# Patient Record
Sex: FEMALE | Race: WHITE | Employment: UNEMPLOYED | URBAN - METROPOLITAN AREA
[De-identification: names, ages, dates, MRNs, and addresses within clinical notes are randomized per-mention and may not be internally consistent; named-entity substitution may affect disease eponyms.]

---

## 2019-04-26 ENCOUNTER — HOSPITAL ENCOUNTER (EMERGENCY)
Age: 49
Discharge: HOME | End: 2019-04-26
Attending: EMERGENCY MEDICINE
Payer: COMMERCIAL

## 2019-04-26 VITALS
OXYGEN SATURATION: 97 % | HEIGHT: 65 IN | TEMPERATURE: 98.4 F | DIASTOLIC BLOOD PRESSURE: 97 MMHG | SYSTOLIC BLOOD PRESSURE: 181 MMHG | RESPIRATION RATE: 18 BRPM | WEIGHT: 224 LBS | HEART RATE: 78 BPM | BODY MASS INDEX: 37.32 KG/M2

## 2019-04-26 DIAGNOSIS — N12 PYELONEPHRITIS: Primary | ICD-10-CM

## 2019-04-26 LAB
-: ABNORMAL
AMORPHOUS: ABNORMAL
BACTERIA: ABNORMAL
BILIRUBIN URINE: NEGATIVE
CASTS UA: ABNORMAL /LPF (ref 0–8)
COLOR: YELLOW
CRYSTALS, UA: ABNORMAL /HPF
EPITHELIAL CELLS UA: ABNORMAL /HPF (ref 0–5)
GLUCOSE URINE: ABNORMAL
HCG(URINE) PREGNANCY TEST: NEGATIVE
KETONES, URINE: ABNORMAL
LEUKOCYTE ESTERASE, URINE: ABNORMAL
MUCUS: ABNORMAL
NITRITE, URINE: POSITIVE
OTHER OBSERVATIONS UA: ABNORMAL
PH UA: 6 (ref 5–8)
PROTEIN UA: NEGATIVE
RBC UA: ABNORMAL /HPF (ref 0–4)
RENAL EPITHELIAL, UA: ABNORMAL /HPF
SPECIFIC GRAVITY UA: 1.02 (ref 1–1.03)
TRICHOMONAS: ABNORMAL
TURBIDITY: CLEAR
URINE HGB: NEGATIVE
UROBILINOGEN, URINE: NORMAL
WBC UA: ABNORMAL /HPF (ref 0–5)
YEAST: ABNORMAL

## 2019-04-26 PROCEDURE — 2580000003 HC RX 258: Performed by: EMERGENCY MEDICINE

## 2019-04-26 PROCEDURE — 84703 CHORIONIC GONADOTROPIN ASSAY: CPT

## 2019-04-26 PROCEDURE — 6370000000 HC RX 637 (ALT 250 FOR IP): Performed by: EMERGENCY MEDICINE

## 2019-04-26 PROCEDURE — 81001 URINALYSIS AUTO W/SCOPE: CPT

## 2019-04-26 PROCEDURE — 87186 SC STD MICRODIL/AGAR DIL: CPT

## 2019-04-26 PROCEDURE — 87088 URINE BACTERIA CULTURE: CPT

## 2019-04-26 PROCEDURE — 87086 URINE CULTURE/COLONY COUNT: CPT

## 2019-04-26 PROCEDURE — 99284 EMERGENCY DEPT VISIT MOD MDM: CPT

## 2019-04-26 RX ORDER — CLOPIDOGREL BISULFATE 75 MG/1
75 TABLET ORAL DAILY
COMMUNITY
Start: 2019-05-31 | End: 2021-09-13 | Stop reason: SDUPTHER

## 2019-04-26 RX ORDER — CEPHALEXIN 250 MG/1
500 CAPSULE ORAL ONCE
Status: COMPLETED | OUTPATIENT
Start: 2019-04-26 | End: 2019-04-26

## 2019-04-26 RX ORDER — CEPHALEXIN 500 MG/1
500 CAPSULE ORAL 2 TIMES DAILY
Qty: 20 CAPSULE | Refills: 0 | Status: SHIPPED | OUTPATIENT
Start: 2019-04-26 | End: 2019-05-06

## 2019-04-26 RX ORDER — RANOLAZINE 500 MG/1
500 TABLET, EXTENDED RELEASE ORAL 2 TIMES DAILY
COMMUNITY
Start: 2019-05-31 | End: 2020-10-01 | Stop reason: SDUPTHER

## 2019-04-26 RX ORDER — ATORVASTATIN CALCIUM 80 MG/1
80 TABLET, FILM COATED ORAL DAILY
COMMUNITY
Start: 2019-05-31 | End: 2021-09-13 | Stop reason: SDUPTHER

## 2019-04-26 RX ORDER — ASPIRIN 81 MG/1
81 TABLET, CHEWABLE ORAL DAILY
COMMUNITY
Start: 2019-05-31

## 2019-04-26 RX ORDER — TAMSULOSIN HYDROCHLORIDE 0.4 MG/1
0.4 CAPSULE ORAL ONCE
Status: COMPLETED | OUTPATIENT
Start: 2019-04-26 | End: 2019-04-26

## 2019-04-26 RX ORDER — 0.9 % SODIUM CHLORIDE 0.9 %
1000 INTRAVENOUS SOLUTION INTRAVENOUS ONCE
Status: COMPLETED | OUTPATIENT
Start: 2019-04-26 | End: 2019-04-26

## 2019-04-26 RX ORDER — METOPROLOL SUCCINATE 100 MG/1
100 TABLET, EXTENDED RELEASE ORAL 2 TIMES DAILY
COMMUNITY
Start: 2019-05-31 | End: 2019-05-31 | Stop reason: SDUPTHER

## 2019-04-26 RX ORDER — LEVOTHYROXINE SODIUM 0.07 MG/1
75 TABLET ORAL DAILY
COMMUNITY
Start: 2019-05-31 | End: 2019-05-31 | Stop reason: SDUPTHER

## 2019-04-26 RX ADMIN — CEPHALEXIN 500 MG: 250 CAPSULE ORAL at 10:17

## 2019-04-26 RX ADMIN — SODIUM CHLORIDE 1000 ML: 9 INJECTION, SOLUTION INTRAVENOUS at 09:29

## 2019-04-26 RX ADMIN — TAMSULOSIN HYDROCHLORIDE 0.4 MG: 0.4 CAPSULE ORAL at 09:26

## 2019-04-26 ASSESSMENT — PAIN SCALES - GENERAL: PAINLEVEL_OUTOF10: 9

## 2019-04-26 ASSESSMENT — ENCOUNTER SYMPTOMS
VOMITING: 0
COUGH: 0
BLOOD IN STOOL: 0
BACK PAIN: 0
WHEEZING: 0
DIARRHEA: 0
NAUSEA: 0
ABDOMINAL PAIN: 0
SHORTNESS OF BREATH: 0
SORE THROAT: 0
CONSTIPATION: 0

## 2019-04-26 ASSESSMENT — PAIN DESCRIPTION - PAIN TYPE: TYPE: ACUTE PAIN

## 2019-04-26 ASSESSMENT — PAIN DESCRIPTION - LOCATION: LOCATION: ABDOMEN

## 2019-04-26 NOTE — ED PROVIDER NOTES
Emergency Medicine Attending Note    I have seen and examined the patient in conjunction with the Resident/MLP. Please see my key portion documented:      I agree with the assessment and plan as discussed with Dr. Michael Moore. Electronically signed:  TRISTAN Swenson MD  04/26/19 [id number]

## 2019-04-26 NOTE — ED PROVIDER NOTES
[facility] ED  Emergency Department Encounter  EmergencyMedicine Resident     Pt Beatrice Villanueva  MRN: [id number]  Rositagfkishan 1970  Date of evaluation: 19  PCP:  No primary care provider on file. CHIEF COMPLAINT       Chief Complaint   Patient presents with    Abdominal Pain     hx of kidney infections and stones per patient. x 12 hours    Nausea       HISTORY OF PRESENT ILLNESS  (Location/Symptom, Timing/Onset, Context/Setting, Quality, Duration, Modifying Factors, Severity.)      Stephanie Bonilla is a 57 y.o. female who presents with a complaint that overnight she began having sharp left-sided flank pain radiating to her groin. She has a history of nephrolithiasis. She denies any vaginal discharge. She does admit to some dark colored urine over the past few days. She also admits to some dysuria. She has no peritoneal tenderness. Denies any shortness of breath or chest pain. She is afebrile today and has not taken any medications yet. PAST MEDICAL / SURGICAL / SOCIAL / FAMILY HISTORY      has a past medical history of Anxiety, Arthritis, CAD (coronary artery disease), Depression, Diabetes mellitus ([address] Hyperlipidemia, Kidney stone, and Myocardial infarct ([facility] [address] has a past surgical history that includes Cardiac surgery; back surgery;  section; Mastoid surgery; and Tonsillectomy.     Social History     Socioeconomic History    Marital status:      Spouse name: Not on file    Number of children: Not on file    Years of education: Not on file    Highest education level: Not on file   Occupational History    Not on file   Social Needs    Financial resource strain: Not on file    Food insecurity:     Worry: Not on file     Inability: Not on file    Transportation needs:     Medical: Not on file     Non-medical: Not on file   Tobacco Use    Smoking status: Former Smoker     Packs/day: 3.00     Years: 20.00     Pack years: 60.00     Types: Cigarettes     Last attempt to quit: 2016     Years since quitting: 3.3   Substance and Sexual Activity    Alcohol use: Yes     Comment: social    Drug use: Yes     Frequency: 7.0 times per week     Types: Marijuana    Sexual activity: Yes   Lifestyle    Physical activity:     Days per week: Not on file     Minutes per session: Not on file    Stress: Not on file   Relationships    Social connections:     Talks on phone: Not on file     Gets together: Not on file     Attends Jainism service: Not on file     Active member of club or organization: Not on file     Attends meetings of clubs or organizations: Not on file     Relationship status: Not on file    Intimate partner violence:     Fear of current or ex partner: Not on file     Emotionally abused: Not on file     Physically abused: Not on file     Forced sexual activity: Not on file   Other Topics Concern    Not on file   Social History Narrative    Not on file       No family history on file. Allergies:  Patient has no known allergies. Home Medications:  Prior to Admission medications    Medication Sig Start Date End Date Taking?  Authorizing Provider   clopidogrel (PLAVIX) 75 MG tablet Take 75 mg by mouth daily   Yes Historical Provider, MD   atorvastatin (LIPITOR) 80 MG tablet Take 80 mg by mouth daily   Yes Historical Provider, MD   metoprolol succinate (TOPROL XL) 100 MG extended release tablet Take 100 mg by mouth 2 times daily   Yes Historical Provider, MD   ranolazine (RANEXA) 500 MG extended release tablet Take 500 mg by mouth 2 times daily   Yes Historical Provider, MD   levothyroxine (SYNTHROID) 75 MCG tablet Take 75 mcg by mouth Daily   Yes Historical Provider, MD   aspirin 81 MG chewable tablet Take 81 mg by mouth daily   Yes Historical Provider, MD   cephALEXin (KEFLEX) 500 MG capsule Take 1 capsule by mouth 2 times daily for 10 days 4/26/19 5/6/19 Yes Roberto Herrera, DO       REVIEW OF SYSTEMS    (2-9 systems for level 4, 10 or more for level 5)      Review of Systems   Constitutional: Negative for diaphoresis and fever. HENT: Negative for sore throat. Respiratory: Negative for cough, shortness of breath and wheezing. Cardiovascular: Negative for chest pain, palpitations and leg swelling. Gastrointestinal: Negative for abdominal pain, blood in stool, constipation, diarrhea, nausea and vomiting. Genitourinary: Positive for dysuria and flank pain. Negative for frequency, hematuria, vaginal bleeding, vaginal discharge and vaginal pain. Musculoskeletal: Negative for back pain and neck pain. Skin: Negative for rash. Neurological: Negative for dizziness and headaches. Hematological: Does not bruise/bleed easily. PHYSICAL EXAM   (up to 7 for level 4, 8 or more for level 5)      INITIAL VITALS:   BP (!) 181/97   Pulse 78   Temp 98.4 °F (36.9 °C) (Oral)   Resp 18   Ht 5' 5\" (1.651 m)   Wt 224 lb (101.6 kg)   LMP 04/19/2019   SpO2 97%   BMI 37.28 kg/m²     Physical Exam   Constitutional: She is oriented to person, place, and time. She appears well-developed and well-nourished. HENT:   Head: Normocephalic and atraumatic. Eyes: Pupils are equal, round, and reactive to light. Right eye exhibits no discharge. Left eye exhibits no discharge. Neck: Normal range of motion. Cardiovascular: Normal rate, regular rhythm and normal heart sounds. Exam reveals no gallop and no friction rub. No murmur heard. Pulmonary/Chest: No respiratory distress. She has no wheezes. She has no rales. She exhibits no tenderness. Abdominal: Soft. Bowel sounds are normal. She exhibits no distension and no mass. There is no tenderness. There is no rebound and no guarding. Patient has left CVA tenderness radiating to left groin. She has no abdominal tenderness on the right side or epigastric tenderness. Musculoskeletal: Normal range of motion. She exhibits no edema, tenderness or deformity.    Neurological: She is alert and oriented to person, place, and time. Skin: Skin is warm, dry and intact. No rash noted. She is not diaphoretic. No erythema. No pallor. Psychiatric: She has a normal mood and affect. Her speech is normal and behavior is normal. Judgment and thought content normal. Cognition and memory are normal.       DIFFERENTIAL  DIAGNOSIS     PLAN (LABS / IMAGING / EKG):  Orders Placed This Encounter   Procedures    Urine Culture    Urinalysis with Microscopic    Pregnancy, Urine       MEDICATIONS ORDERED:  Orders Placed This Encounter   Medications    tamsulosin (FLOMAX) capsule 0.4 mg    0.9 % sodium chloride bolus    cephALEXin (KEFLEX) 500 MG capsule     Sig: Take 1 capsule by mouth 2 times daily for 10 days     Dispense:  20 capsule     Refill:  0    cephALEXin (KEFLEX) capsule 500 mg       DDX: Herniated disc, AAA rupture, pyelonephritis, kidney stone, musculoskeletal pain, retroperitoneal hematoma, pancreatitis, splenic rupture, pneumonia      DIAGNOSTIC RESULTS / EMERGENCY DEPARTMENT COURSE / MDM     LABS:  Results for orders placed or performed during the hospital encounter of 04/26/19   Urine Culture   Result Value Ref Range    Specimen Description . CLEAN CATCH URINE     Special Requests NOT REPORTED     Culture ESCHERICHIA COLI >[id number] CFU/ML (A)        Susceptibility    Escherichia coli - BACTERIAL SUSCEPTIBILITY PANEL RENÉE     amikacin Value in next row        NOT REPORTED     ampicillin Value in next row Resistant       >=32RESISTANT     ampicillin-sulbactam Value in next row        NOT REPORTED     aztreonam Value in next row Sensitive       <=1SUSCEPTIBLE     ceFAZolin Value in next row Sensitive       <=4SUSCEPTIBLE     ceFAZolin Value in next row Sensitive       <=4SUSCEPTIBLE     cefepime Value in next row        NOT REPORTED     cefTRIAXone Value in next row Sensitive       <=1SUSCEPTIBLE     ciprofloxacin Value in next row Sensitive       <=0.25SUSCEPTIBLE     ertapenem Value in next row        NOT REPORTED Confirmatory Extended Spectrum Beta-Lactamase Value in next row Negative       NOT REPORTED     gentamicin Value in next row Sensitive       <=1SUSCEPTIBLE     meropenem Value in next row        NOT REPORTED     nitrofurantoin Value in next row Sensitive       <=16SUSCEPTIBLE     tigecycline Value in next row        NOT REPORTED     tobramycin Value in next row Sensitive       <=1SUSCEPTIBLE     trimethoprim-sulfamethoxazole Value in next row Sensitive       <=20SUSCEPTIBLE     piperacillin-tazobactam Value in next row Sensitive       <=4SUSCEPTIBLE   Urinalysis with Microscopic   Result Value Ref Range    Color, UA YELLOW YELLOW    Turbidity UA CLEAR CLEAR    Glucose, Ur 3+ (A) NEGATIVE    Bilirubin Urine NEGATIVE NEGATIVE    Ketones, Urine MODERATE (A) NEGATIVE    Specific Gravity, UA 1.021 1.005 - 1.030    Urine Hgb NEGATIVE NEGATIVE    pH, UA 6.0 5.0 - 8.0    Protein, UA NEGATIVE NEGATIVE    Urobilinogen, Urine Normal Normal    Nitrite, Urine POSITIVE (A) NEGATIVE    Leukocyte Esterase, Urine SMALL (A) NEGATIVE    -          WBC, UA 10 TO 20 0 - 5 /HPF    RBC, UA 0 TO 2 0 - 4 /HPF    Casts UA  0 - 8 /LPF     2 TO 5 HYALINE Reference range defined for non-centrifuged specimen. Crystals UA NOT REPORTED None /HPF    Epithelial Cells UA 2 TO 5 0 - 5 /HPF    Renal Epithelial, Urine NOT REPORTED 0 /HPF    Bacteria, UA MANY (A) None    Mucus, UA NOT REPORTED None    Trichomonas, UA NOT REPORTED None    Amorphous, UA NOT REPORTED None    Other Observations UA NOT REPORTED NOT REQ. Yeast, UA NOT REPORTED None   Pregnancy, Urine   Result Value Ref Range    HCG(Urine) Pregnancy Test NEGATIVE NEGATIVE       RADIOLOGY:     No results found. Bedside ultrasound performed by attending does not show any acute hydronephrosis.     MDM/EMERGENCY DEPARTMENT COURSE:      ED Course as of Apr 30 0956   Fri Apr 26, 2019   0926 Patient's vitals are stable, afebrile with no antipyretics, will check urinalysis for urinary tract infection and large amount hemoglobin and urine. [KW]   1010 Nitrite, Urine(!): POSITIVE [KW]   1011 Leukocyte Esterase, Urine(!): SMALL [KW]   1011 Patient has signs of urinary tract infection, will give first dose of Keflex here and discharged with Keflex for home. Bacteria, UA(!): MANY [KW]   1011 No blood in urine, lower suspicion for acute nephrolithiasis. [KW]   1013 We'll treat patient with first dose of Keflex in the emergency department followed by 10 day course of Keflex outpatient, patient instructed to return if she should develop fevers or worsening symptoms. [KW]   8990 HCG(Urine) Pregnancy Test: NEGATIVE [KW]      ED Course User Index  [KW] Roberto Herrera DO           PROCEDURES:  None    CONSULTS:  None    CRITICAL CARE:  None    FINAL IMPRESSION      1.  Pyelonephritis          DISPOSITION / PLAN     DISPOSITION Decision To Discharge    PATIENT REFERRED TO:  [facility] AT [place] [id number] [phone]  Schedule an appointment as soon as possible for a visit   For Follow Up    [facility]  [address] [place] [address]  [phone]    If symptoms worsen, or fever develops      DISCHARGE MEDICATIONS:  Discharge Medication List as of 4/26/2019 10:13 AM      START taking these medications    Details   cephALEXin (KEFLEX) 500 MG capsule Take 1 capsule by mouth 2 times daily for 10 days, Disp-20 capsule, R-0Print             Roberto Herrera DO  Emergency Medicine Resident    (Please note that portions of this note were completed with a voicerecognition program.  Efforts were made to edit the dictations but occasionally words are mis-transcribed.)       Roberto Herrera,   04/26/19 [address] [place] [address], DO  04/26/19 [address] [place] [address], DO  04/30/19 0453

## 2019-04-26 NOTE — ED NOTES
Bed: 08  Expected date:   Expected time:   Means of arrival:   Comments:  dereje Rincon RN  04/26/19 [id number]

## 2019-04-26 NOTE — ED TRIAGE NOTES
Patient arrived to unit via ems. Patient in wheelchair. Complains of left sided abdominal pain with minor nausea x 12 hours. Patient reports history of kidney issues. Patient able to ambulate to cot, reports pain 9/10.

## 2019-04-27 LAB
CULTURE: ABNORMAL
Lab: ABNORMAL
SPECIMEN DESCRIPTION: ABNORMAL

## 2019-05-25 ENCOUNTER — HOSPITAL ENCOUNTER (EMERGENCY)
Age: 49
Discharge: HOME | End: 2019-05-25
Attending: EMERGENCY MEDICINE
Payer: COMMERCIAL

## 2019-05-25 ENCOUNTER — APPOINTMENT (OUTPATIENT)
Dept: MRI IMAGING | Age: 49
End: 2019-05-25
Payer: COMMERCIAL

## 2019-05-25 ENCOUNTER — OFFICE VISIT (OUTPATIENT)
Dept: PRIMARY CARE CLINIC | Age: 49
End: 2019-05-25
Payer: COMMERCIAL

## 2019-05-25 VITALS
SYSTOLIC BLOOD PRESSURE: 140 MMHG | DIASTOLIC BLOOD PRESSURE: 92 MMHG | BODY MASS INDEX: 35.65 KG/M2 | HEIGHT: 65 IN | WEIGHT: 214 LBS

## 2019-05-25 VITALS
RESPIRATION RATE: 18 BRPM | BODY MASS INDEX: 37.92 KG/M2 | TEMPERATURE: 98.4 F | DIASTOLIC BLOOD PRESSURE: 102 MMHG | WEIGHT: 214 LBS | HEIGHT: 63 IN | HEART RATE: 94 BPM | OXYGEN SATURATION: 98 % | SYSTOLIC BLOOD PRESSURE: 149 MMHG

## 2019-05-25 DIAGNOSIS — R20.2 NUMBNESS AND TINGLING OF BOTH UPPER EXTREMITIES: ICD-10-CM

## 2019-05-25 DIAGNOSIS — M54.2 CERVICAL PAIN: Primary | ICD-10-CM

## 2019-05-25 DIAGNOSIS — F41.8 ANXIETY WITH DEPRESSION: ICD-10-CM

## 2019-05-25 DIAGNOSIS — M79.602 PARESTHESIA AND PAIN OF BOTH UPPER EXTREMITIES: ICD-10-CM

## 2019-05-25 DIAGNOSIS — R29.898 WEAKNESS OF BOTH UPPER EXTREMITIES: ICD-10-CM

## 2019-05-25 DIAGNOSIS — M79.601 PARESTHESIA AND PAIN OF BOTH UPPER EXTREMITIES: ICD-10-CM

## 2019-05-25 DIAGNOSIS — R20.2 PARESTHESIA AND PAIN OF BOTH UPPER EXTREMITIES: ICD-10-CM

## 2019-05-25 DIAGNOSIS — R20.0 NUMBNESS AND TINGLING OF BOTH UPPER EXTREMITIES: ICD-10-CM

## 2019-05-25 DIAGNOSIS — M54.2 CERVICAL PAIN (NECK): Primary | ICD-10-CM

## 2019-05-25 PROCEDURE — 4004F PT TOBACCO SCREEN RCVD TLK: CPT | Performed by: NURSE PRACTITIONER

## 2019-05-25 PROCEDURE — 99284 EMERGENCY DEPT VISIT MOD MDM: CPT

## 2019-05-25 PROCEDURE — 99213 OFFICE O/P EST LOW 20 MIN: CPT | Performed by: NURSE PRACTITIONER

## 2019-05-25 PROCEDURE — 6370000000 HC RX 637 (ALT 250 FOR IP): Performed by: EMERGENCY MEDICINE

## 2019-05-25 PROCEDURE — G8427 DOCREV CUR MEDS BY ELIG CLIN: HCPCS | Performed by: NURSE PRACTITIONER

## 2019-05-25 PROCEDURE — A9579 GAD-BASE MR CONTRAST NOS,1ML: HCPCS | Performed by: EMERGENCY MEDICINE

## 2019-05-25 PROCEDURE — G8419 CALC BMI OUT NRM PARAM NOF/U: HCPCS | Performed by: NURSE PRACTITIONER

## 2019-05-25 PROCEDURE — 96374 THER/PROPH/DIAG INJ IV PUSH: CPT

## 2019-05-25 PROCEDURE — 2709999900 MRI CERVICAL SPINE W WO CONTRAST

## 2019-05-25 PROCEDURE — 6360000002 HC RX W HCPCS: Performed by: EMERGENCY MEDICINE

## 2019-05-25 PROCEDURE — 6360000004 HC RX CONTRAST MEDICATION: Performed by: EMERGENCY MEDICINE

## 2019-05-25 RX ORDER — IBUPROFEN 800 MG/1
800 TABLET ORAL ONCE
Status: COMPLETED | OUTPATIENT
Start: 2019-05-25 | End: 2019-05-25

## 2019-05-25 RX ORDER — LEVOTHYROXINE SODIUM 0.07 MG/1
75 TABLET ORAL DAILY
Qty: 30 TABLET | Refills: 0 | Status: CANCELLED | OUTPATIENT
Start: 2019-05-25 | End: 2019-06-24

## 2019-05-25 RX ORDER — FENTANYL CITRATE 50 UG/ML
50 INJECTION, SOLUTION INTRAMUSCULAR; INTRAVENOUS ONCE
Status: COMPLETED | OUTPATIENT
Start: 2019-05-25 | End: 2019-05-25

## 2019-05-25 RX ORDER — HYDROCODONE BITARTRATE AND ACETAMINOPHEN 5; 325 MG/1; MG/1
1 TABLET ORAL EVERY 6 HOURS PRN
Qty: 10 TABLET | Refills: 0 | Status: SHIPPED | OUTPATIENT
Start: 2019-05-25 | End: 2019-05-31 | Stop reason: SDUPTHER

## 2019-05-25 RX ORDER — CYCLOBENZAPRINE HCL 5 MG
5 TABLET ORAL 3 TIMES DAILY PRN
Qty: 30 TABLET | Refills: 0 | Status: SHIPPED | OUTPATIENT
Start: 2019-05-25 | End: 2019-05-31 | Stop reason: SDUPTHER

## 2019-05-25 RX ORDER — IBUPROFEN 600 MG/1
600 TABLET ORAL EVERY 6 HOURS PRN
Qty: 30 TABLET | Refills: 0 | Status: SHIPPED | OUTPATIENT
Start: 2019-05-25 | End: 2019-05-31 | Stop reason: SDUPTHER

## 2019-05-25 RX ORDER — DIAZEPAM 5 MG/1
5 TABLET ORAL ONCE
Status: COMPLETED | OUTPATIENT
Start: 2019-05-25 | End: 2019-05-25

## 2019-05-25 RX ADMIN — IBUPROFEN 800 MG: 800 TABLET, FILM COATED ORAL at 11:45

## 2019-05-25 RX ADMIN — DIAZEPAM 5 MG: 5 TABLET ORAL at 11:45

## 2019-05-25 RX ADMIN — GADOTERIDOL 19 ML: 279.3 INJECTION, SOLUTION INTRAVENOUS at 13:52

## 2019-05-25 RX ADMIN — FENTANYL CITRATE 50 MCG: 50 INJECTION, SOLUTION INTRAMUSCULAR; INTRAVENOUS at 14:09

## 2019-05-25 ASSESSMENT — PAIN SCALES - GENERAL
PAINLEVEL_OUTOF10: 9

## 2019-05-25 ASSESSMENT — ENCOUNTER SYMPTOMS
COUGH: 0
SHORTNESS OF BREATH: 0
VOMITING: 0
COLOR CHANGE: 0
CONSTIPATION: 0
SHORTNESS OF BREATH: 0
TROUBLE SWALLOWING: 0
COUGH: 1
DIARRHEA: 0
PHOTOPHOBIA: 0
BACK PAIN: 1
ABDOMINAL PAIN: 0
CONSTIPATION: 1
NAUSEA: 0
DIARRHEA: 0

## 2019-05-25 ASSESSMENT — PAIN DESCRIPTION - DESCRIPTORS: DESCRIPTORS: SHARP;SHOOTING;STABBING

## 2019-05-25 ASSESSMENT — PAIN DESCRIPTION - FREQUENCY: FREQUENCY: CONTINUOUS

## 2019-05-25 ASSESSMENT — PAIN DESCRIPTION - PAIN TYPE: TYPE: CHRONIC PAIN

## 2019-05-25 ASSESSMENT — PAIN DESCRIPTION - LOCATION: LOCATION: NECK

## 2019-05-25 NOTE — ED NOTES
Family arrives back to bedside. Awaiting MRI result. Will continue to monitor.       Raphael Fisher RN  05/25/19 [id number]

## 2019-05-25 NOTE — ED NOTES
Pt returned from MRI. Pt asking for pain medications, states she is very uncomfortable. Resident notified.       Chuck Renee RN  05/25/19 7573

## 2019-05-25 NOTE — ED NOTES
Pt provided box lunch. Dr. Laron Gutierrez at bedside to update pt on results and plan of care.       Chuck Renee RN  05/25/19 6441

## 2019-05-25 NOTE — ED NOTES
Pt to ed with , pt states she was sent from the walk in clinic at [address] for evaluation. Pt states she had cervical fusion c5-c7 in November 2017 while living in [place]. Pt states for the past 1.5 weeks she has been experiencing symptoms intermittently. Pt c/o headache, bilateral arm numbness, left leg numbness, dizziness, anxiety, neck pain. Pt states symptoms are present during the day but are worse at night. Pt is alert, oriented, speaking in complete sentences, no distress noted, pt is tearful.  Pt rates pain 9/10     Chuck Renee RN  05/25/19 8367

## 2019-05-25 NOTE — ED PROVIDER NOTES
Types: Marijuana    Sexual activity: Yes   Lifestyle    Physical activity:     Days per week: Not on file     Minutes per session: Not on file    Stress: Not on file   Relationships    Social connections:     Talks on phone: Not on file     Gets together: Not on file     Attends Pentecostal service: Not on file     Active member of club or organization: Not on file     Attends meetings of clubs or organizations: Not on file     Relationship status: Not on file    Intimate partner violence:     Fear of current or ex partner: Not on file     Emotionally abused: Not on file     Physically abused: Not on file     Forced sexual activity: Not on file   Other Topics Concern    Not on file   Social History Narrative    Not on file       History reviewed. No pertinent family history. Allergies:  Patient has no known allergies. Home Medications:  Prior to Admission medications    Medication Sig Start Date End Date Taking? Authorizing Provider   AMITRIPTYLINE HCL PO Take 2.5 mg by mouth daily   Yes Historical Provider, MD   HYDROcodone-acetaminophen (NORCO) 5-325 MG per tablet Take 1 tablet by mouth every 6 hours as needed for Pain for up to 3 days.  5/25/19 5/28/19 Yes Naresh Alcantar MD   cyclobenzaprine (FLEXERIL) 5 MG tablet Take 1 tablet by mouth 3 times daily as needed for Muscle spasms 5/25/19 6/4/19 Yes Naresh Alcantar MD   ibuprofen (ADVIL;MOTRIN) 600 MG tablet Take 1 tablet by mouth every 6 hours as needed for Pain 5/25/19  Yes Naresh Alcantar MD   clopidogrel (PLAVIX) 75 MG tablet Take 75 mg by mouth daily    Historical Provider, MD   atorvastatin (LIPITOR) 80 MG tablet Take 80 mg by mouth daily    Historical Provider, MD   metoprolol succinate (TOPROL XL) 100 MG extended release tablet Take 100 mg by mouth 2 times daily    Historical Provider, MD   ranolazine (RANEXA) 500 MG extended release tablet Take 500 mg by mouth 2 times daily    Historical Provider, MD   levothyroxine (SYNTHROID) 75 MCG tablet LABS:  No results found for this visit on 05/25/19. IMPRESSION: 51-year-old female with history of cervical spinal fusion presents with acute on intermittent cervical pain with related upper extremity sensory changes. Similar symptoms in the past, states current symptoms have persisted for 7 days. Recently moved to [facility] area, has not establish with neurosurgeon. Neurosurgery consult to and recommended MRI cervical spine. Images consistent with cervical stenosis and postoperative changes. RADIOLOGY:    Mri Cervical Spine W Wo Contrast    Result Date: 5/25/2019  EXAMINATION: MRI OF THE CERVICAL SPINE WITHOUT AND WITH CONTRAST  5/25/2019 1:09 pm: TECHNIQUE: Multiplanar multisequence MRI of the cervical spine was performed without and with the administration of intravenous contrast. COMPARISON: None. HISTORY: ORDERING SYSTEM PROVIDED HISTORY: Hx of fusion, b/l upper extremity sensory changes Initial encounter. FINDINGS: BONES/ALIGNMENT: Vertebral heights are maintained alignment is normal.  The patient is status post prior C5-6 and C6-7 ACDF with anterior metallic fixation hardware causing local susceptibility artifact. Reactive edematous marrow changes are noted about the C6-7 facet joints, likely secondary to mechanical stress is. SPINAL CORD: There is focal T2 hyperintensity in the central cord at C5-6 with mild volume loss. Spinal cord otherwise has normal signal and morphology. There is no abnormal fluid collection or mass within the spinal canal. SOFT TISSUES: There is mild reactive soft tissue edema and enhancement about the C6-7 facet joints. No fluid collection or mass is evident. C2-C3: Disc height and signal maintained. No neural foraminal narrowing or spinal canal stenosis. C3-C4: Disc height and signal maintained. No left neural foraminal narrowing. Mild right neural foraminal narrowing secondary uncovertebral hypertrophy. No spinal canal stenosis.  C4-C5: Disc height and signal maintained. Mild bilateral neural foraminal narrowing secondary to facet hypertrophy. No spinal canal stenosis. C5-C6: Prior ACDF. No left neural foraminal narrowing. Moderate right neural foraminal narrowing secondary to uncovertebral hypertrophy. Mild residual spinal canal stenosis secondary to right eccentric posterior osteophyte ridging. C6-C7: Prior ACDF. Mild bilateral neural foraminal narrowing secondary to uncovertebral hypertrophy. Mild residual spinal canal stenosis secondary to posterior osteophyte ridging. C7-T1: Disc height and signal maintained. Mild bilateral neural foraminal narrowing secondary to facet hypertrophy. No spinal canal stenosis. 1. Changes of prior C5-6 and C6-7 ACDF with mild residual spinal canal stenosis at the operative levels secondary to posterior osteophyte ridging. 2. Marrow and soft tissue edema and enhancement about the C6-7 facet joints, likely reactive secondary to mechanical stress. 3. Mild-to-moderate multilevel neural foraminal narrowing as detailed above. EKG  None    All EKG's are interpreted by the Emergency Department Physician who either signs or Co-signs this chart in the absence of a cardiologist.    EMERGENCY DEPARTMENT COURSE:    57 old female with history of cervical, lumbar fusion presents with acute on intermittent neck, low back pain. Patient states symptoms been present for approximately 1 week. Reports no improvement with outpatient use of Tylenol. Recent moved to the [facility] from [place], where she underwent cervical surgery. Reporting dizziness, neck pain, back pain. Vital signs remarkable for mildly elevated blood pressure. No focal neurological deficits. We'll provide muscle relaxant, NSAIDs. Consult to neurosurgery made. ED Course as of May 25 1759   Sat May 25, 2019   1230 Neurosurgery down to evaluate the patient. Requested MRI of the cervical spine. Orders placed. We'll continue to monitor and treat.     [RB] 1450 MRI results reviewed and discussed with neurosurgery resident. Patient reassessed and reporting symptomatic improvement following medications provided. Neurosurgery follow-up scheduled. We'll provide outpatient analgesics for symptomatic management. Return precautions given. Patient remained stable throughout entirety of ED visit while under my care. [RB]      ED Course User Index  [RB] Katia Hernandez MD         PROCEDURES:  None    CONSULTS:  IP CONSULT TO NEUROSURGERY    CRITICAL CARE:  None    FINAL IMPRESSION      1. Cervical pain    2. Paresthesia and pain of both upper extremities          DISPOSITION / PLAN     DISPOSITION Decision To Discharge 05/25/2019 02:39:01 PM      PATIENT REFERRED TO:  No follow-up provider specified. DISCHARGE MEDICATIONS:  Discharge Medication List as of 5/25/2019  2:39 PM      START taking these medications    Details   HYDROcodone-acetaminophen (NORCO) 5-325 MG per tablet Take 1 tablet by mouth every 6 hours as needed for Pain for up to 3 days. , Disp-10 tablet, R-0Print      cyclobenzaprine (FLEXERIL) 5 MG tablet Take 1 tablet by mouth 3 times daily as needed for Muscle spasms, Disp-30 tablet, R-0Print      ibuprofen (ADVIL;MOTRIN) 600 MG tablet Take 1 tablet by mouth every 6 hours as needed for Pain, Disp-30 tablet, R-0Print             Flora Degroot DO    Emergency Medicine Resident    (Please note that portions of thisnote were completed with a voice recognition program.  Efforts were made to edit the dictations but occasionally words are mis-transcribed.)      Katia Hernandez MD  05/25/19 6027

## 2019-05-25 NOTE — PROGRESS NOTES
Types: Cigarettes     Last attempt to quit: 2016     Years since quitting: 3.3    Smokeless tobacco: Never Used   Substance Use Topics    Alcohol use: Yes     Comment: social     Current Outpatient Medications   Medication Sig Dispense Refill    clopidogrel (PLAVIX) 75 MG tablet Take 75 mg by mouth daily      atorvastatin (LIPITOR) 80 MG tablet Take 80 mg by mouth daily      metoprolol succinate (TOPROL XL) 100 MG extended release tablet Take 100 mg by mouth 2 times daily      ranolazine (RANEXA) 500 MG extended release tablet Take 500 mg by mouth 2 times daily      levothyroxine (SYNTHROID) 75 MCG tablet Take 75 mcg by mouth Daily      aspirin 81 MG chewable tablet Take 81 mg by mouth daily      AMITRIPTYLINE HCL PO Take 2.5 mg by mouth daily       No current facility-administered medications for this visit. No Known Allergies    Health Maintenance   Topic Date Due    Pneumococcal 0-64 years Vaccine (1 of 1 - PPSV23) 12/23/1978    HIV screen  12/23/1987    DTaP/Tdap/Td vaccine (1 - Tdap) 12/23/1991    Cervical cancer screen  12/23/1993    Lipid screen  12/23/2012    Diabetes screen  12/23/2012    Flu vaccine (Season Ended) 09/01/2019         Review of Systems   Constitutional: Positive for activity change. Negative for fever. HENT: Negative. Respiratory: Positive for cough. Negative for shortness of breath. Cardiovascular: Positive for leg swelling. Negative for chest pain. Gastrointestinal: Positive for constipation. Negative for diarrhea. Genitourinary: Negative. Negative for dysuria. Hx of kidney stones   Musculoskeletal: Positive for neck pain. Skin: Negative. Neurological: Positive for weakness, numbness and headaches. Neck surgery November 2017, lumbar fusion 2004   Psychiatric/Behavioral: The patient is nervous/anxious. Depression         Physical Exam   Constitutional: She is oriented to person, place, and time.  She appears well-developed and well-nourished. HENT:   Head: Normocephalic and atraumatic. Right Ear: External ear normal.   Left Ear: External ear normal.   Eyes: Conjunctivae are normal.   Neck:   Pain with palpation   Cardiovascular: Normal rate and regular rhythm. Pulmonary/Chest: Effort normal and breath sounds normal.   Abdominal: Soft. Musculoskeletal: She exhibits tenderness. Tenderness and hypersensitivity with light palpation to posterior neck and upper arms. Upper extremities with limited ROM   Neurological: She is alert and oriented to person, place, and time. Skin: Skin is warm and dry. Psychiatric: She has a normal mood and affect. Her behavior is normal.     BP (!) 140/92 (Site: Left Upper Arm, Position: Sitting, Cuff Size: Medium Adult)   Ht 5' 5\" (1.651 m)   Wt 214 lb (97.1 kg)   LMP 04/19/2019   BMI 35.61 kg/m²           Diagnosis Orders   1. Cervical pain (neck)     2. Anxiety with depression     3. Weakness of both upper extremities     4. Numbness and tingling of both upper extremities           Recommend going to ED for full evaluation. Establish Primary care   All patient questions were answered and pt voiced understanding. Pt and spouse agreed with treatment plan. Follow up as directed.     Electronically signed by SAVANA Mcdaniel CNP on 5/25/2019 at 1:11 PM

## 2019-05-25 NOTE — ED NOTES
Pt family member states he is leaving to run some errands and will come back a little later.       Sunitha Sargent RN  05/25/19 1322

## 2019-05-25 NOTE — PROGRESS NOTES
Visit Information    Have you changed or started any medications since your last visit including any over-the-counter medicines, vitamins, or herbal medicines? no   Have you stopped taking any of your medications? Is so, why? -  no  Are you having any side effects from any of your medications? - no    Have you seen any other physician or provider since your last visit?  no   Have you had any other diagnostic tests since your last visit?  no   Have you been seen in the emergency room and/or had an admission in a hospital since we last saw you?  yes - at [facility]   Have you had your routine dental cleaning in the past 6 months?  no     Do you have an active [email or web address] account? If no, what is the barrier?   No: pending    No care team member to display    Medical History Review  Past Medical, Family, and Social History reviewed and does not contribute to the patient presenting condition    Health Maintenance   Topic Date Due    HIV screen  12/23/1987    DTaP/Tdap/Td vaccine (1 - Tdap) 12/23/1991    Cervical cancer screen  12/23/1993    Lipid screen  12/23/2012    Diabetes screen  12/23/2012    Flu vaccine (Season Ended) 09/01/2019    Pneumococcal 0-64 years Vaccine  Aged Out

## 2019-05-25 NOTE — CONSULTS
Department of Neurosurgery                                       Resident Consult Note    Neurosurgeon:   []Dr. Greer Shelton  []Dr. Philomena España  []Dr. Susana Goetz   []Dr. Saqib Estes  []Dr. Estrella Jiménez  [x]Dr. Patricia Lala    History Obtained From:  patient, electronic medical record    CHIEF COMPLAINT:         B/l UE paraesthesias and pain x 1 week     HISTORY OF PRESENT ILLNESS:       The patient is a 57 y.o. female who presents with bilateral portion of any paresthesias with pain and neck pain ×1 week. Patient recently moved here from [facility] where she underwent cervical fusion in 2017. Patient also she has had occasional flareups similar to this but they have never been as severe or consistent. Patient denies any new trauma or injury and has no other concerns or issues at this time.     PAST MEDICAL HISTORY :       Past Medical History:        Diagnosis Date    Anxiety     Arthritis     CAD (coronary artery disease)     Depression     [facility] mellitus ([facility] [address]     Hyperlipidemia     Kidney stone     Myocardial infarct ([facility] [address]        Past Surgical History:        Procedure Laterality Date    BACK SURGERY      L5-S1, C5-7    CARDIAC SURGERY       SECTION      MASTOID SURGERY      TONSILLECTOMY         Social History:   Social History     Socioeconomic History    Marital status:      Spouse name: Not on file    Number of children: Not on file    Years of education: Not on file    Highest education level: Not on file   Occupational History    Not on file   Social Needs    Financial resource strain: Not on file    Food insecurity:     Worry: Not on file     Inability: Not on file    Transportation needs:     Medical: Not on file     Non-medical: Not on file   Tobacco Use    Smoking status: Current Every Day Smoker     Packs/day: 3.00     Years: 20.00     Pack years: 60.00     Types: Cigarettes     Last attempt to quit: 2016     Years since quitting: 3.3    Smokeless tobacco: Never Used Substance and Sexual Activity    Alcohol use: Yes     Comment: social    Drug use: Yes     Frequency: 7.0 times per week     Types: Marijuana    Sexual activity: Yes   Lifestyle    Physical activity:     Days per week: Not on file     Minutes per session: Not on file    Stress: Not on file   Relationships    Social connections:     Talks on phone: Not on file     Gets together: Not on file     Attends Taoist service: Not on file     Active member of club or organization: Not on file     Attends meetings of clubs or organizations: Not on file     Relationship status: Not on file    Intimate partner violence:     Fear of current or ex partner: Not on file     Emotionally abused: Not on file     Physically abused: Not on file     Forced sexual activity: Not on file   Other Topics Concern    Not on file   Social History Narrative    Not on file       Family History:   History reviewed. No pertinent family history. Allergies:  Patient has no known allergies. Home Medications:  Prior to Admission medications    Medication Sig Start Date End Date Taking? Authorizing Provider   AMITRIPTYLINE HCL PO Take 2.5 mg by mouth daily   Yes Historical Provider, MD   clopidogrel (PLAVIX) 75 MG tablet Take 75 mg by mouth daily    Historical Provider, MD   atorvastatin (LIPITOR) 80 MG tablet Take 80 mg by mouth daily    Historical Provider, MD   metoprolol succinate (TOPROL XL) 100 MG extended release tablet Take 100 mg by mouth 2 times daily    Historical Provider, MD   ranolazine (RANEXA) 500 MG extended release tablet Take 500 mg by mouth 2 times daily    Historical Provider, MD   levothyroxine (SYNTHROID) 75 MCG tablet Take 75 mcg by mouth Daily    Historical Provider, MD   aspirin 81 MG chewable tablet Take 81 mg by mouth daily    Historical Provider, MD       Current Medications:   No current facility-administered medications for this encounter.      REVIEW OF SYSTEMS:       CONSTITUTIONAL: negative for fatigue and malaise   EYES: negative for double vision and photophobia    HEENT: negative for tinnitus and sore throat   RESPIRATORY: negative for cough, shortness of breath   CARDIOVASCULAR: negative for chest pain, palpitations   GASTROINTESTINAL: negative for nausea, vomiting   GENITOURINARY: negative for incontinence   MUSCULOSKELETAL: Positive for neck pain bilateral upper extremity paresthesias and pain. NEUROLOGICAL: negative for seizures   PSYCHIATRIC: negative for agitated, patient is anxious      Review of systems otherwise negative. PHYSICAL EXAM:       BP (!) 149/102   Pulse 94   Temp 98.4 °F (36.9 °C)   Resp 18   Ht 5' 3\" (1.6 m)   Wt 214 lb (97.1 kg)   LMP 04/19/2019   SpO2 98%   BMI 37.91 kg/m²     CONSTITUTIONAL:  Well developed, well nourished, alert and oriented x 3, in no acute distress. GCS 15, nontoxic. No dysarthria, no aphasia. EOMI.     HEAD:  normocephalic, atraumatic    EYES:  PERRLA, EOMI.   ENT:  moist mucous membranes   NECK:  supple, symmetric, no midline tenderness to palpation    BACK:  without midline tenderness, step-offs or deformities    LUNGS:  Equal air entry bilaterally   CARDIOVASCULAR:  normal s1 / s2   ABDOMEN:  Soft, no rigidity   NEUROLOGIC:  EYE OPENING     Spontaneous - 4 []       To voice - 3 []       To pain - 2 []       None - 1 []    VERBAL RESPONSE     Appropriate, oriented - 5 []       Dazed or confused - 4 []       Syllables, expletives - 3 []       Grunts - 2 []       None - 1 []    MOTOR RESPONSE     Spontaneous, command - 6 []       Localizes pain - 5 []       Withdraws pain - 4 []       Abnormal flexion - 3 []       Abnormal extension - 2 []       None - 1 []            Total GCS: 15    Mental Status:  A & O x3, awake             Cranial Nerves:    cranial nerves II-XII are grossly intact    Motor Exam:    Drift:  absent  Tone:  normal    Motor exam is symmetrical 5 out of 5 all extremities bilaterally    Sensory:    Touch:    Right Upper Extremity: normal  Left Upper Extremity:  normal  Right Lower Extremity:  normal  Left Lower Extremity:  normal    Deep Tendon Reflexes:    Right Bicep:  1+  Left Bicep:  1+  Right Knee:  1+  Left Knee:  1+    Plantar Response:    Right:  downgoing  Left:  downgoing    Coordination/Dysmetria:  Heel to Shin:  Right:  Normal  Left: Normal  Finger to Nose:   Right:  normal  Left:  normal     Gait:  normal   SKIN:  no rash      LABS AND IMAGING:     CBC with Differential:  No results found for: WBC, RBC, HGB, HCT, PLT, MCV, MCH, MCHC, RDW, NRBC, SEGSPCT, BANDSPCT, BLASTSPCT, METASPCT, LYMPHOPCT, PROMYELOPCT, MONOPCT, MYELOPCT, EOSPCT, BASOPCT, MONOSABS, LYMPHSABS, EOSABS, BASOSABS, DIFFTYPE  BMP:  No results found for: NA, K, CL, CO2, BUN, LABALBU, CREATININE, CALCIUM, GFRAA, LABGLOM, GLUCOSE    Radiology Review:  Mri Cervical Spine W Wo Contrast    Result Date: 5/25/2019  EXAMINATION: MRI OF THE CERVICAL SPINE WITHOUT AND WITH CONTRAST  5/25/2019 1:09 pm: TECHNIQUE: Multiplanar multisequence MRI of the cervical spine was performed without and with the administration of intravenous contrast. COMPARISON: None. HISTORY: ORDERING SYSTEM PROVIDED HISTORY: Hx of fusion, b/l upper extremity sensory changes Initial encounter. FINDINGS: BONES/ALIGNMENT: Vertebral heights are maintained alignment is normal.  The patient is status post prior C5-6 and C6-7 ACDF with anterior metallic fixation hardware causing local susceptibility artifact. Reactive edematous marrow changes are noted about the C6-7 facet joints, likely secondary to mechanical stress is. SPINAL CORD: There is focal T2 hyperintensity in the central cord at C5-6 with mild volume loss. Spinal cord otherwise has normal signal and morphology. There is no abnormal fluid collection or mass within the spinal canal. SOFT TISSUES: There is mild reactive soft tissue edema and enhancement about the C6-7 facet joints. No fluid collection or mass is evident.  C2-C3: Disc height and signal maintained. No neural foraminal narrowing or spinal canal stenosis. C3-C4: Disc height and signal maintained. No left neural foraminal narrowing. Mild right neural foraminal narrowing secondary uncovertebral hypertrophy. No spinal canal stenosis. C4-C5: Disc height and signal maintained. Mild bilateral neural foraminal narrowing secondary to facet hypertrophy. No spinal canal stenosis. C5-C6: Prior ACDF. No left neural foraminal narrowing. Moderate right neural foraminal narrowing secondary to uncovertebral hypertrophy. Mild residual spinal canal stenosis secondary to right eccentric posterior osteophyte ridging. C6-C7: Prior ACDF. Mild bilateral neural foraminal narrowing secondary to uncovertebral hypertrophy. Mild residual spinal canal stenosis secondary to posterior osteophyte ridging. C7-T1: Disc height and signal maintained. Mild bilateral neural foraminal narrowing secondary to facet hypertrophy. No spinal canal stenosis. 1. Changes of prior C5-6 and C6-7 ACDF with mild residual spinal canal stenosis at the operative levels secondary to posterior osteophyte ridging. 2. Marrow and soft tissue edema and enhancement about the C6-7 facet joints, likely reactive secondary to mechanical stress. 3. Mild-to-moderate multilevel neural foraminal narrowing as detailed above. ASSESSMENT AND PLAN:     There is no problem list on file for this patient. A/P:  This is a 57 y.o. female with neck and UE paraesthesias      Patient care will be discussed with attending, will reevaluate patient along with attending    - Recommend pain control   - No neurosurgical interventions planned for now  - CTLS recommendations: Clear  - MRI reviewed, recommend outpatient CT cervical spine for evaluation of fusion and follow up in Clinic    Additional recommendations may follow    Please contact neurosurgery with any changes in patients neurologic status. Thank you for your consult.        Carolyn Whitmore Karson Avalos DO   [place] pager [phone]  5/25/2019  12:25 PM

## 2019-05-25 NOTE — ED PROVIDER NOTES
[facility] [facility]     Emergency Department     Faculty Attestation        I performed a history and physical examination of the patient and discussed management with the resident. I reviewed the residents note and agree with the documented findings and plan of care. Any areas of disagreement are noted on the chart. I was personally present for the key portions of any procedures. I have documented in the chart those procedures where I was not present during the key portions. I have reviewed the emergency nurses triage note. I agree with the chief complaint, past medical history, past surgical history, allergies, medications, social and family history as documented unless otherwise noted below. For Physician Assistant/ Nurse Practitioner cases/documentation I have personally evaluated this patient and have completed at least one if not all key elements of the E/M (history, physical exam, and MDM). Additional findings are as noted. Vital Signs: BP: (!) 149/102  Pulse: 94  Resp: 18  Temp: 98.4 °F (36.9 °C) SpO2: 98 %  PCP:  No primary care provider on file. Pertinent Comments:     Patient is a 57 red female previous neck issues requiring neurosurgery in [id number] however now is living at [facility] is no follow-up. Patient states she's having her pain and numbness feeling in her typical places a bilateral upper extremities as well as right lower extremity chronic ulcer findings on the left lower extremity. Patient states what concerned her is the severity of symptoms is worse and has been lasting longer at 1 week. Denies any weakness or loss of bowel or bladder function. Plan: We will attempt symptomatic control as well as consult neurosurgery given chest no follow-up as of yet in [facility].         Critical Care  None      (Please note that portions of this note were completed with a voice recognition program. Efforts were made to edit the dictations but occasionally words are mis-transcribed.  Whenever words are used in this note in any gender, they shall be construed as though they were used in the gender appropriate to the circumstances; and whenever words are used in this note in the singular or plural form, they shall be construed as though they were used in the form appropriate to the circumstances.)    MD Jareth Wright  Attending Emergency Medicine Physician            Caroline Juárez MD  05/25/19 2039       Caroline Juárez MD  05/25/19 7411

## 2019-05-31 ENCOUNTER — OFFICE VISIT (OUTPATIENT)
Dept: PRIMARY CARE CLINIC | Age: 49
End: 2019-05-31
Payer: COMMERCIAL

## 2019-05-31 ENCOUNTER — HOSPITAL ENCOUNTER (OUTPATIENT)
Age: 49
Discharge: HOME | End: 2019-05-31
Payer: COMMERCIAL

## 2019-05-31 VITALS
WEIGHT: 219.2 LBS | DIASTOLIC BLOOD PRESSURE: 78 MMHG | TEMPERATURE: 98.2 F | SYSTOLIC BLOOD PRESSURE: 109 MMHG | BODY MASS INDEX: 37.42 KG/M2 | HEART RATE: 81 BPM | OXYGEN SATURATION: 97 % | HEIGHT: 64 IN

## 2019-05-31 DIAGNOSIS — Z79.4 TYPE 2 DIABETES MELLITUS WITHOUT COMPLICATION, WITH LONG-TERM CURRENT USE OF INSULIN (HCC): ICD-10-CM

## 2019-05-31 DIAGNOSIS — E03.9 HYPOTHYROIDISM, UNSPECIFIED TYPE: Primary | ICD-10-CM

## 2019-05-31 DIAGNOSIS — E11.9 TYPE 2 DIABETES MELLITUS WITHOUT COMPLICATION, WITH LONG-TERM CURRENT USE OF INSULIN (HCC): ICD-10-CM

## 2019-05-31 DIAGNOSIS — E03.9 HYPOTHYROIDISM, UNSPECIFIED TYPE: ICD-10-CM

## 2019-05-31 DIAGNOSIS — I10 ESSENTIAL HYPERTENSION: ICD-10-CM

## 2019-05-31 DIAGNOSIS — M48.02 FORAMINAL STENOSIS OF CERVICAL REGION: ICD-10-CM

## 2019-05-31 PROBLEM — M54.2 CERVICAL PAIN (NECK): Status: ACTIVE | Noted: 2019-05-31

## 2019-05-31 PROBLEM — M54.2 CERVICAL PAIN (NECK): Status: RESOLVED | Noted: 2019-05-31 | Resolved: 2019-05-31

## 2019-05-31 LAB
ALBUMIN SERPL-MCNC: 3.8 G/DL (ref 3.5–5.2)
ALBUMIN/GLOBULIN RATIO: 1.2 (ref 1–2.5)
ALP BLD-CCNC: 64 U/L (ref 35–104)
ALT SERPL-CCNC: 16 U/L (ref 5–33)
ANION GAP SERPL CALCULATED.3IONS-SCNC: 13 MMOL/L (ref 9–17)
AST SERPL-CCNC: 12 U/L
BILIRUB SERPL-MCNC: 0.34 MG/DL (ref 0.3–1.2)
BUN BLDV-MCNC: 10 MG/DL (ref 6–20)
BUN/CREAT BLD: ABNORMAL (ref 9–20)
CALCIUM SERPL-MCNC: 8.8 MG/DL (ref 8.6–10.4)
CHLORIDE BLD-SCNC: 100 MMOL/L (ref 98–107)
CO2: 23 MMOL/L (ref 20–31)
CREAT SERPL-MCNC: 0.53 MG/DL (ref 0.5–0.9)
ESTIMATED AVERAGE GLUCOSE: 232 MG/DL
GFR AFRICAN AMERICAN: >60 ML/MIN
GFR NON-AFRICAN AMERICAN: >60 ML/MIN
GFR SERPL CREATININE-BSD FRML MDRD: ABNORMAL ML/MIN/{1.73_M2}
GFR SERPL CREATININE-BSD FRML MDRD: ABNORMAL ML/MIN/{1.73_M2}
GLUCOSE BLD-MCNC: 218 MG/DL (ref 70–99)
HBA1C MFR BLD: 9.7 % (ref 4–6)
HCT VFR BLD CALC: 44.2 % (ref 36.3–47.1)
HEMOGLOBIN: 14.2 G/DL (ref 11.9–15.1)
MCH RBC QN AUTO: 29.8 PG (ref 25.2–33.5)
MCHC RBC AUTO-ENTMCNC: 32.1 G/DL (ref 28.4–34.8)
MCV RBC AUTO: 92.7 FL (ref 82.6–102.9)
NRBC AUTOMATED: 0 PER 100 WBC
PDW BLD-RTO: 13.6 % (ref 11.8–14.4)
PLATELET # BLD: 400 K/UL (ref 138–453)
PMV BLD AUTO: 10.4 FL (ref 8.1–13.5)
POTASSIUM SERPL-SCNC: 4.3 MMOL/L (ref 3.7–5.3)
RBC # BLD: 4.77 M/UL (ref 3.95–5.11)
SODIUM BLD-SCNC: 136 MMOL/L (ref 135–144)
TOTAL PROTEIN: 7 G/DL (ref 6.4–8.3)
TSH SERPL DL<=0.05 MIU/L-ACNC: 0.21 MIU/L (ref 0.3–5)
WBC # BLD: 11.5 K/UL (ref 3.5–11.3)

## 2019-05-31 PROCEDURE — G8427 DOCREV CUR MEDS BY ELIG CLIN: HCPCS | Performed by: NURSE PRACTITIONER

## 2019-05-31 PROCEDURE — 99204 OFFICE O/P NEW MOD 45 MIN: CPT | Performed by: NURSE PRACTITIONER

## 2019-05-31 PROCEDURE — 85027 COMPLETE CBC AUTOMATED: CPT

## 2019-05-31 PROCEDURE — 3046F HEMOGLOBIN A1C LEVEL >9.0%: CPT | Performed by: NURSE PRACTITIONER

## 2019-05-31 PROCEDURE — 2022F DILAT RTA XM EVC RTNOPTHY: CPT | Performed by: NURSE PRACTITIONER

## 2019-05-31 PROCEDURE — 96160 PT-FOCUSED HLTH RISK ASSMT: CPT | Performed by: NURSE PRACTITIONER

## 2019-05-31 PROCEDURE — 4004F PT TOBACCO SCREEN RCVD TLK: CPT | Performed by: NURSE PRACTITIONER

## 2019-05-31 PROCEDURE — 84443 ASSAY THYROID STIM HORMONE: CPT

## 2019-05-31 PROCEDURE — G8417 CALC BMI ABV UP PARAM F/U: HCPCS | Performed by: NURSE PRACTITIONER

## 2019-05-31 PROCEDURE — 80053 COMPREHEN METABOLIC PANEL: CPT

## 2019-05-31 PROCEDURE — 83036 HEMOGLOBIN GLYCOSYLATED A1C: CPT

## 2019-05-31 RX ORDER — CYCLOBENZAPRINE HCL 5 MG
5 TABLET ORAL 3 TIMES DAILY PRN
Qty: 30 TABLET | Refills: 0 | Status: SHIPPED | OUTPATIENT
Start: 2019-05-31 | End: 2019-06-19 | Stop reason: SDUPTHER

## 2019-05-31 RX ORDER — IBUPROFEN 600 MG/1
600 TABLET ORAL EVERY 6 HOURS PRN
Qty: 30 TABLET | Refills: 0 | Status: SHIPPED | OUTPATIENT
Start: 2019-05-31 | End: 2019-07-03 | Stop reason: SDUPTHER

## 2019-05-31 RX ORDER — GLUCOSAMINE HCL/CHONDROITIN SU 500-400 MG
CAPSULE ORAL
Qty: 100 STRIP | Refills: 5 | Status: SHIPPED | OUTPATIENT
Start: 2019-05-31 | End: 2019-06-16 | Stop reason: SDUPTHER

## 2019-05-31 RX ORDER — METOPROLOL SUCCINATE 100 MG/1
100 TABLET, EXTENDED RELEASE ORAL 2 TIMES DAILY
Qty: 60 TABLET | Refills: 11 | Status: SHIPPED | OUTPATIENT
Start: 2019-05-31 | End: 2019-11-20 | Stop reason: SDUPTHER

## 2019-05-31 RX ORDER — LEVOTHYROXINE SODIUM 0.07 MG/1
75 TABLET ORAL DAILY
Qty: 30 TABLET | Refills: 11 | Status: SHIPPED | OUTPATIENT
Start: 2019-05-31 | End: 2019-06-19 | Stop reason: DRUGHIGH

## 2019-05-31 RX ORDER — HYDROCODONE BITARTRATE AND ACETAMINOPHEN 5; 325 MG/1; MG/1
1 TABLET ORAL EVERY 6 HOURS PRN
Qty: 24 TABLET | Refills: 0 | Status: SHIPPED | OUTPATIENT
Start: 2019-05-31 | End: 2019-06-19 | Stop reason: SDUPTHER

## 2019-05-31 ASSESSMENT — ENCOUNTER SYMPTOMS
DIARRHEA: 0
BLOOD IN STOOL: 0
ABDOMINAL PAIN: 0
COUGH: 0
RHINORRHEA: 0
CHEST TIGHTNESS: 0
SHORTNESS OF BREATH: 0
EYE DISCHARGE: 0
SINUS PRESSURE: 0
CONSTIPATION: 1

## 2019-05-31 ASSESSMENT — PATIENT HEALTH QUESTIONNAIRE - PHQ9
SUM OF ALL RESPONSES TO PHQ QUESTIONS 1-9: 21
5. POOR APPETITE OR OVEREATING: 2
6. FEELING BAD ABOUT YOURSELF - OR THAT YOU ARE A FAILURE OR HAVE LET YOURSELF OR YOUR FAMILY DOWN: 2
8. MOVING OR SPEAKING SO SLOWLY THAT OTHER PEOPLE COULD HAVE NOTICED. OR THE OPPOSITE, BEING SO FIGETY OR RESTLESS THAT YOU HAVE BEEN MOVING AROUND A LOT MORE THAN USUAL: 3
2. FEELING DOWN, DEPRESSED OR HOPELESS: 3
SUM OF ALL RESPONSES TO PHQ QUESTIONS 1-9: 21
3. TROUBLE FALLING OR STAYING ASLEEP: 3
7. TROUBLE CONCENTRATING ON THINGS, SUCH AS READING THE NEWSPAPER OR WATCHING TELEVISION: 2
4. FEELING TIRED OR HAVING LITTLE ENERGY: 3
10. IF YOU CHECKED OFF ANY PROBLEMS, HOW DIFFICULT HAVE THESE PROBLEMS MADE IT FOR YOU TO DO YOUR WORK, TAKE CARE OF THINGS AT HOME, OR GET ALONG WITH OTHER PEOPLE: 2
SUM OF ALL RESPONSES TO PHQ9 QUESTIONS 1 & 2: 6
9. THOUGHTS THAT YOU WOULD BE BETTER OFF DEAD, OR OF HURTING YOURSELF: 0
1. LITTLE INTEREST OR PLEASURE IN DOING THINGS: 3

## 2019-05-31 NOTE — PROGRESS NOTES
activity change, appetite change, chills, fatigue and fever. HENT: Negative for congestion, ear pain, rhinorrhea and sinus pressure. Eyes: Negative for discharge and visual disturbance. Respiratory: Negative for cough, chest tightness and shortness of breath. Cardiovascular: Negative for chest pain, palpitations and leg swelling. Gastrointestinal: Positive for constipation. Negative for abdominal pain, blood in stool and diarrhea. Endocrine: Negative for cold intolerance and heat intolerance. Genitourinary: Negative for difficulty urinating and hematuria. Musculoskeletal: Positive for neck pain. Negative for arthralgias and myalgias. Skin: Negative for rash. Neurological: Negative for dizziness, light-headedness and headaches. Psychiatric/Behavioral: Negative for dysphoric mood and self-injury. Objective:     /78 (Site: Left Upper Arm, Position: Sitting, Cuff Size: Large Adult)   Pulse 81   Temp 98.2 °F (36.8 °C) (Oral)   Ht 5' 3.75\" (1.619 m)   Wt 219 lb 3.2 oz (99.4 kg)   SpO2 97%   BMI 37.92 kg/m²    Physical Exam   Constitutional: She is oriented to person, place, and time. She appears well-developed and well-nourished. She is active. No distress. HENT:   Head: Normocephalic and atraumatic. Right Ear: External ear normal.   Left Ear: External ear normal.   Nose: Nose normal.   Mouth/Throat: Oropharynx is clear and moist.   Eyes: Pupils are equal, round, and reactive to light. Conjunctivae and EOM are normal.   Neck: Trachea normal, normal range of motion and full passive range of motion without pain. No thyroid mass present. Cardiovascular: Normal rate, regular rhythm, S1 normal, S2 normal and normal heart sounds. Exam reveals no distant heart sounds and no friction rub. Pulmonary/Chest: Effort normal and breath sounds normal. No accessory muscle usage. No respiratory distress. Abdominal: Soft.  Bowel sounds are normal. She exhibits no distension, no ascites and no mass. Musculoskeletal: She exhibits no deformity. Cervical back: She exhibits decreased range of motion and tenderness. Pain free ROM     Lymphadenopathy:     She has no cervical adenopathy. Neurological: She is alert and oriented to person, place, and time. Gait is normal.   Skin: Skin is warm and dry. No rash noted. She is not diaphoretic. Psychiatric: She has a normal mood and affect. Her behavior is normal. Judgment and thought content normal.     Assessment/Plan         1. Hypothyroidism, unspecified type    - TSH; Future  - levothyroxine (SYNTHROID) 75 MCG tablet; Take 1 tablet by mouth Daily  Dispense: 30 tablet; Refill: 11    2. Foraminal stenosis of cervical region  Office records requested and reviewed from dr rojas's office. Will give short course of norco, encouraged to make them last.    Needs to start pt and f/u with ns.  - cyclobenzaprine (FLEXERIL) 5 MG tablet; Take 1 tablet by mouth 3 times daily as needed for Muscle spasms  Dispense: 30 tablet; Refill: 0  - HYDROcodone-acetaminophen (NORCO) 5-325 MG per tablet; Take 1 tablet by mouth every 6 hours as needed for Pain for up to 7 days. Dispense: 24 tablet; Refill: 0    3. Type 2 diabetes mellitus without complication, with long-term current use of insulin ([address]  Will go get lab work today. - Hemoglobin A1C; Future  - Insulin NPH Isophane & Regular (HUMULIN 70/30 KWIKPEN) (70-30) 100 UNIT per ML injection pen; Inject 33 Units into the skin 2 times daily  Dispense: 5 pen; Refill: 3  - Insulin Pen Needle ([employer] PEN NEEDLES 31G) 31G X 8 MM MISC; 1 each by Does not apply route daily  Dispense: 100 each; Refill: 3  - blood glucose monitor strips; Test twice times a day & as needed for symptoms of irregular blood glucose. True test meter  Dispense: 100 strip; Refill: 5    4. Essential hypertension    - CBC; Future  - Comprehensive Metabolic Panel; Future  - metoprolol succinate (TOPROL XL) 100 MG extended release tablet;  Take 1 tablet by mouth 2 times daily  Dispense: 60 tablet; Refill: 11   rto in 1 month   RTO if symptoms worsen or fail to improve  Pt agreeable with plan      Patient given educationalmaterials - see patient instructions. Discussed use, benefit, and side effectsof prescribed medications. All patient questions answered. Pt voiced understanding. Reviewed health maintenance. Instructed to continue current medications, diet andexercise. 1.  Charisma received counseling on the following healthy behaviors: nutrition, exercise and medication adherence  2. Patient given educational materials when available - see patient instructionswhen applicable  3. Discussed use, benefit, and side effects of prescribed medications. Barriersto medication compliance addressed. All patient questions answered. Pt voicedunderstanding. 4.  Reviewed prior labs and health maintenance  5. Continuecurrent medications, diet and exercise. Completed Refills   Requested Prescriptions     Signed Prescriptions Disp Refills    metoprolol succinate (TOPROL XL) 100 MG extended release tablet 60 tablet 11     Sig: Take 1 tablet by mouth 2 times daily    levothyroxine (SYNTHROID) 75 MCG tablet 30 tablet 11     Sig: Take 1 tablet by mouth Daily    cyclobenzaprine (FLEXERIL) 5 MG tablet 30 tablet 0     Sig: Take 1 tablet by mouth 3 times daily as needed for Muscle spasms    ibuprofen (ADVIL;MOTRIN) 600 MG tablet 30 tablet 0     Sig: Take 1 tablet by mouth every 6 hours as needed for Pain    Insulin NPH Isophane & Regular (HUMULIN 70/30 KWIKPEN) (70-30) 100 UNIT per ML injection pen 5 pen 3     Sig: Inject 33 Units into the skin 2 times daily    Insulin Pen Needle ([employer] PEN NEEDLES 31G) 31G X 8 MM MISC 100 each 3     Si each by Does not apply route daily    blood glucose monitor strips 100 strip 5     Sig: Test twice times a day & as needed for symptoms of irregular blood glucose.  True test meter    HYDROcodone-acetaminophen (Arlester Gaurav) 5-325 MG per tablet 24 tablet 0     Sig: Take 1 tablet by mouth every 6 hours as needed for Pain for up to 7 days.          Electronically signed by Ally Gomez CNP on 5/31/2019 at 10:52 AM

## 2019-05-31 NOTE — PROGRESS NOTES
Visit Information    Have you changed or started any medications since your last visit including any over-the-counter medicines, vitamins, or herbal medicines? no   Are you having any side effects from any of your medications? -  no  Have you stopped taking any of your medications? Is so, why? -  no    Have you seen any other physician or provider since your last visit? No  Have you had any other diagnostic tests since your last visit? Yes - Records Obtained  Have you been seen in the emergency room and/or had an admission to a hospital since we last saw you? Yes - Records Obtained  Have you had your routine dental cleaning in the past 6 months? no    Have you activated your [employer] account? If not, what are your barriers?  Yes     Patient Care Team:  SAVANA Elena - CNP as PCP - General (Family Nurse Practitioner)    Medical History Review  Past Medical, Family, and Social History reviewed and does contribute to the patient presenting condition    Health Maintenance   Topic Date Due    Pneumococcal 0-64 years Vaccine (1 of 1 - PPSV23) 12/23/1978    HIV screen  12/23/1987    DTaP/Tdap/Td vaccine (1 - Tdap) 12/23/1991    Cervical cancer screen  12/23/1993    Lipid screen  12/23/2012    Diabetes screen  12/23/2012    Flu vaccine (Season Ended) 09/01/2019

## 2019-06-04 DIAGNOSIS — E03.9 HYPOTHYROIDISM, UNSPECIFIED TYPE: ICD-10-CM

## 2019-06-14 ENCOUNTER — TELEPHONE (OUTPATIENT)
Dept: PRIMARY CARE CLINIC | Age: 49
End: 2019-06-14

## 2019-06-16 ENCOUNTER — TELEPHONE (OUTPATIENT)
Dept: PRIMARY CARE CLINIC | Age: 49
End: 2019-06-16

## 2019-06-16 DIAGNOSIS — Z79.4 TYPE 2 DIABETES MELLITUS WITHOUT COMPLICATION, WITH LONG-TERM CURRENT USE OF INSULIN (HCC): Primary | ICD-10-CM

## 2019-06-16 DIAGNOSIS — E11.9 TYPE 2 DIABETES MELLITUS WITHOUT COMPLICATION, WITH LONG-TERM CURRENT USE OF INSULIN (HCC): Primary | ICD-10-CM

## 2019-06-16 DIAGNOSIS — E11.9 TYPE 2 DIABETES MELLITUS WITHOUT COMPLICATION, WITH LONG-TERM CURRENT USE OF INSULIN (HCC): ICD-10-CM

## 2019-06-16 DIAGNOSIS — Z79.4 TYPE 2 DIABETES MELLITUS WITHOUT COMPLICATION, WITH LONG-TERM CURRENT USE OF INSULIN (HCC): ICD-10-CM

## 2019-06-16 RX ORDER — GLUCOSAMINE HCL/CHONDROITIN SU 500-400 MG
CAPSULE ORAL
Qty: 100 STRIP | Refills: 5 | Status: SHIPPED | OUTPATIENT
Start: 2019-06-16 | End: 2019-06-16

## 2019-06-16 RX ORDER — BLOOD-GLUCOSE METER
1 KIT MISCELLANEOUS DAILY
Qty: 1 KIT | Refills: 0 | Status: SHIPPED | OUTPATIENT
Start: 2019-06-16 | End: 2021-09-13

## 2019-06-16 RX ORDER — LANCETS 28 GAUGE
1 EACH MISCELLANEOUS DAILY
Qty: 100 EACH | Refills: 3 | Status: SHIPPED | OUTPATIENT
Start: 2019-06-16 | End: 2019-11-20 | Stop reason: SDUPTHER

## 2019-06-16 NOTE — PROGRESS NOTES
Pt called the office and left message with on-call service that she needed test strips. Pt was seen by Irene Ceja on 5/31/19, test strips were reorder at that appointment.  I confirmed pt pharmacy and resent test strips to pharmacy

## 2019-06-19 ENCOUNTER — HOSPITAL ENCOUNTER (OUTPATIENT)
Age: 49
Setting detail: SPECIMEN
Discharge: HOME | End: 2019-06-19
Payer: COMMERCIAL

## 2019-06-19 ENCOUNTER — OFFICE VISIT (OUTPATIENT)
Dept: PRIMARY CARE CLINIC | Age: 49
End: 2019-06-19
Payer: COMMERCIAL

## 2019-06-19 VITALS
DIASTOLIC BLOOD PRESSURE: 92 MMHG | WEIGHT: 215 LBS | OXYGEN SATURATION: 99 % | HEART RATE: 103 BPM | SYSTOLIC BLOOD PRESSURE: 123 MMHG | BODY MASS INDEX: 37.19 KG/M2

## 2019-06-19 DIAGNOSIS — E11.9 TYPE 2 DIABETES MELLITUS WITHOUT COMPLICATION, WITH LONG-TERM CURRENT USE OF INSULIN (HCC): ICD-10-CM

## 2019-06-19 DIAGNOSIS — E11.9 TYPE 2 DIABETES MELLITUS WITHOUT COMPLICATION, WITH LONG-TERM CURRENT USE OF INSULIN (HCC): Primary | ICD-10-CM

## 2019-06-19 DIAGNOSIS — M48.02 FORAMINAL STENOSIS OF CERVICAL REGION: ICD-10-CM

## 2019-06-19 DIAGNOSIS — Z23 NEED FOR PROPHYLACTIC VACCINATION AGAINST STREPTOCOCCUS PNEUMONIAE (PNEUMOCOCCUS): ICD-10-CM

## 2019-06-19 DIAGNOSIS — Z13.220 SCREENING FOR HYPERLIPIDEMIA: ICD-10-CM

## 2019-06-19 DIAGNOSIS — E03.9 HYPOTHYROIDISM, UNSPECIFIED TYPE: ICD-10-CM

## 2019-06-19 DIAGNOSIS — F41.9 ANXIETY: ICD-10-CM

## 2019-06-19 DIAGNOSIS — Z79.4 TYPE 2 DIABETES MELLITUS WITHOUT COMPLICATION, WITH LONG-TERM CURRENT USE OF INSULIN (HCC): ICD-10-CM

## 2019-06-19 DIAGNOSIS — Z79.4 TYPE 2 DIABETES MELLITUS WITHOUT COMPLICATION, WITH LONG-TERM CURRENT USE OF INSULIN (HCC): Primary | ICD-10-CM

## 2019-06-19 LAB
CREATININE URINE: 137.5 MG/DL (ref 28–217)
HBA1C MFR BLD: 9 %
MICROALBUMIN/CREAT 24H UR: 17 MG/L
MICROALBUMIN/CREAT UR-RTO: 12 MCG/MG CREAT

## 2019-06-19 PROCEDURE — 2022F DILAT RTA XM EVC RTNOPTHY: CPT | Performed by: NURSE PRACTITIONER

## 2019-06-19 PROCEDURE — 83036 HEMOGLOBIN GLYCOSYLATED A1C: CPT | Performed by: NURSE PRACTITIONER

## 2019-06-19 PROCEDURE — G8417 CALC BMI ABV UP PARAM F/U: HCPCS | Performed by: NURSE PRACTITIONER

## 2019-06-19 PROCEDURE — 4004F PT TOBACCO SCREEN RCVD TLK: CPT | Performed by: NURSE PRACTITIONER

## 2019-06-19 PROCEDURE — G8427 DOCREV CUR MEDS BY ELIG CLIN: HCPCS | Performed by: NURSE PRACTITIONER

## 2019-06-19 PROCEDURE — 90471 IMMUNIZATION ADMIN: CPT | Performed by: NURSE PRACTITIONER

## 2019-06-19 PROCEDURE — 99214 OFFICE O/P EST MOD 30 MIN: CPT | Performed by: NURSE PRACTITIONER

## 2019-06-19 PROCEDURE — 90732 PPSV23 VACC 2 YRS+ SUBQ/IM: CPT | Performed by: NURSE PRACTITIONER

## 2019-06-19 PROCEDURE — 3045F PR MOST RECENT HEMOGLOBIN A1C LEVEL 7.0-9.0%: CPT | Performed by: NURSE PRACTITIONER

## 2019-06-19 RX ORDER — NITROGLYCERIN 0.4 MG/1
0.4 TABLET SUBLINGUAL EVERY 5 MIN PRN
Refills: 0 | COMMUNITY
Start: 2019-06-16 | End: 2021-09-13 | Stop reason: SDUPTHER

## 2019-06-19 RX ORDER — HYDROXYZINE HYDROCHLORIDE 25 MG/1
25 TABLET, FILM COATED ORAL EVERY 8 HOURS PRN
Qty: 60 TABLET | Refills: 1 | Status: SHIPPED | OUTPATIENT
Start: 2019-06-19 | End: 2019-09-04 | Stop reason: SDUPTHER

## 2019-06-19 RX ORDER — LEVOTHYROXINE SODIUM 0.05 MG/1
50 TABLET ORAL DAILY
Qty: 30 TABLET | Refills: 5 | Status: SHIPPED | OUTPATIENT
Start: 2019-06-19 | End: 2019-11-20 | Stop reason: SDUPTHER

## 2019-06-19 RX ORDER — CYCLOBENZAPRINE HCL 5 MG
5 TABLET ORAL 3 TIMES DAILY PRN
Qty: 30 TABLET | Refills: 0 | Status: SHIPPED | OUTPATIENT
Start: 2019-06-19 | End: 2019-07-03 | Stop reason: SDUPTHER

## 2019-06-19 RX ORDER — HYDROCODONE BITARTRATE AND ACETAMINOPHEN 5; 325 MG/1; MG/1
1 TABLET ORAL 2 TIMES DAILY PRN
Qty: 42 TABLET | Refills: 0 | Status: SHIPPED | OUTPATIENT
Start: 2019-06-19 | End: 2019-07-10

## 2019-06-19 ASSESSMENT — ENCOUNTER SYMPTOMS
ABDOMINAL PAIN: 0
SINUS PRESSURE: 0
BLOOD IN STOOL: 0
SHORTNESS OF BREATH: 0
COUGH: 0
CHEST TIGHTNESS: 0
DIARRHEA: 0
CONSTIPATION: 1
EYE DISCHARGE: 0
RHINORRHEA: 0

## 2019-06-19 NOTE — PROGRESS NOTES
[facility]  [address], [address]  [phone]    Caterina Villeda is a 57 y.o. female who presents today for her  medical conditions/complaintsas noted below. Caterina Villeda is c/o of Medication Refill and Anxiety (States anxiety is getting worse)    HPI:     HPI   Neck pain-     Per last visit:    She is concerned with her neck pain. She is seeing a neurologist.  She is asking for pain control. She is seeing dr Kristi Argueta at [facility] clinic. She states she started working at [employer] and she feels that this worsened her neck pain hence the sudden frequent visits for this. She does have a history of c5-7 surgery that she had a fair response to. Last visit with dr Garcia Ferris was Wednesday. She states he plans on getting a ct scan, PT with traction and told her to come here for pain control. She is tearful in the office stating she is in excruciating pain. She states the flexeril helps some, makes her sleepy. She starts pt when she gets back from [place] next Monday- Wednesday to see her daughter graduate. She does explain that the ten norco that she took from the er, she spread them out and only took when needed. Changes of prior C5-6 and C6-7 ACDF with mild residual spinal canal   stenosis at the operative levels secondary to posterior osteophyte ridging. 2. Marrow and soft tissue edema and enhancement about the C6-7 facet joints,   likely reactive secondary to mechanical stress. 3. Mild-to-moderate multilevel neural foraminal narrowing as detailed above.         Surgery date confirmed with ns office on July 12. Dr Kristi Argueta told her he did not want her to do pt. She is still walking with a cane. Anxiety- not taking anything- has been on ativan. She staets she has had three anxiety attacks in the last few weeks. She feels she has been weepy. She feels her mood is better than it was on may 31.   Has also been on zoloft in the past. She is also anxious about the surgery. PHQ Scores 5/31/2019   PHQ2 Score 6   PHQ9 Score 21     Interpretation of Total Score Depression Severity: 1-4 = Minimal depression, 5-9 = Mild depression, 10-14 = Moderate depression, 15-19 = Moderately severe depression, 20-27 = Severe depression      Dm- a1c is 9.7- states the highest she has had was 154. She had been off her meds when she was seen on May 31, she is back on now and feels they are controlled. Nursing note reviewedand discussed with patient. Patient'smedications, allergies, past medical, surgical, social and family histories werereviewed and updated as appropriate. Current Outpatient Medications on File Prior to Visit   Medication Sig Dispense Refill    Blood Glucose Monitoring Suppl (FREESTYLE FREEDOM LITE) w/Device KIT 1 kit by Does not apply route daily 1 kit 0    FREESTYLE LANCETS MISC 1 each by Does not apply route daily 100 each 3    blood glucose test strips (FREESTYLE LITE) strip 1 each by In Vitro route daily As needed.  100 each 3    metoprolol succinate (TOPROL XL) 100 MG extended release tablet Take 1 tablet by mouth 2 times daily 60 tablet 11    ibuprofen (ADVIL;MOTRIN) 600 MG tablet Take 1 tablet by mouth every 6 hours as needed for Pain 30 tablet 0    Insulin NPH Isophane & Regular (HUMULIN 70/30 KWIKPEN) (70-30) 100 UNIT per ML injection pen Inject 33 Units into the skin 2 times daily 5 pen 3    Insulin Pen Needle ([employer] PEN NEEDLES 31G) 31G X 8 MM MISC 1 each by Does not apply route daily 100 each 3    AMITRIPTYLINE HCL PO Take 2.5 mg by mouth daily      clopidogrel (PLAVIX) 75 MG tablet Take 75 mg by mouth daily      atorvastatin (LIPITOR) 80 MG tablet Take 80 mg by mouth daily      ranolazine (RANEXA) 500 MG extended release tablet Take 500 mg by mouth 2 times daily      aspirin 81 MG chewable tablet Take 81 mg by mouth daily      nitroGLYCERIN (NITROSTAT) 0.4 MG SL tablet   0     No current facility-administered medications on file prior to visit. Past Medical History:   Diagnosis Date    Anxiety     Arthritis     CAD (coronary artery disease)     Depression     [facility] [facility]     Hyperlipidemia     Kidney stone     Myocardial infarct ([facility] [facility] 2016    Neck pain       Past Surgical History:   Procedure Laterality Date    BACK SURGERY      L5-S1, C5-7    CARDIAC CATHETERIZATION       SECTION      MASTOID SURGERY      TONSILLECTOMY       Family History   Problem Relation Age of Onset    Dementia Mother     Depression Mother     High Blood Pressure Mother     Coronary Art Dis Mother     COPD Mother     Diabetes Mother     Heart Attack Father     Diabetes Maternal Grandmother     Alzheimer's Disease Paternal Grandfather      Social History     Tobacco Use    Smoking status: Current Every Day Smoker     Years: 20.00     Types: Cigarettes     Last attempt to quit: 2016     Years since quitting: 3.4    Smokeless tobacco: Never Used    Tobacco comment: 3 packs per week   Substance Use Topics    Alcohol use: Yes     Comment: social      No Known Allergies    Subjective:     Review of Systems   Constitutional: Negative for activity change, appetite change, chills, fatigue and fever. HENT: Negative for congestion, ear pain, rhinorrhea and sinus pressure. Eyes: Negative for discharge and visual disturbance. Respiratory: Negative for cough, chest tightness and shortness of breath. Cardiovascular: Negative for chest pain, palpitations and leg swelling. Gastrointestinal: Positive for constipation. Negative for abdominal pain, blood in stool and diarrhea. Endocrine: Negative for cold intolerance and heat intolerance. Genitourinary: Negative for difficulty urinating and hematuria. Musculoskeletal: Positive for neck pain. Negative for arthralgias and myalgias. Skin: Negative for rash. Neurological: Negative for dizziness, light-headedness and headaches. Intact      Lab Review   Hospital Outpatient Visit on 05/31/2019   Component Date Value    TSH 05/31/2019 0.21*    Hemoglobin A1C 05/31/2019 9.7*    Estimated Avg Glucose 05/31/2019 232     Glucose 05/31/2019 218*    BUN 05/31/2019 10     CREATININE 05/31/2019 0.53     Bun/Cre Ratio 05/31/2019 NOT REPORTED     Calcium 05/31/2019 8.8     Sodium 05/31/2019 136     Potassium 05/31/2019 4.3     Chloride 05/31/2019 100     CO2 05/31/2019 23     Anion Gap 05/31/2019 13     Alkaline Phosphatase 05/31/2019 64     ALT 05/31/2019 16     AST 05/31/2019 12     Total Bilirubin 05/31/2019 0.34     Total Protein 05/31/2019 7.0     Alb 05/31/2019 3.8     Albumin/Globulin Ratio 05/31/2019 1.2     GFR Non- 05/31/2019 >60     GFR  05/31/2019 >60     GFR Comment 05/31/2019          GFR Staging 05/31/2019 NOT REPORTED     WBC 05/31/2019 11.5*    RBC 05/31/2019 4.77     Hemoglobin 05/31/2019 14.2     Hematocrit 05/31/2019 44.2     MCV 05/31/2019 92.7     MCH 05/31/2019 29.8     MCHC 05/31/2019 32.1     RDW 05/31/2019 13.6     Platelets 49/19/3503 400     MPV 05/31/2019 10.4     NRBC Automated 05/31/2019 0.0    Admission on 04/26/2019, Discharged on 04/26/2019   Component Date Value    Color, UA 04/26/2019 YELLOW     Turbidity UA 04/26/2019 CLEAR     Glucose, Ur 04/26/2019 3+*    Bilirubin Urine 04/26/2019 NEGATIVE     Ketones, Urine 04/26/2019 MODERATE*    Specific [place], UA 04/26/2019 1.021     Urine Hgb 04/26/2019 NEGATIVE     pH, UA 04/26/2019 6.0     Protein, UA 04/26/2019 NEGATIVE     Urobilinogen, Urine 04/26/2019 Normal     Nitrite, Urine 04/26/2019 POSITIVE*    Leukocyte Esterase, Urine 04/26/2019 SMALL*    - 04/26/2019          WBC, UA 04/26/2019 10 TO 20     RBC, UA 04/26/2019 0 TO 2     Casts UA 04/26/2019 2 TO 5 HYALINE Reference range defined for non-centrifuged specimen.      Crystals UA 04/26/2019 NOT REPORTED     Epithelial Cells UA 04/26/2019 2 TO 5     Renal Epithelial, Urine 04/26/2019 NOT REPORTED     Bacteria, UA 04/26/2019 MANY*    Mucus, UA 04/26/2019 NOT REPORTED     Trichomonas, UA 04/26/2019 NOT REPORTED     Amorphous, UA 04/26/2019 NOT REPORTED     Other Observations UA 04/26/2019 NOT REPORTED     Yeast, UA 04/26/2019 NOT REPORTED     Specimen Description 04/26/2019 . CLEAN CATCH URINE     Special Requests 04/26/2019 NOT REPORTED     Culture 04/26/2019 ESCHERICHIA COLI >[id number] CFU/ML*    HCG(Urine) Pregnancy Test 04/26/2019 NEGATIVE          Assessment/Plan      1. Foraminal stenosis of cervical region  Will give pain control until surgery, then it is up to NS  - cyclobenzaprine (FLEXERIL) 5 MG tablet; Take 1 tablet by mouth 3 times daily as needed for Muscle spasms  Dispense: 30 tablet; Refill: 0  - HYDROcodone-acetaminophen (NORCO) 5-325 MG per tablet; Take 1 tablet by mouth 2 times daily as needed for Pain for up to 21 days. Dispense: 42 tablet; Refill: 0    2. Screening for hyperlipidemia    - Lipid, Fasting; Future    3. Need for prophylactic vaccination against Streptococcus pneumoniae (pneumococcus)    - Pneumococcal polysaccharide vaccine 23-valent PPSV23    4. Type 2 diabetes mellitus without complication, with long-term current use of insulin (HCC)  Continue meds and dietary changes   -  DIABETES FOOT EXAM  - Microalbumin, Ur; Future  - Lipid, Fasting; Future  - POCT glycosylated hemoglobin (Hb A1C)    5. Anxiety  I've explained to her that drugs of the SSRI class can have side effects such as weight gain, sexual dysfunction, insomnia, headache, nausea. These medications are generally effective at alleviating symptoms of anxiety and/or depression. Let me know if significant side effects do occur.    - sertraline (ZOLOFT) 50 MG tablet; Take 1 tablet by mouth daily  Dispense: 30 tablet; Refill: 5  - hydrOXYzine (ATARAX) 25 MG tablet;  Take 1 tablet by mouth every 8 hours as needed for Anxiety  Dispense: 60 tablet; Refill: 1    6. Hypothyroidism, unspecified type  Decrease dose recheck in 6 weeks. - levothyroxine (SYNTHROID) 50 MCG tablet; Take 1 tablet by mouth Daily  Dispense: 30 tablet; Refill: 5  - TSH With Reflex Ft4; Future    RTO if symptoms worsen or fail to improve  Pt agreeable with plan      Patient given educationalmaterials - see patient instructions. Discussed use, benefit, and side effectsof prescribed medications. All patient questions answered. Pt voiced understanding. Reviewed health maintenance. Instructed to continue current medications, diet andexercise. 1.  Charisma received counseling on the following healthy behaviors: nutrition, exercise and medication adherence  2. Patient given educational materials when available - see patient instructionswhen applicable  3. Discussed use, benefit, and side effects of prescribed medications. Barriersto medication compliance addressed. All patient questions answered. Pt voicedunderstanding. 4.  Reviewed prior labs and health maintenance  5. Continuecurrent medications, diet and exercise. Completed Refills   Requested Prescriptions     Signed Prescriptions Disp Refills    cyclobenzaprine (FLEXERIL) 5 MG tablet 30 tablet 0     Sig: Take 1 tablet by mouth 3 times daily as needed for Muscle spasms    levothyroxine (SYNTHROID) 50 MCG tablet 30 tablet 5     Sig: Take 1 tablet by mouth Daily    HYDROcodone-acetaminophen (NORCO) 5-325 MG per tablet 42 tablet 0     Sig: Take 1 tablet by mouth 2 times daily as needed for Pain for up to 21 days.     sertraline (ZOLOFT) 50 MG tablet 30 tablet 5     Sig: Take 1 tablet by mouth daily    hydrOXYzine (ATARAX) 25 MG tablet 60 tablet 1     Sig: Take 1 tablet by mouth every 8 hours as needed for Anxiety         Electronically signed by Shannon Oneill CNP on 6/19/2019 at 2:54 PM

## 2019-07-03 DIAGNOSIS — M48.02 FORAMINAL STENOSIS OF CERVICAL REGION: ICD-10-CM

## 2019-07-05 RX ORDER — CYCLOBENZAPRINE HCL 5 MG
TABLET ORAL
Qty: 30 TABLET | Refills: 0 | Status: SHIPPED | OUTPATIENT
Start: 2019-07-05 | End: 2019-11-20

## 2019-07-05 RX ORDER — IBUPROFEN 600 MG/1
TABLET ORAL
Qty: 30 TABLET | Refills: 0 | Status: SHIPPED | OUTPATIENT
Start: 2019-07-05 | End: 2020-10-01 | Stop reason: DRUGHIGH

## 2019-09-04 DIAGNOSIS — F41.9 ANXIETY: ICD-10-CM

## 2019-09-05 RX ORDER — HYDROXYZINE HYDROCHLORIDE 25 MG/1
TABLET, FILM COATED ORAL
Qty: 60 TABLET | Refills: 3 | Status: SHIPPED | OUTPATIENT
Start: 2019-09-05 | End: 2019-11-20 | Stop reason: SINTOL

## 2019-10-21 DIAGNOSIS — Z79.4 TYPE 2 DIABETES MELLITUS WITHOUT COMPLICATION, WITH LONG-TERM CURRENT USE OF INSULIN (HCC): ICD-10-CM

## 2019-10-21 DIAGNOSIS — E11.9 TYPE 2 DIABETES MELLITUS WITHOUT COMPLICATION, WITH LONG-TERM CURRENT USE OF INSULIN (HCC): ICD-10-CM

## 2019-10-21 RX ORDER — INSULIN HUMAN 100 [IU]/ML
INJECTION, SUSPENSION SUBCUTANEOUS
Qty: 15 ML | Refills: 0 | Status: SHIPPED | OUTPATIENT
Start: 2019-10-21 | End: 2020-02-20

## 2019-11-20 ENCOUNTER — OFFICE VISIT (OUTPATIENT)
Dept: PRIMARY CARE CLINIC | Age: 49
End: 2019-11-20
Payer: COMMERCIAL

## 2019-11-20 VITALS
DIASTOLIC BLOOD PRESSURE: 88 MMHG | SYSTOLIC BLOOD PRESSURE: 138 MMHG | BODY MASS INDEX: 38.09 KG/M2 | HEART RATE: 90 BPM | WEIGHT: 215 LBS | HEIGHT: 63 IN

## 2019-11-20 DIAGNOSIS — I10 ESSENTIAL HYPERTENSION: ICD-10-CM

## 2019-11-20 DIAGNOSIS — Z23 NEED FOR IMMUNIZATION AGAINST INFLUENZA: ICD-10-CM

## 2019-11-20 DIAGNOSIS — F41.9 ANXIETY: ICD-10-CM

## 2019-11-20 DIAGNOSIS — E03.9 HYPOTHYROIDISM, UNSPECIFIED TYPE: ICD-10-CM

## 2019-11-20 DIAGNOSIS — Z79.4 TYPE 2 DIABETES MELLITUS WITHOUT COMPLICATION, WITH LONG-TERM CURRENT USE OF INSULIN (HCC): Primary | ICD-10-CM

## 2019-11-20 DIAGNOSIS — E11.9 TYPE 2 DIABETES MELLITUS WITHOUT COMPLICATION, WITH LONG-TERM CURRENT USE OF INSULIN (HCC): Primary | ICD-10-CM

## 2019-11-20 LAB — HBA1C MFR BLD: 7.8 %

## 2019-11-20 PROCEDURE — G8417 CALC BMI ABV UP PARAM F/U: HCPCS | Performed by: NURSE PRACTITIONER

## 2019-11-20 PROCEDURE — G8427 DOCREV CUR MEDS BY ELIG CLIN: HCPCS | Performed by: NURSE PRACTITIONER

## 2019-11-20 PROCEDURE — 90686 IIV4 VACC NO PRSV 0.5 ML IM: CPT | Performed by: NURSE PRACTITIONER

## 2019-11-20 PROCEDURE — 4004F PT TOBACCO SCREEN RCVD TLK: CPT | Performed by: NURSE PRACTITIONER

## 2019-11-20 PROCEDURE — G8482 FLU IMMUNIZE ORDER/ADMIN: HCPCS | Performed by: NURSE PRACTITIONER

## 2019-11-20 PROCEDURE — 3045F PR MOST RECENT HEMOGLOBIN A1C LEVEL 7.0-9.0%: CPT | Performed by: NURSE PRACTITIONER

## 2019-11-20 PROCEDURE — 99214 OFFICE O/P EST MOD 30 MIN: CPT | Performed by: NURSE PRACTITIONER

## 2019-11-20 PROCEDURE — 2022F DILAT RTA XM EVC RTNOPTHY: CPT | Performed by: NURSE PRACTITIONER

## 2019-11-20 PROCEDURE — 83036 HEMOGLOBIN GLYCOSYLATED A1C: CPT | Performed by: NURSE PRACTITIONER

## 2019-11-20 PROCEDURE — 90471 IMMUNIZATION ADMIN: CPT | Performed by: NURSE PRACTITIONER

## 2019-11-20 RX ORDER — BUSPIRONE HYDROCHLORIDE 5 MG/1
5 TABLET ORAL 2 TIMES DAILY
Qty: 60 TABLET | Refills: 0 | Status: SHIPPED | OUTPATIENT
Start: 2019-11-20 | End: 2020-02-20

## 2019-11-20 RX ORDER — LEVOTHYROXINE SODIUM 0.05 MG/1
50 TABLET ORAL DAILY
Qty: 30 TABLET | Refills: 5 | Status: SHIPPED | OUTPATIENT
Start: 2019-11-20 | End: 2020-09-03 | Stop reason: SDUPTHER

## 2019-11-20 RX ORDER — METOPROLOL SUCCINATE 100 MG/1
100 TABLET, EXTENDED RELEASE ORAL 2 TIMES DAILY
Qty: 60 TABLET | Refills: 11 | Status: SHIPPED | OUTPATIENT
Start: 2019-11-20 | End: 2020-12-11

## 2019-11-20 RX ORDER — LANCETS 28 GAUGE
1 EACH MISCELLANEOUS DAILY
Qty: 100 EACH | Refills: 3 | Status: SHIPPED | OUTPATIENT
Start: 2019-11-20 | End: 2022-04-19

## 2019-11-20 RX ORDER — SERTRALINE HYDROCHLORIDE 100 MG/1
100 TABLET, FILM COATED ORAL DAILY
Qty: 30 TABLET | Refills: 5 | Status: SHIPPED | OUTPATIENT
Start: 2019-11-20 | End: 2020-02-05 | Stop reason: SDUPTHER

## 2019-11-20 RX ORDER — TIZANIDINE 2 MG/1
2 TABLET ORAL 3 TIMES DAILY PRN
Qty: 90 TABLET | Refills: 3 | Status: SHIPPED | OUTPATIENT
Start: 2019-11-20 | End: 2021-09-13 | Stop reason: SDUPTHER

## 2019-11-20 ASSESSMENT — ENCOUNTER SYMPTOMS
COUGH: 0
SHORTNESS OF BREATH: 0

## 2020-01-28 ENCOUNTER — TELEPHONE (OUTPATIENT)
Dept: PRIMARY CARE CLINIC | Age: 50
End: 2020-01-28

## 2020-02-05 ENCOUNTER — OFFICE VISIT (OUTPATIENT)
Dept: PRIMARY CARE CLINIC | Age: 50
End: 2020-02-05
Payer: COMMERCIAL

## 2020-02-05 VITALS
HEART RATE: 88 BPM | OXYGEN SATURATION: 96 % | SYSTOLIC BLOOD PRESSURE: 127 MMHG | WEIGHT: 208 LBS | BODY MASS INDEX: 36.85 KG/M2 | DIASTOLIC BLOOD PRESSURE: 74 MMHG

## 2020-02-05 PROBLEM — M48.02 SPINAL STENOSIS OF CERVICAL REGION: Status: ACTIVE | Noted: 2020-02-05

## 2020-02-05 PROBLEM — S72.009A HIP FRACTURE (HCC): Status: ACTIVE | Noted: 2020-01-18

## 2020-02-05 PROCEDURE — 96160 PT-FOCUSED HLTH RISK ASSMT: CPT | Performed by: NURSE PRACTITIONER

## 2020-02-05 PROCEDURE — 99214 OFFICE O/P EST MOD 30 MIN: CPT | Performed by: NURSE PRACTITIONER

## 2020-02-05 PROCEDURE — G8431 POS CLIN DEPRES SCRN F/U DOC: HCPCS | Performed by: NURSE PRACTITIONER

## 2020-02-05 PROCEDURE — 4004F PT TOBACCO SCREEN RCVD TLK: CPT | Performed by: NURSE PRACTITIONER

## 2020-02-05 PROCEDURE — G8427 DOCREV CUR MEDS BY ELIG CLIN: HCPCS | Performed by: NURSE PRACTITIONER

## 2020-02-05 PROCEDURE — G8417 CALC BMI ABV UP PARAM F/U: HCPCS | Performed by: NURSE PRACTITIONER

## 2020-02-05 PROCEDURE — G8482 FLU IMMUNIZE ORDER/ADMIN: HCPCS | Performed by: NURSE PRACTITIONER

## 2020-02-05 RX ORDER — SERTRALINE HYDROCHLORIDE 100 MG/1
150 TABLET, FILM COATED ORAL DAILY
Qty: 45 TABLET | Refills: 5 | Status: SHIPPED | OUTPATIENT
Start: 2020-02-05 | End: 2020-09-03 | Stop reason: SDUPTHER

## 2020-02-05 RX ORDER — ACETAMINOPHEN 500 MG
1000 TABLET ORAL
COMMUNITY
Start: 2020-02-03 | End: 2020-10-01 | Stop reason: SDUPTHER

## 2020-02-05 RX ORDER — CHOLECALCIFEROL (VITAMIN D3) 50 MCG
2000 TABLET ORAL DAILY
COMMUNITY
Start: 2020-01-23

## 2020-02-05 RX ORDER — BUSPIRONE HYDROCHLORIDE 15 MG/1
15 TABLET ORAL
COMMUNITY
End: 2020-02-26

## 2020-02-05 RX ORDER — OXYCODONE HYDROCHLORIDE 5 MG/1
5 TABLET ORAL
COMMUNITY
Start: 2020-02-04 | End: 2020-10-01 | Stop reason: ALTCHOICE

## 2020-02-05 ASSESSMENT — PATIENT HEALTH QUESTIONNAIRE - PHQ9
SUM OF ALL RESPONSES TO PHQ9 QUESTIONS 1 & 2: 3
SUM OF ALL RESPONSES TO PHQ QUESTIONS 1-9: 16
7. TROUBLE CONCENTRATING ON THINGS, SUCH AS READING THE NEWSPAPER OR WATCHING TELEVISION: 0
2. FEELING DOWN, DEPRESSED OR HOPELESS: 3
4. FEELING TIRED OR HAVING LITTLE ENERGY: 3
5. POOR APPETITE OR OVEREATING: 2
SUM OF ALL RESPONSES TO PHQ QUESTIONS 1-9: 16
10. IF YOU CHECKED OFF ANY PROBLEMS, HOW DIFFICULT HAVE THESE PROBLEMS MADE IT FOR YOU TO DO YOUR WORK, TAKE CARE OF THINGS AT HOME, OR GET ALONG WITH OTHER PEOPLE: 1
6. FEELING BAD ABOUT YOURSELF - OR THAT YOU ARE A FAILURE OR HAVE LET YOURSELF OR YOUR FAMILY DOWN: 3
3. TROUBLE FALLING OR STAYING ASLEEP: 3
1. LITTLE INTEREST OR PLEASURE IN DOING THINGS: 0
8. MOVING OR SPEAKING SO SLOWLY THAT OTHER PEOPLE COULD HAVE NOTICED. OR THE OPPOSITE, BEING SO FIGETY OR RESTLESS THAT YOU HAVE BEEN MOVING AROUND A LOT MORE THAN USUAL: 2
9. THOUGHTS THAT YOU WOULD BE BETTER OFF DEAD, OR OF HURTING YOURSELF: 0

## 2020-02-05 NOTE — PROGRESS NOTES
medications on file prior to visit. Past Medical History:   Diagnosis Date    Anxiety     Arthritis     CAD (coronary artery disease)     Depression     Diabetes mellitus ([facility] [address]     Hyperlipidemia     Kidney stone     Myocardial infarct ([facility] [address] 2016    Neck pain       Past Surgical History:   Procedure Laterality Date    BACK SURGERY      L5-S1, C5-7    CARDIAC CATHETERIZATION       SECTION      MASTOID SURGERY      TONSILLECTOMY       Family History   Problem Relation Age of Onset    Dementia Mother     Depression Mother     High Blood Pressure Mother     Coronary Art Dis Mother     COPD Mother     Diabetes Mother     Heart Attack Father     Diabetes Maternal Grandmother     Alzheimer's Disease Paternal Grandfather      Social History     Tobacco Use    Smoking status: Current Every Day Smoker     Years: 20.00     Types: Cigarettes     Last attempt to quit: 2016     Years since quittin.0    Smokeless tobacco: Never Used    Tobacco comment: 3 packs per week   Substance Use Topics    Alcohol use: Yes     Comment: social      No Known Allergies    Subjective:     Review of Systems  Objective:     /74 (Site: Right Upper Arm, Position: Sitting, Cuff Size: Large Adult)   Pulse 88   Wt 208 lb (94.3 kg)   SpO2 96%   BMI 36.85 kg/m²    Physical Exam  Constitutional:       General: She is not in acute distress. Appearance: She is well-developed. She is not diaphoretic. HENT:      Head: Normocephalic and atraumatic. Right Ear: External ear normal.      Left Ear: External ear normal.      Nose: Nose normal.   Eyes:      Conjunctiva/sclera: Conjunctivae normal.      Pupils: Pupils are equal, round, and reactive to light. Neck:      Musculoskeletal: Full passive range of motion without pain and normal range of motion. Thyroid: No thyroid mass. Trachea: Trachea normal.   Cardiovascular:      Rate and Rhythm: Normal rate and regular rhythm.       Heart sounds: Normal heart sounds, S1 normal and S2 normal. Heart sounds not distant. No friction rub. Pulmonary:      Effort: Pulmonary effort is normal. No accessory muscle usage or respiratory distress. Breath sounds: Normal breath sounds. Abdominal:      General: Bowel sounds are normal. There is no distension. Palpations: Abdomen is soft. There is no mass. Musculoskeletal:      Left hip: She exhibits decreased range of motion, decreased strength and tenderness. Legs:       Comments: Pain free ROM     Lymphadenopathy:      Cervical: No cervical adenopathy. Skin:     General: Skin is warm and dry. Findings: No rash. Neurological:      Mental Status: She is oriented to person, place, and time. Comments: Gait is normal.   Psychiatric:         Behavior: Behavior normal.         Thought Content: Thought content normal.         Judgment: Judgment normal.          Assessment/Plan         1. Anxiety  Increase dose   rto in 1 month or prn   - sertraline (ZOLOFT) 100 MG tablet; Take 1.5 tablets by mouth daily  Dispense: 45 tablet; Refill: 5    2. Situational depression      3. S/P hip replacement, right  Continue with surgeon and pt     4. Positive depression screening    - Positive Screen for Clinical Depression with a Documented Follow-up Plan      rto in 1 month  Will check a1c  Will have had mammogram done. RTO if symptoms worsen or fail to improve  Pt agreeable with plan      Patient given educationalmaterials - see patient instructions. Discussed use, benefit, and side effectsof prescribed medications. All patient questions answered. Pt voiced understanding. Reviewed health maintenance. Instructed to continue current medications, diet andexercise. 1.  Charisma received counseling on the following healthy behaviors: nutrition, exercise and medication adherence  2. Patient given educational materials when available - see patient instructionswhen applicable  3.   Discussed use, benefit, and side effects of prescribed medications. Barriersto medication compliance addressed. All patient questions answered. Pt voicedunderstanding. 4.  Reviewed prior labs and health maintenance  5. Continuecurrent medications, diet and exercise. Completed Refills   Requested Prescriptions     Signed Prescriptions Disp Refills    sertraline (ZOLOFT) 100 MG tablet 45 tablet 5     Sig: Take 1.5 tablets by mouth daily         Electronically signed by Virgilio Daniel CNP on 2/5/2020 at 8:31 PM                                    On the basis of positive PHQ-9 screening (PHQ-9 Total Score: 16), the following plan was implemented: see emt. Patient will follow-up in 1 month(s) with PCP.

## 2020-02-05 NOTE — PROGRESS NOTES
Visit Information    Have you changed or started any medications since your last visit including any over-the-counter medicines, vitamins, or herbal medicines? no   Are you having any side effects from any of your medications? -  no  Have you stopped taking any of your medications? Is so, why? -  no    Have you seen any other physician or provider since your last visit? Yes - Records Requested  Have you had any other diagnostic tests since your last visit? Yes - Records Requested  Have you been seen in the emergency room and/or had an admission to a hospital since we last saw you? Yes - Records Requested  Have you had your routine dental cleaning in the past 6 months? no    Have you activated your [employer] account? If not, what are your barriers?  Yes     Patient Care Team:  SAVANA Loaiza CNP as PCP - General (Family Nurse Practitioner)  SAVANA Loaiza CNP as PCP - [facility]    Medical History Review  Past Medical, Family, and Social History reviewed and does not contribute to the patient presenting condition    Health Maintenance   Topic Date Due    Diabetic retinal exam  12/23/1982    Lipid screen  12/23/1982    DTaP/Tdap/Td vaccine (1 - Tdap) 12/23/1983    HIV screen  12/23/1987    Hepatitis B vaccine (1 of 3 - Risk 3-dose series) 12/23/1991    Cervical cancer screen  12/23/1993    TSH testing  05/31/2020    Potassium monitoring  05/31/2020    Creatinine monitoring  05/31/2020    Diabetic foot exam  06/19/2020    Diabetic microalbuminuria test  06/19/2020    A1C test (Diabetic or Prediabetic)  11/20/2020    Shingles Vaccine (1 of 2) 12/23/2022    Flu vaccine  Completed    Pneumococcal 0-64 years Vaccine  Completed    Hepatitis A vaccine  Aged Out    Hib vaccine  Aged Out    Meningococcal (ACWY) vaccine  Aged Out

## 2020-02-24 ENCOUNTER — TELEPHONE (OUTPATIENT)
Dept: FAMILY MEDICINE CLINIC | Age: 50
End: 2020-02-24

## 2020-03-06 ENCOUNTER — OFFICE VISIT (OUTPATIENT)
Dept: PRIMARY CARE CLINIC | Age: 50
End: 2020-03-06
Payer: COMMERCIAL

## 2020-03-06 VITALS
SYSTOLIC BLOOD PRESSURE: 126 MMHG | TEMPERATURE: 97.5 F | WEIGHT: 210 LBS | OXYGEN SATURATION: 95 % | BODY MASS INDEX: 37.2 KG/M2 | HEART RATE: 93 BPM | DIASTOLIC BLOOD PRESSURE: 82 MMHG

## 2020-03-06 PROBLEM — F41.9 ANXIETY: Status: ACTIVE | Noted: 2020-03-06

## 2020-03-06 PROBLEM — R91.8 GROUND GLASS OPACITY PRESENT ON IMAGING OF LUNG: Status: ACTIVE | Noted: 2020-02-18

## 2020-03-06 PROBLEM — F43.21 SITUATIONAL DEPRESSION: Status: ACTIVE | Noted: 2020-03-06

## 2020-03-06 PROBLEM — Z86.718 HX OF DEEP VENOUS THROMBOSIS: Status: ACTIVE | Noted: 2020-03-04

## 2020-03-06 PROBLEM — Z96.641 S/P HIP REPLACEMENT, RIGHT: Status: ACTIVE | Noted: 2020-03-06

## 2020-03-06 PROCEDURE — G8427 DOCREV CUR MEDS BY ELIG CLIN: HCPCS | Performed by: NURSE PRACTITIONER

## 2020-03-06 PROCEDURE — 4004F PT TOBACCO SCREEN RCVD TLK: CPT | Performed by: NURSE PRACTITIONER

## 2020-03-06 PROCEDURE — G8482 FLU IMMUNIZE ORDER/ADMIN: HCPCS | Performed by: NURSE PRACTITIONER

## 2020-03-06 PROCEDURE — 99214 OFFICE O/P EST MOD 30 MIN: CPT | Performed by: NURSE PRACTITIONER

## 2020-03-06 PROCEDURE — G8417 CALC BMI ABV UP PARAM F/U: HCPCS | Performed by: NURSE PRACTITIONER

## 2020-03-06 RX ORDER — HYDROXYZINE HYDROCHLORIDE 25 MG/1
25 TABLET, FILM COATED ORAL EVERY 8 HOURS PRN
Qty: 30 TABLET | Refills: 1 | Status: SHIPPED | OUTPATIENT
Start: 2020-03-06 | End: 2020-04-05

## 2020-03-06 RX ORDER — TRAMADOL HYDROCHLORIDE 50 MG/1
50 TABLET ORAL
COMMUNITY
Start: 2020-03-02 | End: 2020-09-03

## 2020-03-06 ASSESSMENT — ENCOUNTER SYMPTOMS
SHORTNESS OF BREATH: 0
COUGH: 0

## 2020-03-06 NOTE — PROGRESS NOTES
Visit Information    Have you changed or started any medications since your last visit including any over-the-counter medicines, vitamins, or herbal medicines? no   Are you having any side effects from any of your medications? -  no  Have you stopped taking any of your medications? Is so, why? -  no    Have you seen any other physician or provider since your last visit? Yes - Records Requested  Have you had any other diagnostic tests since your last visit? Yes - Records Requested  Have you been seen in the emergency room and/or had an admission to a hospital since we last saw you? No  Have you had your routine dental cleaning in the past 6 months? no    Have you activated your [employer] account? If not, what are your barriers?  Yes     Patient Care Team:  SAVANA Albert CNP as PCP - General (Family Nurse Practitioner)  SAVANA Albert CNP as PCP [facility]    Medical History Review  Past Medical, Family, and Social History reviewed and does not contribute to the patient presenting condition    Health Maintenance   Topic Date Due    Diabetic retinal exam  12/23/1982    Lipid screen  12/23/1982    DTaP/Tdap/Td vaccine (1 - Tdap) 12/23/1983    HIV screen  12/23/1987    Hepatitis B vaccine (1 of 3 - Risk 3-dose series) 12/23/1991    Cervical cancer screen  12/23/1993    TSH testing  05/31/2020    Potassium monitoring  05/31/2020    Creatinine monitoring  05/31/2020    Diabetic foot exam  06/19/2020    Diabetic microalbuminuria test  06/19/2020    A1C test (Diabetic or Prediabetic)  11/20/2020    Shingles Vaccine (1 of 2) 12/23/2022    Flu vaccine  Completed    Pneumococcal 0-64 years Vaccine  Completed    Hepatitis A vaccine  Aged Out    Hib vaccine  Aged Out    Meningococcal (ACWY) vaccine  Aged Out

## 2020-03-09 ENCOUNTER — TELEPHONE (OUTPATIENT)
Dept: PRIMARY CARE CLINIC | Age: 50
End: 2020-03-09

## 2020-03-09 NOTE — TELEPHONE ENCOUNTER
Have them get cardiology's approval for this, I believe she has to see them before surgery anyway.
Hypothyroidism     Type 2 diabetes mellitus without complication, with long-term current use of insulin (HCC)     Essential hypertension     Hip fracture (HCC)     Spinal stenosis of cervical region     Ground glass opacity present on imaging of lung     Hx of deep venous thrombosis     Situational depression     Anxiety     S/P hip replacement, right

## 2020-06-24 ENCOUNTER — TELEPHONE (OUTPATIENT)
Dept: FAMILY MEDICINE CLINIC | Age: 50
End: 2020-06-24

## 2020-06-24 ENCOUNTER — APPOINTMENT (OUTPATIENT)
Dept: GENERAL RADIOLOGY | Age: 50
End: 2020-06-24
Payer: COMMERCIAL

## 2020-06-24 ENCOUNTER — HOSPITAL ENCOUNTER (EMERGENCY)
Age: 50
Discharge: HOME | End: 2020-06-24
Attending: EMERGENCY MEDICINE
Payer: COMMERCIAL

## 2020-06-24 VITALS
WEIGHT: 203 LBS | RESPIRATION RATE: 20 BRPM | HEART RATE: 100 BPM | BODY MASS INDEX: 35.97 KG/M2 | HEIGHT: 63 IN | DIASTOLIC BLOOD PRESSURE: 101 MMHG | TEMPERATURE: 99.4 F | SYSTOLIC BLOOD PRESSURE: 150 MMHG | OXYGEN SATURATION: 98 %

## 2020-06-24 LAB
ABSOLUTE EOS #: 0.33 K/UL (ref 0–0.4)
ABSOLUTE IMMATURE GRANULOCYTE: 0 K/UL (ref 0–0.3)
ABSOLUTE LYMPH #: 6.47 K/UL (ref 1.1–3.7)
ABSOLUTE MONO #: 0.33 K/UL (ref 0.1–0.8)
ALBUMIN SERPL-MCNC: 4.1 G/DL (ref 3.5–5.2)
ALBUMIN/GLOBULIN RATIO: 1.3 (ref 1–2.5)
ALP BLD-CCNC: 69 U/L (ref 35–104)
ALT SERPL-CCNC: 14 U/L (ref 5–33)
ANION GAP SERPL CALCULATED.3IONS-SCNC: 15 MMOL/L (ref 9–17)
AST SERPL-CCNC: 13 U/L
ATYPICAL LYMPHOCYTE ABSOLUTE COUNT: 0.17 K/UL
ATYPICAL LYMPHOCYTES: 1 %
BASOPHILS # BLD: 0 % (ref 0–2)
BASOPHILS ABSOLUTE: 0 K/UL (ref 0–0.2)
BILIRUB SERPL-MCNC: 0.29 MG/DL (ref 0.3–1.2)
BUN BLDV-MCNC: 9 MG/DL (ref 6–20)
BUN/CREAT BLD: ABNORMAL (ref 9–20)
CALCIUM SERPL-MCNC: 9.2 MG/DL (ref 8.6–10.4)
CHLORIDE BLD-SCNC: 104 MMOL/L (ref 98–107)
CO2: 20 MMOL/L (ref 20–31)
CREAT SERPL-MCNC: 0.57 MG/DL (ref 0.5–0.9)
DIFFERENTIAL TYPE: ABNORMAL
EOSINOPHILS RELATIVE PERCENT: 2 % (ref 1–4)
GFR AFRICAN AMERICAN: >60 ML/MIN
GFR NON-AFRICAN AMERICAN: >60 ML/MIN
GFR SERPL CREATININE-BSD FRML MDRD: ABNORMAL ML/MIN/{1.73_M2}
GFR SERPL CREATININE-BSD FRML MDRD: ABNORMAL ML/MIN/{1.73_M2}
GLUCOSE BLD-MCNC: 171 MG/DL (ref 70–99)
HCT VFR BLD CALC: 45.6 % (ref 36.3–47.1)
HEMOGLOBIN: 15 G/DL (ref 11.9–15.1)
IMMATURE GRANULOCYTES: 0 %
LYMPHOCYTES # BLD: 39 % (ref 24–44)
MCH RBC QN AUTO: 29.9 PG (ref 25.2–33.5)
MCHC RBC AUTO-ENTMCNC: 32.9 G/DL (ref 28.4–34.8)
MCV RBC AUTO: 91 FL (ref 82.6–102.9)
MONOCYTES # BLD: 2 % (ref 1–7)
MORPHOLOGY: NORMAL
NRBC AUTOMATED: 0 PER 100 WBC
PDW BLD-RTO: 14.1 % (ref 11.8–14.4)
PLATELET # BLD: 483 K/UL (ref 138–453)
PLATELET ESTIMATE: ABNORMAL
PMV BLD AUTO: 9.2 FL (ref 8.1–13.5)
POTASSIUM SERPL-SCNC: 4 MMOL/L (ref 3.7–5.3)
RBC # BLD: 5.01 M/UL (ref 3.95–5.11)
RBC # BLD: ABNORMAL 10*6/UL
SEG NEUTROPHILS: 56 % (ref 36–66)
SEGMENTED NEUTROPHILS ABSOLUTE COUNT: 9.3 K/UL (ref 1.8–7.7)
SODIUM BLD-SCNC: 139 MMOL/L (ref 135–144)
TOTAL PROTEIN: 7.3 G/DL (ref 6.4–8.3)
TROPONIN INTERP: NORMAL
TROPONIN INTERP: NORMAL
TROPONIN T: NORMAL NG/ML
TROPONIN T: NORMAL NG/ML
TROPONIN, HIGH SENSITIVITY: 7 NG/L (ref 0–14)
TROPONIN, HIGH SENSITIVITY: 8 NG/L (ref 0–14)
WBC # BLD: 16.6 K/UL (ref 3.5–11.3)
WBC # BLD: ABNORMAL 10*3/UL

## 2020-06-24 PROCEDURE — 71046 X-RAY EXAM CHEST 2 VIEWS: CPT

## 2020-06-24 PROCEDURE — 80053 COMPREHEN METABOLIC PANEL: CPT

## 2020-06-24 PROCEDURE — 96374 THER/PROPH/DIAG INJ IV PUSH: CPT

## 2020-06-24 PROCEDURE — 93005 ELECTROCARDIOGRAM TRACING: CPT | Performed by: STUDENT IN AN ORGANIZED HEALTH CARE EDUCATION/TRAINING PROGRAM

## 2020-06-24 PROCEDURE — 84484 ASSAY OF TROPONIN QUANT: CPT

## 2020-06-24 PROCEDURE — 96375 TX/PRO/DX INJ NEW DRUG ADDON: CPT

## 2020-06-24 PROCEDURE — 85025 COMPLETE CBC W/AUTO DIFF WBC: CPT

## 2020-06-24 PROCEDURE — 99285 EMERGENCY DEPT VISIT HI MDM: CPT

## 2020-06-24 PROCEDURE — 6360000002 HC RX W HCPCS: Performed by: STUDENT IN AN ORGANIZED HEALTH CARE EDUCATION/TRAINING PROGRAM

## 2020-06-24 RX ORDER — ONDANSETRON 2 MG/ML
4 INJECTION INTRAMUSCULAR; INTRAVENOUS ONCE
Status: COMPLETED | OUTPATIENT
Start: 2020-06-24 | End: 2020-06-24

## 2020-06-24 RX ORDER — DICYCLOMINE HYDROCHLORIDE 10 MG/1
10 CAPSULE ORAL EVERY 6 HOURS PRN
Qty: 20 CAPSULE | Refills: 0 | Status: SHIPPED | OUTPATIENT
Start: 2020-06-24 | End: 2021-01-22

## 2020-06-24 RX ORDER — ONDANSETRON 4 MG/1
4 TABLET, ORALLY DISINTEGRATING ORAL EVERY 8 HOURS PRN
Qty: 10 TABLET | Refills: 0 | Status: SHIPPED | OUTPATIENT
Start: 2020-06-24 | End: 2021-09-13

## 2020-06-24 RX ORDER — IBUPROFEN 800 MG/1
800 TABLET ORAL EVERY 8 HOURS PRN
Qty: 30 TABLET | Refills: 0 | Status: SHIPPED | OUTPATIENT
Start: 2020-06-24 | End: 2021-12-20

## 2020-06-24 RX ORDER — ACETAMINOPHEN 500 MG
1000 TABLET ORAL EVERY 6 HOURS PRN
Qty: 30 TABLET | Refills: 0 | Status: ON HOLD | OUTPATIENT
Start: 2020-06-24 | End: 2022-10-29 | Stop reason: SDUPTHER

## 2020-06-24 RX ORDER — KETOROLAC TROMETHAMINE 15 MG/ML
15 INJECTION, SOLUTION INTRAMUSCULAR; INTRAVENOUS ONCE
Status: COMPLETED | OUTPATIENT
Start: 2020-06-24 | End: 2020-06-24

## 2020-06-24 RX ADMIN — ONDANSETRON 4 MG: 2 INJECTION INTRAMUSCULAR; INTRAVENOUS at 16:17

## 2020-06-24 RX ADMIN — KETOROLAC TROMETHAMINE 15 MG: 15 INJECTION, SOLUTION INTRAMUSCULAR; INTRAVENOUS at 16:17

## 2020-06-24 ASSESSMENT — PAIN DESCRIPTION - DESCRIPTORS: DESCRIPTORS: PRESSURE

## 2020-06-24 ASSESSMENT — PAIN DESCRIPTION - PAIN TYPE: TYPE: ACUTE PAIN

## 2020-06-24 ASSESSMENT — PAIN DESCRIPTION - ORIENTATION: ORIENTATION: RIGHT;LEFT

## 2020-06-24 ASSESSMENT — PAIN SCALES - GENERAL
PAINLEVEL_OUTOF10: 5
PAINLEVEL_OUTOF10: 5

## 2020-06-24 ASSESSMENT — PAIN DESCRIPTION - FREQUENCY: FREQUENCY: INTERMITTENT

## 2020-06-24 ASSESSMENT — PAIN DESCRIPTION - LOCATION: LOCATION: CHEST

## 2020-06-24 ASSESSMENT — PAIN DESCRIPTION - ONSET: ONSET: ON-GOING

## 2020-06-24 NOTE — ED PROVIDER NOTES
Care assumed from Dr. Velasquez Spine,     Pt with Chest pain, cough, diarrhea, myalgias. Planning for repeat troponin. Consider obs vs Discharge with close followup.        Mary Van MD  06/24/20 [id number]

## 2020-06-24 NOTE — TELEPHONE ENCOUNTER
Pt called in and stated she has been vomiting, and has had a fever since last night, and it was worse this morning.ked if she could come in for a visit, and I told her with the fever and vomiting we could not have her in the office, she would have to go to the Flu Clinic.  Pt stated she was going to [facility], she did not want to go to the flu clinic

## 2020-06-24 NOTE — ED PROVIDER NOTES
Attack Father     Diabetes Maternal Grandmother     Alzheimer's Disease Paternal Grandfather      Portions of the past medical history, past surgical history, social history, and family history were discussed and reviewed with thepatient/family and is included in HPI if pertinent. ALLERGIES / IMMUNIZATIONS / HOME MEDICATIONS     Allergies: Patient has no known allergies. IMMUNIZATIONS    Immunization History   Administered Date(s) Administered    Influenza, Quadv, IM, PF (6 mo and older Fluzone, Flulaval, Fluarix, and 3 yrs and older Afluria) 11/20/2019    Pneumococcal Polysaccharide ([id number]) 06/19/2019     Home Medications:  Prior to Admission medications    Medication Sig Start Date End Date Taking? Authorizing Provider   acetaminophen (TYLENOL) 500 MG tablet Take 2 tablets by mouth every 6 hours as needed for Pain 6/24/20  Yes Alicia Chan DO   ibuprofen (ADVIL;MOTRIN) 800 MG tablet Take 1 tablet by mouth every 8 hours as needed for Pain 6/24/20  Yes Alicia Chan DO   dicyclomine (BENTYL) 10 MG capsule Take 1 capsule by mouth every 6 hours as needed (cramps) 6/24/20  Yes Alicia Chan DO   ondansetron (ZOFRAN ODT) 4 MG disintegrating tablet Take 1 tablet by mouth every 8 hours as needed for Nausea 6/24/20  Yes Alicia Chan DO   traMADol (ULTRAM) 50 MG tablet Take 50 mg by mouth. 3/2/20   Historical Provider, MD   busPIRone (BUSPAR) 15 MG tablet Take 15 mg by mouth 2 times daily 2/26/20   SAVANA Nice - CNP   HUMULIN 70/30 KWIKPEN (70-30) 100 UNIT/ML injection pen inject 33 units subcutaneously twice a day 2/26/20   SAVANA Nice CNP   acetaminophen (TYLENOL) 500 MG tablet Take 1,000 mg by mouth 2/3/20   Historical Provider, MD   CALCITRATE 950 MG tablet  1/24/20   Historical Provider, MD   Cholecalciferol (VITAMIN D) 50 MCG (2000 UT) TABS tablet  1/23/20   Historical Provider, MD   oxyCODONE (ROXICODONE) 5 MG immediate release tablet Take 5 mg by mouth. ORDERED:  Orders Placed This Encounter   Medications    ondansetron (ZOFRAN) injection 4 mg    ketorolac (TORADOL) injection 15 mg    acetaminophen (TYLENOL) 500 MG tablet     Sig: Take 2 tablets by mouth every 6 hours as needed for Pain     Dispense:  30 tablet     Refill:  0    ibuprofen (ADVIL;MOTRIN) 800 MG tablet     Sig: Take 1 tablet by mouth every 8 hours as needed for Pain     Dispense:  30 tablet     Refill:  0    dicyclomine (BENTYL) 10 MG capsule     Sig: Take 1 capsule by mouth every 6 hours as needed (cramps)     Dispense:  20 capsule     Refill:  0    ondansetron (ZOFRAN ODT) 4 MG disintegrating tablet     Sig: Take 1 tablet by mouth every 8 hours as needed for Nausea     Dispense:  10 tablet     Refill:  0       DIAGNOSTIC RESULTS     LABS:  Results for orders placed or performed during the hospital encounter of 06/24/20   CBC WITH AUTO DIFFERENTIAL   Result Value Ref Range    WBC 16.6 (H) 3.5 - 11.3 k/uL    RBC 5.01 3.95 - 5.11 m/uL    Hemoglobin 15.0 11.9 - 15.1 g/dL    Hematocrit 45.6 36.3 - 47.1 %    MCV 91.0 82.6 - 102.9 fL    MCH 29.9 25.2 - 33.5 pg    MCHC 32.9 28.4 - 34.8 g/dL    RDW 14.1 11.8 - 14.4 %    Platelets 941 (H) 060 - 453 k/uL    MPV 9.2 8.1 - 13.5 fL    NRBC Automated 0.0 0.0 per 100 WBC    Differential Type NOT REPORTED     WBC Morphology NOT REPORTED     RBC Morphology NOT REPORTED     Platelet Estimate NOT REPORTED     Immature Granulocytes 0 0 %    Seg Neutrophils 56 36 - 66 %    Lymphocytes 39 24 - 44 %    Atypical Lymphocytes 1 %    Monocytes 2 1 - 7 %    Eosinophils % 2 1 - 4 %    Basophils 0 0 - 2 %    Absolute Immature Granulocyte 0.00 0.00 - 0.30 k/uL    Segs Absolute 9.30 (H) 1.8 - 7.7 k/uL    Absolute Lymph # 6.47 (H) 1.10 - 3.70 k/uL    Atypical Lymphocytes Absolute 0.17 k/uL    Absolute Mono # 0.33 0.1 - 0.8 k/uL    Absolute Eos # 0.33 0.0 - 0.4 k/uL    Basophils Absolute 0.00 0.0 - 0.2 k/uL    Morphology Normal    Comprehensive Metabolic Panel   Result Value Ref Range    Glucose 171 (H) 70 - 99 mg/dL    BUN 9 6 - 20 mg/dL    CREATININE 0.57 0.50 - 0.90 mg/dL    Bun/Cre Ratio NOT REPORTED 9 - 20    Calcium 9.2 8.6 - 10.4 mg/dL    Sodium 139 135 - 144 mmol/L    Potassium 4.0 3.7 - 5.3 mmol/L    Chloride 104 98 - 107 mmol/L    CO2 20 20 - 31 mmol/L    Anion Gap 15 9 - 17 mmol/L    Alkaline Phosphatase 69 35 - 104 U/L    ALT 14 5 - 33 U/L    AST 13 <32 U/L    Total Bilirubin 0.29 (L) 0.3 - 1.2 mg/dL    Total Protein 7.3 6.4 - 8.3 g/dL    Alb 4.1 3.5 - 5.2 g/dL    Albumin/Globulin Ratio 1.3 1.0 - 2.5    GFR Non-African American >60 >60 mL/min    GFR African American >60 >60 mL/min    GFR Comment          GFR Staging NOT REPORTED    Troponin   Result Value Ref Range    Troponin, High Sensitivity 7 0 - 14 ng/L    Troponin T NOT REPORTED <0.03 ng/mL    Troponin Interp NOT REPORTED    Troponin   Result Value Ref Range    Troponin, High Sensitivity 8 0 - 14 ng/L    Troponin T NOT REPORTED <0.03 ng/mL    Troponin Interp NOT REPORTED    EKG 12 Lead   Result Value Ref Range    Ventricular Rate 90 BPM    Atrial Rate 90 BPM    P-R Interval 148 ms    QRS Duration 82 ms    Q-T Interval 378 ms    QTc Calculation (Bazett) 462 ms    P Axis 60 degrees    R Axis 62 degrees    T Axis 69 degrees     Labs Reviewed   CBC WITH AUTO DIFFERENTIAL - Abnormal; Notable for the following components:       Result Value    WBC 16.6 (*)     Platelets 817 (*)     Segs Absolute 9.30 (*)     Absolute Lymph # 6.47 (*)     All other components within normal limits   COMPREHENSIVE METABOLIC PANEL - Abnormal; Notable for the following components:    Glucose 171 (*)     Total Bilirubin 0.29 (*)     All other components within normal limits   TROPONIN   TROPONIN     RADIOLOGY:  Xr Chest Standard (2 Vw)    Result Date: 6/24/2020  EXAMINATION: TWO XRAY VIEWS OF THE CHEST 6/24/2020 4:23 pm COMPARISON: None.  HISTORY: ORDERING SYSTEM PROVIDED HISTORY: chest pain, SOB, cough TECHNOLOGIST PROVIDED HISTORY: chest pain, DISPOSITION / PLAN     DISPOSITION Decision To Discharge 06/24/2020 06:49:30 PM    This patient was evaluated in the Emergency Department for symptoms described in the history of present illness. The patient was evaluated in the context of the global COVID-19 pandemic, which necessitated consideration that the patient might be at risk for infection with the SARS-CoV-2 virus that causes COVID-19. Institutional protocols and algorithms that pertain to the evaluation of patients at risk for COVID-19 are in a state of rapid change based on information released by regulatory bodies including the CDC and federal and state organizations. These policies and algorithms were followed during the patient's care in the ED. If discharged, the patient was instructed to return to the emergency department with any worsening symptoms, if new symptoms arise, or if they have any other concerns. Patient was instructed not to drive home if discharged today and received pain medications or other mind-altering medications while here.     PATIENT REFERRED TO:  SAVANA Gonzalez - CNP  [facility]  [phone]    Schedule an appointment as soon as possible for a visit       [facility]  [address] [id number] [phone]    As needed, If symptoms worsen      DISCHARGE MEDICATIONS:  Discharge Medication List as of 6/24/2020  6:51 PM      START taking these medications    Details   !! acetaminophen (TYLENOL) 500 MG tablet Take 2 tablets by mouth every 6 hours as needed for Pain, Disp-30 tablet, R-0Print      !! ibuprofen (ADVIL;MOTRIN) 800 MG tablet Take 1 tablet by mouth every 8 hours as needed for Pain, Disp-30 tablet, R-0Print      dicyclomine (BENTYL) 10 MG capsule Take 1 capsule by mouth every 6 hours as needed (cramps), Disp-20 capsule, R-0Print      ondansetron (ZOFRAN ODT) 4 MG disintegrating tablet Take 1 tablet by mouth every 8 hours as needed for Nausea, Disp-10 tablet, R-0Print       !! - Potential duplicate medications found. Please discuss with provider.         Sanchez Britt DO  Emergency Medicine Resident    (Please note that portions of this note were completed with a voice recognition program.  Efforts were made to edit the dictations but occasionally words are mis-transcribed.)     Sanchez Britt DO  06/25/20 [id number]

## 2020-06-24 NOTE — ED PROVIDER NOTES
Emergency Medicine Attending Note    I have seen and examined the patient in conjunction with the Resident/MLP. Please see my key portion documented:      I agree with the assessment and plan as discussed with Dr. Mena Richardson. Electronically signed:  Robby Velásquez M.D.           Jovanna Majano MD  06/24/20 [id number]

## 2020-06-25 ENCOUNTER — CARE COORDINATION (OUTPATIENT)
Dept: CARE COORDINATION | Age: 50
End: 2020-06-25

## 2020-06-25 LAB
EKG ATRIAL RATE: 90 BPM
EKG P AXIS: 60 DEGREES
EKG P-R INTERVAL: 148 MS
EKG Q-T INTERVAL: 378 MS
EKG QRS DURATION: 82 MS
EKG QTC CALCULATION (BAZETT): 462 MS
EKG R AXIS: 62 DEGREES
EKG T AXIS: 69 DEGREES
EKG VENTRICULAR RATE: 90 BPM

## 2020-06-25 PROCEDURE — 93010 ELECTROCARDIOGRAM REPORT: CPT | Performed by: INTERNAL MEDICINE

## 2020-06-25 ASSESSMENT — ENCOUNTER SYMPTOMS
NAUSEA: 1
COUGH: 1
SHORTNESS OF BREATH: 1
ABDOMINAL PAIN: 0
VOMITING: 1

## 2020-06-25 NOTE — CARE COORDINATION
Patient contacted regarding recent discharge and COVID-19 risk. Discussed COVID-19 related testing which was not done at this time. Test results were not done. Patient informed of results, if available? N/A     Care Transition Nurse/ Ambulatory Care Manager contacted the patient by telephone to perform post discharge assessment. Verified name and  with patient as identifiers. Patient has following risk factors of: CAD. CTN/ACM reviewed discharge instructions, medical action plan and red flags related to discharge diagnosis. Reviewed and educated them on any new and changed medications related to discharge diagnosis. Advised obtaining a 90-day supply of all daily and as-needed medications. Education provided regarding infection prevention, and signs and symptoms of COVID-19 and when to seek medical attention with patient who verbalized understanding. Discussed exposure protocols and quarantine from [address] you at higher risk for severe illness  and given an opportunity for questions and concerns. The patient agrees to contact the COVID-19 hotline [phone] or PCP office for questions related to their healthcare. CTN/ACM provided contact information for future reference. From CDC: Are you at higher risk for severe illness?  Wash your hands often.  Avoid close contact (6 feet, which is about two arm lengths) with people who are sick.  Put distance between yourself and other people if COVID-19 is spreading in your community.  Clean and disinfect frequently touched surfaces.  Avoid all cruise travel and non-essential air travel.  Call your healthcare professional if you have concerns about COVID-19 and your underlying condition or if you are sick. For more information on steps you can take to protect yourself, see CDC's How to [address] [place] [address] for follow-up call in 7-14 days based on severity of symptoms and risk factors.   Patient to call PCP today to schedule

## 2020-06-30 ENCOUNTER — TELEMEDICINE (OUTPATIENT)
Dept: FAMILY MEDICINE CLINIC | Age: 50
End: 2020-06-30
Payer: COMMERCIAL

## 2020-06-30 PROCEDURE — 99213 OFFICE O/P EST LOW 20 MIN: CPT | Performed by: NURSE PRACTITIONER

## 2020-06-30 PROCEDURE — G8427 DOCREV CUR MEDS BY ELIG CLIN: HCPCS | Performed by: NURSE PRACTITIONER

## 2020-06-30 ASSESSMENT — ENCOUNTER SYMPTOMS
SHORTNESS OF BREATH: 0
COUGH: 0

## 2020-06-30 NOTE — PROGRESS NOTES
daily Yes SAVANA Gonzlaez CNP   HUMULIN 70/30 KWIKPEN (70-30) 100 UNIT/ML injection pen inject 33 units subcutaneously twice a day Yes SAVANA Gonzalez CNP   acetaminophen (TYLENOL) 500 MG tablet Take 1,000 mg by mouth Yes Historical Provider, MD   CALCITRATE 950 MG tablet  Yes Historical Provider, MD   Cholecalciferol (VITAMIN D) 50 MCG (2000 UT) TABS tablet  Yes Historical Provider, MD   sertraline (ZOLOFT) 100 MG tablet Take 1.5 tablets by mouth daily Yes SAVANA Gonzalez CNP   levothyroxine (SYNTHROID) 50 MCG tablet Take 1 tablet by mouth Daily Yes SAVANA Gonzalez CNP   FREESTYLE LANCETS MISC 1 each by Does not apply route daily Yes SAVANA Gonzalez CNP   blood glucose test strips (FREESTYLE LITE) strip 1 each by In Vitro route daily As needed.  Yes SAVANA Gonzalez CNP   metoprolol succinate (TOPROL XL) 100 MG extended release tablet Take 1 tablet by mouth 2 times daily Yes SAVANA Goznalez CNP   Insulin Pen Needle ([employer] PEN NEEDLES 31G) 31G X 8 MM MISC 1 each by Does not apply route daily Yes SAVANA Gonzalez CNP   tiZANidine (ZANAFLEX) 2 MG tablet Take 1 tablet by mouth 3 times daily as needed (tension) Yes SAVANA Gonzalez CNP   ibuprofen (ADVIL;MOTRIN) 600 MG tablet take 1 tablet by mouth every 6 hours if needed for pain Yes SAVANA Gonzalez CNP   nitroGLYCERIN (NITROSTAT) 0.4 MG SL tablet  Yes Historical Provider, MD   Blood Glucose Monitoring Suppl (FREESTYLE FREEDOM LITE) w/Device KIT 1 kit by Does not apply route daily Yes Brooksie Nissen, APRN - CNP   AMITRIPTYLINE HCL PO Take 2.5 mg by mouth daily Yes Historical Provider, MD   atorvastatin (LIPITOR) 80 MG tablet Take 80 mg by mouth daily Yes Historical Provider, MD   ranolazine (RANEXA) 500 MG extended release tablet Take 500 mg by mouth 2 times daily Yes Historical Provider, MD   aspirin 81 MG chewable tablet Take 81 mg by mouth daily Yes Historical Provider, MD   oxyCODONE (ROXICODONE) 5 MG immediate release tablet Take 5 mg by mouth. Historical Provider, MD   clopidogrel (PLAVIX) 75 MG tablet Take 75 mg by mouth daily  Historical Provider, MD     Social- patient smokes    Past Medical History:   Diagnosis Date    Anxiety     Arthritis     CAD (coronary artery disease)     Depression     Hyperlipidemia     Kidney stone     Myocardial infarct ([facility] [address] 2016    Neck pain    ,   Past Surgical History:   Procedure Laterality Date    BACK SURGERY      L5-S1, C5-7    CARDIAC CATHETERIZATION       SECTION      MASTOID SURGERY      TONSILLECTOMY               [] OTHER:    Constitutional: [x] Appears well-developed and well-nourished [] No apparent distress                            [] Abnormal-   Mental status  [x] Alert and awake  [x] Oriented to person/place/time [x]Able to follow commands       Eyes:  EOM    [x]  Normal  [] Abnormal-  Sclera  [x]  Normal  [] Abnormal -         Discharge [x]  None visible  [] Abnormal -     HENT:   [x] Normocephalic, atraumatic.   [] Abnormal   [] Mouth/Throat: Mucous membranes are moist.      External Ears [x] Normal  [] Abnormal-      Neck: [x] No visualized mass      Pulmonary/Chest: [x] Respiratory effort normal.  [] No visualized signs of difficulty breathing or respiratory distress        [] Abnormal-      Musculoskeletal:   [] Normal gait with no signs of ataxia         [x] Normal range of motion of neck        [] Abnormal-   [] Motor grossly intact in visible upper extremities    [] Motor grossly intact in visible lower extremities        Neurological:        [x] No Facial Asymmetry (Cranial nerve 7 motor function) (limited exam to video visit)                       [x] No gaze palsy        [] Abnormal-         Skin:                     [x] No significant exanthematous lesions or discoloration noted on facial skin         [] Abnormal-                                  Psychiatric:

## 2020-07-08 ENCOUNTER — CARE COORDINATION (OUTPATIENT)
Dept: CARE COORDINATION | Age: 50
End: 2020-07-08

## 2020-07-08 ENCOUNTER — TELEPHONE (OUTPATIENT)
Dept: PRIMARY CARE CLINIC | Age: 50
End: 2020-07-08

## 2020-07-08 NOTE — CARE COORDINATION
You Patient resolved from the Care Transitions episode on 7/8/2020  Discussed COVID-19 related testing which was not done at this time. Test results were not done. Patient informed of results, if available? N/A    Patient/family has been provided the following resources and education related to COVID-19:                         Signs, symptoms and red flags related to COVID-19            CDC exposure and quarantine guidelines            [employer] exposure contact - [phone]            Contact for their local Department of Health                 Patient currently reports that the following symptoms have improved:  no new/worsening symptoms     No further outreach scheduled with this CTN/ACM. Episode of Care resolved. Patient has this CTN/ACM contact information if future needs arise.

## 2020-08-17 LAB
AVERAGE GLUCOSE: 180
HBA1C MFR BLD: 7.9 %

## 2020-09-01 ENCOUNTER — TELEPHONE (OUTPATIENT)
Dept: FAMILY MEDICINE CLINIC | Age: 50
End: 2020-09-01

## 2020-09-01 NOTE — TELEPHONE ENCOUNTER
Patient is needing a hospital follow up visit scheduled within the next week. Patient was seen at [facility] for a lung biopsy, and was discharged 8/31/2020.    Phone: [phone]

## 2020-09-03 ENCOUNTER — VIRTUAL VISIT (OUTPATIENT)
Dept: FAMILY MEDICINE CLINIC | Age: 50
End: 2020-09-03
Payer: COMMERCIAL

## 2020-09-03 PROBLEM — R91.1 PULMONARY NODULE: Status: ACTIVE | Noted: 2020-08-04

## 2020-09-03 PROCEDURE — G8427 DOCREV CUR MEDS BY ELIG CLIN: HCPCS | Performed by: NURSE PRACTITIONER

## 2020-09-03 PROCEDURE — 99214 OFFICE O/P EST MOD 30 MIN: CPT | Performed by: NURSE PRACTITIONER

## 2020-09-03 RX ORDER — LEVOTHYROXINE SODIUM 0.05 MG/1
50 TABLET ORAL DAILY
Qty: 30 TABLET | Refills: 5 | Status: SHIPPED | OUTPATIENT
Start: 2020-09-03 | End: 2021-09-13 | Stop reason: SDUPTHER

## 2020-09-03 RX ORDER — LORAZEPAM 0.5 MG/1
0.5 TABLET ORAL 2 TIMES DAILY PRN
Qty: 20 TABLET | Refills: 0 | Status: SHIPPED | OUTPATIENT
Start: 2020-09-03 | End: 2020-09-13

## 2020-09-03 RX ORDER — SERTRALINE HYDROCHLORIDE 100 MG/1
150 TABLET, FILM COATED ORAL DAILY
Qty: 45 TABLET | Refills: 5 | Status: SHIPPED | OUTPATIENT
Start: 2020-09-03 | End: 2021-09-13 | Stop reason: SDUPTHER

## 2020-09-03 RX ORDER — AMOXICILLIN 250 MG
2 CAPSULE ORAL NIGHTLY
COMMUNITY
Start: 2020-08-31 | End: 2020-09-14

## 2020-09-03 RX ORDER — POLYETHYLENE GLYCOL 3350 17 G/17G
17 POWDER, FOR SOLUTION ORAL DAILY PRN
COMMUNITY
Start: 2020-08-31

## 2020-09-03 ASSESSMENT — ENCOUNTER SYMPTOMS
COUGH: 0
BACK PAIN: 1
CHEST TIGHTNESS: 0
ABDOMINAL PAIN: 0
DIARRHEA: 0
SHORTNESS OF BREATH: 0
BLOOD IN STOOL: 0
CONSTIPATION: 0
RHINORRHEA: 0
SINUS PRESSURE: 0

## 2020-09-03 NOTE — PROGRESS NOTES
Patient is present for f/u from [facility]  Patient was at the hospital 8/24-8/31 for pulmonary nodule  States they did a lung biopsy and she is still waiting for the results  Patient is following up with her surgeon and pulm

## 2020-09-03 NOTE — PROGRESS NOTES
VISIT LOCATION: Home    TELEHEALTH EVALUATION -- Audio/Visual (During LCGOL-23 public health emergency)    Due to COVID 23 outbreak, patient's office visit was converted to a virtual visit. Patient was contacted and agreed to proceed with a virtual visit via [email or web address]  The risks and benefits of converting to a virtual visit were discussed in light of the current infectious disease epidemic. Patient also understood that insurance coverage and co-pays are up to their individual insurance plans. Jasmin Ellis (:  ) has requested an audio/video evaluation for the following concern(s):    Chief Complaint:   HPI:  Jasmin Ellis is here for virtual visit for hospital follow up. Per discharge summary  I've had the pleasure of caring for Shireen Goodwin in conjunction with, Dr. Robert Tafoya, throughout this hospital admission. As you recall, Shireen Goodwin is a 54 y.o. female patient who presented with multiple medical problems including known coronary disease status post previous stenting of her LAD and ongoing smoking habit 2 pack per day times 20 years. Over the last 2 years she had noted progressive fatigue and shortness of breath now with minimal exertion. CT scan in the past have demonstrated ill defined small nodules for which she did not seek follow-up. Earlier this year she had upper respiratory tract infection With dyspneic symptoms and repeat CT scan of the chest was completed on 2020. This demonstrated ground-glass opacities in the absence of any mediastinal or hilar lymphadenopathy. She saw Dr. Gris Pierson in consultation in February and referred to and seen by Dr. Robert Tafoya for possible surgical consultation. After discussion as to the findings, need for the procedure and the operative risks she has consented to surgery and returns to the hospital at this time for thorascopic lung biopsy.  Prior to surgery a cardiac catheterization was completed on 2018 and demonstrated minimal luminal irregularities with a widely patent stent in the LAD. Echocardiogram was also a completed and demonstrated normal left ventricular function and no significant valvular abnormality. She then underwent a attempted VATS right lateral thoracotomy open lung biopsy of the right upper middle and lower lobes performed on 08/24/2020. After brief time in the recovery room the patient was then transferred to the intermediate step-down unit for ongoing postoperative care. An epidural was placed for postop pain control. Chest x-ray the following day did demonstrate a very small right apical PTX. Chest tube remained to suction. Pain was difficult to controll at times, many of her home medications were resumed. Chest tube continue to demonstrate no air leak, chest x-ray the following day was stable in the chest tube was weaned to water seal. Epidural was discontinued patient remained anxious and painful. Analgesics were adjusted. Chest x-ray the following day did demonstrated increased right apical PTX chest tube was placed back to suction for 48 hours. Pneumothorax gradually improved. Chest tube was weaned to water seal and CXR remained stable. Chest tube was then clamped overnight and chest x-ray this morning is once again stable with a small right apical PTX. Chest tube has been discontinued. Patient will discharged home in stable condition later today. She will follow with CT surgery 1-2 weeks. Final pathology has been sent out and is pending. She states pathology still pending. Still has home nursing coming to care for wounds. She states they look good and there is no sign of infection. She is very anxious. She is concerned that this is cancer. She is still taking her zoloft and buspar but states her anxiety is not controlled. She had a bm last night. Sees dr Ralph Carreon on Tuesday  pulm in October. Hem oc soon  Dr Driss Hutton with cardio- had a stress and echo.  States was told everything looked great    Review of Systems   Constitutional: Positive for fatigue. Negative for activity change, appetite change, chills and fever. HENT: Negative for congestion, ear pain, rhinorrhea and sinus pressure. Respiratory: Negative for cough, chest tightness and shortness of breath. Cardiovascular: Negative for chest pain, palpitations and leg swelling. Gastrointestinal: Negative for abdominal pain, blood in stool, constipation and diarrhea. Musculoskeletal: Positive for back pain (from thoracotomy). Negative for arthralgias and myalgias. Skin: Negative for rash. Neurological: Negative for dizziness, light-headedness and headaches. Psychiatric/Behavioral: Negative for dysphoric mood and self-injury. The patient is nervous/anxious. Prior to Visit Medications    Medication Sig Taking? Authorizing Provider   senna-docusate (PERICOLACE) 8.6-50 MG per tablet Take 2 tablets by mouth nightly Yes Historical Provider, MD   polyethylene glycol (GLYCOLAX) 17 g packet Take 17 g by mouth daily Yes Historical Provider, MD   levothyroxine (SYNTHROID) 50 MCG tablet Take 1 tablet by mouth Daily Yes SAVANA Jeffers CNP   sertraline (ZOLOFT) 100 MG tablet Take 1.5 tablets by mouth daily Yes SAVANA Jeffers CNP   LORazepam (ATIVAN) 0.5 MG tablet Take 1 tablet by mouth 2 times daily as needed for Anxiety for up to 10 days.  Yes SAVANA Jeffers CNP   acetaminophen (TYLENOL) 500 MG tablet Take 2 tablets by mouth every 6 hours as needed for Pain Yes Alicia Chan DO   dicyclomine (BENTYL) 10 MG capsule Take 1 capsule by mouth every 6 hours as needed (cramps) Yes Alicia Chan DO   ondansetron (ZOFRAN ODT) 4 MG disintegrating tablet Take 1 tablet by mouth every 8 hours as needed for Nausea Yes Alicia Chan DO   busPIRone (BUSPAR) 15 MG tablet Take 15 mg by mouth 2 times daily Yes SAVANA Jeffers CNP   HUMULIN 70/30 KWIKPEN (70-30) 100 UNIT/ML injection pen inject 33 units Pain  Patient not taking: Reported on 9/3/2020  Alicia Chan,      Social- none    Past Medical History:   Diagnosis Date    Anxiety     Arthritis     CAD (coronary artery disease)     Depression     Hyperlipidemia     Kidney stone     Myocardial infarct ([facility] [address] 2016    Neck pain    ,   Past Surgical History:   Procedure Laterality Date    BACK SURGERY      L5-S1, C5-7    CARDIAC CATHETERIZATION       SECTION      MASTOID SURGERY      TONSILLECTOMY               [] OTHER:    Constitutional: [x] Appears well-developed and well-nourished [] No apparent distress                            [] Abnormal-   Mental status  [x] Alert and awake  [x] Oriented to person/place/time [x]Able to follow commands       Eyes:  EOM    [x]  Normal  [] Abnormal-  Sclera  [x]  Normal  [] Abnormal -         Discharge [x]  None visible  [] Abnormal -     HENT:   [x] Normocephalic, atraumatic.   [] Abnormal   [] Mouth/Throat: Mucous membranes are moist.      External Ears [x] Normal  [] Abnormal-      Neck: [x] No visualized mass      Pulmonary/Chest: [x] Respiratory effort normal.  [] No visualized signs of difficulty breathing or respiratory distress        [] Abnormal-      Musculoskeletal:   [] Normal gait with no signs of ataxia         [x] Normal range of motion of neck        [] Abnormal-   [] Motor grossly intact in visible upper extremities    [] Motor grossly intact in visible lower extremities        Neurological:        [x] No Facial Asymmetry (Cranial nerve 7 motor function) (limited exam to video visit)                       [x] No gaze palsy        [] Abnormal-         Skin:                     [x] No significant exanthematous lesions or discoloration noted on facial skin         [] Abnormal-                                  Psychiatric:           [x] Normal Affect [] No Hallucinations        [] Abnormal- [] Normal Mood  [] Anxious appearing    [] Depressed appearing  [] Confused appearing      Due to

## 2020-09-09 ENCOUNTER — APPOINTMENT (OUTPATIENT)
Dept: GENERAL RADIOLOGY | Age: 50
End: 2020-09-09
Payer: COMMERCIAL

## 2020-09-09 ENCOUNTER — TELEPHONE (OUTPATIENT)
Dept: FAMILY MEDICINE CLINIC | Age: 50
End: 2020-09-09

## 2020-09-09 ENCOUNTER — HOSPITAL ENCOUNTER (EMERGENCY)
Age: 50
Discharge: HOME | End: 2020-09-09
Attending: EMERGENCY MEDICINE
Payer: COMMERCIAL

## 2020-09-09 VITALS
BODY MASS INDEX: 35.61 KG/M2 | HEIGHT: 63 IN | WEIGHT: 201 LBS | OXYGEN SATURATION: 98 % | TEMPERATURE: 97.8 F | DIASTOLIC BLOOD PRESSURE: 87 MMHG | RESPIRATION RATE: 18 BRPM | SYSTOLIC BLOOD PRESSURE: 162 MMHG | HEART RATE: 83 BPM

## 2020-09-09 LAB
ANION GAP SERPL CALCULATED.3IONS-SCNC: 11 MMOL/L (ref 9–17)
BUN BLDV-MCNC: 9 MG/DL (ref 6–20)
BUN/CREAT BLD: ABNORMAL (ref 9–20)
CALCIUM SERPL-MCNC: 9.2 MG/DL (ref 8.6–10.4)
CHLORIDE BLD-SCNC: 100 MMOL/L (ref 98–107)
CO2: 23 MMOL/L (ref 20–31)
CREAT SERPL-MCNC: 0.62 MG/DL (ref 0.5–0.9)
GFR AFRICAN AMERICAN: >60 ML/MIN
GFR NON-AFRICAN AMERICAN: >60 ML/MIN
GFR SERPL CREATININE-BSD FRML MDRD: ABNORMAL ML/MIN/{1.73_M2}
GFR SERPL CREATININE-BSD FRML MDRD: ABNORMAL ML/MIN/{1.73_M2}
GLUCOSE BLD-MCNC: 187 MG/DL (ref 70–99)
HCT VFR BLD CALC: 42.2 % (ref 36.3–47.1)
HEMOGLOBIN: 13.9 G/DL (ref 11.9–15.1)
LACTIC ACID, WHOLE BLOOD: 1.5 MMOL/L (ref 0.7–2.1)
MCH RBC QN AUTO: 30 PG (ref 25.2–33.5)
MCHC RBC AUTO-ENTMCNC: 32.9 G/DL (ref 28.4–34.8)
MCV RBC AUTO: 91.1 FL (ref 82.6–102.9)
NRBC AUTOMATED: 0 PER 100 WBC
PDW BLD-RTO: 13.7 % (ref 11.8–14.4)
PLATELET # BLD: 607 K/UL (ref 138–453)
PMV BLD AUTO: 8.7 FL (ref 8.1–13.5)
POTASSIUM SERPL-SCNC: 4.6 MMOL/L (ref 3.7–5.3)
RBC # BLD: 4.63 M/UL (ref 3.95–5.11)
SODIUM BLD-SCNC: 134 MMOL/L (ref 135–144)
WBC # BLD: 12.5 K/UL (ref 3.5–11.3)

## 2020-09-09 PROCEDURE — 99283 EMERGENCY DEPT VISIT LOW MDM: CPT

## 2020-09-09 PROCEDURE — 83605 ASSAY OF LACTIC ACID: CPT

## 2020-09-09 PROCEDURE — 85027 COMPLETE CBC AUTOMATED: CPT

## 2020-09-09 PROCEDURE — 6370000000 HC RX 637 (ALT 250 FOR IP): Performed by: EMERGENCY MEDICINE

## 2020-09-09 PROCEDURE — 71046 X-RAY EXAM CHEST 2 VIEWS: CPT

## 2020-09-09 PROCEDURE — 80048 BASIC METABOLIC PNL TOTAL CA: CPT

## 2020-09-09 RX ORDER — OXYCODONE HYDROCHLORIDE 5 MG/1
5 TABLET ORAL ONCE
Status: COMPLETED | OUTPATIENT
Start: 2020-09-09 | End: 2020-09-09

## 2020-09-09 RX ORDER — CEPHALEXIN 500 MG/1
500 CAPSULE ORAL 4 TIMES DAILY
Qty: 28 CAPSULE | Refills: 0 | Status: SHIPPED | OUTPATIENT
Start: 2020-09-09 | End: 2020-09-16

## 2020-09-09 RX ORDER — CEPHALEXIN 500 MG/1
500 CAPSULE ORAL 4 TIMES DAILY
Qty: 28 CAPSULE | Refills: 0 | Status: SHIPPED | OUTPATIENT
Start: 2020-09-09 | End: 2020-09-09 | Stop reason: SDUPTHER

## 2020-09-09 RX ORDER — CEPHALEXIN 250 MG/1
500 CAPSULE ORAL ONCE
Status: COMPLETED | OUTPATIENT
Start: 2020-09-09 | End: 2020-09-09

## 2020-09-09 RX ADMIN — CEPHALEXIN 500 MG: 250 CAPSULE ORAL at 11:02

## 2020-09-09 RX ADMIN — OXYCODONE HYDROCHLORIDE 5 MG: 5 TABLET ORAL at 10:34

## 2020-09-09 ASSESSMENT — ENCOUNTER SYMPTOMS
VOMITING: 1
DIARRHEA: 0
ABDOMINAL DISTENTION: 0
SORE THROAT: 0
NAUSEA: 0
WHEEZING: 0
COUGH: 0
CONSTIPATION: 0
RHINORRHEA: 0
SHORTNESS OF BREATH: 0

## 2020-09-09 ASSESSMENT — PAIN SCALES - GENERAL
PAINLEVEL_OUTOF10: 7
PAINLEVEL_OUTOF10: 7

## 2020-09-09 ASSESSMENT — PAIN DESCRIPTION - PAIN TYPE: TYPE: ACUTE PAIN

## 2020-09-09 NOTE — TELEPHONE ENCOUNTER
Patient called back and stated she called her home care and they are not able to send a nurse out. They suggested she go to the ED. Advised patient that she should go to ED. Verbally agreed.

## 2020-09-09 NOTE — ED PROVIDER NOTES
[facility] ED  Emergency Department        Pt Name: Renea Harper  MRN: [id number]  [place] 1970  Date of evaluation: 20    CHIEF COMPLAINT       Chief Complaint   Patient presents with    Wound Check     Pt with open lright lung biopsy on . Pt states that she was seen yesterday to have sutures removed when she was given betadine strips. Pt states that she has changed dressing 3x today and reports that there has been purulent drainage each time, was told to come to the ED by PCP        HISTORY OF PRESENT ILLNESS  (Location/Symptom, Timing/Onset, Context/Setting, Quality, Duration, ModifyingFactors, Severity.)      Renea Harper is a 54 y.o. female who presents with Patient with some wound drainage after sutures removed from chest tube side and incision from VATS done by Dr. Kristine Marie on . Reports since sutures removed yesterday has had increased drainage and pus coming from chest tube incision. Patient also had emesis this morning, no belly pain, and reports temp of 100.4 last night, Just saw PA for CTsurgery yesterday when sutures were removed. Patient spoke with her CNP who told her to come to ER at [facility] today. Her surgeon is at [facility]. Patient did take tylenol this morning. PAST MEDICAL / SURGICAL / SOCIAL / FAMILY HISTORY      has a past medical history of Anxiety, Arthritis, CAD (coronary artery disease), Depression, Hyperlipidemia, Kidney stone, Myocardial infarct ([address] and Neck pain. has a past surgical history that includes back surgery;  section; Mastoid surgery; Tonsillectomy; and Cardiac catheterization.        Social History     Socioeconomic History    Marital status:      Spouse name: Not on file    Number of children: Not on file    Years of education: Not on file    Highest education level: Not on file   Occupational History    Not on file   Social Needs    Financial resource strain: Not on file    Food insecurity Worry: Not on file     Inability: Not on file    Transportation needs     Medical: Not on file     Non-medical: Not on file   Tobacco Use    Smoking status: Former Smoker     Years: 20.00     Types: Cigarettes     Last attempt to quit: 2016     Years since quittin.6    Smokeless tobacco: Never Used    Tobacco comment: 3 packs per week   Substance and Sexual Activity    Alcohol use: Yes     Comment: social    Drug use: Yes     Frequency: 7.0 times per week     Types: Marijuana    Sexual activity: Yes   Lifestyle    Physical activity     Days per week: Not on file     Minutes per session: Not on file    Stress: Not on file   Relationships    Social connections     Talks on phone: Not on file     Gets together: Not on file     Attends Bahai service: Not on file     Active member of club or organization: Not on file     Attends meetings of clubs or organizations: Not on file     Relationship status: Not on file    Intimate partner violence     Fear of current or ex partner: Not on file     Emotionally abused: Not on file     Physically abused: Not on file     Forced sexual activity: Not on file   Other Topics Concern    Not on file   Social History Narrative    Not on file       Family History   Problem Relation Age of Onset    Dementia Mother     Depression Mother     High Blood Pressure Mother     Coronary Art Dis Mother     COPD Mother     Diabetes Mother     Heart Attack Father     Diabetes Maternal Grandmother     Alzheimer's Disease Paternal Grandfather        Allergies:  Patient has no known allergies. Home Medications:  Prior to Admission medications    Medication Sig Start Date End Date Taking?  Authorizing Provider   cephALEXin (KEFLEX) 500 MG capsule Take 1 capsule by mouth 4 times daily for 7 days 20 Yes Billy Gibson, DO   senna-docusate (PERICOLACE) 8.6-50 MG per tablet Take 2 tablets by mouth nightly 20  Historical Provider, MD   polyethylene glycol (GLYCOLAX) 17 g packet Take 17 g by mouth daily 8/31/20   Historical Provider, MD   levothyroxine (SYNTHROID) 50 MCG tablet Take 1 tablet by mouth Daily 9/3/20 9/4/21  SAVANA Jeffers CNP   sertraline (ZOLOFT) 100 MG tablet Take 1.5 tablets by mouth daily 9/3/20   SAVANA Jeffers CNP   LORazepam (ATIVAN) 0.5 MG tablet Take 1 tablet by mouth 2 times daily as needed for Anxiety for up to 10 days. 9/3/20 9/13/20  SAVANA Jeffers CNP   acetaminophen (TYLENOL) 500 MG tablet Take 2 tablets by mouth every 6 hours as needed for Pain 6/24/20   Alicia Chan DO   ibuprofen (ADVIL;MOTRIN) 800 MG tablet Take 1 tablet by mouth every 8 hours as needed for Pain  Patient not taking: Reported on 9/3/2020 6/24/20   Alicia Chan DO   dicyclomine (BENTYL) 10 MG capsule Take 1 capsule by mouth every 6 hours as needed (cramps) 6/24/20   Alicia Chan DO   ondansetron (ZOFRAN ODT) 4 MG disintegrating tablet Take 1 tablet by mouth every 8 hours as needed for Nausea 6/24/20   Alicia Chan DO   busPIRone (BUSPAR) 15 MG tablet Take 15 mg by mouth 2 times daily 2/26/20   SAVANA Jeffers CNP   HUMULIN 70/30 KWIKPEN (70-30) 100 UNIT/ML injection pen inject 33 units subcutaneously twice a day 2/26/20   SAVANA Jeffers CNP   acetaminophen (TYLENOL) 500 MG tablet Take 1,000 mg by mouth 2/3/20   Historical Provider, MD   CALCITRATE 950 MG tablet  1/24/20   Historical Provider, MD   Cholecalciferol (VITAMIN D) 50 MCG (2000 UT) TABS tablet  1/23/20   Historical Provider, MD   oxyCODONE (ROXICODONE) 5 MG immediate release tablet Take 5 mg by mouth. 2/4/20   Historical Provider, MD   FREESTYLE LANCETS MISC 1 each by Does not apply route daily 11/20/19   SAVANA Jeffers CNP   blood glucose test strips (FREESTYLE LITE) strip 1 each by In Vitro route daily As needed.  11/20/19   SAVANA Jeffers CNP   metoprolol succinate (TOPROL XL) 100 MG extended release tablet Take 1 tablet by mouth 2 times daily 11/20/19 11/20/20  SAVANA Cuba CNP   Insulin Pen Needle ([employer] PEN NEEDLES 31G) 31G X 8 MM MISC 1 each by Does not apply route daily 11/20/19   SAVANA Cuba CNP   tiZANidine (ZANAFLEX) 2 MG tablet Take 1 tablet by mouth 3 times daily as needed (tension) 11/20/19   SAVANA Cuba CNP   ibuprofen (ADVIL;MOTRIN) 600 MG tablet take 1 tablet by mouth every 6 hours if needed for pain 7/5/19   SAVANA Cuba CNP   nitroGLYCERIN (NITROSTAT) 0.4 MG SL tablet  6/16/19   Historical Provider, MD   Blood Glucose Monitoring Suppl (FREESTYLE FREEDOM LITE) w/Device KIT 1 kit by Does not apply route daily 6/16/19   SAVANA Hung CNP   AMITRIPTYLINE HCL PO Take 2.5 mg by mouth daily    Historical Provider, MD   clopidogrel (PLAVIX) 75 MG tablet Take 75 mg by mouth daily 5/31/19 9/3/20  Historical Provider, MD   atorvastatin (LIPITOR) 80 MG tablet Take 80 mg by mouth daily 5/31/19 9/3/20  Historical Provider, MD   ranolazine (RANEXA) 500 MG extended release tablet Take 500 mg by mouth 2 times daily 5/31/19 9/3/20  Historical Provider, MD   aspirin 81 MG chewable tablet Take 81 mg by mouth daily 5/31/19 9/3/20  Historical Provider, MD       REVIEW OF SYSTEMS    (2-9 systems for level 4, 10 or more for level 5)      Review of Systems   Constitutional: Positive for fever. Negative for activity change, appetite change and fatigue. HENT: Negative for congestion, rhinorrhea and sore throat. Respiratory: Negative for cough, shortness of breath and wheezing. Cardiovascular: Negative for chest pain, palpitations and leg swelling. Gastrointestinal: Positive for vomiting. Negative for abdominal distention, constipation, diarrhea and nausea. Genitourinary: Negative for decreased urine volume and dysuria. Skin: Positive for wound. Negative for rash.    Neurological: Negative for dizziness, weakness, light-headedness, numbness and headaches. PHYSICAL EXAM   (up to 7 for level 4, 8 or more for level 5)     INITIAL VITALS:   BP (!) 162/87   Pulse 83   Temp 97.8 °F (36.6 °C) (Oral)   Resp 18   Ht 5' 3\" (1.6 m)   Wt 201 lb (91.2 kg)   LMP 08/23/2020 (Approximate)   SpO2 98%   BMI 35.61 kg/m²     Physical Exam  Vitals signs and nursing note reviewed. Constitutional:       Comments: Patient well appearing non toxic, speaking in full sentences. HENT:      Head: Normocephalic and atraumatic. Eyes:      General:         Right eye: No discharge. Left eye: No discharge. Conjunctiva/sclera: Conjunctivae normal.   Cardiovascular:      Rate and Rhythm: Normal rate and regular rhythm. Heart sounds: Normal heart sounds. No murmur. No friction rub. No gallop. Pulmonary:      Effort: Pulmonary effort is normal. No respiratory distress. Breath sounds: Normal breath sounds. No stridor. No wheezing, rhonchi or rales. Abdominal:      General: There is no distension. Palpations: Abdomen is soft. There is no mass. Tenderness: There is no abdominal tenderness. There is no guarding or rebound. Skin:     General: Skin is warm and dry. Findings: No rash. Comments: VATS incision to R upper back. Betadine still present, wound is well approximated, intermittent scabs noted, a few mm of erythema surrounding scabs. No edema noted, non tender to palpation, no drainage noted. Inferior to this incision is a chest tube site, and does appear to be more open, and slightly approximated. No drainage noted, minimal surround erythema, tender to palpation. Neurological:      Mental Status: She is alert and oriented to person, place, and time.          DIFFERENTIAL  DIAGNOSIS     Patient with concern for wound infection, overall wound appearing well, she does reprots drainage since sutures removed, which is more serosanguinious drainage, there is minimal surround edema, patient does appear very anxious, reports the follow up she has yesterday did not give her much information. And she reports fever last night and vomiting today. She is other wise well appearing. Afebrile here but did take tylenol prior to coming, and also non tachycardic.  Will check basic labs, CXR, and UA     PLAN (LABS / IMAGING / EKG):  Orders Placed This Encounter   Procedures    XR CHEST (2 VW)    CBC    BASIC METABOLIC PANEL    Lactic Acid, Whole Blood    Urinalysis with microscopic    Inpatient consult to Cardiothoracic Surgery       MEDICATIONS ORDERED:  Orders Placed This Encounter   Medications    oxyCODONE (ROXICODONE) immediate release tablet 5 mg    cephALEXin (KEFLEX) 500 MG capsule     Sig: Take 1 capsule by mouth 4 times daily for 7 days     Dispense:  28 capsule     Refill:  0    cephALEXin (KEFLEX) capsule 500 mg       DIAGNOSTIC RESULTS / EMERGENCY DEPARTMENT COURSE / MDM     LABS:  Results for orders placed or performed during the hospital encounter of 09/09/20   CBC   Result Value Ref Range    WBC 12.5 (H) 3.5 - 11.3 k/uL    RBC 4.63 3.95 - 5.11 m/uL    Hemoglobin 13.9 11.9 - 15.1 g/dL    Hematocrit 42.2 36.3 - 47.1 %    MCV 91.1 82.6 - 102.9 fL    MCH 30.0 25.2 - 33.5 pg    MCHC 32.9 28.4 - 34.8 g/dL    RDW 13.7 11.8 - 14.4 %    Platelets 232 (H) 179 - 453 k/uL    MPV 8.7 8.1 - 13.5 fL    NRBC Automated 0.0 0.0 per 100 WBC   BASIC METABOLIC PANEL   Result Value Ref Range    Glucose 187 (H) 70 - 99 mg/dL    BUN 9 6 - 20 mg/dL    CREATININE 0.62 0.50 - 0.90 mg/dL    Bun/Cre Ratio NOT REPORTED 9 - 20    Calcium 9.2 8.6 - 10.4 mg/dL    Sodium 134 (L) 135 - 144 mmol/L    Potassium 4.6 3.7 - 5.3 mmol/L    Chloride 100 98 - 107 mmol/L    CO2 23 20 - 31 mmol/L    Anion Gap 11 9 - 17 mmol/L    GFR Non-African American >60 >60 mL/min    GFR African American >60 >60 mL/min    GFR Comment          GFR Staging NOT REPORTED    Lactic Acid, Whole Blood   Result Value Ref Range    Lactic Acid, Whole Blood 1.5 0.7 - 2.1 mmol/L       IMPRESSION: wound check    RADIOLOGY:  XR CHEST (2 VW)   Final Result   No acute cardiopulmonary process. EMERGENCY DEPARTMENT COURSE:  10:58 AM EDT  Spoke with Dr. Denny Rodriguez regarding patient and workup, Discussed care plan, he is okay with keflex, and she can follow up later this week, and to encourage her to use her IS, and also to follow up with her pulmonologist    Spoke at length with patient, as she is very anxious, explained plan and discharge at this time, she is calm appearing, will try to see pulm, she reports she has been calling but office has not called her back. Patient has tolerated sips of fluids. Soft abdomen. FINAL IMPRESSION      1. Encounter for post surgical wound check          DISPOSITION / PLAN     DISPOSITION Decision To Discharge 09/09/2020 10:57:47 AM      PATIENT REFERRED TO:  No follow-up provider specified.     DISCHARGE MEDICATIONS:  New Prescriptions    CEPHALEXIN (KEFLEX) 500 MG CAPSULE    Take 1 capsule by mouth 4 times daily for 7 days       Arturo Arroyo  11:00 AM    Attending Emergency Physician  [facility] ED    (Please note that portions of this note were completed with a voice recognition program.  Ravi Chatman made to edit the dictations but occasionally words are mis-transcribed.)              Jorge Logan,   09/09/20 1104

## 2020-09-10 ENCOUNTER — TELEPHONE (OUTPATIENT)
Dept: FAMILY MEDICINE CLINIC | Age: 50
End: 2020-09-10

## 2020-09-10 RX ORDER — PROMETHAZINE HYDROCHLORIDE 25 MG/1
25 TABLET ORAL 3 TIMES DAILY PRN
Qty: 20 TABLET | Refills: 0 | Status: SHIPPED | OUTPATIENT
Start: 2020-09-10 | End: 2020-09-17

## 2020-09-10 NOTE — TELEPHONE ENCOUNTER
Spoke with patient and she stated she tried taking it with food and still had the nausea. Please advise due to Lawrence Barber being out of office.

## 2020-09-10 NOTE — TELEPHONE ENCOUNTER
Patient was given Keflex at ER for possible infection from chest tube site. She now is having Nausea.  Asking if something can be called in for nausea      [facility] Ricardo Landaverde

## 2020-09-14 ENCOUNTER — TELEPHONE (OUTPATIENT)
Dept: FAMILY MEDICINE CLINIC | Age: 50
End: 2020-09-14

## 2020-09-21 ENCOUNTER — TELEPHONE (OUTPATIENT)
Dept: FAMILY MEDICINE CLINIC | Age: 50
End: 2020-09-21

## 2020-09-22 NOTE — TELEPHONE ENCOUNTER
Pt stated, yes she did go to cardiothoracic surgeon. They referred her back to Pulmonologist: [facility]. Gomez Mckeon  [phone], out of office this week. Had chest xray on 9.9.20, in epic.

## 2020-09-28 ENCOUNTER — TELEPHONE (OUTPATIENT)
Dept: FAMILY MEDICINE CLINIC | Age: 50
End: 2020-09-28

## 2020-09-28 NOTE — TELEPHONE ENCOUNTER
Pt was admitted to [facility] for Pneumonia and lung issues. HS told her to follow up with PCP this week.    Admitted- 9/22/2020  Discharged- 9/26/2020    Please request records

## 2020-10-01 ENCOUNTER — VIRTUAL VISIT (OUTPATIENT)
Dept: FAMILY MEDICINE CLINIC | Age: 50
End: 2020-10-01
Payer: COMMERCIAL

## 2020-10-01 PROCEDURE — 1111F DSCHRG MED/CURRENT MED MERGE: CPT | Performed by: INTERNAL MEDICINE

## 2020-10-01 PROCEDURE — 99495 TRANSJ CARE MGMT MOD F2F 14D: CPT | Performed by: INTERNAL MEDICINE

## 2020-10-01 RX ORDER — RANOLAZINE 500 MG/1
500 TABLET, EXTENDED RELEASE ORAL 2 TIMES DAILY
Qty: 60 TABLET | Refills: 16 | Status: SHIPPED | OUTPATIENT
Start: 2020-10-01 | End: 2021-09-13 | Stop reason: SDUPTHER

## 2020-10-01 RX ORDER — FLUTICASONE PROPIONATE 50 MCG
1 SPRAY, SUSPENSION (ML) NASAL DAILY
Qty: 1 BOTTLE | Refills: 2 | Status: SHIPPED | OUTPATIENT
Start: 2020-10-01 | End: 2021-01-22

## 2020-10-01 RX ORDER — LIDOCAINE 50 MG/G
1 PATCH TOPICAL DAILY
Qty: 30 PATCH | Refills: 0 | Status: SHIPPED | OUTPATIENT
Start: 2020-10-01 | End: 2020-10-31

## 2020-10-01 RX ORDER — BUSPIRONE HYDROCHLORIDE 15 MG/1
15 TABLET ORAL 2 TIMES DAILY
Qty: 60 TABLET | Refills: 5 | Status: SHIPPED | OUTPATIENT
Start: 2020-10-01 | End: 2021-09-13 | Stop reason: SDUPTHER

## 2020-10-01 RX ORDER — AMOXICILLIN AND CLAVULANATE POTASSIUM 562.5; 437.5; 62.5 MG/1; MG/1; MG/1
2 TABLET, FILM COATED, EXTENDED RELEASE ORAL 2 TIMES DAILY
COMMUNITY
Start: 2020-09-26 | End: 2020-10-03

## 2020-10-01 RX ORDER — ALBUTEROL SULFATE 90 UG/1
2 AEROSOL, METERED RESPIRATORY (INHALATION) EVERY 4 HOURS PRN
COMMUNITY
Start: 2020-09-20 | End: 2021-09-13 | Stop reason: SDUPTHER

## 2020-10-01 RX ORDER — BLOOD-GLUCOSE METER
1 KIT MISCELLANEOUS 2 TIMES DAILY
Qty: 100 EACH | Refills: 3 | Status: SHIPPED | OUTPATIENT
Start: 2020-10-01 | End: 2021-09-13 | Stop reason: SDUPTHER

## 2020-10-01 NOTE — PROGRESS NOTES
Post-Discharge Transitional Care Management Services or Hospital Follow Up      Migdalia Muller   YOB: 1970    Date of Office Visit:  10/1/2020  Date of Hospital Admission: 9/22/2020  Date of Hospital Discharge: 9/26/2020    Care management risk score Rising risk (score 2-5) and Complex Care (Scores >=6): 1     Non face to face  following discharge, date last encounter closed (first attempt may have been earlier): *No documented post hospital discharge outreach found in the last 14 days     Call initiated 2 business days of discharge: *No response recorded in the last 14 days    Patient Active Problem List   Diagnosis    Hypothyroidism    Type 2 diabetes mellitus without complication, with long-term current use of insulin ([address]    Essential hypertension    [facility] [address]    Spinal stenosis of cervical region    Ground glass opacity present on imaging of lung    Hx of deep venous thrombosis    Situational depression    Anxiety    S/P hip replacement, right    Pulmonary nodule       No Known Allergies    Medications listed as ordered at the time of discharge from hospital  Yes     Medications marked \"taking\" at this time  Outpatient Medications Marked as Taking for the 10/1/20 encounter (Virtual Visit) with Abdulaziz Wilkerson MD   Medication Sig Dispense Refill    amoxicillin-clavulanate (AUGMENTIN XR) 1000-62.5 MG per extended release tablet Take 2 tablets by mouth 2 times daily      polyethylene glycol (GLYCOLAX) 17 g packet Take 17 g by mouth daily      levothyroxine (SYNTHROID) 50 MCG tablet Take 1 tablet by mouth Daily 30 tablet 5    sertraline (ZOLOFT) 100 MG tablet Take 1.5 tablets by mouth daily 45 tablet 5    acetaminophen (TYLENOL) 500 MG tablet Take 2 tablets by mouth every 6 hours as needed for Pain 30 tablet 0    ibuprofen (ADVIL;MOTRIN) 800 MG tablet Take 1 tablet by mouth every 8 hours as needed for Pain 30 tablet 0    dicyclomine (BENTYL) 10 MG capsule Take 1 capsule by mouth every 6 hours as needed (cramps) 20 capsule 0    ondansetron (ZOFRAN ODT) 4 MG disintegrating tablet Take 1 tablet by mouth every 8 hours as needed for Nausea 10 tablet 0    busPIRone (BUSPAR) 15 MG tablet Take 15 mg by mouth 2 times daily 60 tablet 5    HUMULIN 70/30 KWIKPEN (70-30) 100 UNIT/ML injection pen inject 33 units subcutaneously twice a day 15 mL 5    CALCITRATE 950 MG tablet       Cholecalciferol (VITAMIN D) 50 MCG (2000 UT) TABS tablet       FREESTYLE LANCETS MISC 1 each by Does not apply route daily 100 each 3    blood glucose test strips (FREESTYLE LITE) strip 1 each by In Vitro route daily As needed.  100 each 3    metoprolol succinate (TOPROL XL) 100 MG extended release tablet Take 1 tablet by mouth 2 times daily 60 tablet 11    Insulin Pen Needle ([employer] PEN NEEDLES 31G) 31G X 8 MM MISC 1 each by Does not apply route daily 100 each 3    tiZANidine (ZANAFLEX) 2 MG tablet Take 1 tablet by mouth 3 times daily as needed (tension) 90 tablet 3    nitroGLYCERIN (NITROSTAT) 0.4 MG SL tablet   0    Blood Glucose Monitoring Suppl (FREESTYLE FREEDOM LITE) w/Device KIT 1 kit by Does not apply route daily 1 kit 0    AMITRIPTYLINE HCL PO Take 2.5 mg by mouth daily      clopidogrel (PLAVIX) 75 MG tablet Take 75 mg by mouth daily      atorvastatin (LIPITOR) 80 MG tablet Take 80 mg by mouth daily      ranolazine (RANEXA) 500 MG extended release tablet Take 500 mg by mouth 2 times daily      aspirin 81 MG chewable tablet Take 81 mg by mouth daily          Medications patient taking as of now reconciled against medications ordered at time of hospital discharge: Yes    Chief Complaint   Patient presents with    Follow-Up from Hospital     TTH    Pneumonia       Per discharge summary, Zaire Lara is a 54 y.o. female with past medical history of coronary artery disease status post stent, insulin-dependent type 2 diabetes mellitus, hypothyroidism, spinal stenosis, depression, anxiety presented to the ED with complaint of shortness of breath for the past 4 days. Patient states that she was in her usual state of health until she developed shortness of breath on September 19 when she went out for a walk. Patient describes shortness of breath which is worse on exertion, with lying flat, taking deep breaths, coughing and sneezing. Shortness of breath gets better when sitting straight up. Patient also complains of right-sided chest pain in the lower part of front chest, moving through the site to lower right thoracic back in the region of previous thoracotomy. Patient states that she used to walk 3 sessions of 1 mild but she is getting short of breath after walking half a mile since September 19. Patient also complains of chills but no fever and diaphoresis that started on September 19. Patient also states that she noticed a crunching snow sound on the right side of her chest on September 20. She called her home nurse who told her that her chest sounds were clear. Patient called cardiothoracic surgeon on September 21 who ordered chest x-ray which did not show any evidence of acute cardiopulmonary etiology and they told her to follow with pulmonology. Patient call pulmonology on September 22 who ordered a stat CT chest which revealed changes of right lung wedge biopsy. Intermediate density lesion in right body wall inferior to scapular tip which may represent subacute postoperative hematoma, it is nonspecific an incompletely evaluated. No evidence for intrathoracic fluid accumulation or pneumothorax. Multiple upper lobe prominent pulmonary nodules similar to July 13, 2020. Patient had VATS procedure performed with limited right lateral thoracotomy with biopsy for multiple ill-defined nodular densities of upper lobe of right lung.  Chest tube was inserted after the procedure, after chest tube was removed and sutures were placed, patient developed sutures dehiscence and increased discharge from the wound was observed. Patient was prescribed Keflex, she has completed the course for 7 days. \"      Inpatient course: Discharge summary reviewed- see chart. Interval history/Current status: working on getting into [facility], should hear something by Friday. Her main issue is right sided pleurisy. She has also been referred to dermatology for eval, since oncology believes this is a multisystem disease. There were no vitals filed for this visit. There is no height or weight on file to calculate BMI. Wt Readings from Last 3 Encounters:   09/09/20 201 lb (91.2 kg)   06/24/20 203 lb (92.1 kg)   03/06/20 210 lb (95.3 kg)     BP Readings from Last 3 Encounters:   09/09/20 (!) 162/87   06/24/20 (!) 150/101   03/06/20 126/82       Review of Systems   Constitutional: Negative for fatigue, fever and unexpected weight change. Respiratory: Negative for cough, choking, chest tightness, shortness of breath and wheezing. Cardiovascular: Positive for chest pain (pleuritic, at site of chest tube). Negative for palpitations and leg swelling. Gastrointestinal: Negative for abdominal pain, anal bleeding, blood in stool, constipation, diarrhea, nausea and vomiting. Endocrine: Negative. Musculoskeletal: Negative for joint swelling and myalgias. Skin: Negative. Neurological: Negative for dizziness. Psychiatric/Behavioral: Negative for sleep disturbance. All other systems reviewed and are negative. Physical Exam  Nursing note reviewed. Constitutional:       General: She is not in acute distress. Appearance: She is not ill-appearing or toxic-appearing. HENT:      Head: Normocephalic and atraumatic. Right Ear: External ear normal.      Left Ear: External ear normal.      Mouth/Throat:      Mouth: Mucous membranes are moist.   Pulmonary:      Effort: Pulmonary effort is normal.   Skin:     General: Skin is dry. Findings: No rash (no rash on facial skin).    Neurological:      Mental Status:

## 2020-10-15 ASSESSMENT — ENCOUNTER SYMPTOMS
CONSTIPATION: 0
CHOKING: 0
CHEST TIGHTNESS: 0
WHEEZING: 0
SHORTNESS OF BREATH: 0
ABDOMINAL PAIN: 0
NAUSEA: 0
VOMITING: 0
ANAL BLEEDING: 0
COUGH: 0
BLOOD IN STOOL: 0
DIARRHEA: 0

## 2021-01-18 LAB
AVERAGE GLUCOSE: NORMAL
HBA1C MFR BLD: 8.6 %

## 2021-01-22 ENCOUNTER — APPOINTMENT (OUTPATIENT)
Dept: GENERAL RADIOLOGY | Age: 51
End: 2021-01-22
Payer: COMMERCIAL

## 2021-01-22 ENCOUNTER — APPOINTMENT (OUTPATIENT)
Dept: CT IMAGING | Age: 51
End: 2021-01-22
Payer: COMMERCIAL

## 2021-01-22 ENCOUNTER — HOSPITAL ENCOUNTER (OUTPATIENT)
Age: 51
Setting detail: OBSERVATION
Discharge: HOME | End: 2021-01-22
Attending: EMERGENCY MEDICINE | Admitting: INTERNAL MEDICINE
Payer: COMMERCIAL

## 2021-01-22 VITALS
BODY MASS INDEX: 37.74 KG/M2 | TEMPERATURE: 98.8 F | DIASTOLIC BLOOD PRESSURE: 85 MMHG | SYSTOLIC BLOOD PRESSURE: 129 MMHG | HEIGHT: 63 IN | HEART RATE: 77 BPM | OXYGEN SATURATION: 93 % | WEIGHT: 213 LBS | RESPIRATION RATE: 14 BRPM

## 2021-01-22 DIAGNOSIS — R07.9 CHEST PAIN, UNSPECIFIED TYPE: Primary | ICD-10-CM

## 2021-01-22 LAB
ABSOLUTE EOS #: 0.42 K/UL (ref 0–0.44)
ABSOLUTE IMMATURE GRANULOCYTE: 0.04 K/UL (ref 0–0.3)
ABSOLUTE LYMPH #: 4.49 K/UL (ref 1.1–3.7)
ABSOLUTE MONO #: 0.6 K/UL (ref 0.1–1.2)
ANION GAP SERPL CALCULATED.3IONS-SCNC: 13 MMOL/L (ref 9–17)
BASOPHILS # BLD: 1 % (ref 0–2)
BASOPHILS ABSOLUTE: 0.06 K/UL (ref 0–0.2)
BETA-HYDROXYBUTYRATE: 0.12 MMOL/L (ref 0.02–0.27)
BUN BLDV-MCNC: 10 MG/DL (ref 6–20)
BUN/CREAT BLD: 17 (ref 9–20)
CALCIUM SERPL-MCNC: 9.1 MG/DL (ref 8.6–10.4)
CHLORIDE BLD-SCNC: 101 MMOL/L (ref 98–107)
CO2: 21 MMOL/L (ref 20–31)
CREAT SERPL-MCNC: 0.59 MG/DL (ref 0.5–0.9)
D-DIMER QUANTITATIVE: <0.27 MG/L FEU (ref 0–0.59)
DIFFERENTIAL TYPE: ABNORMAL
EKG ATRIAL RATE: 100 BPM
EKG P AXIS: 56 DEGREES
EKG P-R INTERVAL: 154 MS
EKG Q-T INTERVAL: 342 MS
EKG QRS DURATION: 80 MS
EKG QTC CALCULATION (BAZETT): 441 MS
EKG R AXIS: 38 DEGREES
EKG T AXIS: 64 DEGREES
EKG VENTRICULAR RATE: 100 BPM
EOSINOPHILS RELATIVE PERCENT: 3 % (ref 1–4)
GFR AFRICAN AMERICAN: >60 ML/MIN
GFR NON-AFRICAN AMERICAN: >60 ML/MIN
GFR SERPL CREATININE-BSD FRML MDRD: ABNORMAL ML/MIN/{1.73_M2}
GFR SERPL CREATININE-BSD FRML MDRD: ABNORMAL ML/MIN/{1.73_M2}
GLUCOSE BLD-MCNC: 241 MG/DL (ref 70–99)
HCT VFR BLD CALC: 41.3 % (ref 36.3–47.1)
HEMOGLOBIN: 13.3 G/DL (ref 11.9–15.1)
IMMATURE GRANULOCYTES: 0 %
LYMPHOCYTES # BLD: 37 % (ref 24–43)
MCH RBC QN AUTO: 28.8 PG (ref 25.2–33.5)
MCHC RBC AUTO-ENTMCNC: 32.2 G/DL (ref 28.4–34.8)
MCV RBC AUTO: 89.4 FL (ref 82.6–102.9)
MONOCYTES # BLD: 5 % (ref 3–12)
MYOGLOBIN: <21 NG/ML (ref 25–58)
NRBC AUTOMATED: 0 PER 100 WBC
PDW BLD-RTO: 13.8 % (ref 11.8–14.4)
PLATELET # BLD: 436 K/UL (ref 138–453)
PLATELET ESTIMATE: ABNORMAL
PMV BLD AUTO: 9.2 FL (ref 8.1–13.5)
POTASSIUM SERPL-SCNC: 4.3 MMOL/L (ref 3.7–5.3)
RBC # BLD: 4.62 M/UL (ref 3.95–5.11)
RBC # BLD: ABNORMAL 10*6/UL
SEG NEUTROPHILS: 54 % (ref 36–65)
SEGMENTED NEUTROPHILS ABSOLUTE COUNT: 6.57 K/UL (ref 1.5–8.1)
SODIUM BLD-SCNC: 135 MMOL/L (ref 135–144)
TROPONIN INTERP: ABNORMAL
TROPONIN T: ABNORMAL NG/ML
TROPONIN, HIGH SENSITIVITY: 8 NG/L (ref 0–14)
TROPONIN, HIGH SENSITIVITY: 8 NG/L (ref 0–14)
TROPONIN, HIGH SENSITIVITY: 9 NG/L (ref 0–14)
WBC # BLD: 12.2 K/UL (ref 3.5–11.3)
WBC # BLD: ABNORMAL 10*3/UL

## 2021-01-22 PROCEDURE — 6360000004 HC RX CONTRAST MEDICATION: Performed by: EMERGENCY MEDICINE

## 2021-01-22 PROCEDURE — 36415 COLL VENOUS BLD VENIPUNCTURE: CPT

## 2021-01-22 PROCEDURE — 2580000003 HC RX 258: Performed by: EMERGENCY MEDICINE

## 2021-01-22 PROCEDURE — 83874 ASSAY OF MYOGLOBIN: CPT

## 2021-01-22 PROCEDURE — 81025 URINE PREGNANCY TEST: CPT

## 2021-01-22 PROCEDURE — 96376 TX/PRO/DX INJ SAME DRUG ADON: CPT

## 2021-01-22 PROCEDURE — 96374 THER/PROPH/DIAG INJ IV PUSH: CPT

## 2021-01-22 PROCEDURE — 96375 TX/PRO/DX INJ NEW DRUG ADDON: CPT

## 2021-01-22 PROCEDURE — 81003 URINALYSIS AUTO W/O SCOPE: CPT

## 2021-01-22 PROCEDURE — 71045 X-RAY EXAM CHEST 1 VIEW: CPT

## 2021-01-22 PROCEDURE — 6370000000 HC RX 637 (ALT 250 FOR IP): Performed by: EMERGENCY MEDICINE

## 2021-01-22 PROCEDURE — 80048 BASIC METABOLIC PNL TOTAL CA: CPT

## 2021-01-22 PROCEDURE — 6360000002 HC RX W HCPCS: Performed by: EMERGENCY MEDICINE

## 2021-01-22 PROCEDURE — 93010 ELECTROCARDIOGRAM REPORT: CPT | Performed by: INTERNAL MEDICINE

## 2021-01-22 PROCEDURE — 96361 HYDRATE IV INFUSION ADD-ON: CPT

## 2021-01-22 PROCEDURE — 82010 KETONE BODYS QUAN: CPT

## 2021-01-22 PROCEDURE — 85025 COMPLETE CBC W/AUTO DIFF WBC: CPT

## 2021-01-22 PROCEDURE — G0378 HOSPITAL OBSERVATION PER HR: HCPCS

## 2021-01-22 PROCEDURE — 99282 EMERGENCY DEPT VISIT SF MDM: CPT

## 2021-01-22 PROCEDURE — 93005 ELECTROCARDIOGRAM TRACING: CPT | Performed by: EMERGENCY MEDICINE

## 2021-01-22 PROCEDURE — 71260 CT THORAX DX C+: CPT

## 2021-01-22 PROCEDURE — 85379 FIBRIN DEGRADATION QUANT: CPT

## 2021-01-22 PROCEDURE — 84484 ASSAY OF TROPONIN QUANT: CPT

## 2021-01-22 RX ORDER — ACETAMINOPHEN 325 MG/1
650 TABLET ORAL EVERY 4 HOURS PRN
Status: DISCONTINUED | OUTPATIENT
Start: 2021-01-22 | End: 2021-01-22 | Stop reason: HOSPADM

## 2021-01-22 RX ORDER — CIPROFLOXACIN AND DEXAMETHASONE 3; 1 MG/ML; MG/ML
5 SUSPENSION/ DROPS AURICULAR (OTIC) 2 TIMES DAILY
COMMUNITY
Start: 2021-01-18 | End: 2021-09-13

## 2021-01-22 RX ORDER — OXYCODONE HYDROCHLORIDE AND ACETAMINOPHEN 5; 325 MG/1; MG/1
1 TABLET ORAL 2 TIMES DAILY PRN
COMMUNITY
End: 2021-09-13

## 2021-01-22 RX ORDER — MORPHINE SULFATE 4 MG/ML
4 INJECTION, SOLUTION INTRAMUSCULAR; INTRAVENOUS ONCE
Status: COMPLETED | OUTPATIENT
Start: 2021-01-22 | End: 2021-01-22

## 2021-01-22 RX ORDER — SODIUM CHLORIDE 0.9 % (FLUSH) 0.9 %
10 SYRINGE (ML) INJECTION PRN
Status: DISCONTINUED | OUTPATIENT
Start: 2021-01-22 | End: 2021-01-22 | Stop reason: HOSPADM

## 2021-01-22 RX ORDER — TOPIRAMATE 50 MG/1
50 TABLET, FILM COATED ORAL 2 TIMES DAILY
COMMUNITY
End: 2021-09-13 | Stop reason: SDUPTHER

## 2021-01-22 RX ORDER — HYDROCODONE BITARTRATE AND ACETAMINOPHEN 5; 325 MG/1; MG/1
1 TABLET ORAL ONCE
Status: COMPLETED | OUTPATIENT
Start: 2021-01-22 | End: 2021-01-22

## 2021-01-22 RX ORDER — SODIUM CHLORIDE 0.9 % (FLUSH) 0.9 %
10 SYRINGE (ML) INJECTION EVERY 12 HOURS SCHEDULED
Status: DISCONTINUED | OUTPATIENT
Start: 2021-01-22 | End: 2021-01-22 | Stop reason: HOSPADM

## 2021-01-22 RX ORDER — INSULIN HUMAN 100 [IU]/ML
25 INJECTION, SUSPENSION SUBCUTANEOUS 2 TIMES DAILY
COMMUNITY
End: 2021-09-15 | Stop reason: SDUPTHER

## 2021-01-22 RX ORDER — ONDANSETRON 2 MG/ML
4 INJECTION INTRAMUSCULAR; INTRAVENOUS ONCE
Status: COMPLETED | OUTPATIENT
Start: 2021-01-22 | End: 2021-01-22

## 2021-01-22 RX ORDER — ALBUTEROL SULFATE 2.5 MG/3ML
2.5 SOLUTION RESPIRATORY (INHALATION) EVERY 4 HOURS PRN
Status: ON HOLD | COMMUNITY
End: 2022-01-01 | Stop reason: HOSPADM

## 2021-01-22 RX ORDER — HYDROCODONE BITARTRATE AND ACETAMINOPHEN 5; 325 MG/1; MG/1
1 TABLET ORAL ONCE
Status: DISCONTINUED | OUTPATIENT
Start: 2021-01-22 | End: 2021-01-22 | Stop reason: HOSPADM

## 2021-01-22 RX ORDER — AMLODIPINE BESYLATE 5 MG/1
5 TABLET ORAL DAILY
COMMUNITY
Start: 2020-09-23 | End: 2021-09-13 | Stop reason: SDUPTHER

## 2021-01-22 RX ORDER — UBROGEPANT 100 MG/1
100 TABLET ORAL DAILY PRN
COMMUNITY
End: 2021-09-13

## 2021-01-22 RX ORDER — 0.9 % SODIUM CHLORIDE 0.9 %
80 INTRAVENOUS SOLUTION INTRAVENOUS ONCE
Status: COMPLETED | OUTPATIENT
Start: 2021-01-22 | End: 2021-01-22

## 2021-01-22 RX ORDER — ASCORBIC ACID 500 MG
500 TABLET ORAL DAILY
COMMUNITY

## 2021-01-22 RX ORDER — ONDANSETRON 2 MG/ML
4 INJECTION INTRAMUSCULAR; INTRAVENOUS EVERY 8 HOURS PRN
Status: DISCONTINUED | OUTPATIENT
Start: 2021-01-22 | End: 2021-01-22 | Stop reason: HOSPADM

## 2021-01-22 RX ORDER — FLUTICASONE PROPIONATE 50 MCG
1 SPRAY, SUSPENSION (ML) NASAL DAILY PRN
COMMUNITY

## 2021-01-22 RX ORDER — 0.9 % SODIUM CHLORIDE 0.9 %
1000 INTRAVENOUS SOLUTION INTRAVENOUS ONCE
Status: COMPLETED | OUTPATIENT
Start: 2021-01-22 | End: 2021-01-22

## 2021-01-22 RX ORDER — M-VIT,TX,IRON,MINS/CALC/FOLIC 27MG-0.4MG
1 TABLET ORAL DAILY
COMMUNITY

## 2021-01-22 RX ADMIN — ONDANSETRON 4 MG: 2 INJECTION INTRAMUSCULAR; INTRAVENOUS at 08:57

## 2021-01-22 RX ADMIN — Medication 10 ML: at 09:37

## 2021-01-22 RX ADMIN — MORPHINE SULFATE 4 MG: 4 INJECTION, SOLUTION INTRAMUSCULAR; INTRAVENOUS at 08:57

## 2021-01-22 RX ADMIN — HYDROCODONE BITARTRATE AND ACETAMINOPHEN 1 TABLET: 5; 325 TABLET ORAL at 11:34

## 2021-01-22 RX ADMIN — SODIUM CHLORIDE 1000 ML: 9 INJECTION, SOLUTION INTRAVENOUS at 08:57

## 2021-01-22 RX ADMIN — SODIUM CHLORIDE 80 ML: 9 INJECTION, SOLUTION INTRAVENOUS at 09:38

## 2021-01-22 RX ADMIN — IOPAMIDOL 75 ML: 755 INJECTION, SOLUTION INTRAVENOUS at 09:32

## 2021-01-22 RX ADMIN — MORPHINE SULFATE 4 MG: 4 INJECTION, SOLUTION INTRAMUSCULAR; INTRAVENOUS at 16:22

## 2021-01-22 ASSESSMENT — PAIN SCALES - GENERAL
PAINLEVEL_OUTOF10: 8
PAINLEVEL_OUTOF10: 7
PAINLEVEL_OUTOF10: 8

## 2021-01-22 ASSESSMENT — ENCOUNTER SYMPTOMS
ABDOMINAL PAIN: 0
TROUBLE SWALLOWING: 0
SHORTNESS OF BREATH: 1
NAUSEA: 1

## 2021-01-22 NOTE — ED NOTES
Dr John Washburn into examine patient ok to go home and follow with out patient follow up in his office.       Gee Sosa RN  01/22/21 [id number]

## 2021-01-22 NOTE — ED PROVIDER NOTES
Patient was endorsed to me as an admitted patient. .  Patient was evaluated by Dr. Valentine Lorenzo, cardiology in the emergency department evaluated patient in the ED and recommend for discharge home and outpatient follow-up. Patient was amenable to this treatment plan. Discharged home with outpatient follow-up and given parameters to return to the emergency department.       Shawn Esparza MD  92/82/31 [id number]

## 2021-01-22 NOTE — ED NOTES
Patient presents to the er with c/o left lateral chest pain radiating under left breast wrapping around to the back. Patient verbalizing sob along with \"feeling nauseated'. Patient has significant cardiac hx with stent placement. Patient follows with Dr Marco Baca and and had recent stress test back in she thinks September.      Atilio Caro, RN  01/22/21 [address], RN  01/22/21 [id number]

## 2021-01-22 NOTE — PROGRESS NOTES
Transitions of Care Pharmacy Service   Medication Review    The patient's list of current home medications has been reviewed. Source(s) of information: patient, Epic, Care Everywhere, [facility] Lawson, [facility], [employer]      Other Notes · She gets Junious Cally, Percocet, and Topamax from [facility], all other meds filled at [facility]           Please feel free to call me with any questions about this encounter. Thank you. Ruth Marroquin [facility]   Transitions of Care Pharmacy Service  Phone:  [phone]  Fax: [phone]      Electronically signed by Ruth Marroquin [facility] on 1/22/2021 at 2:52 PM         Prior to Admission medications    Medication Sig       Insulin NPH Isophane & Regular (HUMULIN 70/30 KWIKPEN) (70-30) 100 UNIT per ML injection pen Inject 25 Units into the skin 2 times daily       ciprofloxacin-dexamethasone (CIPRODEX) 0.3-0.1 % otic suspension Place 5 drops in ear(s) 2 times daily       amLODIPine (NORVASC) 5 MG tablet Take 5 mg by mouth daily        Calcium 200 MG TABS Take 400 mg by mouth 3 times daily (with meals) Takes 2 tabs with meals       albuterol (PROVENTIL) (2.5 MG/3ML) 0.083% nebulizer solution Take 2.5 mg by nebulization every 4 hours as needed for Wheezing or Shortness of Breath       topiramate (TOPAMAX) 50 MG tablet Take 50 mg by mouth 2 times daily       oxyCODONE-acetaminophen (PERCOCET) 5-325 MG per tablet Take 1 tablet by mouth 2 times daily as needed for Pain.        Ubrogepant (UBRELVY) 100 MG TABS Take 100 mg by mouth daily as needed (migraine)       fluticasone (FLONASE) 50 MCG/ACT nasal spray 1 spray by Each Nostril route daily as needed for Rhinitis       Multiple Vitamins-Minerals (THERAPEUTIC MULTIVITAMIN-MINERALS) tablet Take 1 tablet by mouth daily       ascorbic acid (VITAMIN C) 500 MG tablet Take 500 mg by mouth daily       metoprolol succinate (TOPROL XL) 100 MG extended release tablet take 1 tablet by mouth twice a day       albuterol sulfate  (90 Base) MCG/ACT inhaler Inhale 2 puffs into the lungs every 4 hours as needed       busPIRone (BUSPAR) 15 MG tablet Take 15 mg by mouth 2 times daily       ranolazine (RANEXA) 500 MG extended release tablet Take 1 tablet by mouth 2 times daily       polyethylene glycol (GLYCOLAX) 17 g packet Take 17 g by mouth daily as needed        levothyroxine (SYNTHROID) 50 MCG tablet Take 1 tablet by mouth Daily       sertraline (ZOLOFT) 100 MG tablet Take 1.5 tablets by mouth daily       acetaminophen (TYLENOL) 500 MG tablet Take 2 tablets by mouth every 6 hours as needed for Pain       ibuprofen (ADVIL;MOTRIN) 800 MG tablet Take 1 tablet by mouth every 8 hours as needed for Pain       ondansetron (ZOFRAN ODT) 4 MG disintegrating tablet Take 1 tablet by mouth every 8 hours as needed for Nausea       Cholecalciferol (VITAMIN D) 50 MCG (2000 UT) TABS tablet Take 2,000 Units by mouth daily        tiZANidine (ZANAFLEX) 2 MG tablet Take 1 tablet by mouth 3 times daily as needed (tension)       nitroGLYCERIN (NITROSTAT) 0.4 MG SL tablet Place 0.4 mg under the tongue every 5 minutes as needed        clopidogrel (PLAVIX) 75 MG tablet Take 75 mg by mouth daily       atorvastatin (LIPITOR) 80 MG tablet Take 80 mg by mouth daily       aspirin 81 MG chewable tablet Take 81 mg by mouth daily

## 2021-01-22 NOTE — ED PROVIDER NOTES
EMERGENCY DEPARTMENT ENCOUNTER    Pt Name: Michael Montiel  MRN: [id number]  [place] 1970  Date of evaluation: 1/22/21  CHIEF COMPLAINT       Chief Complaint   Patient presents with    Chest Pain     HISTORY OF PRESENT ILLNESS   Patient is a 68-year-old female with history of hypertension, diabetes mellitus, CAD with prior stent in 2016 here with chest pain. She describes it as pressure midsternal radiating to the left shoulder and left lateral rib area and back. She states it is intermittent. No ripping or tearing pain to the back. States pain started last night at rest.  She states this is not as severe as her pain back in 2016 when she required a stent. She is felt fatigued for the past few days. Some nausea increasing shortness of breath. No dizziness lightheadedness. No focal weakness numbness tingling. No falls or injuries. She took 4 baby aspirin and 2 nitro this morning with minimal relief. She is on Plavix. Denies abdominal pain. No vomiting no dark or bloody stools. No leg swelling or hemoptysis. She has had a prior DVT she is not on anticoagulation denies history of PE or hormone use currently. She states she is getting worked up at [facility] clinic for [facility]. REVIEW OF SYSTEMS     Review of Systems   Constitutional: Positive for activity change, appetite change and fatigue. Negative for chills and fever. HENT: Negative for trouble swallowing. Eyes: Negative for visual disturbance. Respiratory: Positive for shortness of breath. Cardiovascular: Positive for chest pain. Gastrointestinal: Positive for nausea. Negative for abdominal pain. Genitourinary: Negative for difficulty urinating. Musculoskeletal: Negative for neck pain. Skin: Negative for rash. Neurological: Negative for dizziness, weakness and headaches. Psychiatric/Behavioral: Negative for confusion.      PASTMEDICAL HISTORY     Past Medical History:   Diagnosis Date    Anxiety     Arthritis     CAD (coronary artery disease)     Depression     Hyperlipidemia     Kidney stone     Myocardial infarct ([facility] [address] 2016    Neck pain      SURGICAL HISTORY       Past Surgical History:   Procedure Laterality Date    BACK SURGERY      L5-S1, C5-7    CARDIAC CATHETERIZATION       SECTION      MASTOID SURGERY      TONSILLECTOMY       CURRENT MEDICATIONS       Previous Medications    ACETAMINOPHEN (TYLENOL) 500 MG TABLET    Take 2 tablets by mouth every 6 hours as needed for Pain    ALBUTEROL SULFATE  (90 BASE) MCG/ACT INHALER    Inhale 2 puffs into the lungs every 4 hours as needed    AMITRIPTYLINE HCL PO    Take 2.5 mg by mouth daily    ASPIRIN 81 MG CHEWABLE TABLET    Take 81 mg by mouth daily    ATORVASTATIN (LIPITOR) 80 MG TABLET    Take 80 mg by mouth daily    BLOOD GLUCOSE MONITORING SUPPL (FREESTYLE FREEDOM LITE) W/DEVICE KIT    1 kit by Does not apply route daily    BLOOD GLUCOSE TEST STRIPS (FREESTYLE LITE) STRIP    1 each by Does not apply route 2 times daily As needed.     BUSPIRONE (BUSPAR) 15 MG TABLET    Take 15 mg by mouth 2 times daily    CALCITRATE 950 MG TABLET        CHOLECALCIFEROL (VITAMIN D) 50 MCG (2000) TABS TABLET        CLOPIDOGREL (PLAVIX) 75 MG TABLET    Take 75 mg by mouth daily    DICYCLOMINE (BENTYL) 10 MG CAPSULE    Take 1 capsule by mouth every 6 hours as needed (cramps)    FLUTICASONE (FLONASE) 50 MCG/ACT NASAL SPRAY    1 spray by Each Nostril route daily    FREESTYLE LANCETS MISC    1 each by Does not apply route daily    HUMULIN 70/30 KWIKPEN (70-30) 100 UNIT/ML INJECTION PEN    inject 33 units subcutaneously twice a day    IBUPROFEN (ADVIL;MOTRIN) 800 MG TABLET    Take 1 tablet by mouth every 8 hours as needed for Pain    INSULIN PEN NEEDLE ([employer] PEN NEEDLES 31G) 31G X 8 MM MISC    1 each by Does not apply route daily    LEVOTHYROXINE (SYNTHROID) 50 MCG TABLET    Take 1 tablet by mouth Daily    METOPROLOL SUCCINATE (TOPROL XL) 100 MG EXTENDED icterus. Extraocular Movements: Extraocular movements intact. Pupils: Pupils are equal, round, and reactive to light. Neck:      Musculoskeletal: Normal range of motion and neck supple. No neck rigidity. Vascular: No carotid bruit. Cardiovascular:      Rate and Rhythm: Normal rate and regular rhythm. Pulses: Normal pulses. Radial pulses are 2+ on the right side and 2+ on the left side. Heart sounds: Normal heart sounds. Pulmonary:      Effort: Pulmonary effort is normal. No respiratory distress. Breath sounds: Normal breath sounds. Abdominal:      General: There is no distension. Palpations: Abdomen is soft. There is no mass or pulsatile mass. Tenderness: There is no abdominal tenderness. There is no guarding. Musculoskeletal: Normal range of motion. General: No deformity. Right lower leg: No edema. Left lower leg: No edema. Comments: No calf tenderness or asymmetry   Skin:     General: Skin is warm and dry. Capillary Refill: Capillary refill takes less than 2 seconds. Findings: No rash. Neurological:      General: No focal deficit present. Mental Status: She is alert and oriented to person, place, and time. Psychiatric:         Thought Content: Thought content normal.         MEDICAL DECISION MAKING:          Please see ED Course below for MDM/ED course. DDx: ACS, PE, aortic dissection, pneumonia    All patient's question's and concerns were answered prior to disposition and patient and/or family expressed understanding and agreement of treatment plan.        NIH STROKE SCALE:            PROCEDURES:    Procedures    DIAGNOSTIC RESULTS   EKG:All EKG's are interpreted by the Emergency Department Physician who either signs or Co-signs this chart in the absence of a cardiologist.    Normal sinus rhythm rate of 100  QRS 80  normal axis no ST elevations or depressions T wave flattening in aVL, Q wave V2, good R wave progression, nonspecific EKG; when compared to prior on 6/24/2020 no significant changes    RADIOLOGY:All plain film, CT, MRI, and formal ultrasound images (except ED bedside ultrasound) are read by the radiologist, see reports below, unless otherwisenoted in MDM or here. CT CHEST PULMONARY EMBOLISM W CONTRAST   Final Result   1. No evidence for acute pulmonary embolism. 2.  Peripheral opacity in the lateral inferior right upper lobe has an area   of central cavitation and associated scarring. Additionally, bilateral   predominantly ground-glass nodules are present. These findings may be   related to the provided history of Langerhans cell histiocytosis, however an   acute inflammatory process or infection is not excluded in the appropriate   clinical setting. Correlation with prior imaging is recommended. Otherwise   consider noncontrast CT follow-up in 3 months to demonstrate stability or   resolution. XR CHEST 1 VIEW   Final Result   No acute cardiopulmonary process. LABS: All lab results were reviewed by myself, and all abnormals are listed below.   Labs Reviewed   CBC WITH AUTO DIFFERENTIAL - Abnormal; Notable for the following components:       Result Value    WBC 12.2 (*)     Absolute Lymph # 4.49 (*)     All other components within normal limits   BASIC METABOLIC PANEL - Abnormal; Notable for the following components:    Glucose 241 (*)     All other components within normal limits   TROP/MYOGLOBIN - Abnormal; Notable for the following components:    Myoglobin <21 (*)     All other components within normal limits   TROP/MYOGLOBIN - Abnormal; Notable for the following components:    Myoglobin <21 (*)     All other components within normal limits   D-DIMER, QUANTITATIVE   BETA-HYDROXYBUTYRATE   POCT URINE PREGNANCY       EMERGENCY DEPARTMENTCOURSE:     Patient is a 30-year-old female with history of CAD status post stent here with chest pain shortness of breath nausea since last night. Minimal improvement with aspirin and nitro. She is tachycardic here. She has equal radial pulses her heart sounds are regular no murmurs rubs gallops lungs are clear abdomen soft nontender no lower extremity edema or calf tenderness. Impression is chest pain, heart score of 4. Will get cardiac work-up D-dimer likely CT chest given her tachycardia and history of DVT, will treat her pain and reassess. Likely speak with her cardiologist.  EKG is nonischemic. Patient's work-up negative for PE 2 normal troponins however she still having chest pain on reevaluation. Her CT does show peripheral opacity in the lateral inferior right upper lung which could be due to her Langerhans. She has no cough or fever to suggest pneumonia or infectious process. Spoke with her cardiologist Dr. Maribeth Dakin who is agreeable to admit the patient for cardiac evaluation. Vitals:    Vitals:    01/22/21 0800   BP: (!) 177/96   Pulse: 119   Resp: 16   Temp: 98.8 °F (37.1 °C)   TempSrc: Oral   SpO2: 97%   Weight: 213 lb (96.6 kg)   Height: 5' 3\" (1.6 m)       The patient was given the following medications while in the emergency department:  Orders Placed This Encounter   Medications    0.9 % sodium chloride bolus    morphine sulfate (PF) injection 4 mg    ondansetron (ZOFRAN) injection 4 mg    0.9 % sodium chloride bolus    sodium chloride flush 0.9 % injection 10 mL    iopamidol (ISOVUE-370) 76 % injection 75 mL     CONSULTS:  IP CONSULT TO CARDIOLOGY    FINAL IMPRESSION      1. Chest pain, unspecified type          DISPOSITION/PLAN   DISPOSITION Admitted 01/22/2021 11:20:20 AM      PATIENT REFERRED TO:  No follow-up provider specified.   DISCHARGE MEDICATIONS:  New Prescriptions    No medications on file     Kelly Menard MD  Attending Emergency Physician    This note was created with the assistance of a speech-recognition program. While intending to generate a document that actually reflects the content of the visit, no guarantees can be provided that every mistake has been identified and corrected by editing.                    Tsering Hawkins MD  01/22/21 1127

## 2021-01-22 NOTE — Clinical Note
Patient Class: Observation [104]   REQUIRED: Diagnosis: Chest pain [801700]   Estimated Length of Stay: Estimated stay of less than 2 midnights   Admitting Provider: Sally Ingram [[id number]]   Telemetry Bed Required?: Yes

## 2021-01-22 NOTE — CONSULTS
Cardiovascular Consult Note     TODAY'S DATE: 2021    Patient name: Michael Montiel   YOB: 1970  Date of admission:  2021       Patient seen, examined. Previous clinical entries reviewed. All available laboratory, imaging and ancillary data reviewed. Reason for Consult: Chest pain. Referring Physician: Maldonado Muniz MD    History of present Illness:     Michael Montiel is a 52 y.o. female who presented to ED with chest pain. Chest Pain: Patient complains of chest pain. Onset was 12 hours ago, with improving course since that time. The patient describes the pain as constant, costochondral in nature, does not radiate. Patient rates pain as a 5/10 in intensity. Associated symptoms are fatigue. Aggravating factors are none. Alleviating factors are: rest. Patient's cardiac risk factors are CAD, Hx of stent, hypertension, hyperliidemia. CT scan -ve for PE. Stress test negative for ischemia less than 6 months. Component of anxiety. EKG- No changes. Enzymes - 3 sets negative. Past Medical History:    has a past medical history of Anxiety, Arthritis, CAD (coronary artery disease), Depression, Hyperlipidemia, Kidney stone, Myocardial infarct ([facility] [address] and Neck pain.     Surgical History:     Past Surgical History:   Procedure Laterality Date    BACK SURGERY      L5-S1, C5-7    CARDIAC CATHETERIZATION       SECTION      MASTOID SURGERY      TONSILLECTOMY         Medications:   Scheduled Meds:   HYDROcodone 5 mg - acetaminophen  1 tablet Oral Once    sodium chloride flush  10 mL Intravenous 2 times per day     Continuous Infusions:   Outpatient Medications Marked as Taking for the 21 encounter [facility] Encounter)   Medication Sig Dispense Refill    Insulin NPH Isophane & Regular (HUMULIN 70/30 KWIKPEN) (70-30) 100 UNIT per ML injection pen Inject 25 Units into the skin 2 times daily      ciprofloxacin-dexamethasone (CIPRODEX) 0.3-0.1 % otic suspension Place 5 drops in ear(s) 2 times daily      amLODIPine (NORVASC) 5 MG tablet Take 5 mg by mouth daily       Calcium 200 MG TABS Take 400 mg by mouth 3 times daily (with meals) Takes 2 tabs with meals      albuterol (PROVENTIL) (2.5 MG/3ML) 0.083% nebulizer solution Take 2.5 mg by nebulization every 4 hours as needed for Wheezing or Shortness of Breath      topiramate (TOPAMAX) 50 MG tablet Take 50 mg by mouth 2 times daily      oxyCODONE-acetaminophen (PERCOCET) 5-325 MG per tablet Take 1 tablet by mouth 2 times daily as needed for Pain.       Ubrogepant (UBRELVY) 100 MG TABS Take 100 mg by mouth daily as needed (migraine)      fluticasone (FLONASE) 50 MCG/ACT nasal spray 1 spray by Each Nostril route daily as needed for Rhinitis      Multiple Vitamins-Minerals (THERAPEUTIC MULTIVITAMIN-MINERALS) tablet Take 1 tablet by mouth daily      ascorbic acid (VITAMIN C) 500 MG tablet Take 500 mg by mouth daily      metoprolol succinate (TOPROL XL) 100 MG extended release tablet take 1 tablet by mouth twice a day 60 tablet 5    albuterol sulfate  (90 Base) MCG/ACT inhaler Inhale 2 puffs into the lungs every 4 hours as needed      busPIRone (BUSPAR) 15 MG tablet Take 15 mg by mouth 2 times daily 60 tablet 5    ranolazine (RANEXA) 500 MG extended release tablet Take 1 tablet by mouth 2 times daily 60 tablet 16    polyethylene glycol (GLYCOLAX) 17 g packet Take 17 g by mouth daily as needed       levothyroxine (SYNTHROID) 50 MCG tablet Take 1 tablet by mouth Daily 30 tablet 5    sertraline (ZOLOFT) 100 MG tablet Take 1.5 tablets by mouth daily 45 tablet 5    acetaminophen (TYLENOL) 500 MG tablet Take 2 tablets by mouth every 6 hours as needed for Pain 30 tablet 0    ibuprofen (ADVIL;MOTRIN) 800 MG tablet Take 1 tablet by mouth every 8 hours as needed for Pain 30 tablet 0    ondansetron (ZOFRAN ODT) 4 MG disintegrating tablet Take 1 tablet by mouth every 8 hours as needed for Nausea 10 tablet 0    Cholecalciferol (VITAMIN D) 50 MCG (2000 UT) TABS tablet Take 2,000 Units by mouth daily       tiZANidine (ZANAFLEX) 2 MG tablet Take 1 tablet by mouth 3 times daily as needed (tension) 90 tablet 3    nitroGLYCERIN (NITROSTAT) 0.4 MG SL tablet Place 0.4 mg under the tongue every 5 minutes as needed   0    clopidogrel (PLAVIX) 75 MG tablet Take 75 mg by mouth daily      atorvastatin (LIPITOR) 80 MG tablet Take 80 mg by mouth daily      aspirin 81 MG chewable tablet Take 81 mg by mouth daily         Allergies:   Patient has no known allergies. Social History:    reports that she quit smoking about 5 years ago. Her smoking use included cigarettes. She quit after 20.00 years of use. She has never used smokeless tobacco. She reports current alcohol use. She reports current drug use. Frequency: 7.00 times per week. Drug: Marijuana. Family History:    family history includes Alzheimer's Disease in her paternal grandfather; COPD in her mother; Coronary Art Dis in her mother; Dementia in her mother; Depression in her mother; Diabetes in her maternal grandmother and mother; Heart Attack in her father; High Blood Pressure in her mother. Review of Systems:     Constitutional: No fever/chills. HENT: No headache, neck pain or neck stiffnes. No sore throat or dysphagia. Eyes: No blurred vision. Respiratory: As above. Cardiovascular: As above. Gastrointestinal: Negative. Genitourinary: Negative  Endocrine: Negative. Musculoskeletal: Negative. Skin: Negative. Allergic/Immunologic: Negative. Neurologic: Negative. Hematological: Negative. Psychiatric: Negative. All other systems are are noted to be otherwise negative. Physical Exam:   /85   Pulse 77   Temp 98.8 °F (37.1 °C) (Oral)   Resp 14   Ht 5' 3\" (1.6 m)   Wt 213 lb (96.6 kg)   LMP 01/15/2021   SpO2 93%   BMI 37.73 kg/m²   No intake or output data in the 24 hours ending 01/22/21 1807    GENERAL:  Alert, appropriate, oriented, in NAD.   HEENT:  Head is

## 2021-01-22 NOTE — CARE COORDINATION
Case Management Initial Discharge Plan  Anna Cardozo,         Readmission Risk              Risk of Unplanned Readmission:        0             Met with:patient to discuss discharge plans. Information verified: address, contacts, phone number, , insurance Yes  PCP: SAVANA Smith CNP    Stated no longer goes to [facility], needs new pcp- open to pcp list    Insurance Provider: [employer]    Discharge Planning  Current Residence:   house  Living Arrangements:   family   Home has 2 stories/3 stairs to climb  Support Systems:   family  Current Services PTA:   no Supplier: none  Patient able to perform ADL's:Independent  DME used to aid ambulation prior to admission: none  During admission: none    Potential Assistance Needed:   tbd    Pharmacy: [facility]   Potential Assistance Purchasing Medications:   no  Does patient want to participate in local refill/ meds to beds program?   no    Patient agreeable to home care: NA  [place] of choice provided:  n/a      Type of Home Care Services:     Patient expects to be discharged to:   Home    Prior SNF/Rehab Placement and Facility: no  Agreeable to SNF/Rehab: No  [place] of choice provided: yes   Evaluation: yes    Expected Discharge date: Follow Up Appointment: Best Day/ Time:      Transportation provider: family  Transportation arrangements needed for discharge: No    Discharge Plan:   Patient lives at home with family in 2 story home with 3 steps to enter. Patient was independent prior to admission. Patient denied any drug or alcohol use. SBIRT completed. Patient is obs for chest pain. Dr. Pamela Doss is cardiologist. Discharge needs tbd.         Electronically signed by JANIA Munroe on 21 at 2:53 PM EST

## 2021-01-25 LAB — HCG, PREGNANCY URINE (POC): NEGATIVE

## 2021-09-13 ENCOUNTER — TELEPHONE (OUTPATIENT)
Dept: FAMILY MEDICINE CLINIC | Age: 51
End: 2021-09-13

## 2021-09-13 ENCOUNTER — HOSPITAL ENCOUNTER (OUTPATIENT)
Age: 51
Setting detail: SPECIMEN
Discharge: HOME | End: 2021-09-13
Payer: COMMERCIAL

## 2021-09-13 ENCOUNTER — OFFICE VISIT (OUTPATIENT)
Dept: FAMILY MEDICINE CLINIC | Age: 51
End: 2021-09-13
Payer: COMMERCIAL

## 2021-09-13 VITALS
OXYGEN SATURATION: 98 % | HEART RATE: 99 BPM | SYSTOLIC BLOOD PRESSURE: 122 MMHG | TEMPERATURE: 97.3 F | BODY MASS INDEX: 37.03 KG/M2 | HEIGHT: 63 IN | WEIGHT: 209 LBS | DIASTOLIC BLOOD PRESSURE: 80 MMHG

## 2021-09-13 DIAGNOSIS — Z76.89 ENCOUNTER TO ESTABLISH CARE WITH NEW DOCTOR: ICD-10-CM

## 2021-09-13 DIAGNOSIS — G43.909 MIGRAINE WITHOUT STATUS MIGRAINOSUS, NOT INTRACTABLE, UNSPECIFIED MIGRAINE TYPE: ICD-10-CM

## 2021-09-13 DIAGNOSIS — I10 ESSENTIAL HYPERTENSION: Primary | ICD-10-CM

## 2021-09-13 DIAGNOSIS — E78.2 MIXED HYPERLIPIDEMIA: ICD-10-CM

## 2021-09-13 DIAGNOSIS — J84.82: ICD-10-CM

## 2021-09-13 DIAGNOSIS — F41.9 ANXIETY: ICD-10-CM

## 2021-09-13 DIAGNOSIS — N32.81 OAB (OVERACTIVE BLADDER): ICD-10-CM

## 2021-09-13 DIAGNOSIS — Z79.4 TYPE 2 DIABETES MELLITUS WITHOUT COMPLICATION, WITH LONG-TERM CURRENT USE OF INSULIN (HCC): ICD-10-CM

## 2021-09-13 DIAGNOSIS — F17.200 SMOKER: ICD-10-CM

## 2021-09-13 DIAGNOSIS — I25.10 CORONARY ARTERY DISEASE INVOLVING NATIVE HEART WITHOUT ANGINA PECTORIS, UNSPECIFIED VESSEL OR LESION TYPE: ICD-10-CM

## 2021-09-13 DIAGNOSIS — E11.9 TYPE 2 DIABETES MELLITUS WITHOUT COMPLICATION, WITH LONG-TERM CURRENT USE OF INSULIN (HCC): ICD-10-CM

## 2021-09-13 DIAGNOSIS — E03.9 HYPOTHYROIDISM, UNSPECIFIED TYPE: ICD-10-CM

## 2021-09-13 DIAGNOSIS — Z12.11 SCREENING FOR MALIGNANT NEOPLASM OF COLON: ICD-10-CM

## 2021-09-13 DIAGNOSIS — R07.9 CHEST PAIN, UNSPECIFIED TYPE: ICD-10-CM

## 2021-09-13 DIAGNOSIS — M48.02 SPINAL STENOSIS OF CERVICAL REGION: ICD-10-CM

## 2021-09-13 DIAGNOSIS — I10 ESSENTIAL HYPERTENSION: ICD-10-CM

## 2021-09-13 LAB
ABSOLUTE EOS #: 0.41 K/UL (ref 0–0.44)
ABSOLUTE IMMATURE GRANULOCYTE: <0.03 K/UL (ref 0–0.3)
ABSOLUTE LYMPH #: 4.59 K/UL (ref 1.1–3.7)
ABSOLUTE MONO #: 0.85 K/UL (ref 0.1–1.2)
ALBUMIN SERPL-MCNC: 4.2 G/DL (ref 3.5–5.2)
ALBUMIN/GLOBULIN RATIO: 1.4 (ref 1–2.5)
ALP BLD-CCNC: 79 U/L (ref 35–104)
ALT SERPL-CCNC: 19 U/L (ref 5–33)
ANION GAP SERPL CALCULATED.3IONS-SCNC: 15 MMOL/L (ref 9–17)
AST SERPL-CCNC: 19 U/L
BASOPHILS # BLD: 0 % (ref 0–2)
BASOPHILS ABSOLUTE: 0.05 K/UL (ref 0–0.2)
BILIRUB SERPL-MCNC: 0.27 MG/DL (ref 0.3–1.2)
BUN BLDV-MCNC: 10 MG/DL (ref 6–20)
BUN/CREAT BLD: ABNORMAL (ref 9–20)
CALCIUM SERPL-MCNC: 9.1 MG/DL (ref 8.6–10.4)
CHLORIDE BLD-SCNC: 102 MMOL/L (ref 98–107)
CHOLESTEROL, FASTING: 228 MG/DL
CHOLESTEROL/HDL RATIO: 5.1
CO2: 20 MMOL/L (ref 20–31)
CREAT SERPL-MCNC: 0.56 MG/DL (ref 0.5–0.9)
CREATININE URINE: 212.2 MG/DL (ref 28–217)
DIFFERENTIAL TYPE: ABNORMAL
EOSINOPHILS RELATIVE PERCENT: 3 % (ref 1–4)
ESTIMATED AVERAGE GLUCOSE: 214 MG/DL
GFR AFRICAN AMERICAN: >60 ML/MIN
GFR NON-AFRICAN AMERICAN: >60 ML/MIN
GFR SERPL CREATININE-BSD FRML MDRD: ABNORMAL ML/MIN/{1.73_M2}
GFR SERPL CREATININE-BSD FRML MDRD: ABNORMAL ML/MIN/{1.73_M2}
GLUCOSE BLD-MCNC: 204 MG/DL (ref 70–99)
HBA1C MFR BLD: 9.1 % (ref 4–6)
HCT VFR BLD CALC: 43.8 % (ref 36.3–47.1)
HDLC SERPL-MCNC: 45 MG/DL
HEMOGLOBIN: 13.7 G/DL (ref 11.9–15.1)
IMMATURE GRANULOCYTES: 0 %
LDL CHOLESTEROL: 156 MG/DL (ref 0–130)
LYMPHOCYTES # BLD: 38 % (ref 24–43)
MCH RBC QN AUTO: 28.1 PG (ref 25.2–33.5)
MCHC RBC AUTO-ENTMCNC: 31.3 G/DL (ref 28.4–34.8)
MCV RBC AUTO: 89.9 FL (ref 82.6–102.9)
MICROALBUMIN/CREAT 24H UR: <12 MG/L
MICROALBUMIN/CREAT UR-RTO: NORMAL MCG/MG CREAT
MONOCYTES # BLD: 7 % (ref 3–12)
NRBC AUTOMATED: 0 PER 100 WBC
PDW BLD-RTO: 14.4 % (ref 11.8–14.4)
PLATELET # BLD: 526 K/UL (ref 138–453)
PLATELET ESTIMATE: ABNORMAL
PMV BLD AUTO: 9.4 FL (ref 8.1–13.5)
POTASSIUM SERPL-SCNC: 4.4 MMOL/L (ref 3.7–5.3)
RBC # BLD: 4.87 M/UL (ref 3.95–5.11)
RBC # BLD: ABNORMAL 10*6/UL
SEG NEUTROPHILS: 52 % (ref 36–65)
SEGMENTED NEUTROPHILS ABSOLUTE COUNT: 6.32 K/UL (ref 1.5–8.1)
SODIUM BLD-SCNC: 137 MMOL/L (ref 135–144)
TOTAL PROTEIN: 7.3 G/DL (ref 6.4–8.3)
TRIGLYCERIDE, FASTING: 137 MG/DL
TSH SERPL DL<=0.05 MIU/L-ACNC: 0.99 MIU/L (ref 0.3–5)
VLDLC SERPL CALC-MCNC: ABNORMAL MG/DL (ref 1–30)
WBC # BLD: 12.2 K/UL (ref 3.5–11.3)
WBC # BLD: ABNORMAL 10*3/UL

## 2021-09-13 PROCEDURE — 4004F PT TOBACCO SCREEN RCVD TLK: CPT | Performed by: NURSE PRACTITIONER

## 2021-09-13 PROCEDURE — G8417 CALC BMI ABV UP PARAM F/U: HCPCS | Performed by: NURSE PRACTITIONER

## 2021-09-13 PROCEDURE — 2022F DILAT RTA XM EVC RTNOPTHY: CPT | Performed by: NURSE PRACTITIONER

## 2021-09-13 PROCEDURE — 99205 OFFICE O/P NEW HI 60 MIN: CPT | Performed by: NURSE PRACTITIONER

## 2021-09-13 PROCEDURE — 99406 BEHAV CHNG SMOKING 3-10 MIN: CPT | Performed by: NURSE PRACTITIONER

## 2021-09-13 PROCEDURE — 3052F HG A1C>EQUAL 8.0%<EQUAL 9.0%: CPT | Performed by: NURSE PRACTITIONER

## 2021-09-13 PROCEDURE — G8427 DOCREV CUR MEDS BY ELIG CLIN: HCPCS | Performed by: NURSE PRACTITIONER

## 2021-09-13 RX ORDER — TOPIRAMATE 50 MG/1
50 TABLET, FILM COATED ORAL 2 TIMES DAILY
Qty: 180 TABLET | Refills: 1 | Status: ON HOLD | OUTPATIENT
Start: 2021-09-13 | End: 2022-07-09 | Stop reason: HOSPADM

## 2021-09-13 RX ORDER — SERTRALINE HYDROCHLORIDE 100 MG/1
150 TABLET, FILM COATED ORAL DAILY
Qty: 135 TABLET | Refills: 1 | Status: SHIPPED | OUTPATIENT
Start: 2021-09-13

## 2021-09-13 RX ORDER — ATORVASTATIN CALCIUM 80 MG/1
80 TABLET, FILM COATED ORAL DAILY
Qty: 90 TABLET | Refills: 1 | Status: SHIPPED | OUTPATIENT
Start: 2021-09-13 | End: 2022-06-30

## 2021-09-13 RX ORDER — CLOPIDOGREL BISULFATE 75 MG/1
75 TABLET ORAL DAILY
Qty: 90 TABLET | Refills: 1 | Status: SHIPPED | OUTPATIENT
Start: 2021-09-13 | End: 2022-06-30

## 2021-09-13 RX ORDER — BUSPIRONE HYDROCHLORIDE 15 MG/1
15 TABLET ORAL 2 TIMES DAILY
Qty: 180 TABLET | Refills: 1 | Status: SHIPPED | OUTPATIENT
Start: 2021-09-13 | End: 2022-03-12

## 2021-09-13 RX ORDER — BLOOD-GLUCOSE METER
1 KIT MISCELLANEOUS 2 TIMES DAILY
Qty: 100 EACH | Refills: 3 | Status: SHIPPED | OUTPATIENT
Start: 2021-09-13 | End: 2022-04-19

## 2021-09-13 RX ORDER — AMLODIPINE BESYLATE 5 MG/1
5 TABLET ORAL DAILY
Qty: 90 TABLET | Refills: 1 | Status: SHIPPED | OUTPATIENT
Start: 2021-09-13 | End: 2022-03-14

## 2021-09-13 RX ORDER — BUPROPION HYDROCHLORIDE 150 MG/1
150 TABLET, EXTENDED RELEASE ORAL 2 TIMES DAILY
Qty: 180 TABLET | Refills: 1 | Status: ON HOLD | OUTPATIENT
Start: 2021-09-13 | End: 2021-12-08

## 2021-09-13 RX ORDER — RANOLAZINE 500 MG/1
500 TABLET, EXTENDED RELEASE ORAL 2 TIMES DAILY
Qty: 180 TABLET | Refills: 1 | Status: SHIPPED | OUTPATIENT
Start: 2021-09-13 | End: 2022-06-30

## 2021-09-13 RX ORDER — LEVOTHYROXINE SODIUM 0.05 MG/1
50 TABLET ORAL DAILY
Qty: 90 TABLET | Refills: 1 | Status: SHIPPED | OUTPATIENT
Start: 2021-09-13 | End: 2022-06-30

## 2021-09-13 RX ORDER — UREA 10 %
500-1000 LOTION (ML) TOPICAL 3 TIMES DAILY
COMMUNITY
Start: 2020-10-20

## 2021-09-13 RX ORDER — METOPROLOL SUCCINATE 100 MG/1
TABLET, EXTENDED RELEASE ORAL
Qty: 180 TABLET | Refills: 1 | Status: SHIPPED | OUTPATIENT
Start: 2021-09-13

## 2021-09-13 RX ORDER — TIZANIDINE 2 MG/1
2 TABLET ORAL 3 TIMES DAILY PRN
Qty: 90 TABLET | Refills: 0 | Status: SHIPPED | OUTPATIENT
Start: 2021-09-13 | End: 2021-10-19

## 2021-09-13 RX ORDER — ALBUTEROL SULFATE 90 UG/1
2 AEROSOL, METERED RESPIRATORY (INHALATION) EVERY 4 HOURS PRN
Qty: 18 G | Refills: 1 | Status: ON HOLD | OUTPATIENT
Start: 2021-09-13 | End: 2022-07-09 | Stop reason: HOSPADM

## 2021-09-13 RX ORDER — NITROGLYCERIN 0.4 MG/1
0.4 TABLET SUBLINGUAL EVERY 5 MIN PRN
Qty: 25 TABLET | Refills: 0 | Status: SHIPPED | OUTPATIENT
Start: 2021-09-13 | End: 2021-10-19

## 2021-09-13 SDOH — ECONOMIC STABILITY: FOOD INSECURITY: WITHIN THE PAST 12 MONTHS, THE FOOD YOU BOUGHT JUST DIDN'T LAST AND YOU DIDN'T HAVE MONEY TO GET MORE.: NEVER TRUE

## 2021-09-13 SDOH — ECONOMIC STABILITY: TRANSPORTATION INSECURITY
IN THE PAST 12 MONTHS, HAS THE LACK OF TRANSPORTATION KEPT YOU FROM MEDICAL APPOINTMENTS OR FROM GETTING MEDICATIONS?: NO

## 2021-09-13 SDOH — ECONOMIC STABILITY: TRANSPORTATION INSECURITY
IN THE PAST 12 MONTHS, HAS LACK OF TRANSPORTATION KEPT YOU FROM MEETINGS, WORK, OR FROM GETTING THINGS NEEDED FOR DAILY LIVING?: NO

## 2021-09-13 SDOH — ECONOMIC STABILITY: FOOD INSECURITY: WITHIN THE PAST 12 MONTHS, YOU WORRIED THAT YOUR FOOD WOULD RUN OUT BEFORE YOU GOT MONEY TO BUY MORE.: NEVER TRUE

## 2021-09-13 ASSESSMENT — SOCIAL DETERMINANTS OF HEALTH (SDOH): HOW HARD IS IT FOR YOU TO PAY FOR THE VERY BASICS LIKE FOOD, HOUSING, MEDICAL CARE, AND HEATING?: NOT HARD AT ALL

## 2021-09-13 NOTE — PATIENT INSTRUCTIONS
Stopping Smoking: Care Instructions  Your Care Instructions     Cigarette smokers crave the nicotine in cigarettes. Giving it up is much harder than simply changing a habit. Your body has to stop craving the nicotine. It is hard to quit, but you can do it. There are many tools that people use to quit smoking. You may find that combining tools works best for you. There are several steps to quitting. First you get ready to quit. Then you get support to help you. After that, you learn new skills and behaviors to become a nonsmoker. For many people, a necessary step is getting and using medicine. Your doctor will help you set up the plan that best meets your needs. You may want to attend a smoking cessation program to help you quit smoking. When you choose a program, look for one that has proven success. Ask your doctor for ideas. You will greatly increase your chances of success if you take medicine as well as get counseling or join a cessation program.  Some of the changes you feel when you first quit tobacco are uncomfortable. Your body will miss the nicotine at first, and you may feel short-tempered and grumpy. You may have trouble sleeping or concentrating. Medicine can help you deal with these symptoms. You may struggle with changing your smoking habits and rituals. The last step is the tricky one: Be prepared for the smoking urge to continue for a time. This is a lot to deal with, but keep at it. You will feel better. Follow-up care is a key part of your treatment and safety. Be sure to make and go to all appointments, and call your doctor if you are having problems. It's also a good idea to know your test results and keep a list of the medicines you take. How can you care for yourself at home? · Ask your family, friends, and coworkers for support. You have a better chance of quitting if you have help and support.   · Join a support group, such as Nicotine Anonymous, for people who are trying to quit smoking. · Consider signing up for a smoking cessation program, such as the American Lung Association's Freedom from Smoking program.  · Get text messaging support. Go to the website at www.smokefree. gov to sign up for the [facility] program.  · Set a quit date. Pick your date carefully so that it is not right in the middle of a big deadline or stressful time. Once you quit, do not even take a puff. Get rid of all ashtrays and lighters after your last cigarette. Clean your house and your clothes so that they do not smell of smoke. · Learn how to be a nonsmoker. Think about ways you can avoid those things that make you reach for a cigarette. ? Avoid situations that put you at greatest risk for smoking. For some people, it is hard to have a drink with friends without smoking. For others, they might skip a coffee break with coworkers who smoke. ? Change your daily routine. Take a different route to work or eat a meal in a different place. · Cut down on stress. Calm yourself or release tension by doing an activity you enjoy, such as reading a book, taking a hot bath, or gardening. · Talk to your doctor or pharmacist about nicotine replacement therapy, which replaces the nicotine in your body. You still get nicotine but you do not use tobacco. Nicotine replacement products help you slowly reduce the amount of nicotine you need. These products come in several forms, many of them available over-the-counter:  ? Nicotine patches  ? Nicotine gum and lozenges  ? Nicotine inhaler  · Ask your doctor about bupropion (Wellbutrin) or varenicline (Chantix), which are prescription medicines. They do not contain nicotine. They help you by reducing withdrawal symptoms, such as stress and anxiety. · Some people find hypnosis, acupuncture, and massage helpful for ending the smoking habit. · Eat a healthy diet and get regular exercise. Having healthy habits will help your body move past its craving for nicotine.   · Be prepared to keep trying. Most people are not successful the first few times they try to quit. Do not get mad at yourself if you smoke again. Make a list of things you learned and think about when you want to try again, such as next week, next month, or next year. Where can you learn more? Go to https://chpeanna.[employer]. org and sign in to your [employer] account. Enter Q670 in the [employer] box to learn more about \"Stopping Smoking: Care Instructions. \"     If you do not have an account, please click on the \"Sign Up Now\" link. Current as of: February 11, 2021               Content Version: 12.9  © 2006-2021 Healthwise, Incorporated. Care instructions adapted under license by [facility] ([facility]). If you have questions about a medical condition or this instruction, always ask your healthcare professional. [address] any warranty or liability for your use of this information.

## 2021-09-13 NOTE — TELEPHONE ENCOUNTER
Jan Houser,    Could you please assist patient with assistance in getting insulin? She is currently on 70/30 injecting twice daily. I would like to get her switched over to long acting once daily injection with [place] or [place] if possible. Also a GLP 1 as well.      Thanks,    Manfred Dominguez, APRN-CNP

## 2021-09-13 NOTE — PROGRESS NOTES
[facility]  [id number] [facility] [place], [place] [address]  [phone]  [phone]    Vadim Saunders is a 52 y.o. female who is here with c/o of:    Chief Complaint: Establish Care (previous pcp Dr Nereida Fairbanks)      Patient Accompanied by: n/a    HPI - Vadim Saunders is here today with c/o:    Patient here to establish care. Previous PCP: Dr Nereida Fairbanks  : Yes  Children: Yes  Employed: No  Exercise: Yes daily- 2 miles per day walking  Diet: Eats a balanced diet  Smoker: 3 pks per week  Alcohol: Socially  Sleep: Averages 8 hours- sleeps 4 hours    Chronic Conditions:    HTN-  Follows with Cardiology. HLD-  Compliant with Lipitor. Hypothyroid-  Compliant with Levothyroxine. Due for TSH testing. Anxiety-  Stable with buspar. Pulmonary Langer[place]ans-  Follows with Pulmonary. Uses her albuterol inhaler 2-3 times per week    CAD-  Follows with Cardiology. Has hx cardiac stents. Reports she had  maker. Migraines-  Takes Topamax daily. OAB-  Compliant with Myrbetriq. Spinal Stenosis Cervical-  Follows with Neurosurg. Takes Tizanidine. Diabetes type 2-  Checks blood sugars daily and averages 150's fasting. Uses sliding scale. Specialists:    Cardiology- Dr Kadeem Pond- hemoc/rheum/pulm    Health Concerns:    Due for labs and colonoscopy. Needs medication refills.      Health Maintenance Due   Topic Date Due    Lipid screen  Never done    Hepatitis B vaccine (1 of 3 - Risk 3-dose series) Never done    Cervical cancer screen  Never done    Colon cancer screen colonoscopy  Never done    TSH testing  05/31/2020    Diabetic foot exam  06/19/2020    Diabetic microalbuminuria test  06/19/2020    Flu vaccine (1) 09/01/2021        Patient Active Problem List:     Hypothyroidism     Type 2 diabetes mellitus without complication, with long-term current use of insulin ([facility] [address]     Essential hypertension     Hip fracture ([facility] [address]     Spinal stenosis of cervical region     Ground glass opacity present on imaging of lung     Hx of deep venous thrombosis     Situational depression     Anxiety     S/P hip replacement, right     Pulmonary nodule     Chest pain     Past Medical History:   Diagnosis Date    Anxiety     Arthritis     CAD (coronary artery disease)     Depression     Hyperlipidemia     Kidney stone     Myocardial infarct ([facility] [address] 2016    Neck pain     Spinal stenosis 2017      Past Surgical History:   Procedure Laterality Date    BACK SURGERY      L5-S1, C5-7    CARDIAC CATHETERIZATION       SECTION      HIP SURGERY Left     LUNG BIOPSY Right     MASTOID SURGERY      TONSILLECTOMY       Family History   Problem Relation Age of Onset    Dementia Mother     Depression Mother     High Blood Pressure Mother     Coronary Art Dis Mother     COPD Mother     Diabetes Mother     Heart Attack Father     Diabetes Maternal Grandmother     Alzheimer's Disease Paternal Grandfather      Social History     Tobacco Use    Smoking status: Current Some Day Smoker     Packs/day: 0.50     Years: 25.00     Pack years: 12.50     Types: Cigarettes    Smokeless tobacco: Never Used    Tobacco comment: 3 packs per week   Substance Use Topics    Alcohol use: Yes     Comment: social     ALLERGIES:    Allergies   Allergen Reactions    Other Other (See Comments)     Sneezing, watery eyes, wheezing    Seasonal Other (See Comments)     Sneezing, watery eyes, wheezing          Subjective   Review of Systems   · Constitutional:  Negative for activity change, appetite change,unexpected weight change, chills, fever, and fatigue. · HENT: Negative for ear pain, sore throat,  Rhinorrhea, sinus pain, sinus pressure, congestion. · Eyes:  Negative for pain and discharge. · Respiratory:  Negative for chest tightness, shortness of breath, wheezing, and cough.     · Cardiovascular:  Negative for chest pain, palpitations and leg swelling. · Gastrointestinal: Negative for abdominal pain, blood in stool, constipation,diarrhea, nausea and vomiting. · Endocrine: Negative for cold intolerance, heat intolerance, polydipsia, polyphagia and polyuria. · Genitourinary: Negative for difficulty urinating, dysuria, flank pain, frequency, hematuria and urgency. · Musculoskeletal: Negative for arthralgias, back pain, joint swelling, myalgias, neck pain and neck stiffness. · Skin: Negative for rash and wound. · Allergic/Immunologic: Negative for environmental allergies and food allergies. · Neurological:  Negative for dizziness, light-headedness, numbness and headaches. · Hematological:  Negative for adenopathy. Does not bruise/bleed easily. · Psychiatric/Behavioral: Negative for self-injury, sleep disturbance and suicidal ideas. Objective   Physical Exam   PHYSICAL EXAM:   · Constitutional: Roel Jiang is oriented to person, place, and time. Vital signs are normal. Appears well-developed and well-nourished. She is obese  · HEENT:   · Head: Normocephalic and atraumatic. Right Ear: Hearing and external ear normal. TM normal  Canal normal  · Left Ear: Hearing and external ear normal. TM normal Canal normal  · Nose: Nares normal. Septum midline. No drainage or sinus tenderness. Mucosa pink and moist  · Mouth/Throat: Oropharynx- No erythema, no exudate. Uvula midline, no erythema, no edema. Mucous membranes are pink and moist.   · Eyes:PERRL, EOMI, Conjunctiva normal, No discharge. · Neck: Full passive range of motion. Non-tender on palpation. Neck supple. No thyromegaly present. Trachea normal.  · Cardiovascular: Normal rate, regular rhythm, S1, S2, no murmur, no gallop, no friction rub, intact distal pulses. · Pulmonary/Chest: Breath sounds are clear throughout, No respiratory distress, No wheezing, No chest tenderness. Effort normal  · Abdominal: Soft. Normal appearance, bowel sounds are present and normoactive.  There is no hepatosplenomegaly. There is no tenderness. There is no CVA tenderness. · Musculoskeletal: Extremities appear regular and symmetric. No evident masses, lesions, foreign bodies, or other abnormalities. No edema. No tenderness on palpation. Joints are stable. Full ROM, strength and tone are within normal limits. · Lymphadenopathy: No lymphadenopathy noted. · Neurological: Alert and oriented to person, place, and time. Normal motor function, Normal sensory function, No focal deficits noted. He has normal strength. · Skin: Skin is warm, dry and intact. No obvious lesions on exposed skin  · Psychiatric: Normal mood and affect. Speech is normal and behavior is normal.     Nursing note and vitals reviewed. Blood pressure 122/80, pulse 99, temperature 97.3 °F (36.3 °C), temperature source Temporal, height 5' 3\" (1.6 m), weight 209 lb (94.8 kg), SpO2 98 %, not currently breastfeeding. Body mass index is 37.02 kg/m². Wt Readings from Last 3 Encounters:   21 209 lb (94.8 kg)   21 213 lb (96.6 kg)   20 201 lb (91.2 kg)     BP Readings from Last 3 Encounters:   21 122/80   21 129/85   20 (!) 162/87       Orders Only on 2021   Component Date Value Ref Range Status    Hemoglobin A1C 2021 8.6  % Final     No results found for this visit on 21. Completed Orders/Prescriptions   Orders Placed This Encounter   Medications    blood glucose test strips (FREESTYLE LITE) strip     Si each by Does not apply route 2 times daily As needed.      Dispense:  100 each     Refill:  3    busPIRone (BUSPAR) 15 MG tablet     Sig: Take 15 mg by mouth 2 times daily     Dispense:  180 tablet     Refill:  1    levothyroxine (SYNTHROID) 50 MCG tablet     Sig: Take 1 tablet by mouth Daily     Dispense:  90 tablet     Refill:  1    metoprolol succinate (TOPROL XL) 100 MG extended release tablet     Sig: take 1 tablet by mouth twice a day     Dispense:  180 tablet     Refill: 1    sertraline (ZOLOFT) 100 MG tablet     Sig: Take 1.5 tablets by mouth daily     Dispense:  135 tablet     Refill:  1    tiZANidine (ZANAFLEX) 2 MG tablet     Sig: Take 1 tablet by mouth 3 times daily as needed (tension)     Dispense:  90 tablet     Refill:  0    ranolazine (RANEXA) 500 MG extended release tablet     Sig: Take 1 tablet by mouth 2 times daily     Dispense:  180 tablet     Refill:  1    amLODIPine (NORVASC) 5 MG tablet     Sig: Take 1 tablet by mouth daily     Dispense:  90 tablet     Refill:  1    atorvastatin (LIPITOR) 80 MG tablet     Sig: Take 1 tablet by mouth daily     Dispense:  90 tablet     Refill:  1    clopidogrel (PLAVIX) 75 MG tablet     Sig: Take 1 tablet by mouth daily     Dispense:  90 tablet     Refill:  1    mirabegron (MYRBETRIQ) 50 MG TB24     Sig: Take 50 mg by mouth daily     Dispense:  90 tablet     Refill:  1    topiramate (TOPAMAX) 50 MG tablet     Sig: Take 1 tablet by mouth 2 times daily     Dispense:  180 tablet     Refill:  1    nitroGLYCERIN (NITROSTAT) 0.4 MG SL tablet     Sig: Place 1 tablet under the tongue every 5 minutes as needed for Chest pain     Dispense:  25 tablet     Refill:  0    albuterol sulfate  (90 Base) MCG/ACT inhaler     Sig: Inhale 2 puffs into the lungs every 4 hours as needed for Wheezing or Shortness of Breath     Dispense:  18 g     Refill:  1    buPROPion (ZYBAN) 150 MG extended release tablet     Sig: Take 1 tablet by mouth 2 times daily     Dispense:  180 tablet     Refill:  1               AssessmentPlan/Medical Decision Making     1. Essential hypertension    - Lipid, Fasting; Future  - CBC Auto Differential; Future  - Comprehensive Metabolic Panel; Future  - TSH with Reflex; Future  - metoprolol succinate (TOPROL XL) 100 MG extended release tablet; take 1 tablet by mouth twice a day  Dispense: 180 tablet; Refill: 1  - ranolazine (RANEXA) 500 MG extended release tablet;  Take 1 tablet by mouth 2 times daily  Dispense: 180 tablet; Refill: 1  - amLODIPine (NORVASC) 5 MG tablet; Take 1 tablet by mouth daily  Dispense: 90 tablet; Refill: 1    2. Hypothyroidism, unspecified type    - CBC Auto Differential; Future  - Comprehensive Metabolic Panel; Future  - TSH with Reflex; Future  - levothyroxine (SYNTHROID) 50 MCG tablet; Take 1 tablet by mouth Daily  Dispense: 90 tablet; Refill: 1    3. Type 2 diabetes mellitus without complication, with long-term current use of insulin (HCC)    - Lipid, Fasting; Future  - CBC Auto Differential; Future  - Comprehensive Metabolic Panel; Future  - TSH with Reflex; Future  - Hemoglobin A1C; Future  - Microalbumin, Ur; Future  - blood glucose test strips (FREESTYLE LITE) strip; 1 each by Does not apply route 2 times daily As needed. Dispense: 100 each; Refill: 3    4. Anxiety    - CBC Auto Differential; Future  - Comprehensive Metabolic Panel; Future  - TSH with Reflex; Future  - busPIRone (BUSPAR) 15 MG tablet; Take 15 mg by mouth 2 times daily  Dispense: 180 tablet; Refill: 1  - sertraline (ZOLOFT) 100 MG tablet; Take 1.5 tablets by mouth daily  Dispense: 135 tablet; Refill: 1    5. Mixed hyperlipidemia    - Lipid, Fasting; Future  - atorvastatin (LIPITOR) 80 MG tablet; Take 1 tablet by mouth daily  Dispense: 90 tablet; Refill: 1    6. Chest pain, unspecified type    - nitroGLYCERIN (NITROSTAT) 0.4 MG SL tablet; Place 1 tablet under the tongue every 5 minutes as needed for Chest pain  Dispense: 25 tablet; Refill: 0    7. Pulmonary [facility]anger[facility]ans cell granulomat[facility]      8. Coronary artery disease involving native heart without angina pectoris, unspecified vessel or lesion type    - clopidogrel (PLAVIX) 75 MG tablet; Take 1 tablet by mouth daily  Dispense: 90 tablet; Refill: 1    9. Migraine without status migrainosus, not intractable, unspecified migraine type    - topiramate (TOPAMAX) 50 MG tablet; Take 1 tablet by mouth 2 times daily  Dispense: 180 tablet; Refill: 1    10.  OAB (overactive questions.           Signed:  Maudie Fabry, TONYA

## 2021-09-13 NOTE — TELEPHONE ENCOUNTER
Pt called her insurance company and 4 of the 5 meds you told her, needs a prior authorization. triceba does not need a prior auth but would still cost her  Over $2000 per month.   The other meds are all between  $2000 and $ 4000 even with prior auth

## 2021-09-15 ENCOUNTER — TELEPHONE (OUTPATIENT)
Dept: FAMILY MEDICINE CLINIC | Age: 51
End: 2021-09-15

## 2021-09-15 DIAGNOSIS — Z79.4 TYPE 2 DIABETES MELLITUS WITHOUT COMPLICATION, WITH LONG-TERM CURRENT USE OF INSULIN (HCC): Primary | ICD-10-CM

## 2021-09-15 DIAGNOSIS — E11.9 TYPE 2 DIABETES MELLITUS WITHOUT COMPLICATION, WITH LONG-TERM CURRENT USE OF INSULIN (HCC): Primary | ICD-10-CM

## 2021-09-15 RX ORDER — INSULIN HUMAN 100 [IU]/ML
25 INJECTION, SUSPENSION SUBCUTANEOUS 2 TIMES DAILY
Qty: 5 PEN | Refills: 3 | Status: SHIPPED | OUTPATIENT
Start: 2021-09-15 | End: 2021-12-20

## 2021-09-15 NOTE — TELEPHONE ENCOUNTER
Will send refill and wait for Alisa Dues to return to assist with getting patient other medications.  I want her to increase her insulin to 28 units in am and 28 units in pm.

## 2021-09-15 NOTE — TELEPHONE ENCOUNTER
Pt wants to know if  She can get another rx for the humulin because she is down to 2 doses left. Augie Hernández will not be in the office this week. Or she would be willing to try an oral medicine?

## 2021-09-17 ENCOUNTER — NURSE TRIAGE (OUTPATIENT)
Dept: OTHER | Age: 51
End: 2021-09-17

## 2021-09-17 ENCOUNTER — TELEPHONE (OUTPATIENT)
Dept: FAMILY MEDICINE CLINIC | Age: 51
End: 2021-09-17

## 2021-09-17 ENCOUNTER — HOSPITAL ENCOUNTER (EMERGENCY)
Age: 51
Discharge: HOME | End: 2021-09-17
Attending: EMERGENCY MEDICINE
Payer: COMMERCIAL

## 2021-09-17 VITALS
OXYGEN SATURATION: 97 % | HEIGHT: 63 IN | SYSTOLIC BLOOD PRESSURE: 132 MMHG | TEMPERATURE: 98.4 F | BODY MASS INDEX: 36.32 KG/M2 | WEIGHT: 205 LBS | HEART RATE: 73 BPM | DIASTOLIC BLOOD PRESSURE: 75 MMHG | RESPIRATION RATE: 11 BRPM

## 2021-09-17 DIAGNOSIS — R07.89 ATYPICAL CHEST PAIN: ICD-10-CM

## 2021-09-17 DIAGNOSIS — F41.1 ANXIETY STATE: Primary | ICD-10-CM

## 2021-09-17 LAB
ABSOLUTE EOS #: 0.52 K/UL (ref 0–0.44)
ABSOLUTE IMMATURE GRANULOCYTE: 0.08 K/UL (ref 0–0.3)
ABSOLUTE LYMPH #: 4.65 K/UL (ref 1.1–3.7)
ABSOLUTE MONO #: 0.72 K/UL (ref 0.1–1.2)
ALBUMIN SERPL-MCNC: 4.1 G/DL (ref 3.5–5.2)
ALBUMIN/GLOBULIN RATIO: ABNORMAL (ref 1–2.5)
ALP BLD-CCNC: 74 U/L (ref 35–104)
ALT SERPL-CCNC: 16 U/L (ref 5–33)
ANION GAP SERPL CALCULATED.3IONS-SCNC: 12 MMOL/L (ref 9–17)
AST SERPL-CCNC: 12 U/L
BASOPHILS # BLD: 1 % (ref 0–2)
BASOPHILS ABSOLUTE: 0.07 K/UL (ref 0–0.2)
BILIRUB SERPL-MCNC: 0.52 MG/DL (ref 0.3–1.2)
BUN BLDV-MCNC: 9 MG/DL (ref 6–20)
BUN/CREAT BLD: 15 (ref 9–20)
CALCIUM SERPL-MCNC: 9.1 MG/DL (ref 8.6–10.4)
CHLORIDE BLD-SCNC: 104 MMOL/L (ref 98–107)
CO2: 20 MMOL/L (ref 20–31)
CREAT SERPL-MCNC: 0.6 MG/DL (ref 0.5–0.9)
DIFFERENTIAL TYPE: ABNORMAL
EKG ATRIAL RATE: 78 BPM
EKG P AXIS: 43 DEGREES
EKG P-R INTERVAL: 158 MS
EKG Q-T INTERVAL: 404 MS
EKG QRS DURATION: 78 MS
EKG QTC CALCULATION (BAZETT): 460 MS
EKG R AXIS: 52 DEGREES
EKG T AXIS: 66 DEGREES
EKG VENTRICULAR RATE: 78 BPM
EOSINOPHILS RELATIVE PERCENT: 4 % (ref 1–4)
GFR AFRICAN AMERICAN: >60 ML/MIN
GFR NON-AFRICAN AMERICAN: >60 ML/MIN
GFR SERPL CREATININE-BSD FRML MDRD: ABNORMAL ML/MIN/{1.73_M2}
GFR SERPL CREATININE-BSD FRML MDRD: ABNORMAL ML/MIN/{1.73_M2}
GLUCOSE BLD-MCNC: 212 MG/DL (ref 70–99)
HCT VFR BLD CALC: 42.3 % (ref 36.3–47.1)
HEMOGLOBIN: 13.8 G/DL (ref 11.9–15.1)
IMMATURE GRANULOCYTES: 1 %
LYMPHOCYTES # BLD: 31 % (ref 24–43)
MCH RBC QN AUTO: 28.3 PG (ref 25.2–33.5)
MCHC RBC AUTO-ENTMCNC: 32.6 G/DL (ref 28.4–34.8)
MCV RBC AUTO: 86.9 FL (ref 82.6–102.9)
MONOCYTES # BLD: 5 % (ref 3–12)
NRBC AUTOMATED: 0 PER 100 WBC
PDW BLD-RTO: 14.3 % (ref 11.8–14.4)
PLATELET # BLD: 499 K/UL (ref 138–453)
PLATELET ESTIMATE: ABNORMAL
PMV BLD AUTO: 8.9 FL (ref 8.1–13.5)
POTASSIUM SERPL-SCNC: 4.2 MMOL/L (ref 3.7–5.3)
RBC # BLD: 4.87 M/UL (ref 3.95–5.11)
RBC # BLD: ABNORMAL 10*6/UL
SEG NEUTROPHILS: 58 % (ref 36–65)
SEGMENTED NEUTROPHILS ABSOLUTE COUNT: 8.96 K/UL (ref 1.5–8.1)
SODIUM BLD-SCNC: 136 MMOL/L (ref 135–144)
TOTAL PROTEIN: 7.3 G/DL (ref 6.4–8.3)
TROPONIN INTERP: NORMAL
TROPONIN T: NORMAL NG/ML
TROPONIN, HIGH SENSITIVITY: <6 NG/L (ref 0–14)
WBC # BLD: 15 K/UL (ref 3.5–11.3)
WBC # BLD: ABNORMAL 10*3/UL

## 2021-09-17 PROCEDURE — 80053 COMPREHEN METABOLIC PANEL: CPT

## 2021-09-17 PROCEDURE — 6370000000 HC RX 637 (ALT 250 FOR IP): Performed by: EMERGENCY MEDICINE

## 2021-09-17 PROCEDURE — 99283 EMERGENCY DEPT VISIT LOW MDM: CPT

## 2021-09-17 PROCEDURE — 84484 ASSAY OF TROPONIN QUANT: CPT

## 2021-09-17 PROCEDURE — 36415 COLL VENOUS BLD VENIPUNCTURE: CPT

## 2021-09-17 PROCEDURE — 93005 ELECTROCARDIOGRAM TRACING: CPT | Performed by: EMERGENCY MEDICINE

## 2021-09-17 PROCEDURE — 85025 COMPLETE CBC W/AUTO DIFF WBC: CPT

## 2021-09-17 RX ORDER — HYDROCODONE BITARTRATE AND ACETAMINOPHEN 5; 325 MG/1; MG/1
1 TABLET ORAL ONCE
Status: COMPLETED | OUTPATIENT
Start: 2021-09-17 | End: 2021-09-17

## 2021-09-17 RX ORDER — LORAZEPAM 1 MG/1
1 TABLET ORAL ONCE
Status: COMPLETED | OUTPATIENT
Start: 2021-09-17 | End: 2021-09-17

## 2021-09-17 RX ADMIN — HYDROCODONE BITARTRATE AND ACETAMINOPHEN 1 TABLET: 5; 325 TABLET ORAL at 06:14

## 2021-09-17 RX ADMIN — LORAZEPAM 1 MG: 1 TABLET ORAL at 06:13

## 2021-09-17 ASSESSMENT — PAIN SCALES - GENERAL: PAINLEVEL_OUTOF10: 8

## 2021-09-17 ASSESSMENT — PAIN DESCRIPTION - LOCATION: LOCATION: CHEST

## 2021-09-17 ASSESSMENT — PAIN DESCRIPTION - PAIN TYPE: TYPE: ACUTE PAIN

## 2021-09-17 NOTE — ED PROVIDER NOTES
EMERGENCY DEPARTMENT ENCOUNTER    Pt Name: Alley Mcrae  MRN: [id number]  [place] 1970  Date of evaluation: 21  CHIEF COMPLAINT       Chief Complaint   Patient presents with    Chest Pain     HISTORY OF PRESENT ILLNESS   Patient is a 53-year-old female with PMH of CAD, hyperlipidemia, and anxiety who presents the ED complaining of anxiety and chest pain. She reports anxiety started gradually after starting Wellbutrin for the first time this week to help quit smoking. Yesterday, she reports having to do the hammock maneuver on her son which raised her anxiety even higher. She had difficulty sleeping last night because of anxiety and developed shortness of breath and chest pain. No other issues at this time. No fevers, cough, shortness of breath, chest pain, abdominal pain, nausea, vomiting, changes in urine or stool. REVIEW OF SYSTEMS     Review of Systems   All other systems reviewed and are negative.     PASTMEDICAL HISTORY     Past Medical History:   Diagnosis Date    Anxiety     Arthritis     CAD (coronary artery disease)     Depression     Hyperlipidemia     Kidney stone     Myocardial infarct ([facility] [address] 2016    Neck pain     Spinal stenosis 2017     SURGICAL HISTORY       Past Surgical History:   Procedure Laterality Date    BACK SURGERY      L5-S1, C5-7    CARDIAC CATHETERIZATION       SECTION      HIP SURGERY Left     LUNG BIOPSY Right     MASTOID SURGERY      TONSILLECTOMY       CURRENT MEDICATIONS       Previous Medications    ACETAMINOPHEN (TYLENOL) 500 MG TABLET    Take 2 tablets by mouth every 6 hours as needed for Pain    ALBUTEROL (PROVENTIL) (2.5 MG/3ML) 0.083% NEBULIZER SOLUTION    Take 2.5 mg by nebulization every 4 hours as needed for Wheezing or Shortness of Breath    ALBUTEROL SULFATE  (90 BASE) MCG/ACT INHALER    Inhale 2 puffs into the lungs every 4 hours as needed for Wheezing or Shortness of Breath    AMLODIPINE (NORVASC) 5 MG TABLET    Take 1 tablet by mouth daily    ASCORBIC ACID (VITAMIN C) 500 MG TABLET    Take 500 mg by mouth daily    ASPIRIN 81 MG CHEWABLE TABLET    Take 81 mg by mouth daily    ATORVASTATIN (LIPITOR) 80 MG TABLET    Take 1 tablet by mouth daily    BLOOD GLUCOSE TEST STRIPS (FREESTYLE LITE) STRIP    1 each by Does not apply route 2 times daily As needed.     BUPROPION (ZYBAN) 150 MG EXTENDED RELEASE TABLET    Take 1 tablet by mouth 2 times daily    BUSPIRONE (BUSPAR) 15 MG TABLET    Take 15 mg by mouth 2 times daily    CALCIUM CARBONATE (OS-BRANNON) 1250 (500 CA) MG CHEWABLE TABLET    Take 500-1,000 mg by mouth 3 times daily    CHOLECALCIFEROL (VITAMIN D) 50 MCG (2000 UT) TABS TABLET    Take 2,000 Units by mouth daily     CLOPIDOGREL (PLAVIX) 75 MG TABLET    Take 1 tablet by mouth daily    FLUTICASONE (FLONASE) 50 MCG/ACT NASAL SPRAY    1 spray by Each Nostril route daily as needed for Rhinitis    FREESTYLE LANCETS MISC    1 each by Does not apply route daily    IBUPROFEN (ADVIL;MOTRIN) 800 MG TABLET    Take 1 tablet by mouth every 8 hours as needed for Pain    INSULIN NPH ISOPHANE & REGULAR (HUMULIN 70/30 KWIKPEN) (70-30) 100 UNIT PER ML INJECTION PEN    Inject 25 Units into the skin 2 times daily    INSULIN PEN NEEDLE ([employer] PEN NEEDLES 31G) 31G X 8 MM MISC    1 each by Does not apply route daily    LEVOTHYROXINE (SYNTHROID) 50 MCG TABLET    Take 1 tablet by mouth Daily    METOPROLOL SUCCINATE (TOPROL XL) 100 MG EXTENDED RELEASE TABLET    take 1 tablet by mouth twice a day    MIRABEGRON (MYRBETRIQ) 50 MG TB24    Take 50 mg by mouth daily    MULTIPLE VITAMINS-MINERALS (THERAPEUTIC MULTIVITAMIN-MINERALS) TABLET    Take 1 tablet by mouth daily    NITROGLYCERIN (NITROSTAT) 0.4 MG SL TABLET    Place 1 tablet under the tongue every 5 minutes as needed for Chest pain    POLYETHYLENE GLYCOL (GLYCOLAX) 17 G PACKET    Take 17 g by mouth daily as needed     RANOLAZINE (RANEXA) 500 MG EXTENDED RELEASE TABLET    Take 1 tablet by mouth 2 times daily SERTRALINE (ZOLOFT) 100 MG TABLET    Take 1.5 tablets by mouth daily    TIZANIDINE (ZANAFLEX) 2 MG TABLET    Take 1 tablet by mouth 3 times daily as needed (tension)    TOPIRAMATE (TOPAMAX) 50 MG TABLET    Take 1 tablet by mouth 2 times daily     ALLERGIES     is allergic to other and seasonal.  FAMILY HISTORY     She indicated that her mother is . She indicated that her father is . She indicated that both of her brothers are . She indicated that her maternal grandmother is . She indicated that her maternal grandfather is . She indicated that her paternal grandmother is . She indicated that her paternal grandfather is . SOCIAL HISTORY       Social History     Tobacco Use    Smoking status: Current Some Day Smoker     Packs/day: 0.50     Years: 25.00     Pack years: 12.50     Types: Cigarettes    Smokeless tobacco: Never Used    Tobacco comment: 3 packs per week   Vaping Use    Vaping Use: Former   Substance Use Topics    Alcohol use: Yes     Comment: social    Drug use: Yes     Frequency: 7.0 times per week     Types: Marijuana     PHYSICAL EXAM     INITIAL VITALS: Pulse 73   Temp 98.4 °F (36.9 °C) (Oral)   Resp 11   Ht 5' 3\" (1.6 m)   Wt 205 lb (93 kg)   LMP 09/10/2021   SpO2 97%   BMI 36.31 kg/m²    Physical Exam  Constitutional:       Comments: Appearing anxious, tearful secondary to her symptoms. HENT:      Head: Normocephalic. Right Ear: External ear normal.      Left Ear: External ear normal.      Nose: Nose normal.   Eyes:      Conjunctiva/sclera: Conjunctivae normal.   Cardiovascular:      Rate and Rhythm: Normal rate and regular rhythm. Pulmonary:      Effort: Pulmonary effort is normal.   Abdominal:      General: Abdomen is flat. Skin:     General: Skin is dry. Neurological:      Mental Status: She is alert. Mental status is at baseline.    Psychiatric:         Mood and Affect: Mood normal.         Behavior: Behavior normal.         MEDICAL DECISION MAKING:   The patient is hemodynamically stable, afebrile, nontoxic-appearing. Physical exam unremarkable. Based on history and exam low suspicion for ACS, likely anxiety. ED plan for Ativan, Norco, EKG, chest x-ray, likely discharge. DIAGNOSTIC RESULTS   EKG:All EKG's are interpreted by the Emergency Department Physician who either signs or Co-signs this chart in the absence of a cardiologist.        RADIOLOGY:All plain film, CT, MRI, and formal ultrasound images (except ED bedside ultrasound) are read by the radiologist, see reports below, unless otherwisenoted in MDM or here. No orders to display     LABS: All lab results were reviewed by myself, and all abnormals are listed below. Labs Reviewed   CBC WITH AUTO DIFFERENTIAL - Abnormal; Notable for the following components:       Result Value    WBC 15.0 (*)     Platelets 293 (*)     Immature Granulocytes 1 (*)     Segs Absolute 8.96 (*)     Absolute Lymph # 4.65 (*)     Absolute Eos # 0.52 (*)     All other components within normal limits   COMPREHENSIVE METABOLIC PANEL W/ REFLEX TO MG FOR LOW K   TROPONIN       EMERGENCY DEPARTMENTCOURSE:   Patient did well in the ED. Labs unremarkable. EKG nonischemic. Symptoms improved with Ativan and Norco.  Patient will stop taking Wellbutrin tablets for smoking cessation and try the patch. No further work-up indicated at this time. Nursing notes reviewed. At this time this is what I find, the patient appears well and does not appear sick or toxic. I gave my usual and customary discussion of the risks and benefits of discharge versus admission. I answered the patient's questions. I gave the patient strict return precautions. Patient expressed understanding of the discharge instructions. Dictated but not reviewed.     Vitals:    Vitals:    09/17/21 0554 09/17/21 0630   Pulse: 88 73   Resp: 18 11   Temp: 98.4 °F (36.9 °C)    TempSrc: Oral    SpO2: 98% 97%   Weight: 205 lb (93 kg)    Height: 5' 3\" (1.6 m)        The patient was given the following medications while in the emergency department:  Orders Placed This Encounter   Medications    LORazepam (ATIVAN) tablet 1 mg    HYDROcodone-acetaminophen (NORCO) 5-325 MG per tablet 1 tablet     CONSULTS:  None    FINAL IMPRESSION      1. Anxiety state    2.  Atypical chest pain          DISPOSITION/PLAN   DISPOSITION Discharge - Pending Orders Complete 09/17/2021 06:58:22 AM      PATIENT REFERRED TO:  SAVANA Marsh - [address]  [phone]    In 2 days      DISCHARGE MEDICATIONS:  New Prescriptions    No medications on file     Tressa Hill MD  Attending Emergency Physician                    Jearline Fleischer, MD  09/17/21 1951

## 2021-09-17 NOTE — TELEPHONE ENCOUNTER
Pt advised and states she wants to know what is going to be done she is fine with not getting the norco that's just what they gave her at the ER she wants to know how long shell have the side effects of the wellburtin

## 2021-09-17 NOTE — TELEPHONE ENCOUNTER
Pt was in [facility]'s ER during the night. The Dr suggested that she stop taking the well butrin because of the side effects she was having. Having bad anxiety, chest angina pain, blood pressure 151/101, no appetite dry, mouth, headache,vertigo. It said she got really bad after yesterday afternoon  Because her son was choking  And she had  To do the heimlich maneuver on him.   So she is calling to say that she does not want to take wellbutrin anymore

## 2021-09-17 NOTE — TELEPHONE ENCOUNTER
Patient calls and states she started a new medication on Tuesday: Wellbutrin. Patient states she feels she is experiencing side affects to the medication which include CP, high BP, dizziness, headache and night sweats. Patient states she has been having CP since last night which she did take Nitroglycerin for and it did help, but the CP is back this morning. Patient denies any SOB or difficulty breathing. Patient states she does have a history of cardiac issues including angina. Patient states her BP is still high after taking her blood pressure medications. She last took BP right before she called answering service. Writer instructs patient to call 911. Patient states she will have her  take her to [facility] or [place] [address].      Reason for Disposition   [1] Chest pain lasts > 5 minutes AND [2] history of heart disease  (i.e., heart attack, bypass surgery, angina, angioplasty, CHF)    Protocols used: HIGH BLOOD PRESSURE-ADULT-AH

## 2021-09-17 NOTE — TELEPHONE ENCOUNTER
----- Message from Alysalmkishan Devinherb sent at 9/17/2021 11:02 AM EDT -----  Subject: Refill Request    QUESTIONS  Name of Medication? Other - HYDROcodone-acetaminophen (NORCO) 5-325 MG per   tablet 1 tablet  Patient-reported dosage and instructions? 5-325MG,   How many days do you have left? 0  Preferred Pharmacy? [facility] phone number (if available)? [phone]  Additional Information for Provider? Patient side effects to the   wellbutrin are coming back and is wanting to take this medication which   was given to her in the ER.  ---------------------------------------------------------------------------  --------------,  Name of Medication? Other - LORazepam (ATIVAN) tablet 1 mg  Patient-reported dosage and instructions? 1MG  How many days do you have left? 0  Preferred Pharmacy? [facility] phone number (if available)? [phone]  Additional Information for Provider? Patient side effects to the   wellbutrin are coming back and is wanting to take this medication which   was given to her in the ER.  ---------------------------------------------------------------------------  --------------  CALL BACK INFO  What is the best way for the office to contact you? OK to leave message on   voicemail  Preferred Call Back Phone Number?  [phone]

## 2021-09-17 NOTE — TELEPHONE ENCOUNTER
----- Message from Donna Cervantes sent at 9/17/2021 11:02 AM EDT -----  Subject: Refill Request    QUESTIONS  Name of Medication? Other - HYDROcodone-acetaminophen (NORCO) 5-325 MG per   tablet 1 tablet  Patient-reported dosage and instructions? 5-325MG,   How many days do you have left? 0  Preferred Pharmacy? [address] [email or web address] phone number (if available)? [phone]  Additional Information for Provider? Patient side effects to the   wellbutrin are coming back and is wanting to take this medication which   was given to her in the ER.  ---------------------------------------------------------------------------  --------------,  Name of Medication? Other - LORazepam (ATIVAN) tablet 1 mg  Patient-reported dosage and instructions? 1MG  How many days do you have left? 0  Preferred Pharmacy? [address] [email or web address] phone number (if available)? [phone]  Additional Information for Provider? Patient side effects to the   wellbutrin are coming back and is wanting to take this medication which   was given to her in the ER.  ---------------------------------------------------------------------------  --------------  CALL BACK INFO  What is the best way for the office to contact you? OK to leave message on   voicemail  Preferred Call Back Phone Number?  [phone]

## 2021-09-19 ENCOUNTER — APPOINTMENT (OUTPATIENT)
Dept: GENERAL RADIOLOGY | Age: 51
End: 2021-09-19
Payer: COMMERCIAL

## 2021-09-19 ENCOUNTER — APPOINTMENT (OUTPATIENT)
Dept: CT IMAGING | Age: 51
End: 2021-09-19
Payer: COMMERCIAL

## 2021-09-19 ENCOUNTER — HOSPITAL ENCOUNTER (EMERGENCY)
Age: 51
Discharge: HOME | End: 2021-09-19
Attending: EMERGENCY MEDICINE
Payer: COMMERCIAL

## 2021-09-19 VITALS
HEART RATE: 84 BPM | TEMPERATURE: 98.9 F | OXYGEN SATURATION: 98 % | HEIGHT: 63 IN | DIASTOLIC BLOOD PRESSURE: 99 MMHG | WEIGHT: 208 LBS | RESPIRATION RATE: 16 BRPM | SYSTOLIC BLOOD PRESSURE: 138 MMHG | BODY MASS INDEX: 36.86 KG/M2

## 2021-09-19 DIAGNOSIS — R42 DIZZINESS: Primary | ICD-10-CM

## 2021-09-19 DIAGNOSIS — R07.81 RIB PAIN: ICD-10-CM

## 2021-09-19 LAB
-: ABNORMAL
ABSOLUTE EOS #: 0.44 K/UL (ref 0–0.4)
ABSOLUTE IMMATURE GRANULOCYTE: 0 K/UL (ref 0–0.3)
ABSOLUTE LYMPH #: 5.95 K/UL (ref 1–4.8)
ABSOLUTE MONO #: 0.29 K/UL (ref 0.2–0.8)
ALBUMIN SERPL-MCNC: 4.2 G/DL (ref 3.5–5.2)
ALBUMIN/GLOBULIN RATIO: ABNORMAL (ref 1–2.5)
ALP BLD-CCNC: 79 U/L (ref 35–104)
ALT SERPL-CCNC: 20 U/L (ref 5–33)
AMORPHOUS: ABNORMAL
ANION GAP SERPL CALCULATED.3IONS-SCNC: 13 MMOL/L (ref 9–17)
AST SERPL-CCNC: 15 U/L
BACTERIA: ABNORMAL
BASOPHILS # BLD: 2 %
BASOPHILS ABSOLUTE: 0.29 K/UL (ref 0–0.2)
BILIRUB SERPL-MCNC: 0.28 MG/DL (ref 0.3–1.2)
BILIRUBIN DIRECT: 0.09 MG/DL
BILIRUBIN URINE: NEGATIVE
BILIRUBIN, INDIRECT: 0.19 MG/DL (ref 0–1)
BUN BLDV-MCNC: 13 MG/DL (ref 6–20)
BUN/CREAT BLD: 16 (ref 9–20)
CALCIUM SERPL-MCNC: 9.5 MG/DL (ref 8.6–10.4)
CASTS UA: ABNORMAL /LPF
CHLORIDE BLD-SCNC: 101 MMOL/L (ref 98–107)
CO2: 20 MMOL/L (ref 20–31)
COLOR: YELLOW
COMMENT UA: ABNORMAL
CREAT SERPL-MCNC: 0.83 MG/DL (ref 0.5–0.9)
CRYSTALS, UA: ABNORMAL /HPF
D-DIMER QUANTITATIVE: 0.39 MG/L FEU (ref 0–0.59)
DIFFERENTIAL TYPE: ABNORMAL
EOSINOPHILS RELATIVE PERCENT: 3 % (ref 1–4)
EPITHELIAL CELLS UA: ABNORMAL /HPF (ref 0–5)
GFR AFRICAN AMERICAN: >60 ML/MIN
GFR NON-AFRICAN AMERICAN: >60 ML/MIN
GFR SERPL CREATININE-BSD FRML MDRD: ABNORMAL ML/MIN/{1.73_M2}
GFR SERPL CREATININE-BSD FRML MDRD: ABNORMAL ML/MIN/{1.73_M2}
GLOBULIN: ABNORMAL G/DL (ref 1.5–3.8)
GLUCOSE BLD-MCNC: 171 MG/DL (ref 70–99)
GLUCOSE URINE: NEGATIVE
HCG QUALITATIVE: NEGATIVE
HCT VFR BLD CALC: 43.2 % (ref 36.3–47.1)
HEMOGLOBIN: 14.1 G/DL (ref 11.9–15.1)
IMMATURE GRANULOCYTES: 0 %
KETONES, URINE: NEGATIVE
LACTIC ACID: 0.9 MMOL/L (ref 0.5–2.2)
LACTIC ACID: 1.1 MMOL/L (ref 0.5–2.2)
LEUKOCYTE ESTERASE, URINE: NEGATIVE
LIPASE: 31 U/L (ref 13–60)
LYMPHOCYTES # BLD: 41 % (ref 24–44)
MCH RBC QN AUTO: 28.5 PG (ref 25.2–33.5)
MCHC RBC AUTO-ENTMCNC: 32.6 G/DL (ref 28.4–34.8)
MCV RBC AUTO: 87.4 FL (ref 82.6–102.9)
MONOCYTES # BLD: 2 % (ref 1–7)
MUCUS: ABNORMAL
NITRITE, URINE: NEGATIVE
NRBC AUTOMATED: 0 PER 100 WBC
OTHER OBSERVATIONS UA: ABNORMAL
PDW BLD-RTO: 14.2 % (ref 11.8–14.4)
PH UA: 5 (ref 5–8)
PLATELET # BLD: 486 K/UL (ref 138–453)
PLATELET ESTIMATE: ABNORMAL
PMV BLD AUTO: 8.9 FL (ref 8.1–13.5)
POTASSIUM SERPL-SCNC: 3.8 MMOL/L (ref 3.7–5.3)
PROTEIN UA: NEGATIVE
RBC # BLD: 4.94 M/UL (ref 3.95–5.11)
RBC # BLD: ABNORMAL 10*6/UL
RBC UA: ABNORMAL /HPF (ref 0–2)
RENAL EPITHELIAL, UA: ABNORMAL /HPF
SARS-COV-2, RAPID: NOT DETECTED
SEG NEUTROPHILS: 52 % (ref 36–66)
SEGMENTED NEUTROPHILS ABSOLUTE COUNT: 7.53 K/UL (ref 1.8–7.7)
SODIUM BLD-SCNC: 134 MMOL/L (ref 135–144)
SPECIFIC GRAVITY UA: 1.03 (ref 1–1.03)
SPECIMEN DESCRIPTION: NORMAL
TOTAL PROTEIN: 7.2 G/DL (ref 6.4–8.3)
TRICHOMONAS: ABNORMAL
TROPONIN INTERP: NORMAL
TROPONIN INTERP: NORMAL
TROPONIN T: NORMAL NG/ML
TROPONIN T: NORMAL NG/ML
TROPONIN, HIGH SENSITIVITY: 7 NG/L (ref 0–14)
TROPONIN, HIGH SENSITIVITY: <6 NG/L (ref 0–14)
TURBIDITY: ABNORMAL
URINE HGB: NEGATIVE
UROBILINOGEN, URINE: NORMAL
WBC # BLD: 14.5 K/UL (ref 3.5–11.3)
WBC # BLD: ABNORMAL 10*3/UL
WBC UA: ABNORMAL /HPF (ref 0–5)
YEAST: ABNORMAL

## 2021-09-19 PROCEDURE — 93005 ELECTROCARDIOGRAM TRACING: CPT | Performed by: EMERGENCY MEDICINE

## 2021-09-19 PROCEDURE — 83690 ASSAY OF LIPASE: CPT

## 2021-09-19 PROCEDURE — 84484 ASSAY OF TROPONIN QUANT: CPT

## 2021-09-19 PROCEDURE — 83605 ASSAY OF LACTIC ACID: CPT

## 2021-09-19 PROCEDURE — 87635 SARS-COV-2 COVID-19 AMP PRB: CPT

## 2021-09-19 PROCEDURE — 81001 URINALYSIS AUTO W/SCOPE: CPT

## 2021-09-19 PROCEDURE — 36415 COLL VENOUS BLD VENIPUNCTURE: CPT

## 2021-09-19 PROCEDURE — 80048 BASIC METABOLIC PNL TOTAL CA: CPT

## 2021-09-19 PROCEDURE — 71260 CT THORAX DX C+: CPT

## 2021-09-19 PROCEDURE — 84703 CHORIONIC GONADOTROPIN ASSAY: CPT

## 2021-09-19 PROCEDURE — 80076 HEPATIC FUNCTION PANEL: CPT

## 2021-09-19 PROCEDURE — 85025 COMPLETE CBC W/AUTO DIFF WBC: CPT

## 2021-09-19 PROCEDURE — 2580000003 HC RX 258: Performed by: EMERGENCY MEDICINE

## 2021-09-19 PROCEDURE — 71045 X-RAY EXAM CHEST 1 VIEW: CPT

## 2021-09-19 PROCEDURE — 6360000004 HC RX CONTRAST MEDICATION: Performed by: EMERGENCY MEDICINE

## 2021-09-19 PROCEDURE — 99283 EMERGENCY DEPT VISIT LOW MDM: CPT

## 2021-09-19 PROCEDURE — 85379 FIBRIN DEGRADATION QUANT: CPT

## 2021-09-19 RX ORDER — 0.9 % SODIUM CHLORIDE 0.9 %
1000 INTRAVENOUS SOLUTION INTRAVENOUS ONCE
Status: COMPLETED | OUTPATIENT
Start: 2021-09-19 | End: 2021-09-19

## 2021-09-19 RX ORDER — SODIUM CHLORIDE 0.9 % (FLUSH) 0.9 %
10 SYRINGE (ML) INJECTION PRN
Status: DISCONTINUED | OUTPATIENT
Start: 2021-09-19 | End: 2021-09-19 | Stop reason: HOSPADM

## 2021-09-19 RX ORDER — 0.9 % SODIUM CHLORIDE 0.9 %
80 INTRAVENOUS SOLUTION INTRAVENOUS ONCE
Status: COMPLETED | OUTPATIENT
Start: 2021-09-19 | End: 2021-09-19

## 2021-09-19 RX ADMIN — IOPAMIDOL 75 ML: 755 INJECTION, SOLUTION INTRAVENOUS at 19:13

## 2021-09-19 RX ADMIN — SODIUM CHLORIDE 80 ML: 9 INJECTION, SOLUTION INTRAVENOUS at 19:13

## 2021-09-19 RX ADMIN — SODIUM CHLORIDE, PRESERVATIVE FREE 10 ML: 5 INJECTION INTRAVENOUS at 19:13

## 2021-09-19 RX ADMIN — SODIUM CHLORIDE 1000 ML: 9 INJECTION, SOLUTION INTRAVENOUS at 18:22

## 2021-09-19 ASSESSMENT — ENCOUNTER SYMPTOMS
SHORTNESS OF BREATH: 1
SORE THROAT: 0
EYE DISCHARGE: 0
COLOR CHANGE: 0
DIARRHEA: 0
RHINORRHEA: 0
VOMITING: 0
EYE REDNESS: 0
COUGH: 0
NAUSEA: 0

## 2021-09-19 ASSESSMENT — PAIN SCALES - GENERAL: PAINLEVEL_OUTOF10: 7

## 2021-09-19 ASSESSMENT — PAIN DESCRIPTION - LOCATION: LOCATION: ABDOMEN

## 2021-09-19 NOTE — ED PROVIDER NOTES
EMERGENCY DEPARTMENT ENCOUNTER    Pt Name: Arben Jerez  MRN: [id number]  [place] 1970  Date of evaluation: 9/19/21  CHIEF COMPLAINT       Chief Complaint   Patient presents with    Abdominal Pain     left side    Dizziness    Rib Pain     HISTORY OF PRESENT ILLNESS   This is a 57-year-old female who presents with complaints of pain and discomfort in the left side of the upper abdomen as well as the left chest.  The patient has a history of a DVT, she states that over the past few days she has had increasing pain and discomfort in the left upper abdomen. She denies any cardiac history, she has no history of exposure to COVID-19. REVIEW OF SYSTEMS     Review of Systems   Constitutional: Negative for chills and fever. HENT: Negative for rhinorrhea and sore throat. Eyes: Negative for discharge, redness and visual disturbance. Respiratory: Positive for shortness of breath. Negative for cough. Cardiovascular: Positive for chest pain. Negative for palpitations and leg swelling. Gastrointestinal: Negative for diarrhea, nausea and vomiting. Genitourinary: Negative for dysuria and hematuria. Musculoskeletal: Negative for arthralgias, myalgias and neck pain. Skin: Negative for color change and rash. Neurological: Positive for dizziness. Negative for seizures, weakness and headaches. Psychiatric/Behavioral: Negative for hallucinations, self-injury and suicidal ideas.      PASTMEDICAL HISTORY     Past Medical History:   Diagnosis Date    Anxiety     Arthritis     CAD (coronary artery disease)     Depression     Hyperlipidemia     Kidney stone     Myocardial infarct ([address] 2016    Neck pain     Spinal stenosis 2017     Past Problem List  Patient Active Problem List   Diagnosis Code    Hypothyroidism E03.9    Type 2 diabetes mellitus without complication, with long-term current use of insulin ([address] E11.9, Z79.4    Essential hypertension I10    Hip fracture ([address] S72.009A  Spinal stenosis of cervical region M48.02    Ground glass opacity present on imaging of lung R91.8    Hx of deep venous thrombosis Z86.718    Situational depression F43.21    Anxiety F41.9    S/P hip replacement, right Z96.641    Chest pain R07.9    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    Mixed hyperlipidemia E78.2    Coronary artery disease involving native heart without angina pectoris I25.10    Migraine without status migrainosus, not intractable G43.909    OAB (overactive bladder) N32.81    Smoker F17.200     SURGICAL HISTORY       Past Surgical History:   Procedure Laterality Date    BACK SURGERY      L5-S1, C5-7    CARDIAC CATHETERIZATION       SECTION      HIP SURGERY Left     LUNG BIOPSY Right     MASTOID SURGERY      TONSILLECTOMY       CURRENT MEDICATIONS       Current Discharge Medication List      CONTINUE these medications which have NOT CHANGED    Details   Insulin NPH Isophane & Regular (HUMULIN 70/30 KWIKPEN) (70-30) 100 UNIT per ML injection pen Inject 25 Units into the skin 2 times daily  Qty: 5 pen, Refills: 3    Associated Diagnoses: Type 2 diabetes mellitus without complication, with long-term current use of insulin ([facility])      calcium carbonate (OS-BRANNON) 1250 (500 Ca) MG chewable tablet Take 500-1,000 mg by mouth 3 times daily      blood glucose test strips (FREESTYLE LITE) strip 1 each by Does not apply route 2 times daily As needed.   Qty: 100 each, Refills: 3    Associated Diagnoses: Type 2 diabetes mellitus without complication, with long-term current use of insulin ([facility])      busPIRone (BUSPAR) 15 MG tablet Take 15 mg by mouth 2 times daily  Qty: 180 tablet, Refills: 1    Associated Diagnoses: Anxiety      levothyroxine (SYNTHROID) 50 MCG tablet Take 1 tablet by mouth Daily  Qty: 90 tablet, Refills: 1    Associated Diagnoses: Hypothyroidism, unspecified type      metoprolol succinate (TOPROL XL) 100 MG extended release tablet take 1 tablet by mouth twice a day  Qty: 180 tablet, Refills: 1    Associated Diagnoses: Essential hypertension      sertraline (ZOLOFT) 100 MG tablet Take 1.5 tablets by mouth daily  Qty: 135 tablet, Refills: 1    Associated Diagnoses: Anxiety      tiZANidine (ZANAFLEX) 2 MG tablet Take 1 tablet by mouth 3 times daily as needed (tension)  Qty: 90 tablet, Refills: 0    Associated Diagnoses: Spinal stenosis of cervical region      ranolazine (RANEXA) 500 MG extended release tablet Take 1 tablet by mouth 2 times daily  Qty: 180 tablet, Refills: 1    Associated Diagnoses: Essential hypertension      amLODIPine (NORVASC) 5 MG tablet Take 1 tablet by mouth daily  Qty: 90 tablet, Refills: 1    Associated Diagnoses: Essential hypertension      atorvastatin (LIPITOR) 80 MG tablet Take 1 tablet by mouth daily  Qty: 90 tablet, Refills: 1    Associated Diagnoses: Mixed hyperlipidemia      clopidogrel (PLAVIX) 75 MG tablet Take 1 tablet by mouth daily  Qty: 90 tablet, Refills: 1    Associated Diagnoses: Coronary artery disease involving native heart without angina pectoris, unspecified vessel or lesion type      mirabegron (MYRBETRIQ) 50 MG TB24 Take 50 mg by mouth daily  Qty: 90 tablet, Refills: 1    Associated Diagnoses: OAB (overactive bladder)      topiramate (TOPAMAX) 50 MG tablet Take 1 tablet by mouth 2 times daily  Qty: 180 tablet, Refills: 1    Associated Diagnoses: Migraine without status migrainosus, not intractable, unspecified migraine type      nitroGLYCERIN (NITROSTAT) 0.4 MG SL tablet Place 1 tablet under the tongue every 5 minutes as needed for Chest pain  Qty: 25 tablet, Refills: 0    Associated Diagnoses: Chest pain, unspecified type      albuterol sulfate  (90 Base) MCG/ACT inhaler Inhale 2 puffs into the lungs every 4 hours as needed for Wheezing or Shortness of Breath  Qty: 18 g, Refills: 1      buPROPion (ZYBAN) 150 MG extended release tablet Take 1 tablet by mouth 2 times daily  Qty: 180 tablet, Refills: 1 Associated Diagnoses: Smoker      albuterol (PROVENTIL) (2.5 MG/3ML) 0.083% nebulizer solution Take 2.5 mg by nebulization every 4 hours as needed for Wheezing or Shortness of Breath      fluticasone (FLONASE) 50 MCG/ACT nasal spray 1 spray by Each Nostril route daily as needed for Rhinitis      Multiple Vitamins-Minerals (THERAPEUTIC MULTIVITAMIN-MINERALS) tablet Take 1 tablet by mouth daily      ascorbic acid (VITAMIN C) 500 MG tablet Take 500 mg by mouth daily      polyethylene glycol (GLYCOLAX) 17 g packet Take 17 g by mouth daily as needed       acetaminophen (TYLENOL) 500 MG tablet Take 2 tablets by mouth every 6 hours as needed for Pain  Qty: 30 tablet, Refills: 0      ibuprofen (ADVIL;MOTRIN) 800 MG tablet Take 1 tablet by mouth every 8 hours as needed for Pain  Qty: 30 tablet, Refills: 0      Cholecalciferol (VITAMIN D) 50 MCG (2000 UT) TABS tablet Take 2,000 Units by mouth daily       FREESTYLE LANCETS MISC 1 each by Does not apply route daily  Qty: 100 each, Refills: 3    Associated Diagnoses: Type 2 diabetes mellitus without complication, with long-term current use of insulin ([facility])      Insulin Pen Needle ([employer] PEN NEEDLES 31G) 31G X 8 MM MISC 1 each by Does not apply route daily  Qty: 100 each, Refills: 3    Associated Diagnoses: Type 2 diabetes mellitus without complication, with long-term current use of insulin ([facility])      aspirin 81 MG chewable tablet Take 81 mg by mouth daily           ALLERGIES     is allergic to other and seasonal.  FAMILY HISTORY     She indicated that her mother is . She indicated that her father is . She indicated that both of her brothers are . She indicated that her maternal grandmother is . She indicated that her maternal grandfather is . She indicated that her paternal grandmother is . She indicated that her paternal grandfather is .      SOCIAL HISTORY       Social History     Tobacco Use    Smoking status: Current Some Day Smoker     Packs/day: 0.50     Years: 25.00     Pack years: 12.50     Types: Cigarettes    Smokeless tobacco: Never Used    Tobacco comment: 3 packs per week   Vaping Use    Vaping Use: Former   Substance Use Topics    Alcohol use: Yes     Comment: social    Drug use: Yes     Frequency: 7.0 times per week     Types: Marijuana     PHYSICAL EXAM     INITIAL VITALS: BP (!) 138/99   Pulse 84   Temp 98.9 °F (37.2 °C) (Oral)   Resp 16   Ht 5' 3\" (1.6 m)   Wt 208 lb (94.3 kg)   LMP 09/10/2021   SpO2 99%   BMI 36.85 kg/m²    Physical Exam  Constitutional:       Appearance: Normal appearance. She is well-developed. She is not ill-appearing or toxic-appearing. HENT:      Head: Normocephalic and atraumatic. Eyes:      Conjunctiva/sclera: Conjunctivae normal.      Pupils: Pupils are equal, round, and reactive to light. Neck:      Trachea: Trachea normal.   Cardiovascular:      Rate and Rhythm: Normal rate and regular rhythm. Heart sounds: S1 normal and S2 normal. No murmur heard. Pulmonary:      Effort: Pulmonary effort is normal. No accessory muscle usage or respiratory distress. Breath sounds: Normal breath sounds. Chest:      Chest wall: No deformity or tenderness. Abdominal:      General: Bowel sounds are normal. There is no distension or abdominal bruit. Palpations: Abdomen is not rigid. Tenderness: There is no abdominal tenderness. There is no guarding or rebound. Musculoskeletal:      Cervical back: Normal range of motion and neck supple. Skin:     General: Skin is warm. Findings: No rash. Neurological:      Mental Status: She is alert and oriented to person, place, and time. GCS: GCS eye subscore is 4. GCS verbal subscore is 5. GCS motor subscore is 6. Psychiatric:         Speech: Speech normal.         MEDICAL DECISION MAKIN-year-old female presents with complaints of some dizziness, left-sided rib pain, and some upper abdominal pain. Plan is basic labs cardiac enzymes CT chest and reevaluation. 8:29 PM EDT  Patient's laboratory studies and CT scan are unremarkable, at this time I do not believe the patient requires admission to the hospital, the patient's symptoms of pain have been ongoing over the past few days, this makes an acute coronary syndrome extremely unlikely. CRITICAL CARE:       PROCEDURES:    Procedures    DIAGNOSTIC RESULTS   EKG:All EKG's are interpreted by the Emergency Department Physician who either signs or Co-signs this chart in the absence of a cardiologist.    Patient's EKG shows sinus rhythm rate of 71, [place] QRS QTC intervals unremarkable patient has normal axis no ST elevation or depressions, no significant T wave changes. Nonspecific EKG. RADIOLOGY:All plain film, CT, MRI, and formal ultrasound images (except ED bedside ultrasound) are read by the radiologist, see reports below, unless otherwisenoted in MDM or here. CT CHEST PULMONARY EMBOLISM W CONTRAST   Final Result   1. No evidence of pulmonary embolism   2. Reticulonodular changes again noted throughout the lungs, with an upper   lobe predominance, related to the history of Langerhans cell histiocytosis. No focal area of consolidation or pneumothorax is present . 3. Interval resolution of the previously noted cavity, within the right upper   lobe. Bronchiectatic changes are now present within this area         XR CHEST PORTABLE   Final Result   Persistent opacity peripherally in the right lung, a cavitary lesion was seen   in this region on CT. A CT of the chest may be helpful for further evaluation           LABS: All lab results were reviewed by myself, and all abnormals are listed below.   Labs Reviewed   BASIC METABOLIC PANEL - Abnormal; Notable for the following components:       Result Value    Glucose 171 (*)     Sodium 134 (*)     All other components within normal limits   CBC WITH AUTO DIFFERENTIAL - Abnormal; Notable for the following components:    WBC 14.5 (*)     Platelets 002 (*)     Absolute Lymph # 5.95 (*)     Absolute Eos # 0.44 (*)     Basophils Absolute 0.29 (*)     All other components within normal limits   HEPATIC FUNCTION PANEL - Abnormal; Notable for the following components: Total Bilirubin 0.28 (*)     All other components within normal limits   URINALYSIS - Abnormal; Notable for the following components:    Turbidity UA SLIGHTLY CLOUDY (*)     Specific Gravity, UA 1.035 (*)     All other components within normal limits   MICROSCOPIC URINALYSIS - Abnormal; Notable for the following components:    Mucus, UA 4+ (*)     All other components within normal limits   COVID-19, RAPID   LIPASE   TROPONIN   TROPONIN   LACTIC ACID   LACTIC ACID   HCG, SERUM, QUALITATIVE   D-DIMER, QUANTITATIVE       EMERGENCY DEPARTMENTCOURSE:         Vitals:    Vitals:    09/19/21 1718 09/19/21 1819 09/19/21 1830 09/19/21 1930   BP: (!) 138/99      Pulse: 84      Resp: 16      Temp: 98.9 °F (37.2 °C)      TempSrc: Oral      SpO2: 99% 97% 98% 99%   Weight: 208 lb (94.3 kg)      Height: 5' 3\" (1.6 m)          The patient was given the following medications while in the emergency department:  Orders Placed This Encounter   Medications    0.9 % sodium chloride bolus    0.9 % sodium chloride bolus    sodium chloride flush 0.9 % injection 10 mL    iopamidol (ISOVUE-370) 76 % injection 75 mL     CONSULTS:  None    FINAL IMPRESSION      1. Dizziness    2.  Rib pain          DISPOSITION/PLAN   DISPOSITION Decision To Discharge 09/19/2021 08:25:04 PM      PATIENT REFERRED TO:  SAVANA Zarco - CNP  [address]  [phone]    Schedule an appointment as soon as possible for a visit in 2 days      DISCHARGE MEDICATIONS:  Current Discharge Medication List        Tyree Pal MD  Attending Emergency Physician                   Tyree Pal MD  09/19/21 2031

## 2021-09-20 LAB
EKG ATRIAL RATE: 71 BPM
EKG P AXIS: 55 DEGREES
EKG P-R INTERVAL: 160 MS
EKG Q-T INTERVAL: 414 MS
EKG QRS DURATION: 80 MS
EKG QTC CALCULATION (BAZETT): 449 MS
EKG R AXIS: 48 DEGREES
EKG T AXIS: 86 DEGREES
EKG VENTRICULAR RATE: 71 BPM

## 2021-09-22 ENCOUNTER — OFFICE VISIT (OUTPATIENT)
Dept: FAMILY MEDICINE CLINIC | Age: 51
End: 2021-09-22
Payer: COMMERCIAL

## 2021-09-22 VITALS
HEART RATE: 77 BPM | BODY MASS INDEX: 37.02 KG/M2 | DIASTOLIC BLOOD PRESSURE: 78 MMHG | SYSTOLIC BLOOD PRESSURE: 120 MMHG | WEIGHT: 209 LBS | TEMPERATURE: 97.4 F | OXYGEN SATURATION: 98 %

## 2021-09-22 DIAGNOSIS — R42 DIZZINESS: ICD-10-CM

## 2021-09-22 DIAGNOSIS — M54.2 NECK PAIN: ICD-10-CM

## 2021-09-22 DIAGNOSIS — R11.0 NAUSEA: ICD-10-CM

## 2021-09-22 DIAGNOSIS — Z09 HOSPITAL DISCHARGE FOLLOW-UP: Primary | ICD-10-CM

## 2021-09-22 PROCEDURE — 99214 OFFICE O/P EST MOD 30 MIN: CPT | Performed by: NURSE PRACTITIONER

## 2021-09-22 PROCEDURE — 1111F DSCHRG MED/CURRENT MED MERGE: CPT | Performed by: NURSE PRACTITIONER

## 2021-09-22 PROCEDURE — G8417 CALC BMI ABV UP PARAM F/U: HCPCS | Performed by: NURSE PRACTITIONER

## 2021-09-22 PROCEDURE — 4004F PT TOBACCO SCREEN RCVD TLK: CPT | Performed by: NURSE PRACTITIONER

## 2021-09-22 PROCEDURE — G8427 DOCREV CUR MEDS BY ELIG CLIN: HCPCS | Performed by: NURSE PRACTITIONER

## 2021-09-22 RX ORDER — METOPROLOL TARTRATE 100 MG/1
100 TABLET ORAL 2 TIMES DAILY
Status: ON HOLD | COMMUNITY
Start: 2021-05-05 | End: 2021-12-08

## 2021-09-22 RX ORDER — ONDANSETRON 4 MG/1
4 TABLET, FILM COATED ORAL EVERY 8 HOURS PRN
Qty: 40 TABLET | Refills: 0 | Status: SHIPPED | OUTPATIENT
Start: 2021-09-22 | End: 2021-10-02

## 2021-09-22 ASSESSMENT — ENCOUNTER SYMPTOMS: NAUSEA: 1

## 2021-09-22 NOTE — PROGRESS NOTES
Post-Discharge Transitional Care Management Services or Hospital Follow Up      Racquel Vitale   YOB: 1970    Date of Office Visit:  9/22/2021  Date of Hospital Admission: 9/19/21  Date of Hospital Discharge: 9/19/21  Readmission Risk Score(high >=14%.  Medium >=10%):No data recorded    Care management risk score Rising risk (score 2-5) and Complex Care (Scores >=6): 1     Non face to face  following discharge, date last encounter closed (first attempt may have been earlier): *No documented post hospital discharge outreach found in the last 14 days *No documented post hospital discharge outreach found in the last 14 days    Call initiated 2 business days of discharge: *No response recorded in the last 14 days     Patient Active Problem List   Diagnosis    Hypothyroidism    Type 2 diabetes mellitus without complication, with long-term current use of insulin ([address]    Essential hypertension    Hip fracture ([address]    Spinal stenosis of cervical region    Ground glass opacity present on imaging of lung    Hx of deep venous thrombosis    Situational depression    Anxiety    S/P hip replacement, right    Chest pain    Pulmonary Langerhans cell granulomatosis ([address]    Mixed hyperlipidemia    Coronary artery disease involving native heart without angina pectoris    Migraine without status migrainosus, not intractable    OAB (overactive bladder)    Smoker       Allergies   Allergen Reactions    Other Other (See Comments)     Sneezing, watery eyes, wheezing    Seasonal Other (See Comments)     Sneezing, watery eyes, wheezing       Medications listed as ordered at the time of discharge from hospital   Neeta ALEXANDRA   Home Medication Instructions EFRA:    Printed on:09/22/21 3371   Medication Information                      acetaminophen (TYLENOL) 500 MG tablet  Take 2 tablets by mouth every 6 hours as needed for Pain             albuterol (PROVENTIL) (2.5 MG/3ML) 0.083% nebulizer solution  Take 2.5 mg by nebulization every 4 hours as needed for Wheezing or Shortness of Breath             albuterol sulfate  (90 Base) MCG/ACT inhaler  Inhale 2 puffs into the lungs every 4 hours as needed for Wheezing or Shortness of Breath             amLODIPine (NORVASC) 5 MG tablet  Take 1 tablet by mouth daily             ascorbic acid (VITAMIN C) 500 MG tablet  Take 500 mg by mouth daily             aspirin 81 MG chewable tablet  Take 81 mg by mouth daily             atorvastatin (LIPITOR) 80 MG tablet  Take 1 tablet by mouth daily             blood glucose test strips (FREESTYLE LITE) strip  1 each by Does not apply route 2 times daily As needed.              buPROPion (ZYBAN) 150 MG extended release tablet  Take 1 tablet by mouth 2 times daily             busPIRone (BUSPAR) 15 MG tablet  Take 15 mg by mouth 2 times daily             calcium carbonate (OS-BRANNON) 1250 (500 Ca) MG chewable tablet  Take 500-1,000 mg by mouth 3 times daily             Cholecalciferol (VITAMIN D) 50 MCG (2000 UT) TABS tablet  Take 2,000 Units by mouth daily              clopidogrel (PLAVIX) 75 MG tablet  Take 1 tablet by mouth daily             fluticasone (FLONASE) 50 MCG/ACT nasal spray  1 spray by Each Nostril route daily as needed for Rhinitis             FREESTYLE LANCETS MISC  1 each by Does not apply route daily             ibuprofen (ADVIL;MOTRIN) 800 MG tablet  Take 1 tablet by mouth every 8 hours as needed for Pain             Insulin NPH Isophane & Regular (HUMULIN 70/30 KWIKPEN) (70-30) 100 UNIT per ML injection pen  Inject 25 Units into the skin 2 times daily             Insulin Pen Needle (CHET PEN NEEDLES 31G) 31G X 8 MM MISC  1 each by Does not apply route daily             levothyroxine (SYNTHROID) 50 MCG tablet  Take 1 tablet by mouth Daily             metoprolol (LOPRESSOR) 100 MG tablet  Take 100 mg by mouth 2 times daily             metoprolol succinate (TOPROL XL) 100 MG extended release tablet  take 1 tablet by mouth twice a day             mirabegron (MYRBETRIQ) 50 MG TB24  Take 50 mg by mouth daily             Multiple Vitamins-Minerals (THERAPEUTIC MULTIVITAMIN-MINERALS) tablet  Take 1 tablet by mouth daily             nitroGLYCERIN (NITROSTAT) 0.4 MG SL tablet  Place 1 tablet under the tongue every 5 minutes as needed for Chest pain             ondansetron (ZOFRAN) 4 MG tablet  Take 1 tablet by mouth every 8 hours as needed for Nausea             polyethylene glycol (GLYCOLAX) 17 g packet  Take 17 g by mouth daily as needed              ranolazine (RANEXA) 500 MG extended release tablet  Take 1 tablet by mouth 2 times daily             sertraline (ZOLOFT) 100 MG tablet  Take 1.5 tablets by mouth daily             tiZANidine (ZANAFLEX) 2 MG tablet  Take 1 tablet by mouth 3 times daily as needed (tension)             topiramate (TOPAMAX) 50 MG tablet  Take 1 tablet by mouth 2 times daily                   Medications marked \"taking\" at this time  Outpatient Medications Marked as Taking for the 9/22/21 encounter (Office Visit) with SAVANA Valdes CNP   Medication Sig Dispense Refill    metoprolol (LOPRESSOR) 100 MG tablet Take 100 mg by mouth 2 times daily      ondansetron (ZOFRAN) 4 MG tablet Take 1 tablet by mouth every 8 hours as needed for Nausea 40 tablet 0    Insulin NPH Isophane & Regular (HUMULIN 70/30 KWIKPEN) (70-30) 100 UNIT per ML injection pen Inject 25 Units into the skin 2 times daily 5 pen 3    calcium carbonate (OS-BRANNON) 1250 (500 Ca) MG chewable tablet Take 500-1,000 mg by mouth 3 times daily      blood glucose test strips (FREESTYLE LITE) strip 1 each by Does not apply route 2 times daily As needed.  100 each 3    busPIRone (BUSPAR) 15 MG tablet Take 15 mg by mouth 2 times daily 180 tablet 1    levothyroxine (SYNTHROID) 50 MCG tablet Take 1 tablet by mouth Daily 90 tablet 1    metoprolol succinate (TOPROL XL) 100 MG extended release tablet take 1 tablet by mouth twice a day 180 tablet 1    sertraline (ZOLOFT) 100 MG tablet Take 1.5 tablets by mouth daily 135 tablet 1    tiZANidine (ZANAFLEX) 2 MG tablet Take 1 tablet by mouth 3 times daily as needed (tension) 90 tablet 0    ranolazine (RANEXA) 500 MG extended release tablet Take 1 tablet by mouth 2 times daily 180 tablet 1    amLODIPine (NORVASC) 5 MG tablet Take 1 tablet by mouth daily 90 tablet 1    atorvastatin (LIPITOR) 80 MG tablet Take 1 tablet by mouth daily 90 tablet 1    clopidogrel (PLAVIX) 75 MG tablet Take 1 tablet by mouth daily 90 tablet 1    mirabegron (MYRBETRIQ) 50 MG TB24 Take 50 mg by mouth daily 90 tablet 1    topiramate (TOPAMAX) 50 MG tablet Take 1 tablet by mouth 2 times daily 180 tablet 1    nitroGLYCERIN (NITROSTAT) 0.4 MG SL tablet Place 1 tablet under the tongue every 5 minutes as needed for Chest pain 25 tablet 0    albuterol sulfate  (90 Base) MCG/ACT inhaler Inhale 2 puffs into the lungs every 4 hours as needed for Wheezing or Shortness of Breath 18 g 1    buPROPion (ZYBAN) 150 MG extended release tablet Take 1 tablet by mouth 2 times daily 180 tablet 1    albuterol (PROVENTIL) (2.5 MG/3ML) 0.083% nebulizer solution Take 2.5 mg by nebulization every 4 hours as needed for Wheezing or Shortness of Breath      fluticasone (FLONASE) 50 MCG/ACT nasal spray 1 spray by Each Nostril route daily as needed for Rhinitis      Multiple Vitamins-Minerals (THERAPEUTIC MULTIVITAMIN-MINERALS) tablet Take 1 tablet by mouth daily      ascorbic acid (VITAMIN C) 500 MG tablet Take 500 mg by mouth daily      polyethylene glycol (GLYCOLAX) 17 g packet Take 17 g by mouth daily as needed       acetaminophen (TYLENOL) 500 MG tablet Take 2 tablets by mouth every 6 hours as needed for Pain 30 tablet 0    ibuprofen (ADVIL;MOTRIN) 800 MG tablet Take 1 tablet by mouth every 8 hours as needed for Pain 30 tablet 0    Cholecalciferol (VITAMIN D) 50 MCG (2000 UT) TABS tablet Take 2,000 Units by mouth daily       FREESTYLE LANCETS MISC 1 each by Does not apply route daily 100 each 3    Insulin Pen Needle ([employer] PEN NEEDLES 31G) 31G X 8 MM MISC 1 each by Does not apply route daily 100 each 3        Medications patient taking as of now reconciled against medications ordered at time of hospital discharge: Yes    Chief Complaint   Patient presents with   [address] from [facility] 9/19 dizziness, 9/17 anxiety       HPI    Inpatient course: Discharge summary reviewed- see chart. Interval history/Current status:     Patient here for ED follow up from [address] [place] [address]. She says she went to ED on 9/17 & 9/19. She says that she has been dizzy and nauseated. She is also having a lot of neck pain. Reports anytime she walks or stands up she gets these symptoms. She has severe spinal stenosis so he called her neurosurgeon, Dr Altaf Espinoza and was seen today. She will be having MRI and injections. He feels her symptoms are due to her neck. Says that he wants her to wear her neck brace. Review of Systems   Gastrointestinal: Positive for nausea. Musculoskeletal: Positive for neck pain. Neurological: Positive for dizziness. All other systems reviewed and are negative. Vitals:    09/22/21 1150   BP: 120/78   Site: Left Upper Arm   Position: Sitting   Cuff Size: Large Adult   Pulse: 77   Temp: 97.4 °F (36.3 °C)   TempSrc: Temporal   SpO2: 98%   Weight: 209 lb (94.8 kg)     Body mass index is 37.02 kg/m². Wt Readings from Last 3 Encounters:   09/22/21 209 lb (94.8 kg)   09/19/21 208 lb (94.3 kg)   09/17/21 205 lb (93 kg)     BP Readings from Last 3 Encounters:   09/22/21 120/78   09/19/21 (!) 138/99   09/17/21 132/75       Physical Exam  Constitutional:       Appearance: Normal appearance. HENT:      Head: Normocephalic and atraumatic. Neck:      Comments: In neck brace  Cardiovascular:      Rate and Rhythm: Normal rate and regular rhythm. Heart sounds: Normal heart sounds.    Pulmonary:      Effort: Pulmonary effort is normal.      Breath sounds: Normal breath sounds. Musculoskeletal:         General: Normal range of motion. Skin:     General: Skin is warm and dry. Neurological:      General: No focal deficit present. Mental Status: She is alert and oriented to person, place, and time. Mental status is at baseline. Psychiatric:         Mood and Affect: Mood normal.         Behavior: Behavior normal.         Thought Content: Thought content normal.         Judgment: Judgment normal.             Assessment/Plan:  1. Hospital discharge follow-up    - FL DISCHARGE MEDS RECONCILED W/ CURRENT OUTPATIENT MED LIST    2. Dizziness    - FL DISCHARGE MEDS RECONCILED W/ CURRENT OUTPATIENT MED LIST    3. Nausea    - ondansetron (ZOFRAN) 4 MG tablet; Take 1 tablet by mouth every 8 hours as needed for Nausea  Dispense: 40 tablet; Refill: 0    4. Neck pain    - Following with Neurosurgery. Has upcoming MRI and steroid injection.          Medical Decision Making: moderate complexity        Electronically Signed by: SAVANA Marsh-CNP

## 2021-09-29 ENCOUNTER — TELEPHONE (OUTPATIENT)
Dept: GASTROENTEROLOGY | Age: 51
End: 2021-09-29

## 2021-09-29 DIAGNOSIS — R19.5 POSITIVE COLORECTAL CANCER SCREENING USING COLOGUARD TEST: Primary | ICD-10-CM

## 2021-09-29 DIAGNOSIS — Z12.11 SCREENING FOR MALIGNANT NEOPLASM OF COLON: ICD-10-CM

## 2021-09-30 NOTE — TELEPHONE ENCOUNTER
Writer called and spoke to pt to discuss scheduling. Colon screen completed and pt needs ov prior d/t plavix and cardiac history.      Pt now scheduled for ov with Dr Gorge Dyer on 10/13/21 @ 215pm.

## 2021-10-13 ENCOUNTER — OFFICE VISIT (OUTPATIENT)
Dept: GASTROENTEROLOGY | Age: 51
End: 2021-10-13
Payer: COMMERCIAL

## 2021-10-13 VITALS
HEIGHT: 63 IN | HEART RATE: 79 BPM | OXYGEN SATURATION: 99 % | SYSTOLIC BLOOD PRESSURE: 105 MMHG | TEMPERATURE: 97.2 F | BODY MASS INDEX: 37.21 KG/M2 | WEIGHT: 210 LBS | DIASTOLIC BLOOD PRESSURE: 74 MMHG

## 2021-10-13 DIAGNOSIS — K58.1 IRRITABLE BOWEL SYNDROME WITH CONSTIPATION: ICD-10-CM

## 2021-10-13 DIAGNOSIS — F41.9 ANXIETY: ICD-10-CM

## 2021-10-13 DIAGNOSIS — R19.5 POSITIVE COLORECTAL CANCER SCREENING USING COLOGUARD TEST: Primary | ICD-10-CM

## 2021-10-13 DIAGNOSIS — K58.2 IRRITABLE BOWEL SYNDROME WITH BOTH CONSTIPATION AND DIARRHEA: ICD-10-CM

## 2021-10-13 PROCEDURE — 99204 OFFICE O/P NEW MOD 45 MIN: CPT | Performed by: INTERNAL MEDICINE

## 2021-10-13 PROCEDURE — 4004F PT TOBACCO SCREEN RCVD TLK: CPT | Performed by: INTERNAL MEDICINE

## 2021-10-13 PROCEDURE — G8484 FLU IMMUNIZE NO ADMIN: HCPCS | Performed by: INTERNAL MEDICINE

## 2021-10-13 PROCEDURE — G8417 CALC BMI ABV UP PARAM F/U: HCPCS | Performed by: INTERNAL MEDICINE

## 2021-10-13 PROCEDURE — G8427 DOCREV CUR MEDS BY ELIG CLIN: HCPCS | Performed by: INTERNAL MEDICINE

## 2021-10-13 RX ORDER — POLYETHYLENE GLYCOL 3350 17 G/17G
POWDER, FOR SOLUTION ORAL
Qty: 238 G | Refills: 0 | Status: ON HOLD | OUTPATIENT
Start: 2021-10-13 | End: 2021-12-08

## 2021-10-13 RX ORDER — GREEN TEA/HOODIA GORDONII 315-12.5MG
1 CAPSULE ORAL 2 TIMES DAILY
Qty: 60 TABLET | Refills: 0 | Status: SHIPPED | OUTPATIENT
Start: 2021-10-13 | End: 2021-11-12

## 2021-10-13 RX ORDER — BISACODYL 5 MG
TABLET, DELAYED RELEASE (ENTERIC COATED) ORAL
Qty: 4 TABLET | Refills: 0 | Status: ON HOLD | OUTPATIENT
Start: 2021-10-13 | End: 2021-12-08

## 2021-10-13 ASSESSMENT — ENCOUNTER SYMPTOMS
RECTAL PAIN: 0
ABDOMINAL PAIN: 1
VOMITING: 0
TROUBLE SWALLOWING: 0
SHORTNESS OF BREATH: 1
ABDOMINAL DISTENTION: 0
CHOKING: 0
SORE THROAT: 0
BLOOD IN STOOL: 0
COUGH: 1
VOICE CHANGE: 0
DIARRHEA: 0
WHEEZING: 0
ANAL BLEEDING: 0
NAUSEA: 1
CONSTIPATION: 1
COLOR CHANGE: 0

## 2021-10-19 DIAGNOSIS — M48.02 SPINAL STENOSIS OF CERVICAL REGION: ICD-10-CM

## 2021-10-19 DIAGNOSIS — R07.9 CHEST PAIN, UNSPECIFIED TYPE: ICD-10-CM

## 2021-10-19 RX ORDER — TIZANIDINE 2 MG/1
TABLET ORAL
Qty: 90 TABLET | Refills: 0 | Status: SHIPPED | OUTPATIENT
Start: 2021-10-19

## 2021-10-19 RX ORDER — NITROGLYCERIN 0.4 MG/1
TABLET SUBLINGUAL
Qty: 25 TABLET | Refills: 0 | Status: SHIPPED | OUTPATIENT
Start: 2021-10-19

## 2021-10-19 NOTE — TELEPHONE ENCOUNTER
Britney Camp is calling to request a refill on the following medication(s):    Medication Request:  Requested Prescriptions     Pending Prescriptions Disp Refills    tiZANidine (ZANAFLEX) 2 MG tablet [Pharmacy Med Name: TIZANIDINE HCL 2 MG TABLET] 90 tablet 0     Sig: take 1 tablet by mouth three times a day if needed    nitroGLYCERIN (NITROSTAT) 0.4 MG SL tablet [Pharmacy Med Name: NITROGLYCERIN 0.4 MG TABLET SL] 25 tablet 0     Sig: place 1 tablet under the tongue if needed every 5 minutes for chest pain for 3 doses IF NO RELIEF AFTER THIRD DOSE CALL PRESCRIBER .        Last Visit Date (If Applicable):  3/43/8032    Next Visit Date:    12/14/2021

## 2021-10-20 ENCOUNTER — TELEPHONE (OUTPATIENT)
Dept: FAMILY MEDICINE CLINIC | Age: 51
End: 2021-10-20

## 2021-10-20 DIAGNOSIS — E11.9 TYPE 2 DIABETES MELLITUS WITHOUT COMPLICATION, WITH LONG-TERM CURRENT USE OF INSULIN (HCC): Primary | ICD-10-CM

## 2021-10-20 DIAGNOSIS — Z79.4 TYPE 2 DIABETES MELLITUS WITHOUT COMPLICATION, WITH LONG-TERM CURRENT USE OF INSULIN (HCC): Primary | ICD-10-CM

## 2021-10-20 NOTE — TELEPHONE ENCOUNTER
Received fax stating that Freestyle Lite test strips aren't covered due to patient changing insurance. Will need new order for meter and test strips sent to pharmacy.

## 2021-11-09 ENCOUNTER — TELEPHONE (OUTPATIENT)
Dept: FAMILY MEDICINE CLINIC | Age: 51
End: 2021-11-09

## 2021-11-09 ENCOUNTER — TELEPHONE (OUTPATIENT)
Dept: GASTROENTEROLOGY | Age: 51
End: 2021-11-09

## 2021-11-09 NOTE — TELEPHONE ENCOUNTER
Writer called and spoke to Saman Byrnes from [employer] office to follow up on clearance again. Per Saman Byrnes pt has no appt for surgical clearance. She has an appt for chronic condition check up onn 12/14/21. Saman Byrnes is sending a message to the Dr to see if they will be able to clear pt or if she will need an appt first. Saman Byrnes will call the office after she gets an answer.

## 2021-11-12 ENCOUNTER — APPOINTMENT (OUTPATIENT)
Dept: CT IMAGING | Age: 51
End: 2021-11-12
Payer: COMMERCIAL

## 2021-11-12 ENCOUNTER — HOSPITAL ENCOUNTER (EMERGENCY)
Age: 51
Discharge: HOME | End: 2021-11-12
Attending: EMERGENCY MEDICINE
Payer: COMMERCIAL

## 2021-11-12 VITALS
TEMPERATURE: 98.5 F | HEART RATE: 94 BPM | RESPIRATION RATE: 18 BRPM | DIASTOLIC BLOOD PRESSURE: 96 MMHG | HEIGHT: 63 IN | WEIGHT: 200 LBS | BODY MASS INDEX: 35.44 KG/M2 | OXYGEN SATURATION: 97 % | SYSTOLIC BLOOD PRESSURE: 127 MMHG

## 2021-11-12 DIAGNOSIS — M54.2 NECK PAIN: Primary | ICD-10-CM

## 2021-11-12 PROCEDURE — 6360000002 HC RX W HCPCS: Performed by: EMERGENCY MEDICINE

## 2021-11-12 PROCEDURE — 72125 CT NECK SPINE W/O DYE: CPT

## 2021-11-12 PROCEDURE — 99283 EMERGENCY DEPT VISIT LOW MDM: CPT

## 2021-11-12 PROCEDURE — 96372 THER/PROPH/DIAG INJ SC/IM: CPT

## 2021-11-12 RX ORDER — HYDROCODONE BITARTRATE AND ACETAMINOPHEN 5; 325 MG/1; MG/1
1 TABLET ORAL EVERY 8 HOURS PRN
Qty: 10 TABLET | Refills: 0 | Status: SHIPPED | OUTPATIENT
Start: 2021-11-12 | End: 2021-11-17

## 2021-11-12 RX ORDER — ONDANSETRON 2 MG/ML
4 INJECTION INTRAMUSCULAR; INTRAVENOUS ONCE
Status: COMPLETED | OUTPATIENT
Start: 2021-11-12 | End: 2021-11-12

## 2021-11-12 RX ORDER — KETOROLAC TROMETHAMINE 30 MG/ML
30 INJECTION, SOLUTION INTRAMUSCULAR; INTRAVENOUS ONCE
Status: COMPLETED | OUTPATIENT
Start: 2021-11-12 | End: 2021-11-12

## 2021-11-12 RX ORDER — HYDROMORPHONE HYDROCHLORIDE 1 MG/ML
1 INJECTION, SOLUTION INTRAMUSCULAR; INTRAVENOUS; SUBCUTANEOUS ONCE
Status: COMPLETED | OUTPATIENT
Start: 2021-11-12 | End: 2021-11-12

## 2021-11-12 RX ADMIN — ONDANSETRON 4 MG: 2 INJECTION INTRAMUSCULAR; INTRAVENOUS at 02:33

## 2021-11-12 RX ADMIN — HYDROMORPHONE HYDROCHLORIDE 1 MG: 1 INJECTION, SOLUTION INTRAMUSCULAR; INTRAVENOUS; SUBCUTANEOUS at 02:34

## 2021-11-12 RX ADMIN — KETOROLAC TROMETHAMINE 30 MG: 30 INJECTION, SOLUTION INTRAMUSCULAR at 02:33

## 2021-11-12 ASSESSMENT — PAIN SCALES - GENERAL
PAINLEVEL_OUTOF10: 9
PAINLEVEL_OUTOF10: 9

## 2021-11-12 ASSESSMENT — ENCOUNTER SYMPTOMS
NAUSEA: 1
VOMITING: 0

## 2021-11-12 NOTE — ED PROVIDER NOTES
[address] [place] [address]  Emergency Medicine Department    Pt Name: Marina Ba  MRN: [id number]  [place] 1970  Date of evaluation: 11/12/2021  Provider: China Wadsworth MD    [address] [place] [address]     Chief Complaint   Patient presents with    Neck Pain    Emesis     pain induced    Arm Pain     states shooting nerve pain       HISTORY OF PRESENT ILLNESS  (Location/Symptom, Timing/Onset, Context/Setting,Quality, Duration, Modifying Factors, Severity.)   Marina Ba is a 52 y.o. female who presents to the emergency department complaining of pain in her neck radiating into her shoulders, worse on the right side. She rates her pain as a 9 out of 10. She had a fall 4 days ago and since then her pain has been getting progressively worse. She states she has a known disc bulge in her cervical spine and has had previous cervical fusion surgery. She made appointment to see her spine doctor on Monday but the pain has been unbearable. She denies any numbness or tingling in the hands. She also complains of pain in her left leg. Nursing Notes were reviewed.     ALLERGIES     Seasonal    CURRENT MEDICATIONS       Previous Medications    ACETAMINOPHEN (TYLENOL) 500 MG TABLET    Take 2 tablets by mouth every 6 hours as needed for Pain    ALBUTEROL (PROVENTIL) (2.5 MG/3ML) 0.083% NEBULIZER SOLUTION    Take 2.5 mg by nebulization every 4 hours as needed for Wheezing or Shortness of Breath    ALBUTEROL SULFATE  (90 BASE) MCG/ACT INHALER    Inhale 2 puffs into the lungs every 4 hours as needed for Wheezing or Shortness of Breath    AMLODIPINE (NORVASC) 5 MG TABLET    Take 1 tablet by mouth daily    ASCORBIC ACID (VITAMIN C) 500 MG TABLET    Take 500 mg by mouth daily    ASPIRIN 81 MG CHEWABLE TABLET    Take 81 mg by mouth daily    ATORVASTATIN (LIPITOR) 80 MG TABLET    Take 1 tablet by mouth daily    BISACODYL (BISACODYL) 5 MG EC TABLET    Take 4 tablets in the morning    BLOOD GLUCOSE MONITOR KIT AND SUPPLIES    Dispense sufficient amount for BID testing frequency plus additional to accommodate PRN testing needs. Dispense all needed supplies to include: monitor, strips, lancing device, lancets, control solutions, alcohol swabs. BLOOD GLUCOSE TEST STRIPS (FREESTYLE LITE) STRIP    1 each by Does not apply route 2 times daily As needed. BUPROPION (ZYBAN) 150 MG EXTENDED RELEASE TABLET    Take 1 tablet by mouth 2 times daily    BUSPIRONE (BUSPAR) 15 MG TABLET    Take 15 mg by mouth 2 times daily    CALCIUM CARBONATE (OS-BRANNON) 1250 (500 CA) MG CHEWABLE TABLET    Take 500-1,000 mg by mouth 3 times daily    CHOLECALCIFEROL (VITAMIN D) 50 MCG (2000 UT) TABS TABLET    Take 2,000 Units by mouth daily     CLOPIDOGREL (PLAVIX) 75 MG TABLET    Take 1 tablet by mouth daily    FLUTICASONE (FLONASE) 50 MCG/ACT NASAL SPRAY    1 spray by Each Nostril route daily as needed for Rhinitis    FREESTYLE LANCETS MISC    1 each by Does not apply route daily    IBUPROFEN (ADVIL;MOTRIN) 800 MG TABLET    Take 1 tablet by mouth every 8 hours as needed for Pain    INSULIN NPH ISOPHANE & REGULAR (HUMULIN 70/30 KWIKPEN) (70-30) 100 UNIT PER ML INJECTION PEN    Inject 25 Units into the skin 2 times daily    INSULIN PEN NEEDLE ([employer] PEN NEEDLES 31G) 31G X 8 MM MISC    1 each by Does not apply route daily    LEVOTHYROXINE (SYNTHROID) 50 MCG TABLET    Take 1 tablet by mouth Daily    METOPROLOL (LOPRESSOR) 100 MG TABLET    Take 100 mg by mouth 2 times daily    METOPROLOL SUCCINATE (TOPROL XL) 100 MG EXTENDED RELEASE TABLET    take 1 tablet by mouth twice a day    MIRABEGRON (MYRBETRIQ) 50 MG TB24    Take 50 mg by mouth daily    MULTIPLE VITAMINS-MINERALS (THERAPEUTIC MULTIVITAMIN-MINERALS) TABLET    Take 1 tablet by mouth daily    NITROGLYCERIN (NITROSTAT) 0.4 MG SL TABLET    place 1 tablet under the tongue if needed every 5 minutes for chest pain for 3 doses IF NO RELIEF AFTER THIRD DOSE CALL PRESCRIBER .     POLYETHYLENE GLYCOL (GLYCOLAX) 17 G PACKET    Take 17 g by mouth daily as needed     POLYETHYLENE GLYCOL (GLYCOLAX) 17 GM/SCOOP POWDER    Follow Instructions provided by physician's office    PROBIOTIC ACIDOPHILUS (FLORANEX) TABS    Take 1 tablet by mouth 2 times daily    RANOLAZINE (RANEXA) 500 MG EXTENDED RELEASE TABLET    Take 1 tablet by mouth 2 times daily    SERTRALINE (ZOLOFT) 100 MG TABLET    Take 1.5 tablets by mouth daily    TIZANIDINE (ZANAFLEX) 2 MG TABLET    take 1 tablet by mouth three times a day if needed    TOPIRAMATE (TOPAMAX) 50 MG TABLET    Take 1 tablet by mouth 2 times daily       PAST MEDICAL HISTORY         Diagnosis Date    Anxiety     Arthritis     CAD (coronary artery disease)     Depression     Hyperlipidemia     Kidney stone     Myocardial infarct ([facility] 2016    Neck pain     Spinal stenosis 2017       SURGICAL HISTORY           Procedure Laterality Date    BACK SURGERY      L5-S1, C5-7    CARDIAC CATHETERIZATION       SECTION      HIP SURGERY Left     LUNG BIOPSY Right     MASTOID SURGERY      TONSILLECTOMY         FAMILY HISTORY           Problem Relation Age of Onset    Dementia Mother     Depression Mother     High Blood Pressure Mother     Coronary Art Dis Mother     COPD Mother     Diabetes Mother     Heart Attack Father     Diabetes Maternal Grandmother     Alzheimer's Disease Paternal Grandfather      Family Status   Relation Name Status    Mother      Father     Shanna Blank Brother      MGM      MGF      [facility]      PGF      Brother          SOCIAL HISTORY      reports that she has been smoking cigarettes. She has a 12.50 pack-year smoking history. She has never used smokeless tobacco. She reports current alcohol use. She reports current drug use. Frequency: 7.00 times per week. Drug: Marijuana Naresh Blow).     REVIEW OF SYSTEMS    (2-9 systems for level 4, 10 or more for level 5)     Review of Systems Constitutional: Negative for fever. Gastrointestinal: Positive for nausea. Negative for vomiting. Musculoskeletal: Positive for myalgias (left leg pain) and neck pain. Allergic/Immunologic: Negative for immunocompromised state. Neurological: Positive for headaches. Negative for dizziness, weakness and numbness. All other systems reviewed and are negative. PHYSICAL EXAM    (up to 7 for level 4, 8 or more for level 5)     ED Triage Vitals   BP Temp Temp Source Pulse Resp SpO2 Height Weight   11/12/21 0144 11/12/21 0144 11/12/21 0144 11/12/21 0143 11/12/21 0143 11/12/21 0143 11/12/21 0143 11/12/21 0143   (!) 127/96 98.5 °F (36.9 °C) Oral 94 18 97 % 5' 3\" (1.6 m) 200 lb (90.7 kg)       Physical Exam  Vitals and nursing note reviewed. Constitutional:       General: She is not in acute distress. Appearance: She is well-developed. HENT:      Head: Normocephalic and atraumatic. Nose: Nose normal.      Mouth/Throat:      Mouth: Mucous membranes are moist.   Eyes:      Extraocular Movements: Extraocular movements intact. Cardiovascular:      Rate and Rhythm: Normal rate and regular rhythm. Heart sounds: Normal heart sounds. Pulmonary:      Effort: Pulmonary effort is normal. No respiratory distress. Breath sounds: Normal breath sounds. Abdominal:      Palpations: Abdomen is soft. Tenderness: There is no abdominal tenderness. There is no guarding. Musculoskeletal:         General: Tenderness (left upper leg) present. No deformity. Normal range of motion. Cervical back: Normal range of motion and neck supple. Right lower leg: No edema. Left lower leg: No edema. Lymphadenopathy:      Cervical: No cervical adenopathy. Skin:     General: Skin is warm and dry. Neurological:      Mental Status: She is alert and oriented to person, place, and time. Sensory: No sensory deficit. Motor: No weakness.    Psychiatric:         Behavior: Behavior normal. Thought Content: Thought content normal.         Judgment: Judgment normal.         DIAGNOSTIC RESULTS     RADIOLOGY:   Non-plain film images such as CT, Ultrasound and MRI are read by theradiologist. Plain radiographic images are visualized and preliminarily interpreted by the emergency physician with the below findings:    Interpretation per the Radiologist below, if available at the time of this note:    CT CERVICAL SPINE WO CONTRAST   Final Result   No acute abnormality of the cervical spine. Postsurgical changes of C4-C7 fusion. Follow-up cervical spine MRI may be helpful for a more thorough evaluation of   the spinal canal and neural foramina. ED BEDSIDE ULTRASOUND:   Performed by ED Physician - none    LABS:  Labs Reviewed - No data to display    All other labs were within normal range or not returned as of this dictation. EMERGENCYDEPARTMENT COURSE and DIFFERENTIAL DIAGNOSIS/MDM:   Vitals:    Vitals:    11/12/21 0143 11/12/21 0144   BP:  (!) 127/96   Pulse: 94    Resp: 18    Temp:  98.5 °F (36.9 °C)   TempSrc:  Oral   SpO2: 97%    Weight: 200 lb (90.7 kg)    Height: 5' 3\" (1.6 m)      30-year-old female with a history of cervical disc herniation presenting with acutely worsening pain in her neck. She has a normal neurologic exam in her upper extremity with no weakness or sensory deficits. She has full strength in her lower extremities as well. A CT scan of the cervical spine reveals no bony abnormalities. I feel she is safe for discharge home. She feels relief after treatment with IM pain medication. Will discharge with a short course of pain medicine. She has a follow-up appointment with her spine doctor in 3 days. CONSULTS:  None    PROCEDURES:  None indicated    FINAL IMPRESSION     1.  Neck pain          DISPOSITION/PLAN   DISPOSITION Decision To Discharge 11/12/2021 03:41:08 AM    PATIENT REFERRED TO:   Your spine doctor    Go on 11/15/2021  as scheduled    DISCHARGE MEDICATIONS:     New Prescriptions    HYDROCODONE-ACETAMINOPHEN (NORCO) 5-325 MG PER TABLET    Take 1 tablet by mouth every 8 hours as needed for Pain for up to 5 days. Intended supply: 3 days.  Take lowest dose possible to manage pain     (Please note that portions of this note were completed with a voice recognition program.  Efforts were made to edit the dictations butoccasionally words are mis-transcribed.)    Toribio Bruno MD  Attending Emergency Physician         Toribio Bruno MD  11/12/21 3106

## 2021-11-16 ENCOUNTER — HOSPITAL ENCOUNTER (EMERGENCY)
Age: 51
Discharge: HOME | End: 2021-11-17
Attending: EMERGENCY MEDICINE
Payer: COMMERCIAL

## 2021-11-16 ENCOUNTER — NURSE TRIAGE (OUTPATIENT)
Dept: OTHER | Facility: CLINIC | Age: 51
End: 2021-11-16

## 2021-11-16 VITALS
TEMPERATURE: 98.5 F | HEIGHT: 63 IN | DIASTOLIC BLOOD PRESSURE: 89 MMHG | WEIGHT: 200 LBS | OXYGEN SATURATION: 97 % | BODY MASS INDEX: 35.44 KG/M2 | SYSTOLIC BLOOD PRESSURE: 133 MMHG | HEART RATE: 83 BPM | RESPIRATION RATE: 16 BRPM

## 2021-11-16 DIAGNOSIS — M48.02 SPINAL STENOSIS OF CERVICAL REGION: ICD-10-CM

## 2021-11-16 DIAGNOSIS — R51.9 NONINTRACTABLE HEADACHE, UNSPECIFIED CHRONICITY PATTERN, UNSPECIFIED HEADACHE TYPE: ICD-10-CM

## 2021-11-16 DIAGNOSIS — R73.9 HYPERGLYCEMIA: Primary | ICD-10-CM

## 2021-11-16 LAB
ABSOLUTE EOS #: 0.42 K/UL (ref 0–0.44)
ABSOLUTE IMMATURE GRANULOCYTE: 0.05 K/UL (ref 0–0.3)
ABSOLUTE LYMPH #: 4.54 K/UL (ref 1.1–3.7)
ABSOLUTE MONO #: 0.74 K/UL (ref 0.1–1.2)
ALBUMIN SERPL-MCNC: 4 G/DL (ref 3.5–5.2)
ALBUMIN/GLOBULIN RATIO: ABNORMAL (ref 1–2.5)
ALLEN TEST: ABNORMAL
ALP BLD-CCNC: 83 U/L (ref 35–104)
ALT SERPL-CCNC: 14 U/L (ref 5–33)
ANION GAP SERPL CALCULATED.3IONS-SCNC: 14 MMOL/L (ref 9–17)
AST SERPL-CCNC: 11 U/L
BASOPHILS # BLD: 0 % (ref 0–2)
BASOPHILS ABSOLUTE: 0.04 K/UL (ref 0–0.2)
BETA-HYDROXYBUTYRATE: 0.09 MMOL/L (ref 0.02–0.27)
BILIRUB SERPL-MCNC: 0.13 MG/DL (ref 0.3–1.2)
BUN BLDV-MCNC: 9 MG/DL (ref 6–20)
BUN/CREAT BLD: 15 (ref 9–20)
CALCIUM SERPL-MCNC: 9 MG/DL (ref 8.6–10.4)
CHLORIDE BLD-SCNC: 104 MMOL/L (ref 98–107)
CO2: 22 MMOL/L (ref 20–31)
CREAT SERPL-MCNC: 0.61 MG/DL (ref 0.5–0.9)
DIFFERENTIAL TYPE: ABNORMAL
EOSINOPHILS RELATIVE PERCENT: 4 % (ref 1–4)
FIO2: ABNORMAL
GFR AFRICAN AMERICAN: >60 ML/MIN
GFR NON-AFRICAN AMERICAN: >60 ML/MIN
GFR SERPL CREATININE-BSD FRML MDRD: ABNORMAL ML/MIN/{1.73_M2}
GFR SERPL CREATININE-BSD FRML MDRD: ABNORMAL ML/MIN/{1.73_M2}
GLUCOSE BLD-MCNC: 196 MG/DL (ref 70–99)
GLUCOSE BLD-MCNC: 319 MG/DL (ref 65–105)
HCO3 VENOUS: 24.2 MMOL/L (ref 22–29)
HCT VFR BLD CALC: 38.6 % (ref 36.3–47.1)
HEMOGLOBIN: 12.2 G/DL (ref 11.9–15.1)
IMMATURE GRANULOCYTES: 0 %
LYMPHOCYTES # BLD: 40 % (ref 24–43)
MCH RBC QN AUTO: 28.8 PG (ref 25.2–33.5)
MCHC RBC AUTO-ENTMCNC: 31.6 G/DL (ref 28.4–34.8)
MCV RBC AUTO: 91 FL (ref 82.6–102.9)
MODE: ABNORMAL
MONOCYTES # BLD: 6 % (ref 3–12)
NEGATIVE BASE EXCESS, VEN: 1 (ref 0–2)
NRBC AUTOMATED: 0 PER 100 WBC
O2 DEVICE/FLOW/%: ABNORMAL
O2 SAT, VEN: 97 % (ref 60–85)
PATIENT TEMP: ABNORMAL
PCO2, VEN: 41.9 MM HG (ref 41–51)
PDW BLD-RTO: 14.6 % (ref 11.8–14.4)
PH VENOUS: 7.37 (ref 7.32–7.43)
PLATELET # BLD: 437 K/UL (ref 138–453)
PLATELET ESTIMATE: ABNORMAL
PMV BLD AUTO: 9.1 FL (ref 8.1–13.5)
PO2, VEN: 94.1 MM HG (ref 30–50)
POC PCO2 TEMP: ABNORMAL MM HG
POC PH TEMP: ABNORMAL
POC PO2 TEMP: ABNORMAL MM HG
POSITIVE BASE EXCESS, VEN: ABNORMAL (ref 0–3)
POTASSIUM SERPL-SCNC: 4.4 MMOL/L (ref 3.7–5.3)
RBC # BLD: 4.24 M/UL (ref 3.95–5.11)
RBC # BLD: ABNORMAL 10*6/UL
SAMPLE SITE: ABNORMAL
SEG NEUTROPHILS: 50 % (ref 36–65)
SEGMENTED NEUTROPHILS ABSOLUTE COUNT: 5.69 K/UL (ref 1.5–8.1)
SODIUM BLD-SCNC: 140 MMOL/L (ref 135–144)
TOTAL CO2, VENOUS: ABNORMAL MMOL/L (ref 23–30)
TOTAL PROTEIN: 6.9 G/DL (ref 6.4–8.3)
WBC # BLD: 11.5 K/UL (ref 3.5–11.3)
WBC # BLD: ABNORMAL 10*3/UL

## 2021-11-16 PROCEDURE — 80053 COMPREHEN METABOLIC PANEL: CPT

## 2021-11-16 PROCEDURE — 82010 KETONE BODYS QUAN: CPT

## 2021-11-16 PROCEDURE — 99282 EMERGENCY DEPT VISIT SF MDM: CPT

## 2021-11-16 PROCEDURE — 96374 THER/PROPH/DIAG INJ IV PUSH: CPT

## 2021-11-16 PROCEDURE — 6360000002 HC RX W HCPCS: Performed by: EMERGENCY MEDICINE

## 2021-11-16 PROCEDURE — 86140 C-REACTIVE PROTEIN: CPT

## 2021-11-16 PROCEDURE — 82947 ASSAY GLUCOSE BLOOD QUANT: CPT

## 2021-11-16 PROCEDURE — 82803 BLOOD GASES ANY COMBINATION: CPT

## 2021-11-16 PROCEDURE — 2580000003 HC RX 258: Performed by: EMERGENCY MEDICINE

## 2021-11-16 PROCEDURE — 85025 COMPLETE CBC W/AUTO DIFF WBC: CPT

## 2021-11-16 RX ORDER — FENTANYL CITRATE 50 UG/ML
25 INJECTION, SOLUTION INTRAMUSCULAR; INTRAVENOUS ONCE
Status: COMPLETED | OUTPATIENT
Start: 2021-11-16 | End: 2021-11-16

## 2021-11-16 RX ORDER — 0.9 % SODIUM CHLORIDE 0.9 %
1000 INTRAVENOUS SOLUTION INTRAVENOUS ONCE
Status: COMPLETED | OUTPATIENT
Start: 2021-11-16 | End: 2021-11-16

## 2021-11-16 RX ADMIN — SODIUM CHLORIDE 1000 ML: 9 INJECTION, SOLUTION INTRAVENOUS at 22:27

## 2021-11-16 RX ADMIN — FENTANYL CITRATE 25 MCG: 50 INJECTION, SOLUTION INTRAMUSCULAR; INTRAVENOUS at 22:27

## 2021-11-16 ASSESSMENT — PAIN DESCRIPTION - DESCRIPTORS: DESCRIPTORS: CONSTANT;PRESSURE

## 2021-11-16 ASSESSMENT — PAIN DESCRIPTION - LOCATION: LOCATION: NECK;SHOULDER

## 2021-11-16 ASSESSMENT — PAIN DESCRIPTION - ORIENTATION: ORIENTATION: LEFT;RIGHT

## 2021-11-16 ASSESSMENT — PAIN SCALES - GENERAL: PAINLEVEL_OUTOF10: 9

## 2021-11-16 ASSESSMENT — PAIN DESCRIPTION - FREQUENCY: FREQUENCY: CONTINUOUS

## 2021-11-16 NOTE — TELEPHONE ENCOUNTER
Received call from Leeann Gil at [facility] with [employer]. Brief description of triage: hyperglycemia    Triage indicates for patient to ED     Care advice provided, patient verbalizes understanding; denies any other questions or concerns; instructed to call back for any new or worsening symptoms. Attention Provider: Thank you for allowing me to participate in the care of your patient. The patient was connected to triage in response to information provided to the ECC/PSC. Please do not respond through this encounter as the response is not directed to a shared pool. Reason for Disposition   [1] Vomiting AND [2] signs of dehydration (e.g., very dry mouth, lightheaded, dark urine)    Answer Assessment - Initial Assessment Questions  1. BLOOD GLUCOSE: \"What is your blood glucose level? \"       307 at dinner @1830    2. ONSET: \"When did you check the blood glucose? \"     3414    3. USUAL RANGE: \"What is your glucose level usually? \" (e.g., usual fasting morning value, usual evening value)      200 -  Had steroid injection in neck this afternoon. States 258 this  Morning before the injection. 4. KETONES: \"Do you check for ketones (urine or blood test strips)? \" If yes, ask: \"What does the test show now? \"       Denies    5. TYPE 1 or 2:  \"Do you know what type of diabetes you have? \"  (e.g., Type 1, Type 2, Gestational; doesn't know)       Type 2    6. INSULIN: \"Do you take insulin? \" \"What type of insulin(s) do you use? What is the mode of delivery? (syringe, pen; injection or pump)? \"       28 units 70/30 humilin    7. DIABETES PILLS: \"Do you take any pills for your diabetes? \" If yes, ask: \"Have you missed taking any pills recently? \"      Denies    8. OTHER SYMPTOMS: \"Do you have any symptoms? \" (e.g., fever, frequent urination, difficulty breathing, dizziness, weakness, vomiting)      Dizziness, \"unbalanced\", headache, pain in arms and legs, increased urination, fatigue    9.  PREGNANCY: \"Is there any chance you are pregnant? \" \"When was your last menstrual period? \"      Currently on period.     Protocols used: DIABETES - HIGH BLOOD SUGAR-ADULT-AH

## 2021-11-17 ENCOUNTER — NURSE TRIAGE (OUTPATIENT)
Dept: OTHER | Facility: CLINIC | Age: 51
End: 2021-11-17

## 2021-11-17 ENCOUNTER — TELEPHONE (OUTPATIENT)
Dept: FAMILY MEDICINE CLINIC | Age: 51
End: 2021-11-17

## 2021-11-17 LAB — C-REACTIVE PROTEIN: 4.3 MG/L (ref 0–5)

## 2021-11-17 RX ORDER — BUTALBITAL, ACETAMINOPHEN AND CAFFEINE 50; 325; 40 MG/1; MG/1; MG/1
1 TABLET ORAL EVERY 4 HOURS PRN
Qty: 30 TABLET | Refills: 0 | Status: SHIPPED | OUTPATIENT
Start: 2021-11-17 | End: 2021-12-20

## 2021-11-17 NOTE — ED PROVIDER NOTES
[address] [facility]  eMERGENCY dEPARTMENT eNCOUnter      Pt Name: Kel Trivedi  MRN: [id number]  [place] 1970  Date of evaluation: 21      CHIEF COMPLAINT:  Chief Complaint   Patient presents with    Hyperglycemia     triage     Headache     epidural today, pain cont to head and neck       HISTORY OF PRESENT ILLNESS    Kel Trivedi is a 52 y.o. female who presents with having undergone cervical epidural steroid injections bilaterally earlier today. She is presenting now with complaints of pain at the site of the injections as well as elevated glucose with polyuria and thirst.  She reports her glucose checks at home were in the mid upper 300 range. She did cover her self with insulin at approximately 1830 this evening. No fevers or chills. No nausea or vomiting. No diarrhea. No chest pain or shortness of breath. No cough. No melena, hematochezia, hematemesis. Patient is currently on her menses. No dysuria or frequency. No abnormal vaginal bleeding or discharge. No rash. No headache. No new numbness, weakness, tingling. Patient has discomfort in bilateral upper extremities and this has been ongoing for several months. Patient is scheduled for laminectomy and fusion in the lower cervical segments sometime soon. Location/Symptom: See above  Timing/Onset: See above  Context/Setting: See above  Quality: See above  Duration: See above  Modifying Factors: See above  Severity: See above      REVIEW OF SYSTEMS       See above    PAST MEDICAL HISTORY   PMH:  has a past medical history of Anxiety, Arthritis, CAD (coronary artery disease), Depression, Hyperlipidemia, Kidney stone, Myocardial infarct [facility]), Neck pain, and Spinal stenosis. Surgical History:  has a past surgical history that includes back surgery;  section; Mastoid surgery; Tonsillectomy; Cardiac catheterization; Lung biopsy (Right); and hip surgery (Left).   Social History:  reports that she has been smoking cigarettes. She has a 12.50 pack-year smoking history. She has never used smokeless tobacco. She reports current alcohol use. She reports current drug use. Frequency: 7.00 times per week. Drug: Marijuana Candiss Littler). Family History: Noncontributory at this time  Psychiatric History: Noncontributory at this time    Allergies:is allergic to seasonal.      PHYSICAL EXAM     INITIAL VITALS: /89   Pulse 83   Temp 98.5 °F (36.9 °C) (Oral)   Resp 16   Ht 5' 3\" (1.6 m)   Wt 200 lb (90.7 kg)   LMP 10/29/2021   SpO2 97%   BMI 35.43 kg/m²     Awake, alert, coop, responsive. Speech fluent, normal comprehension. Lungs clear bilaterally. No rales, rhonchi, wheezes, stridor, retractions. Cardiac-S1S2, RRR, no murmur, rub, or gallop. Abdomen soft, nondistended, nontender. No organomegaly, mass, bruit. Normal bowel sounds. Examination the upper extremities reveals normal sensation bilaterally. There is some mild weakness in all muscle groups tested on the right side compared to the left which is apparently not new. Examination of the neck reveals some mild bilateral paracervical tenderness. There is no significant midline tenderness.   Injection sites do not appear infected or otherwise abnormal.        DIAGNOSTIC RESULTS     EKG: All EKG's are interpreted by the Emergency Department Physician who either signs or Co-signs this chart in the absence of a cardiologist.  Not indicated    RADIOLOGY:   I directly visualized the following  images and reviewed the radiologist interpretations:  No orders to display     Not indicated      LABS:  Labs Reviewed   CBC WITH AUTO DIFFERENTIAL - Abnormal; Notable for the following components:       Result Value    WBC 11.5 (*)     RDW 14.6 (*)     Absolute Lymph # 4.54 (*)     All other components within normal limits   COMPREHENSIVE METABOLIC PANEL W/ REFLEX TO MG FOR LOW K - Abnormal; Notable for the following components:    Glucose 196 (*)     Total Bilirubin 0.13 (*) All other components within normal limits   POC GLUCOSE FINGERSTICK - Abnormal; Notable for the following components:    POC Glucose 319 (*)     All other components within normal limits   VENOUS BLOOD GAS, POINT OF CARE - Abnormal; Notable for the following components:    pO2, Michel 94.1 (*)     O2 Sat, Michel 97 (*)     All other components within normal limits   C-REACTIVE PROTEIN   BETA-HYDROXYBUTYRATE     Available lab results have been reviewed. pH and PCO2 are essentially normal.  Main to the lab work is outstanding. EMERGENCY DEPARTMENT COURSE:   -------------------------  We will check lab work and provide IV fluids and analgesia and recheck patient's glucose after liter of fluids and proceed. This patient was signed out to  at the completion of my shift. CRITICAL CARE:     None    CONSULTS:    Undetermined    FINAL IMPRESSION      1. Hyperglycemia    2. Nonintractable headache, unspecified chronicity pattern, unspecified headache type    3. Spinal stenosis of cervical region          DISPOSITION/PLAN:  DISPOSITION Decision To Discharge 11/17/2021 12:38:52 AM        PATIENT REFERRED TO:  SAVANA Xie - CNP  [address] [place]    Schedule an appointment as soon as possible for a visit in 1 week        DISCHARGE MEDICATIONS:  Discharge Medication List as of 11/17/2021 12:38 AM      START taking these medications    Details   butalbital-acetaminophen-caffeine (FIORICET, ESGIC) -40 MG per tablet Take 1 tablet by mouth every 4 hours as needed for Headaches, Disp-30 tablet, R-0Print             (Please note that portions of this note were completed with a voice recognition program.  Efforts were made to edit the dictations but occasionally words are mis-transcribed. )    Alfredo Neal MD [facility]  Attending Emergency Physician        Alfredo Neal MD  11/17/21 3462       Alfredo Neal MD  11/17/21 8063

## 2021-11-17 NOTE — TELEPHONE ENCOUNTER
Reason for Disposition   SEVERE post-op pain (e.g., excruciating, pain scale 8-10) that is not controlled with pain medications   Sounds like a serious complication to the triager    Answer Assessment - Initial Assessment Questions  1. SYMPTOM: \"What's the main symptom you're concerned about? \" (e.g., pain, fever, vomiting)      Received pain steriod shot in neck yesterday. Today her BG is 158, having blurred vision, loss of bladder control and stiff neck. 2. ONSET: \"When did stiff neck, loss of bladder control and stiff neck  start? \"      Today. 3. SURGERY: \"What surgery was performed? \"      Steroid injection in neck and pain control clinic. 4. DATE of SURGERY: \"When was surgery performed? \"       Steroid in injection    5. ANESTHESIA: \" What type of anesthesia did you have? \" (e.g., general, spinal, epidural, local)      No     6. PAIN: \"Is there any pain? \" If so, ask: \"How bad is it? \"  (Scale 1-10; or mild, moderate, severe)      9-10/10  Worse than when she went in for the shot. 7. FEVER: \"Do you have a fever? \" If so, ask: \"What is your temperature, how was it measured, and when did it start? \"     Has not checked her temp but feels a bit warm. 8. VOMITING: \"Is there any vomiting? \" If yes, ask: \"How many times? \"      No     9. BLEEDING: \"Is there any bleeding? \" If so, ask: \"How much? \" and \"Where? \"      No     10. OTHER SYMPTOMS: \"Do you have any other symptoms? \" (e.g., drainage from wound, painful urination, constipation)        Fingers are swollen this AM. Rings are tight. Protocols used: POST-OP SYMPTOMS AND QUESTIONS-ADULT-[place]    Received call from Eron [facility]ford [facility] with [employer] Complaint. Brief description of triage: cervical steroid injection yesterday. Today blurred vision, loss of bladder control and stiff neck. Triage indicates for patient to go to ER. I reached out to office since patient was in ER last night for hyperglycemia. I reached out to office.  NP in with patient. MA stated no appointments today. Patient stated she will reach out to pain clinic again (called them before calling us and has not heard back) and her neurosurgeon. I encouraged her to go to ER unless she gets different direction from a provider within the next hour. Care advice provided, patient verbalizes understanding; denies any other questions or concerns; instructed to call back for any new or worsening symptoms. Attention Provider: Thank you for allowing me to participate in the care of your patient. The patient was connected to triage in response to information provided to the ECC/PSC. Please do not respond through this encounter as the response is not directed to a shared pool.

## 2021-11-17 NOTE — ED PROVIDER NOTES
[address] [place] [address] ED  Emergency Department  Emergency Medicine     Care of Ahsan Marie was assumed from previous provider and is being seen for Hyperglycemia (triage ) and Headache (epidural today, pain cont to head and neck)  . The patient's initial evaluation and plan have been discussed with the prior provider who initially evaluated the patient. EMERGENCY DEPARTMENT COURSE / MEDICAL DECISION MAKING:       MEDICATIONS GIVEN:  Orders Placed This Encounter   Medications    0.9 % sodium chloride bolus    fentaNYL (SUBLIMAZE) injection 25 mcg    butalbital-acetaminophen-caffeine (FIORICET, ESGIC) -40 MG per tablet     Sig: Take 1 tablet by mouth every 4 hours as needed for Headaches     Dispense:  30 tablet     Refill:  0       LABS / RADIOLOGY:     Labs Reviewed   CBC WITH AUTO DIFFERENTIAL - Abnormal; Notable for the following components:       Result Value    WBC 11.5 (*)     RDW 14.6 (*)     Absolute Lymph # 4.54 (*)     All other components within normal limits   COMPREHENSIVE METABOLIC PANEL W/ REFLEX TO MG FOR LOW K - Abnormal; Notable for the following components:    Glucose 196 (*)     Total Bilirubin 0.13 (*)     All other components within normal limits   POC GLUCOSE FINGERSTICK - Abnormal; Notable for the following components:    POC Glucose 319 (*)     All other components within normal limits   VENOUS BLOOD GAS, POINT OF CARE - Abnormal; Notable for the following components:    pO2, Michel 94.1 (*)     O2 Sat, Michel 97 (*)     All other components within normal limits   C-REACTIVE PROTEIN   BETA-HYDROXYBUTYRATE       CT CERVICAL SPINE WO CONTRAST    Result Date: 11/12/2021  EXAMINATION: CT OF THE CERVICAL SPINE WITHOUT CONTRAST 11/12/2021 2:10 am TECHNIQUE: CT of the cervical spine was performed without the administration of intravenous contrast. Multiplanar reformatted images are provided for review.  Dose modulation, iterative reconstruction, and/or weight based adjustment of the mA/kV was utilized to reduce the radiation dose to as low as reasonably achievable. COMPARISON: CT chest done September 19, 2021. HISTORY: ORDERING SYSTEM PROVIDED HISTORY: pain TECHNOLOGIST PROVIDED HISTORY: pain Decision Support Exception - unselect if not a suspected or confirmed emergency medical condition->Emergency Medical Condition (MA) Is the patient pregnant?->No Reason for Exam: Neck pain, nausea Acuity: Acute Type of Exam: Initial FINDINGS: BONES/ALIGNMENT: Postsurgical changes of C4-C7 fusion with solid osseous incorporation across the disc spaces at these levels. Straightening of the normal cervical lordosis. The craniocervical junction is intact. Trace anterolisthesis of C7 on T1. No significant listhesis at the other levels. No destructive osseous lesion. DEGENERATIVE CHANGES: Multilevel degenerative changes in the cervical spine. No definite severe cervical spinal canal stenosis. SOFT TISSUES: There is no prevertebral soft tissue swelling. Atherosclerosis. Partially visualized postsurgical changes in the right lung apex. Redemonstration reticulonodular opacities in the visualized lungs. No acute abnormality of the cervical spine. Postsurgical changes of C4-C7 fusion. Follow-up cervical spine MRI may be helpful for a more thorough evaluation of the spinal canal and neural foramina. RECENT VITALS:     Temp: 98.5 °F (36.9 °C),  Pulse: 83, Resp: 16, BP: 133/89, SpO2: 97 %         This patient is a 52 y.o. Female with reported hyperglycemia and headache. Patient has multiple chronic pain conditions receives a epidural injection today also has history of spinal stenosis of cervical region. Work-up completed by prior physician negative findings. Blood sugar markedly improved on lab blood sugar testing 196. Will discharge. Has insulin. No signs of DKA on testing. FINAL IMPRESSION:     1. Hyperglycemia    2.  Nonintractable headache, unspecified chronicity pattern, unspecified headache type    3.  Spinal stenosis of cervical region        DISPOSITION:         DISPOSITION:  [x]  Discharge   []  Transfer -    []  Admission -     []  Against Medical Advice   []  Eloped   FOLLOW-UP: Joy Coley, APRN - CNP  [facility] [place] [address]  [phone]    Schedule an appointment as soon as possible for a visit in 1 week       DISCHARGE MEDICATIONS: New Prescriptions    BUTALBITAL-ACETAMINOPHEN-CAFFEINE (FIORICET, ESGIC) -40 MG PER TABLET    Take 1 tablet by mouth every 4 hours as needed for Headaches          Alan Wells DO  Emergency Medicine       Alan Wells DO  11/17/21 0038

## 2021-11-17 NOTE — TELEPHONE ENCOUNTER
Pt called to let you know that she got hold of the neurosurgeons office and she is heading there right now

## 2021-12-03 ENCOUNTER — OFFICE VISIT (OUTPATIENT)
Dept: FAMILY MEDICINE CLINIC | Age: 51
End: 2021-12-03
Payer: COMMERCIAL

## 2021-12-03 VITALS
TEMPERATURE: 97.3 F | SYSTOLIC BLOOD PRESSURE: 130 MMHG | WEIGHT: 212.4 LBS | HEIGHT: 63 IN | BODY MASS INDEX: 37.63 KG/M2 | HEART RATE: 87 BPM | OXYGEN SATURATION: 98 % | DIASTOLIC BLOOD PRESSURE: 80 MMHG

## 2021-12-03 DIAGNOSIS — Z01.818 PRE-OP EVALUATION: Primary | ICD-10-CM

## 2021-12-03 DIAGNOSIS — Z23 NEED FOR INFLUENZA VACCINATION: ICD-10-CM

## 2021-12-03 PROCEDURE — 4004F PT TOBACCO SCREEN RCVD TLK: CPT | Performed by: NURSE PRACTITIONER

## 2021-12-03 PROCEDURE — G8417 CALC BMI ABV UP PARAM F/U: HCPCS | Performed by: NURSE PRACTITIONER

## 2021-12-03 PROCEDURE — G8484 FLU IMMUNIZE NO ADMIN: HCPCS | Performed by: NURSE PRACTITIONER

## 2021-12-03 PROCEDURE — G8427 DOCREV CUR MEDS BY ELIG CLIN: HCPCS | Performed by: NURSE PRACTITIONER

## 2021-12-03 PROCEDURE — 99214 OFFICE O/P EST MOD 30 MIN: CPT | Performed by: NURSE PRACTITIONER

## 2021-12-03 RX ORDER — HYDROCODONE BITARTRATE AND ACETAMINOPHEN 5; 325 MG/1; MG/1
TABLET ORAL
COMMUNITY
Start: 2021-12-02 | End: 2021-12-20 | Stop reason: SDUPTHER

## 2021-12-03 RX ORDER — CHLORHEXIDINE GLUCONATE 0.12 MG/ML
RINSE ORAL
COMMUNITY
Start: 2021-11-12 | End: 2022-02-14

## 2021-12-03 RX ORDER — DIAZEPAM 5 MG/1
TABLET ORAL
COMMUNITY
Start: 2021-10-20 | End: 2021-12-20

## 2021-12-03 NOTE — PROGRESS NOTES
Outpatient Medications Marked as Taking for the 12/3/21 encounter (Office Visit) with Claudell Roussel, APRN - CNP   Medication Sig Dispense Refill    chlorhexidine (PERIDEX) 0.12 % solution RINSE THREE TIMES A DAY TIL GONE(1/4OZ RINSE FOR 30 SECONDS;SPIT . ..  (REFER TO PRESCRIPTION NOTES).  diazePAM (VALIUM) 5 MG tablet take 1 tablet by mouth 1 hour PRIOR TO INJECTION may repeat after 15 MINUTES NO DRIVING      HYDROcodone-acetaminophen (NORCO) 5-325 MG per tablet       ALBUTEROL IN 2 Puffs Inhale (breathe in) every 6 hours as needed wheezing.  butalbital-acetaminophen-caffeine (FIORICET, ESGIC) -40 MG per tablet Take 1 tablet by mouth every 4 hours as needed for Headaches 30 tablet 0    blood glucose monitor kit and supplies Dispense sufficient amount for BID testing frequency plus additional to accommodate PRN testing needs. Dispense all needed supplies to include: monitor, strips, lancing device, lancets, control solutions, alcohol swabs. 1 kit 0    tiZANidine (ZANAFLEX) 2 MG tablet take 1 tablet by mouth three times a day if needed 90 tablet 0    nitroGLYCERIN (NITROSTAT) 0.4 MG SL tablet place 1 tablet under the tongue if needed every 5 minutes for chest pain for 3 doses IF NO RELIEF AFTER THIRD DOSE CALL PRESCRIBER . 25 tablet 0    polyethylene glycol (GLYCOLAX) 17 GM/SCOOP powder Follow Instructions provided by physician's office 238 g 0    bisacodyl (BISACODYL) 5 MG EC tablet Take 4 tablets in the morning 4 tablet 0    metoprolol (LOPRESSOR) 100 MG tablet Take 100 mg by mouth 2 times daily      Insulin NPH Isophane & Regular (HUMULIN 70/30 KWIKPEN) (70-30) 100 UNIT per ML injection pen Inject 25 Units into the skin 2 times daily 5 pen 3    calcium carbonate (OS-BRANNON) 1250 (500 Ca) MG chewable tablet Take 500-1,000 mg by mouth 3 times daily      blood glucose test strips (FREESTYLE LITE) strip 1 each by Does not apply route 2 times daily As needed.  100 each 3    busPIRone (BUSPAR) 15 MG tablet Take 15 mg by mouth 2 times daily 180 tablet 1    levothyroxine (SYNTHROID) 50 MCG tablet Take 1 tablet by mouth Daily 90 tablet 1    metoprolol succinate (TOPROL XL) 100 MG extended release tablet take 1 tablet by mouth twice a day 180 tablet 1    sertraline (ZOLOFT) 100 MG tablet Take 1.5 tablets by mouth daily 135 tablet 1    ranolazine (RANEXA) 500 MG extended release tablet Take 1 tablet by mouth 2 times daily 180 tablet 1    amLODIPine (NORVASC) 5 MG tablet Take 1 tablet by mouth daily 90 tablet 1    atorvastatin (LIPITOR) 80 MG tablet Take 1 tablet by mouth daily 90 tablet 1    clopidogrel (PLAVIX) 75 MG tablet Take 1 tablet by mouth daily 90 tablet 1    mirabegron (MYRBETRIQ) 50 MG TB24 Take 50 mg by mouth daily 90 tablet 1    topiramate (TOPAMAX) 50 MG tablet Take 1 tablet by mouth 2 times daily 180 tablet 1    albuterol sulfate  (90 Base) MCG/ACT inhaler Inhale 2 puffs into the lungs every 4 hours as needed for Wheezing or Shortness of Breath 18 g 1    buPROPion (ZYBAN) 150 MG extended release tablet Take 1 tablet by mouth 2 times daily 180 tablet 1    albuterol (PROVENTIL) (2.5 MG/3ML) 0.083% nebulizer solution Take 2.5 mg by nebulization every 4 hours as needed for Wheezing or Shortness of Breath      fluticasone (FLONASE) 50 MCG/ACT nasal spray 1 spray by Each Nostril route daily as needed for Rhinitis      Multiple Vitamins-Minerals (THERAPEUTIC MULTIVITAMIN-MINERALS) tablet Take 1 tablet by mouth daily      ascorbic acid (VITAMIN C) 500 MG tablet Take 500 mg by mouth daily      polyethylene glycol (GLYCOLAX) 17 g packet Take 17 g by mouth daily as needed       acetaminophen (TYLENOL) 500 MG tablet Take 2 tablets by mouth every 6 hours as needed for Pain 30 tablet 0    ibuprofen (ADVIL;MOTRIN) 800 MG tablet Take 1 tablet by mouth every 8 hours as needed for Pain 30 tablet 0    Cholecalciferol (VITAMIN D) 50 MCG (2000 UT) TABS tablet Take 2,000 Units by Temp 11/16/2021 NOT REPORTED     POC pCO2 Temp 11/16/2021 NOT REPORTED     POC pO2 Temp 11/16/2021 NOT REPORTED            Assessment:       52 y.o. patient with planned surgery as above. Known risk factors for perioperative complications: Coronary artery disease, Hypertension, Tobacco abuse  Current medications which may produce withdrawal symptoms if withheld perioperatively:none      Plan:     1. Preoperative workup as follows: none  2. Change in medication regimen before surgery: per cardiology and GI  3. Prophylaxis for cardiac events with perioperative beta-blockers: Currently taking  metoprolol  ACC/AHA indications for pre-operative beta-blocker use:    · Vascular surgery with history of postitive stress test  · Intermediate or high risk surgery withhistory of CAD   · Intermediate or high risk surgery with multiple clinical predictors of CAD- 2 of thefollowing: history of compensated or prior heart failure, history of cerebrovascular disease, DM, or renal insufficiency    Routine administration of higher-dose, long-acting metoprolol in beta-blockernaïve patients on the day of surgery, and in the absence of dose titration is associated with an overall increase in mortality. Beta-blockers should be started days to weeks prior tosurgery and titrated to pulse < 70.  4. Deep vein thrombosis prophylaxis: n/a  5.  No contraindications to planned surgery            Electronically Signed by: JORDAN Pacheco

## 2021-12-07 NOTE — TELEPHONE ENCOUNTER
Rec'd clearance from [address] 12/3/21 that cleared pt for pt and noted \"pt already off 1 wk per cardiology\". Writer sent clearance to Dr Leeroy Closs 12/3. rec'd clearance from Dr Leeroy Closs 12/7; pt cleared and ok to hold Plavix and asa x 7 days. Writer called and spoke to pt to notify her and see if she has already began holding meds. Pt reports she has been holding Plavix and asa for 7 days already d/t recent procedure.

## 2021-12-08 ENCOUNTER — ANESTHESIA EVENT (OUTPATIENT)
Dept: OPERATING ROOM | Age: 51
End: 2021-12-08
Payer: COMMERCIAL

## 2021-12-08 ENCOUNTER — ANESTHESIA (OUTPATIENT)
Dept: OPERATING ROOM | Age: 51
End: 2021-12-08
Payer: COMMERCIAL

## 2021-12-08 ENCOUNTER — HOSPITAL ENCOUNTER (OUTPATIENT)
Age: 51
Setting detail: OUTPATIENT SURGERY
Discharge: HOME | End: 2021-12-08
Attending: INTERNAL MEDICINE | Admitting: INTERNAL MEDICINE
Payer: COMMERCIAL

## 2021-12-08 VITALS
WEIGHT: 205 LBS | OXYGEN SATURATION: 98 % | TEMPERATURE: 97.3 F | DIASTOLIC BLOOD PRESSURE: 79 MMHG | RESPIRATION RATE: 17 BRPM | HEART RATE: 83 BPM | SYSTOLIC BLOOD PRESSURE: 119 MMHG | HEIGHT: 63 IN | BODY MASS INDEX: 36.32 KG/M2

## 2021-12-08 VITALS — SYSTOLIC BLOOD PRESSURE: 100 MMHG | OXYGEN SATURATION: 95 % | DIASTOLIC BLOOD PRESSURE: 68 MMHG

## 2021-12-08 LAB
GLUCOSE BLD-MCNC: 168 MG/DL (ref 65–105)
HCG, PREGNANCY URINE (POC): NEGATIVE

## 2021-12-08 PROCEDURE — 3609010300 HC COLONOSCOPY W/BIOPSY SINGLE/MULTIPLE: Performed by: INTERNAL MEDICINE

## 2021-12-08 PROCEDURE — 7100000010 HC PHASE II RECOVERY - FIRST 15 MIN: Performed by: INTERNAL MEDICINE

## 2021-12-08 PROCEDURE — 3700000001 HC ADD 15 MINUTES (ANESTHESIA): Performed by: INTERNAL MEDICINE

## 2021-12-08 PROCEDURE — 3700000000 HC ANESTHESIA ATTENDED CARE: Performed by: INTERNAL MEDICINE

## 2021-12-08 PROCEDURE — 43239 EGD BIOPSY SINGLE/MULTIPLE: CPT | Performed by: INTERNAL MEDICINE

## 2021-12-08 PROCEDURE — 7100000011 HC PHASE II RECOVERY - ADDTL 15 MIN: Performed by: INTERNAL MEDICINE

## 2021-12-08 PROCEDURE — 45380 COLONOSCOPY AND BIOPSY: CPT | Performed by: INTERNAL MEDICINE

## 2021-12-08 PROCEDURE — 2580000003 HC RX 258: Performed by: NURSE ANESTHETIST, CERTIFIED REGISTERED

## 2021-12-08 PROCEDURE — 2580000003 HC RX 258: Performed by: ANESTHESIOLOGY

## 2021-12-08 PROCEDURE — 88305 TISSUE EXAM BY PATHOLOGIST: CPT

## 2021-12-08 PROCEDURE — 6360000002 HC RX W HCPCS: Performed by: NURSE ANESTHETIST, CERTIFIED REGISTERED

## 2021-12-08 PROCEDURE — 82947 ASSAY GLUCOSE BLOOD QUANT: CPT

## 2021-12-08 PROCEDURE — 3609012400 HC EGD TRANSORAL BIOPSY SINGLE/MULTIPLE: Performed by: INTERNAL MEDICINE

## 2021-12-08 PROCEDURE — 2709999900 HC NON-CHARGEABLE SUPPLY: Performed by: INTERNAL MEDICINE

## 2021-12-08 PROCEDURE — 81025 URINE PREGNANCY TEST: CPT

## 2021-12-08 PROCEDURE — 2500000003 HC RX 250 WO HCPCS: Performed by: NURSE ANESTHETIST, CERTIFIED REGISTERED

## 2021-12-08 RX ORDER — ONDANSETRON 2 MG/ML
4 INJECTION INTRAMUSCULAR; INTRAVENOUS
Status: DISCONTINUED | OUTPATIENT
Start: 2021-12-08 | End: 2021-12-08 | Stop reason: HOSPADM

## 2021-12-08 RX ORDER — SODIUM CHLORIDE, SODIUM LACTATE, POTASSIUM CHLORIDE, CALCIUM CHLORIDE 600; 310; 30; 20 MG/100ML; MG/100ML; MG/100ML; MG/100ML
INJECTION, SOLUTION INTRAVENOUS CONTINUOUS PRN
Status: DISCONTINUED | OUTPATIENT
Start: 2021-12-08 | End: 2021-12-08 | Stop reason: SDUPTHER

## 2021-12-08 RX ORDER — LIDOCAINE HYDROCHLORIDE 20 MG/ML
INJECTION, SOLUTION EPIDURAL; INFILTRATION; INTRACAUDAL; PERINEURAL PRN
Status: DISCONTINUED | OUTPATIENT
Start: 2021-12-08 | End: 2021-12-08 | Stop reason: SDUPTHER

## 2021-12-08 RX ORDER — SODIUM CHLORIDE, SODIUM LACTATE, POTASSIUM CHLORIDE, CALCIUM CHLORIDE 600; 310; 30; 20 MG/100ML; MG/100ML; MG/100ML; MG/100ML
INJECTION, SOLUTION INTRAVENOUS CONTINUOUS
Status: DISCONTINUED | OUTPATIENT
Start: 2021-12-09 | End: 2021-12-08 | Stop reason: HOSPADM

## 2021-12-08 RX ORDER — PROPOFOL 10 MG/ML
INJECTION, EMULSION INTRAVENOUS PRN
Status: DISCONTINUED | OUTPATIENT
Start: 2021-12-08 | End: 2021-12-08 | Stop reason: SDUPTHER

## 2021-12-08 RX ORDER — LIDOCAINE HYDROCHLORIDE 10 MG/ML
1 INJECTION, SOLUTION EPIDURAL; INFILTRATION; INTRACAUDAL; PERINEURAL
Status: DISCONTINUED | OUTPATIENT
Start: 2021-12-09 | End: 2021-12-08 | Stop reason: HOSPADM

## 2021-12-08 RX ADMIN — SODIUM CHLORIDE, POTASSIUM CHLORIDE, SODIUM LACTATE AND CALCIUM CHLORIDE: 600; 310; 30; 20 INJECTION, SOLUTION INTRAVENOUS at 06:51

## 2021-12-08 RX ADMIN — PROPOFOL 60 MG: 10 INJECTION, EMULSION INTRAVENOUS at 08:51

## 2021-12-08 RX ADMIN — PROPOFOL 60 MG: 10 INJECTION, EMULSION INTRAVENOUS at 08:34

## 2021-12-08 RX ADMIN — PROPOFOL 60 MG: 10 INJECTION, EMULSION INTRAVENOUS at 08:39

## 2021-12-08 RX ADMIN — LIDOCAINE HYDROCHLORIDE 60 MG: 20 INJECTION, SOLUTION EPIDURAL; INFILTRATION; INTRACAUDAL; PERINEURAL at 08:31

## 2021-12-08 RX ADMIN — PROPOFOL 60 MG: 10 INJECTION, EMULSION INTRAVENOUS at 08:46

## 2021-12-08 RX ADMIN — SODIUM CHLORIDE, POTASSIUM CHLORIDE, SODIUM LACTATE AND CALCIUM CHLORIDE: 600; 310; 30; 20 INJECTION, SOLUTION INTRAVENOUS at 08:29

## 2021-12-08 RX ADMIN — PROPOFOL 70 MG: 10 INJECTION, EMULSION INTRAVENOUS at 08:31

## 2021-12-08 ASSESSMENT — PULMONARY FUNCTION TESTS
PIF_VALUE: 1

## 2021-12-08 ASSESSMENT — PAIN SCALES - GENERAL
PAINLEVEL_OUTOF10: 0
PAINLEVEL_OUTOF10: 0

## 2021-12-08 ASSESSMENT — ENCOUNTER SYMPTOMS: SHORTNESS OF BREATH: 0

## 2021-12-08 ASSESSMENT — PAIN DESCRIPTION - DESCRIPTORS: DESCRIPTORS: THROBBING;BURNING

## 2021-12-08 ASSESSMENT — PAIN - FUNCTIONAL ASSESSMENT: PAIN_FUNCTIONAL_ASSESSMENT: 0-10

## 2021-12-08 NOTE — OP NOTE
PROCEDURE NOTE    DATE OF PROCEDURE: 12/8/2021    SURGEON: Janell Sloan MD    ASSISTANT: None    PREOPERATIVE DIAGNOSIS: SCREENING  IBS    POSTOPERATIVE DIAGNOSIS: as described below    OPERATION: Total colonoscopy     ANESTHESIA: MAC PER ANESTHESIA     ESTIMATED BLOOD LOSS: less than 50     COMPLICATIONS: None. SPECIMENS:  Was Obtained:     HISTORY: The patient is a 52y.o. year old female with history of above preop diagnosis. I recommended colonoscopy with possible biopsy or polypectomy and I explained the risk, benefits, expected outcome, and alternatives to the procedure. Risks included but are not limited to bleeding, infection, respiratory distress, hypotension, and perforation of the colon and possibility of missing a lesion. The patient understands and is in agreement. PROCEDURE: The patient was given IV conscious sedation. The patient's SPO2 remained above 90% throughout the procedure. The colonoscope was inserted per rectum and advanced under direct vision to the cecum without difficulty. The prep was good. Findings:  Terminal ileum: normal    Cecum/Ascending colon: normal    Transverse colon: normal    Descending/Sigmoid colon: abnormal: MILD TO MOD DIVERTICULOSIS  FEW DIMINUTIVE POLYPS DISTAL RECTUM WERE BIOPSIED    Rectum/Anus: examined in normal and retroflexed positions and was abnormal: MOD INT HEMORRHOIDS WITH SKIN TAGS    Withdrawal Time was (minutes): 10    The colon was decompressed and the scope was removed. The patient tolerated the procedure well. Recommendations/Plan:   1. Lifestyle and dietary modifications as discussed  2. F/U Biopsies  3. F/U In Office in 3-4 weeks  4. Discussed with the family  5. Colonoscopy Recall :5 year  6. POST SEDATION PATIENT WAS STABLE WITH STABLE VITAL SIGNS AND OXYGEN SATURATIONS AND WAS DISCHARGED HOME WITH RIDE IN A STABLE CONDITION.     Electronically signed by Janell Sloan MD  on 12/8/2021 at 8:58 AM

## 2021-12-08 NOTE — ANESTHESIA POSTPROCEDURE EVALUATION
Department of Anesthesiology  Postprocedure Note    Patient: Regi Jara  MRN: [id number]  YOB: 1970  Date of evaluation: 12/8/2021  Time:  10:31 AM     Procedure Summary     Date: 12/08/21 Room / Location: [place] [address] / [facility] - INPATIENT    Anesthesia Start: 1858 Anesthesia Stop: 3004    Procedures:       COLONOSCOPY WITH BIOPSY (N/A )      EGD BIOPSY Diagnosis: (DX POSITIVE COLOGUARD TEST       IBS WITH CONSTIPATION AND DIARRHEA)    Surgeons: Olga Cleary MD Responsible Provider: Charisma Robbins MD    Anesthesia Type: general, MAC ASA Status: 4          Anesthesia Type: general, MAC    Joshua Phase I: Joshua Score: 10    Joshua Phase II: Joshua Score: 8    Last vitals: Reviewed and per EMR flowsheets.        Anesthesia Post Evaluation    Complications: no

## 2021-12-08 NOTE — OP NOTE
PROCEDURE NOTE    DATE OF PROCEDURE: 12/8/2021     SURGEON: Magali Francisco MD    ASSISTANT: None    PREOPERATIVE DIAGNOSIS: GERD  ABD PAINS  IBS    POSTOPERATIVE DIAGNOSIS: As described below    OPERATION: Upper GI endoscopy with Biopsy    ANESTHESIA: MAC PER ANESTHESIA     ESTIMATED BLOOD LOSS: Less than 50 ml    COMPLICATIONS: None. SPECIMENS:  Was Obtained:     HISTORY: The patient is a 52y.o. year old female with history of above preop diagnosis. I recommended esophagogastroduodenoscopy with possible biopsy and I explained the risk, benefits, expected outcome, and alternatives to the procedure. Risks included but are not limited to bleeding, infection, respiratory distress, hypotension, and perforation of the esophagus, stomach, or duodenum. Patient understands and is in agreement. PROCEDURE: The patient was given IV conscious sedation. The patient's SPO2 remained above 90% throughout the procedure. The gastroscope was inserted orally and advanced under direct vision through the esophagus, through the stomach, through the pylorus, and into the descending duodenum. Findings:    Retropharyngeal area was grossly normal appearing    Esophagus: abnormal: MILD DISTAL ESOPHAGITIS WAS BIOPSIED AT [facility]    Stomach:    Fundus: normal    Body: normal    Antrum: abnormal: MILD GASTRITIS WAS BIOPSIED    Duodenum:     Descending: normal    Bulb: normal    The scope was removed and the patient tolerated the procedure well. Recommendations/Plan:   1. F/U Biopsies  2. F/U In Office in 3-4 weeks  3. Discussed with the family  4.  Post sedation patient was stable with stable vital signs and stable O2 saturations    Electronically signed by Magali Francisco MD  on 12/8/2021 at 8:39 AM

## 2021-12-08 NOTE — H&P
Interval H&P Note    Pt Name: Ahsan Marie  MRN: [id number]  YOB: 1970  Date of evaluation: 12/8/2021      [x] I have reviewed in Epic the Family Practice progress note by Jil ORTEGA dated 12/3/2021 attached below which meets the criteria for an Interval History and Physical note. [x] I have examined  Ahsan Marie  There are no changes to the patient who is scheduled for a diagnostic colonoscopy by Dr. Lori Mendoza for positive cologuard test, IBS with constipation and diarrhea. Patient had positive cologuard test at home. She has complaints of severe constipation with 10-14 days between bowel movements and difficulty passing stool. Patient has occasional bright red blood in the stool and occasional dark tarry stools. Patient abdominal pain with constipation. She has complaints of dysphagia and heartburn. Patient has nausea today after completing the colon prep. She denies diarrhea, vomiting. Patient followed bowel prep until watery clear. Patient states she also needs to have EGD today. No previous colonoscopy. Previous EGD 5 years ago with no intervention. No FH colon cancer or polyps. The patient denies new health changes, fever, chills, wheezing, cough, increased SOB, chest pain, open sores or wounds. Denies hx diabetes. Last Vitamin D 12/7/2021, Plavix 11/24/2021, Ibuprofen 12/5/2021, ASA 81mg 11/24/2021. History of coronary artery disease with 1 stent to LAD (2017), diabetes, hypertension, hyperlipidemia. Patient was admitted to the hospital in 7/2021 with chest pain and had abnormal stress test. Patient underwent cardiac catheterization with no further intervention. Patient has occasional chest pain but managed with Ranexa. Patient follows with Dr. Erika Perez. Patient denies shortness of breath, palpitations, syncope. Patient states she has occasional dizziness which occurs with headaches. Patient states she fell on 12/1/2021 after becoming dizzy and hit her head in her bathroom.  She was evaluated at [facility] and discharged home. Patient was evaluated by PCP for clearance on 12/3/2021. Patient presented today with elevated heart rate - 137. Recheck 112. Anesthesia Dr. Betsy Cool aware and evaluated. Patient states she is nauseated, in pain, and is anxious over scheduled procedure. POC . Cardiac catheterization 7/23/2021:  · Previously placed stent in left anterior descending artery is widely patent. No significant changes in coronary anatomy since her last cardiac catheterization. · Global left ventricular systolic function is at the low end normal.   · Minimally elevated left ventricular end-diastolic pressure. Recommendations:  Medical management. Vital signs: BP (!) 151/99   Pulse 137   Temp 96.4 °F (35.8 °C) (Temporal)   Resp 16   Ht 5' 3\" (1.6 m)   Wt 205 lb (93 kg)   LMP 11/28/2021   SpO2 97%   BMI 36.31 kg/m²     Allergies:  Seasonal    Medications:    Prior to Admission medications    Medication Sig Start Date End Date Taking? Authorizing Provider   chlorhexidine (PERIDEX) 0.12 % solution RINSE THREE TIMES A DAY TIL GONE(1/4OZ RINSE FOR 30 SECONDS;SPIT . ..  (REFER TO PRESCRIPTION NOTES).  11/12/21  Yes Historical Provider, MD   tiZANidine (ZANAFLEX) 2 MG tablet take 1 tablet by mouth three times a day if needed 10/19/21  Yes To Guerrero, DO   Insulin NPH Isophane & Regular (HUMULIN 70/30 KWIKPEN) (70-30) 100 UNIT per ML injection pen Inject 25 Units into the skin 2 times daily 9/15/21  Yes SAVANA Dorman CNP   calcium carbonate (OS-BRANNON) 1250 (500 Ca) MG chewable tablet Take 500-1,000 mg by mouth 3 times daily 10/20/20  Yes Historical Provider, MD   busPIRone (BUSPAR) 15 MG tablet Take 15 mg by mouth 2 times daily 9/13/21 3/12/22 Yes SAVANA Dorman CNP   levothyroxine (SYNTHROID) 50 MCG tablet Take 1 tablet by mouth Daily 9/13/21 3/12/22 Yes SAVANA Dorman CNP   metoprolol succinate (TOPROL XL) 100 MG extended release tablet take 1 tablet by mouth twice a day 9/13/21  Yes SAVANA Salcedo CNP   sertraline (ZOLOFT) 100 MG tablet Take 1.5 tablets by mouth daily 9/13/21  Yes SAVANA Salcedo CNP   ranolazine (RANEXA) 500 MG extended release tablet Take 1 tablet by mouth 2 times daily 9/13/21 3/12/22 Yes SAVANA Salcedo CNP   amLODIPine (NORVASC) 5 MG tablet Take 1 tablet by mouth daily 9/13/21 3/12/22 Yes SAVANA Salcedo CNP   atorvastatin (LIPITOR) 80 MG tablet Take 1 tablet by mouth daily 9/13/21 3/12/22 Yes SAVANA Salcedo CNP   mirabegron (MYRBETRIQ) 50 MG TB24 Take 50 mg by mouth daily 9/13/21  Yes SAVANA Salcedo CNP   topiramate (TOPAMAX) 50 MG tablet Take 1 tablet by mouth 2 times daily 9/13/21 3/12/22 Yes SAVANA Salcedo CNP   albuterol sulfate  (90 Base) MCG/ACT inhaler Inhale 2 puffs into the lungs every 4 hours as needed for Wheezing or Shortness of Breath 9/13/21  Yes SAVANA Salcedo CNP   Multiple Vitamins-Minerals (THERAPEUTIC MULTIVITAMIN-MINERALS) tablet Take 1 tablet by mouth daily   Yes Historical Provider, MD   Cholecalciferol (VITAMIN D) 50 MCG (2000 UT) TABS tablet Take 2,000 Units by mouth daily  1/23/20  Yes Historical Provider, MD   diazePAM (VALIUM) 5 MG tablet take 1 tablet by mouth 1 hour PRIOR TO INJECTION may repeat after 15 MINUTES NO DRIVING 38/09/97   Historical Provider, MD   HYDROcodone-acetaminophen (Burns Balsam) 5-325 MG per tablet  12/2/21   Historical Provider, MD   ALBUTEROL IN 2 Puffs Inhale (breathe in) every 6 hours as needed wheezing. 12/1/21   Historical Provider, MD   butalbital-acetaminophen-caffeine (FIORICET, ESGIC) -40 MG per tablet Take 1 tablet by mouth every 4 hours as needed for Headaches 11/17/21   Hartmann ,    blood glucose monitor kit and supplies Dispense sufficient amount for BID testing frequency plus additional to accommodate PRN testing needs.  Dispense all needed supplies to include: monitor, strips, lancing device, lancets, control solutions, alcohol swabs. 10/25/21   SAVANA Medellin CNP   nitroGLYCERIN (NITROSTAT) 0.4 MG SL tablet place 1 tablet under the tongue if needed every 5 minutes for chest pain for 3 doses IF NO RELIEF AFTER THIRD DOSE CALL PRESCRIBER . 10/19/21   Florencio Riley,    blood glucose test strips (FREESTYLE LITE) strip 1 each by Does not apply route 2 times daily As needed. 9/13/21   SAVANA Medellin CNP   clopidogrel (PLAVIX) 75 MG tablet Take 1 tablet by mouth daily 9/13/21 3/12/22  SAVANA Medellin CNP   albuterol (PROVENTIL) (2.5 MG/3ML) 0.083% nebulizer solution Take 2.5 mg by nebulization every 4 hours as needed for Wheezing or Shortness of Breath    Historical Provider, MD   fluticasone (FLONASE) 50 MCG/ACT nasal spray 1 spray by Each Nostril route daily as needed for Rhinitis    Historical Provider, MD   ascorbic acid (VITAMIN C) 500 MG tablet Take 500 mg by mouth daily    Historical Provider, MD   polyethylene glycol (GLYCOLAX) 17 g packet Take 17 g by mouth daily as needed  8/31/20   Historical Provider, MD   acetaminophen (TYLENOL) 500 MG tablet Take 2 tablets by mouth every 6 hours as needed for Pain 6/24/20   Alicia Chan DO   ibuprofen (ADVIL;MOTRIN) 800 MG tablet Take 1 tablet by mouth every 8 hours as needed for Pain 6/24/20   Alicia Chan DO   FREESTYLE LANCETS MISC 1 each by Does not apply route daily 11/20/19   SAVANA Garrison CNP   Insulin Pen Needle ([employer] PEN NEEDLES 31G) 31G X 8 MM MISC 1 each by Does not apply route daily 11/20/19   SAVANA Garrison CNP   aspirin 81 MG chewable tablet Take 81 mg by mouth daily 5/31/19 12/3/21  Historical Provider, MD         This is a 52 y.o. female who is pleasant, cooperative, alert and oriented x3, in no acute distress. Heart: Heart sounds are normal.   asymptomatic tachycardic rate and regular rhythm without murmur, gallop or rub.    Lungs: Normal respiratory effort with equal expansion, good air exchange, unlabored and clear to auscultation without wheezes or rales bilaterally   Abdomen: soft, tenderness with palpation, nondistended with bowel sounds active. Labs:  Recent Labs     12/08/21  0622 11/16/21  2234   HGB  --  12.2   HCT  --  38.6   WBC  --  11.5*   MCV  --  91.0   PLT  --  437   NA  --  140   K  --  4.4   CL  --  104   CO2  --  22   BUN  --  9   CREATININE  --  0.61   GLUCOSE  --  196*   AST  --  11   ALT  --  14   LABALBU  --  4.0   HCG NEGATIVE  --        No results for input(s): COVID19 in the last 720 hours.     SAVANA Gooden CNP  Electronically signed 12/8/2021 at 6:40 AM      SAVANA Medellin CNP   Nurse Practitioner   Specialty:  Nurse Practitioner   Progress Notes      Signed   Encounter Date:  12/3/2021         Related encounter: Office Visit from 12/3/2021 in [facility]           Signed        Expand All Collapse All          Show:Clear all  [x]Manual[x]Template[]Copied    Added by:  [x]SAVANA Valle CNP      []Fransisco for details    Preoperative Consultation        Marina Ba  YOB: 1970     Date of Service:  12/3/2021             Chief Complaint   Patient presents with    Pre-op Exam       clearance for colonoscopy     Fall       fell after myleogram at [facility]         This patient presents to the office today for apreoperative consultation at the request of surgeon, Dr. Samreen Cruz, who plans on performing EGD/Colonoscopy on December 8 at [facility] DR DAYANA JAMIL.      Planned anesthesia: General      Review of Systems  Known anesthesia problems:None   Bleeding risk: No recent or remote history of abnormal bleeding  Personal or FHof DVT/PE: No    Patient objection to receiving blood products: No     Past Medical History        Past Medical History:   Diagnosis Date    Anxiety      Arthritis      CAD (coronary artery disease)      Depression      Hyperlipidemia      Kidney stone      Myocardial infarct ([facility] [address] 2016    Neck pain  Spinal stenosis          Past Surgical History         Past Surgical History:   Procedure Laterality Date    BACK SURGERY         L5-S1, C5-7    CARDIAC CATHETERIZATION         SECTION        HIP SURGERY Left      LUNG BIOPSY Right      MASTOID SURGERY        TONSILLECTOMY             Family History         Family History   Problem Relation Age of Onset    Dementia Mother      Depression Mother      High Blood Pressure Mother      Coronary Art Dis Mother      COPD Mother      Diabetes Mother      Heart Attack Father      Diabetes Maternal Grandmother      Alzheimer's Disease Paternal Grandfather           Social History            Tobacco Use    Smoking status: Current Some Day Smoker       Packs/day: 0.50       Years: 25.00       Pack years: 12.50       Types: Cigarettes    Smokeless tobacco: Never Used    Tobacco comment: 3 packs per week   Vaping Use    Vaping Use: Former   Substance Use Topics    Alcohol use: Yes       Comment: social    Drug use: Yes       Frequency: 7.0 times per week       Types: Marijuana (Weed)               Allergies   Allergen Reactions    Seasonal Other (See Comments)       Sneezing, watery eyes, wheezing      Active Medications          Outpatient Medications Marked as Taking for the 12/3/21 encounter (Office Visit) with SAVANA Dewitt CNP   Medication Sig Dispense Refill    chlorhexidine (PERIDEX) 0.12 % solution RINSE THREE TIMES A DAY TIL GONE(OZ RINSE FOR 30 SECONDS;SPIT . ..  (REFER TO PRESCRIPTION NOTES).  diazePAM (VALIUM) 5 MG tablet take 1 tablet by mouth 1 hour PRIOR TO INJECTION may repeat after 15 MINUTES NO DRIVING        HYDROcodone-acetaminophen (NORCO) 5-325 MG per tablet          ALBUTEROL IN 2 Puffs Inhale (breathe in) every 6 hours as needed wheezing.         butalbital-acetaminophen-caffeine (FIORICET, ESGIC) -40 MG per tablet Take 1 tablet by mouth every 4 hours as needed for Headaches 30 tablet 0    blood glucose monitor kit and supplies Dispense sufficient amount for BID testing frequency plus additional to accommodate PRN testing needs. Dispense all needed supplies to include: monitor, strips, lancing device, lancets, control solutions, alcohol swabs. 1 kit 0    tiZANidine (ZANAFLEX) 2 MG tablet take 1 tablet by mouth three times a day if needed 90 tablet 0    nitroGLYCERIN (NITROSTAT) 0.4 MG SL tablet place 1 tablet under the tongue if needed every 5 minutes for chest pain for 3 doses IF NO RELIEF AFTER THIRD DOSE CALL PRESCRIBER . 25 tablet 0    polyethylene glycol (GLYCOLAX) 17 GM/SCOOP powder Follow Instructions provided by physician's office 238 g 0    bisacodyl (BISACODYL) 5 MG EC tablet Take 4 tablets in the morning 4 tablet 0    metoprolol (LOPRESSOR) 100 MG tablet Take 100 mg by mouth 2 times daily        Insulin NPH Isophane & Regular (HUMULIN 70/30 KWIKPEN) (70-30) 100 UNIT per ML injection pen Inject 25 Units into the skin 2 times daily 5 pen 3    calcium carbonate (OS-BRANNON) 1250 (500 Ca) MG chewable tablet Take 500-1,000 mg by mouth 3 times daily        blood glucose test strips (FREESTYLE LITE) strip 1 each by Does not apply route 2 times daily As needed.  100 each 3    busPIRone (BUSPAR) 15 MG tablet Take 15 mg by mouth 2 times daily 180 tablet 1    levothyroxine (SYNTHROID) 50 MCG tablet Take 1 tablet by mouth Daily 90 tablet 1    metoprolol succinate (TOPROL XL) 100 MG extended release tablet take 1 tablet by mouth twice a day 180 tablet 1    sertraline (ZOLOFT) 100 MG tablet Take 1.5 tablets by mouth daily 135 tablet 1    ranolazine (RANEXA) 500 MG extended release tablet Take 1 tablet by mouth 2 times daily 180 tablet 1    amLODIPine (NORVASC) 5 MG tablet Take 1 tablet by mouth daily 90 tablet 1    atorvastatin (LIPITOR) 80 MG tablet Take 1 tablet by mouth daily 90 tablet 1    clopidogrel (PLAVIX) 75 MG tablet Take 1 tablet by mouth daily 90 tablet 1    mirabegron (MYRBETRIQ) 50 MG TB24 Take 50 mg by mouth daily 90 tablet 1    topiramate (TOPAMAX) 50 MG tablet Take 1 tablet by mouth 2 times daily 180 tablet 1    albuterol sulfate  (90 Base) MCG/ACT inhaler Inhale 2 puffs into the lungs every 4 hours as needed for Wheezing or Shortness of Breath 18 g 1    buPROPion (ZYBAN) 150 MG extended release tablet Take 1 tablet by mouth 2 times daily 180 tablet 1    albuterol (PROVENTIL) (2.5 MG/3ML) 0.083% nebulizer solution Take 2.5 mg by nebulization every 4 hours as needed for Wheezing or Shortness of Breath        fluticasone (FLONASE) 50 MCG/ACT nasal spray 1 spray by Each Nostril route daily as needed for Rhinitis        Multiple Vitamins-Minerals (THERAPEUTIC MULTIVITAMIN-MINERALS) tablet Take 1 tablet by mouth daily        ascorbic acid (VITAMIN C) 500 MG tablet Take 500 mg by mouth daily        polyethylene glycol (GLYCOLAX) 17 g packet Take 17 g by mouth daily as needed         acetaminophen (TYLENOL) 500 MG tablet Take 2 tablets by mouth every 6 hours as needed for Pain 30 tablet 0    ibuprofen (ADVIL;MOTRIN) 800 MG tablet Take 1 tablet by mouth every 8 hours as needed for Pain 30 tablet 0    Cholecalciferol (VITAMIN D) 50 MCG (2000 UT) TABS tablet Take 2,000 Units by mouth daily         FREESTYLE LANCETS MISC 1 each by Does not apply route daily 100 each 3    Insulin Pen Needle ([employer] PEN NEEDLES 31G) 31G X 8 MM MISC 1 each by Does not apply route daily 100 each 3    aspirin 81 MG chewable tablet Take 81 mg by mouth daily                   Physical Exam   Constitutional: She is oriented to person, place, and time. She appears well-developed and well-nourished. No distress. HENT:   Head: Normocephalic and atraumatic. Mouth/Throat: Uvula is midline, oropharynx isclear and moist and mucous membranes are normal.   Eyes: Conjunctivae and EOM arenormal. Pupils are equal, round, and reactive to light. Neck: Trachea normaland normal range of motion.  Neck  BUN 11/16/2021 9     CREATININE 11/16/2021 0.61     Bun/Cre Ratio 11/16/2021 15     Calcium 11/16/2021 9.0     Sodium 11/16/2021 140     Potassium 11/16/2021 4.4     Chloride 11/16/2021 104     CO2 11/16/2021 22     Anion Gap 11/16/2021 14     Alkaline Phosphatase 11/16/2021 83     ALT 11/16/2021 14     AST 11/16/2021 11     Total Bilirubin 11/16/2021 0.13*    Total Protein 11/16/2021 6.9     Albumin 11/16/2021 4.0     Albumin/Globulin Ratio 11/16/2021 NOT REPORTED     GFR Non- 11/16/2021 >60     GFR  11/16/2021 >60     GFR Comment 11/16/2021          GFR Staging 11/16/2021 NOT REPORTED     CRP 11/16/2021 4.3     Beta-Hydroxybutyrate 11/16/2021 0.09     pH, Michel 11/16/2021 7.369     pCO2, Michel 11/16/2021 41.9     pO2, Michel 11/16/2021 94.1*    HCO3, Venous 11/16/2021 24.2     Total CO2, Venous 11/16/2021 NOT REPORTED     Negative Base Excess, Michel 11/16/2021 1     Positive Base Excess, Michel 11/16/2021 NOT REPORTED     O2 Sat, Michel 11/16/2021 97*    O2 Device/Flow/% 11/16/2021 NOT REPORTED     Otto Test 11/16/2021 NOT REPORTED     Sample Site 11/16/2021 NOT REPORTED     Mode 11/16/2021 NOT REPORTED     FIO2 11/16/2021 NOT REPORTED     Pt Temp 11/16/2021 NOT REPORTED     POC pH Temp 11/16/2021 NOT REPORTED     POC pCO2 Temp 11/16/2021 NOT REPORTED     POC pO2 Temp 11/16/2021 NOT REPORTED              Assessment:       52 y.o. patient with planned surgery as above. Known risk factors for perioperative complications: Coronary artery disease, Hypertension, Tobacco abuse  Current medications which may produce withdrawal symptoms if withheld perioperatively:none       Plan:      1. Preoperative workup as follows: none  2. Change in medication regimen before surgery: per cardiology and GI  3.  Prophylaxis for cardiac events with perioperative beta-blockers: Currently taking  metoprolol  ACC/AHA indications for pre-operative beta-blocker use:    · Vascular surgery with history of postitive stress test  · Intermediate or high risk surgery withhistory of CAD   · Intermediate or high risk surgery with multiple clinical predictors of CAD- 2 of thefollowing: history of compensated or prior heart failure, history of cerebrovascular disease, DM, or renal insufficiency     Routine administration of higher-dose, long-acting metoprolol in beta-blocker-naïve patients on the day of surgery, and in the absence of dose titration is associated with an overall increase in mortality. Beta-blockers should be started days to weeks prior tosurgery and titrated to pulse < 70.  4. Deep vein thrombosis prophylaxis: n/a  5.  No contraindications to planned surgery               Electronically Signed by: JORDAN Huynh

## 2021-12-08 NOTE — ANESTHESIA PRE PROCEDURE
Department of Anesthesiology  Preprocedure Note       Name:  Mary Aguillon   Age:  52 y.o.  :  1970                                          MRN:  [id number]         Date:  2021      Surgeon: Clotilde Sharpe):  Cassie Oliver MD    Procedure: Procedure(s):  COLONOSCOPY DIAGNOSTIC    Medications prior to admission:   Prior to Admission medications    Medication Sig Start Date End Date Taking? Authorizing Provider   chlorhexidine (PERIDEX) 0.12 % solution RINSE THREE TIMES A DAY TIL GONE(1/4OZ RINSE FOR 30 SECONDS;SPIT . ..  (REFER TO PRESCRIPTION NOTES).  21  Yes Historical Provider, MD   tiZANidine (ZANAFLEX) 2 MG tablet take 1 tablet by mouth three times a day if needed 10/19/21  Yes Coral Kuhn DO   Insulin NPH Isophane & Regular (HUMULIN 70/30 KWIKPEN) (70-30) 100 UNIT per ML injection pen Inject 25 Units into the skin 2 times daily 9/15/21  Yes SAVANA Redmond CNP   calcium carbonate (OS-BRANNON) 1250 (500 Ca) MG chewable tablet Take 500-1,000 mg by mouth 3 times daily 10/20/20  Yes Historical Provider, MD   busPIRone (BUSPAR) 15 MG tablet Take 15 mg by mouth 2 times daily 9/13/21 3/12/22 Yes SAVANA Redmond CNP   levothyroxine (SYNTHROID) 50 MCG tablet Take 1 tablet by mouth Daily 9/13/21 3/12/22 Yes SAAVNA Redmond CNP   metoprolol succinate (TOPROL XL) 100 MG extended release tablet take 1 tablet by mouth twice a day 21  Yes SAVANA Redmond CNP   sertraline (ZOLOFT) 100 MG tablet Take 1.5 tablets by mouth daily 21  Yes SAVANA Redmond CNP   ranolazine (RANEXA) 500 MG extended release tablet Take 1 tablet by mouth 2 times daily 9/13/21 3/12/22 Yes SAVANA Redmond CNP   amLODIPine (NORVASC) 5 MG tablet Take 1 tablet by mouth daily 9/13/21 3/12/22 Yes SAVANA Redmond CNP   atorvastatin (LIPITOR) 80 MG tablet Take 1 tablet by mouth daily 9/13/21 3/12/22 Yes SAVANA Redmond - CNP   mirabegron (MYRBETRIQ) 50 MG TB24 Take 50 mg by mouth daily 21 Yes SAVANA Adorno CNP   topiramate (TOPAMAX) 50 MG tablet Take 1 tablet by mouth 2 times daily 9/13/21 3/12/22 Yes SAVANA Adorno CNP   albuterol sulfate  (90 Base) MCG/ACT inhaler Inhale 2 puffs into the lungs every 4 hours as needed for Wheezing or Shortness of Breath 9/13/21  Yes SAVANA Adorno CNP   Multiple Vitamins-Minerals (THERAPEUTIC MULTIVITAMIN-MINERALS) tablet Take 1 tablet by mouth daily   Yes Historical Provider, MD   Cholecalciferol (VITAMIN D) 50 MCG (2000 UT) TABS tablet Take 2,000 Units by mouth daily  1/23/20  Yes Historical Provider, MD   diazePAM (VALIUM) 5 MG tablet take 1 tablet by mouth 1 hour PRIOR TO INJECTION may repeat after 15 MINUTES NO DRIVING 13/30/62   Historical Provider, MD   HYDROcodone-acetaminophen ([address]) 5-325 MG per tablet  12/2/21   Historical Provider, MD   ALBUTEROL IN 2 Puffs Inhale (breathe in) every 6 hours as needed wheezing. 12/1/21   Historical Provider, MD   butalbital-acetaminophen-caffeine (FIORICET, ESGIC) -40 MG per tablet Take 1 tablet by mouth every 4 hours as needed for Headaches 11/17/21   Jeanette Cabral,    blood glucose monitor kit and supplies Dispense sufficient amount for BID testing frequency plus additional to accommodate PRN testing needs. Dispense all needed supplies to include: monitor, strips, lancing device, lancets, control solutions, alcohol swabs. 10/25/21   SAVANA Adorno CNP   nitroGLYCERIN (NITROSTAT) 0.4 MG SL tablet place 1 tablet under the tongue if needed every 5 minutes for chest pain for 3 doses IF NO RELIEF AFTER THIRD DOSE CALL PRESCRIBER . 10/19/21   Chepe Regalado, DO   blood glucose test strips (FREESTYLE LITE) strip 1 each by Does not apply route 2 times daily As needed.  9/13/21   SAVANA Adorno CNP   clopidogrel (PLAVIX) 75 MG tablet Take 1 tablet by mouth daily 9/13/21 3/12/22  SAVANA Adorno CNP   albuterol (PROVENTIL) (2.5 MG/3ML) 0.083% nebulizer solution Take 2.5 mg by nebulization every 4 hours as needed for Wheezing or Shortness of Breath    Historical Provider, MD   fluticasone (FLONASE) 50 MCG/ACT nasal spray 1 spray by Each Nostril route daily as needed for Rhinitis    Historical Provider, MD   ascorbic acid (VITAMIN C) 500 MG tablet Take 500 mg by mouth daily    Historical Provider, MD   polyethylene glycol (GLYCOLAX) 17 g packet Take 17 g by mouth daily as needed  8/31/20   Historical Provider, MD   acetaminophen (TYLENOL) 500 MG tablet Take 2 tablets by mouth every 6 hours as needed for Pain 6/24/20   Alicia Chan DO   ibuprofen (ADVIL;MOTRIN) 800 MG tablet Take 1 tablet by mouth every 8 hours as needed for Pain 6/24/20   Alicia Chan DO   FREESTYLE LANCETS MISC 1 each by Does not apply route daily 11/20/19   SAVANA Hicks CNP   Insulin Pen Needle ([employer] PEN NEEDLES 31G) 31G X 8 MM MISC 1 each by Does not apply route daily 11/20/19   SAVANA Hicks CNP   aspirin 81 MG chewable tablet Take 81 mg by mouth daily 5/31/19 12/3/21  Historical Provider, MD       Current medications:    Current Facility-Administered Medications   Medication Dose Route Frequency Provider Last Rate Last Admin    [START ON 12/9/2021] lactated ringers infusion   IntraVENous Continuous Benitez MD Ramona 50 mL/hr at 12/08/21 0651 New Bag at 12/08/21 0651    [START ON 12/9/2021] lidocaine PF 1 % injection 1 mL  1 mL IntraDERmal Once PRN Benitez MD Ramona           Allergies:     Allergies   Allergen Reactions    Seasonal Other (See Comments)     Sneezing, watery eyes, wheezing       Problem List:    Patient Active Problem List   Diagnosis Code    Hypothyroidism E03.9    Type 2 diabetes mellitus without complication, with long-term current use of insulin ([address] E11.9, Z79.4    Essential hypertension I10    Hip fracture ([address] S72.009A    Spinal stenosis of cervical region M48.02    Ground glass opacity present on imaging of lung R91.8    Hx of deep venous thrombosis Z86.718    Situational depression F43.21    Anxiety F41.9    S/P hip replacement, right Z96.641    Chest pain R07.9    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    Mixed hyperlipidemia E78.2    Coronary artery disease involving native heart without angina pectoris I25.10    Migraine without status migrainosus, not intractable G43.909    OAB (overactive bladder) N32.81    Smoker F17.200    Irritable bowel syndrome with constipation K58.1    Irritable bowel syndrome with both constipation and diarrhea K58.2    Positive colorectal cancer screening using Cologuard test R19.5       Past Medical History:        Diagnosis Date    Anxiety     Arthritis     CAD (coronary artery disease)     Depression     Hyperlipidemia     Kidney stone     [facility] infarct [facility] [facility] 2016    Neck pain     Spinal stenosis 2017       Past Surgical History:        Procedure Laterality Date    BACK SURGERY      L5-S1, C5-7    CARDIAC CATHETERIZATION       SECTION      HIP SURGERY Left     LUNG BIOPSY Right     MASTOID SURGERY      TONSILLECTOMY         Social History:    Social History     Tobacco Use    Smoking status: Current Some Day Smoker     Packs/day: 0.50     Years: 25.00     Pack years: 12.50     Types: Cigarettes    Smokeless tobacco: Never Used    Tobacco comment: 3 packs per week   Substance Use Topics    Alcohol use: Yes     Comment: social                                Ready to quit: Not Answered  Counseling given: Not Answered  Comment: 3 packs per week      Vital Signs (Current):   Vitals:    21 0626 21 0627   BP:  (!) 151/99   Pulse:  137   Resp:  16   Temp:  96.4 °F (35.8 °C)   TempSrc:  Temporal   SpO2:  97%   Weight: 205 lb (93 kg)    Height: 5' 3\" (1.6 m)                                               BP Readings from Last 3 Encounters:   21 (!) 151/99   21 130/80   21 133/89       NPO Status: Time of last liquid consumption: 2200                        Time of last solid consumption: 2030                        Date of last liquid consumption: 12/07/21                        Date of last solid food consumption: 12/06/21    BMI:   Wt Readings from Last 3 Encounters:   12/08/21 205 lb (93 kg)   12/03/21 212 lb 6.4 oz (96.3 kg)   11/16/21 200 lb (90.7 kg)     Body mass index is 36.31 kg/m². CBC:   Lab Results   Component Value Date    WBC 11.5 11/16/2021    RBC 4.24 11/16/2021    HGB 12.2 11/16/2021    HCT 38.6 11/16/2021    MCV 91.0 11/16/2021    RDW 14.6 11/16/2021     11/16/2021       CMP:   Lab Results   Component Value Date     11/16/2021    K 4.4 11/16/2021     11/16/2021    CO2 22 11/16/2021    BUN 9 11/16/2021    CREATININE 0.61 11/16/2021    GFRAA >60 11/16/2021    LABGLOM >60 11/16/2021    GLUCOSE 196 11/16/2021    PROT 6.9 11/16/2021    CALCIUM 9.0 11/16/2021    BILITOT 0.13 11/16/2021    ALKPHOS 83 11/16/2021    AST 11 11/16/2021    ALT 14 11/16/2021       POC Tests:   Recent Labs     12/08/21  0650   POCGLU 168*       Coags: No results found for: PROTIME, INR, APTT    HCG (If Applicable):   Lab Results   Component Value Date    PREGTESTUR NEGATIVE 04/26/2019    HCG NEGATIVE 12/08/2021        ABGs: No results found for: PHART, PO2ART, IAM0VGI, NZJ1EWM, BEART, A5KNQDKK     Type & Screen (If Applicable):  No results found for: LABABO, LABRH    Drug/Infectious Status (If Applicable):  No results found for: HIV, HEPCAB    COVID-19 Screening (If Applicable):   Lab Results   Component Value Date    COVID19 Not Detected 09/19/2021           Anesthesia Evaluation    Airway: Mallampati: I  TM distance: >3 FB   Neck ROM: full  Mouth opening: > = 3 FB Dental:          Pulmonary:       (-) shortness of breath                           Cardiovascular:    (+) CABG/stent:,     (-)  angina                Neuro/Psych:               GI/Hepatic/Renal:             Endo/Other:    (+) Diabetes, .                  Abdominal:

## 2021-12-09 LAB — SURGICAL PATHOLOGY REPORT: NORMAL

## 2021-12-20 ENCOUNTER — OFFICE VISIT (OUTPATIENT)
Dept: FAMILY MEDICINE CLINIC | Age: 51
End: 2021-12-20
Payer: COMMERCIAL

## 2021-12-20 VITALS
HEART RATE: 99 BPM | OXYGEN SATURATION: 98 % | DIASTOLIC BLOOD PRESSURE: 80 MMHG | SYSTOLIC BLOOD PRESSURE: 140 MMHG | WEIGHT: 215.6 LBS | BODY MASS INDEX: 38.19 KG/M2

## 2021-12-20 DIAGNOSIS — E03.9 HYPOTHYROIDISM, UNSPECIFIED TYPE: ICD-10-CM

## 2021-12-20 DIAGNOSIS — E11.65 TYPE 2 DIABETES MELLITUS WITH HYPERGLYCEMIA, WITH LONG-TERM CURRENT USE OF INSULIN (HCC): Primary | ICD-10-CM

## 2021-12-20 DIAGNOSIS — G43.909 MIGRAINE WITHOUT STATUS MIGRAINOSUS, NOT INTRACTABLE, UNSPECIFIED MIGRAINE TYPE: ICD-10-CM

## 2021-12-20 DIAGNOSIS — M48.02 SPINAL STENOSIS OF CERVICAL REGION: ICD-10-CM

## 2021-12-20 DIAGNOSIS — I10 ESSENTIAL HYPERTENSION: ICD-10-CM

## 2021-12-20 DIAGNOSIS — I25.10 CORONARY ARTERY DISEASE INVOLVING NATIVE HEART WITHOUT ANGINA PECTORIS, UNSPECIFIED VESSEL OR LESION TYPE: ICD-10-CM

## 2021-12-20 DIAGNOSIS — N32.81 OAB (OVERACTIVE BLADDER): ICD-10-CM

## 2021-12-20 DIAGNOSIS — F41.9 ANXIETY: ICD-10-CM

## 2021-12-20 DIAGNOSIS — J84.82: ICD-10-CM

## 2021-12-20 DIAGNOSIS — E78.2 MIXED HYPERLIPIDEMIA: ICD-10-CM

## 2021-12-20 DIAGNOSIS — Z79.4 TYPE 2 DIABETES MELLITUS WITH HYPERGLYCEMIA, WITH LONG-TERM CURRENT USE OF INSULIN (HCC): Primary | ICD-10-CM

## 2021-12-20 PROBLEM — Z96.641 S/P HIP REPLACEMENT, RIGHT: Status: RESOLVED | Noted: 2020-03-06 | Resolved: 2021-12-20

## 2021-12-20 PROBLEM — R07.9 CHEST PAIN: Status: RESOLVED | Noted: 2021-01-22 | Resolved: 2021-12-20

## 2021-12-20 PROCEDURE — G8482 FLU IMMUNIZE ORDER/ADMIN: HCPCS | Performed by: NURSE PRACTITIONER

## 2021-12-20 PROCEDURE — G8417 CALC BMI ABV UP PARAM F/U: HCPCS | Performed by: NURSE PRACTITIONER

## 2021-12-20 PROCEDURE — 4004F PT TOBACCO SCREEN RCVD TLK: CPT | Performed by: NURSE PRACTITIONER

## 2021-12-20 PROCEDURE — 90674 CCIIV4 VAC NO PRSV 0.5 ML IM: CPT | Performed by: NURSE PRACTITIONER

## 2021-12-20 PROCEDURE — 2022F DILAT RTA XM EVC RTNOPTHY: CPT | Performed by: NURSE PRACTITIONER

## 2021-12-20 PROCEDURE — G8427 DOCREV CUR MEDS BY ELIG CLIN: HCPCS | Performed by: NURSE PRACTITIONER

## 2021-12-20 PROCEDURE — 90471 IMMUNIZATION ADMIN: CPT | Performed by: NURSE PRACTITIONER

## 2021-12-20 PROCEDURE — 3046F HEMOGLOBIN A1C LEVEL >9.0%: CPT | Performed by: NURSE PRACTITIONER

## 2021-12-20 PROCEDURE — 99214 OFFICE O/P EST MOD 30 MIN: CPT | Performed by: NURSE PRACTITIONER

## 2021-12-20 RX ORDER — HYDROCODONE BITARTRATE AND ACETAMINOPHEN 5; 325 MG/1; MG/1
1 TABLET ORAL EVERY 6 HOURS PRN
Qty: 42 TABLET | Refills: 0 | Status: SHIPPED | OUTPATIENT
Start: 2021-12-20 | End: 2022-01-19

## 2021-12-20 ASSESSMENT — PATIENT HEALTH QUESTIONNAIRE - PHQ9
2. FEELING DOWN, DEPRESSED OR HOPELESS: 0
1. LITTLE INTEREST OR PLEASURE IN DOING THINGS: 0
SUM OF ALL RESPONSES TO PHQ9 QUESTIONS 1 & 2: 0
SUM OF ALL RESPONSES TO PHQ QUESTIONS 1-9: 0

## 2021-12-20 NOTE — PROGRESS NOTES
region     Ground glass opacity present on imaging of lung     Hx of deep venous thrombosis     Situational depression     Anxiety     S/P hip replacement, right     Chest pain     Pulmonary Langerhans cell granulomatosis (HCC)     Mixed hyperlipidemia     Coronary artery disease involving native heart without angina pectoris     Migraine without status migrainosus, not intractable     OAB (overactive bladder)     Smoker     Irritable bowel syndrome with constipation     Irritable bowel syndrome with both constipation and diarrhea     Positive colorectal cancer screening using Cologuard test     Gastroesophageal reflux disease with esophagitis without hemorrhage     Screening for colorectal cancer     Past Medical History:   Diagnosis Date    Anxiety     Arthritis     CAD (coronary artery disease)     Depression     Hyperlipidemia     Kidney stone     Myocardial infarct ([facility] [address] 2016    Neck pain     Spinal stenosis 2017      Past Surgical History:   Procedure Laterality Date    BACK SURGERY      L5-S1, C5-7    CARDIAC CATHETERIZATION       SECTION      COLONOSCOPY N/A 2021    COLONOSCOPY WITH BIOPSY [facility] Left     LUNG BIOPSY Right     MASTOID SURGERY      TONSILLECTOMY      UPPER GASTROINTESTINAL ENDOSCOPY  2021    EGD BIOPSY [facility] at [facility] History   Problem Relation Age of Onset    Dementia Mother     Depression Mother     High Blood Pressure Mother     Coronary Art Dis Mother     COPD Mother     Diabetes Mother     Heart Attack Father     Diabetes Maternal Grandmother     Alzheimer's Disease Paternal Grandfather      Social History     Tobacco Use    Smoking status: Current Some Day Smoker     Packs/day: 0.50     Years: 25.00     Pack years: 12.50     Types: Cigarettes    Smokeless tobacco: Never Used    Tobacco comment: 3 packs per week   Substance Use Topics    Alcohol use:  Yes Comment: social     ALLERGIES:    Allergies   Allergen Reactions    Seasonal Other (See Comments)     Sneezing, watery eyes, wheezing          Subjective   Review of Systems   · Constitutional:  Negative for activity change, appetite change,unexpected weight change, chills, fever, and fatigue. · HENT: Negative for ear pain, sore throat,  Rhinorrhea, sinus pain, sinus pressure, congestion. · Eyes:  Negative for pain and discharge. · Respiratory:  Negative for chest tightness, shortness of breath, wheezing, and cough. · Cardiovascular:  Negative for chest pain, palpitations and leg swelling. · Gastrointestinal: Negative for abdominal pain, blood in stool, constipation,diarrhea, nausea and vomiting. · Endocrine: Negative for cold intolerance, heat intolerance, polydipsia, polyphagia and polyuria. · Genitourinary: Negative for difficulty urinating, dysuria, flank pain, frequency, hematuria and urgency. · Musculoskeletal: Negative for arthralgias, back pain, joint swelling, myalgias Positive chronic neck pain  · Skin: Negative for rash and wound. · Allergic/Immunologic: Negative for environmental allergies and food allergies. · Neurological:  Negative for dizziness, light-headedness, numbness and headaches. · Hematological:  Negative for adenopathy. Does not bruise/bleed easily. · Psychiatric/Behavioral: Negative for self-injury, sleep disturbance and suicidal ideas. Objective   Physical Exam  Musculoskeletal:      Cervical back: Pain with movement and muscular tenderness present. Decreased range of motion. PHYSICAL EXAM:  · Constitutional: Dena Roman is oriented to person, place, and time. Vital signs are normal. Appears well-developed and well-nourished. · Eyes:PERRL, EOMI, Conjunctiva normal, No discharge. · Cardiovascular: Normal rate, regular rhythm, S1, S2, no murmur, no gallop, no friction rub, intact distal pulses.     · Pulmonary/Chest: Breath sounds are clear throughout, No respiratory distress, No wheezing, No chest tenderness. Effort normal  · Abdominal: Soft. Normal appearance, bowel sounds are present and normoactive. There is no hepatosplenomegaly. There is no tenderness. There is no CVA tenderness. · Musculoskeletal: Extremities appear regular and symmetric. No evident masses, lesions, foreign bodies, or other abnormalities. No edema. No tenderness on palpation. Joints are stable. Full ROM, strength and tone are within normal limits. · Neurological: Alert and oriented to person, place, and time. Normal motor function, Normal sensory function, No focal deficits noted. He has normal strength. · Skin: Skin is warm, dry and intact. No obvious lesions on exposed skin  · Psychiatric: Normal mood and affect. Speech is normal and behavior is normal.     Nursing note and vitals reviewed. Blood pressure (!) 140/80, pulse 99, weight 215 lb 9.6 oz (97.8 kg), SpO2 98 %, not currently breastfeeding. Body mass index is 38.19 kg/m². Wt Readings from Last 3 Encounters:   12/20/21 215 lb 9.6 oz (97.8 kg)   12/08/21 205 lb (93 kg)   12/03/21 212 lb 6.4 oz (96.3 kg)     BP Readings from Last 3 Encounters:   12/20/21 (!) 140/80   12/08/21 119/79   12/08/21 100/68       Admission on 12/08/2021, Discharged on 12/08/2021   Component Date Value Ref Range Status    HCG, Pregnancy Urine (POC) 12/08/2021 NEGATIVE  NEGATIVE Final    Comment: Specimens with hCG levels near the threshold of the test (25 mIU/mL) may give a negative or   indeterminate result. In such cases, another test should be performed with a new specimen   in 48-72 hours. If early pregnancy is suspected clinically in this setting, correlation   with quantitative serum b-hCG level is suggested.  POC Glucose 12/08/2021 168* 65 - 105 mg/dL Final    Surgical Pathology Report 12/08/2021    Final                    Value:-- Diagnosis --  1.   STOMACH, ANTRUM, BIOPSY:    MINIMAL CHRONIC INACTIVE GASTRITIS    BY H&E STAINING, HELICOBACTER PYLORI MICROORGANISMS ARE NOT  IDENTIFIED    2.  GE JUNCTION, BIOPSY:    COLUMNAR MUCOSA WITH MILD CHRONIC INFLAMMATION    THERE IS NO INTESTINAL METAPLASIA OR DYSPLASIA IDENTIFIED    BENIGN SQUAMOUS MUCOSA    3. DISTAL SIGMOID COLON, POLYPECTOMY:    HYPERPLASTIC POLYP      Coral Delon. Kelsea Ojeda D.O.  **Electronically Signed Out**         [facility]/12/9/2021       Clinical Information  Pre-Op Diagnosis:  DX POSITIVE COLOGUARD TEST, IBS WITH CONSTIPATION  AND DIARRHEA  Operative Findings:  GASTRIC ANTRUM FOR H PYLORI, GE JUNCTION, SIGMOID  COLON POLYP  Operative Performed:  COLONOSCOPY WITH BIOPSY, EGD BIOPSY    Source of Specimen  1: GASTRIC ANTRUM  2: GE JUNCTION  3: DISTAL SIGMOID COLON POLYP    Gross Description  1.. \"SERGEY KELSEA, GASTRIC ANTRUM\"  Two pink tan tissue fragments  ranging in size from 0.1 to 0.3 cm and form in aggregate measuring 0.4  x 0.3 x 0.2 cm. Entirel                          venkat Robertson  2. Meena Crumble \"SERGEY KELSEA, GE JUNCTION\"  Four tan white tissue fragments  ranging in size from 0.2 to 0.3 cm and form in aggregate measuring 0.5  x 0.4 x 0.2 cm. Entirely. 1cs  3. Beaumont Crumble \"SERGEY KELSEA, DISTAL SIGMOID COLON POLYP\"  Two pink tan tissue  fragments each measuring 0.3 cm in greatest dimension and form in  aggregate measuring 0.4 x 0.3 x 0.2 cm. Entirely. 1cs  mpb/dw      Microscopic Description  1-3. Microscopic examination performed. SURGICAL PATHOLOGY CONSULTATION       Patient Name: Shanta Diop [facility] Rec: [id number]  Path Number: [id number]    [facility]  ANATOMIC PATHOLOGY  [address] [place] [address]. [place], [address]  [phone]  Fax: [phone]       No results found for this visit on 12/20/21.     Completed Orders/Prescriptions   Orders Placed This Encounter   Medications    Insulin Degludec 200 UNIT/ML SOPN     Sig: Inject 30 Units into the skin daily     Dispense:  4 pen     Refill:  3    HYDROcodone-acetaminophen (NORCO) 5-325 MG per tablet     Sig: Take 1 tablet by mouth every 6 hours as needed for Pain for up to 30 days. Dispense:  42 tablet     Refill:  0     Reduce doses taken as pain becomes manageable           Immunizations Administered     Name Date Dose Route    Influenza, MDCK Quadv, IM, PF (Flucelvax 2 yrs and older) 12/20/2021 0.5 mL Intramuscular    Site: Deltoid- Left    Lot: [id number]    NDC: 45289-258-79          AssessmentPlan/Medical Decision Making     1. Type 2 diabetes mellitus with hyperglycemia, with long-term current use of insulin (HCC)    - will discontinue Humalog and add Tresiba  - Insulin Degludec 200 UNIT/ML SOPN; Inject 30 Units into the skin daily  Dispense: 4 pen; Refill: 3  - [facility] Primary Care Pharmacist  - Hemoglobin A1C; Future    2. Essential hypertension    - Stable  - Follows with Cardiology  - Continue Metoprolol    3. Hypothyroidism, unspecified type    - Stable  - Continue Levothyroxine    4. Coronary artery disease involving native heart without angina pectoris, unspecified vessel or lesion type    - Follows with Cardiology    5. Pulmonary Langerhans cell granulomatosis ([facility] [address]    - follows with pulmonary    6. OAB (overactive bladder)    - Stable    7. Anxiety    - Stable  - Continue Buspar    8. Mixed hyperlipidemia    - Stable    9. Migraine without status migrainosus, not intractable, unspecified migraine type    - Stable  - Continue Topamax  - Will be scheduling with Neurology    10. Spinal stenosis of cervical region    - PDMP reviewed  - Patient aware of one time prescription due to not being able to take NSAIDs   - Will be scheduling with [facility] management at [facility]  - HYDROcodone-acetaminophen ([address]) 5-325 MG per tablet; Take 1 tablet by mouth every 6 hours as needed for Pain for up to 30 days. Dispense: 42 tablet; Refill: 0      Return in about 3 months (around 3/20/2022) for chronic conditions.     1.  Sofy Schulz received counseling on the following healthy behaviors: nutrition, exercise and medication adherence  2. Patient given educational materials - see patient instructions  3. Was a self-tracking handout given in paper form or via [employer]? No  If yes, see orders or list here. 4.  Discussed use, benefit, and side effects of prescribed medications. Barriers to medication compliance addressed. All patient questions answered. Pt voiced understanding. 5.  Reviewed prior labs, imaging, consultation, follow up, and health maintenance  6. Continue current medications, diet and exercise. 7. Discussed use, benefit, and side effects of prescribed medications. Barriers to medication compliance addressed. All her questions were answered. Pt voiced understanding. Tony Trivedi will continue current medications, diet and exercise. Of the 30 minute duration appointment visit, Alena Aceves CNP spent at least 50% of the face-to-face time in counseling, explanation of diagnosis, planning of further management, and answering all questions.           Signed:  Alena Aceves CNP

## 2021-12-30 ENCOUNTER — HOSPITAL ENCOUNTER (OUTPATIENT)
Age: 51
Setting detail: OBSERVATION
Discharge: HOME | End: 2022-01-01
Attending: EMERGENCY MEDICINE | Admitting: FAMILY MEDICINE
Payer: COMMERCIAL

## 2021-12-30 ENCOUNTER — APPOINTMENT (OUTPATIENT)
Dept: GENERAL RADIOLOGY | Age: 51
End: 2021-12-30
Payer: COMMERCIAL

## 2021-12-30 DIAGNOSIS — R07.9 CHEST PAIN, UNSPECIFIED TYPE: Primary | ICD-10-CM

## 2021-12-30 LAB
ABSOLUTE EOS #: 0.78 K/UL (ref 0–0.4)
ABSOLUTE IMMATURE GRANULOCYTE: 0 K/UL (ref 0–0.3)
ABSOLUTE LYMPH #: 7.22 K/UL (ref 1–4.8)
ABSOLUTE MONO #: 1.56 K/UL (ref 0.2–0.8)
ANION GAP SERPL CALCULATED.3IONS-SCNC: 14 MMOL/L (ref 9–17)
BASOPHILS # BLD: 0 %
BASOPHILS ABSOLUTE: 0 K/UL (ref 0–0.2)
BNP INTERPRETATION: NORMAL
BUN BLDV-MCNC: 9 MG/DL (ref 6–20)
BUN/CREAT BLD: 15 (ref 9–20)
CALCIUM SERPL-MCNC: 8.7 MG/DL (ref 8.6–10.4)
CHLORIDE BLD-SCNC: 100 MMOL/L (ref 98–107)
CO2: 20 MMOL/L (ref 20–31)
CREAT SERPL-MCNC: 0.59 MG/DL (ref 0.5–0.9)
D-DIMER QUANTITATIVE: 0.33 MG/L FEU (ref 0–0.59)
DIFFERENTIAL TYPE: ABNORMAL
EOSINOPHILS RELATIVE PERCENT: 4 % (ref 1–4)
GFR AFRICAN AMERICAN: >60 ML/MIN
GFR NON-AFRICAN AMERICAN: >60 ML/MIN
GFR SERPL CREATININE-BSD FRML MDRD: ABNORMAL ML/MIN/{1.73_M2}
GFR SERPL CREATININE-BSD FRML MDRD: ABNORMAL ML/MIN/{1.73_M2}
GLUCOSE BLD-MCNC: 168 MG/DL (ref 70–99)
HCG QUALITATIVE: NEGATIVE
HCT VFR BLD CALC: 37 % (ref 36.3–47.1)
HEMOGLOBIN: 12 G/DL (ref 11.9–15.1)
IMMATURE GRANULOCYTES: 0 %
LYMPHOCYTES # BLD: 37 % (ref 24–44)
MCH RBC QN AUTO: 28.6 PG (ref 25.2–33.5)
MCHC RBC AUTO-ENTMCNC: 32.4 G/DL (ref 28.4–34.8)
MCV RBC AUTO: 88.1 FL (ref 82.6–102.9)
MONOCYTES # BLD: 8 % (ref 1–7)
MYOGLOBIN: <21 NG/ML (ref 25–58)
NRBC AUTOMATED: 0 PER 100 WBC
PDW BLD-RTO: 14.4 % (ref 11.8–14.4)
PLATELET # BLD: 428 K/UL (ref 138–453)
PLATELET ESTIMATE: ABNORMAL
PMV BLD AUTO: 9.1 FL (ref 8.1–13.5)
POTASSIUM SERPL-SCNC: 3.6 MMOL/L (ref 3.7–5.3)
PRO-BNP: 61 PG/ML
RBC # BLD: 4.2 M/UL (ref 3.95–5.11)
RBC # BLD: ABNORMAL 10*6/UL
SARS-COV-2, RAPID: NOT DETECTED
SEG NEUTROPHILS: 51 % (ref 36–66)
SEGMENTED NEUTROPHILS ABSOLUTE COUNT: 9.94 K/UL (ref 1.8–7.7)
SODIUM BLD-SCNC: 134 MMOL/L (ref 135–144)
SPECIMEN DESCRIPTION: NORMAL
TROPONIN INTERP: ABNORMAL
TROPONIN T: ABNORMAL NG/ML
TROPONIN, HIGH SENSITIVITY: 8 NG/L (ref 0–14)
WBC # BLD: 19.5 K/UL (ref 3.5–11.3)
WBC # BLD: ABNORMAL 10*3/UL

## 2021-12-30 PROCEDURE — 84484 ASSAY OF TROPONIN QUANT: CPT

## 2021-12-30 PROCEDURE — 2500000003 HC RX 250 WO HCPCS: Performed by: NURSE PRACTITIONER

## 2021-12-30 PROCEDURE — 80048 BASIC METABOLIC PNL TOTAL CA: CPT

## 2021-12-30 PROCEDURE — 71045 X-RAY EXAM CHEST 1 VIEW: CPT

## 2021-12-30 PROCEDURE — 85379 FIBRIN DEGRADATION QUANT: CPT

## 2021-12-30 PROCEDURE — 96374 THER/PROPH/DIAG INJ IV PUSH: CPT

## 2021-12-30 PROCEDURE — 6360000002 HC RX W HCPCS: Performed by: NURSE PRACTITIONER

## 2021-12-30 PROCEDURE — 83880 ASSAY OF NATRIURETIC PEPTIDE: CPT

## 2021-12-30 PROCEDURE — 99284 EMERGENCY DEPT VISIT MOD MDM: CPT

## 2021-12-30 PROCEDURE — 83874 ASSAY OF MYOGLOBIN: CPT

## 2021-12-30 PROCEDURE — 84703 CHORIONIC GONADOTROPIN ASSAY: CPT

## 2021-12-30 PROCEDURE — 96375 TX/PRO/DX INJ NEW DRUG ADDON: CPT

## 2021-12-30 PROCEDURE — 85025 COMPLETE CBC W/AUTO DIFF WBC: CPT

## 2021-12-30 PROCEDURE — 99219 PR INITIAL OBSERVATION CARE/DAY 50 MINUTES: CPT | Performed by: NURSE PRACTITIONER

## 2021-12-30 PROCEDURE — 87635 SARS-COV-2 COVID-19 AMP PRB: CPT

## 2021-12-30 PROCEDURE — 96376 TX/PRO/DX INJ SAME DRUG ADON: CPT

## 2021-12-30 PROCEDURE — 93005 ELECTROCARDIOGRAM TRACING: CPT

## 2021-12-30 RX ORDER — MORPHINE SULFATE 2 MG/ML
2 INJECTION, SOLUTION INTRAMUSCULAR; INTRAVENOUS ONCE
Status: COMPLETED | OUTPATIENT
Start: 2021-12-30 | End: 2021-12-30

## 2021-12-30 RX ORDER — ONDANSETRON 2 MG/ML
4 INJECTION INTRAMUSCULAR; INTRAVENOUS ONCE
Status: COMPLETED | OUTPATIENT
Start: 2021-12-30 | End: 2021-12-30

## 2021-12-30 RX ADMIN — MORPHINE SULFATE 2 MG: 2 INJECTION, SOLUTION INTRAMUSCULAR; INTRAVENOUS at 23:50

## 2021-12-30 RX ADMIN — MORPHINE SULFATE 2 MG: 2 INJECTION, SOLUTION INTRAMUSCULAR; INTRAVENOUS at 22:16

## 2021-12-30 RX ADMIN — ONDANSETRON 4 MG: 2 INJECTION INTRAMUSCULAR; INTRAVENOUS at 22:16

## 2021-12-30 RX ADMIN — FAMOTIDINE 20 MG: 10 INJECTION, SOLUTION INTRAVENOUS at 23:50

## 2021-12-30 ASSESSMENT — ENCOUNTER SYMPTOMS
DIARRHEA: 0
SHORTNESS OF BREATH: 0
COUGH: 0
BACK PAIN: 0
VOMITING: 0
COLOR CHANGE: 0
NAUSEA: 0
ABDOMINAL PAIN: 0

## 2021-12-30 ASSESSMENT — PAIN SCALES - GENERAL
PAINLEVEL_OUTOF10: 7
PAINLEVEL_OUTOF10: 8
PAINLEVEL_OUTOF10: 7
PAINLEVEL_OUTOF10: 7
PAINLEVEL_OUTOF10: 9

## 2021-12-31 PROBLEM — R07.9 CHEST PAIN: Status: RESOLVED | Noted: 2021-01-22 | Resolved: 2021-12-31

## 2021-12-31 LAB
-: ABNORMAL
AMORPHOUS: ABNORMAL
BACTERIA: ABNORMAL
BILIRUBIN URINE: NEGATIVE
CASTS UA: ABNORMAL /LPF
COLOR: YELLOW
COMMENT UA: ABNORMAL
CRYSTALS, UA: ABNORMAL /HPF
EPITHELIAL CELLS UA: ABNORMAL /HPF (ref 0–5)
GLUCOSE BLD-MCNC: 137 MG/DL (ref 65–105)
GLUCOSE BLD-MCNC: 139 MG/DL (ref 65–105)
GLUCOSE BLD-MCNC: 159 MG/DL (ref 65–105)
GLUCOSE BLD-MCNC: 160 MG/DL (ref 65–105)
GLUCOSE URINE: ABNORMAL
KETONES, URINE: NEGATIVE
LEUKOCYTE ESTERASE, URINE: NEGATIVE
MUCUS: ABNORMAL
NITRITE, URINE: NEGATIVE
OTHER OBSERVATIONS UA: ABNORMAL
PH UA: 6 (ref 5–8)
PROCALCITONIN: 0.03 NG/ML
PROTEIN UA: NEGATIVE
RBC UA: ABNORMAL /HPF (ref 0–2)
RENAL EPITHELIAL, UA: ABNORMAL /HPF
SPECIFIC GRAVITY UA: 1.02 (ref 1–1.03)
TRICHOMONAS: ABNORMAL
TROPONIN INTERP: NORMAL
TROPONIN T: NORMAL NG/ML
TROPONIN, HIGH SENSITIVITY: 8 NG/L (ref 0–14)
TURBIDITY: CLEAR
URINE HGB: NEGATIVE
UROBILINOGEN, URINE: NORMAL
WBC UA: ABNORMAL /HPF (ref 0–5)
YEAST: ABNORMAL

## 2021-12-31 PROCEDURE — 84145 PROCALCITONIN (PCT): CPT

## 2021-12-31 PROCEDURE — 36415 COLL VENOUS BLD VENIPUNCTURE: CPT

## 2021-12-31 PROCEDURE — 6370000000 HC RX 637 (ALT 250 FOR IP): Performed by: NURSE PRACTITIONER

## 2021-12-31 PROCEDURE — 6360000002 HC RX W HCPCS: Performed by: NURSE PRACTITIONER

## 2021-12-31 PROCEDURE — 84484 ASSAY OF TROPONIN QUANT: CPT

## 2021-12-31 PROCEDURE — 96376 TX/PRO/DX INJ SAME DRUG ADON: CPT

## 2021-12-31 PROCEDURE — 82947 ASSAY GLUCOSE BLOOD QUANT: CPT

## 2021-12-31 PROCEDURE — G0378 HOSPITAL OBSERVATION PER HR: HCPCS

## 2021-12-31 PROCEDURE — 96372 THER/PROPH/DIAG INJ SC/IM: CPT

## 2021-12-31 PROCEDURE — 81001 URINALYSIS AUTO W/SCOPE: CPT

## 2021-12-31 PROCEDURE — 99225 PR SBSQ OBSERVATION CARE/DAY 25 MINUTES: CPT | Performed by: FAMILY MEDICINE

## 2021-12-31 PROCEDURE — 94640 AIRWAY INHALATION TREATMENT: CPT

## 2021-12-31 PROCEDURE — 2580000003 HC RX 258: Performed by: NURSE PRACTITIONER

## 2021-12-31 PROCEDURE — 96361 HYDRATE IV INFUSION ADD-ON: CPT

## 2021-12-31 RX ORDER — MAGNESIUM SULFATE 1 G/100ML
1000 INJECTION INTRAVENOUS PRN
Status: DISCONTINUED | OUTPATIENT
Start: 2021-12-31 | End: 2022-01-01 | Stop reason: HOSPADM

## 2021-12-31 RX ORDER — MORPHINE SULFATE 4 MG/ML
4 INJECTION, SOLUTION INTRAMUSCULAR; INTRAVENOUS ONCE
Status: COMPLETED | OUTPATIENT
Start: 2021-12-31 | End: 2021-12-31

## 2021-12-31 RX ORDER — INSULIN GLARGINE 100 [IU]/ML
30 INJECTION, SOLUTION SUBCUTANEOUS DAILY
Status: DISCONTINUED | OUTPATIENT
Start: 2021-12-31 | End: 2022-01-01 | Stop reason: HOSPADM

## 2021-12-31 RX ORDER — AMLODIPINE BESYLATE 5 MG/1
5 TABLET ORAL DAILY
Status: DISCONTINUED | OUTPATIENT
Start: 2021-12-31 | End: 2022-01-01 | Stop reason: HOSPADM

## 2021-12-31 RX ORDER — HYDROCODONE BITARTRATE AND ACETAMINOPHEN 5; 325 MG/1; MG/1
1 TABLET ORAL ONCE
Status: COMPLETED | OUTPATIENT
Start: 2021-12-31 | End: 2021-12-31

## 2021-12-31 RX ORDER — ATORVASTATIN CALCIUM 80 MG/1
80 TABLET, FILM COATED ORAL DAILY
Status: DISCONTINUED | OUTPATIENT
Start: 2021-12-31 | End: 2022-01-01 | Stop reason: HOSPADM

## 2021-12-31 RX ORDER — DEXTROSE MONOHYDRATE 50 MG/ML
100 INJECTION, SOLUTION INTRAVENOUS PRN
Status: DISCONTINUED | OUTPATIENT
Start: 2021-12-31 | End: 2022-01-01 | Stop reason: HOSPADM

## 2021-12-31 RX ORDER — NITROGLYCERIN 0.4 MG/1
0.4 TABLET SUBLINGUAL EVERY 5 MIN PRN
Status: DISCONTINUED | OUTPATIENT
Start: 2021-12-31 | End: 2022-01-01 | Stop reason: HOSPADM

## 2021-12-31 RX ORDER — ONDANSETRON 4 MG/1
4 TABLET, ORALLY DISINTEGRATING ORAL EVERY 8 HOURS PRN
Status: DISCONTINUED | OUTPATIENT
Start: 2021-12-31 | End: 2022-01-01 | Stop reason: HOSPADM

## 2021-12-31 RX ORDER — ONDANSETRON 2 MG/ML
4 INJECTION INTRAMUSCULAR; INTRAVENOUS EVERY 6 HOURS PRN
Status: DISCONTINUED | OUTPATIENT
Start: 2021-12-31 | End: 2022-01-01 | Stop reason: HOSPADM

## 2021-12-31 RX ORDER — ACETAMINOPHEN 650 MG/1
650 SUPPOSITORY RECTAL EVERY 6 HOURS PRN
Status: DISCONTINUED | OUTPATIENT
Start: 2021-12-31 | End: 2021-12-31

## 2021-12-31 RX ORDER — ACETAMINOPHEN 650 MG/1
650 SUPPOSITORY RECTAL EVERY 6 HOURS PRN
Status: DISCONTINUED | OUTPATIENT
Start: 2021-12-31 | End: 2022-01-01 | Stop reason: HOSPADM

## 2021-12-31 RX ORDER — TOPIRAMATE 25 MG/1
50 TABLET ORAL 2 TIMES DAILY
Status: DISCONTINUED | OUTPATIENT
Start: 2021-12-31 | End: 2022-01-01 | Stop reason: HOSPADM

## 2021-12-31 RX ORDER — ALBUTEROL SULFATE 90 UG/1
2 AEROSOL, METERED RESPIRATORY (INHALATION) EVERY 4 HOURS PRN
Status: DISCONTINUED | OUTPATIENT
Start: 2021-12-31 | End: 2022-01-01 | Stop reason: HOSPADM

## 2021-12-31 RX ORDER — HYDROCODONE BITARTRATE AND ACETAMINOPHEN 5; 325 MG/1; MG/1
1 TABLET ORAL EVERY 6 HOURS PRN
Status: DISCONTINUED | OUTPATIENT
Start: 2021-12-31 | End: 2022-01-01 | Stop reason: HOSPADM

## 2021-12-31 RX ORDER — POTASSIUM CHLORIDE 7.45 MG/ML
10 INJECTION INTRAVENOUS PRN
Status: DISCONTINUED | OUTPATIENT
Start: 2021-12-31 | End: 2022-01-01 | Stop reason: HOSPADM

## 2021-12-31 RX ORDER — CLOPIDOGREL BISULFATE 75 MG/1
75 TABLET ORAL DAILY
Status: DISCONTINUED | OUTPATIENT
Start: 2021-12-31 | End: 2022-01-01 | Stop reason: HOSPADM

## 2021-12-31 RX ORDER — ATORVASTATIN CALCIUM 40 MG/1
40 TABLET, FILM COATED ORAL NIGHTLY
Status: DISCONTINUED | OUTPATIENT
Start: 2021-12-31 | End: 2021-12-31 | Stop reason: SDUPTHER

## 2021-12-31 RX ORDER — TROSPIUM CHLORIDE 20 MG/1
20 TABLET, FILM COATED ORAL
Status: DISCONTINUED | OUTPATIENT
Start: 2021-12-31 | End: 2022-01-01 | Stop reason: HOSPADM

## 2021-12-31 RX ORDER — LEVOTHYROXINE SODIUM 0.05 MG/1
50 TABLET ORAL DAILY
Status: DISCONTINUED | OUTPATIENT
Start: 2021-12-31 | End: 2022-01-01 | Stop reason: HOSPADM

## 2021-12-31 RX ORDER — NICOTINE POLACRILEX 4 MG
15 LOZENGE BUCCAL PRN
Status: DISCONTINUED | OUTPATIENT
Start: 2021-12-31 | End: 2022-01-01 | Stop reason: HOSPADM

## 2021-12-31 RX ORDER — DEXTROSE MONOHYDRATE 25 G/50ML
12.5 INJECTION, SOLUTION INTRAVENOUS PRN
Status: DISCONTINUED | OUTPATIENT
Start: 2021-12-31 | End: 2022-01-01 | Stop reason: HOSPADM

## 2021-12-31 RX ORDER — RANOLAZINE 500 MG/1
500 TABLET, EXTENDED RELEASE ORAL 2 TIMES DAILY
Status: DISCONTINUED | OUTPATIENT
Start: 2021-12-31 | End: 2022-01-01 | Stop reason: HOSPADM

## 2021-12-31 RX ORDER — SODIUM CHLORIDE 0.9 % (FLUSH) 0.9 %
5-40 SYRINGE (ML) INJECTION EVERY 12 HOURS SCHEDULED
Status: DISCONTINUED | OUTPATIENT
Start: 2021-12-31 | End: 2022-01-01 | Stop reason: HOSPADM

## 2021-12-31 RX ORDER — POTASSIUM CHLORIDE 20 MEQ/1
40 TABLET, EXTENDED RELEASE ORAL PRN
Status: DISCONTINUED | OUTPATIENT
Start: 2021-12-31 | End: 2022-01-01 | Stop reason: HOSPADM

## 2021-12-31 RX ORDER — ACETAMINOPHEN 325 MG/1
650 TABLET ORAL EVERY 6 HOURS PRN
Status: DISCONTINUED | OUTPATIENT
Start: 2021-12-31 | End: 2021-12-31

## 2021-12-31 RX ORDER — M-VIT,TX,IRON,MINS/CALC/FOLIC 27MG-0.4MG
1 TABLET ORAL DAILY
Status: DISCONTINUED | OUTPATIENT
Start: 2021-12-31 | End: 2022-01-01 | Stop reason: HOSPADM

## 2021-12-31 RX ORDER — ALBUTEROL SULFATE 90 UG/1
2 AEROSOL, METERED RESPIRATORY (INHALATION) EVERY 4 HOURS PRN
Status: DISCONTINUED | OUTPATIENT
Start: 2021-12-31 | End: 2021-12-31

## 2021-12-31 RX ORDER — SODIUM CHLORIDE 0.9 % (FLUSH) 0.9 %
10 SYRINGE (ML) INJECTION PRN
Status: DISCONTINUED | OUTPATIENT
Start: 2021-12-31 | End: 2022-01-01 | Stop reason: HOSPADM

## 2021-12-31 RX ORDER — SODIUM CHLORIDE 9 MG/ML
25 INJECTION, SOLUTION INTRAVENOUS PRN
Status: DISCONTINUED | OUTPATIENT
Start: 2021-12-31 | End: 2022-01-01 | Stop reason: HOSPADM

## 2021-12-31 RX ORDER — TIZANIDINE 2 MG/1
2 TABLET ORAL 3 TIMES DAILY
Status: DISCONTINUED | OUTPATIENT
Start: 2021-12-31 | End: 2022-01-01 | Stop reason: HOSPADM

## 2021-12-31 RX ORDER — NICOTINE 21 MG/24HR
1 PATCH, TRANSDERMAL 24 HOURS TRANSDERMAL DAILY
Status: DISCONTINUED | OUTPATIENT
Start: 2021-12-31 | End: 2022-01-01 | Stop reason: HOSPADM

## 2021-12-31 RX ORDER — SODIUM CHLORIDE 9 MG/ML
INJECTION, SOLUTION INTRAVENOUS CONTINUOUS
Status: DISCONTINUED | OUTPATIENT
Start: 2021-12-31 | End: 2022-01-01 | Stop reason: HOSPADM

## 2021-12-31 RX ORDER — M-VIT,TX,IRON,MINS/CALC/FOLIC 27MG-0.4MG
1 TABLET ORAL DAILY
Status: DISCONTINUED | OUTPATIENT
Start: 2021-12-31 | End: 2021-12-31

## 2021-12-31 RX ORDER — ACETAMINOPHEN 325 MG/1
650 TABLET ORAL EVERY 6 HOURS PRN
Status: DISCONTINUED | OUTPATIENT
Start: 2021-12-31 | End: 2022-01-01 | Stop reason: HOSPADM

## 2021-12-31 RX ORDER — METOPROLOL SUCCINATE 50 MG/1
100 TABLET, EXTENDED RELEASE ORAL DAILY
Status: DISCONTINUED | OUTPATIENT
Start: 2021-12-31 | End: 2022-01-01 | Stop reason: HOSPADM

## 2021-12-31 RX ADMIN — TROSPIUM CHLORIDE 20 MG: 20 TABLET, FILM COATED ORAL at 08:21

## 2021-12-31 RX ADMIN — TROSPIUM CHLORIDE 20 MG: 20 TABLET, FILM COATED ORAL at 16:05

## 2021-12-31 RX ADMIN — MULTIPLE VITAMINS W/ MINERALS TAB 1 TABLET: TAB at 11:47

## 2021-12-31 RX ADMIN — ACETAMINOPHEN 650 MG: 325 TABLET ORAL at 01:50

## 2021-12-31 RX ADMIN — SODIUM CHLORIDE: 9 INJECTION, SOLUTION INTRAVENOUS at 15:25

## 2021-12-31 RX ADMIN — HYDROCODONE BITARTRATE AND ACETAMINOPHEN 1 TABLET: 5; 325 TABLET ORAL at 03:10

## 2021-12-31 RX ADMIN — HYDROCODONE BITARTRATE AND ACETAMINOPHEN 1 TABLET: 5; 325 TABLET ORAL at 11:47

## 2021-12-31 RX ADMIN — SERTRALINE HYDROCHLORIDE 150 MG: 100 TABLET ORAL at 08:20

## 2021-12-31 RX ADMIN — Medication 0.4 MG: at 08:32

## 2021-12-31 RX ADMIN — RANOLAZINE 500 MG: 500 TABLET, FILM COATED, EXTENDED RELEASE ORAL at 08:20

## 2021-12-31 RX ADMIN — TOPIRAMATE 50 MG: 25 TABLET, FILM COATED ORAL at 08:20

## 2021-12-31 RX ADMIN — MORPHINE SULFATE 4 MG: 4 INJECTION INTRAVENOUS at 21:07

## 2021-12-31 RX ADMIN — HYDROCODONE BITARTRATE AND ACETAMINOPHEN 1 TABLET: 5; 325 TABLET ORAL at 18:00

## 2021-12-31 RX ADMIN — TIZANIDINE 2 MG: 2 TABLET ORAL at 13:28

## 2021-12-31 RX ADMIN — RANOLAZINE 500 MG: 500 TABLET, FILM COATED, EXTENDED RELEASE ORAL at 20:41

## 2021-12-31 RX ADMIN — ATORVASTATIN CALCIUM 80 MG: 80 TABLET, FILM COATED ORAL at 08:21

## 2021-12-31 RX ADMIN — SODIUM CHLORIDE, PRESERVATIVE FREE 10 ML: 5 INJECTION INTRAVENOUS at 08:22

## 2021-12-31 RX ADMIN — TOPIRAMATE 50 MG: 25 TABLET, FILM COATED ORAL at 20:41

## 2021-12-31 RX ADMIN — LEVOTHYROXINE SODIUM 50 MCG: 0.05 TABLET ORAL at 08:20

## 2021-12-31 RX ADMIN — ENOXAPARIN SODIUM 40 MG: 100 INJECTION SUBCUTANEOUS at 08:22

## 2021-12-31 RX ADMIN — ALBUTEROL SULFATE 2 PUFF: 90 AEROSOL, METERED RESPIRATORY (INHALATION) at 17:40

## 2021-12-31 RX ADMIN — BUSPIRONE HYDROCHLORIDE 15 MG: 10 TABLET ORAL at 08:21

## 2021-12-31 RX ADMIN — TIZANIDINE 2 MG: 2 TABLET ORAL at 20:41

## 2021-12-31 RX ADMIN — CLOPIDOGREL BISULFATE 75 MG: 75 TABLET ORAL at 08:20

## 2021-12-31 RX ADMIN — BUSPIRONE HYDROCHLORIDE 15 MG: 10 TABLET ORAL at 20:41

## 2021-12-31 RX ADMIN — SODIUM CHLORIDE: 9 INJECTION, SOLUTION INTRAVENOUS at 01:25

## 2021-12-31 ASSESSMENT — PAIN DESCRIPTION - PAIN TYPE
TYPE: CHRONIC PAIN

## 2021-12-31 ASSESSMENT — PAIN DESCRIPTION - PROGRESSION
CLINICAL_PROGRESSION: NOT CHANGED

## 2021-12-31 ASSESSMENT — PAIN DESCRIPTION - ONSET
ONSET: ON-GOING

## 2021-12-31 ASSESSMENT — PAIN DESCRIPTION - ORIENTATION
ORIENTATION: POSTERIOR

## 2021-12-31 ASSESSMENT — PAIN DESCRIPTION - FREQUENCY
FREQUENCY: CONTINUOUS

## 2021-12-31 ASSESSMENT — PAIN DESCRIPTION - DESCRIPTORS
DESCRIPTORS: THROBBING;BURNING;ACHING
DESCRIPTORS: THROBBING;ACHING;DISCOMFORT
DESCRIPTORS: THROBBING;BURNING;ACHING
DESCRIPTORS: DISCOMFORT;THROBBING
DESCRIPTORS: DISCOMFORT;THROBBING
DESCRIPTORS: THROBBING;BURNING

## 2021-12-31 ASSESSMENT — PAIN SCALES - GENERAL
PAINLEVEL_OUTOF10: 8
PAINLEVEL_OUTOF10: 7
PAINLEVEL_OUTOF10: 10
PAINLEVEL_OUTOF10: 8

## 2021-12-31 ASSESSMENT — PAIN DESCRIPTION - LOCATION
LOCATION: NECK;HEAD
LOCATION: NECK
LOCATION: NECK;HEAD
LOCATION: NECK
LOCATION: NECK
LOCATION: HEAD;NECK

## 2021-12-31 NOTE — PROGRESS NOTES
Pt admitted to room 2023  from ER. Oriented to room and call light/tv controls. Bed in lowest position, wheels locked, 2/4 side rails up  Call light in reach, room free of clutter, adequate lighting provided.

## 2021-12-31 NOTE — RT PROTOCOL NOTE
RT Inhaler-Nebulizer Bronchodilator Protocol Note    There is a bronchodilator order in the chart from a provider indicating to follow the RT Bronchodilator Protocol and there is an Initiate RT Inhaler-Nebulizer Bronchodilator Protocol order as well (see protocol at bottom of note). CXR Findings:  XR CHEST PORTABLE    Result Date: 12/30/2021  No acute cardiopulmonary abnormality. The findings from the last RT Protocol Assessment were as follows:   History Pulmonary Disease: None or smoker <15 pack years  Respiratory Pattern: Regular pattern and RR 12-20 bpm  Breath Sounds: Clear breath sounds  Cough: Strong, productive  Indication for Bronchodilator Therapy: On home bronchodilators  Bronchodilator Assessment Score: 1    Aerosolized bronchodilator medication orders have been revised according to the RT Inhaler-Nebulizer Bronchodilator Protocol below. Respiratory Therapist to perform RT Therapy Protocol Assessment initially then follow the protocol. Repeat RT Therapy Protocol Assessment PRN for score 0-3 or on second treatment, BID, and PRN for scores above 3. No Indications  adjust the frequency to every 6 hours PRN wheezing or bronchospasm, if no treatments needed after 48 hours then discontinue using Per Protocol order mode. If indication present, adjust the RT bronchodilator orders based on the Bronchodilator Assessment Score as indicated below. Use Inhaler orders unless patient has one or more of the following: on home nebulizer, not able to hold breath for 10 seconds, is not alert and oriented, cannot activate and use MDI correctly, or respiratory rate 25 breaths per minute or more, then use the equivalent nebulizer order(s) with same Frequency and PRN reasons based on the score. If a patient is on this medication at home then do not decrease Frequency below that used at home.     0-3  enter or revise RT bronchodilator order(s) to equivalent RT Bronchodilator order with Frequency of every 4 hours PRN for wheezing or increased work of breathing using Per Protocol order mode. 4-6  enter or revise RT Bronchodilator order(s) to two equivalent RT bronchodilator orders with one order with BID Frequency and one order with Frequency of every 4 hours PRN wheezing or increased work of breathing using Per Protocol order mode. 7-10  enter or revise RT Bronchodilator order(s) to two equivalent RT bronchodilator orders with one order with TID Frequency and one order with Frequency of every 4 hours PRN wheezing or increased work of breathing using Per Protocol order mode. 11-13  enter or revise RT Bronchodilator order(s) to one equivalent RT bronchodilator order with QID Frequency and an Albuterol order with Frequency of every 4 hours PRN wheezing or increased work of breathing using Per Protocol order mode. Greater than 13  enter or revise RT Bronchodilator order(s) to one equivalent RT bronchodilator order with every 4 hours Frequency and an Albuterol order with Frequency of every 2 hours PRN wheezing or increased work of breathing using Per Protocol order mode. RT to enter RT Home Evaluation for COPD & MDI Assessment order using Per Protocol order mode.     Electronically signed by Darrel Eli RCP on 12/31/2021 at 2:56 AM

## 2021-12-31 NOTE — CARE COORDINATION
Case Management Initial Discharge Plan  Usama Love,             Met with:patient to discuss discharge plans. Information verified: address, contacts, phone number, , insurance Yes  PCP: SAVANA Cruz CNP  Date of last visit: 21    Insurance Provider: Libra Mccain    Discharge Planning    Living Arrangements:  Children,Spouse/Significant Other   Support Systems:  Spouse/Significant Ethan Suresh has 2 stories  4 stairs to climb to get into front door, 10-12 stairs to climb to reach second floor  Location of bedroom/bathroom in home Main Floor    Patient able to perform ADL's:Independent with walker    Current Services (outpatient & in home) None  DME equipment: walker, cane, BSC & shower chair. DME provider: N/A    Pharmacy: [facility]    Potential Assistance Needed:  N/A    Patient agreeable to home care: No  [place] of choice provided:  no    Prior SNF/Rehab Placement and Facility: No  Agreeable to SNF/Rehab: No  [place] of choice provided: n/a   Evaluation: n/a    Expected Discharge date:  22  Patient expects to be discharged to: Follow Up Appointment: Best Day/ Time: Monday PM    Transportation provider: Spouse  Transportation arrangements needed for discharge: No    Readmission Risk              Risk of Unplanned Readmission:  0             Does patient have a readmission risk score greater than 14?: No  If yes, follow-up appointment must be made within 7 days of discharge. Goal of Care:       Discharge Plan:   Met with patient at bedside to discuss d/c plans. Patient is admitted with chest pain. CXR - no acute cardiopulmonary abnormality. EKG - NSR with prolonged QT, no overt ST elevation or depression. Cardiology has been consulted. Hx of chronic neck pain that patient attributes to spinal stenosis. Patient has a virtual appointment with [facility] 2022. Lives with spouse and son.   Independent with walker. Does not drive or work. Declines any HHC/DME needs at [facility]. Consults: Cardiology    Continue to follow for Cardiology POC.              Electronically signed by Garrett Shafer RN on 12/31/21 at 4:30 PM EST

## 2021-12-31 NOTE — PROGRESS NOTES
[facility]  Office: [address], DO, Cassandra Garrett, DO, Guicho London, DO, Jacinto Salamanca, DO, Jeremy De La Paz MD, Maryellen Pringle MD, Tereza oYo MD, Lani Gordon MD, Sofia Mccall MD, Anatoliy Melendez MD, Lakeisha Escobar MD, Jennifer Long, DO, Kristian Plummer DO, Eugenio Hendricks MD,  Aminata Mosqueda, DO, Lou Rollins MD, Alie Covarrubias MD, Marianne Alatorre MD, Marbin Trivedi MD, Leonardo Potter MD, Leonor Rod MD, Rayshawn Mariano MD, Margaret Crawley, [facility], [facility], CNP, Johnny Haywood, CNP, Chad Rico, CNS, Shelley Sandhya, CNP, Jackson Castelan, CNP, Ashok Moraless, CNP, Dutch Goldberg, CNP, Nori Houser, CNP, Milissa Pallas, PA-C, Trudi Soulier, DNP, Bina Canales DNP, Diana Yoo, CNP, Manjinder Leija, CNP, Frankey Blush, CNP, Sheree Arambula, CNP, Shona Nagy, CNP, Haja Garsia, [facility]    Progress Note    12/31/2021    4:59 AM    Name:   Dmitry Aguilar  MRN:     [id number]     Acct:      [de-identified]   Room:   2023/2023-01   Day:  0  Admit Date:  12/30/2021  9:31 PM    PCP:   SAVANA Murray CNP  Code Status:  Full Code    Subjective:     C/C:   Chief Complaint   Patient presents with    Chest Pain     x1 hour, took 1 Nitro and 324mg ASA @ 1835      Interval History Status: not changed. Pt was seen and examined this morning  No acute events overnight   Continues to complain of constant chest pressure like pain under left breast and left chest radiating up into the left shoulder   Associated with dyspnea and relieved with nitro   No other complaints ta this time          Review of Systems:     12 point ROS performed and negative for anythign other than what was stated in subjective     Medications: Allergies:     Allergies   Allergen Reactions    Seasonal Other (See Comments)     Sneezing, watery eyes, wheezing       Current Meds:   Scheduled Meds:    amLODIPine  5 mg Oral Daily    atorvastatin  80 mg Oral Daily    busPIRone  15 mg Oral BID    clopidogrel  75 mg Oral Daily    levothyroxine  50 mcg Oral Daily    metoprolol succinate  100 mg Oral Daily    trospium  20 mg Oral BID AC    ranolazine  500 mg Oral BID    sertraline  150 mg Oral Daily    topiramate  50 mg Oral BID    sodium chloride flush  5-40 mL IntraVENous 2 times per day    aspirin  325 mg Oral Daily    enoxaparin  40 mg SubCUTAneous Daily    therapeutic multivitamin-minerals  1 tablet Oral Daily    insulin glargine  30 Units SubCUTAneous Daily    nicotine  1 patch TransDERmal Daily     Continuous Infusions:    dextrose      sodium chloride      sodium chloride 75 mL/hr at 12/31/21 0125     PRN Meds: glucose, dextrose, glucagon (rDNA), dextrose, sodium chloride flush, sodium chloride, ondansetron **OR** ondansetron, magnesium hydroxide, potassium chloride **OR** potassium alternative oral replacement **OR** potassium chloride, potassium chloride, magnesium sulfate, nitroGLYCERIN, perflutren lipid microspheres, acetaminophen **OR** acetaminophen, albuterol sulfate HFA    Data:     Past Medical History:   has a past medical history of Anxiety, Arthritis, CAD (coronary artery disease), Depression, Hyperlipidemia, Kidney stone, Langerhans-cell granulomatosis ([facility] [address] Myocardial infarct ([facility] [address] Neck pain, and Spinal stenosis. Social History:   reports that she has been smoking cigarettes. She has a 12.50 pack-year smoking history. She has never used smokeless tobacco. She reports current alcohol use. She reports current drug use. Frequency: 7.00 times per week. Drug: Marijuana Rehana Caro).      Family History:   Family History   Problem Relation Age of Onset    Dementia Mother     Depression Mother     High Blood Pressure Mother     Coronary Art Dis Mother     COPD Mother     Diabetes Mother     Heart Attack Father     Diabetes Maternal Grandmother     Alzheimer's Disease Paternal Grandfather        Vitals:  /68   Pulse 80 Temp 98.1 °F (36.7 °C) (Oral)   Resp 18   Ht 5' 3\" (1.6 m)   Wt 205 lb 6 oz (93.2 kg)   LMP 2021   SpO2 96%   BMI 36.38 kg/m²   Temp (24hrs), Av.1 °F (36.7 °C), Min:98.1 °F (36.7 °C), Max:98.2 °F (36.8 °C)    Recent Labs     21  0630   POCGLU 160*       I/O (24Hr): No intake or output data in the 24 hours ending 21    Labs:  Hematology:  Recent Labs     21   WBC 19.5*   RBC 4.20   HGB 12.0   HCT 37.0   MCV 88.1   MCH 28.6   MCHC 32.4   RDW 14.4      MPV 9.1   DDIMER 0.33     Chemistry:  Recent Labs     21  2215 21  2215 21  0030 21  0455 21  0858   *  --   --   --   --    K 3.6*  --   --   --   --      --   --   --   --    CO2 20  --   --   --   --    GLUCOSE 168*  --   --   --   --    BUN 9  --   --   --   --    CREATININE 0.59  --   --   --   --    ANIONGAP 14  --   --   --   --    LABGLOM >60  --   --   --   --    GFRAA >60  --   --   --   --    CALCIUM 8.7  --   --   --   --    PROBNP 61  --   --   --   --    TROPHS 8   < > 8 8 8   MYOGLOBIN <21*  --   --   --   --     < > = values in this interval not displayed. Recent Labs     2130   POCGLU 160*     ABG:  Lab Results   Component Value Date    FIO2 NOT REPORTED 2021     Lab Results   Component Value Date/Time    SPECIAL NOT REPORTED 2019 09:40 AM     Lab Results   Component Value Date/Time    CULTURE ESCHERICHIA COLI >[id number] CFU/ML (A) 2019 09:40 AM       Radiology:  XR CHEST PORTABLE    Result Date: 2021  No acute cardiopulmonary abnormality.        Physical Examination:        General appearance:  alert, cooperative and no distress  Mental Status:  oriented to person, place and time and normal affect  Lungs:  clear to auscultation bilaterally, normal effort  Heart:  regular rate and rhythm, no murmur  Abdomen:  soft, nontender, nondistended, normal bowel sounds   Extremities:  no edema, redness, tenderness in the calves  Skin: no gross lesions, rashes, induration    Assessment:        Hospital Problems           Last Modified POA    * (Principal) Coronary artery disease involving native heart with angina pectoris ([facility] [address] 12/31/2021 Yes    Hypothyroidism 12/31/2021 Yes    Type 2 diabetes mellitus without complication, with long-term current use of insulin ([facility] [address] 12/31/2021 Yes    Essential hypertension 12/31/2021 Yes    Spinal stenosis of cervical region 12/31/2021 Yes    Pulmonary Langerhans cell granulomatosis ([facility] [address] 12/31/2021 Yes          Plan:        - cardiology consulted  - nitro prn   - asa   - telemetry   - cardiac enzymes negative   - v/s per unit protocol   - will f/u with cardio recs regarding current chest pain     Lalitha Coppola MD  12/31/2021  9:22 AM

## 2021-12-31 NOTE — ED PROVIDER NOTES
[facility] ED  eMERGENCY dEPARTMENT eNCOUnter      Pt Name: Deb Coppola  MRN: [id number]  [place] 1970  Date of evaluation: 12/30/2021  Provider: SAVANA Hernandez [id number]       Chief Complaint   Patient presents with    Chest Pain     x1 hour, took 1 Nitro and 324mg ASA @ 1835         HISTORY OF PRESENT ILLNESS  (Location/Symptom, Timing/Onset, Context/Setting, Quality, Duration, Modifying Factors, Severity.)   Deb Coppola is a 54 y.o. female who presents to the emergency department via private auto. Reports chest and left arm pain with onset was this afternoon. States she has had chills, sweats, heartburn, fatigue for 2-3 days. Denies fever, SOB, N/V/D. Rates her pain 8/10 at this time. She took 3 nitro and 324 mg of asa today. She has one cardiac stent placed in 2016. She had a stress test and cardiac catheterization a few months ago. She sees Dr. Lynnette Delong. She had a colonoscopy 12/8/21; she is awaiting the results of her biopsies. Nursing Notes were reviewed. ALLERGIES     Seasonal    CURRENT MEDICATIONS       Previous Medications    ACETAMINOPHEN (TYLENOL) 500 MG TABLET    Take 2 tablets by mouth every 6 hours as needed for Pain    ALBUTEROL (PROVENTIL) (2.5 MG/3ML) 0.083% NEBULIZER SOLUTION    Take 2.5 mg by nebulization every 4 hours as needed for Wheezing or Shortness of Breath    ALBUTEROL SULFATE  (90 BASE) MCG/ACT INHALER    Inhale 2 puffs into the lungs every 4 hours as needed for Wheezing or Shortness of Breath    AMLODIPINE (NORVASC) 5 MG TABLET    Take 1 tablet by mouth daily    ASCORBIC ACID (VITAMIN C) 500 MG TABLET    Take 500 mg by mouth daily    ASPIRIN 81 MG CHEWABLE TABLET    Take 81 mg by mouth daily    ATORVASTATIN (LIPITOR) 80 MG TABLET    Take 1 tablet by mouth daily    BLOOD GLUCOSE MONITOR KIT AND SUPPLIES    Dispense sufficient amount for BID testing frequency plus additional to accommodate PRN testing needs.  Dispense all needed supplies to include: monitor, strips, lancing device, lancets, control solutions, alcohol swabs. BLOOD GLUCOSE TEST STRIPS (FREESTYLE LITE) STRIP    1 each by Does not apply route 2 times daily As needed. BUSPIRONE (BUSPAR) 15 MG TABLET    Take 15 mg by mouth 2 times daily    CALCIUM CARBONATE (OS-BRANNON) 1250 (500 CA) MG CHEWABLE TABLET    Take 500-1,000 mg by mouth 3 times daily    CHLORHEXIDINE (PERIDEX) 0.12 % SOLUTION    RINSE THREE TIMES A DAY TIL GONE(1/4OZ RINSE FOR 30 SECONDS;SPIT . ..  (REFER TO PRESCRIPTION NOTES). CHOLECALCIFEROL (VITAMIN D) 50 MCG (2000 UT) TABS TABLET    Take 2,000 Units by mouth daily     CLOPIDOGREL (PLAVIX) 75 MG TABLET    Take 1 tablet by mouth daily    FLUTICASONE (FLONASE) 50 MCG/ACT NASAL SPRAY    1 spray by Each Nostril route daily as needed for Rhinitis    FREESTYLE LANCETS MISC    1 each by Does not apply route daily    HYDROCODONE-ACETAMINOPHEN (NORCO) 5-325 MG PER TABLET    Take 1 tablet by mouth every 6 hours as needed for Pain for up to 30 days. INSULIN DEGLUDEC 200 UNIT/ML SOPN    Inject 30 Units into the skin daily    INSULIN PEN NEEDLE ([employer] PEN NEEDLES 31G) 31G X 8 MM MISC    1 each by Does not apply route daily    LEVOTHYROXINE (SYNTHROID) 50 MCG TABLET    Take 1 tablet by mouth Daily    METOPROLOL SUCCINATE (TOPROL XL) 100 MG EXTENDED RELEASE TABLET    take 1 tablet by mouth twice a day    MIRABEGRON (MYRBETRIQ) 50 MG TB24    Take 50 mg by mouth daily    MULTIPLE VITAMINS-MINERALS (THERAPEUTIC MULTIVITAMIN-MINERALS) TABLET    Take 1 tablet by mouth daily    NITROGLYCERIN (NITROSTAT) 0.4 MG SL TABLET    place 1 tablet under the tongue if needed every 5 minutes for chest pain for 3 doses IF NO RELIEF AFTER THIRD DOSE CALL PRESCRIBER .     POLYETHYLENE GLYCOL (GLYCOLAX) 17 G PACKET    Take 17 g by mouth daily as needed     RANOLAZINE (RANEXA) 500 MG EXTENDED RELEASE TABLET    Take 1 tablet by mouth 2 times daily    SERTRALINE (ZOLOFT) 100 MG TABLET Take 1.5 tablets by mouth daily    TIZANIDINE (ZANAFLEX) 2 MG TABLET    take 1 tablet by mouth three times a day if needed    TOPIRAMATE (TOPAMAX) 50 MG TABLET    Take 1 tablet by mouth 2 times daily       PAST MEDICAL HISTORY         Diagnosis Date    Anxiety     Arthritis     CAD (coronary artery disease)     Depression     Hyperlipidemia     Kidney stone     Myocardial infarct ([facility] [address] 2016    Neck pain     Spinal stenosis 2017       SURGICAL HISTORY           Procedure Laterality Date    BACK SURGERY      L5-S1, C5-7    CARDIAC CATHETERIZATION       SECTION      COLONOSCOPY N/A 2021    COLONOSCOPY WITH BIOPSY performed by [facility] Left     LUNG BIOPSY Right     MASTOID SURGERY      TONSILLECTOMY      UPPER GASTROINTESTINAL ENDOSCOPY  2021    EGD BIOPSY performed by Millicent Chapman MD at [place] [id number]           Problem Relation Age of Onset    Dementia Mother     Depression Mother     High Blood Pressure Mother     Coronary Art Dis Mother     COPD Mother     Diabetes Mother     Heart Attack Father     Diabetes Maternal Grandmother     Alzheimer's Disease Paternal Grandfather      Family Status   Relation Name Status    Mother     Nell Otoole Father     Nell Otoole Brother      MGM      MGF      [facility]      PGF      Brother          SOCIAL HISTORY      reports that she has been smoking cigarettes. She has a 12.50 pack-year smoking history. She has never used smokeless tobacco. She reports current alcohol use. She reports current drug use. Frequency: 7.00 times per week. Drug: Marijuana Melania Danielle). REVIEW OF SYSTEMS    (2-9 systems for level 4, 10 or more for level 5)     Review of Systems   Constitutional: Positive for chills, diaphoresis and fatigue. Negative for fever. Respiratory: Negative for cough and shortness of breath. Cardiovascular: Positive for chest pain. Gastrointestinal: Negative for abdominal pain, diarrhea, nausea and vomiting. Musculoskeletal: Positive for arthralgias and myalgias. Negative for back pain. Skin: Negative for color change, rash and wound. Neurological: Negative for dizziness, weakness, light-headedness and headaches. Except as noted above the remainder of the review of systems was reviewed and negative. PHYSICAL EXAM    (up to 7 for level 4, 8 or more for level 5)     ED Triage Vitals   BP Temp Temp Source Pulse Resp SpO2 Height Weight   12/30/21 1901 12/30/21 1902 12/30/21 1902 12/30/21 1901 12/30/21 1901 12/30/21 1901 12/30/21 1859 12/30/21 1859   (!) 180/103 98.1 °F (36.7 °C) Oral 118 18 100 % 5' 3\" (1.6 m) 203 lb (92.1 kg)     Physical Exam  Vitals reviewed. Constitutional:       General: She is not in acute distress. Appearance: She is well-developed. She is not diaphoretic. Eyes:      General: No scleral icterus. Conjunctiva/sclera: Conjunctivae normal.   Cardiovascular:      Rate and Rhythm: Normal rate and regular rhythm. Pulmonary:      Effort: Pulmonary effort is normal. No respiratory distress. Breath sounds: No stridor. Abdominal:      General: There is no distension. Palpations: Abdomen is soft. Tenderness: There is no abdominal tenderness. Musculoskeletal:      Cervical back: Neck supple. Comments: Moves extremities. Skin:     General: Skin is warm and dry. Findings: No rash. Neurological:      Mental Status: She is alert and oriented to person, place, and time. GCS: GCS eye subscore is 4. GCS verbal subscore is 5. GCS motor subscore is 6. Motor: Motor function is intact. Coordination: Coordination is intact.    Psychiatric:         Behavior: Behavior normal.           DIAGNOSTIC RESULTS     EKG: All EKG's are interpreted by the Emergency Department Physician who either signs or Co-signs this chart in the absence of a cardiologist.  EKG was interpreted by Dr. Carmelo Tejada after completion. RADIOLOGY:   Non-plain film images such as CT, Ultrasound and MRI are read by the radiologist. Plain radiographic images are visualized and preliminarily interpreted by the emergency physician with the below findings:    Interpretation per the Radiologist below, if available at the time of this note:    XR CHEST PORTABLE    Result Date: 12/30/2021  EXAMINATION: [address] [place] [address] 12/30/2021 9:50 pm COMPARISON: 09/19/2021 HISTORY: ORDERING SYSTEM PROVIDED HISTORY: Chest Pain TECHNOLOGIST PROVIDED HISTORY: Chest Pain Reason for Exam: chest pain FINDINGS: The lungs are clear other than stable chronic changes in the right middle lobe. The cardiac and mediastinal contours are normal.  There is no pleural effusion or pneumothorax. No acute osseous abnormality is identified. No acute cardiopulmonary abnormality. LABS:  Labs Reviewed   BASIC METABOLIC PANEL - Abnormal; Notable for the following components:       Result Value    Glucose 168 (*)     Sodium 134 (*)     Potassium 3.6 (*)     All other components within normal limits   CBC WITH AUTO DIFFERENTIAL - Abnormal; Notable for the following components:    WBC 19.5 (*)     Monocytes 8 (*)     Segs Absolute 9.94 (*)     Absolute Lymph # 7.22 (*)     Absolute Mono # 1.56 (*)     Absolute Eos # 0.78 (*)     All other components within normal limits   TROP/MYOGLOBIN - Abnormal; Notable for the following components:    Myoglobin <21 (*)     All other components within normal limits   COVID-19, RAPID   BRAIN NATRIURETIC PEPTIDE   D-DIMER, QUANTITATIVE   HCG, SERUM, QUALITATIVE   TROPONIN       All other labs were within normal range or not returned as of this dictation.     EMERGENCY DEPARTMENT COURSE and DIFFERENTIAL DIAGNOSIS/MDM:   Vitals:    Vitals:    12/30/21 2204 12/30/21 2215 12/30/21 2230 12/30/21 2300   BP: (!) 141/90 126/89 128/85 126/80   Pulse: 96 95 96 95   Resp: 20 9 11 19   Temp:       TempSrc:       SpO2: 98% 97% 97% 95%   Weight:       Height:             MEDICATIONS GIVEN IN THE ED:  Medications   ondansetron (ZOFRAN) injection 4 mg (4 mg IntraVENous Given 12/30/21 2216)   morphine (PF) injection 2 mg (2 mg IntraVENous Given 12/30/21 2216)   morphine (PF) injection 2 mg (2 mg IntraVENous Given 12/30/21 2350)   famotidine (PEPCID) injection 20 mg (20 mg IntraVENous Given 12/30/21 2350)       CLINICAL DECISION MAKING:  The patient presented alert with a nontoxic appearance and was seen in conjunction with Dr. Natalya Mari. Her WBC count was elevated. Troponin was unremarkable. Covid-19 test was negative. She will be admitted for further evaluation and treatment. I spoke with Dr. Shauna Narayanan and Ema. Care was provided during an unprecedented national emergency due to the novel coronavirus, Covid-19. At this time there is significant evidence of Covid-19 community spread due to this pandemic. Direct patient contact is avoided at this time due to the critically low supplies of PPE worldwide and an effort to luba appropriate use of PPE. I performed a medical screening exam that is compliant with the Declaration of [place] [facility] in the [facility], secondary to the Pandemic Infectious Disease of SARS-Coronavirus-2. CONSULTS:  IP CONSULT TO CARDIOLOGY  IP CONSULT TO INTERNAL MEDICINE      FINAL IMPRESSION      1.  Chest pain, unspecified type            Problem List  Patient Active Problem List   Diagnosis Code    Hypothyroidism E03.9    Type 2 diabetes mellitus without complication, with long-term current use of insulin ([facility] [address] E11.9, Z79.4    Essential hypertension I10    Hip fracture ([address] S72.009A    Spinal stenosis of cervical region M48.02    Ground glass opacity present on imaging of lung R91.8    Hx of deep venous thrombosis Z86.718    Situational depression F43.21    Anxiety F41.9    Chest pain R07.9    Pulmonary Langerhans cell granulomatosis ([address] J84.82    Mixed hyperlipidemia E78.2  Coronary artery disease involving native heart without angina pectoris I25.10    Migraine without status migrainosus, not intractable G43.909    OAB (overactive bladder) N32.81    Smoker F17.200    Irritable bowel syndrome with constipation K58.1    Irritable bowel syndrome with both constipation and diarrhea K58.2    Positive colorectal cancer screening using Cologuard test R19.5    Gastroesophageal reflux disease with esophagitis without hemorrhage K21.00         DISPOSITION/PLAN   DISPOSITION  ADMIT      PATIENT REFERRED TO:   No follow-up provider specified.     DISCHARGE MEDICATIONS:     New Prescriptions    No medications on file           (Please note that portions of this note were completed with a voice recognition program.  Efforts were made to edit the dictations but occasionally words are mis-transcribed.)    SAVANA Lara CNP, APRN - CNP  12/31/21 0010

## 2021-12-31 NOTE — H&P
[facility]  Office: [address], DO, Shandra Fischer, DO, Leatha Richard, DO, Tutu Salamanca, DO, Elba Ge MD, Jose Ascencio MD, Gela Mcbride MD, César Browning MD, Exie Ahumada, MD, Rasta Angulo MD, Alexys Ashton MD, Dain Dsouza, DO, Ishan Matute DO, Aj Kaye MD,  Fauzia Ayala DO, Diaz Pickard MD, Rocio Maradiaga MD, Gianna Leija MD, Kendra Bailon MD, Felipe Cifuentes MD, Erika Charlton MD, Kelli Guzman MD, Betty Prakash [facility], [facility], [facility], Wilfrid Guzmán, [facility], Kendal Cade, CNS, Chace Kaye, CNP, Frank Rober, CNP, Ankita San, CNP, Nereyda Kerns, CNP, Jorge Ram, CNP, Prasanna Ridley PA-C, Talon Castellanos, DNP, Oumou Welch, DNP, Elliot Curiel, CNP, Lloyd Castaneda, CNP, Esther Brewer, CNP, Adelaide España, CNP, Sandra Coley, CNP, Jefferson Negrete, [address]    HISTORY AND PHYSICAL EXAMINATION            Date:   12/30/2021  Patient name:  Pao Lara  Date of admission:  12/30/2021  9:31 PM  MRN:   [id number]  Account:  [de-identified]  YOB: 1970  PCP:    SAVANA Sifuentes CNP  Room:   [place] [address]  Code Status:    Prior    Chief Complaint:     Chief Complaint   Patient presents with    Chest Pain     x1 hour, took 1 Nitro and 324mg ASA @ 1835     History Obtained From:     Patient and electronic medical record. History of Present Illness:     Pao Lara is a 54 y.o. Non- / non  female who presents with Chest Pain (x1 hour, took 1 Nitro and 324mg ASA @ 1835)   and is admitted to the hospital for the management of <principal problem not specified>. The patient reports to the hospital with complaint of chest pain. She endorses left sided chest pain with radiation to her left arm, neck and the middle of her upper back. She states she was laying on the couch when it first occurred. She describes her pain as intermittent, sharp and severe.  She has associated shortness of breath and diaphoresis. She also states she has had heart burn several times in the past week, she took Pepto bismol with some relief. She also reports chronic neck pain that she attributes to spinal stenosis, she has a pending appointment for this with [address] [place] [address]. She denies additional symptomology or modifying factors. She has past medical history that includes langerhans cell granulomatosis, diabetes, hypothyroidism, MI, CAD, hyperlipidemia. She has received x2 COVID-19 vaccinations. She follows outpatient with Dr. Claude Freshwater with cardiology and Dr. Jimmy Timmons with pulmonology. Rapid SARS-CoV-2 negative. UA negative. High sensitivity troponin 8, 8, WBC 19.5, d dimer 0.33. CXR shows no acute cardiopulmonary abnormality. EKG shows NSR with prolonged QT, no overt ST elevation or depression. Past Medical History:     Past Medical History:   Diagnosis Date    Anxiety     Arthritis     CAD (coronary artery disease)     Depression     Hyperlipidemia     Kidney stone     Myocardial infarct ([facility] [address] 2016    Neck pain     Spinal stenosis 2017        Past Surgical History:     Past Surgical History:   Procedure Laterality Date    BACK SURGERY      L5-S1, C5-7    CARDIAC CATHETERIZATION       SECTION      COLONOSCOPY N/A 2021    COLONOSCOPY WITH BIOPSY performed by Carlos Eduardo Sanchez [facility] at [facility] Left     LUNG BIOPSY Right     MASTOID SURGERY      TONSILLECTOMY      UPPER GASTROINTESTINAL ENDOSCOPY  2021    EGD BIOPSY performed by Carlos Eduardo Sanchez MD at [address] [place] [address]        Medications Prior to Admission:     Prior to Admission medications    Medication Sig Start Date End Date Taking?  Authorizing Provider   Insulin Degludec 200 UNIT/ML SOPN Inject 30 Units into the skin daily 21 Yes SAVANA Grimes - TONYA   HYDROcodone-acetaminophen (NORCO) 5-325 MG per tablet Take 1 tablet by mouth every 6 hours as needed for Pain for up to 30 days. 12/20/21 1/19/22 Yes SAVANA Lloyd CNP   chlorhexidine (PERIDEX) 0.12 % solution RINSE THREE TIMES A DAY TIL GONE(1/4OZ RINSE FOR 30 SECONDS;SPIT . ..  (REFER TO PRESCRIPTION NOTES). 11/12/21  Yes Historical Provider, MD   blood glucose monitor kit and supplies Dispense sufficient amount for BID testing frequency plus additional to accommodate PRN testing needs. Dispense all needed supplies to include: monitor, strips, lancing device, lancets, control solutions, alcohol swabs. 10/25/21  Yes SAVANA Lloyd CNP   tiZANidine (ZANAFLEX) 2 MG tablet take 1 tablet by mouth three times a day if needed 10/19/21  Yes Chele Yoder, DO   nitroGLYCERIN (NITROSTAT) 0.4 MG SL tablet place 1 tablet under the tongue if needed every 5 minutes for chest pain for 3 doses IF NO RELIEF AFTER THIRD DOSE CALL PRESCRIBER . 10/19/21  Yes Chele Yoder, DO   calcium carbonate (OS-BRANNON) 1250 (500 Ca) MG chewable tablet Take 500-1,000 mg by mouth 3 times daily 10/20/20  Yes Historical Provider, MD   blood glucose test strips (FREESTYLE LITE) strip 1 each by Does not apply route 2 times daily As needed.  9/13/21  Yes SAVANA Lloyd CNP   busPIRone (BUSPAR) 15 MG tablet Take 15 mg by mouth 2 times daily 9/13/21 3/12/22 Yes SAVANA Lloyd CNP   levothyroxine (SYNTHROID) 50 MCG tablet Take 1 tablet by mouth Daily 9/13/21 3/12/22 Yes SAVANA Lloyd CNP   metoprolol succinate (TOPROL XL) 100 MG extended release tablet take 1 tablet by mouth twice a day 9/13/21  Yes SAVANA Lloyd CNP   sertraline (ZOLOFT) 100 MG tablet Take 1.5 tablets by mouth daily 9/13/21  Yes SAVANA Lloyd CNP   ranolazine (RANEXA) 500 MG extended release tablet Take 1 tablet by mouth 2 times daily 9/13/21 3/12/22 Yes SAVANA Lloyd CNP   amLODIPine (NORVASC) 5 MG tablet Take 1 tablet by mouth daily 9/13/21 3/12/22 Yes SAVANA Lloyd - CNP   atorvastatin (LIPITOR) 80 MG tablet Take 1 tablet by mouth daily 9/13/21 3/12/22 Yes SAVANA Freedman CNP   clopidogrel (PLAVIX) 75 MG tablet Take 1 tablet by mouth daily 9/13/21 3/12/22 Yes SAVANA Freedman CNP   mirabegron (MYRBETRIQ) 50 MG TB24 Take 50 mg by mouth daily 9/13/21  Yes SAVANA Freedman CNP   topiramate (TOPAMAX) 50 MG tablet Take 1 tablet by mouth 2 times daily 9/13/21 3/12/22 Yes SAVANA Freedman CNP   albuterol sulfate  (90 Base) MCG/ACT inhaler Inhale 2 puffs into the lungs every 4 hours as needed for Wheezing or Shortness of Breath 9/13/21  Yes SAVANA Freedman CNP   albuterol (PROVENTIL) (2.5 MG/3ML) 0.083% nebulizer solution Take 2.5 mg by nebulization every 4 hours as needed for Wheezing or Shortness of Breath   Yes Historical Provider, MD   fluticasone (FLONASE) 50 MCG/ACT nasal spray 1 spray by Each Nostril route daily as needed for Rhinitis   Yes Historical Provider, MD   Multiple Vitamins-Minerals (THERAPEUTIC MULTIVITAMIN-MINERALS) tablet Take 1 tablet by mouth daily   Yes Historical Provider, MD   ascorbic acid (VITAMIN C) 500 MG tablet Take 500 mg by mouth daily   Yes Historical Provider, MD   polyethylene glycol (GLYCOLAX) 17 g packet Take 17 g by mouth daily as needed  8/31/20  Yes Historical Provider, MD   acetaminophen (TYLENOL) 500 MG tablet Take 2 tablets by mouth every 6 hours as needed for Pain 6/24/20  Yes Alicia Chan,    Cholecalciferol (VITAMIN D) 50 MCG (2000 UT) TABS tablet Take 2,000 Units by mouth daily  1/23/20  Yes Historical Provider, MD   FREESTYLE LANCETS MISC 1 each by Does not apply route daily 11/20/19  Yes SAVANA Snow CNP   Insulin Pen Needle ([employer] PEN NEEDLES 31G) 31G X 8 MM MISC 1 each by Does not apply route daily 11/20/19  Yes SAVANA Snow CNP   aspirin 81 MG chewable tablet Take 81 mg by mouth daily 5/31/19 12/3/21  Historical Provider, MD        Allergies:     Seasonal    Social History:     Tobacco:    reports that she has been smoking cigarettes. She has a 12.50 pack-year smoking history. She has never used smokeless tobacco.  Alcohol:      reports current alcohol use. Drug Use:  reports current drug use. Frequency: 7.00 times per week. Drug: Marijuana [place]). Family History:     Family History   Problem Relation Age of Onset    Dementia Mother     Depression Mother     High Blood Pressure Mother     Coronary Art Dis Mother     COPD Mother     Diabetes Mother     Heart Attack Father     Diabetes Maternal Grandmother     Alzheimer's Disease Paternal Grandfather        Review of Systems:     Positive and Negative as described in HPI. Review of Systems   Constitutional: Positive for diaphoresis. Negative for chills and fever. HENT: Negative for congestion. Eyes: Negative for visual disturbance. Respiratory: Positive for shortness of breath. Negative for cough and wheezing. Cardiovascular: Positive for chest pain. Negative for palpitations and leg swelling. Gastrointestinal: Negative for abdominal pain, blood in stool, constipation, diarrhea, nausea and vomiting. Endocrine: Negative for cold intolerance and heat intolerance. Genitourinary: Negative for difficulty urinating, dysuria, frequency and urgency. Musculoskeletal: Positive for neck pain. Negative for gait problem. Skin: Negative for color change and rash. Neurological: Negative for dizziness, weakness, light-headedness, numbness and headaches. Hematological: Does not bruise/bleed easily. Psychiatric/Behavioral: The patient is not nervous/anxious. All other systems reviewed and are negative. Physical Exam:   /80   Pulse 95   Temp 98.1 °F (36.7 °C) (Oral)   Resp 19   Ht 5' 3\" (1.6 m)   Wt 203 lb (92.1 kg)   LMP 2021   SpO2 95%   BMI 35.96 kg/m²   Temp (24hrs), Av.1 °F (36.7 °C), Min:98.1 °F (36.7 °C), Max:98.1 °F (36.7 °C)    No results for input(s): POCGLU in the last 72 hours.   No intake or output data in the 24 hours ending 12/30/21 3362    Physical Exam  Vitals and nursing note reviewed. Constitutional:       Appearance: She is not diaphoretic. HENT:      Head: Normocephalic and atraumatic. Right Ear: Hearing normal.      Left Ear: Hearing normal.      Nose: Nose normal. No rhinorrhea. Eyes:      General: Lids are normal.      Extraocular Movements:      Right eye: Normal extraocular motion. Left eye: Normal extraocular motion. Conjunctiva/sclera: Conjunctivae normal.      Right eye: Right conjunctiva is not injected. Left eye: Left conjunctiva is not injected. Pupils: Pupils are equal, round, and reactive to light. Pupils are equal.      Right eye: Pupil is reactive. Left eye: Pupil is reactive. Neck:      Thyroid: No thyromegaly. Trachea: Trachea normal. No tracheal deviation. Cardiovascular:      Rate and Rhythm: Normal rate and regular rhythm. Pulses: Normal pulses. Heart sounds: Normal heart sounds. No murmur heard. Pulmonary:      Effort: Pulmonary effort is normal. No respiratory distress. Breath sounds: Normal breath sounds. No stridor. No decreased breath sounds. Abdominal:      General: Bowel sounds are normal. There is no distension. Palpations: Abdomen is soft. There is no mass. Tenderness: There is no abdominal tenderness. There is no guarding. Musculoskeletal:         General: No tenderness. Cervical back: Neck supple. Skin:     General: Skin is warm and dry. Neurological:      Mental Status: She is alert and oriented to person, place, and time. She is not disoriented. Cranial Nerves: No cranial nerve deficit.    Psychiatric:         Speech: Speech normal.         Behavior: Behavior normal.         Investigations:      Laboratory Testing:  Recent Results (from the past 24 hour(s))   EKG 12 Lead    Collection Time: 12/30/21 10:02 PM   Result Value Ref Range    Ventricular Rate 96 BPM    Atrial Rate 96 BPM    P-R Interval 160 ms    QRS Duration 78 ms    Q-T Interval 386 ms    QTc Calculation (Bazett) 487 ms    P Axis 56 degrees    R Axis 51 degrees    T Axis 66 degrees   Basic Metabolic Prof    Collection Time: 12/30/21 10:15 PM   Result Value Ref Range    Glucose 168 (H) 70 - 99 mg/dL    BUN 9 6 - 20 mg/dL    CREATININE 0.59 0.50 - 0.90 mg/dL    Bun/Cre Ratio 15 9 - 20    Calcium 8.7 8.6 - 10.4 mg/dL    Sodium 134 (L) 135 - 144 mmol/L    Potassium 3.6 (L) 3.7 - 5.3 mmol/L    Chloride 100 98 - 107 mmol/L    CO2 20 20 - 31 mmol/L    Anion Gap 14 9 - 17 mmol/L    GFR Non-African American >60 >60 mL/min    GFR African American >60 >60 mL/min    GFR Comment          GFR Staging NOT REPORTED    Brain Natriuretic Peptide    Collection Time: 12/30/21 10:15 PM   Result Value Ref Range    Pro-BNP 61 <300 pg/mL    BNP Interpretation NOT REPORTED    CBC with DIFF    Collection Time: 12/30/21 10:15 PM   Result Value Ref Range    WBC 19.5 (H) 3.5 - 11.3 k/uL    RBC 4.20 3.95 - 5.11 m/uL    Hemoglobin 12.0 11.9 - 15.1 g/dL    Hematocrit 37.0 36.3 - 47.1 %    MCV 88.1 82.6 - 102.9 fL    MCH 28.6 25.2 - 33.5 pg    MCHC 32.4 28.4 - 34.8 g/dL    RDW 14.4 11.8 - 14.4 %    Platelets 296 253 - 810 k/uL    MPV 9.1 8.1 - 13.5 fL    NRBC Automated 0.0 0.0 per 100 WBC    Differential Type NOT REPORTED     WBC Morphology NOT REPORTED     RBC Morphology NOT REPORTED     Platelet Estimate NOT REPORTED     Seg Neutrophils 51 36 - 66 %    Lymphocytes 37 24 - 44 %    Monocytes 8 (H) 1 - 7 %    Eosinophils % 4 1 - 4 %    Basophils 0 %    Immature Granulocytes 0 0 %    Segs Absolute 9.94 (H) 1.8 - 7.7 k/uL    Absolute Lymph # 7.22 (H) 1.0 - 4.8 k/uL    Absolute Mono # 1.56 (H) 0.2 - 0.8 k/uL    Absolute Eos # 0.78 (H) 0.0 - 0.4 k/uL    Basophils Absolute 0.00 0.0 - 0.2 k/uL    Absolute Immature Granulocyte 0.00 0.00 - 0.30 k/uL   Trop/Myoglobin    Collection Time: 12/30/21 10:15 PM   Result Value Ref Range    Troponin, High Sensitivity 8 0 - 14 ng/L    Troponin T NOT REPORTED <0.03 ng/mL    Troponin Interp NOT REPORTED     Myoglobin <21 (L) 25 - 58 ng/mL   D-Dimer Test    Collection Time: 12/30/21 10:15 PM   Result Value Ref Range    D-Dimer, Quant 0.33 0.00 - 0.59 mg/L FEU   HCG Qualitative, Serum    Collection Time: 12/30/21 10:15 PM   Result Value Ref Range    hCG Qual NEGATIVE NEGATIVE   COVID-19, Rapid    Collection Time: 12/30/21 10:29 PM    Specimen: Nasopharyngeal Swab   Result Value Ref Range    Specimen Description . NASOPHARYNGEAL SWAB     SARS-CoV-2, Rapid Not Detected Not Detected       Imaging/Diagnostics:  XR CHEST PORTABLE    Result Date: 12/30/2021  No acute cardiopulmonary abnormality. Assessment :       Principal Problem:    Coronary artery disease involving native heart with angina pectoris (HCC)  Active Problems:    Hypothyroidism    Type 2 diabetes mellitus without complication, with long-term current use of insulin (HCC)    Essential hypertension    Spinal stenosis of cervical region    Pulmonary Langerhans cell granulomatosis ([address]  Resolved Problems:    Chest pain      Plan:     Patient status observation in the  Med/Surge    1. Consult cardiology, appreciate recommendations. 2. Trend troponin, repeat EKG in AM.   3. CAD- continue aspirin and plavix. 4. Leukocytosis- appears to be chronic, but elevated above baseline. No clear source of infection, check procalcitonin. Possibly attributed to langerhans call granulomatosis? 5. DM- monitor and control blood glucose. Continue long acting insulin. 6. Hypertension- continue metoprolol, amlodipine. 7. Spinal stenosis- chronic, follow up with [facility]. 8. Telemetry. 9. Monitor vital signs. 10. Follow chemistries. 11. DVT prophylaxis. 12. NPO pending cardiology evaluation. 13. Activity as tolerated with assist.     Plan of care discussed with patient.      Consultations:   IP CONSULT TO CARDIOLOGY  IP CONSULT TO INTERNAL MEDICINE    Patient is admitted as inpatient status because of co-morbidities listed above, severity of signs and symptoms as outlined, requirement for current medical therapies and most importantly because of direct risk to patient if care not provided in a hospital setting. Expected length of stay > 48 hours.     SAAVNA Velázquez CNP  12/30/2021  11:54 PM    Copy sent to SAVANA Zayas - CNP

## 2021-12-31 NOTE — CONSULTS
Cardiovascular Consult Note     TODAY'S DATE: 2021    Patient name: Tracy Gage   YOB: 1970  Date of admission:  2021       Patient seen, examined. Previous clinical entries reviewed. All available laboratory, imaging and ancillary data reviewed. Reason for Consult: Chest pain    History of present Illness:     Tracy Gage is a 54 y.o. female with remote history of left anterior descending artery stent placement, hypertension, hyperlipidemia and chronic chest pain syndrome with 2 - stress tests and one negative cardiac catheterization over the last year and a half presented to [facility] complaints of chest discomfort that was described as left-sided pain with radiation to the left arm and neck and middle of her back. It is mostly associated with movement. Its intermittent and occasionally sharp in nature. No new shortness of breath or diaphoresis. Her initial EKG showed no acute changes. Her cardiac markers have been negative. She has had extensive work-up in the recent remote past for chest pain and has a history of Langerhans cell granulomatosis for which she sees pulmonology. She also has some nonspecific fatigue and or chills and her Covid test was negative. Past Medical History:    has a past medical history of Anxiety, Arthritis, CAD (coronary artery disease), Depression, Hyperlipidemia, Kidney stone, Langerhans-cell granulomatosis ([facility] [address] Myocardial infarct [facility]), Neck pain, and Spinal stenosis.     Surgical History:     Past Surgical History:   Procedure Laterality Date    BACK SURGERY      L5-S1, C5-7    CARDIAC CATHETERIZATION       SECTION      COLONOSCOPY N/A 2021    COLONOSCOPY WITH BIOPSY performed by Jose King [facility] at [facility] Left     LUNG BIOPSY Right     MASTOID SURGERY      TONSILLECTOMY      UPPER GASTROINTESTINAL ENDOSCOPY  2021    EGD BIOPSY performed by Jose King MD at [address] [place] [address] mg by mouth 3 times daily      blood glucose test strips (FREESTYLE LITE) strip 1 each by Does not apply route 2 times daily As needed.  100 each 3    busPIRone (BUSPAR) 15 MG tablet Take 15 mg by mouth 2 times daily 180 tablet 1    levothyroxine (SYNTHROID) 50 MCG tablet Take 1 tablet by mouth Daily 90 tablet 1    metoprolol succinate (TOPROL XL) 100 MG extended release tablet take 1 tablet by mouth twice a day 180 tablet 1    sertraline (ZOLOFT) 100 MG tablet Take 1.5 tablets by mouth daily 135 tablet 1    ranolazine (RANEXA) 500 MG extended release tablet Take 1 tablet by mouth 2 times daily 180 tablet 1    amLODIPine (NORVASC) 5 MG tablet Take 1 tablet by mouth daily 90 tablet 1    atorvastatin (LIPITOR) 80 MG tablet Take 1 tablet by mouth daily 90 tablet 1    clopidogrel (PLAVIX) 75 MG tablet Take 1 tablet by mouth daily 90 tablet 1    mirabegron (MYRBETRIQ) 50 MG TB24 Take 50 mg by mouth daily 90 tablet 1    topiramate (TOPAMAX) 50 MG tablet Take 1 tablet by mouth 2 times daily 180 tablet 1    albuterol sulfate  (90 Base) MCG/ACT inhaler Inhale 2 puffs into the lungs every 4 hours as needed for Wheezing or Shortness of Breath 18 g 1    albuterol (PROVENTIL) (2.5 MG/3ML) 0.083% nebulizer solution Take 2.5 mg by nebulization every 4 hours as needed for Wheezing or Shortness of Breath      Multiple Vitamins-Minerals (THERAPEUTIC MULTIVITAMIN-MINERALS) tablet Take 1 tablet by mouth daily      ascorbic acid (VITAMIN C) 500 MG tablet Take 500 mg by mouth daily      acetaminophen (TYLENOL) 500 MG tablet Take 2 tablets by mouth every 6 hours as needed for Pain 30 tablet 0    Cholecalciferol (VITAMIN D) 50 MCG (2000 UT) TABS tablet Take 2,000 Units by mouth daily       FREESTYLE LANCETS MISC 1 each by Does not apply route daily 100 each 3    Insulin Pen Needle ([employer] PEN NEEDLES 31G) 31G X 8 MM MISC 1 each by Does not apply route daily 100 each 3       Allergies:   Seasonal    Social History: reports that she has been smoking cigarettes. She has a 12.50 pack-year smoking history. She has never used smokeless tobacco. She reports current alcohol use. She reports current drug use. Frequency: 7.00 times per week. Drug: Marijuana Yus[place]te Javier). Family History:    family history includes Alzheimer's Disease in her paternal grandfather; COPD in her mother; Coronary Art Dis in her mother; Dementia in her mother; Depression in her mother; Diabetes in her maternal grandmother and mother; Heart Attack in her father; High Blood Pressure in her mother. Review of Systems:     Constitutional: No fever/chills. Positive for fatigue HENT: No headache, neck pain or neck stiffnes. No sore throat or dysphagia. Eyes: No blurred vision. Respiratory: As above. Cardiovascular: As above. Gastrointestinal: Negative. Genitourinary: Negative  Endocrine: Negative. Musculoskeletal: Negative. Skin: Negative. Allergic/Immunologic: Negative. Neurologic: Negative. Hematological: Negative. Psychiatric: Negative. All other systems are are noted to be otherwise negative. Physical Exam:   /71   Pulse 84   Temp 98.4 °F (36.9 °C) (Oral)   Resp 18   Ht 5' 3\" (1.6 m)   Wt 205 lb 6 oz (93.2 kg)   LMP 12/16/2021   SpO2 94%   BMI 36.38 kg/m²     Intake/Output Summary (Last 24 hours) at 12/31/2021 1833  Last data filed at 12/31/2021 1804  Gross per 24 hour   Intake 1238.07 ml   Output 1200 ml   Net 38.07 ml       GENERAL:  Alert, appropriate, oriented, in NAD. HEENT:  Head is atraumatic and normocephalic. No Pallor. No icterus. NECK: Supple without any thyromegaly. LUNGS: Generally clear to auscultation  CARDIAC: S1, S2, RRR. ABD:  Soft non-tender . EXT: No edema. MS: No obvious deformities. SKIN: No obvious skin rashes. NEURO: No focal neurologic deficits.     Labs/ Ancillary data:     CBC:   Recent Labs     12/30/21  2215   WBC 19.5*   HGB 12.0        BMP:    Recent Labs     12/30/21  2215   *   K 3.6*      CO2 20   BUN 9   CREATININE 0.59   GLUCOSE 168*     Troponin:   Recent Labs     12/31/21  0858   TROPONINT NOT REPORTED       Imaging:    CXR: No acute cardiopulmonary abnormalities. EKG: No acute changes    Impression :     Chest pain-noncardiac      Coronary artery disease involving native heart    Hypothyroidism    Type 2 diabetes mellitus without complication, with long-term current use of insulin (HCC)    Essential hypertension    Spinal stenosis of cervical region    Pulmonary Langerhans cell granulomatosis (HCC)    Hypokalemia    Plan :     No further cardiac work-up recommended at this time. Supplement low potassium. Management of noncardiac chest pain per primary service. Thank you very much for allowing us to participate in the care of this patient. Please call us with any questions.       Electronically signed by Irineo Tyson MD on 12/31/2021 at 6:33 PM

## 2021-12-31 NOTE — FLOWSHEET NOTE
12/31/21 0813   Vital Signs   Pulse 80   /68   MAP (mmHg) 78   Patient complaining of chest pain. Vitals are as shown above. Dr. Mercedez Lindsay at bedside. One dose of sublingual Nitro given per doctors order. RN will continue to monitor.

## 2022-01-01 ENCOUNTER — APPOINTMENT (OUTPATIENT)
Dept: GENERAL RADIOLOGY | Age: 52
End: 2022-01-01
Payer: COMMERCIAL

## 2022-01-01 VITALS
TEMPERATURE: 97.9 F | WEIGHT: 205 LBS | HEART RATE: 80 BPM | OXYGEN SATURATION: 95 % | SYSTOLIC BLOOD PRESSURE: 115 MMHG | BODY MASS INDEX: 36.32 KG/M2 | DIASTOLIC BLOOD PRESSURE: 69 MMHG | HEIGHT: 63 IN | RESPIRATION RATE: 18 BRPM

## 2022-01-01 LAB
ALBUMIN SERPL-MCNC: 3.6 G/DL (ref 3.5–5.2)
ALBUMIN/GLOBULIN RATIO: ABNORMAL (ref 1–2.5)
ALP BLD-CCNC: 76 U/L (ref 35–104)
ALT SERPL-CCNC: 13 U/L (ref 5–33)
ANION GAP SERPL CALCULATED.3IONS-SCNC: 10 MMOL/L (ref 9–17)
AST SERPL-CCNC: 11 U/L
BILIRUB SERPL-MCNC: 0.24 MG/DL (ref 0.3–1.2)
BUN BLDV-MCNC: 8 MG/DL (ref 6–20)
BUN/CREAT BLD: 13 (ref 9–20)
CALCIUM SERPL-MCNC: 8.5 MG/DL (ref 8.6–10.4)
CHLORIDE BLD-SCNC: 105 MMOL/L (ref 98–107)
CHOLESTEROL/HDL RATIO: 5.5
CHOLESTEROL: 182 MG/DL
CO2: 21 MMOL/L (ref 20–31)
CREAT SERPL-MCNC: 0.61 MG/DL (ref 0.5–0.9)
GFR AFRICAN AMERICAN: >60 ML/MIN
GFR NON-AFRICAN AMERICAN: >60 ML/MIN
GFR SERPL CREATININE-BSD FRML MDRD: ABNORMAL ML/MIN/{1.73_M2}
GFR SERPL CREATININE-BSD FRML MDRD: ABNORMAL ML/MIN/{1.73_M2}
GLUCOSE BLD-MCNC: 164 MG/DL (ref 65–105)
GLUCOSE BLD-MCNC: 178 MG/DL (ref 70–99)
GLUCOSE BLD-MCNC: 181 MG/DL (ref 65–105)
HCT VFR BLD CALC: 36 % (ref 36.3–47.1)
HDLC SERPL-MCNC: 33 MG/DL
HEMOGLOBIN: 11.3 G/DL (ref 11.9–15.1)
LDL CHOLESTEROL: 125 MG/DL (ref 0–130)
MAGNESIUM: 2.1 MG/DL (ref 1.6–2.6)
MCH RBC QN AUTO: 28.3 PG (ref 25.2–33.5)
MCHC RBC AUTO-ENTMCNC: 31.4 G/DL (ref 28.4–34.8)
MCV RBC AUTO: 90 FL (ref 82.6–102.9)
NRBC AUTOMATED: 0 PER 100 WBC
PDW BLD-RTO: 14.2 % (ref 11.8–14.4)
PLATELET # BLD: 399 K/UL (ref 138–453)
PMV BLD AUTO: 8.9 FL (ref 8.1–13.5)
POTASSIUM SERPL-SCNC: 4.1 MMOL/L (ref 3.7–5.3)
RBC # BLD: 4 M/UL (ref 3.95–5.11)
SODIUM BLD-SCNC: 136 MMOL/L (ref 135–144)
TOTAL PROTEIN: 6.4 G/DL (ref 6.4–8.3)
TRIGL SERPL-MCNC: 121 MG/DL
VLDLC SERPL CALC-MCNC: ABNORMAL MG/DL (ref 1–30)
WBC # BLD: 11.8 K/UL (ref 3.5–11.3)

## 2022-01-01 PROCEDURE — 96361 HYDRATE IV INFUSION ADD-ON: CPT

## 2022-01-01 PROCEDURE — 6370000000 HC RX 637 (ALT 250 FOR IP): Performed by: NURSE PRACTITIONER

## 2022-01-01 PROCEDURE — 85027 COMPLETE CBC AUTOMATED: CPT

## 2022-01-01 PROCEDURE — 71045 X-RAY EXAM CHEST 1 VIEW: CPT

## 2022-01-01 PROCEDURE — 80061 LIPID PANEL: CPT

## 2022-01-01 PROCEDURE — G0378 HOSPITAL OBSERVATION PER HR: HCPCS

## 2022-01-01 PROCEDURE — 82947 ASSAY GLUCOSE BLOOD QUANT: CPT

## 2022-01-01 PROCEDURE — 83735 ASSAY OF MAGNESIUM: CPT

## 2022-01-01 PROCEDURE — 96372 THER/PROPH/DIAG INJ SC/IM: CPT

## 2022-01-01 PROCEDURE — 6360000002 HC RX W HCPCS: Performed by: NURSE PRACTITIONER

## 2022-01-01 PROCEDURE — 96376 TX/PRO/DX INJ SAME DRUG ADON: CPT

## 2022-01-01 PROCEDURE — 2580000003 HC RX 258: Performed by: NURSE PRACTITIONER

## 2022-01-01 PROCEDURE — 36415 COLL VENOUS BLD VENIPUNCTURE: CPT

## 2022-01-01 PROCEDURE — 80053 COMPREHEN METABOLIC PANEL: CPT

## 2022-01-01 PROCEDURE — 99217 PR OBSERVATION CARE DISCHARGE MANAGEMENT: CPT | Performed by: FAMILY MEDICINE

## 2022-01-01 RX ORDER — ALBUTEROL SULFATE 2.5 MG/3ML
2.5 SOLUTION RESPIRATORY (INHALATION)
Status: DISCONTINUED | OUTPATIENT
Start: 2022-01-01 | End: 2022-01-01

## 2022-01-01 RX ORDER — MORPHINE SULFATE 4 MG/ML
4 INJECTION, SOLUTION INTRAMUSCULAR; INTRAVENOUS EVERY 4 HOURS PRN
Status: DISCONTINUED | OUTPATIENT
Start: 2022-01-01 | End: 2022-01-01 | Stop reason: HOSPADM

## 2022-01-01 RX ADMIN — MORPHINE SULFATE 4 MG: 4 INJECTION, SOLUTION INTRAMUSCULAR; INTRAVENOUS at 03:00

## 2022-01-01 RX ADMIN — CLOPIDOGREL BISULFATE 75 MG: 75 TABLET ORAL at 08:47

## 2022-01-01 RX ADMIN — BUSPIRONE HYDROCHLORIDE 15 MG: 10 TABLET ORAL at 08:47

## 2022-01-01 RX ADMIN — HYDROCODONE BITARTRATE AND ACETAMINOPHEN 1 TABLET: 5; 325 TABLET ORAL at 00:46

## 2022-01-01 RX ADMIN — MULTIPLE VITAMINS W/ MINERALS TAB 1 TABLET: TAB at 13:10

## 2022-01-01 RX ADMIN — ATORVASTATIN CALCIUM 80 MG: 80 TABLET, FILM COATED ORAL at 08:47

## 2022-01-01 RX ADMIN — TROSPIUM CHLORIDE 20 MG: 20 TABLET, FILM COATED ORAL at 06:50

## 2022-01-01 RX ADMIN — HYDROCODONE BITARTRATE AND ACETAMINOPHEN 1 TABLET: 5; 325 TABLET ORAL at 08:47

## 2022-01-01 RX ADMIN — INSULIN GLARGINE 30 UNITS: 100 INJECTION, SOLUTION SUBCUTANEOUS at 08:49

## 2022-01-01 RX ADMIN — ENOXAPARIN SODIUM 40 MG: 100 INJECTION SUBCUTANEOUS at 08:49

## 2022-01-01 RX ADMIN — LEVOTHYROXINE SODIUM 50 MCG: 0.05 TABLET ORAL at 06:50

## 2022-01-01 RX ADMIN — RANOLAZINE 500 MG: 500 TABLET, FILM COATED, EXTENDED RELEASE ORAL at 08:48

## 2022-01-01 RX ADMIN — TIZANIDINE 2 MG: 2 TABLET ORAL at 14:45

## 2022-01-01 RX ADMIN — SERTRALINE HYDROCHLORIDE 150 MG: 100 TABLET ORAL at 08:48

## 2022-01-01 RX ADMIN — SODIUM CHLORIDE: 9 INJECTION, SOLUTION INTRAVENOUS at 03:04

## 2022-01-01 RX ADMIN — HYDROCODONE BITARTRATE AND ACETAMINOPHEN 1 TABLET: 5; 325 TABLET ORAL at 14:45

## 2022-01-01 RX ADMIN — TIZANIDINE 2 MG: 2 TABLET ORAL at 08:48

## 2022-01-01 RX ADMIN — ASPIRIN 325 MG: 325 TABLET, COATED ORAL at 08:47

## 2022-01-01 RX ADMIN — TOPIRAMATE 50 MG: 25 TABLET, FILM COATED ORAL at 08:48

## 2022-01-01 RX ADMIN — SODIUM CHLORIDE, PRESERVATIVE FREE 10 ML: 5 INJECTION INTRAVENOUS at 08:52

## 2022-01-01 ASSESSMENT — PAIN DESCRIPTION - PROGRESSION
CLINICAL_PROGRESSION: NOT CHANGED
CLINICAL_PROGRESSION: GRADUALLY WORSENING
CLINICAL_PROGRESSION: NOT CHANGED
CLINICAL_PROGRESSION: GRADUALLY WORSENING

## 2022-01-01 ASSESSMENT — PAIN DESCRIPTION - DESCRIPTORS
DESCRIPTORS: ACHING
DESCRIPTORS: DISCOMFORT;THROBBING
DESCRIPTORS: ACHING
DESCRIPTORS: DISCOMFORT;THROBBING

## 2022-01-01 ASSESSMENT — PAIN DESCRIPTION - LOCATION
LOCATION: NECK;HIP;SHOULDER
LOCATION: NECK;SHOULDER
LOCATION: NECK;SHOULDER
LOCATION: NECK

## 2022-01-01 ASSESSMENT — PAIN DESCRIPTION - ONSET
ONSET: ON-GOING

## 2022-01-01 ASSESSMENT — PAIN DESCRIPTION - FREQUENCY
FREQUENCY: CONTINUOUS

## 2022-01-01 ASSESSMENT — PAIN DESCRIPTION - ORIENTATION
ORIENTATION: POSTERIOR
ORIENTATION: POSTERIOR

## 2022-01-01 ASSESSMENT — PAIN SCALES - GENERAL
PAINLEVEL_OUTOF10: 8
PAINLEVEL_OUTOF10: 9
PAINLEVEL_OUTOF10: 8
PAINLEVEL_OUTOF10: 8
PAINLEVEL_OUTOF10: 7

## 2022-01-01 ASSESSMENT — PAIN DESCRIPTION - PAIN TYPE
TYPE: CHRONIC PAIN

## 2022-01-01 ASSESSMENT — PAIN - FUNCTIONAL ASSESSMENT
PAIN_FUNCTIONAL_ASSESSMENT: PREVENTS OR INTERFERES SOME ACTIVE ACTIVITIES AND ADLS
PAIN_FUNCTIONAL_ASSESSMENT: PREVENTS OR INTERFERES SOME ACTIVE ACTIVITIES AND ADLS

## 2022-01-01 NOTE — DISCHARGE SUMMARY
[facility]  Office: [address], DO, Yonnysonja Rae, DO, Dave Ash, DO, Ramon Salamanca, DO, Keerthi Thompson MD, Christiano Gutierrez MD, Ana Johnson MD, Kevin Lutz MD, Jami Carter MD, Rafita Agarwal MD, Oscar Fiore MD, Valeriy Ackerman DO, Corey Smith DO, Chesley Saint, MD,  Glynda Fothergill, DO, Sarah Beth Cabello MD, hCioma Monson MD, Latoya Blakely MD, Lala Berrios MD, Donovan Lal MD, Mohan Fleming MD, Rusty Mackenzie MD, Jazmine Pinto [facility], [facility], CNP, Dennie Hageman, CNP, Jessa Ramos, CNS, Yasmeen Kulkarni, CNP, Kai Cuevas, CNP, Jennifer Martin, CNP, Armando Tvaeras, CNP, Melanie Forbes, CNP, Edgardo Wen PA-GREG, Ramírez Melchor, DNP, Roger Mccain, DNP, Kathy Busby, CNP, Jose Sun, CNP, Keo Maldonado, CNP, Mega Lala, CNP, Allison De, [facility], Cara Wheat, [facility]    Discharge Summary     Patient ID: Lisa Coleman  :     MRN: [id number]     ACCOUNT:  [de-identified]   Patient's PCP: SAVANA Simeon CNP  Admit Date: 2021   Discharge Date: 2022      Length of Stay: 0  Code Status:  Full Code  Admitting Physician: Sarah Beth Cabello MD  Discharge Physician: Sarah Beth Cabello MD     Active Discharge Diagnoses:     Hospital Problem Lists:  Principal Problem:    Coronary artery disease involving native heart with angina pectoris [facility])  Active Problems:    Hypothyroidism    Type 2 diabetes mellitus without complication, with long-term current use of insulin ([facility] [address]    Essential hypertension    Spinal stenosis of cervical region    Pulmonary Langerhans cell granulomatosis ([facility] [address]  Resolved Problems:    Chest pain      Admission Condition:  stable     Discharged Condition: stable    Hospital Stay:     Hospital Course:  Lisa Coleman is a 54 y.o. female who was admitted for the management of    Coronary artery disease involving native heart with angina pectoris ([address] , presented to ER with Chest Pain (x1 hour, took 1 Nitro and 324mg ASA @ 1835)      Pt seen and examined on the day of d/c  Labs wnl   V/s wnl   Seen and cleared for d/c by cardiology   Pt advised to follow up with pcp     Per cardio:     No further cardiac work-up recommended at this time. Supplement low potassium.   Management of noncardiac chest pain per primary service.     Thank you very much for allowing us to participate in the care of this patient.     Please call us with any questions.        Electronically signed by Freeman Graham MD on 12/31/2021 at 6:33     Significant therapeutic interventions:      Significant Diagnostic Studies:   Labs / Micro:  CBC:   Lab Results   Component Value Date    WBC 11.8 01/01/2022    RBC 4.00 01/01/2022    HGB 11.3 01/01/2022    HCT 36.0 01/01/2022    MCV 90.0 01/01/2022    MCH 28.3 01/01/2022    MCHC 31.4 01/01/2022    RDW 14.2 01/01/2022     01/01/2022     BMP:    Lab Results   Component Value Date    GLUCOSE 178 01/01/2022     01/01/2022    K 4.1 01/01/2022     01/01/2022    CO2 21 01/01/2022    ANIONGAP 10 01/01/2022    BUN 8 01/01/2022    CREATININE 0.61 01/01/2022    BUNCRER 13 01/01/2022    CALCIUM 8.5 01/01/2022    LABGLOM >60 01/01/2022    GFRAA >60 01/01/2022    GFR      01/01/2022    GFR NOT REPORTED 01/01/2022     HFP:    Lab Results   Component Value Date    PROT 6.4 01/01/2022     CMP:    Lab Results   Component Value Date    GLUCOSE 178 01/01/2022     01/01/2022    K 4.1 01/01/2022     01/01/2022    CO2 21 01/01/2022    BUN 8 01/01/2022    CREATININE 0.61 01/01/2022    ANIONGAP 10 01/01/2022    ALKPHOS 76 01/01/2022    ALT 13 01/01/2022    AST 11 01/01/2022    BILITOT 0.24 01/01/2022    LABALBU 3.6 01/01/2022    ALBUMIN NOT REPORTED 01/01/2022    LABGLOM >60 01/01/2022    GFRAA >60 01/01/2022    GFR      01/01/2022    GFR NOT REPORTED 01/01/2022    PROT 6.4 01/01/2022    CALCIUM 8.5 01/01/2022     PT/INR:  No results found for: PTINR, PROTIME, INR  PTT: No results found for: APTT  FLP:    Lab Results   Component Value Date    CHOL 182 01/01/2022    TRIG 121 01/01/2022    HDL 33 01/01/2022     U/A:    Lab Results   Component Value Date    COLORU Yellow 12/31/2021    TURBIDITY Clear 12/31/2021    SPECGRAV 1.025 12/31/2021    HGBUR NEGATIVE 12/31/2021    PHUR 6.0 12/31/2021    PROTEINU NEGATIVE 12/31/2021    GLUCOSEU 1+ 12/31/2021    KETUA NEGATIVE 12/31/2021    BILIRUBINUR NEGATIVE 12/31/2021    UROBILINOGEN Normal 12/31/2021    NITRU NEGATIVE 12/31/2021    LEUKOCYTESUR NEGATIVE 12/31/2021     TSH:    Lab Results   Component Value Date    TSH 0.99 09/13/2021         Radiology:  XR CHEST PORTABLE    Result Date: 1/1/2022  1. Prominent appearance of the cardiac silhouette. 2. Indistinct interstitial markings raise the possibility of interstitial pulmonary edema. XR CHEST PORTABLE    Result Date: 12/30/2021  No acute cardiopulmonary abnormality. Consultations:    Consults:     Final Specialist Recommendations/Findings:   IP CONSULT TO CARDIOLOGY  IP CONSULT TO INTERNAL MEDICINE  IP CONSULT TO CARDIOLOGY      The patient was seen and examined on day of discharge and this discharge summary is in conjunction with any daily progress note from day of discharge. Discharge plan:     Disposition: Home    Physician Follow Up:      SAVANA Cruz - CNP  [address]    Schedule an appointment as soon as possible for a visit in 1 week         Requiring Further Evaluation/Follow Up POST HOSPITALIZATION/Incidental Findings:      Diet: cardiac diet    Activity: As tolerated     Instructions to Patient:      Discharge Medications:      Medication List      CHANGE how you take these medications    albuterol sulfate  (90 Base) MCG/ACT inhaler  Inhale 2 puffs into the lungs every 4 hours as needed for Wheezing or Shortness of Breath  What changed: Another medication with the same name was removed. Continue taking this medication, and follow the directions you see here. CONTINUE taking these medications    acetaminophen 500 MG tablet  Commonly known as: TYLENOL  Take 2 tablets by mouth every 6 hours as needed for Pain     amLODIPine 5 MG tablet  Commonly known as: NORVASC  Take 1 tablet by mouth daily     ascorbic acid 500 MG tablet  Commonly known as: VITAMIN C     aspirin 81 MG chewable tablet     atorvastatin 80 MG tablet  Commonly known as: LIPITOR  Take 1 tablet by mouth daily     blood glucose monitor kit and supplies  Dispense sufficient amount for BID testing frequency plus additional to accommodate PRN testing needs. Dispense all needed supplies to include: monitor, strips, lancing device, lancets, control solutions, alcohol swabs. busPIRone 15 MG tablet  Commonly known as: BUSPAR  Take 15 mg by mouth 2 times daily     calcium carbonate 1250 (500 Ca) MG chewable tablet  Commonly known as: OS-BRANNON     chlorhexidine 0.12 % solution  Commonly known as: PERIDEX     clopidogrel 75 MG tablet  Commonly known as: PLAVIX  Take 1 tablet by mouth daily     fluticasone 50 MCG/ACT nasal spray  Commonly known as: FLONASE     FreeStyle Lancets Misc  1 each by Does not apply route daily     FREESTYLE LITE strip  Generic drug: blood glucose test strips  1 each by Does not apply route 2 times daily As needed. HYDROcodone-acetaminophen 5-325 MG per tablet  Commonly known as: NORCO  Take 1 tablet by mouth every 6 hours as needed for Pain for up to 30 days.      Insulin Degludec 200 UNIT/ML Sopn  Inject 30 Units into the skin daily     Insulin Pen Needle 31G X 8 MM Misc  Commonly known as: [employer] Pen [place] 31G  1 each by Does not apply route daily     levothyroxine 50 MCG tablet  Commonly known as: SYNTHROID  Take 1 tablet by mouth Daily     metoprolol succinate 100 MG extended release tablet  Commonly known as: TOPROL XL  take 1 tablet by mouth twice a day     mirabegron 50 MG Tb24  Commonly known as: MYRBETRIQ  Take 50 mg by mouth daily     nitroGLYCERIN 0.4 MG SL tablet  Commonly known as: NITROSTAT  place 1 tablet under the tongue if needed every 5 minutes for chest pain for 3 doses IF NO RELIEF AFTER THIRD DOSE CALL PRESCRIBER . polyethylene glycol 17 g packet  Commonly known as: GLYCOLAX     ranolazine 500 MG extended release tablet  Commonly known as: RANEXA  Take 1 tablet by mouth 2 times daily     sertraline 100 MG tablet  Commonly known as: ZOLOFT  Take 1.5 tablets by mouth daily     therapeutic multivitamin-minerals tablet     tiZANidine 2 MG tablet  Commonly known as: ZANAFLEX  take 1 tablet by mouth three times a day if needed     topiramate 50 MG tablet  Commonly known as: TOPAMAX  Take 1 tablet by mouth 2 times daily     vitamin D 50 MCG (2000 UT) Tabs tablet  Commonly known as: CHOLECALCIFEROL            No discharge procedures on file. Time Spent on discharge is  38 mins in patient examination, evaluation, counseling as well as medication reconciliation, prescriptions for required medications, discharge plan and follow up. Electronically signed by   Blanco Barnard MD  1/1/2022  9:17 AM      Thank you Dr. Miguel Wang, APRN - CNP for the opportunity to be involved in this patient's care.

## 2022-01-01 NOTE — PLAN OF CARE
Problem: Falls - Risk of:  Goal: Will remain free from falls  Description: Will remain free from falls  Outcome: Ongoing  Note: Ongoing     Problem: Infection:  Goal: Will remain free from infection  Description: Will remain free from infection  Outcome: Ongoing  Note: Ongoing     Problem: Safety:  Goal: Free from accidental physical injury  Description: Free from accidental physical injury  12/31/2021 1305 by Dasha Hilliard RN  Outcome: Ongoing  Note: Ongoing     Problem: Daily Care:  Goal: Daily care needs are met  Description: Daily care needs are met  Outcome: Ongoing  Note: Ongoing     Problem: Pain:  Goal: Patient's pain/discomfort is manageable  Description: Patient's pain/discomfort is manageable  12/31/2021 1305 by Dasha Hilliard RN  Outcome: Ongoing  Note: Ongoing     Problem: Skin Integrity:  Goal: Skin integrity will stabilize  Description: Skin integrity will stabilize  12/31/2021 1305 by Dasha Hilliard RN  Outcome: Ongoing  Note: Ongoing     Problem: Discharge Planning:  Goal: Patients continuum of care needs are met  Description: Patients continuum of care needs are met  Outcome: Ongoing  Note: Ongoing     Problem: Cardiac:  Goal: Hemodynamic stability will improve  Description: Hemodynamic stability will improve  Outcome: Ongoing  Note: Ongoing
Siderails up x 2  Hourly rounding  Call light in reach  Instructed to call for assist before attempting out of bed.   Remains free from falls and accidental injury at this time   Floor free from obstacles  Bed is locked and in lowest position  Adequate lighting provided  Bed alarm on, Red Falling star and Stay with Me signs posted
Siderails up x 2  Hourly rounding  Call light in reach  Instructed to call for assist before attempting out of bed. Remains free from falls and accidental injury at this time   Floor free from obstacles  Bed is locked and in lowest position  Adequate lighting provided  Bed alarm on, Red Falling star and Stay with Me signs posted    Pain level assessment complete. Patient educated on pain scale and control interventions  PRN pain medication given per patient request  Patient instructed to call out with new onset of pain or unrelieved pain  Checked for incontinence every 2 hours and prn. Pericare as needed. Assisted to reposition off back frequently. On waffle mattress. Heels off bed with pillows.
minimized  Outcome: Ongoing  Goal: Hemodynamic stability will improve  Description: Hemodynamic stability will improve  Outcome: Ongoing  Goal: Risk factors for ineffective tissue perfusion will decrease  Description: Risk factors for ineffective tissue perfusion will decrease  Outcome: Ongoing     Problem: Fluid Volume:  Goal: Will show no signs or symptoms of fluid imbalance  Description: Will show no signs or symptoms of fluid imbalance  Outcome: Ongoing

## 2022-01-01 NOTE — PROGRESS NOTES
Pt discharged to home with all belongings, copy of AVS, and instructions for two follow up visits. Pt left unit via wheelchair assisted by writer. Pt left hospital grounds via private auto driven by her .    Electronically signed by Sommer Mccann RN on 1/1/2022 at 3:05 PM

## 2022-01-03 ENCOUNTER — TELEPHONE (OUTPATIENT)
Dept: FAMILY MEDICINE CLINIC | Age: 52
End: 2022-01-03

## 2022-01-03 LAB
EKG ATRIAL RATE: 103 BPM
EKG ATRIAL RATE: 96 BPM
EKG P AXIS: 56 DEGREES
EKG P AXIS: 63 DEGREES
EKG P-R INTERVAL: 160 MS
EKG P-R INTERVAL: 162 MS
EKG Q-T INTERVAL: 348 MS
EKG Q-T INTERVAL: 386 MS
EKG QRS DURATION: 70 MS
EKG QRS DURATION: 78 MS
EKG QTC CALCULATION (BAZETT): 455 MS
EKG QTC CALCULATION (BAZETT): 487 MS
EKG R AXIS: 51 DEGREES
EKG R AXIS: 61 DEGREES
EKG T AXIS: 66 DEGREES
EKG T AXIS: 68 DEGREES
EKG VENTRICULAR RATE: 103 BPM
EKG VENTRICULAR RATE: 96 BPM

## 2022-01-03 NOTE — TELEPHONE ENCOUNTER
[facility] Transitions Initial Follow Up Call    Outreach made within 2 business days of discharge: Yes    Patient: Larey Bosworth Patient : 1970   MRN: [id number]  Reason for Admission: There are no discharge diagnoses documented for the most recent discharge. Discharge Date: 22       Spoke with: patient    Discharge department/facility: [facility] Interactive Patient Contact:  Was patient able to fill all prescriptions: No: Nothing was given  Was patient instructed to bring all medications to the follow-up visit: Yes  Is patient taking all medications as directed in the discharge summary? No  Does patient understand their discharge instructions: Yes  Does patient have questions or concerns that need addressed prior to 7-14 day follow up office visit: no    Scheduled appointment with PCP within 7-14 days. Patient wants to wait for Pulmonologist to call back and schedule with them as she thinks it has to due with her lungs and is feeling the same.     Follow Up  Future Appointments   Date Time Provider Bucky Black   2022 12:00 PM Clay De Dios MD [facility]   3/21/2022  9:20 AM SAVANA Freedman - CNP [facility] [place] [address], [place]

## 2022-01-12 DIAGNOSIS — E11.65 TYPE 2 DIABETES MELLITUS WITH HYPERGLYCEMIA, WITH LONG-TERM CURRENT USE OF INSULIN (HCC): Primary | ICD-10-CM

## 2022-01-12 DIAGNOSIS — Z79.4 TYPE 2 DIABETES MELLITUS WITH HYPERGLYCEMIA, WITH LONG-TERM CURRENT USE OF INSULIN (HCC): Primary | ICD-10-CM

## 2022-01-12 RX ORDER — INSULIN DETEMIR 100 [IU]/ML
30 INJECTION, SOLUTION SUBCUTANEOUS NIGHTLY
Qty: 5 PEN | Refills: 3 | Status: SHIPPED | OUTPATIENT
Start: 2022-01-12 | End: 2022-02-14 | Stop reason: DRUGHIGH

## 2022-01-21 ENCOUNTER — TELEPHONE (OUTPATIENT)
Dept: GASTROENTEROLOGY | Age: 52
End: 2022-01-21

## 2022-01-21 ENCOUNTER — TELEPHONE (OUTPATIENT)
Dept: FAMILY MEDICINE CLINIC | Age: 52
End: 2022-01-21

## 2022-01-21 NOTE — TELEPHONE ENCOUNTER
Dania lares says it needs the form refaxed with Jennifers information at the bottom, and also who filled it out.  Please advise pt when done and she said that the form can be done when Kyle Hightower comes back because it is not due until the end of next week

## 2022-01-21 NOTE — TELEPHONE ENCOUNTER
Patient called stating she has had black tarry stool for the last week. Patient wanted to schedule appt. With Dr.F. Guido,     Patient was informed that provider is on call next week if it gets to bad to go to the ER or Urgent care. Patient informed she would go to ER.

## 2022-02-14 ENCOUNTER — VIRTUAL VISIT (OUTPATIENT)
Dept: FAMILY MEDICINE CLINIC | Age: 52
End: 2022-02-14

## 2022-02-14 DIAGNOSIS — Z79.899 MEDICATION MANAGEMENT: Primary | ICD-10-CM

## 2022-02-14 PROCEDURE — 99999 PR OFFICE/OUTPT VISIT,PROCEDURE ONLY: CPT | Performed by: PHARMACIST

## 2022-02-14 PROCEDURE — APPNB60 APP NON BILLABLE TIME 46-60 MINS: Performed by: PHARMACIST

## 2022-02-14 RX ORDER — ALBUTEROL SULFATE 2.5 MG/3ML
2.5 SOLUTION RESPIRATORY (INHALATION) DAILY PRN
COMMUNITY

## 2022-02-14 RX ORDER — SEMAGLUTIDE 1.34 MG/ML
INJECTION, SOLUTION SUBCUTANEOUS
Qty: 1 PEN | Refills: 2 | Status: SHIPPED | OUTPATIENT
Start: 2022-02-14 | End: 2022-02-21

## 2022-02-14 RX ORDER — INSULIN DETEMIR 100 [IU]/ML
35 INJECTION, SOLUTION SUBCUTANEOUS NIGHTLY
Qty: 5 PEN | Refills: 3
Start: 2022-02-14 | End: 2022-03-07 | Stop reason: SDUPTHER

## 2022-02-14 SDOH — HEALTH STABILITY: PHYSICAL HEALTH: ON AVERAGE, HOW MANY DAYS PER WEEK DO YOU ENGAGE IN MODERATE TO STRENUOUS EXERCISE (LIKE A BRISK WALK)?: 0 DAYS

## 2022-02-14 NOTE — Clinical Note
Ever Kitchen,    As talked about earlier. Scripts sent for Ozempic to her pharmacy. May need PA. Patient will increase Levemir to 35 units until Ozempic started. Follow up call in 1 week to check blood glucose readings and if she was able to start Ozempic. Recommended patient be seen sooner than scheduled PCP appointment of 03/14/2022 for \"arm locking\". Niru Cavazos, Pharm. D., [address] [facility] Medication Management Service  [phone]  2/14/2022  5:06 PM

## 2022-02-14 NOTE — PROGRESS NOTES
Medication Management Service  [facility]  [phone]      Manjula Cantor is a 54 y.o. female that presents for an initial diabetes mellitus telephone visit with Medication Management Service per referral from JORDAN Harris for Diabetes Management under Collaborative Practice Agreement with A1c goal < 7 %. Preferred Pharmacy is [facility] on [address]. Just went off of a prednisone taper. Pulmonologist appointment is scheduled with [facility]. 03/2 or 03/03. Patient talked about \"Arm locked up\" Not able to move it. Pointer finger stuck. Had another episode this morning. Wondering if she has vitamin deficiency? Blue Cross and Blue shield will be primary insurance in about 1 month. He  will be the carrier for the policy. This will make medicaid a secondary plan for her. Subjective/Objective     New Problems/Changes: Guido Salmonro recently started as part of DM management by PCP on 12/20/2021. Patient likes how she feels with the Levemir. Much better than when on the 70/30. DIET  3 meals- Light snack in the afternoon and sometimes at night time too. Breakfast:  granola with fruit, english muffin, yogurt, Likes strawberries, blue berries, oranges-blood orange (1 per day)  Lunch:  [place] or salad. With protein in salad. Lots of coffee, water, may be some juice (apple-diet, cran grape)    Dinner:  Protein and veggies with little starch. Nothing white. No potatoes. Pasta switched to whole grain. Will eat a couple of couples of vegetables. Use to drink sweet tea. None in 3 years. Soda may be 1-2 times per week. Likes 10 calorie soda. Does not drink regular soda. EXERCISE  Patient limited due to neck. Activity has been decreased since July. Been close to bed rest since July.       WEIGHT  Weight: 93 kg  BMI: 36.31 kg/m2    DIABETES MANAGEMENT    Diabetes Goals: Using ADA Standards of Care     Goal A1c:  Less than 7 %   Fasting Blood Sugars: 80-130mg/dL  Postprandial glucose:  Less than 180mg/dL     Lab Results   Component Value Date    LABA1C 9.1 (H) 09/13/2021    LABA1C 8.6 01/18/2021    LABA1C 7.9 (H) 08/17/2020       Home Glucose Readings:   300-350s    Interested in CGM   160s-170s     Normal reading:  Fasting blood glucose:  About 145mg/dL-160mg/dL   Dinner blood glucose:  170mg/dL    Just came off steroid. Been running in the 250s. Hypoglycemia:    No low readings with Levemir. Renal monitoring:  Lab Results   Component Value Date    LABMICR CANNOT BE CALCULATED 09/13/2021     Lab Results   Component Value Date    CREATININE 0.61 01/01/2022     CrCl cannot be calculated (Unknown ideal weight.). BLOOD PRESSURE MANAGEMENT  BP Readings from Last 3 Encounters:   01/01/22 115/69   12/20/21 (!) 140/80   12/08/21 119/79       CHOLESTEROL MANAGEMENT    Lab Results   Component Value Date    CHOL 182 01/01/2022    TRIG 121 01/01/2022    HDL 33 (L) 01/01/2022    LDLCHOLESTEROL 125 01/01/2022     ALT   Date Value Ref Range Status   01/01/2022 13 5 - 33 U/L Final     AST   Date Value Ref Range Status   01/01/2022 11 <32 U/L Final     The 10-year ASCVD risk score (Dexter Fernandez, et al., 2013) is: 11.6%    Values used to calculate the score:      Age: 54 years      Sex: Female      Is Non- : No      Diabetic: Yes      Tobacco smoker: Yes      Systolic Blood Pressure: 689 mmHg      Is BP treated: Yes      HDL Cholesterol: 33 mg/dL      Total Cholesterol: 182 mg/dL     MEDICATIONS    Previous Diabetes Medications:   Metformin-caused diarrhea. Pt reports being on XR with diarrhea. Medication Sig Comment    insulin detemir (LEVEMIR FLEXTOUCH) 100 UNIT/ML injection pen Inject 33 Units into the skin nightly Taking as prescribed       On Statin: Yes    On ACE-I or ARB for HTN or Microalbuminuria: No    Medication Adherence/Cost/Adverse Events:    No barriers identified.   Patient appears to be taking medications as prescribed. Assessment/Plan     Diabetes Management:   Blood glucose readings are above goal on most days. Even higher when patient is needing to take prednisone. Plan for patient to increase Levemir to 35 units daily unless Ozempic is covered by her insurance. Then patient will start Ozempic 0.25mg weekly x 4 weeks. After 4 weeks will increase to 0.5mg weekly. New Rx for Ozempic sent to patient's pharmacy. Looks like Ary Beltran will need PA. Patient is interested in a possible CGM device. May be easier to obtain approval as medicaid requires patients to be on multiple daily doses of insurance. May reconsider once patient has been added to her husbands now policy in about 1 month [facility] Shield-Medicaid will then be her secondary). Spoke with patient's PCP regarding \"arm locking/vitamin deficiency\" , PCP recommended that patient make an appointment sooner than already scheduled appointment for 03/14/2022. The following education was provided during today's visit:     [x] Blood glucose goals    [x] Food choices with DM-Overall appears that patient has a good understanding. Pt reported that she has been through several classes. Next Follow-Up: By telephone in 1 week to review blood glucose, current dose of Levemir, and if patient was able to start Ozempic. Phone call scheduled for 02/21/2022 @9:00am    Patient verbalized understanding of care plan. Patient advised to call Medication Management with any questions, concerns, or changes prior to next appointment. Progress note sent to referring provider. Jens Singleton, SAVANA-CNP)   Patient verbally expresses agreement to working with writer in a consult agreement with myself and PCP. Plan to have patient sign written consent with future office visit. Patient verbally acknowledges working in a consult agreement with the pharmacist as referred by his/her physician. Nayan Olvera, Pharm. D., [place]

## 2022-02-21 ENCOUNTER — TELEPHONE (OUTPATIENT)
Dept: FAMILY MEDICINE CLINIC | Age: 52
End: 2022-02-21

## 2022-02-21 ENCOUNTER — PHARMACY VISIT (OUTPATIENT)
Dept: FAMILY MEDICINE CLINIC | Age: 52
End: 2022-02-21

## 2022-02-21 DIAGNOSIS — E11.65 TYPE 2 DIABETES MELLITUS WITH HYPERGLYCEMIA, WITH LONG-TERM CURRENT USE OF INSULIN (HCC): Primary | ICD-10-CM

## 2022-02-21 DIAGNOSIS — Z79.899 MEDICATION MANAGEMENT: Primary | ICD-10-CM

## 2022-02-21 DIAGNOSIS — Z79.4 TYPE 2 DIABETES MELLITUS WITH HYPERGLYCEMIA, WITH LONG-TERM CURRENT USE OF INSULIN (HCC): Primary | ICD-10-CM

## 2022-02-21 PROCEDURE — 99999 PR OFFICE/OUTPT VISIT,PROCEDURE ONLY: CPT | Performed by: PHARMACIST

## 2022-02-21 PROCEDURE — APPNB30 APP NON BILLABLE TIME 0-30 MINS: Performed by: PHARMACIST

## 2022-02-21 NOTE — Clinical Note
Alayna De Leon,    Spoke with patient by telephone. Reviewed blood glucose. Fasting above goal.  Pt reports range is 140-145mg/dL. Will have patient increase Levemir to 37 units unless she is approved for the Trulicity. Then she will start Trulicity and continue with Levemir 35 units. Telephone call in 2 weeks. Next PCP visit in 3 weeks on 03/14/2022. Keshawn Mccoy, Pharm. D., [address] [facility] Medication Management Service  [phone]  2/21/2022  9:30 AM

## 2022-02-21 NOTE — TELEPHONE ENCOUNTER
Left message regarding ozempic not being covered. Insurance doesn't cover it and [address] sent in trulicity to the pharmacy instead.

## 2022-02-21 NOTE — PROGRESS NOTES
Medication Management Service  [facility]  [phone]      Alice Church is a 54 y.o. female that presents for an initial and a follow-up diabetes mellitus telephone visit with Medication Management Service per referral from JORDAN Verdin for Diabetes Management under Collaborative Practice Agreement with A1c goal < 7 %. Preferred Pharmacy is [facility] on [address]. [place]    [employer] and Blue shield will be primary insurance in about 1 month. He  will be the carrier for the policy. This will make medicaid a secondary plan for her. Subjective/Objective     New Problems/Changes: Levemir recently started as part of DM management by PCP on 12/20/2021. Dose increased to 35 unit daily with last phone call on 02/14/2022. DIET  3 meals- Light snack in the afternoon and sometimes at night time too. Breakfast:  granola with fruit, english muffin, yogurt, Likes strawberries, blue berries, oranges-blood orange (1 per day)  Lunch:  [place] or salad. With protein in salad. Lots of coffee, water, may be some juice (apple-diet, cran grape)    Dinner:  Protein and veggies with little starch. Nothing white. No potatoes. Pasta switched to whole grain. Will eat a couple of couples of vegetables. Use to drink sweet tea. None in 3 years. Soda may be 1-2 times per week. Likes 10 calorie soda. Does not drink regular soda. Been eating less carbs. No other changes noted during phone call. EXERCISE  Patient limited due to neck. Activity has been decreased since July. Been close to bed rest since July. No changes in activity level noted.      WEIGHT  Weight: 93 kg  BMI: 36.31 kg/m2    DIABETES MANAGEMENT    Diabetes Goals: Using ADA Standards of Care     Goal A1c:  Less than 7 %   Fasting Blood Sugars:  80-130mg/dL  Postprandial glucose:  Less than 180mg/dL     Lab Results   Component Value Date    LABA1C 9.1 (H) 09/13/2021    LABA1C 8.6 01/18/2021    LABA1C 7.9 (H) 08/17/2020 Home Glucose Readings:   Fasting blood glucose readings noted to be around 140mg/dL-145mg/dL.  Before dinner readings are running between 150-160s   Post prandial dinner running around the 180s   Interested in CGM-device    Hypoglycemia:    No low readings with Levemir. Renal monitoring:  Lab Results   Component Value Date    LABMICR CANNOT BE CALCULATED 09/13/2021     Lab Results   Component Value Date    CREATININE 0.61 01/01/2022     CrCl cannot be calculated (Unknown ideal weight.). BLOOD PRESSURE MANAGEMENT  BP Readings from Last 3 Encounters:   01/01/22 115/69   12/20/21 (!) 140/80   12/08/21 119/79       CHOLESTEROL MANAGEMENT    Lab Results   Component Value Date    CHOL 182 01/01/2022    TRIG 121 01/01/2022    HDL 33 (L) 01/01/2022    LDLCHOLESTEROL 125 01/01/2022     ALT   Date Value Ref Range Status   01/01/2022 13 5 - 33 U/L Final     AST   Date Value Ref Range Status   01/01/2022 11 <32 U/L Final     The 10-year ASCVD risk score (Bijal Pack, et al., 2013) is: 11.6%    Values used to calculate the score:      Age: 54 years      Sex: Female      Is Non- : No      Diabetic: Yes      Tobacco smoker: Yes      Systolic Blood Pressure: 243 mmHg      Is BP treated: Yes      HDL Cholesterol: 33 mg/dL      Total Cholesterol: 182 mg/dL     MEDICATIONS    Previous Diabetes Medications:   Metformin-caused diarrhea. Pt reports being on XR with diarrhea. Medication Sig Comment    insulin detemir (LEVEMIR FLEXTOUCH) 100 UNIT/ML injection pen Inject 35 Units into the skin nightly Taking as prescribed     Noticed that new order for dulaglutide 0.75mg weekly has been sent to [address] [place] [address] this morning by PCP. Patient mentioned that she also has received a text message from the pharmacy that the medication will need to be ordered.         On Statin: Yes    On ACE-I or ARB for HTN or Microalbuminuria: No    Medication Adherence/Cost/Adverse Events:    No barriers identified. Patient appears to be taking medications as prescribed. Assessment/Plan     Diabetes Management:   Blood glucose readings are above goal on most days. Even higher when patient is needing to take prednisone. Plan for patient to increase Levemir to 37 units daily unless Trulicity is covered by her insurance. Then patient will start Trulicity 3.97VS weekly and continue Levemir 35 units weekly. Patient is interested in a possible CGM device. Plan continues to be to submit Rx once patient changes to new insurance. [facility] Shield-Medicaid will then be her secondary). May be easier to obtain approval as medicaid requires patients to be on multiple daily doses of insurance. Next Follow-Up: By telephone in 2 week to review blood glucose, current dose of Levemir, and if patient was able to start Trulicity. Phone call scheduled for 03/07/2022 @10:00am    Patient verbalized understanding of care plan. Patient advised to call Medication Management with any questions, concerns, or changes prior to next appointment. Progress note sent to referring provider. Jhonatan Iglesias, APRN-CNP)   Patient verbally expresses agreement to working with writer in a consult agreement with myself and PCP. Plan to have patient sign written consent with future office visit. Patient verbally acknowledges working in a consult agreement with the pharmacist as referred by his/her physician. Guido Chapman, Pharm. D., [address] [facility] Medication Management Service  [phone]  2/21/2022  8:40 AM        ==================================================================For Pharmacy Admin Tracking Only     CPA in place:  No   Recommendation Provided To: Patient/Caregiver: 1 via Telephone   Intervention Detail: Dose Adjustment: 1, reason: Therapy Optimization   Intervention Accepted By: Patient/Caregiver: 1   Time Spent (min): 30

## 2022-02-23 ENCOUNTER — OFFICE VISIT (OUTPATIENT)
Dept: GASTROENTEROLOGY | Age: 52
End: 2022-02-23
Payer: COMMERCIAL

## 2022-02-23 VITALS
SYSTOLIC BLOOD PRESSURE: 131 MMHG | TEMPERATURE: 98.4 F | WEIGHT: 216 LBS | DIASTOLIC BLOOD PRESSURE: 83 MMHG | BODY MASS INDEX: 38.26 KG/M2 | HEART RATE: 114 BPM

## 2022-02-23 DIAGNOSIS — K21.9 GASTROESOPHAGEAL REFLUX DISEASE, UNSPECIFIED WHETHER ESOPHAGITIS PRESENT: Primary | ICD-10-CM

## 2022-02-23 DIAGNOSIS — K58.1 IRRITABLE BOWEL SYNDROME WITH CONSTIPATION: ICD-10-CM

## 2022-02-23 DIAGNOSIS — K21.00 GASTROESOPHAGEAL REFLUX DISEASE WITH ESOPHAGITIS WITHOUT HEMORRHAGE: ICD-10-CM

## 2022-02-23 DIAGNOSIS — K58.2 IRRITABLE BOWEL SYNDROME WITH BOTH CONSTIPATION AND DIARRHEA: ICD-10-CM

## 2022-02-23 DIAGNOSIS — F41.9 ANXIETY: ICD-10-CM

## 2022-02-23 PROCEDURE — G8427 DOCREV CUR MEDS BY ELIG CLIN: HCPCS | Performed by: INTERNAL MEDICINE

## 2022-02-23 PROCEDURE — G8417 CALC BMI ABV UP PARAM F/U: HCPCS | Performed by: INTERNAL MEDICINE

## 2022-02-23 PROCEDURE — 1036F TOBACCO NON-USER: CPT | Performed by: INTERNAL MEDICINE

## 2022-02-23 PROCEDURE — 99214 OFFICE O/P EST MOD 30 MIN: CPT | Performed by: INTERNAL MEDICINE

## 2022-02-23 PROCEDURE — G8482 FLU IMMUNIZE ORDER/ADMIN: HCPCS | Performed by: INTERNAL MEDICINE

## 2022-02-23 RX ORDER — OMEPRAZOLE 20 MG/1
20 CAPSULE, DELAYED RELEASE ORAL DAILY
Qty: 180 CAPSULE | Refills: 1 | Status: SHIPPED | OUTPATIENT
Start: 2022-02-23 | End: 2022-06-30 | Stop reason: DRUGHIGH

## 2022-02-23 ASSESSMENT — ENCOUNTER SYMPTOMS
VOMITING: 0
VOICE CHANGE: 0
COLOR CHANGE: 0
CHOKING: 1
TROUBLE SWALLOWING: 1
WHEEZING: 0
ABDOMINAL PAIN: 1
COUGH: 1
SORE THROAT: 0
RECTAL PAIN: 0
NAUSEA: 1
ABDOMINAL DISTENTION: 1
CONSTIPATION: 1
ANAL BLEEDING: 0
SHORTNESS OF BREATH: 1
BLOOD IN STOOL: 0
DIARRHEA: 0

## 2022-02-23 NOTE — PROGRESS NOTES
syndrome-like symptoms she has no known family history for colon cancer        She is an ex-smoker  Drinks 3 cups of coffee every morning  No alcohol abuse illicit drug use    HISTORY OF PRESENT ILLNESS: Azalea ALEXANDRA Bhavik Pa is a 54 y.o. female with a past history remarkable for , referred for evaluation of   Chief Complaint   Patient presents with    Follow Up After Procedure     Patient here to follow up on his EGD and Colonoscopy / patient states he feels like she is having trouble swallowing , heartburn, constipation since his last visit . Patient states she feels any better , the same      . Past Medical,Family, and Social History reviewed and does contribute to the patient presenting condition. Patient's PMH/PSH,SH,PSYCH Hx, MEDs, ALLERGIES, and ROS were all reviewed and updated in the appropriate sections.     PAST MEDICAL HISTORY:  Past Medical History:   Diagnosis Date    Anxiety     Arthritis     CAD (coronary artery disease)     Depression     Hyperlipidemia     Kidney stone     Langerhans-cell granulomatosis ([address]     Myocardial infarct ([facility] [address] 2016    Neck pain     Spinal stenosis 2017       Past Surgical History:   Procedure Laterality Date    BACK SURGERY      L5-S1, C5-7    CARDIAC CATHETERIZATION       SECTION      COLONOSCOPY N/A 2021    COLONOSCOPY WITH BIOPSY performed by Mile Kaufman MD at [facility] Left     LUNG BIOPSY Right     MASTOID SURGERY      TONSILLECTOMY      UPPER GASTROINTESTINAL ENDOSCOPY  2021    EGD BIOPSY performed by Mile Kaufman MD at [address] [place] [address]:    Current Outpatient Medications:     omeprazole (PRILOSEC) 20 MG delayed release capsule, Take 1 capsule by mouth Daily, Disp: 180 capsule, Rfl: 1    Dulaglutide 0.75 MG/0.5ML SOPN, Inject 0.75 mg into the skin once a week, Disp: 5 pen, Rfl: 3    albuterol (PROVENTIL) (2.5 MG/3ML) 0.083% nebulizer solution, Take 2.5 mg by nebulization daily as needed for Wheezing, Disp: , Rfl:     insulin detemir (LEVEMIR FLEXTOUCH) 100 UNIT/ML injection pen, Inject 35 Units into the skin nightly, Disp: 5 pen, Rfl: 3    blood glucose monitor kit and supplies, Dispense sufficient amount for BID testing frequency plus additional to accommodate PRN testing needs. Dispense all needed supplies to include: monitor, strips, lancing device, lancets, control solutions, alcohol swabs., Disp: 1 kit, Rfl: 0    tiZANidine (ZANAFLEX) 2 MG tablet, take 1 tablet by mouth three times a day if needed, Disp: 90 tablet, Rfl: 0    nitroGLYCERIN (NITROSTAT) 0.4 MG SL tablet, place 1 tablet under the tongue if needed every 5 minutes for chest pain for 3 doses IF NO RELIEF AFTER THIRD DOSE CALL PRESCRIBER ., Disp: 25 tablet, Rfl: 0    calcium carbonate (OS-BRANNON) 1250 (500 Ca) MG chewable tablet, Take 500-1,000 mg by mouth 3 times daily, Disp: , Rfl:     blood glucose test strips (FREESTYLE LITE) strip, 1 each by Does not apply route 2 times daily As needed. , Disp: 100 each, Rfl: 3    busPIRone (BUSPAR) 15 MG tablet, Take 15 mg by mouth 2 times daily, Disp: 180 tablet, Rfl: 1    levothyroxine (SYNTHROID) 50 MCG tablet, Take 1 tablet by mouth Daily, Disp: 90 tablet, Rfl: 1    metoprolol succinate (TOPROL XL) 100 MG extended release tablet, take 1 tablet by mouth twice a day, Disp: 180 tablet, Rfl: 1    sertraline (ZOLOFT) 100 MG tablet, Take 1.5 tablets by mouth daily, Disp: 135 tablet, Rfl: 1    ranolazine (RANEXA) 500 MG extended release tablet, Take 1 tablet by mouth 2 times daily, Disp: 180 tablet, Rfl: 1    amLODIPine (NORVASC) 5 MG tablet, Take 1 tablet by mouth daily, Disp: 90 tablet, Rfl: 1    atorvastatin (LIPITOR) 80 MG tablet, Take 1 tablet by mouth daily, Disp: 90 tablet, Rfl: 1    clopidogrel (PLAVIX) 75 MG tablet, Take 1 tablet by mouth daily, Disp: 90 tablet, Rfl: 1    mirabegron (MYRBETRIQ) 50 MG TB24, Take 50 mg by mouth daily, Disp: 90 tablet, Rfl: 1    topiramate (TOPAMAX) 50 MG tablet, Take 1 tablet by mouth 2 times daily, Disp: 180 tablet, Rfl: 1    albuterol sulfate  (90 Base) MCG/ACT inhaler, Inhale 2 puffs into the lungs every 4 hours as needed for Wheezing or Shortness of Breath, Disp: 18 g, Rfl: 1    fluticasone (FLONASE) 50 MCG/ACT nasal spray, 1 spray by Each Nostril route daily as needed for Rhinitis, Disp: , Rfl:     Multiple Vitamins-Minerals (THERAPEUTIC MULTIVITAMIN-MINERALS) tablet, Take 1 tablet by mouth daily, Disp: , Rfl:     ascorbic acid (VITAMIN C) 500 MG tablet, Take 500 mg by mouth daily, Disp: , Rfl:     polyethylene glycol (GLYCOLAX) 17 g packet, Take 17 g by mouth daily as needed , Disp: , Rfl:     acetaminophen (TYLENOL) 500 MG tablet, Take 2 tablets by mouth every 6 hours as needed for Pain, Disp: 30 tablet, Rfl: 0    Cholecalciferol (VITAMIN D) 50 MCG (2000 UT) TABS tablet, Take 2,000 Units by mouth daily , Disp: , Rfl:     FREESTYLE LANCETS MISC, 1 each by Does not apply route daily, Disp: 100 each, Rfl: 3    Insulin Pen Needle ([employer] PEN NEEDLES 31G) 31G X 8 MM MISC, 1 each by Does not apply route daily, Disp: 100 each, Rfl: 3    aspirin 81 MG chewable tablet, Take 81 mg by mouth daily, Disp: , Rfl:     ALLERGIES:   Allergies   Allergen Reactions    Seasonal Other (See Comments)     Sneezing, watery eyes, wheezing       FAMILY HISTORY:       Problem Relation Age of Onset    Dementia Mother     Depression Mother     High Blood Pressure Mother     Coronary Art Dis Mother     COPD Mother     Diabetes Mother     Heart Attack Father     Diabetes Maternal Grandmother     Alzheimer's Disease Paternal Grandfather          SOCIAL HISTORY:   Social History     Socioeconomic History    Marital status:      Spouse name: Not on file    Number of children: Not on file    Years of education: Not on file    Highest education level: Not on file   Occupational History    Not on file   Tobacco Use    Smoking status: Former Smoker     Packs/day: 0.50     Years: 25.00     Pack years: 12.50     Types: Cigarettes    Smokeless tobacco: Never Used    Tobacco comment: 3 packs per week   Vaping Use    Vaping Use: Never used   Substance and Sexual Activity    Alcohol use: Yes     Comment: social    Drug use: Yes     Frequency: 7.0 times per week     Types: Marijuana Peg Telles    Sexual activity: Yes   Other Topics Concern    Not on file   Social History Narrative    Not on file     Social Determinants of Health     Financial Resource Strain: Low Risk     Difficulty of Paying Living Expenses: Not hard at all   Food Insecurity: No Food Insecurity    Worried About Running Out of Food in the Last Year: Never true    Jim of Food in the Last Year: Never true   Transportation Needs: No Transportation Needs    Lack of Transportation (Medical): No    Lack of Transportation (Non-Medical): No   Physical Activity: Unknown    Days of Exercise per Week: 0 days    Minutes of Exercise per Session: Not on file   Stress:     Feeling of Stress : Not on file   Social Connections:     Frequency of Communication with Friends and Family: Not on file    Frequency of Social Gatherings with Friends and Family: Not on file    Attends Anglican Services: Not on file    Active Member of Clubs or Organizations: Not on file    Attends Club or Organization Meetings: Not on file    Marital Status: Not on file   Intimate Partner Violence: Not At Risk    Fear of Current or Ex-Partner: No    Emotionally Abused: No    Physically Abused: No    Sexually Abused: No   Housing Stability:     Unable to Pay for Housing in the Last Year: Not on file    Number of [place] in the Last Year: Not on file    Unstable Housing in the Last Year: Not on file         REVIEW OF SYSTEMS:         Review of Systems   Constitutional: Negative for fever and unexpected weight change. HENT: Positive for trouble swallowing. Negative for sore throat and voice change. Eyes: Positive for visual disturbance. Respiratory: Positive for cough, choking and shortness of breath. Negative for wheezing. Cardiovascular: Negative for chest pain and leg swelling. Gastrointestinal: Positive for abdominal distention, abdominal pain, constipation and nausea. Negative for anal bleeding, blood in stool, diarrhea, rectal pain and vomiting. Genitourinary: Negative for difficulty urinating. Musculoskeletal: Negative for joint swelling. Skin: Negative for color change and rash. Neurological: Positive for weakness. Negative for dizziness, light-headedness and headaches. Hematological: Bruises/bleeds easily. Psychiatric/Behavioral: Positive for sleep disturbance. Negative for confusion and hallucinations. The patient is nervous/anxious. PHYSICAL EXAMINATION: Vital signs reviewed per the nursing documentation. /83   Pulse 114   Temp 98.4 °F (36.9 °C)   Wt 216 lb (98 kg)   LMP 01/30/2022   BMI 38.26 kg/m²   Body mass index is 38.26 kg/m². Physical Exam  Nursing note reviewed. Constitutional:       Appearance: She is well-developed. Comments: Anxious    HENT:      Head: Normocephalic and atraumatic. Eyes:      Conjunctiva/sclera: Conjunctivae normal.      Pupils: Pupils are equal, round, and reactive to light. Cardiovascular:      Heart sounds: Normal heart sounds. Pulmonary:      Effort: Pulmonary effort is normal.      Breath sounds: Normal breath sounds. Abdominal:      General: Bowel sounds are normal.      Palpations: Abdomen is soft. Comments: NON TENDER, NON DISTENTED  LIVER SPLEEN AND HERNIAS ARE NOT  PALPABLE  BOWEL SOUNDS ARE POSITIVE      Musculoskeletal:         General: Normal range of motion. Cervical back: Normal range of motion and neck supple. Skin:     General: Skin is warm. Neurological:      Mental Status: She is alert and oriented to person, place, and time.    Psychiatric:         Behavior: Behavior normal. LABORATORY DATA: Reviewed  Lab Results   Component Value Date    WBC 11.8 (H) 01/01/2022    HGB 11.3 (L) 01/01/2022    HCT 36.0 (L) 01/01/2022    MCV 90.0 01/01/2022     01/01/2022     01/01/2022    K 4.1 01/01/2022     01/01/2022    CO2 21 01/01/2022    BUN 8 01/01/2022    CREATININE 0.61 01/01/2022    LABALBU 3.6 01/01/2022    BILITOT 0.24 (L) 01/01/2022    ALKPHOS 76 01/01/2022    AST 11 01/01/2022    ALT 13 01/01/2022         Lab Results   Component Value Date    RBC 4.00 01/01/2022    HGB 11.3 (L) 01/01/2022    MCV 90.0 01/01/2022    MCH 28.3 01/01/2022    MCHC 31.4 01/01/2022    RDW 14.2 01/01/2022    MPV 8.9 01/01/2022    BASOPCT 0 12/30/2021    LYMPHSABS 7.22 (H) 12/30/2021    MONOSABS 1.56 (H) 12/30/2021    NEUTROABS 9.94 (H) 12/30/2021    EOSABS 0.78 (H) 12/30/2021    BASOSABS 0.00 12/30/2021         DIAGNOSTIC TESTING:     No results found. Assessment  1. Gastroesophageal reflux disease, unspecified whether esophagitis present    2. Gastroesophageal reflux disease with esophagitis without hemorrhage    3. Irritable bowel syndrome with both constipation and diarrhea    4. Irritable bowel syndrome with constipation    5. Anxiety        Plan    Start PPI    Pt was discussed in detail about the possible side effects of proton pump inhibiter therapy. She was explained about the possibility of calcium and magnesium malabsorption and was advised to start taking calcium supplements with Vit D. Some over the counter regimens were explained to patient. Some dietary advices were also given. She has verbalized understanding and agreement to this. Pt seems to have signs and symptoms consistent with GERD, acid indigestion and heartburns. She was discussed  in detail about some possible life style and dietary modifications. She was stressed about the maintenance  of appropriate weight and effect of obesity contributing to reflux symptoms. Routine exercise was streesed. Avoidance of Caffeine, nicotine and chocolate were explained. Pt was asked to avoid spices grease and fried food. Advices were also given about avoidance of any kind of fast foods, soda pops and high energy drinks. Pt was advised to place two small block under the head end of the bed which may help with night time reflux. Was advised not to eat any thin at least 2-3 hrs before going to bed and walk especially after dinner    Pt has verbalized understanding and agreement to this plan. Pt was advised in detail about some life style and dietary modifications. She was advised about avoidance of caffeine, nicotine and chocolate. Pt was also told to stay away from any kind of fast foods, soda pops. She was also advised to avoid lots of spices, grease and fried food etc.     Instructions were also given about trying to arrange the timing, quality and quantity of food. Instructions were given about using ample amount of fiber including dietary and supplemental fiber either metamucil, bennafiber or citrucell etc.  Pt was advised about drinking ample amount of water without any colors or chemicals. Stress was given about regular exercise. Pt has verbalized understanding and agreement to these modifications. The patient was instructed to start taking some OTC Probiotics products   These are available over the counter at the Pharmacy stores and Grocery stores  He was explained about the beneficial effects they have in the GI track  They will help to establish the good bacterial radha and will help with the digestion and bowel movements  The patient has verbalized understanding and agreement to this plan    Pt was given advices and instructions about weight loss. She was advised about the significance of exercise at least three times a week . Dietary advices were also given about the avoidance of fast food, fried food and lots of spices and grease.      nstructions were given about using ample amount of fiber including dietary and supplemental fiber either metamucil, bennafiber or citrucell etc.  Pt was advised about drinking ample amount of water without any colors or chemicals. Stress was given about regular exercise. Pt was told to stay way from soda pops. Pt has verbalized understanding and agrrement    I communicated with the patient and/or health care decision maker about   Details of this discussion including any medical advice provided:YES      I affirm this is a Patient Initiated Episode with an Established Patient who has not had a related appointment within my department in the past 7 days or scheduled within the next 24 hours. Total Time: minutes: 21-30 minutes    Note: not billable if this call serves to triage the patient into an appointment for the relevant concern      Thank you for allowing me to participate in the care of Ms. Brianne Larose. For any further questions please do not hesitate to contact me. I have reviewed and agree with the ROS entered by the MA/LPN.          Katt Wilson MD, [facility]  Board Certified in Gastroenterology and [address] [place] [address] Gastroenterology  Office #: [phone]

## 2022-03-07 ENCOUNTER — PHARMACY VISIT (OUTPATIENT)
Dept: FAMILY MEDICINE CLINIC | Age: 52
End: 2022-03-07

## 2022-03-07 DIAGNOSIS — Z79.899 MEDICATION MANAGEMENT: Primary | ICD-10-CM

## 2022-03-07 PROCEDURE — APPNB30 APP NON BILLABLE TIME 0-30 MINS: Performed by: PHARMACIST

## 2022-03-07 PROCEDURE — 99999 PR OFFICE/OUTPT VISIT,PROCEDURE ONLY: CPT | Performed by: PHARMACIST

## 2022-03-07 RX ORDER — INSULIN DETEMIR 100 [IU]/ML
30 INJECTION, SOLUTION SUBCUTANEOUS NIGHTLY
Qty: 5 PEN | Refills: 3
Start: 2022-03-07 | End: 2022-05-25 | Stop reason: SDUPTHER

## 2022-03-07 NOTE — PROGRESS NOTES
Medication Management Service  [facility]  [phone]      Lizbeth Monson is a 54 y.o. female that presents for an initial and a follow-up diabetes mellitus telephone visit with Medication Management Service per referral from JORDAN Castro for Diabetes Management under Collaborative Practice Agreement with A1c goal < 7 %. Preferred Pharmacy is [facility] on [address]. Blue Cross and Blue shield will be primary insurance soon. He  will be the carrier for the policy. This will make medicaid a secondary plan for her. No changes with today's phone call. Patient continues with [facility]    Patient answered second phone call by Dante Haskins and had been sleeping during first attempt at scheduled call this morning. Subjective/Objective     New Problems/Changes: Trulicity was started 2 weeks ago. Doses taken on Wednesday. Overall patient is doing well. Has noticed some diarrhea, nausea, and decreased appetite. All are manageable per patient. Pt continues with Levemir but admits to decreasing to 30 units daily as she was concerned that readings would go low with starting the Trulicity. DIET  3 meals- Light snack in the afternoon and sometimes at night time too. Breakfast:  granola with fruit, english muffin, yogurt, Likes strawberries, blue berries, oranges-blood orange (1 per day)  Lunch:  [place] or salad. With protein in salad. Lots of coffee, water, may be some juice (apple-diet, cran grape)    Dinner:  Protein and veggies with little starch. Nothing white. No potatoes. Pasta switched to whole grain. Will eat a couple of couples of vegetables. Use to drink sweet tea. None in 3 years. Soda may be 1-2 times per week. Likes 10 calorie soda. Does not drink regular soda. No changes noted during phone call. Patient did report a decreased appetite probably secondary to starting Trulicity. EXERCISE  Patient limited due to neck.   Activity has been decreased since July. Been close to bed rest since July. No changes in activity level noted during this call. WEIGHT  Weight: 93 kg  BMI: 36.31 kg/m2    DIABETES MANAGEMENT    Diabetes Goals: Using ADA Standards of Care     Goal A1c:  Less than 7 %   Fasting Blood Sugars:  80-130mg/dL  Postprandial glucose:  Less than 180mg/dL     Lab Results   Component Value Date    LABA1C 9.1 (H) 09/13/2021    LABA1C 8.6 01/18/2021    LABA1C 7.9 (H) 08/17/2020       Home Glucose Readings:   Fasting blood glucose readings noted to be around 121mg/dL.  Before dinner readings are running between 125-130s   Post prandial dinner running around the 150 after dinner was the highest after pasta.  Interested in CGM-device    Hypoglycemia:    No low readings with Trulicity 7.40WI weekly and Levemir 30 units daily. Renal monitoring:  Lab Results   Component Value Date    LABMICR CANNOT BE CALCULATED 09/13/2021     Lab Results   Component Value Date    CREATININE 0.61 01/01/2022     CrCl cannot be calculated (Unknown ideal weight.). BLOOD PRESSURE MANAGEMENT  BP Readings from Last 3 Encounters:   02/23/22 131/83   01/01/22 115/69   12/20/21 (!) 140/80       CHOLESTEROL MANAGEMENT    Lab Results   Component Value Date    CHOL 182 01/01/2022    TRIG 121 01/01/2022    HDL 33 (L) 01/01/2022    LDLCHOLESTEROL 125 01/01/2022     ALT   Date Value Ref Range Status   01/01/2022 13 5 - 33 U/L Final     AST   Date Value Ref Range Status   01/01/2022 11 <32 U/L Final     The 10-year ASCVD risk score (Janeth Carpenter, et al., 2013) is: 5.2%    Values used to calculate the score:      Age: 54 years      Sex: Female      Is Non- : No      Diabetic: Yes      Tobacco smoker: No      Systolic Blood Pressure: 829 mmHg      Is BP treated: Yes      HDL Cholesterol: 33 mg/dL      Total Cholesterol: 182 mg/dL     MEDICATIONS    Previous Diabetes Medications:   Metformin-caused diarrhea. Pt reports being on XR with diarrhea. Medication Sig Comments     Dulaglutide 0.75 MG/0.5ML SOPN Inject 0.75 mg into the skin once a week Taking as prescribed on Wednesday    insulin detemir (LEVEMIR FLEXTOUCH) 100 UNIT/ML injection pen Inject 35 Units into the skin nightly Not taking as prescribed. Patient changed dose to 30 units daily as she was concern for low readings with starting Trulicity. On Statin: Yes    On ACE-I or ARB for HTN or Microalbuminuria: No    Medication Adherence/Cost/Adverse Events:    No barriers identified. Assessment/Plan     Diabetes Management:   Blood glucose readings appear to be at goal since starting Trulicity. Plan for patient to continue with Trulicity 8.21EK weekly and Levemir 30 units daily. May consider further increasing Trulicity to 2.2JV weekly and further decreasing Levemir in future. Would need to wait 4-8 weeks on current dose of Trulicity before further increasing. Patient scheduled for follow up appointment with PCP next Monday, 03/14/2022. Scheduled next Meds Mgmt follow up call on [email or web address] for review of blood glucose readings, adherence with prescribed DM medications, and consideration for further titration upwards of Trulicity. Patient continues to be nterested in a possible CGM device. Plan continues to be to submit Rx once patient changes to new insurance. [facility] Shield-Medicaid will then be her secondary). May be easier to obtain approval as medicaid requires patients to be on multiple daily doses of insurance. Next Follow-Up: As noted above. Patient verbalized understanding of care plan. Patient advised to call Medication Management with any questions, concerns, or changes prior to next appointment. Progress note sent to referring provider. Fabiana Justin, SAVANA-CNP)   Patient verbally expresses agreement to working with writer in a consult agreement with myself and PCP. Plan to have patient sign written consent with future office visit. Patient verbally acknowledges working in a consult agreement with the pharmacist as referred by his/her physician. Zenobia Yoo, Pharm. D., [address] Medication Management Service  [phone]  3/7/2022  8:37 AM         =========================================================  For Pharmacy Admin Tracking Only     CPA in place:   Yes   Time Spent (min): 30

## 2022-03-07 NOTE — Clinical Note
Thu Ngo,    Spoke with patient by telephone today. Pt has started Trulicity 8.93GC weekly and also decreased Levemir to 30 units daily. Reported blood glucose readings appear to be at goal with no reported low readings. Next PCP appointment scheduled for 03/14/2022. Could consider further increases in Trulicity dose to 6.3DS weekly after 4-8 weeks on the current dose. Patient will have taken her 3rd dose this Wednesday (03/09/2022). Could be a consideration so that Levemir could be further decreased? Next Meds Mgmt phone call scheduled for 04/04/2022. Ai Pinto, Pharm. D., [address] [facility] Medication Management Service  [phone]  3/7/2022  10:51 AM

## 2022-03-14 ENCOUNTER — OFFICE VISIT (OUTPATIENT)
Dept: FAMILY MEDICINE CLINIC | Age: 52
End: 2022-03-14
Payer: COMMERCIAL

## 2022-03-14 VITALS
WEIGHT: 218 LBS | SYSTOLIC BLOOD PRESSURE: 122 MMHG | BODY MASS INDEX: 38.62 KG/M2 | DIASTOLIC BLOOD PRESSURE: 80 MMHG | OXYGEN SATURATION: 98 % | HEART RATE: 102 BPM | TEMPERATURE: 97.6 F

## 2022-03-14 DIAGNOSIS — E78.2 MIXED HYPERLIPIDEMIA: ICD-10-CM

## 2022-03-14 DIAGNOSIS — Z79.4 TYPE 2 DIABETES MELLITUS WITH HYPERGLYCEMIA, WITH LONG-TERM CURRENT USE OF INSULIN (HCC): Primary | ICD-10-CM

## 2022-03-14 DIAGNOSIS — T78.40XD ALLERGIC REACTION TO DRUG, SUBSEQUENT ENCOUNTER: ICD-10-CM

## 2022-03-14 DIAGNOSIS — K21.9 GASTROESOPHAGEAL REFLUX DISEASE, UNSPECIFIED WHETHER ESOPHAGITIS PRESENT: ICD-10-CM

## 2022-03-14 DIAGNOSIS — E11.65 TYPE 2 DIABETES MELLITUS WITH HYPERGLYCEMIA, WITH LONG-TERM CURRENT USE OF INSULIN (HCC): Primary | ICD-10-CM

## 2022-03-14 DIAGNOSIS — N30.00 ACUTE CYSTITIS WITHOUT HEMATURIA: ICD-10-CM

## 2022-03-14 DIAGNOSIS — N32.81 OAB (OVERACTIVE BLADDER): ICD-10-CM

## 2022-03-14 DIAGNOSIS — E03.9 HYPOTHYROIDISM, UNSPECIFIED TYPE: ICD-10-CM

## 2022-03-14 DIAGNOSIS — I10 ESSENTIAL HYPERTENSION: ICD-10-CM

## 2022-03-14 DIAGNOSIS — I25.119 CORONARY ARTERY DISEASE INVOLVING NATIVE HEART WITH ANGINA PECTORIS, UNSPECIFIED VESSEL OR LESION TYPE (HCC): ICD-10-CM

## 2022-03-14 PROCEDURE — G8482 FLU IMMUNIZE ORDER/ADMIN: HCPCS | Performed by: NURSE PRACTITIONER

## 2022-03-14 PROCEDURE — 3046F HEMOGLOBIN A1C LEVEL >9.0%: CPT | Performed by: NURSE PRACTITIONER

## 2022-03-14 PROCEDURE — G8417 CALC BMI ABV UP PARAM F/U: HCPCS | Performed by: NURSE PRACTITIONER

## 2022-03-14 PROCEDURE — G8427 DOCREV CUR MEDS BY ELIG CLIN: HCPCS | Performed by: NURSE PRACTITIONER

## 2022-03-14 PROCEDURE — 1036F TOBACCO NON-USER: CPT | Performed by: NURSE PRACTITIONER

## 2022-03-14 PROCEDURE — 2022F DILAT RTA XM EVC RTNOPTHY: CPT | Performed by: NURSE PRACTITIONER

## 2022-03-14 PROCEDURE — 99214 OFFICE O/P EST MOD 30 MIN: CPT | Performed by: NURSE PRACTITIONER

## 2022-03-14 PROCEDURE — 83036 HEMOGLOBIN GLYCOSYLATED A1C: CPT | Performed by: NURSE PRACTITIONER

## 2022-03-14 RX ORDER — CEPHALEXIN 500 MG/1
500 CAPSULE ORAL 2 TIMES DAILY
COMMUNITY
Start: 2022-03-09 | End: 2022-03-14 | Stop reason: SINTOL

## 2022-03-14 RX ORDER — DOXYCYCLINE HYCLATE 100 MG/1
CAPSULE ORAL
COMMUNITY
Start: 2022-01-07 | End: 2022-03-14

## 2022-03-14 RX ORDER — CIPROFLOXACIN 250 MG/1
250 TABLET, FILM COATED ORAL 2 TIMES DAILY
Qty: 6 TABLET | Refills: 0 | Status: SHIPPED | OUTPATIENT
Start: 2022-03-14 | End: 2022-03-17

## 2022-03-14 NOTE — PROGRESS NOTES
[facility] [address]  [id number] [facility]. Rodrigo Bond 80  [phone]  [phone]    Emma Spencer is a 54 y.o. female who is here with c/o of:    Chief Complaint: 3 Month Follow-Up, Hypertension, Diabetes, and Other (wants vitamin levels checked)      Patient Accompanied by: n/a    HPI - Emma Spencer is here today with c/o:    Patient here for re evaluation of chronic conditions.     HTN-  Follows with Cardiology- Cari. Checks blood pressure at home and averages 120/70-82. She says that she doesn't take her medication most days due to her readings. Denies chest pain, shortness of breath, headaches, chest pain or swelling     HLD-  Compliant with Lipitor. No routine exercise. Eating more salads and veggies.      Hypothyroid-  Compliant with Levothyroxine. TSH utd     Anxiety-  Stable with buspar. She says she has her ups and downs.      Pulmonary [place]anger[place]ans-  Follows with Pulmonary. Uses her albuterol inhaler 2-3 times per week     CAD-  Follows with Cardiology- Cari. Has hx cardiac stents. Reports she had  maker.      Migraines-  Takes Topamax daily. Averages 2 migraines per week. Follows with Neurology      OAB-  Compliant with Myrbetriq.     Spinal Stenosis Cervical-  Follows with Neurosurg. Takes Tizanidine.     Diabetes type 2-  Checks blood sugars daily and averages 120-125's fasting. Says her post prandials are higher 150-160. Uses sliding scale. Hga1b 9.0%    Health Concerns-  She was put on Keflex on Thursday for UTI. She says yesterday she started with rash on her hands and around her mouth that is very pruritic. She denies shortness of breath or trouble swallowing. She has also been experiencing leg cramps. Says that she did get some OTC magnesium and that has helped some. Reports one of the specialist she sees thinks it could be a magnesium deficiency.      Health Maintenance Due   Topic Date Due    Diabetic retinal exam  Never done    Hepatitis B vaccine (1 of 3 - Risk 3-dose series) Never done    Cervical cancer screen  Never done    Diabetic foot exam  2020    COVID-19 Vaccine (3 - Booster for Moderna series) 2021    A1C test (Diabetic or Prediabetic)  2021        Patient Active Problem List:     Hypothyroidism     Type 2 diabetes mellitus without complication, with long-term current use of insulin ([address]     Essential hypertension     Hip fracture ([address]     Spinal stenosis of cervical region     Ground glass opacity present on imaging of lung     Hx of deep venous thrombosis     Situational depression     Anxiety     Pulmonary Langerhans cell granulomatosis ([address]     Mixed hyperlipidemia     Coronary artery disease involving native heart with angina pectoris (HCC)     Migraine without status migrainosus, not intractable     OAB (overactive bladder)     Smoker     Irritable bowel syndrome with constipation     Irritable bowel syndrome with both constipation and diarrhea     Positive colorectal cancer screening using Cologuard test     Gastroesophageal reflux disease with esophagitis without hemorrhage     Gastroesophageal reflux disease     Past Medical History:   Diagnosis Date    Anxiety     Arthritis     CAD (coronary artery disease)     Depression     Hyperlipidemia     Kidney stone     Langerhans-cell granulomatosis ([address]     Myocardial infarct ([address] 2016    Neck pain     Spinal stenosis 2017      Past Surgical History:   Procedure Laterality Date    BACK SURGERY      L5-S1, C5-7    CARDIAC CATHETERIZATION       SECTION      COLONOSCOPY N/A 2021    COLONOSCOPY WITH BIOPSY [facility] Left     LUNG BIOPSY Right     MASTOID SURGERY      TONSILLECTOMY      UPPER GASTROINTESTINAL ENDOSCOPY  2021    EGD BIOPSY performed by Margarita Marsh MD at [address] [place] [address] History   Problem Relation Age of Onset    Dementia Mother     Depression Mother     High Blood Pressure Mother     Coronary Art Dis Mother     COPD Mother     Diabetes Mother     Heart Attack Father     Diabetes Maternal Grandmother     Alzheimer's Disease Paternal Grandfather      Social History     Tobacco Use    Smoking status: Former Smoker     Packs/day: 0.50     Years: 25.00     Pack years: 12.50     Types: Cigarettes    Smokeless tobacco: Never Used    Tobacco comment: 3 packs per week   Substance Use Topics    Alcohol use: Yes     Comment: social     ALLERGIES:    Allergies   Allergen Reactions    Seasonal Other (See Comments)     Sneezing, watery eyes, wheezing    Keflex [Cephalexin] Rash          Subjective   Review of Systems   · Constitutional:  Negative for activity change, appetite change,unexpected weight change, chills, fever, and fatigue. · HENT: Negative for ear pain, sore throat,  Rhinorrhea, sinus pain, sinus pressure, congestion. · Eyes:  Negative for pain and discharge. · Respiratory:  Negative for chest tightness, shortness of breath, wheezing, and cough. · Cardiovascular:  Negative for chest pain, palpitations and leg swelling. · Gastrointestinal: Negative for abdominal pain, blood in stool, constipation,diarrhea, nausea and vomiting. · Endocrine: Negative for cold intolerance, heat intolerance, polydipsia, polyphagia and polyuria. · Genitourinary: Negative for difficulty urinating, dysuria, flank pain, frequency, hematuria and urgency. · Musculoskeletal: Negative for arthralgias, back pain, joint swelling, myalgias, neck pain and neck stiffness. Positive for leg cramps  · Skin: Negative for wound. Positive for rash   · Allergic/Immunologic: Negative for environmental allergies and food allergies. · Neurological:  Negative for dizziness, light-headedness, numbness and headaches. · Hematological:  Negative for adenopathy. Does not bruise/bleed easily. · Psychiatric/Behavioral: Negative for self-injury, sleep disturbance and suicidal ideas. Objective   Physical Exam  Skin:     Findings: Erythema and rash present. Rash is macular and papular. Comments: On tops of bilateral hands and around mouth and jaw        PHYSICAL EXAM:   · Constitutional: Monique Bar is oriented to person, place, and time. Vital signs are normal. Appears well-developed and well-nourished. She is obese  · Eyes:PERRL, EOMI, Conjunctiva normal, No discharge. · Neck: Full passive range of motion. Non-tender on palpation. Neck supple. No thyromegaly present. Trachea normal.  · Cardiovascular: Normal rate, regular rhythm, S1, S2, no murmur, no gallop, no friction rub, intact distal pulses. · Pulmonary/Chest: Breath sounds are clear throughout, No respiratory distress, No wheezing, No chest tenderness. Effort normal  · Abdominal: Soft. Normal appearance, bowel sounds are present and normoactive. There is no hepatosplenomegaly. There is no tenderness. There is no CVA tenderness. · Musculoskeletal: Extremities appear regular and symmetric. No evident masses, lesions, foreign bodies, or other abnormalities. No edema. No tenderness on palpation. Joints are stable. Full ROM, strength and tone are within normal limits. · Neurological: Alert and oriented to person, place, and time. Normal motor function, Normal sensory function, No focal deficits noted. He has normal strength. · Psychiatric: Normal mood and affect. Speech is normal and behavior is normal.     Nursing note and vitals reviewed. Blood pressure 122/80, pulse 102, temperature 97.6 °F (36.4 °C), temperature source Temporal, weight 218 lb (98.9 kg), SpO2 98 %, not currently breastfeeding. Body mass index is 38.62 kg/m².     Wt Readings from Last 3 Encounters:   03/14/22 218 lb (98.9 kg)   02/23/22 216 lb (98 kg)   01/01/22 205 lb (93 kg)     BP Readings from Last 3 Encounters:   03/14/22 122/80   02/23/22 131/83   01/01/22 115/69       Admission on 12/30/2021, Discharged on 01/01/2022   Component Date Value Ref Range Status    Ventricular Rate 12/30/2021 103  BPM Final    Atrial Rate 12/30/2021 103  BPM Final    P-R Interval 12/30/2021 162  ms Final    QRS Duration 12/30/2021 70  ms Final    Q-T Interval 12/30/2021 348  ms Final    QTc Calculation (Bazett) 12/30/2021 455  ms Final    P Axis 12/30/2021 63  degrees Final    R Axis 12/30/2021 61  degrees Final    T Axis 12/30/2021 68  degrees Final    Ventricular Rate 12/30/2021 96  BPM Final    Atrial Rate 12/30/2021 96  BPM Final    P-R Interval 12/30/2021 160  ms Final    QRS Duration 12/30/2021 78  ms Final    Q-T Interval 12/30/2021 386  ms Final    QTc Calculation (Bazett) 12/30/2021 487  ms Final    P Axis 12/30/2021 56  degrees Final    R Axis 12/30/2021 51  degrees Final    T Axis 12/30/2021 66  degrees Final    Glucose 12/30/2021 168* 70 - 99 mg/dL Final    BUN 12/30/2021 9  6 - 20 mg/dL Final    CREATININE 12/30/2021 0.59  0.50 - 0.90 mg/dL Final    Bun/Cre Ratio 12/30/2021 15  9 - 20 Final    Calcium 12/30/2021 8.7  8.6 - 10.4 mg/dL Final    Sodium 12/30/2021 134* 135 - 144 mmol/L Final    Potassium 12/30/2021 3.6* 3.7 - 5.3 mmol/L Final    Chloride 12/30/2021 100  98 - 107 mmol/L Final    CO2 12/30/2021 20  20 - 31 mmol/L Final    Anion Gap 12/30/2021 14  9 - 17 mmol/L Final    GFR Non- 12/30/2021 >60  >60 mL/min Final    GFR  12/30/2021 >60  >60 mL/min Final    GFR Comment 12/30/2021        Final    Comment: Average GFR for 38-51 years old:   80 mL/min/1.73sq m  Chronic Kidney Disease:   <60 mL/min/1.73sq m  Kidney failure:   <15 mL/min/1.73sq m              eGFR calculated using average adult body mass.  Additional eGFR calculator available at:        [employer]            GFR Staging 12/30/2021 NOT REPORTED   Final    Pro-BNP 12/30/2021 61  <300 pg/mL Final    Comment:       An age-independent cutoff point of 300 pg/ml has a 98% negative predictive value additional   information for diagnosis.  Troponin T 12/31/2021 NOT REPORTED  <0.03 ng/mL Final    Troponin Interp 12/31/2021 NOT REPORTED   Final    Troponin, High Sensitivity 12/30/2021 8  0 - 14 ng/L Final    Comment:       High Sensitivity Troponin values cannot be compared with other Troponin methodologies. Patients with high levels of Biotin oral intake (i.e >5mg/day) may have falsely decreased   Troponin levels. Samples collected within 8 hours of biotin intake may require additional   information for diagnosis.  Troponin T 12/30/2021 NOT REPORTED  <0.03 ng/mL Final    Troponin Interp 12/30/2021 NOT REPORTED   Final    Myoglobin 12/30/2021 <21* 25 - 58 ng/mL Final    D-Dimer, Quant 12/30/2021 0.33  0.00 - 0.59 mg/L FEU Final    Comment:        When combined with a low clinical probability, a D dimer value of <0.50 mg/L FEU is   considered negative for DVT and PE (negative predictive value of 98%, sensitivity of 97%). If this test is not being used to help rule out DVT and PE, then the following reference   range should be utilized: 0.00 - 0.59 mg/L FEU. The D-Dimer assay is intended for use as an aid in the diagnosis of venous thromboembolism   (DVT and PE) and the results should be interpreted in conjunction with the patient's medical   history, clinical presentation, and other findings. Elevated levels of D-dimer activity can be seen in any state of coagulation activation and   is not recommended in patients with therapeutic dose anticoagulant therapy for >24 hours,   fibrinolytic therapy within the previous 7 days, trauma or surgery within the previous 4   weeks,   disseminated malignancies, aortic aneurysm, sepsis, severe infections, pneumonia, severe   skin infections, liver cirrhosis, advanced age, flor                           nary disease, diabetes, and pregnancy. A very low percentage of patients with DVT may yield D-dimer results below the cutoff of   0.5 mg/L FEU. This is known to be more prevalent in patients with distal DVT.  Specimen Description 12/30/2021 . NASOPHARYNGEAL SWAB   Final    SARS-CoV-2, Rapid 12/30/2021 Not Detected  Not Detected Final    Comment:       Rapid NAAT:  The specimen is NEGATIVE for SARS-CoV-2, the novel coronavirus associated with   COVID-19. The ID NOW COVID-19 assay is designed to detect the virus that causes COVID-19 in patients   with signs and symptoms of infection who are suspected of COVID-19. An individual without symptoms of COVID-19 and who is not shedding SARS-CoV-2 virus would   expect to have a negative (not detected) result in this assay. Negative results should be treated as presumptive and, if inconsistent with clinical signs   and symptoms or necessary for patient management,  should be tested with an alternative molecular assay. Negative results do not preclude   SARS-CoV-2 infection and   should not be used as the sole basis for patient management decisions. Fact sheet for Healthcare Providers: Laura.es  Fact sheet for Patients: Laura.es          Methodology: Isothermal Nucleic Acid Amplification      hCG Qual 12/30/2021 NEGATIVE  NEGATIVE Final    Comment: Specimens with hCG levels near the threshold of the test (25 mIU/mL) may give a negative or   indeterminate result. In such cases, another test should be performed with a new specimen   in 48-72 hours. If early pregnancy is suspected clinically in this setting, correlation   with quantitative serum b-hCG level is suggested.       Color, UA 12/31/2021 Yellow  Yellow Final    Turbidity UA 12/31/2021 Clear  Clear Final    Glucose, Ur 12/31/2021 1+* NEGATIVE Final    Bilirubin Urine 12/31/2021 NEGATIVE  NEGATIVE Final    Ketones, Urine 12/31/2021 NEGATIVE  NEGATIVE Final    Specific Gravity, UA 12/31/2021 1.025  1.005 - 1.030 Final    Urine Hgb 12/31/2021 NEGATIVE  NEGATIVE Final    pH, UA 12/31/2021 6.0  5.0 - 8.0 Final    Protein, UA 12/31/2021 NEGATIVE  NEGATIVE Final    Urobilinogen, Urine 12/31/2021 Normal  Normal Final    Nitrite, Urine 12/31/2021 NEGATIVE  NEGATIVE Final    Leukocyte Esterase, Urine 12/31/2021 NEGATIVE  NEGATIVE Final    Urinalysis Comments 12/31/2021 NOT REPORTED   Final    - 12/31/2021        Final    WBC, UA 12/31/2021 0 TO 2  0 - 5 /HPF Final    RBC, UA 12/31/2021 0 TO 2  0 - 2 /HPF Final    Casts UA 12/31/2021 NOT REPORTED  /LPF Final    Crystals, UA 12/31/2021 NOT REPORTED  None /HPF Final    Epithelial Cells UA 12/31/2021 0 TO 2  0 - 5 /HPF Final    Renal Epithelial, UA 12/31/2021 NOT REPORTED  0 /HPF Final    Bacteria, UA 12/31/2021 NOT REPORTED  None Final    Mucus, UA 12/31/2021 1+* None Final    Trichomonas, UA 12/31/2021 NOT REPORTED  None Final    Amorphous, UA 12/31/2021 NOT REPORTED  None Final    Other Observations UA 12/31/2021 NOT REPORTED  NOT REQ. Final    Yeast, UA 12/31/2021 NOT REPORTED  None Final    Troponin, High Sensitivity 12/31/2021 8  0 - 14 ng/L Final    Comment:       High Sensitivity Troponin values cannot be compared with other Troponin methodologies. Patients with high levels of Biotin oral intake (i.e >5mg/day) may have falsely decreased   Troponin levels. Samples collected within 8 hours of biotin intake may require additional   information for diagnosis.  Troponin T 12/31/2021 NOT REPORTED  <0.03 ng/mL Final    Troponin Interp 12/31/2021 NOT REPORTED   Final    Troponin, High Sensitivity 12/31/2021 8  0 - 14 ng/L Final    Comment:       High Sensitivity Troponin values cannot be compared with other Troponin methodologies. Patients with high levels of Biotin oral intake (i.e >5mg/day) may have falsely decreased   Troponin levels. Samples collected within 8 hours of biotin intake may require additional   information for diagnosis.       Troponin T 12/31/2021 NOT REPORTED  <0.03 ng/mL Final    Troponin Interp 12/31/2021 NOT REPORTED   Final    Procalcitonin 12/31/2021 0.03  <0.09 ng/mL Final    Comment:       Suspected Sepsis:  <0.50 ng/mL     Low likelihood of sepsis. 0.50-2.00 ng/mL     Increased likelihood of sepsis. Antibiotics encouraged. >2.00 ng/mL     High risk of sepsis/shock. Antibiotics strongly encouraged. Suspected Lower Resp Tract Infections:  <0.24 ng/mL     Low likelihood of bacterial infection. >0.24 ng/mL     Increased likelihood of bacterial infection. Antibiotics encouraged. With successful antibiotic therapy, PCT levels should decrease rapidly. (Half-life of 24 to   36 hours.)        Procalcitonin values from samples collected within the first 6 hours of systemic infection   may still be low. Retesting may be indicated. Values from day 1 and day 4 can be entered into the Change in Procalcitonin Calculator   (www.[employer]phant[employer] CommunicationsJim Taliaferro [facility] – LawtonGloucester Pharmaceuticalspct-calculator. [employer]) to determine the patient's Mortality Risk Prognosis        In healthy neonates, plasma Procalcitonin (PCT) concentrations increase gradually after   birth, reaching peak values at about 24 hours of age then decr                           ease to normal values below   0.5 ng/mL by 48-72 hours of age.       POC Glucose 12/31/2021 160* 65 - 105 mg/dL Final    POC Glucose 12/31/2021 137* 65 - 105 mg/dL Final    POC Glucose 12/31/2021 159* 65 - 105 mg/dL Final    Glucose 01/01/2022 178* 70 - 99 mg/dL Final    BUN 01/01/2022 8  6 - 20 mg/dL Final    CREATININE 01/01/2022 0.61  0.50 - 0.90 mg/dL Final    Bun/Cre Ratio 01/01/2022 13  9 - 20 Final    Calcium 01/01/2022 8.5* 8.6 - 10.4 mg/dL Final    Sodium 01/01/2022 136  135 - 144 mmol/L Final    Potassium 01/01/2022 4.1  3.7 - 5.3 mmol/L Final    Chloride 01/01/2022 105  98 - 107 mmol/L Final    CO2 01/01/2022 21  20 - 31 mmol/L Final    Anion Gap 01/01/2022 10  9 - 17 mmol/L Final    Alkaline Phosphatase 01/01/2022 76  35 - 104 U/L Final    ALT 01/01/2022 13  5 - 33 U/L Final    AST 01/01/2022 11  <32 U/L Final    Total Bilirubin 01/01/2022 0.24* 0.3 - 1.2 mg/dL Final    Total Protein 01/01/2022 6.4  6.4 - 8.3 g/dL Final    Albumin 01/01/2022 3.6  3.5 - 5.2 g/dL Final    Albumin/Globulin Ratio 01/01/2022 NOT REPORTED  1.0 - 2.5 Final    GFR Non- 01/01/2022 >60  >60 mL/min Final    GFR  01/01/2022 >60  >60 mL/min Final    GFR Comment 01/01/2022        Final    Comment: Average GFR for 38-51 years old:   80 mL/min/1.73sq m  Chronic Kidney Disease:   <60 mL/min/1.73sq m  Kidney failure:   <15 mL/min/1.73sq m              eGFR calculated using average adult body mass.  Additional eGFR calculator available at:        [employer]            GFR Staging 01/01/2022 NOT REPORTED   Final    Magnesium 01/01/2022 2.1  1.6 - 2.6 mg/dL Final    WBC 01/01/2022 11.8* 3.5 - 11.3 k/uL Final    RBC 01/01/2022 4.00  3.95 - 5.11 m/uL Final    Hemoglobin 01/01/2022 11.3* 11.9 - 15.1 g/dL Final    Hematocrit 01/01/2022 36.0* 36.3 - 47.1 % Final    MCV 01/01/2022 90.0  82.6 - 102.9 fL Final    MCH 01/01/2022 28.3  25.2 - 33.5 pg Final    MCHC 01/01/2022 31.4  28.4 - 34.8 g/dL Final    RDW 01/01/2022 14.2  11.8 - 14.4 % Final    Platelets 97/11/0577 399  138 - 453 k/uL Final    MPV 01/01/2022 8.9  8.1 - 13.5 fL Final    NRBC Automated 01/01/2022 0.0  0.0 per 100 WBC Final    Cholesterol 01/01/2022 182  <200 mg/dL Final    Comment:    Cholesterol Guidelines:      <200  Desirable   200-240  Borderline      >240  Undesirable         HDL 01/01/2022 33* >40 mg/dL Final    Comment:    HDL Guidelines:    <40     Undesirable   40-59    Borderline    >59     Desirable         LDL Cholesterol 01/01/2022 125  0 - 130 mg/dL Final    Comment:    LDL Guidelines:     <100    Desirable   100-129   Near to/above Desirable   130-159   Borderline      >159   Undesirable     Direct (measured) LDL and calculated LDL are not interchangeable tests.  Chol/HDL Ratio 01/01/2022 5.5* <5 Final            Triglycerides 01/01/2022 121  <150 mg/dL Final    Comment:    Triglyceride Guidelines:     <150   Desirable   150-199  Borderline   200-499  High     >499   Very high   Based on AHA Guidelines for fasting triglyceride, October 2012.  VLDL 01/01/2022 NOT REPORTED  1 - 30 mg/dL Final    POC Glucose 12/31/2021 139* 65 - 105 mg/dL Final    POC Glucose 01/01/2022 181* 65 - 105 mg/dL Final    POC Glucose 01/01/2022 164* 65 - 105 mg/dL Final     No results found for this visit on 03/14/22. Completed Orders/Prescriptions   Orders Placed This Encounter   Medications    ciprofloxacin (CIPRO) 250 MG tablet     Sig: Take 1 tablet by mouth 2 times daily for 3 days     Dispense:  6 tablet     Refill:  0               AssessmentPlan/Medical Decision Making     1. Type 2 diabetes mellitus with hyperglycemia, with long-term current use of insulin (HCC)    - Continue Trulicity   - Continue Levimer 30 units  - Has only been on Trulicity 3 weeks, will anticipate a A1C drop next time. - POCT glycosylated hemoglobin (Hb A1C)    2. Essential hypertension    - Discontinue Norvasc 5 mg  - Take 1/2 tablet of Metoprolol 100 mg and contact Cardiology and schedule appt to discuss further changes to medications. - Magnesium; Future  - Basic Metabolic Panel; Future    3. Coronary artery disease involving native heart with angina pectoris, unspecified vessel or lesion type ([facility] [address]    - Magnesium; Future  - Basic Metabolic Panel; Future    4. Gastroesophageal reflux disease, unspecified whether esophagitis present    - Stable    5. Hypothyroidism, unspecified type    - Stable    6. OAB (overactive bladder)    - Stable    7. Mixed hyperlipidemia    - Stable    8. Allergic reaction to drug, subsequent encounter    - Discontinue Keflex, added to allergy list    9.  Acute cystitis without hematuria    - Will add Cipro due to allergy with Keflex  - ciprofloxacin (CIPRO) 250 MG tablet; Take 1 tablet by mouth 2 times daily for 3 days  Dispense: 6 tablet; Refill: 0      Return in about 3 months (around 6/14/2022) for chronic conditions. 1.  Charisma received counseling on the following healthy behaviors: nutrition, exercise and medication adherence  2. Patient given educational materials - see patient instructions  3. Was a self-tracking handout given in paper form or via [employer]? No  If yes, see orders or list here. 4.  Discussed use, benefit, and side effects of prescribed medications. Barriers to medication compliance addressed. All patient questions answered. Pt voiced understanding. 5.  Reviewed prior labs, imaging, consultation, follow up, and health maintenance  6. Continue current medications, diet and exercise. 7. Discussed use, benefit, and side effects of prescribed medications. Barriers to medication compliance addressed. All her questions were answered. Pt voiced understanding. Donna Lay will continue current medications, diet and exercise. Of the 30 minute duration appointment visit, Constantino Travis CNP spent at least 50% of the face-to-face time in counseling, explanation of diagnosis, planning of further management, and answering all questions.           Signed:  Constantino Travis CNP

## 2022-04-04 ENCOUNTER — PHARMACY VISIT (OUTPATIENT)
Dept: FAMILY MEDICINE CLINIC | Age: 52
End: 2022-04-04

## 2022-04-04 DIAGNOSIS — Z79.899 MEDICATION MANAGEMENT: Primary | ICD-10-CM

## 2022-04-04 PROCEDURE — 99999 PR OFFICE/OUTPT VISIT,PROCEDURE ONLY: CPT | Performed by: PHARMACIST

## 2022-04-04 PROCEDURE — APPNB60 APP NON BILLABLE TIME 46-60 MINS: Performed by: PHARMACIST

## 2022-04-04 NOTE — Clinical Note
Piero Kaur with patient on Monday. Blood glucose readings appear to be at goal with Trulicity 0.26BI weekly and Levemir 30 units daily. No changes made to DM medications. Not sure if this is something to consider, but we could go on Trulicity to 8.4Y weekly and potentially decrease dose of Levemir. Thoughts? Patient is not going to change insurance. She is still interested in a CGM. I will pend prescriptions for the [employer] 2 sensors and reader for you. These prescriptions will need to be printed out and then sent to [facility]. Patient insurance covers CGM as a DME. I can fax it over tomorrow if you want to leave the signed scripts on my desk. Or if Ty is able to fax over today that is fine also. Whatever is easiest for you. Ever Morrison, Pharm. D., [address] [facility] Medication Management Service  [phone]  4/6/2022  12:09 PM

## 2022-04-04 NOTE — PROGRESS NOTES
Medication Management Service  [facility]  [phone]      Arben Jerez is a 54 y.o. female that presents for an initial and a follow-up diabetes mellitus telephone visit with Medication Management Service per referral from JORDAN Zarco for Diabetes Management under Collaborative Practice Agreement with A1c goal < 7 %. Preferred Pharmacy is [address] [place] [address] on [place]. Patient will not be changing to her 's insurance. It will be too expensive for her to be added to his plan. She will continue with [facility] for now. Was thinking about switching to another provider such as [facility] but will need to look into these policies further. Subjective/Objective     New Problems/Changes: Trulicity was started about 6-7 weeks ago. Doses taken on Wednesday. Overall patient is doing well. No adverse effects noted with Trulicity. Pt continues with Levemir 30 units daily. DIET  3 meals- Light snack in the afternoon and sometimes at night time too. Breakfast:  granola with fruit, english muffin, yogurt, Likes strawberries, blue berries, oranges-blood orange (1 per day)  Lunch:  [place] or salad. With protein in salad. Lots of coffee, water, may be some juice (apple-diet, cran grape)    Dinner:  Protein and veggies with little starch. Nothing white. No potatoes. Pasta switched to whole grain. Will eat a couple of vegetables. Use to drink sweet tea. None in 3 years. Soda may be 1-2 times per week. Likes 10 calorie soda. Does not drink regular soda. Today, patient reports that she is on a pain/nutrition diet through the [facility] to help with inflammation. Patient is eating mostly fruits/veggies, low dairy (changed to almond milk)-does admit to having a chocolate shake, low carbohydrates. Eating fish. EXERCISE  Patient limited due to neck. Activity has been decreased since July. Been close to bed rest since July.   No changes in activity level noted during this call. WEIGHT  Weight: 93 kg  BMI: 36.31 kg/m2    DIABETES MANAGEMENT    Diabetes Goals: Using ADA Standards of Care     Goal A1c:  Less than 7 %   Fasting Blood Sugars:  80-130mg/dL  Postprandial glucose:  Less than 180mg/dL     Lab Results   Component Value Date    LABA1C 9.1 (H) 09/13/2021    LABA1C 8.6 01/18/2021    LABA1C 7.9 (H) 08/17/2020       Home Glucose Readings:   Fasting blood glucose readings noted to be around 125mg/dL.  Lowest readings have been 92-95 mg/dL   Patient reports one readings of 156 mg/dL after having a chocolate shake.  Patient continues to check blood glucose three times daily.  Interested in [place] [address]    Patient screened to determine if Continuous Glucose Monitoring (CGM) is appropriate at this time. Patient would benefit from CGM and meets the following Medicaid requirements:    1. Patient has diagnosed insulin dependent Type 2 diabetes mellitus  2. Patient utilizes 1 insulin injections per day to manage their diagnosis  3. Patient is unresponsive to standard medical therapy and A1c is 9.1% and not at goal  4. Patient has ongoing access to a certified diabetes educator  5. Additionally, patient has Completed a comprehensive diabetes education program and Motivation to achieve and maintain improved glycemic control    Will fax the following to [address] [place] [address] ([phone]):   Demographic report   This visit note and provider's last visit note if applicable   Hard copy prescription signed by attending physician     Hypoglycemia:    No low readings with Trulicity 1.79MC weekly and Levemir 30 units daily. Renal monitoring:  Lab Results   Component Value Date    LABMICR CANNOT BE CALCULATED 09/13/2021     Lab Results   Component Value Date    CREATININE 0.61 01/01/2022     CrCl cannot be calculated (Unknown ideal weight.).     BLOOD PRESSURE MANAGEMENT  BP Readings from Last 3 Encounters:   03/14/22 122/80   02/23/22 131/83   01/01/22 115/69 CHOLESTEROL MANAGEMENT    Lab Results   Component Value Date    CHOL 182 01/01/2022    TRIG 121 01/01/2022    HDL 33 (L) 01/01/2022    LDLCHOLESTEROL 125 01/01/2022     ALT   Date Value Ref Range Status   01/01/2022 13 5 - 33 U/L Final     AST   Date Value Ref Range Status   01/01/2022 11 <32 U/L Final     The 10-year ASCVD risk score (Blanche Caraballo et al., 2013) is: 3.4%    Values used to calculate the score:      Age: 54 years      Sex: Female      Is Non- : No      Diabetic: Yes      Tobacco smoker: No      Systolic Blood Pressure: 568 mmHg      Is BP treated: No      HDL Cholesterol: 33 mg/dL      Total Cholesterol: 182 mg/dL     MEDICATIONS    Previous Diabetes Medications:   Metformin-caused diarrhea. Pt reports being on XR with diarrhea. Medication Sig Comments     Dulaglutide 0.75 MG/0.5ML SOPN Inject 0.75 mg into the skin once a week Taking as prescribed on Wednesday    insulin detemir (LEVEMIR FLEXTOUCH) 100 UNIT/ML injection pen Inject 30 Units into the skin nightly Taking as prescribed       On Statin: Yes    On ACE-I or ARB for HTN or Microalbuminuria: No    Medication Adherence/Cost/Adverse Events:    No barriers identified. Assessment/Plan     Diabetes Management:   Blood glucose readings appear to be at goal since starting Trulicity. Plan for patient to continue with Trulicity 3.35DZ weekly and Levemir 30 units daily. May consider further increasing Trulicity to 6.2DR weekly and further decreasing Levemir in future. Would need to wait 4-8 weeks on current dose of Trulicity before further increasing. Patient scheduled for follow up appointment with PCP Thursday, 06/16/2022. Scheduled next Meds Mgmt follow up call on [email or web address] for review of blood glucose readings, adherence with prescribed DM medications, and consideration for further titration upwards of Trulicity, and potential Mira 2 education .       Patient continues to be interested in a possible CGM device. Will send orders over to [facility] and see if approved. If patient is approved, Gerald Andrade will set up a time to provide Sourav Duvall education. Of note, patient will be out of town 05/06/2022-05/15/2022 visiting daughter and celebrating grandson's 1st birthday. Next Follow-Up: As noted above. Patient verbalized understanding of care plan. Patient advised to call Medication Management with any questions, concerns, or changes prior to next appointment. Progress note sent to referring provider. SAVANA Cabello-CNP)   Patient verbally expresses agreement to working with writer in a consult agreement with myself and PCP. Plan to have patient sign written consent with future office visit. Patient verbally acknowledges working in a consult agreement with the pharmacist as referred by his/her physician. Awa Almazan, Pharm. D., [address] [facility] Medication Management Service  [phone]  4/4/2022  8:17 AM       Addendum:  Marge Lynch with [employer] Mira orders signed and faxed to [address] [place] [address] on 04/07/2022 by PAULA Noonan MA.    =========================================================  For Pharmacy [address] in place:   Yes   Recommendation Provided To: Patient/Caregiver: 1 via Telephone   Intervention Detail: New Rx: 2, reason: Improve Adherence   Intervention Accepted By: Patient/Caregiver: 1   Time Spent (min): 60

## 2022-04-06 DIAGNOSIS — Z79.4 TYPE 2 DIABETES MELLITUS WITH HYPERGLYCEMIA, WITH LONG-TERM CURRENT USE OF INSULIN (HCC): Primary | ICD-10-CM

## 2022-04-06 DIAGNOSIS — E11.65 TYPE 2 DIABETES MELLITUS WITH HYPERGLYCEMIA, WITH LONG-TERM CURRENT USE OF INSULIN (HCC): Primary | ICD-10-CM

## 2022-04-06 NOTE — PROGRESS NOTES
Gary Gomez is calling to requesting that orders be sent to [facility]. Fax Number is [phone]. Patient's insurance will process CGM requests as DME. Medication Request:  Requested Prescriptions     Pending Prescriptions Disp Refills    Continuous Blood Gluc Sensor (FREESTYLE SRINATH 2 SENSOR) MISC 2 each 11     Sig: Apply new sensor to back of arm every 14 days.  Continuous Blood Gluc  (FREESTYLE SRINATH 2 READER) PATTI 1 each 0     Sig: Use to scan sensor and check blood sugar at least every 8 hours. Last Visit Date (If Applicable):  5/47/9696    Next Visit Date:    6/16/2022    Aletha Martines, Pharm. D., [address] [facility] Medication Management Service  [phone]  4/6/2022  12:19 PM

## 2022-04-07 RX ORDER — FLASH GLUCOSE SCANNING READER
EACH MISCELLANEOUS
Qty: 1 EACH | Refills: 0 | Status: SHIPPED | OUTPATIENT
Start: 2022-04-07 | End: 2022-05-25 | Stop reason: ALTCHOICE

## 2022-04-07 RX ORDER — FLASH GLUCOSE SENSOR
KIT MISCELLANEOUS
Qty: 2 EACH | Refills: 11 | Status: SHIPPED | OUTPATIENT
Start: 2022-04-07 | End: 2022-05-25 | Stop reason: ALTCHOICE

## 2022-04-07 NOTE — PROGRESS NOTES
Avinash Nolasco,    I think that would be a good idea. Let me know if you want me to order it and I will.      Rose Mansfield, APRN-CNP

## 2022-04-19 DIAGNOSIS — Z79.4 TYPE 2 DIABETES MELLITUS WITHOUT COMPLICATION, WITH LONG-TERM CURRENT USE OF INSULIN (HCC): ICD-10-CM

## 2022-04-19 DIAGNOSIS — E11.9 TYPE 2 DIABETES MELLITUS WITHOUT COMPLICATION, WITH LONG-TERM CURRENT USE OF INSULIN (HCC): ICD-10-CM

## 2022-04-19 RX ORDER — GLUCOSAM/CHON-MSM1/C/MANG/BOSW 500-416.6
TABLET ORAL
Qty: 100 EACH | Refills: 3 | Status: SHIPPED | OUTPATIENT
Start: 2022-04-19

## 2022-04-19 RX ORDER — CALCIUM CITRATE/VITAMIN D3 200MG-6.25
TABLET ORAL
Qty: 100 STRIP | Refills: 3 | Status: SHIPPED | OUTPATIENT
Start: 2022-04-19

## 2022-04-19 NOTE — TELEPHONE ENCOUNTER
Elsy Villaseñor is calling to request a refill on the following medication(s):    Medication Request:  Requested Prescriptions     Pending Prescriptions Disp Refills    TRUE METRIX BLOOD GLUCOSE TEST strip [Pharmacy Med Name: TRUE METRIX GLUCOSE TEST STRIP] 100 strip      Sig: use 1 TEST STRIP to TEST BLOOD SUGAR twice a day and if needed   [address] [address] [Pharmacy Med Name: Jebterry Vikash 30G LANCET]       Sig: use 1 LANCET to TEST BLOOD SUGAR twice a day and if needed       Last Visit Date (If Applicable):  4/13/2810    Next Visit Date:    6/16/2022

## 2022-05-19 ENCOUNTER — PHARMACY VISIT (OUTPATIENT)
Dept: FAMILY MEDICINE CLINIC | Age: 52
End: 2022-05-19

## 2022-05-19 DIAGNOSIS — E11.9 TYPE 2 DIABETES MELLITUS WITHOUT COMPLICATION, WITH LONG-TERM CURRENT USE OF INSULIN (HCC): ICD-10-CM

## 2022-05-19 DIAGNOSIS — Z79.4 TYPE 2 DIABETES MELLITUS WITHOUT COMPLICATION, WITH LONG-TERM CURRENT USE OF INSULIN (HCC): ICD-10-CM

## 2022-05-19 PROCEDURE — APPNB60 APP NON BILLABLE TIME 46-60 MINS: Performed by: PHARMACIST

## 2022-05-19 PROCEDURE — 99999 PR OFFICE/OUTPT VISIT,PROCEDURE ONLY: CPT | Performed by: PHARMACIST

## 2022-05-19 NOTE — Clinical Note
lAmaz Tejada,    As we talked about last week. Patient agreeable to increasing to 2.7TL weekly Trulicity. Decreased Levemir by 4 units. PCP appointment scheduled for 06/16/2022. Did schedule a follow up telephone call (06/08/2022) to check in with patient regarding blood glucose readings and tolerability of the higher dose of Trulicity. Pt will call if she starts to notice low glucose readings. Plan to start new doses today, 05/25/2022. New prescriptions sent to the pharmacy along with a refill on pen needles. Delfina Gowers, Pharm. D., [address] [facility] Medication Management Service  [phone]  5/25/2022  9:28 AM

## 2022-05-19 NOTE — PROGRESS NOTES
Medication Management Service  [facility]  [phone]      Natalia Tubbs is a 54 y.o. female that presents for an initial and a follow-up diabetes mellitus telephone visit with Medication Management Service per referral from JORDAN Valdes for Diabetes Management under Collaborative Practice Agreement with A1c goal < 7 %. Preferred Pharmacy is [facility] on [address]. Patient will not be changing to her 's insurance. It will be too expensive for her to be added to his plan. She will continue with [facility] for now. Was thinking about switching to another provider such as [employer] [facility] but will need to look into these policies further. Subjective/Objective     New Problems/Changes: Trulicity was started about 6-7 weeks ago. Doses taken on Wednesday. Overall patient is doing well. No adverse effects noted with Trulicity. Pt continues with Levemir 30 units daily. DIET  3 meals- Light snack in the afternoon and sometimes at night time too. Breakfast:  granola with fruit, english muffin, yogurt, Likes strawberries, blue berries, oranges-blood orange (1 per day)  Lunch:  [place] or salad. With protein in salad. Lots of coffee, water, may be some juice (apple-diet, cran grape)    Dinner:  Protein and veggies with little starch. Nothing white. No potatoes. Pasta switched to whole grain. Will eat a couple of vegetables. Use to drink sweet tea. None in 3 years. Soda may be 1-2 times per week. Likes 10 calorie soda. Does not drink regular soda. Patient had previously reported that she is on a pain/nutrition diet through the [facility] to help with inflammation. Patient is eating mostly fruits/veggies, low dairy (changed to almond milk)-does admit to having a chocolate shake, low carbohydrates. Eating fish. EXERCISE  Patient limited due to neck. Activity has been decreased since July. Been close to bed rest since July.   No changes in activity level noted during this call. WEIGHT  Weight: 93 kg  BMI: 36.31 kg/m2    DIABETES MANAGEMENT    Diabetes Goals: Using ADA Standards of Care     Goal A1c:  Less than 7 %   Fasting Blood Sugars:  80-130mg/dL  Postprandial glucose:  Less than 180mg/dL     Lab Results   Component Value Date    LABA1C 9.1 (H) 09/13/2021    LABA1C 8.6 01/18/2021    LABA1C 7.9 (H) 08/17/2020       Home Glucose Readings:   Highest reading 127mg/dL.  Lowest readings have been 92-97 mg/dL   Patient continues to check blood glucose three times daily.  Interested in [facility] approved due to not meeting daily number of insulin injection    Hypoglycemia:    No low readings with Trulicity 9.03LK weekly and Levemir 30 units daily. Renal monitoring:  Lab Results   Component Value Date    LABMICR CANNOT BE CALCULATED 09/13/2021     Lab Results   Component Value Date    CREATININE 0.61 01/01/2022     CrCl cannot be calculated (Unknown ideal weight.). BLOOD PRESSURE MANAGEMENT  BP Readings from Last 3 Encounters:   03/14/22 122/80   02/23/22 131/83   01/01/22 115/69       CHOLESTEROL MANAGEMENT    Lab Results   Component Value Date    CHOL 182 01/01/2022    TRIG 121 01/01/2022    HDL 33 (L) 01/01/2022    LDLCHOLESTEROL 125 01/01/2022     ALT   Date Value Ref Range Status   01/01/2022 13 5 - 33 U/L Final     AST   Date Value Ref Range Status   01/01/2022 11 <32 U/L Final     The 10-year ASCVD risk score (Jennifer Boston et al., 2013) is: 3.4%    Values used to calculate the score:      Age: 54 years      Sex: Female      Is Non- : No      Diabetic: Yes      Tobacco smoker: No      Systolic Blood Pressure: 647 mmHg      Is BP treated: No      HDL Cholesterol: 33 mg/dL      Total Cholesterol: 182 mg/dL     MEDICATIONS    Previous Diabetes Medications:   Metformin-caused diarrhea. Pt reports being on XR with diarrhea.       Medication Sig Comments     Dulaglutide 0.75 MG/0.5ML SOPN Inject 0.75 mg into the skin once a week Taking as prescribed on Wednesday    insulin detemir (LEVEMIR FLEXTOUCH) 100 UNIT/ML injection pen Inject 30 Units into the skin nightly Taking as prescribed       On Statin: Yes    On ACE-I or ARB for HTN or Microalbuminuria: No    Medication Adherence/Cost/Adverse Events:    No barriers identified. Assessment/Plan     Diabetes Management:   Blood glucose readings appear to be at goal since starting Trulicity. Pt has been on current dose of Trulicity since 4/5120. Spoke with PCP. Plan to increase Trulicity 3.1VZ weekly and adjust Levemir down by 4 units to 26 units daily. Anticipating that patient will take first dose of Trulicity 1.2HX on Wednesday (05/25/2022). Patient scheduled for follow up appointment with PCP Thursday, 06/16/2022. Scheduled next Meds Mgmt follow up call on 06/08/2022@ 9:00am for review of blood glucose readings, adherence with prescribed DM medications, and tolerability of Trulicity since increasing to 1.5mg weekly. Of note, patient will be out of town ([address] [place] [address]) visiting daughter and celebrating graduations for 3 weeks in June. Next Follow-Up: As noted above. Patient verbalized understanding of care plan. Patient advised to call Medication Management with any questions, concerns, or changes prior to next appointment. Patient will follow up sooner if she is noticing low blood glucose readings. Pt requesting refills on Levemir and pen needles. Progress note sent to referring provider. Dasia Farrell, SAVANA-CNP)   Patient verbally expresses agreement to working with writer in a consult agreement with myself and PCP. Plan to have patient sign written consent with future office visit. Patient verbally acknowledges working in a consult agreement with the pharmacist as referred by his/her physician. Wood Sequeira, Pharm. D., [address] [facility] Medication Management Service  [phone]  5/19/2022  11:36 AM =========================================================  For Pharmacy [address]:   Yes   Recommendation Provided To: Patient/Caregiver: 7 via Telephone   Intervention Detail: Discontinued Rx: 2, reason: Therapy Complete, Therapy De-escalation, Dose Adjustment: 2, reason: Therapy De-escalation, Therapy Optimization, New Rx: 2, reason: Needs Additional Therapy, Refill(s) Provided and Scheduled Appointment   Intervention Accepted By: Patient/Caregiver: 7   Time Spent (min): 90

## 2022-05-25 RX ORDER — DULAGLUTIDE 1.5 MG/.5ML
1.5 INJECTION, SOLUTION SUBCUTANEOUS WEEKLY
Qty: 2 ML | Refills: 3 | Status: SHIPPED | OUTPATIENT
Start: 2022-05-25 | End: 2022-06-30 | Stop reason: DRUGHIGH

## 2022-05-25 RX ORDER — INSULIN DETEMIR 100 [IU]/ML
26 INJECTION, SOLUTION SUBCUTANEOUS NIGHTLY
Qty: 5 PEN | Refills: 3 | Status: SHIPPED | OUTPATIENT
Start: 2022-05-25

## 2022-05-29 ENCOUNTER — HOSPITAL ENCOUNTER (EMERGENCY)
Age: 52
Discharge: HOME | End: 2022-05-29
Attending: EMERGENCY MEDICINE
Payer: COMMERCIAL

## 2022-05-29 VITALS
SYSTOLIC BLOOD PRESSURE: 164 MMHG | TEMPERATURE: 98.2 F | OXYGEN SATURATION: 98 % | BODY MASS INDEX: 36.86 KG/M2 | WEIGHT: 208 LBS | RESPIRATION RATE: 16 BRPM | HEART RATE: 128 BPM | DIASTOLIC BLOOD PRESSURE: 111 MMHG | HEIGHT: 63 IN

## 2022-05-29 DIAGNOSIS — L02.91 ABSCESS: Primary | ICD-10-CM

## 2022-05-29 PROCEDURE — 96372 THER/PROPH/DIAG INJ SC/IM: CPT

## 2022-05-29 PROCEDURE — 6370000000 HC RX 637 (ALT 250 FOR IP): Performed by: EMERGENCY MEDICINE

## 2022-05-29 PROCEDURE — 56405 I&D VULVA/PERINEAL ABSCESS: CPT

## 2022-05-29 PROCEDURE — 99284 EMERGENCY DEPT VISIT MOD MDM: CPT

## 2022-05-29 PROCEDURE — 2500000003 HC RX 250 WO HCPCS: Performed by: EMERGENCY MEDICINE

## 2022-05-29 PROCEDURE — 6360000002 HC RX W HCPCS: Performed by: EMERGENCY MEDICINE

## 2022-05-29 RX ORDER — MORPHINE SULFATE 4 MG/ML
4 INJECTION, SOLUTION INTRAMUSCULAR; INTRAVENOUS ONCE
Status: COMPLETED | OUTPATIENT
Start: 2022-05-29 | End: 2022-05-29

## 2022-05-29 RX ORDER — SULFAMETHOXAZOLE AND TRIMETHOPRIM 800; 160 MG/1; MG/1
1 TABLET ORAL 2 TIMES DAILY
Qty: 14 TABLET | Refills: 0 | Status: SHIPPED | OUTPATIENT
Start: 2022-05-29 | End: 2022-05-29 | Stop reason: SDUPTHER

## 2022-05-29 RX ORDER — LIDOCAINE HYDROCHLORIDE 20 MG/ML
5 JELLY TOPICAL PRN
Status: DISCONTINUED | OUTPATIENT
Start: 2022-05-29 | End: 2022-05-29 | Stop reason: HOSPADM

## 2022-05-29 RX ORDER — SULFAMETHOXAZOLE AND TRIMETHOPRIM 800; 160 MG/1; MG/1
1 TABLET ORAL 2 TIMES DAILY
Qty: 14 TABLET | Refills: 0 | Status: SHIPPED | OUTPATIENT
Start: 2022-05-29 | End: 2022-06-05

## 2022-05-29 RX ORDER — LIDOCAINE HYDROCHLORIDE 10 MG/ML
5 INJECTION, SOLUTION INFILTRATION; PERINEURAL ONCE
Status: COMPLETED | OUTPATIENT
Start: 2022-05-29 | End: 2022-05-29

## 2022-05-29 RX ADMIN — LIDOCAINE HYDROCHLORIDE 5 ML: 10 INJECTION, SOLUTION INFILTRATION; PERINEURAL at 14:09

## 2022-05-29 RX ADMIN — MORPHINE SULFATE 4 MG: 4 INJECTION, SOLUTION INTRAMUSCULAR; INTRAVENOUS at 13:41

## 2022-05-29 ASSESSMENT — PAIN DESCRIPTION - LOCATION
LOCATION: GROIN
LOCATION: GROIN

## 2022-05-29 ASSESSMENT — PAIN SCALES - GENERAL
PAINLEVEL_OUTOF10: 10
PAINLEVEL_OUTOF10: 10

## 2022-05-29 ASSESSMENT — ENCOUNTER SYMPTOMS
VOMITING: 0
CONSTIPATION: 0
SHORTNESS OF BREATH: 0
APNEA: 0
ABDOMINAL DISTENTION: 0
EYES NEGATIVE: 1
COLOR CHANGE: 0
SINUS PAIN: 0
DIARRHEA: 0
CHEST TIGHTNESS: 0

## 2022-05-29 ASSESSMENT — PAIN - FUNCTIONAL ASSESSMENT: PAIN_FUNCTIONAL_ASSESSMENT: 0-10

## 2022-05-29 ASSESSMENT — PAIN DESCRIPTION - FREQUENCY: FREQUENCY: CONTINUOUS

## 2022-05-29 ASSESSMENT — PAIN DESCRIPTION - DESCRIPTORS: DESCRIPTORS: THROBBING;SHARP

## 2022-05-29 ASSESSMENT — PAIN DESCRIPTION - ORIENTATION: ORIENTATION: RIGHT

## 2022-05-29 NOTE — ED PROVIDER NOTES
EMERGENCY DEPARTMENT ENCOUNTER    Pt Name: Josiah Cha  MRN: [id number]  [place] 1970  Date of evaluation: 5/29/22  CHIEF COMPLAINT       Chief Complaint   Patient presents with    Abscess     right groin, onset yesterday     HISTORY OF PRESENT ILLNESS   29-year-old female presents emergency room for abscess to the right perineal region. Patient reports previous abscess to the labia majora about 15 years ago. She has not had any issues since then. She woke up yesterday and had some swelling there and it was much worse today. The area is very tender. She has not noticed any drainage from the area. REVIEW OF SYSTEMS     Review of Systems   Constitutional: Negative for activity change, chills and diaphoresis. HENT: Negative for congestion, sinus pain and tinnitus. Eyes: Negative. Respiratory: Negative for apnea, chest tightness and shortness of breath. Gastrointestinal: Negative for abdominal distention, constipation, diarrhea and vomiting. Genitourinary: Negative for difficulty urinating and frequency. Musculoskeletal: Negative for arthralgias and myalgias. Skin: Negative for color change and rash. Neurological: Negative for dizziness. Hematological: Negative. Psychiatric/Behavioral: Negative.         PASTMEDICAL HISTORY     Past Medical History:   Diagnosis Date    Anxiety     Arthritis     CAD (coronary artery disease)     Depression     Hyperlipidemia     Kidney stone     Langerhans-cell granulomatosis ([address]     Myocardial infarct ([address] 2016    Neck pain     Spinal stenosis 2017     Past Problem List  Patient Active Problem List   Diagnosis Code    Hypothyroidism E03.9    Type 2 diabetes mellitus without complication, with long-term current use of insulin ([address] E11.9, Z79.4    Essential hypertension I10    Hip fracture ([address] S72.009A    Spinal stenosis of cervical region M48.02    Ground glass opacity present on imaging of lung R91.8    Hx of deep venous thrombosis Z86.718    Situational depression F43.21    Anxiety F41.9    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    Mixed hyperlipidemia E78.2    Coronary artery disease involving native heart with angina pectoris (HCC) I25.119    Migraine without status migrainosus, not intractable G43.909    OAB (overactive bladder) N32.81    Smoker F17.200    Irritable bowel syndrome with constipation K58.1    Irritable bowel syndrome with both constipation and diarrhea K58.2    Positive colorectal cancer screening using Cologuard test R19.5    Gastroesophageal reflux disease with esophagitis without hemorrhage K21.00    Gastroesophageal reflux disease K21.9     SURGICAL HISTORY       Past Surgical History:   Procedure Laterality Date    BACK SURGERY      L5-S1, C5-7    CARDIAC CATHETERIZATION       SECTION      COLONOSCOPY N/A 2021    COLONOSCOPY WITH BIOPSY performed by [facility] at [facility] Left     LUNG BIOPSY Right     MASTOID SURGERY      TONSILLECTOMY      UPPER GASTROINTESTINAL ENDOSCOPY  2021    EGD BIOPSY performed by Jero Cook MD at [address] [place] [address]       Discharge Medication List as of 2022  2:16 PM      CONTINUE these medications which have NOT CHANGED    Details   insulin detemir (LEVEMIR FLEXTOUCH) 100 UNIT/ML injection pen Inject 26 Units into the skin nightly, Disp-5 pen, R-3Normal      Dulaglutide (TRULICITY) 1.5 FK/1.6JB SOPN Inject 1.5 mg into the skin once a week, Disp-2 mL, R-3Normal      Insulin Pen Needle 31G X 5 MM MISC Disp-100 each, R-3, NormalUse pen needle for giving daily insulin injection      TRUE METRIX BLOOD GLUCOSE TEST strip use 1 TEST STRIP to TEST BLOOD SUGAR twice a day and if needed, Disp-100 strip, R-3Normal      TRUEplus Lancets 30G MISC Disp-100 each, R-3, Normal      omeprazole (PRILOSEC) 20 MG delayed release capsule Take 1 capsule by mouth Daily, Disp-180 capsule, R-1Normal albuterol (PROVENTIL) (2.5 MG/3ML) 0.083% nebulizer solution Take 2.5 mg by nebulization daily as needed for WheezingHistorical Med      blood glucose monitor kit and supplies Dispense sufficient amount for BID testing frequency plus additional to accommodate PRN testing needs.  Dispense all needed supplies to include: monitor, strips, lancing device, lancets, control solutions, alcohol swabs., Disp-1 kit, R-0, Normal      tiZANidine (ZANAFLEX) 2 MG tablet take 1 tablet by mouth three times a day if needed, Disp-90 tablet, R-0Normal      nitroGLYCERIN (NITROSTAT) 0.4 MG SL tablet place 1 tablet under the tongue if needed every 5 minutes for chest pain for 3 doses IF NO RELIEF AFTER THIRD DOSE CALL PRESCRIBER ., Disp-25 tablet, R-0Normal      calcium carbonate (OS-BRANNON) 1250 (500 Ca) MG chewable tablet Take 500-1,000 mg by mouth 3 times dailyHistorical Med      levothyroxine (SYNTHROID) 50 MCG tablet Take 1 tablet by mouth Daily, Disp-90 tablet, R-1Normal      metoprolol succinate (TOPROL XL) 100 MG extended release tablet take 1 tablet by mouth twice a day, Disp-180 tablet, R-1Normal      sertraline (ZOLOFT) 100 MG tablet Take 1.5 tablets by mouth daily, Disp-135 tablet, R-1Normal      ranolazine (RANEXA) 500 MG extended release tablet Take 1 tablet by mouth 2 times daily, Disp-180 tablet, R-1Normal      atorvastatin (LIPITOR) 80 MG tablet Take 1 tablet by mouth daily, Disp-90 tablet, R-1Normal      clopidogrel (PLAVIX) 75 MG tablet Take 1 tablet by mouth daily, Disp-90 tablet, R-1Normal      mirabegron (MYRBETRIQ) 50 MG TB24 Take 50 mg by mouth daily, Disp-90 tablet, R-1Normal      topiramate (TOPAMAX) 50 MG tablet Take 1 tablet by mouth 2 times daily, Disp-180 tablet, R-1Normal      albuterol sulfate  (90 Base) MCG/ACT inhaler Inhale 2 puffs into the lungs every 4 hours as needed for Wheezing or Shortness of Breath, Disp-18 g, R-1Normal      fluticasone (FLONASE) 50 MCG/ACT nasal spray 1 spray by Each Nostril route daily as needed for RhinitisHistorical Med      Multiple Vitamins-Minerals (THERAPEUTIC MULTIVITAMIN-MINERALS) tablet Take 1 tablet by mouth dailyHistorical Med      ascorbic acid (VITAMIN C) 500 MG tablet Take 500 mg by mouth dailyHistorical Med      polyethylene glycol (GLYCOLAX) 17 g packet Take 17 g by mouth daily as needed Historical Med      acetaminophen (TYLENOL) 500 MG tablet Take 2 tablets by mouth every 6 hours as needed for Pain, Disp-30 tablet, R-0Print      Cholecalciferol (VITAMIN D) 50 MCG (2000 UT) TABS tablet Take 2,000 Units by mouth daily Historical Med      aspirin 81 MG chewable tablet Take 81 mg by mouth dailyHistorical Med           ALLERGIES     is allergic to seasonal and keflex [cephalexin]. FAMILY HISTORY     She indicated that her mother is . She indicated that her father is . She indicated that both of her brothers are . She indicated that her maternal grandmother is . She indicated that her maternal grandfather is . She indicated that her paternal grandmother is . She indicated that her paternal grandfather is . SOCIAL HISTORY       Social History     Tobacco Use    Smoking status: Former Smoker     Packs/day: 0.50     Years: 25.00     Pack years: 12.50     Types: Cigarettes    Smokeless tobacco: Never Used    Tobacco comment: 3 packs per week   Vaping Use    Vaping Use: Never used   Substance Use Topics    Alcohol use: Yes     Comment: social    Drug use: Yes     Frequency: 7.0 times per week     Types: Marijuana (Weed)     PHYSICAL EXAM     INITIAL VITALS: BP (!) 164/111   Pulse (!) 128   Temp 98.2 °F (36.8 °C) (Oral)   Resp 16   Ht 5' 3\" (1.6 m)   Wt 208 lb (94.3 kg)   LMP 2022   SpO2 98%   BMI 36.85 kg/m²    Physical Exam  Constitutional:       General: She is not in acute distress. Appearance: She is well-developed. HENT:      Head: Normocephalic.    Eyes:      Pupils: Pupils are equal, round, and reactive to light. Cardiovascular:      Rate and Rhythm: Normal rate and regular rhythm. Heart sounds: Normal heart sounds. Pulmonary:      Effort: Pulmonary effort is normal. No respiratory distress. Breath sounds: Normal breath sounds. Abdominal:      General: Bowel sounds are normal.      Palpations: Abdomen is soft. Tenderness: There is no abdominal tenderness. Genitourinary:         Comments: 1/2 x 1 cm abscess to the perineal area outside the labia no significant surrounding erythema  Musculoskeletal:         General: Normal range of motion. Skin:     General: Skin is warm and dry. Neurological:      Mental Status: She is alert and oriented to person, place, and time. MEDICAL DECISION MAKIN-year-old female presents emergency room for small abscess to the perineal area. Abscess was drained here in the ED without complication. Moderate amount of bloody purulent material was drained. Given size of abscess I do not feel packing is necessary. Patient started on oral antibiotics for home and was given return precautions. CRITICAL CARE:       PROCEDURES:    Incision/Drainage    Date/Time: 2022 2:45 PM  Performed by: Angela Allen MD  Authorized by: Angela Allen MD     Consent:     Consent obtained:  Verbal    Consent given by:  Patient    Risks discussed:  Bleeding, incomplete drainage, infection and pain  Location:     Type:  Abscess    Size:  1    Location:  Anogenital    Anogenital location:  Perineum  Pre-procedure details:     Skin preparation:  Betadine  Procedure type:     Complexity:  Simple  Procedure details:     Incision types:  Single straight    Scalpel blade:  11    Wound management:  Probed and deloculated    Drainage:  Bloody and purulent    Drainage amount: Moderate    Wound treatment:  Wound left open  Post-procedure details:     Patient tolerance of procedure:   Tolerated well, no immediate complications        DIAGNOSTIC RESULTS   EKG:All EKG's are interpreted by the Emergency Department Physician who either signs or Co-signs this chart in the absence of a cardiologist.        RADIOLOGY:All plain film, CT, MRI, and formal ultrasound images (except ED bedside ultrasound) are read by the radiologist, see reports below, unless otherwisenoted in MDM or here. No orders to display     LABS: All lab results were reviewed by myself, and all abnormals are listed below. Labs Reviewed - No data to display    EMERGENCY DEPARTMENTCOURSE:         Vitals:    Vitals:    05/29/22 1307   BP: (!) 164/111   Pulse: (!) 128   Resp: 16   Temp: 98.2 °F (36.8 °C)   TempSrc: Oral   SpO2: 98%   Weight: 208 lb (94.3 kg)   Height: 5' 3\" (1.6 m)       The patient was given the following medications while in the emergency department:  Orders Placed This Encounter   Medications    morphine sulfate (PF) injection 4 mg    lidocaine (XYLOCAINE) 2 % uro-jet 5 mL    lidocaine 1 % injection 5 mL    DISCONTD: sulfamethoxazole-trimethoprim (BACTRIM DS) 800-160 MG per tablet     Sig: Take 1 tablet by mouth 2 times daily for 7 days     Dispense:  14 tablet     Refill:  0    sulfamethoxazole-trimethoprim (BACTRIM DS) 800-160 MG per tablet     Sig: Take 1 tablet by mouth 2 times daily for 7 days     Dispense:  14 tablet     Refill:  0     CONSULTS:  None    FINAL IMPRESSION      1. Abscess          DISPOSITION/PLAN   DISPOSITION Decision To Discharge 05/29/2022 02:14:27 PM      PATIENT REFERRED TO:  No follow-up provider specified. DISCHARGE MEDICATIONS:  Discharge Medication List as of 5/29/2022  2:16 PM        Christine Titus MD  Attending Emergency Physician      Care during this encounter was due to an unprecedented national emergency due to COVID-19.             Clifton Knutson MD  05/29/22 [id number]

## 2022-06-30 ENCOUNTER — OFFICE VISIT (OUTPATIENT)
Dept: FAMILY MEDICINE CLINIC | Age: 52
End: 2022-06-30
Payer: COMMERCIAL

## 2022-06-30 VITALS
DIASTOLIC BLOOD PRESSURE: 72 MMHG | OXYGEN SATURATION: 96 % | BODY MASS INDEX: 38.09 KG/M2 | SYSTOLIC BLOOD PRESSURE: 112 MMHG | WEIGHT: 215 LBS | TEMPERATURE: 98.5 F | HEART RATE: 100 BPM

## 2022-06-30 DIAGNOSIS — F41.9 ANXIETY: ICD-10-CM

## 2022-06-30 DIAGNOSIS — I25.119 CORONARY ARTERY DISEASE INVOLVING NATIVE HEART WITH ANGINA PECTORIS, UNSPECIFIED VESSEL OR LESION TYPE (HCC): ICD-10-CM

## 2022-06-30 DIAGNOSIS — K21.9 GASTROESOPHAGEAL REFLUX DISEASE, UNSPECIFIED WHETHER ESOPHAGITIS PRESENT: ICD-10-CM

## 2022-06-30 DIAGNOSIS — E03.9 HYPOTHYROIDISM, UNSPECIFIED TYPE: ICD-10-CM

## 2022-06-30 DIAGNOSIS — N32.81 OAB (OVERACTIVE BLADDER): ICD-10-CM

## 2022-06-30 DIAGNOSIS — G43.909 MIGRAINE WITHOUT STATUS MIGRAINOSUS, NOT INTRACTABLE, UNSPECIFIED MIGRAINE TYPE: ICD-10-CM

## 2022-06-30 DIAGNOSIS — J84.82: ICD-10-CM

## 2022-06-30 DIAGNOSIS — I10 ESSENTIAL HYPERTENSION: Primary | ICD-10-CM

## 2022-06-30 DIAGNOSIS — Z79.4 TYPE 2 DIABETES MELLITUS WITHOUT COMPLICATION, WITH LONG-TERM CURRENT USE OF INSULIN (HCC): ICD-10-CM

## 2022-06-30 DIAGNOSIS — E11.9 TYPE 2 DIABETES MELLITUS WITHOUT COMPLICATION, WITH LONG-TERM CURRENT USE OF INSULIN (HCC): ICD-10-CM

## 2022-06-30 DIAGNOSIS — E78.2 MIXED HYPERLIPIDEMIA: ICD-10-CM

## 2022-06-30 LAB — HBA1C MFR BLD: 7.8 %

## 2022-06-30 PROCEDURE — 2022F DILAT RTA XM EVC RTNOPTHY: CPT | Performed by: NURSE PRACTITIONER

## 2022-06-30 PROCEDURE — 99214 OFFICE O/P EST MOD 30 MIN: CPT | Performed by: NURSE PRACTITIONER

## 2022-06-30 PROCEDURE — 3051F HG A1C>EQUAL 7.0%<8.0%: CPT | Performed by: NURSE PRACTITIONER

## 2022-06-30 PROCEDURE — G8417 CALC BMI ABV UP PARAM F/U: HCPCS | Performed by: NURSE PRACTITIONER

## 2022-06-30 PROCEDURE — G8427 DOCREV CUR MEDS BY ELIG CLIN: HCPCS | Performed by: NURSE PRACTITIONER

## 2022-06-30 PROCEDURE — 1036F TOBACCO NON-USER: CPT | Performed by: NURSE PRACTITIONER

## 2022-06-30 PROCEDURE — 83036 HEMOGLOBIN GLYCOSYLATED A1C: CPT | Performed by: NURSE PRACTITIONER

## 2022-06-30 RX ORDER — DULAGLUTIDE 3 MG/.5ML
3 INJECTION, SOLUTION SUBCUTANEOUS WEEKLY
Qty: 5 PEN | Refills: 3 | Status: SHIPPED | OUTPATIENT
Start: 2022-06-30

## 2022-06-30 RX ORDER — OMEPRAZOLE 40 MG/1
40 CAPSULE, DELAYED RELEASE ORAL
Qty: 30 CAPSULE | Refills: 3 | Status: SHIPPED | OUTPATIENT
Start: 2022-06-30

## 2022-06-30 RX ORDER — BUSPIRONE HYDROCHLORIDE 15 MG/1
TABLET ORAL
Status: ON HOLD | COMMUNITY
Start: 2022-04-19 | End: 2022-07-09 | Stop reason: HOSPADM

## 2022-06-30 ASSESSMENT — PATIENT HEALTH QUESTIONNAIRE - PHQ9
7. TROUBLE CONCENTRATING ON THINGS, SUCH AS READING THE NEWSPAPER OR WATCHING TELEVISION: 0
SUM OF ALL RESPONSES TO PHQ QUESTIONS 1-9: 3
3. TROUBLE FALLING OR STAYING ASLEEP: 1
9. THOUGHTS THAT YOU WOULD BE BETTER OFF DEAD, OR OF HURTING YOURSELF: 0
10. IF YOU CHECKED OFF ANY PROBLEMS, HOW DIFFICULT HAVE THESE PROBLEMS MADE IT FOR YOU TO DO YOUR WORK, TAKE CARE OF THINGS AT HOME, OR GET ALONG WITH OTHER PEOPLE: 0
8. MOVING OR SPEAKING SO SLOWLY THAT OTHER PEOPLE COULD HAVE NOTICED. OR THE OPPOSITE, BEING SO FIGETY OR RESTLESS THAT YOU HAVE BEEN MOVING AROUND A LOT MORE THAN USUAL: 0
6. FEELING BAD ABOUT YOURSELF - OR THAT YOU ARE A FAILURE OR HAVE LET YOURSELF OR YOUR FAMILY DOWN: 0
2. FEELING DOWN, DEPRESSED OR HOPELESS: 0
4. FEELING TIRED OR HAVING LITTLE ENERGY: 1
5. POOR APPETITE OR OVEREATING: 1
SUM OF ALL RESPONSES TO PHQ QUESTIONS 1-9: 3

## 2022-06-30 NOTE — PROGRESS NOTES
omp  [address]  [id number] [facility]. Shahab Muñoz  [facility], [address]  [phone]  [phone]    Jennifer Clark is a 54 y.o. female who is here with c/o of:    Chief Complaint: Hypertension, Diabetes, and Heartburn (last few weeks getting worse)      Patient Accompanied by: n/a    HPI - Jennifer Clark is here today with c/o:    Patient here for re evaluation of chronic conditions.     HTN-  Follows with Cardiology- Cari. Checks blood pressure at home and averages 110/70-82. Denies chest pain, shortness of breath, headaches, chest pain or swelling     HLD-  Compliant with Lipitor. No routine exercise. Eating more salads and veggies.      Hypothyroid-  Compliant with Levothyroxine. TSH utd     Anxiety-  Stable with buspar. She says she has her ups and downs.      Pulmonary [place]-  Follows with Pulmonary. Uses her albuterol inhaler 3 times per week     CAD-  Follows with Cardiology- Cari. Has hx cardiac stents. Reports she had  maker.      Migraines-  Takes Topamax daily. Averages 1 migraines per week. Follows with Neurology      OAB-  Compliant with Myrbetriq.     Spinal Stenosis Cervical-  Follows with [facility] pain management      Diabetes type 2-  Checks blood sugars daily and averages 100-110's fasting. Says her post prandials are 125-130. Using Levimer 30 units Today Hga1c 7.8%    GERD-  Compliant with medication.  Reports she has been having a lot of reflux symptoms and needing to take tums    Health Maintenance Due   Topic Date Due    Cervical cancer screen  Never done    Diabetic foot exam  06/19/2020    Pneumococcal 0-64 years Vaccine (2 - PCV) 06/19/2020    COVID-19 Vaccine (3 - Booster for Moderna series) 09/22/2021        Patient Active Problem List:     Hypothyroidism     Type 2 diabetes mellitus without complication, with long-term current use of insulin ([address]     Essential hypertension     Hip fracture (HCC)     Spinal stenosis of cervical region     Ground glass opacity present on imaging of lung     Hx of deep venous thrombosis     Situational depression     Anxiety     Pulmonary Langerhans cell granulomatosis (HCC)     Mixed hyperlipidemia     Coronary artery disease involving native heart with angina pectoris (HCC)     Migraine without status migrainosus, not intractable     OAB (overactive bladder)     Smoker     Irritable bowel syndrome with constipation     Irritable bowel syndrome with both constipation and diarrhea     Positive colorectal cancer screening using Cologuard test     Gastroesophageal reflux disease with esophagitis without hemorrhage     Gastroesophageal reflux disease     Past Medical History:   Diagnosis Date    Anxiety     Arthritis     CAD (coronary artery disease)     Depression     Hyperlipidemia     Kidney stone     [facility]-cell granulomatosis [facility]     Myocardial infarct ([facility] 2016    Neck pain     Spinal stenosis 2017      Past Surgical History:   Procedure Laterality Date    BACK SURGERY      L5-S1, C5-7    CARDIAC CATHETERIZATION       SECTION      COLONOSCOPY N/A 2021    COLONOSCOPY WITH BIOPSY performed by Jie [facility] at [facility] Left     LUNG BIOPSY Right     MASTOID SURGERY      TONSILLECTOMY      UPPER GASTROINTESTINAL ENDOSCOPY  2021    EGD BIOPSY performed by Jie Bryant MD at [address] [place] [address] History   Problem Relation Age of Onset    Dementia Mother     Depression Mother     High Blood Pressure Mother     Coronary Art Dis Mother     COPD Mother     Diabetes Mother     Heart Attack Father     Diabetes Maternal Grandmother     Alzheimer's Disease Paternal Grandfather      Social History     Tobacco Use    Smoking status: Former Smoker     Packs/day: 0.50     Years: 25.00     Pack years: 12.50     Types: Cigarettes    Smokeless tobacco: Never Used    Tobacco comment: 3 packs per week   Substance Use Topics    Alcohol use: Yes     Comment: social     ALLERGIES: Allergies   Allergen Reactions    Seasonal Other (See Comments)     Sneezing, watery eyes, wheezing    Keflex [Cephalexin] Rash          Subjective   Review of Systems   · Constitutional:  Negative for activity change, appetite change,unexpected weight change, chills, fever, and fatigue. · HENT: Negative for ear pain, sore throat,  Rhinorrhea, sinus pain, sinus pressure, congestion. · Eyes:  Negative for pain and discharge. · Respiratory:  Negative for chest tightness, shortness of breath, wheezing, and cough. · Cardiovascular:  Negative for chest pain, palpitations and leg swelling. · Gastrointestinal: Negative for abdominal pain, blood in stool, constipation,diarrhea, nausea and vomiting. Positive for reflux symptoms  · Endocrine: Negative for cold intolerance, heat intolerance, polydipsia, polyphagia and polyuria. · Genitourinary: Negative for difficulty urinating, dysuria, flank pain, frequency, hematuria and urgency. · Musculoskeletal: Negative for arthralgias, back pain, joint swelling, myalgias, neck pain and neck stiffness. · Skin: Negative for rash and wound. · Allergic/Immunologic: Negative for environmental allergies and food allergies. · Neurological:  Negative for dizziness, light-headedness, numbness and headaches. · Hematological:  Negative for adenopathy. Does not bruise/bleed easily. · Psychiatric/Behavioral: Negative for self-injury, sleep disturbance and suicidal ideas. Objective   Physical Exam   PHYSICAL EXAM:   · Constitutional: Marco Will is oriented to person, place, and time. Vital signs are normal. Appears well-developed and well-nourished. She is obese  · Head: Normocephalic and atraumatic. Eyes:PERRL, EOMI, Conjunctiva normal, No discharge. · Neck: Full passive range of motion. Non-tender on palpation. Neck supple. No thyromegaly present.  Trachea normal.  · Cardiovascular: Normal rate, regular rhythm, S1, S2, no murmur, no gallop, no friction rub, intact distal pulses. · Pulmonary/Chest: Breath sounds are clear throughout, No respiratory distress, No wheezing, No chest tenderness. Effort normal  · Abdominal: Soft. Normal appearance, bowel sounds are present and normoactive. There is no hepatosplenomegaly. There is no tenderness. There is no CVA tenderness. · Musculoskeletal: Extremities appear regular and symmetric. No evident masses, lesions, foreign bodies, or other abnormalities. No edema. No tenderness on palpation. Joints are stable. Full ROM, strength and tone are within normal limits. · Neurological: Alert and oriented to person, place, and time. Normal motor function, Normal sensory function, No focal deficits noted. He has normal strength. · Skin: Skin is warm, dry and intact. No obvious lesions on exposed skin  · Psychiatric: Normal mood and affect. Speech is normal and behavior is normal.     Nursing note and vitals reviewed. Blood pressure 112/72, pulse 100, temperature 98.5 °F (36.9 °C), temperature source Temporal, weight 215 lb (97.5 kg), SpO2 96 %, not currently breastfeeding. Body mass index is 38.09 kg/m².     Wt Readings from Last 3 Encounters:   06/30/22 215 lb (97.5 kg)   05/29/22 208 lb (94.3 kg)   03/14/22 218 lb (98.9 kg)     BP Readings from Last 3 Encounters:   06/30/22 112/72   05/29/22 (!) 164/111   03/14/22 122/80       Admission on 12/30/2021, Discharged on 01/01/2022   Component Date Value Ref Range Status    Ventricular Rate 12/30/2021 103  BPM Final    Atrial Rate 12/30/2021 103  BPM Final    P-R Interval 12/30/2021 162  ms Final    QRS Duration 12/30/2021 70  ms Final    Q-T Interval 12/30/2021 348  ms Final    QTc Calculation (Bazett) 12/30/2021 455  ms Final    P Axis 12/30/2021 63  degrees Final    R Axis 12/30/2021 61  degrees Final    T Axis 12/30/2021 68  degrees Final    Ventricular Rate 12/30/2021 96  BPM Final    Atrial Rate 12/30/2021 96  BPM Final    P-R Interval 12/30/2021 160  ms Final    QRS Duration 12/30/2021 78  ms Final    Q-T Interval 12/30/2021 386  ms Final    QTc Calculation (Bazett) 12/30/2021 487  ms Final    P Axis 12/30/2021 56  degrees Final    R Axis 12/30/2021 51  degrees Final    T Axis 12/30/2021 66  degrees Final    Glucose 12/30/2021 168* 70 - 99 mg/dL Final    BUN 12/30/2021 9  6 - 20 mg/dL Final    CREATININE 12/30/2021 0.59  0.50 - 0.90 mg/dL Final    Bun/Cre Ratio 12/30/2021 15  9 - 20 Final    Calcium 12/30/2021 8.7  8.6 - 10.4 mg/dL Final    Sodium 12/30/2021 134* 135 - 144 mmol/L Final    Potassium 12/30/2021 3.6* 3.7 - 5.3 mmol/L Final    Chloride 12/30/2021 100  98 - 107 mmol/L Final    CO2 12/30/2021 20  20 - 31 mmol/L Final    Anion Gap 12/30/2021 14  9 - 17 mmol/L Final    GFR Non- 12/30/2021 >60  >60 mL/min Final    GFR  12/30/2021 >60  >60 mL/min Final    GFR Comment 12/30/2021        Final    Comment: Average GFR for 38-51 years old:   80 mL/min/1.73sq m  Chronic Kidney Disease:   <60 mL/min/1.73sq m  Kidney failure:   <15 mL/min/1.73sq m              eGFR calculated using average adult body mass. Additional eGFR calculator available at:        [employer]zee[employer]            GFR Staging 12/30/2021 NOT REPORTED   Final    Pro-BNP 12/30/2021 61  <300 pg/mL Final    Comment:       An age-independent cutoff point of 300 pg/ml has a 98% negative predictive value excluding   acute heart failure.       BNP Interpretation 12/30/2021 NOT REPORTED   Final    WBC 12/30/2021 19.5* 3.5 - 11.3 k/uL Final    RBC 12/30/2021 4.20  3.95 - 5.11 m/uL Final    Hemoglobin 12/30/2021 12.0  11.9 - 15.1 g/dL Final    Hematocrit 12/30/2021 37.0  36.3 - 47.1 % Final    MCV 12/30/2021 88.1  82.6 - 102.9 fL Final    MCH 12/30/2021 28.6  25.2 - 33.5 pg Final    MCHC 12/30/2021 32.4  28.4 - 34.8 g/dL Final    RDW 12/30/2021 14.4  11.8 - 14.4 % Final    Platelets 91/43/5457 428  138 - 453 k/uL Final    MPV 12/30/2021 9.1  8.1 - 13.5 fL Final    NRBC Automated 12/30/2021 0.0  0.0 per 100 WBC Final    Differential Type 12/30/2021 NOT REPORTED   Final    WBC Morphology 12/30/2021 NOT REPORTED   Final    RBC Morphology 12/30/2021 NOT REPORTED   Final    Platelet Estimate 52/84/6239 NOT REPORTED   Final    Seg Neutrophils 12/30/2021 51  36 - 66 % Final    Lymphocytes 12/30/2021 37  24 - 44 % Final    Monocytes 12/30/2021 8* 1 - 7 % Final    Eosinophils % 12/30/2021 4  1 - 4 % Final    Basophils 12/30/2021 0  % Final    Immature Granulocytes 12/30/2021 0  0 % Final    Segs Absolute 12/30/2021 9.94* 1.8 - 7.7 k/uL Final    Absolute Lymph # 12/30/2021 7.22* 1.0 - 4.8 k/uL Final    Comment: R/O Chronic Lymphoproliferative Disorder,  recommend peripheral blood Flow Cytometry,  if clinically indicated.  Absolute Mono # 12/30/2021 1.56* 0.2 - 0.8 k/uL Final    Absolute Eos # 12/30/2021 0.78* 0.0 - 0.4 k/uL Final    Basophils Absolute 12/30/2021 0.00  0.0 - 0.2 k/uL Final    Absolute Immature Granulocyte 12/30/2021 0.00  0.00 - 0.30 k/uL Final    Troponin, High Sensitivity 12/31/2021 8  0 - 14 ng/L Final    Comment:       High Sensitivity Troponin values cannot be compared with other Troponin methodologies. Patients with high levels of Biotin oral intake (i.e >5mg/day) may have falsely decreased   Troponin levels. Samples collected within 8 hours of biotin intake may require additional   information for diagnosis.  Troponin T 12/31/2021 NOT REPORTED  <0.03 ng/mL Final    Troponin Interp 12/31/2021 NOT REPORTED   Final    Troponin, High Sensitivity 12/30/2021 8  0 - 14 ng/L Final    Comment:       High Sensitivity Troponin values cannot be compared with other Troponin methodologies. Patients with high levels of Biotin oral intake (i.e >5mg/day) may have falsely decreased   Troponin levels.  Samples collected within 8 hours of biotin intake may require additional   information for diagnosis.  Troponin T 12/30/2021 NOT REPORTED  <0.03 ng/mL Final    Troponin Interp 12/30/2021 NOT REPORTED   Final    Myoglobin 12/30/2021 <21* 25 - 58 ng/mL Final    D-Dimer, Quant 12/30/2021 0.33  0.00 - 0.59 mg/L FEU Final    Comment:        When combined with a low clinical probability, a D dimer value of <0.50 mg/L FEU is   considered negative for DVT and PE (negative predictive value of 98%, sensitivity of 97%). If this test is not being used to help rule out DVT and PE, then the following reference   range should be utilized: 0.00 - 0.59 mg/L FEU. The D-Dimer assay is intended for use as an aid in the diagnosis of venous thromboembolism   (DVT and PE) and the results should be interpreted in conjunction with the patient's medical   history, clinical presentation, and other findings. Elevated levels of D-dimer activity can be seen in any state of coagulation activation and   is not recommended in patients with therapeutic dose anticoagulant therapy for >24 hours,   fibrinolytic therapy within the previous 7 days, trauma or surgery within the previous 4   weeks,   disseminated malignancies, aortic aneurysm, sepsis, severe infections, pneumonia, severe   skin infections, liver cirrhosis, advanced age, flor                           nary disease, diabetes, and pregnancy. A very low percentage of patients with DVT may yield D-dimer results below the cutoff of   0.5 mg/L FEU. This is known to be more prevalent in patients with distal DVT.  Specimen Description 12/30/2021 . NASOPHARYNGEAL SWAB   Final    SARS-CoV-2, Rapid 12/30/2021 Not Detected  Not Detected Final    Comment:       Rapid NAAT:  The specimen is NEGATIVE for SARS-CoV-2, the novel coronavirus associated with   COVID-19. The ID NOW COVID-19 assay is designed to detect the virus that causes COVID-19 in patients   with signs and symptoms of infection who are suspected of COVID-19.   An individual without symptoms of COVID-19 and who is not shedding SARS-CoV-2 virus would   expect to have a negative (not detected) result in this assay. Negative results should be treated as presumptive and, if inconsistent with clinical signs   and symptoms or necessary for patient management,  should be tested with an alternative molecular assay. Negative results do not preclude   SARS-CoV-2 infection and   should not be used as the sole basis for patient management decisions. Fact sheet for Healthcare Providers: Juan  Fact sheet for Patients: Juan          Methodology: Isothermal Nucleic Acid Amplification      hCG Qual 12/30/2021 NEGATIVE  NEGATIVE Final    Comment: Specimens with hCG levels near the threshold of the test (25 mIU/mL) may give a negative or   indeterminate result. In such cases, another test should be performed with a new specimen   in 48-72 hours. If early pregnancy is suspected clinically in this setting, correlation   with quantitative serum b-hCG level is suggested.       Color, UA 12/31/2021 Yellow  Yellow Final    Turbidity UA 12/31/2021 Clear  Clear Final    Glucose, Ur 12/31/2021 1+* NEGATIVE Final    Bilirubin Urine 12/31/2021 NEGATIVE  NEGATIVE Final    Ketones, Urine 12/31/2021 NEGATIVE  NEGATIVE Final    Specific Gravity, UA 12/31/2021 1.025  1.005 - 1.030 Final    Urine Hgb 12/31/2021 NEGATIVE  NEGATIVE Final    pH, UA 12/31/2021 6.0  5.0 - 8.0 Final    Protein, UA 12/31/2021 NEGATIVE  NEGATIVE Final    Urobilinogen, Urine 12/31/2021 Normal  Normal Final    Nitrite, Urine 12/31/2021 NEGATIVE  NEGATIVE Final    Leukocyte Esterase, Urine 12/31/2021 NEGATIVE  NEGATIVE Final    Urinalysis Comments 12/31/2021 NOT REPORTED   Final    - 12/31/2021        Final    WBC, UA 12/31/2021 0 TO 2  0 - 5 /HPF Final    RBC, UA 12/31/2021 0 TO 2  0 - 2 /HPF Final    Casts UA 12/31/2021 NOT REPORTED  /LPF Final    Crystals, UA encouraged. With successful antibiotic therapy, PCT levels should decrease rapidly. (Half-life of 24 to   36 hours.)        Procalcitonin values from samples collected within the first 6 hours of systemic infection   may still be low. Retesting may be indicated. Values from day 1 and day 4 can be entered into the Change in Procalcitonin Calculator   ([email or web address] [email or web address] Softwarepct-[email or web address]. [employer]) to determine the patient's Mortality Risk Prognosis        In healthy neonates, plasma Procalcitonin (PCT) concentrations increase gradually after   birth, reaching peak values at about 24 hours of age then decr                           ease to normal values below   0.5 ng/mL by 48-72 hours of age.       POC Glucose 12/31/2021 160* 65 - 105 mg/dL Final    POC Glucose 12/31/2021 137* 65 - 105 mg/dL Final    POC Glucose 12/31/2021 159* 65 - 105 mg/dL Final    Glucose 01/01/2022 178* 70 - 99 mg/dL Final    BUN 01/01/2022 8  6 - 20 mg/dL Final    CREATININE 01/01/2022 0.61  0.50 - 0.90 mg/dL Final    Bun/Cre Ratio 01/01/2022 13  9 - 20 Final    Calcium 01/01/2022 8.5* 8.6 - 10.4 mg/dL Final    Sodium 01/01/2022 136  135 - 144 mmol/L Final    Potassium 01/01/2022 4.1  3.7 - 5.3 mmol/L Final    Chloride 01/01/2022 105  98 - 107 mmol/L Final    CO2 01/01/2022 21  20 - 31 mmol/L Final    Anion Gap 01/01/2022 10  9 - 17 mmol/L Final    Alkaline Phosphatase 01/01/2022 76  35 - 104 U/L Final    ALT 01/01/2022 13  5 - 33 U/L Final    AST 01/01/2022 11  <32 U/L Final    Total Bilirubin 01/01/2022 0.24* 0.3 - 1.2 mg/dL Final    Total Protein 01/01/2022 6.4  6.4 - 8.3 g/dL Final    Albumin 01/01/2022 3.6  3.5 - 5.2 g/dL Final    Albumin/Globulin Ratio 01/01/2022 NOT REPORTED  1.0 - 2.5 Final    GFR Non- 01/01/2022 >60  >60 mL/min Final    GFR  01/01/2022 >60  >60 mL/min Final    GFR Comment 01/01/2022        Final    Comment: Average GFR for 38-51 years old:   80 mL/min/1.73sq m  Chronic Kidney Disease:   <60 mL/min/1.73sq m  Kidney failure:   <15 mL/min/1.73sq m              eGFR calculated using average adult body mass. Additional eGFR calculator available at:        [employer]            GFR Staging 01/01/2022 NOT REPORTED   Final    Magnesium 01/01/2022 2.1  1.6 - 2.6 mg/dL Final    WBC 01/01/2022 11.8* 3.5 - 11.3 k/uL Final    RBC 01/01/2022 4.00  3.95 - 5.11 m/uL Final    Hemoglobin 01/01/2022 11.3* 11.9 - 15.1 g/dL Final    Hematocrit 01/01/2022 36.0* 36.3 - 47.1 % Final    MCV 01/01/2022 90.0  82.6 - 102.9 fL Final    MCH 01/01/2022 28.3  25.2 - 33.5 pg Final    MCHC 01/01/2022 31.4  28.4 - 34.8 g/dL Final    RDW 01/01/2022 14.2  11.8 - 14.4 % Final    Platelets 50/31/3654 399  138 - 453 k/uL Final    MPV 01/01/2022 8.9  8.1 - 13.5 fL Final    NRBC Automated 01/01/2022 0.0  0.0 per 100 WBC Final    Cholesterol 01/01/2022 182  <200 mg/dL Final    Comment:    Cholesterol Guidelines:      <200  Desirable   200-240  Borderline      >240  Undesirable         HDL 01/01/2022 33* >40 mg/dL Final    Comment:    HDL Guidelines:    <40     Undesirable   40-59    Borderline    >59     Desirable         LDL Cholesterol 01/01/2022 125  0 - 130 mg/dL Final    Comment:    LDL Guidelines:     <100    Desirable   100-129   Near to/above Desirable   130-159   Borderline      >159   Undesirable     Direct (measured) LDL and calculated LDL are not interchangeable tests.  Chol/HDL Ratio 01/01/2022 5.5* <5 Final            Triglycerides 01/01/2022 121  <150 mg/dL Final    Comment:    Triglyceride Guidelines:     <150   Desirable   150-199  Borderline   200-499  High     >499   Very high   Based on AHA Guidelines for fasting triglyceride, October 2012.          VLDL 01/01/2022 NOT REPORTED  1 - 30 mg/dL Final    POC Glucose 12/31/2021 139* 65 - 105 mg/dL Final    POC Glucose 01/01/2022 181* 65 - 105 mg/dL Final    POC Glucose 01/01/2022 164* 65 - 105 mg/dL Final     Results for POC orders placed in visit on 06/30/22   POCT glycosylated hemoglobin (Hb A1C)   Result Value Ref Range    Hemoglobin A1C 7.8 %       Completed Orders/Prescriptions   Orders Placed This Encounter   Medications    Dulaglutide (TRULICITY) 3 AL/2.3FY SOPN     Sig: Inject 3 mg into the skin once a week     Dispense:  5 pen     Refill:  3    omeprazole (PRILOSEC) 40 MG delayed release capsule     Sig: Take 1 capsule by mouth every morning (before breakfast)     Dispense:  30 capsule     Refill:  3               AssessmentPlan/Medical Decision Making     1. Essential hypertension    - Stable  - CBC with Auto Differential; Future  - Comprehensive Metabolic Panel; Future  - TSH with Reflex; Future  - Lipid, Fasting; Future    2. Mixed hyperlipidemia    - CBC with Auto Differential; Future  - Comprehensive Metabolic Panel; Future  - TSH with Reflex; Future  - Lipid, Fasting; Future    3. Hypothyroidism, unspecified type    - CBC with Auto Differential; Future  - Comprehensive Metabolic Panel; Future  - TSH with Reflex; Future    4. Anxiety    - Stable  - CBC with Auto Differential; Future  - Comprehensive Metabolic Panel; Future  - TSH with Reflex; Future    5. Pulmonary Langerhans cell granulomatosis ([facility]    - Follows with Pulmonary  - CBC with Auto Differential; Future  - Comprehensive Metabolic Panel; Future  - TSH with Reflex; Future    6. Coronary artery disease involving native heart with angina pectoris, unspecified vessel or lesion type ([facility]    - Follows with Cardiology  - CBC with Auto Differential; Future  - Comprehensive Metabolic Panel; Future  - TSH with Reflex; Future  - Lipid, Fasting; Future    7. Migraine without status migrainosus, not intractable, unspecified migraine type    - Follows with Neurology  - CBC with Auto Differential; Future  - Comprehensive Metabolic Panel; Future  - TSH with Reflex; Future    8.  OAB (overactive bladder)    - Stable  - CBC with Auto Differential; Future  - Comprehensive Metabolic Panel; Future  - TSH with Reflex; Future    9. Type 2 diabetes mellitus without complication, with long-term current use of insulin (HCC)    - Will increase Trulicity for better ECCK3P control  - POCT glycosylated hemoglobin (Hb A1C)  - Dulaglutide (TRULICITY) 3 WOO/7.0SQ SOPN; Inject 3 mg into the skin once a week  Dispense: 5 pen; Refill: 3  - CBC with Auto Differential; Future  - Comprehensive Metabolic Panel; Future  - TSH with Reflex; Future  - Lipid, Fasting; Future  - Hemoglobin A1C; Future  - Microalbumin, Ur; Future    10. Gastroesophageal reflux disease, unspecified whether esophagitis present    - Will increase PPI  - omeprazole (PRILOSEC) 40 MG delayed release capsule; Take 1 capsule by mouth every morning (before breakfast)  Dispense: 30 capsule; Refill: 3      Return in about 3 months (around 9/30/2022) for chronic conditions. 1.  Charisma received counseling on the following healthy behaviors: nutrition, exercise and medication adherence  2. Patient given educational materials - see patient instructions  3. Was a self-tracking handout given in paper form or via [employer]? No  If yes, see orders or list here. 4.  Discussed use, benefit, and side effects of prescribed medications. Barriers to medication compliance addressed. All patient questions answered. Pt voiced understanding. 5.  Reviewed prior labs, imaging, consultation, follow up, and health maintenance  6. Continue current medications, diet and exercise. 7. Discussed use, benefit, and side effects of prescribed medications. Barriers to medication compliance addressed. All her questions were answered. Pt voiced understanding. Cade Hilliard will continue current medications, diet and exercise.       Of the 30 minute duration appointment visit, Marilyn Mcgrath CNP spent at least 50% of the face-to-face time in counseling, explanation of diagnosis, planning of further management, and answering all questions.           Signed:  Seth Paniagua, CNP

## 2022-07-07 ENCOUNTER — APPOINTMENT (OUTPATIENT)
Dept: GENERAL RADIOLOGY | Age: 52
End: 2022-07-07
Payer: COMMERCIAL

## 2022-07-07 ENCOUNTER — HOSPITAL ENCOUNTER (EMERGENCY)
Age: 52
Discharge: HOME | End: 2022-07-07
Attending: STUDENT IN AN ORGANIZED HEALTH CARE EDUCATION/TRAINING PROGRAM
Payer: COMMERCIAL

## 2022-07-07 ENCOUNTER — APPOINTMENT (OUTPATIENT)
Dept: CT IMAGING | Age: 52
End: 2022-07-07
Payer: COMMERCIAL

## 2022-07-07 VITALS
HEIGHT: 63 IN | SYSTOLIC BLOOD PRESSURE: 133 MMHG | DIASTOLIC BLOOD PRESSURE: 88 MMHG | TEMPERATURE: 99.7 F | BODY MASS INDEX: 36.32 KG/M2 | HEART RATE: 109 BPM | OXYGEN SATURATION: 97 % | RESPIRATION RATE: 19 BRPM | WEIGHT: 205 LBS

## 2022-07-07 DIAGNOSIS — U07.1 COVID-19: Primary | ICD-10-CM

## 2022-07-07 LAB
ABSOLUTE EOS #: 0.15 K/UL (ref 0–0.44)
ABSOLUTE IMMATURE GRANULOCYTE: 0.04 K/UL (ref 0–0.3)
ABSOLUTE LYMPH #: 1.35 K/UL (ref 1.1–3.7)
ABSOLUTE MONO #: 1.16 K/UL (ref 0.1–1.2)
ANION GAP SERPL CALCULATED.3IONS-SCNC: 13 MMOL/L (ref 9–17)
BASOPHILS # BLD: 0 % (ref 0–2)
BASOPHILS ABSOLUTE: 0.03 K/UL (ref 0–0.2)
BUN BLDV-MCNC: 10 MG/DL (ref 6–20)
BUN/CREAT BLD: 14 (ref 9–20)
CALCIUM SERPL-MCNC: 9.2 MG/DL (ref 8.6–10.4)
CHLORIDE BLD-SCNC: 100 MMOL/L (ref 98–107)
CO2: 22 MMOL/L (ref 20–31)
CREAT SERPL-MCNC: 0.74 MG/DL (ref 0.5–0.9)
EOSINOPHILS RELATIVE PERCENT: 2 % (ref 1–4)
GFR AFRICAN AMERICAN: >60 ML/MIN
GFR NON-AFRICAN AMERICAN: >60 ML/MIN
GFR SERPL CREATININE-BSD FRML MDRD: ABNORMAL ML/MIN/{1.73_M2}
GLUCOSE BLD-MCNC: 208 MG/DL (ref 70–99)
HCT VFR BLD CALC: 40.5 % (ref 36.3–47.1)
HEMOGLOBIN: 12.4 G/DL (ref 11.9–15.1)
IMMATURE GRANULOCYTES: 0 %
LYMPHOCYTES # BLD: 14 % (ref 24–43)
MAGNESIUM: 2 MG/DL (ref 1.6–2.6)
MCH RBC QN AUTO: 25.6 PG (ref 25.2–33.5)
MCHC RBC AUTO-ENTMCNC: 30.6 G/DL (ref 28.4–34.8)
MCV RBC AUTO: 83.7 FL (ref 82.6–102.9)
MONOCYTES # BLD: 12 % (ref 3–12)
NRBC AUTOMATED: 0 PER 100 WBC
PDW BLD-RTO: 15.4 % (ref 11.8–14.4)
PLATELET # BLD: 506 K/UL (ref 138–453)
PMV BLD AUTO: 8.8 FL (ref 8.1–13.5)
POTASSIUM SERPL-SCNC: 4.1 MMOL/L (ref 3.7–5.3)
PRO-BNP: 109 PG/ML
RBC # BLD: 4.84 M/UL (ref 3.95–5.11)
RBC # BLD: ABNORMAL 10*6/UL
SARS-COV-2, RAPID: DETECTED
SEG NEUTROPHILS: 72 % (ref 36–65)
SEGMENTED NEUTROPHILS ABSOLUTE COUNT: 6.73 K/UL (ref 1.5–8.1)
SODIUM BLD-SCNC: 135 MMOL/L (ref 135–144)
SPECIMEN DESCRIPTION: ABNORMAL
TROPONIN, HIGH SENSITIVITY: 18 NG/L (ref 0–14)
TROPONIN, HIGH SENSITIVITY: 28 NG/L (ref 0–14)
TSH SERPL DL<=0.05 MIU/L-ACNC: 0.56 UIU/ML (ref 0.3–5)
WBC # BLD: 9.5 K/UL (ref 3.5–11.3)

## 2022-07-07 PROCEDURE — 2580000003 HC RX 258: Performed by: STUDENT IN AN ORGANIZED HEALTH CARE EDUCATION/TRAINING PROGRAM

## 2022-07-07 PROCEDURE — 96361 HYDRATE IV INFUSION ADD-ON: CPT

## 2022-07-07 PROCEDURE — 71045 X-RAY EXAM CHEST 1 VIEW: CPT

## 2022-07-07 PROCEDURE — 96375 TX/PRO/DX INJ NEW DRUG ADDON: CPT

## 2022-07-07 PROCEDURE — 87635 SARS-COV-2 COVID-19 AMP PRB: CPT

## 2022-07-07 PROCEDURE — 99285 EMERGENCY DEPT VISIT HI MDM: CPT

## 2022-07-07 PROCEDURE — 84484 ASSAY OF TROPONIN QUANT: CPT

## 2022-07-07 PROCEDURE — 93005 ELECTROCARDIOGRAM TRACING: CPT | Performed by: STUDENT IN AN ORGANIZED HEALTH CARE EDUCATION/TRAINING PROGRAM

## 2022-07-07 PROCEDURE — 83880 ASSAY OF NATRIURETIC PEPTIDE: CPT

## 2022-07-07 PROCEDURE — 96374 THER/PROPH/DIAG INJ IV PUSH: CPT

## 2022-07-07 PROCEDURE — 80048 BASIC METABOLIC PNL TOTAL CA: CPT

## 2022-07-07 PROCEDURE — 83735 ASSAY OF MAGNESIUM: CPT

## 2022-07-07 PROCEDURE — 84443 ASSAY THYROID STIM HORMONE: CPT

## 2022-07-07 PROCEDURE — 6360000004 HC RX CONTRAST MEDICATION: Performed by: STUDENT IN AN ORGANIZED HEALTH CARE EDUCATION/TRAINING PROGRAM

## 2022-07-07 PROCEDURE — 71260 CT THORAX DX C+: CPT | Performed by: STUDENT IN AN ORGANIZED HEALTH CARE EDUCATION/TRAINING PROGRAM

## 2022-07-07 PROCEDURE — 36415 COLL VENOUS BLD VENIPUNCTURE: CPT

## 2022-07-07 PROCEDURE — 85025 COMPLETE CBC W/AUTO DIFF WBC: CPT

## 2022-07-07 PROCEDURE — 6360000002 HC RX W HCPCS: Performed by: STUDENT IN AN ORGANIZED HEALTH CARE EDUCATION/TRAINING PROGRAM

## 2022-07-07 RX ORDER — METOPROLOL SUCCINATE 50 MG/1
50 TABLET, EXTENDED RELEASE ORAL 2 TIMES DAILY
Status: CANCELLED | OUTPATIENT
Start: 2022-07-07

## 2022-07-07 RX ORDER — RANOLAZINE 500 MG/1
500 TABLET, EXTENDED RELEASE ORAL 2 TIMES DAILY
Status: CANCELLED | OUTPATIENT
Start: 2022-07-07

## 2022-07-07 RX ORDER — SODIUM CHLORIDE 0.9 % (FLUSH) 0.9 %
10 SYRINGE (ML) INJECTION PRN
Status: DISCONTINUED | OUTPATIENT
Start: 2022-07-07 | End: 2022-07-08 | Stop reason: HOSPADM

## 2022-07-07 RX ORDER — KETOROLAC TROMETHAMINE 15 MG/ML
15 INJECTION, SOLUTION INTRAMUSCULAR; INTRAVENOUS ONCE
Status: COMPLETED | OUTPATIENT
Start: 2022-07-07 | End: 2022-07-07

## 2022-07-07 RX ORDER — ASCORBIC ACID 500 MG
500 TABLET ORAL DAILY
Status: CANCELLED | OUTPATIENT
Start: 2022-07-08

## 2022-07-07 RX ORDER — ASPIRIN 81 MG/1
81 TABLET, CHEWABLE ORAL DAILY
Status: CANCELLED | OUTPATIENT
Start: 2022-07-08

## 2022-07-07 RX ORDER — LEVOTHYROXINE SODIUM 0.05 MG/1
50 TABLET ORAL DAILY
Status: CANCELLED | OUTPATIENT
Start: 2022-07-08

## 2022-07-07 RX ORDER — ATORVASTATIN CALCIUM 80 MG/1
80 TABLET, FILM COATED ORAL DAILY
Status: CANCELLED | OUTPATIENT
Start: 2022-07-08

## 2022-07-07 RX ORDER — 0.9 % SODIUM CHLORIDE 0.9 %
1000 INTRAVENOUS SOLUTION INTRAVENOUS ONCE
Status: COMPLETED | OUTPATIENT
Start: 2022-07-07 | End: 2022-07-07

## 2022-07-07 RX ORDER — ONDANSETRON 2 MG/ML
4 INJECTION INTRAMUSCULAR; INTRAVENOUS ONCE
Status: COMPLETED | OUTPATIENT
Start: 2022-07-07 | End: 2022-07-07

## 2022-07-07 RX ORDER — 0.9 % SODIUM CHLORIDE 0.9 %
80 INTRAVENOUS SOLUTION INTRAVENOUS ONCE
Status: DISCONTINUED | OUTPATIENT
Start: 2022-07-07 | End: 2022-07-08 | Stop reason: HOSPADM

## 2022-07-07 RX ORDER — DEXAMETHASONE SODIUM PHOSPHATE 10 MG/ML
6 INJECTION, SOLUTION INTRAMUSCULAR; INTRAVENOUS ONCE
Status: COMPLETED | OUTPATIENT
Start: 2022-07-07 | End: 2022-07-07

## 2022-07-07 RX ORDER — PANTOPRAZOLE SODIUM 40 MG/1
40 TABLET, DELAYED RELEASE ORAL
Status: CANCELLED | OUTPATIENT
Start: 2022-07-08

## 2022-07-07 RX ORDER — HYDROCODONE BITARTRATE AND ACETAMINOPHEN 5; 325 MG/1; MG/1
1 TABLET ORAL EVERY 6 HOURS PRN
Qty: 8 TABLET | Refills: 0 | Status: ON HOLD | OUTPATIENT
Start: 2022-07-07 | End: 2022-07-09 | Stop reason: HOSPADM

## 2022-07-07 RX ORDER — DEXAMETHASONE 6 MG/1
6 TABLET ORAL
Qty: 10 TABLET | Refills: 0 | Status: ON HOLD | OUTPATIENT
Start: 2022-07-07 | End: 2022-07-09 | Stop reason: HOSPADM

## 2022-07-07 RX ADMIN — KETOROLAC TROMETHAMINE 15 MG: 15 INJECTION, SOLUTION INTRAMUSCULAR; INTRAVENOUS at 22:18

## 2022-07-07 RX ADMIN — SODIUM CHLORIDE, PRESERVATIVE FREE 10 ML: 5 INJECTION INTRAVENOUS at 21:48

## 2022-07-07 RX ADMIN — IOPAMIDOL 75 ML: 755 INJECTION, SOLUTION INTRAVENOUS at 21:47

## 2022-07-07 RX ADMIN — Medication 80 ML: at 21:48

## 2022-07-07 RX ADMIN — DEXAMETHASONE SODIUM PHOSPHATE 6 MG: 10 INJECTION, SOLUTION INTRAMUSCULAR; INTRAVENOUS at 21:35

## 2022-07-07 RX ADMIN — ONDANSETRON 4 MG: 2 INJECTION INTRAMUSCULAR; INTRAVENOUS at 22:01

## 2022-07-07 RX ADMIN — SODIUM CHLORIDE 1000 ML: 9 INJECTION, SOLUTION INTRAVENOUS at 21:21

## 2022-07-08 ENCOUNTER — HOSPITAL ENCOUNTER (INPATIENT)
Age: 52
LOS: 1 days | Discharge: HOME | DRG: 137 | End: 2022-07-09
Attending: EMERGENCY MEDICINE | Admitting: INTERNAL MEDICINE
Payer: COMMERCIAL

## 2022-07-08 ENCOUNTER — APPOINTMENT (OUTPATIENT)
Dept: GENERAL RADIOLOGY | Age: 52
DRG: 137 | End: 2022-07-08
Payer: COMMERCIAL

## 2022-07-08 ENCOUNTER — TELEPHONE (OUTPATIENT)
Dept: FAMILY MEDICINE CLINIC | Age: 52
End: 2022-07-08

## 2022-07-08 DIAGNOSIS — R07.89 ATYPICAL CHEST PAIN: Primary | ICD-10-CM

## 2022-07-08 DIAGNOSIS — U07.1 COVID-19: ICD-10-CM

## 2022-07-08 PROBLEM — I21.4 NSTEMI (NON-ST ELEVATED MYOCARDIAL INFARCTION) (HCC): Status: ACTIVE | Noted: 2022-07-08

## 2022-07-08 PROBLEM — R07.9 CHEST PAIN: Status: ACTIVE | Noted: 2022-07-08

## 2022-07-08 LAB
ANION GAP SERPL CALCULATED.3IONS-SCNC: 12 MMOL/L (ref 9–17)
BILIRUBIN URINE: NEGATIVE
BUN BLDV-MCNC: 11 MG/DL (ref 6–20)
C-REACTIVE PROTEIN: 7 MG/L (ref 0–5)
CALCIUM SERPL-MCNC: 8.9 MG/DL (ref 8.6–10.4)
CHLORIDE BLD-SCNC: 105 MMOL/L (ref 98–107)
CO2: 18 MMOL/L (ref 20–31)
COLOR: YELLOW
COMMENT UA: ABNORMAL
CREAT SERPL-MCNC: 0.56 MG/DL (ref 0.5–0.9)
EKG ATRIAL RATE: 132 BPM
EKG P AXIS: 64 DEGREES
EKG P-R INTERVAL: 146 MS
EKG Q-T INTERVAL: 302 MS
EKG QRS DURATION: 74 MS
EKG QTC CALCULATION (BAZETT): 447 MS
EKG R AXIS: 57 DEGREES
EKG T AXIS: 69 DEGREES
EKG VENTRICULAR RATE: 132 BPM
GFR AFRICAN AMERICAN: >60 ML/MIN
GFR NON-AFRICAN AMERICAN: >60 ML/MIN
GFR SERPL CREATININE-BSD FRML MDRD: ABNORMAL ML/MIN/{1.73_M2}
GLUCOSE BLD-MCNC: 174 MG/DL (ref 65–105)
GLUCOSE BLD-MCNC: 220 MG/DL (ref 70–99)
GLUCOSE BLD-MCNC: 259 MG/DL (ref 65–105)
GLUCOSE URINE: ABNORMAL
HCT VFR BLD CALC: 36.8 % (ref 36.3–47.1)
HEMOGLOBIN: 12.1 G/DL (ref 11.9–15.1)
KETONES, URINE: NEGATIVE
LEUKOCYTE ESTERASE, URINE: NEGATIVE
MCH RBC QN AUTO: 26.4 PG (ref 25.2–33.5)
MCHC RBC AUTO-ENTMCNC: 32.9 G/DL (ref 28.4–34.8)
MCV RBC AUTO: 80.2 FL (ref 82.6–102.9)
NITRITE, URINE: NEGATIVE
NRBC AUTOMATED: 0 PER 100 WBC
PDW BLD-RTO: 15.4 % (ref 11.8–14.4)
PH UA: 5.5 (ref 5–8)
PLATELET # BLD: 459 K/UL (ref 138–453)
PMV BLD AUTO: 9.1 FL (ref 8.1–13.5)
POTASSIUM SERPL-SCNC: 4.4 MMOL/L (ref 3.7–5.3)
PROCALCITONIN: 0.06 NG/ML
PROTEIN UA: NEGATIVE
RBC # BLD: 4.59 M/UL (ref 3.95–5.11)
SODIUM BLD-SCNC: 135 MMOL/L (ref 135–144)
SPECIFIC GRAVITY UA: 1.02 (ref 1–1.03)
TROPONIN, HIGH SENSITIVITY: 21 NG/L (ref 0–14)
TROPONIN, HIGH SENSITIVITY: 22 NG/L (ref 0–14)
TURBIDITY: CLEAR
URINE HGB: NEGATIVE
UROBILINOGEN, URINE: NORMAL
WBC # BLD: 7.6 K/UL (ref 3.5–11.3)

## 2022-07-08 PROCEDURE — 82728 ASSAY OF FERRITIN: CPT

## 2022-07-08 PROCEDURE — 6370000000 HC RX 637 (ALT 250 FOR IP): Performed by: INTERNAL MEDICINE

## 2022-07-08 PROCEDURE — 1200000000 HC SEMI PRIVATE

## 2022-07-08 PROCEDURE — 84484 ASSAY OF TROPONIN QUANT: CPT

## 2022-07-08 PROCEDURE — 85027 COMPLETE CBC AUTOMATED: CPT

## 2022-07-08 PROCEDURE — 86738 MYCOPLASMA ANTIBODY: CPT

## 2022-07-08 PROCEDURE — 87040 BLOOD CULTURE FOR BACTERIA: CPT

## 2022-07-08 PROCEDURE — 93005 ELECTROCARDIOGRAM TRACING: CPT | Performed by: STUDENT IN AN ORGANIZED HEALTH CARE EDUCATION/TRAINING PROGRAM

## 2022-07-08 PROCEDURE — 2580000003 HC RX 258

## 2022-07-08 PROCEDURE — 84145 PROCALCITONIN (PCT): CPT

## 2022-07-08 PROCEDURE — 6370000000 HC RX 637 (ALT 250 FOR IP)

## 2022-07-08 PROCEDURE — 80048 BASIC METABOLIC PNL TOTAL CA: CPT

## 2022-07-08 PROCEDURE — 71045 X-RAY EXAM CHEST 1 VIEW: CPT

## 2022-07-08 PROCEDURE — 86140 C-REACTIVE PROTEIN: CPT

## 2022-07-08 PROCEDURE — 82947 ASSAY GLUCOSE BLOOD QUANT: CPT

## 2022-07-08 PROCEDURE — 36415 COLL VENOUS BLD VENIPUNCTURE: CPT

## 2022-07-08 PROCEDURE — 81003 URINALYSIS AUTO W/O SCOPE: CPT

## 2022-07-08 PROCEDURE — 6370000000 HC RX 637 (ALT 250 FOR IP): Performed by: STUDENT IN AN ORGANIZED HEALTH CARE EDUCATION/TRAINING PROGRAM

## 2022-07-08 PROCEDURE — 99285 EMERGENCY DEPT VISIT HI MDM: CPT

## 2022-07-08 RX ORDER — ALBUTEROL SULFATE 2.5 MG/3ML
2.5 SOLUTION RESPIRATORY (INHALATION) DAILY PRN
Status: DISCONTINUED | OUTPATIENT
Start: 2022-07-08 | End: 2022-07-09 | Stop reason: HOSPADM

## 2022-07-08 RX ORDER — ALBUTEROL SULFATE 90 UG/1
2 AEROSOL, METERED RESPIRATORY (INHALATION) EVERY 4 HOURS PRN
Status: DISCONTINUED | OUTPATIENT
Start: 2022-07-08 | End: 2022-07-09 | Stop reason: HOSPADM

## 2022-07-08 RX ORDER — SODIUM CHLORIDE 0.9 % (FLUSH) 0.9 %
5-40 SYRINGE (ML) INJECTION EVERY 12 HOURS SCHEDULED
Status: DISCONTINUED | OUTPATIENT
Start: 2022-07-08 | End: 2022-07-09 | Stop reason: HOSPADM

## 2022-07-08 RX ORDER — ATORVASTATIN CALCIUM 80 MG/1
80 TABLET, FILM COATED ORAL DAILY
Status: DISCONTINUED | OUTPATIENT
Start: 2022-07-08 | End: 2022-07-09 | Stop reason: HOSPADM

## 2022-07-08 RX ORDER — LEVOTHYROXINE SODIUM 0.05 MG/1
50 TABLET ORAL DAILY
Status: DISCONTINUED | OUTPATIENT
Start: 2022-07-08 | End: 2022-07-08

## 2022-07-08 RX ORDER — SODIUM CHLORIDE 9 MG/ML
INJECTION, SOLUTION INTRAVENOUS PRN
Status: DISCONTINUED | OUTPATIENT
Start: 2022-07-08 | End: 2022-07-09 | Stop reason: HOSPADM

## 2022-07-08 RX ORDER — CLOPIDOGREL BISULFATE 75 MG/1
75 TABLET ORAL DAILY
Status: DISCONTINUED | OUTPATIENT
Start: 2022-07-09 | End: 2022-07-09 | Stop reason: HOSPADM

## 2022-07-08 RX ORDER — ASPIRIN 81 MG/1
81 TABLET, CHEWABLE ORAL DAILY
Status: DISCONTINUED | OUTPATIENT
Start: 2022-07-08 | End: 2022-07-08

## 2022-07-08 RX ORDER — NITROGLYCERIN 0.4 MG/1
0.4 TABLET SUBLINGUAL ONCE
Status: COMPLETED | OUTPATIENT
Start: 2022-07-08 | End: 2022-07-08

## 2022-07-08 RX ORDER — ONDANSETRON 2 MG/ML
4 INJECTION INTRAMUSCULAR; INTRAVENOUS EVERY 6 HOURS PRN
Status: DISCONTINUED | OUTPATIENT
Start: 2022-07-08 | End: 2022-07-09 | Stop reason: HOSPADM

## 2022-07-08 RX ORDER — ACETAMINOPHEN 650 MG/1
650 SUPPOSITORY RECTAL EVERY 6 HOURS PRN
Status: DISCONTINUED | OUTPATIENT
Start: 2022-07-08 | End: 2022-07-09 | Stop reason: HOSPADM

## 2022-07-08 RX ORDER — INSULIN LISPRO 100 [IU]/ML
0-6 INJECTION, SOLUTION INTRAVENOUS; SUBCUTANEOUS NIGHTLY
Status: DISCONTINUED | OUTPATIENT
Start: 2022-07-08 | End: 2022-07-08

## 2022-07-08 RX ORDER — CLOPIDOGREL BISULFATE 75 MG/1
75 TABLET ORAL DAILY
Status: DISCONTINUED | OUTPATIENT
Start: 2022-07-08 | End: 2022-07-08

## 2022-07-08 RX ORDER — INSULIN GLARGINE 100 [IU]/ML
10 INJECTION, SOLUTION SUBCUTANEOUS NIGHTLY
Status: DISCONTINUED | OUTPATIENT
Start: 2022-07-08 | End: 2022-07-08

## 2022-07-08 RX ORDER — ONDANSETRON 4 MG/1
4 TABLET, ORALLY DISINTEGRATING ORAL EVERY 8 HOURS PRN
Status: DISCONTINUED | OUTPATIENT
Start: 2022-07-08 | End: 2022-07-09 | Stop reason: HOSPADM

## 2022-07-08 RX ORDER — FLUTICASONE PROPIONATE 50 MCG
1 SPRAY, SUSPENSION (ML) NASAL DAILY PRN
Status: DISCONTINUED | OUTPATIENT
Start: 2022-07-08 | End: 2022-07-09 | Stop reason: HOSPADM

## 2022-07-08 RX ORDER — LEVOTHYROXINE SODIUM 0.05 MG/1
50 TABLET ORAL DAILY
Status: DISCONTINUED | OUTPATIENT
Start: 2022-07-09 | End: 2022-07-09 | Stop reason: HOSPADM

## 2022-07-08 RX ORDER — POLYETHYLENE GLYCOL 3350 17 G/17G
17 POWDER, FOR SOLUTION ORAL DAILY PRN
Status: DISCONTINUED | OUTPATIENT
Start: 2022-07-08 | End: 2022-07-09 | Stop reason: HOSPADM

## 2022-07-08 RX ORDER — TOPIRAMATE 50 MG/1
50 TABLET, FILM COATED ORAL 2 TIMES DAILY
Status: DISCONTINUED | OUTPATIENT
Start: 2022-07-08 | End: 2022-07-09 | Stop reason: HOSPADM

## 2022-07-08 RX ORDER — ASPIRIN 81 MG/1
81 TABLET, CHEWABLE ORAL DAILY
Status: DISCONTINUED | OUTPATIENT
Start: 2022-07-09 | End: 2022-07-09 | Stop reason: HOSPADM

## 2022-07-08 RX ORDER — ENOXAPARIN SODIUM 100 MG/ML
40 INJECTION SUBCUTANEOUS DAILY
Status: DISCONTINUED | OUTPATIENT
Start: 2022-07-08 | End: 2022-07-09

## 2022-07-08 RX ORDER — BUSPIRONE HYDROCHLORIDE 15 MG/1
15 TABLET ORAL 3 TIMES DAILY
Status: DISCONTINUED | OUTPATIENT
Start: 2022-07-08 | End: 2022-07-09 | Stop reason: HOSPADM

## 2022-07-08 RX ORDER — DEXTROSE MONOHYDRATE 50 MG/ML
100 INJECTION, SOLUTION INTRAVENOUS PRN
Status: DISCONTINUED | OUTPATIENT
Start: 2022-07-08 | End: 2022-07-09 | Stop reason: HOSPADM

## 2022-07-08 RX ORDER — METOPROLOL SUCCINATE 50 MG/1
50 TABLET, EXTENDED RELEASE ORAL DAILY
Status: DISCONTINUED | OUTPATIENT
Start: 2022-07-08 | End: 2022-07-09 | Stop reason: HOSPADM

## 2022-07-08 RX ORDER — HYDROCODONE BITARTRATE AND ACETAMINOPHEN 5; 325 MG/1; MG/1
1 TABLET ORAL EVERY 6 HOURS PRN
Status: DISCONTINUED | OUTPATIENT
Start: 2022-07-08 | End: 2022-07-09 | Stop reason: HOSPADM

## 2022-07-08 RX ORDER — INSULIN LISPRO 100 [IU]/ML
0-12 INJECTION, SOLUTION INTRAVENOUS; SUBCUTANEOUS
Status: DISCONTINUED | OUTPATIENT
Start: 2022-07-08 | End: 2022-07-09 | Stop reason: HOSPADM

## 2022-07-08 RX ORDER — ACETAMINOPHEN 325 MG/1
650 TABLET ORAL EVERY 6 HOURS PRN
Status: DISCONTINUED | OUTPATIENT
Start: 2022-07-08 | End: 2022-07-09 | Stop reason: HOSPADM

## 2022-07-08 RX ORDER — TROSPIUM CHLORIDE 20 MG/1
20 TABLET, FILM COATED ORAL
Status: DISCONTINUED | OUTPATIENT
Start: 2022-07-08 | End: 2022-07-09 | Stop reason: HOSPADM

## 2022-07-08 RX ORDER — SODIUM CHLORIDE 0.9 % (FLUSH) 0.9 %
5-40 SYRINGE (ML) INJECTION PRN
Status: DISCONTINUED | OUTPATIENT
Start: 2022-07-08 | End: 2022-07-09 | Stop reason: HOSPADM

## 2022-07-08 RX ORDER — INSULIN GLARGINE 100 [IU]/ML
15 INJECTION, SOLUTION SUBCUTANEOUS NIGHTLY
Status: DISCONTINUED | OUTPATIENT
Start: 2022-07-08 | End: 2022-07-09 | Stop reason: HOSPADM

## 2022-07-08 RX ADMIN — INSULIN LISPRO 6 UNITS: 100 INJECTION, SOLUTION INTRAVENOUS; SUBCUTANEOUS at 18:29

## 2022-07-08 RX ADMIN — SODIUM CHLORIDE, PRESERVATIVE FREE 10 ML: 5 INJECTION INTRAVENOUS at 22:59

## 2022-07-08 RX ADMIN — BUSPIRONE HYDROCHLORIDE 15 MG: 15 TABLET ORAL at 18:27

## 2022-07-08 RX ADMIN — INSULIN LISPRO 2 UNITS: 100 INJECTION, SOLUTION INTRAVENOUS; SUBCUTANEOUS at 23:30

## 2022-07-08 RX ADMIN — TROSPIUM CHLORIDE 20 MG: 20 TABLET, FILM COATED ORAL at 18:27

## 2022-07-08 RX ADMIN — INSULIN GLARGINE 15 UNITS: 100 INJECTION, SOLUTION SUBCUTANEOUS at 23:30

## 2022-07-08 RX ADMIN — BUSPIRONE HYDROCHLORIDE 15 MG: 15 TABLET ORAL at 20:17

## 2022-07-08 RX ADMIN — SERTRALINE 150 MG: 50 TABLET, FILM COATED ORAL at 18:27

## 2022-07-08 RX ADMIN — NITROGLYCERIN 0.4 MG: 0.4 TABLET SUBLINGUAL at 11:51

## 2022-07-08 RX ADMIN — METOPROLOL SUCCINATE 50 MG: 50 TABLET, FILM COATED, EXTENDED RELEASE ORAL at 18:27

## 2022-07-08 ASSESSMENT — ENCOUNTER SYMPTOMS
CHEST TIGHTNESS: 1
VOMITING: 0
COUGH: 1
SINUS PRESSURE: 0
TROUBLE SWALLOWING: 0
SORE THROAT: 1
CHEST TIGHTNESS: 0
ABDOMINAL PAIN: 0
EYE REDNESS: 0
SHORTNESS OF BREATH: 1
CONSTIPATION: 0
EYE DISCHARGE: 0
SHORTNESS OF BREATH: 1
EYE REDNESS: 0
COUGH: 1
ABDOMINAL PAIN: 0
NAUSEA: 0
BACK PAIN: 0
SINUS PAIN: 0
PHOTOPHOBIA: 0
COUGH: 0
COLOR CHANGE: 0
DIARRHEA: 0
RHINORRHEA: 0
SORE THROAT: 0
COLOR CHANGE: 0
EYE ITCHING: 0

## 2022-07-08 ASSESSMENT — PAIN SCALES - GENERAL
PAINLEVEL_OUTOF10: 8
PAINLEVEL_OUTOF10: 10
PAINLEVEL_OUTOF10: 10

## 2022-07-08 ASSESSMENT — PAIN DESCRIPTION - DESCRIPTORS: DESCRIPTORS: ACHING

## 2022-07-08 ASSESSMENT — PAIN - FUNCTIONAL ASSESSMENT
PAIN_FUNCTIONAL_ASSESSMENT: PREVENTS OR INTERFERES WITH MANY ACTIVE NOT PASSIVE ACTIVITIES
PAIN_FUNCTIONAL_ASSESSMENT: 0-10

## 2022-07-08 ASSESSMENT — PAIN DESCRIPTION - LOCATION: LOCATION: HEAD

## 2022-07-08 ASSESSMENT — PAIN DESCRIPTION - PAIN TYPE: TYPE: CHRONIC PAIN

## 2022-07-08 NOTE — ED PROVIDER NOTES
[facility]  [facility]  Emergency Department Encounter  Emergency Medicine Resident     Pt Name: Elise Ratliff  MRN: [id number]  [place] 1970  Date of evaluation: 22  PCP:  SAVANA Lane CNP    CHIEF COMPLAINT       Chief Complaint   Patient presents with    Chest Pain       HISTORY OFPRESENT ILLNESS  (Location/Symptom, Timing/Onset, Context/Setting, Quality, Duration, Modifying Factors,Severity.)      Elise Ratliff is a 54 y. o.yo female who presents with concerns for chest discomfort lightheadedness and dizziness. Patient was seen yesterday at [facility] for multiple complaints including chest discomfort, cough and illness. Patient ended up testing positive for COVID-19. Patient reportedly had elevated troponins at that time, on chart review her troponin did increase from 18-28 yesterday. Patient was discharged home. She states that she received a call from her primary care provider today stating her that she needed to return to [facility] for admission and cardiac evaluation. Patient is still complaining of chest pressure and elevated blood pressure. Patient states she has been vaccinated for COVID. She admits to history of occlusion of her LAD 6 years ago with subsequent stent placement. She also has history of hypertension as well as Langerhans' cell granulomatosis. She admits to nausea denies any vomiting. PAST MEDICAL / SURGICAL / SOCIAL / FAMILY HISTORY      has a past medical history of Anxiety, Arthritis, CAD (coronary artery disease), Depression, Hyperlipidemia, Kidney stone, Langerhans-cell granulomatosis ([address] Myocardial infarct ([address] Neck pain, and Spinal stenosis. has a past surgical history that includes back surgery;  section; Mastoid surgery; Tonsillectomy; Cardiac catheterization; Lung biopsy (Right); hip surgery (Left); Colonoscopy (N/A, 2021); and Upper gastrointestinal endoscopy (2021).      Social History Socioeconomic History    Marital status:      Spouse name: Not on file    Number of children: Not on file    Years of education: Not on file    Highest education level: Not on file   Occupational History    Not on file   Tobacco Use    Smoking status: Former Smoker     Packs/day: 0.50     Years: 25.00     Pack years: 12.50     Types: Cigarettes    Smokeless tobacco: Never Used    Tobacco comment: 3 packs per week   Vaping Use    Vaping Use: Never used   Substance and Sexual Activity    Alcohol use: Yes     Comment: social    Drug use: Yes     Frequency: 7.0 times per week     Types: Marijuana Valdene Kuhn)    Sexual activity: Yes   Other Topics Concern    Not on file   Social History Narrative    Not on file     Social Determinants of Health     Financial Resource Strain: Low Risk     Difficulty of Paying Living Expenses: Not hard at all   Food Insecurity: No Food Insecurity    Worried About Running Out of Food in the Last Year: Never true    Jim of Food in the Last Year: Never true   Transportation Needs: No Transportation Needs    Lack of Transportation (Medical): No    Lack of Transportation (Non-Medical):  No   Physical Activity: Unknown    Days of Exercise per Week: 0 days    Minutes of Exercise per Session: Not on file   Stress:     Feeling of Stress : Not on file   Social Connections:     Frequency of Communication with Friends and Family: Not on file    Frequency of Social Gatherings with Friends and Family: Not on file    Attends Orthodoxy Services: Not on file    Active Member of Clubs or Organizations: Not on file    Attends Club or Organization Meetings: Not on file    Marital Status: Not on file   Intimate Partner Violence: Not At Risk    Fear of Current or Ex-Partner: No    Emotionally Abused: No    Physically Abused: No    Sexually Abused: No   Housing Stability:     Unable to Pay for Housing in the Last Year: Not on file    Number of [place] in the Last Year: Not on file    Unstable Housing in the Last Year: Not on file       Family History   Problem Relation Age of Onset    Dementia Mother     Depression Mother     High Blood Pressure Mother     Coronary Art Dis Mother     COPD Mother     Diabetes Mother     Heart Attack Father     Diabetes Maternal Grandmother     Alzheimer's Disease Paternal Grandfather         Allergies:  Seasonal and Keflex [cephalexin]    Home Medications:  Prior to Admission medications    Medication Sig Start Date End Date Taking? Authorizing Provider   nirmatrelvir/ritonavir (PAXLOVID) 20 x 150 MG & 10 x 100MG Take 3 tablets (two 150 mg nirmatrelvir and one 100 mg ritonavir tablets) by mouth every 12 hours for 5 days. 7/7/22 7/12/22  Jazmín Menjivar DO   dexamethasone (DECADRON) 6 MG tablet Take 1 tablet by mouth daily (with breakfast) for 10 days 7/7/22 7/17/22  Jazmín Menjivar DO   HYDROcodone-acetaminophen (NORCO) 5-325 MG per tablet Take 1 tablet by mouth every 6 hours as needed for Pain for up to 3 days. Intended supply: 3 days.  Take lowest dose possible to manage pain 7/7/22 7/10/22  Jazmín Menjivar DO   busPIRone (BUSPAR) 15 MG tablet take 1 tablet by mouth twice a day 4/19/22   Historical Provider, MD   Dulaglutide (TRULICITY) 3 CC/2.4JE SOPN Inject 3 mg into the skin once a week 6/30/22   [facility]   omeprazole (PRILOSEC) 40 MG delayed release capsule Take 1 capsule by mouth every morning (before breakfast) 6/30/22   Ronn SinkSAVANA bishop CNP   insulin detemir (LEVEMIR FLEXTOUCH) 100 UNIT/ML injection pen Inject 26 Units into the skin nightly 5/25/22   [facility]   Insulin Pen Needle 31G X 5 MM MISC Use pen needle for giving daily insulin injection 5/25/22   [facility]   TRUE METRIX BLOOD GLUCOSE TEST strip use 1 TEST STRIP to TEST BLOOD SUGAR twice a day and if needed 4/19/22   [facility]   TRUEplus Lancets 30G MISC use 1 LANCET to TEST BLOOD SUGAR twice a day and if needed 4/19/22   SAVANA Tello CNP   albuterol (PROVENTIL) (2.5 MG/3ML) 0.083% nebulizer solution Take 2.5 mg by nebulization daily as needed for Wheezing    Historical Provider, MD   blood glucose monitor kit and supplies Dispense sufficient amount for BID testing frequency plus additional to accommodate PRN testing needs. Dispense all needed supplies to include: monitor, strips, lancing device, lancets, control solutions, alcohol swabs.  10/25/21   SAVANA Tello CNP   tiZANidine (ZANAFLEX) 2 MG tablet take 1 tablet by mouth three times a day if needed 10/19/21   Tati Jin DO   nitroGLYCERIN (NITROSTAT) 0.4 MG SL tablet place 1 tablet under the tongue if needed every 5 minutes for chest pain for 3 doses IF NO RELIEF AFTER THIRD DOSE CALL PRESCRIBER . 10/19/21   Tati Jin,    calcium carbonate (OS-BRANNON) 1250 (500 Ca) MG chewable tablet Take 500-1,000 mg by mouth 3 times daily 10/20/20   Historical Provider, MD   levothyroxine (SYNTHROID) 50 MCG tablet Take 1 tablet by mouth Daily 9/13/21 6/30/22  SAVANA Tello CNP   metoprolol succinate (TOPROL XL) 100 MG extended release tablet take 1 tablet by mouth twice a day  Patient taking differently: 50 mg 2 times daily take 1 tablet by mouth twice a day 9/13/21   SAVANA Tello CNP   sertraline (ZOLOFT) 100 MG tablet Take 1.5 tablets by mouth daily 9/13/21   SAVANA Tello CNP   ranolazine (RANEXA) 500 MG extended release tablet Take 1 tablet by mouth 2 times daily 9/13/21 6/30/22  SAVANA Tello CNP   atorvastatin (LIPITOR) 80 MG tablet Take 1 tablet by mouth daily 9/13/21 6/30/22  SAVANA Tello CNP   clopidogrel (PLAVIX) 75 MG tablet Take 1 tablet by mouth daily 9/13/21 6/30/22  SAVANA Tello CNP   mirabegron (MYRBETRIQ) 50 MG TB24 Take 50 mg by mouth daily 9/13/21   Hue Lesser, APRN - CNP   topiramate (TOPAMAX) 50 MG tablet Take 1 tablet by mouth 2 times daily 9/13/21 6/30/22 SAVANA Connor CNP   albuterol sulfate  (90 Base) MCG/ACT inhaler Inhale 2 puffs into the lungs every 4 hours as needed for Wheezing or Shortness of Breath 9/13/21   SAVANA Connor CNP   fluticasone (FLONASE) 50 MCG/ACT nasal spray 1 spray by Each Nostril route daily as needed for Rhinitis    Historical Provider, MD   Multiple Vitamins-Minerals (THERAPEUTIC MULTIVITAMIN-MINERALS) tablet Take 1 tablet by mouth daily    Historical Provider, MD   ascorbic acid (VITAMIN C) 500 MG tablet Take 500 mg by mouth daily    Historical Provider, MD   polyethylene glycol (GLYCOLAX) 17 g packet Take 17 g by mouth daily as needed  8/31/20   Historical Provider, MD   acetaminophen (TYLENOL) 500 MG tablet Take 2 tablets by mouth every 6 hours as needed for Pain 6/24/20   Alicia Chan DO   Cholecalciferol (VITAMIN D) 50 MCG (2000 UT) TABS tablet Take 2,000 Units by mouth daily  1/23/20   Historical Provider, MD   aspirin 81 MG chewable tablet Take 81 mg by mouth daily 5/31/19 6/30/22  Historical Provider, MD       REVIEW OFSYSTEMS    (2-9 systems for level 4, 10 or more for level 5)      Review of Systems   Constitutional: Positive for fatigue. Negative for chills, diaphoresis and fever. HENT: Negative for congestion, sinus pressure, sinus pain, sore throat and trouble swallowing. Eyes: Negative for photophobia, redness, itching and visual disturbance. Respiratory: Positive for chest tightness and shortness of breath. Negative for cough. Cardiovascular: Positive for chest pain. Negative for palpitations and leg swelling. Gastrointestinal: Negative for abdominal pain, constipation, diarrhea, nausea and vomiting. Genitourinary: Negative for difficulty urinating, dysuria, flank pain and urgency. Musculoskeletal: Negative for back pain, neck pain and neck stiffness. Skin: Negative for color change, pallor and rash. Neurological: Positive for light-headedness and headaches.  Negative for dizziness, tremors, speech difficulty and weakness. PHYSICAL EXAM   (up to 7 for level 4, 8 or more forlevel 5)      INITIAL VITALS:   ED Triage Vitals [07/08/22 1035]   BP Temp Temp Source Heart Rate Resp SpO2 Height Weight   (!) 167/107 98.4 °F (36.9 °C) Oral (!) 101 12 98 % -- --       Physical Exam  Constitutional:       General: She is not in acute distress. Appearance: She is obese. She is not ill-appearing. HENT:      Head: Normocephalic and atraumatic. Right Ear: External ear normal.      Left Ear: External ear normal.      Nose: Nose normal. No rhinorrhea. Eyes:      Extraocular Movements: Extraocular movements intact. Pupils: Pupils are equal, round, and reactive to light. Cardiovascular:      Rate and Rhythm: Regular rhythm. Tachycardia present. Pulses: Normal pulses. Heart sounds: No murmur heard. Pulmonary:      Effort: Pulmonary effort is normal.      Breath sounds: Normal breath sounds. Abdominal:      Palpations: Abdomen is soft. Tenderness: There is no abdominal tenderness. Musculoskeletal:         General: No swelling. Normal range of motion. Cervical back: Normal range of motion and neck supple. Skin:     General: Skin is warm and dry. Neurological:      General: No focal deficit present. Mental Status: She is alert and oriented to person, place, and time. DIFFERENTIAL  DIAGNOSIS     PLAN (LABS / IMAGING / EKG):  Orders Placed This Encounter   Procedures    XR CHEST PORTABLE    Troponin    CBC    Basic Metabolic Panel    Inpatient consult to Internal Medicine    Inpatient consult to Cardiology    EKG 12 Lead       MEDICATIONS ORDERED:  Orders Placed This Encounter   Medications    nitroGLYCERIN (NITROSTAT) SL tablet 0.4 mg       DDX: ACS, atypical chest pain, COVID myocarditis, COVID-19    Initial MDM/Plan: 54 y.o. female who presents with concerns for elevated troponins after being diagnosed with COVID-19.   Patient was tested positive for COVID-19 yesterday, yesterday was first day of symptoms. Was also found to have elevated troponins, she does have significant cardiac history. Patient was sent here by her primary care today for admission and cardiac evaluation. We will plan for repeat cardiac work-up today and likely admission to the hospital.    DIAGNOSTIC RESULTS / Wilmer Hanley / NIKO     LABS:  Labs Reviewed   TROPONIN - Abnormal; Notable for the following components:       Result Value    Troponin, High Sensitivity 22 (*)     All other components within normal limits   TROPONIN - Abnormal; Notable for the following components:    Troponin, High Sensitivity 21 (*)     All other components within normal limits   CBC - Abnormal; Notable for the following components:    MCV 80.2 (*)     RDW 15.4 (*)     Platelets 002 (*)     All other components within normal limits   BASIC METABOLIC PANEL - Abnormal; Notable for the following components:    Glucose 220 (*)     CO2 18 (*)     All other components within normal limits         RADIOLOGY:  XR CHEST PORTABLE    Result Date: 7/8/2022  EXAMINATION: ONE XRAY VIEW OF THE CHEST 7/8/2022 10:51 am COMPARISON: 07/07/2022, 01/01/2022 HISTORY: ORDERING SYSTEM PROVIDED HISTORY: Chest Pain, COVID TECHNOLOGIST PROVIDED HISTORY: Chest Pain, COVID 79-year-old female with chest pain, history of COVID FINDINGS: Portable upright view of the chest. Cardiac monitor leads overlie the chest. Spinal fusion hardware projecting over the cervical spine. No pneumothorax. No free air. Stable cardiomegaly. Cardiac and mediastinal contours remain unchanged. No acute focal airspace consolidation or pleural effusions. Visualized osseous structures remain unchanged. Scarring at the right mid lung zone, stable. 1. Stable cardiomegaly. 2. Stable scarring at the right mid lung zone. No acute focal airspace consolidation.      XR CHEST PORTABLE    Result Date: 7/7/2022  EXAMINATION: ONE XRAY VIEW OF THE CHEST 7/7/2022 9:03 pm COMPARISON: 01/01/2022 HISTORY: Acute shortness of breath. FINDINGS: Patient is mildly rotated. Borderline enlarged cardiomediastinal silhouette. No acute airspace disease, pleural effusion, or pneumothorax. Postoperative changes of the cervical spine. No acute abnormality. CT CHEST PULMONARY EMBOLISM W CONTRAST    Result Date: 7/7/2022  EXAMINATION: CTA OF THE CHEST 7/7/2022 9:34 pm TECHNIQUE: CTA of the chest was performed after the administration of intravenous contrast.  Multiplanar reformatted images are provided for review. MIP images are provided for review. Automated exposure control, iterative reconstruction, and/or weight based adjustment of the mA/kV was utilized to reduce the radiation dose to as low as reasonably achievable. COMPARISON: September 19, 2021 HISTORY: ORDERING SYSTEM PROVIDED HISTORY: History of Langerhans' cell cytosis, COVID-positive TECHNOLOGIST PROVIDED HISTORY: History of Langerhans' cell cytosis, COVID-positive Decision Support Exception - unselect if not a suspected or confirmed emergency medical condition->Emergency Medical Condition (MA) FINDINGS: Pulmonary Arteries: Pulmonary arteries are adequately opacified for evaluation. No evidence of intraluminal filling defect to suggest pulmonary embolism. Main pulmonary artery is normal in caliber. Mediastinum: No evidence of mediastinal lymphadenopathy. The heart and pericardium demonstrate no acute abnormality. There is no acute abnormality of the thoracic aorta. Lungs/pleura: The lungs are without acute process. No focal consolidation or pulmonary edema. No evidence of pleural effusion or pneumothorax. Mild scarring in the right upper lobe. Tiny 2-3 mm pulmonary nodules in the left upper lobe are stable from prior study. Small right upper lobe pulmonary nodule measure up to 2-3 mm, not requiring follow-up per the Fleischner Society recommendations.  Upper Abdomen: Limited images of the upper abdomen are unremarkable. Soft Tissues/Bones: No acute bone or soft tissue abnormality. No evidence of pulmonary embolism or acute pulmonary abnormality. RECOMMENDATIONS: 3 mm right solid pulmonary nodule within the upper lobe. If patient is low risk for malignancy, no routine follow-up imaging is recommended; if patient is high risk for malignancy, a non-contrast Chest CT at 12 months is optional. If performed and the nodule is stable at 12 months, no further follow-up is recommended. These guidelines do not apply to immunocompromised patients and patients with cancer. Follow up in patients with significant comorbidities as clinically warranted. For lung cancer screening, adhere to Lung-RADS guidelines. Reference: Radiology. 2017; 284(1):228-43. EKG      All EKG's are interpreted by the Emergency Department Physicianwho either signs or Co-signs this chart in the absence of a cardiologist.    EMERGENCY DEPARTMENT COURSE:  ED Course as of 07/08/22 1402   Fri Jul 08, 2022   1028 Patient seen and evaluated. [facility] with COVID-19. Was found to have elevated troponins, history of CAD. [CD]   1145 Troponin, High Sensitivity(!): 22 [CD]   1342 Troponin, High Sensitivity(!): 21 [CD]   3436 Patient's troponins are stable but are elevated. Given her history I think she needs to be evaluated by cardiology. Given the fact patient has symptomatic COVID I do not believe she can go to the observation unit. Will discuss with medicine. [CD]   1402 Discussed with internal medicine. They will admit to their service. Placed cardiology consult [CD]      ED Course User Index  [CD] Belkis Zarco DO          PROCEDURES:  None    CONSULTS:  IP CONSULT TO INTERNAL MEDICINE  IP CONSULT TO CARDIOLOGY    CRITICAL CARE:      FINAL IMPRESSION      1. Atypical chest pain    2.  COVID-19          DISPOSITION / PLAN     DISPOSITION Decision To Admit 07/08/2022 01:43:38 PM      PATIENT REFERRED TO:  No follow-up provider specified.     DISCHARGE MEDICATIONS:  New Prescriptions    No medications on file       Quincy Chavez DO  Emergency Medicine Resident    (Please note that portions of this note were completed with a voice recognition program.Efforts were made to edit the dictations but occasionally words are mis-transcribed.)        Quincy Chavez DO  Resident  07/08/22 4232

## 2022-07-08 NOTE — ED NOTES
Pt arrived to ED with c/o of chest pain. Pt states she was seen at [facility] yesterday for same. Pt was found to be covid positive. Pt received a call today from her PCP stating that her trops were elevated. Pt was advised to come in because of her cardiac hx. Pt had MI and stent placement. Pt states she has a hx of blood clots. Pt states she is dizzy, SOB. Pt states she has had fevers at home. Pt was afebrile upon assessment but states she recently took some tylenol. Pt is alert and oriented x4.      Nafisa Stockton RN  07/08/22 1100

## 2022-07-08 NOTE — ED NOTES
Pt given dinner tray and fluids. Pt denies any other needs/wants. Will continue to monitor.      Radha Tran RN  07/08/22 0461

## 2022-07-08 NOTE — ED PROVIDER NOTES
[facility]  Emergency Department Encounter     Pt Name: Hue Fernandez  MRN: [id number]  [place] 1970  Date of evaluation: 22  PCP:  SAVANA Jones CNP    CHIEF COMPLAINT       Chief Complaint   Patient presents with    Dizziness    Shortness of Breath    Fatigue    Sweats    Neck Pain       HISTORY [facility]  (Location/Symptom, Timing/Onset, Context/Setting, Quality, Duration, Modifying Factors,Severity.)      Hue Fernandez is a 54 y.o. female who presents with multiple complaints, lightheaded, short of breath, fatigue, feeling achy all over. 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 She is not on any immunomodulatory therapy. Following with [address] [place] [address] pulmonology. States all of her symptoms started today. Has received 2 COVID vaccine doses. Symptoms started suddenly. Worsening. Generalized. States she is concerned as she gets pneumonia extremely quickly. And has required hospitalization previously. PAST MEDICAL / SURGICAL / SOCIAL / FAMILY HISTORY      has a past medical history of Anxiety, Arthritis, CAD (coronary artery disease), Depression, Hyperlipidemia, Kidney stone, Langerhans-cell granulomatosis ([address] Myocardial infarct ([address] Neck pain, and Spinal stenosis. has a past surgical history that includes back surgery;  section; Mastoid surgery; Tonsillectomy; Cardiac catheterization; Lung biopsy (Right); hip surgery (Left); Colonoscopy (N/A, 2021); and Upper gastrointestinal endoscopy (2021).     Social History     Socioeconomic History    Marital status:      Spouse name: Not on file    Number of children: Not on file    Years of education: Not on file    Highest education level: Not on file   Occupational History    Not on file   Tobacco Use    Smoking status: Former Smoker     Packs/day: 0.50     Years: 25.00     Pack years: 12.50     Types: Cigarettes    Smokeless tobacco: Never Used    Tobacco comment: 3 packs per week   Vaping Use    Vaping Use: Never used   Substance and Sexual Activity    Alcohol use: Yes     Comment: social    Drug use: Yes     Frequency: 7.0 times per week     Types: Marijuana Amrit Casey)    Sexual activity: Yes   Other Topics Concern    Not on file   Social History Narrative    Not on file     Social Determinants of Health     Financial Resource Strain: Low Risk     Difficulty of Paying Living Expenses: Not hard at all   Food Insecurity: No Food Insecurity    Worried About Running Out of Food in the Last Year: Never true    Jim of Food in the Last Year: Never true   Transportation Needs: No Transportation Needs    Lack of Transportation (Medical): No    Lack of Transportation (Non-Medical):  No   Physical Activity: Unknown    Days of Exercise per Week: 0 days    Minutes of Exercise per Session: Not on file   Stress:     Feeling of Stress : Not on file   Social Connections:     Frequency of Communication with Friends and Family: Not on file    Frequency of Social Gatherings with Friends and Family: Not on file    Attends Advent Services: Not on file    Active Member of Clubs or Organizations: Not on file    Attends Club or Organization Meetings: Not on file    Marital Status: Not on file   Intimate Partner Violence: Not At Risk    Fear of Current or Ex-Partner: No    Emotionally Abused: No    Physically Abused: No    Sexually Abused: No   Housing Stability:     Unable to Pay for Housing in the Last Year: Not on file    Number of [place] in the Last Year: Not on file    Unstable Housing in the Last Year: Not on file       Family History   Problem Relation Age of Onset    Dementia Mother     Depression Mother     High Blood Pressure Mother     Coronary Art Dis Mother     COPD Mother     Diabetes Mother     Heart Attack Father     Diabetes Maternal Grandmother     Alzheimer's Disease Paternal Grandfather        Allergies:  Seasonal and Keflex [cephalexin]    Home Medications:  Prior to Admission medications    Medication Sig Start Date End Date Taking? Authorizing Provider   nirmatrelvir/ritonavir (PAXLOVID) 20 x 150 MG & 10 x 100MG Take 3 tablets (two 150 mg nirmatrelvir and one 100 mg ritonavir tablets) by mouth every 12 hours for 5 days. 7/7/22 7/12/22 Yes Hedy Guillen DO   dexamethasone (DECADRON) 6 MG tablet Take 1 tablet by mouth daily (with breakfast) for 10 days 7/7/22 7/17/22 Yes Hedy Guillen DO   HYDROcodone-acetaminophen (NORCO) 5-325 MG per tablet Take 1 tablet by mouth every 6 hours as needed for Pain for up to 3 days. Intended supply: 3 days. Take lowest dose possible to manage pain 7/7/22 7/10/22 Yes Hedy Guillen DO   busPIRone (BUSPAR) 15 MG tablet take 1 tablet by mouth twice a day 4/19/22   Historical Provider, MD   Dulaglutide (TRULICITY) 3 NI/1.1LF SOPN Inject 3 mg into the skin once a week 6/30/22   SAVANA Gallardo CNP   omeprazole (PRILOSEC) 40 MG delayed release capsule Take 1 capsule by mouth every morning (before breakfast) 6/30/22   SAVANA Gallardo CNP   insulin detemir (LEVEMIR FLEXTOUCH) 100 UNIT/ML injection pen Inject 26 Units into the skin nightly 5/25/22   SAVANA Gallardo CNP   Insulin Pen Needle 31G X 5 MM MISC Use pen needle for giving daily insulin injection 5/25/22   SAVANA Gallardo CNP   TRUE METRIX BLOOD GLUCOSE TEST strip use 1 TEST STRIP to TEST BLOOD SUGAR twice a day and if needed 4/19/22   SAVANA Gallardo CNP   TRUEplus Lancets 30G MISC use 1 LANCET to TEST BLOOD SUGAR twice a day and if needed 4/19/22   SAVANA Gallardo CNP   albuterol (PROVENTIL) (2.5 MG/3ML) 0.083% nebulizer solution Take 2.5 mg by nebulization daily as needed for Wheezing    Historical Provider, MD   blood glucose monitor kit and supplies Dispense sufficient amount for BID testing frequency plus additional to accommodate PRN testing needs.  Dispense all needed supplies to include: monitor, strips, lancing device, lancets, control solutions, alcohol swabs.  10/25/21   Lissette Anderson, APRN - CNP   tiZANidine (ZANAFLEX) 2 MG tablet take 1 tablet by mouth three times a day if needed 10/19/21   Jose L Fleming DO   nitroGLYCERIN (NITROSTAT) 0.4 MG SL tablet place 1 tablet under the tongue if needed every 5 minutes for chest pain for 3 doses IF NO RELIEF AFTER THIRD DOSE CALL PRESCRIBER . 10/19/21   Jose L Fleming DO   calcium carbonate (OS-BRANNON) 1250 (500 Ca) MG chewable tablet Take 500-1,000 mg by mouth 3 times daily 10/20/20   Historical Provider, MD   levothyroxine (SYNTHROID) 50 MCG tablet Take 1 tablet by mouth Daily 9/13/21 6/30/22  Lissette Render, APRN - CNP   metoprolol succinate (TOPROL XL) 100 MG extended release tablet take 1 tablet by mouth twice a day  Patient taking differently: 50 mg 2 times daily take 1 tablet by mouth twice a day 9/13/21   Lissette Render, APRN - CNP   sertraline (ZOLOFT) 100 MG tablet Take 1.5 tablets by mouth daily 9/13/21   Lissette Render, APRN - CNP   ranolazine (RANEXA) 500 MG extended release tablet Take 1 tablet by mouth 2 times daily 9/13/21 6/30/22  Lissette Render, APRN - CNP   atorvastatin (LIPITOR) 80 MG tablet Take 1 tablet by mouth daily 9/13/21 6/30/22  Lissette Render, APRN - CNP   clopidogrel (PLAVIX) 75 MG tablet Take 1 tablet by mouth daily 9/13/21 6/30/22  Lissette Render, APRN - CNP   mirabegron (MYRBETRIQ) 50 MG TB24 Take 50 mg by mouth daily 9/13/21   Lissette Render, APRN - CNP   topiramate (TOPAMAX) 50 MG tablet Take 1 tablet by mouth 2 times daily 9/13/21 6/30/22  Lissette Render, APRN - CNP   albuterol sulfate  (90 Base) MCG/ACT inhaler Inhale 2 puffs into the lungs every 4 hours as needed for Wheezing or Shortness of Breath 9/13/21   Lissette Render, APRN - CNP   fluticasone (FLONASE) 50 MCG/ACT nasal spray 1 spray by Each Nostril route daily as needed for Rhinitis    Historical Provider, MD   Multiple Vitamins-Minerals (THERAPEUTIC MULTIVITAMIN-MINERALS) tablet Take 1 tablet by mouth daily    Historical Provider, MD   ascorbic acid (VITAMIN C) 500 MG tablet Take 500 mg by mouth daily    Historical Provider, MD   polyethylene glycol (GLYCOLAX) 17 g packet Take 17 g by mouth daily as needed  8/31/20   Historical Provider, MD   acetaminophen (TYLENOL) 500 MG tablet Take 2 tablets by mouth every 6 hours as needed for Pain 6/24/20   Alicia Chan DO   Cholecalciferol (VITAMIN D) 50 MCG (2000 UT) TABS tablet Take 2,000 Units by mouth daily  1/23/20   Historical Provider, MD   aspirin 81 MG chewable tablet Take 81 mg by mouth daily 5/31/19 6/30/22  Historical Provider, MD       REVIEW OF SYSTEMS    (2-9 systems for level 4, 10 or more for level 5)      Review of Systems   Constitutional: Positive for chills, fatigue and fever. Eyes: Negative for discharge and redness. Respiratory: Positive for cough and shortness of breath. Cardiovascular: Negative for chest pain. Gastrointestinal: Negative for abdominal pain. Genitourinary: Negative for flank pain. Musculoskeletal: Positive for myalgias. Skin: Negative for color change and rash. Allergic/Immunologic: Negative for environmental allergies. Neurological: Positive for dizziness. Psychiatric/Behavioral: Negative for agitation and confusion. PHYSICAL EXAM   (up to 7 for level 4, 8 or more for level 5)     INITIAL VITALS:    height is 5' 3\" (1.6 m) and weight is 205 lb (93 kg). Her oral temperature is 99.7 °F (37.6 °C). Her blood pressure is 133/88 and her pulse is 109 (abnormal). Her respiration is 19 and oxygen saturation is 97%. Physical Exam  Vitals and nursing note reviewed. Constitutional:       General: She is not in acute distress. Appearance: She is well-developed. She is ill-appearing. She is not toxic-appearing. HENT:      Head: Normocephalic and atraumatic.       Nose: Nose normal.      Mouth/Throat:      Mouth: Mucous membranes are moist.   Eyes: General: No scleral icterus. Conjunctiva/sclera: Conjunctivae normal.      Pupils: Pupils are equal, round, and reactive to light. Neck:      Trachea: No tracheal deviation. Cardiovascular:      Rate and Rhythm: Regular rhythm. Tachycardia present. Heart sounds: Normal heart sounds. No murmur heard. No friction rub. No gallop. Pulmonary:      Effort: Pulmonary effort is normal. No respiratory distress. Breath sounds: Normal breath sounds. No wheezing or rales. Abdominal:      General: There is no distension. Palpations: Abdomen is soft. There is no mass. Tenderness: There is no abdominal tenderness. There is no guarding. Hernia: No hernia is present. Musculoskeletal:         General: Normal range of motion. Cervical back: Neck supple. Skin:     General: Skin is warm and dry. Findings: No erythema or rash. Neurological:      Mental Status: She is alert and oriented to person, place, and time.    Psychiatric:         Behavior: Behavior normal.         DIFFERENTIAL  DIAGNOSIS     PLAN (LABS / IMAGING / EKG):  Orders Placed This Encounter   Procedures    COVID-19, Rapid    XR CHEST PORTABLE    CT CHEST PULMONARY EMBOLISM W CONTRAST    Basic Metabolic Panel    Brain Natriuretic Peptide    CBC with Auto Differential    Troponin    TSH w/reflex to FT4    Magnesium    Troponin    Inpatient consult to Hospitalist    Inpatient consult to Pulmonology    EKG 12 Lead       MEDICATIONS ORDERED:  Orders Placed This Encounter   Medications    0.9 % sodium chloride bolus    dexamethasone (PF) (DECADRON) injection 6 mg    DISCONTD: 0.9 % sodium chloride bolus    DISCONTD: sodium chloride flush 0.9 % injection 10 mL    iopamidol (ISOVUE-370) 76 % injection 75 mL    ondansetron (ZOFRAN) injection 4 mg    ketorolac (TORADOL) injection 15 mg    nirmatrelvir/ritonavir (PAXLOVID) 20 x 150 MG & 10 x 100MG     Sig: Take 3 tablets (two 150 mg nirmatrelvir and one 100 mg ritonavir tablets) by mouth every 12 hours for 5 days. Dispense:  30 tablet     Refill:  0     Order Specific Question:   Does this patient qualify for COVID-19 antIviral therapy based on criteria for treatment? Answer: Yes    dexamethasone (DECADRON) 6 MG tablet     Sig: Take 1 tablet by mouth daily (with breakfast) for 10 days     Dispense:  10 tablet     Refill:  0    HYDROcodone-acetaminophen (NORCO) 5-325 MG per tablet     Sig: Take 1 tablet by mouth every 6 hours as needed for Pain for up to 3 days. Intended supply: 3 days. Take lowest dose possible to manage pain     Dispense:  8 tablet     Refill:  0       DDX: COVID versus pneumonia versus viral URI versus PE    Initial MDM/Plan: 54 y.o. female who presents with multiple generalized symptoms, suspected viral illness however given patient high risk will get CT imaging rule out PE. Labs and fluids. Reassess. Possible admission.     DIAGNOSTIC RESULTS / EMERGENCY DEPARTMENT COURSE / MDM     LABS:  Labs Reviewed   COVID-19, RAPID - Abnormal; Notable for the following components:       Result Value    SARS-CoV-2, Rapid DETECTED (*)     All other components within normal limits   BASIC METABOLIC PANEL - Abnormal; Notable for the following components:    Glucose 208 (*)     All other components within normal limits   CBC WITH AUTO DIFFERENTIAL - Abnormal; Notable for the following components:    RDW 15.4 (*)     Platelets 197 (*)     Seg Neutrophils 72 (*)     Lymphocytes 14 (*)     All other components within normal limits   TROPONIN - Abnormal; Notable for the following components:    Troponin, High Sensitivity 18 (*)     All other components within normal limits   TROPONIN - Abnormal; Notable for the following components:    Troponin, High Sensitivity 28 (*)     All other components within normal limits   BRAIN NATRIURETIC PEPTIDE   TSH WITH REFLEX   MAGNESIUM         RADIOLOGY:  XR CHEST PORTABLE    Result Date: 7/7/2022  EXAMINATION: ONE XRAY VIEW OF THE CHEST 7/7/2022 9:03 pm COMPARISON: 01/01/2022 HISTORY: Acute shortness of breath. FINDINGS: Patient is mildly rotated. Borderline enlarged cardiomediastinal silhouette. No acute airspace disease, pleural effusion, or pneumothorax. Postoperative changes of the cervical spine. No acute abnormality. CT CHEST PULMONARY EMBOLISM W CONTRAST    Result Date: 7/7/2022  EXAMINATION: CTA OF THE CHEST 7/7/2022 9:34 pm TECHNIQUE: CTA of the chest was performed after the administration of intravenous contrast.  Multiplanar reformatted images are provided for review. MIP images are provided for review. Automated exposure control, iterative reconstruction, and/or weight based adjustment of the mA/kV was utilized to reduce the radiation dose to as low as reasonably achievable. COMPARISON: September 19, 2021 HISTORY: ORDERING SYSTEM PROVIDED HISTORY: History of Langerhans' cell cytosis, COVID-positive TECHNOLOGIST PROVIDED HISTORY: History of Langerhans' cell cytosis, COVID-positive Decision Support Exception - unselect if not a suspected or confirmed emergency medical condition->Emergency Medical Condition (MA) FINDINGS: Pulmonary Arteries: Pulmonary arteries are adequately opacified for evaluation. No evidence of intraluminal filling defect to suggest pulmonary embolism. Main pulmonary artery is normal in caliber. Mediastinum: No evidence of mediastinal lymphadenopathy. The heart and pericardium demonstrate no acute abnormality. There is no acute abnormality of the thoracic aorta. Lungs/pleura: The lungs are without acute process. No focal consolidation or pulmonary edema. No evidence of pleural effusion or pneumothorax. Mild scarring in the right upper lobe. Tiny 2-3 mm pulmonary nodules in the left upper lobe are stable from prior study. Small right upper lobe pulmonary nodule measure up to 2-3 mm, not requiring follow-up per the Fleischner Society recommendations.  Upper Abdomen: Limited images of the upper abdomen are unremarkable. Soft Tissues/Bones: No acute bone or soft tissue abnormality. No evidence of pulmonary embolism or acute pulmonary abnormality. RECOMMENDATIONS: 3 mm right solid pulmonary nodule within the upper lobe. If patient is low risk for malignancy, no routine follow-up imaging is recommended; if patient is high risk for malignancy, a non-contrast Chest CT at 12 months is optional. If performed and the nodule is stable at 12 months, no further follow-up is recommended. These guidelines do not apply to immunocompromised patients and patients with cancer. Follow up in patients with significant comorbidities as clinically warranted. For lung cancer screening, adhere to Lung-RADS guidelines. Reference: Radiology. 2017; 284(1):228-43. EKG  Rhythm: Sinus rhythm, but tachycardic  Rate: Tachycardic  Axis: normal  Conduction: normal  ST Segments: no acute change  T Waves: no acute change  Q Waves: no acute change    Clinical Impression: Sinus tachycardia    All EKG's are interpreted by the Emergency Department Physician who either signs or Co-signs this chart in the absence of a cardiologist.    EMERGENCY DEPARTMENT COURSE:  ED Course as of 07/08/22 0208   Thu Jul 07, 2022 2259 Talk to Dr. Tahir Adamson wants Paxlovid and dc will write for this, outpatient pulm follow-up, Decadron [MS]      ED Course User Index  [MS] Lb Antonio DO     Patient did test positive for COVID. Suspected etiology of symptoms. Mildly elevated troponins however patient with marked sinus tachycardia upon arrival.  Received fluid bolus as well as Toradol and feeling much improved continuing to have generalized malaise and fatigue. Has had normal pulse ox during stay in emergency department. Has been ambulatory.   Given patient high risk discussed with pulmonologist on-call and given patient not requiring any oxygen will discharge with Paxlovid as well as Decadron burst.  Encouraged her to follow-up with her pulmonologist at [address] [place] [address] as well as primary care doctor. Given pulse ox for home monitoring. Voiced understanding of plan. · Based on the low acuity of concerning symptoms and improvement of symptoms, patient will be discharged with follow up and prescription information listed in the Disposition section. · Patient states they will follow-up with primary care physician and/or return to the emergency department should they experience a change or worsening of symptoms. · Patient will be discharged with resources: summary of visit, instructions, follow-up information, prescriptions if necessary. · Patient/ family instructed to read discharge paperwork. All of their questions and concerns were addressed. · Suspicion for any acute life-threatening processes is low. Patient voices understanding of plan. PROCEDURES:  None    CONSULTS:  IP CONSULT TO HOSPITALIST  IP CONSULT TO PULMONOLOGY    CRITICAL CARE:  0    FINAL IMPRESSION      1. COVID-19          DISPOSITION / PLAN     DISPOSITION Decision To Discharge 07/07/2022 11:02:36 PM    Discharge    PATIENTREFERRED TO:  No follow-up provider specified. DISCHARGE MEDICATIONS:  Discharge Medication List as of 7/7/2022 11:06 PM      START taking these medications    Details   nirmatrelvir/ritonavir (PAXLOVID) 20 x 150 MG & 10 x 100MG Take 3 tablets (two 150 mg nirmatrelvir and one 100 mg ritonavir tablets) by mouth every 12 hours for 5 days. , Disp-30 tablet, R-0Print      dexamethasone (DECADRON) 6 MG tablet Take 1 tablet by mouth daily (with breakfast) for 10 days, Disp-10 tablet, R-0Print             Denton Pal DO  EmergencyMedicine Attending    (Please note that portions of this note were completed with a voice recognition program.  Efforts were made to edit the dictations but occasionally words are mis-transcribed.)       Denton Pal DO  07/08/22 [id number]

## 2022-07-08 NOTE — ED NOTES
ED to inpatient nurses report     Chief Complaint   Patient presents with    Dizziness    Shortness of Breath    Fatigue    Sweats    Neck Pain      Present to ED from home c/o bodyaches, colds & sweats along w/ nausea. Pt reports that she woke up this morning approx. 0400 feeling unwell. Pt reports that it hurts the most in her neck &in her groin. Pt reports that she has hx of lung cancer & is a bit SOB. Pt denies being around anyone that has been sick. Pt denies VD at this time. LOC: alert and orientated to name, place, date  Vital signs   Vitals:    07/07/22 2048 07/07/22 2113 07/07/22 2136 07/07/22 2200   BP:   (!) 143/90 (!) 125/107   Pulse:  (!) 126 (!) 116 (!) 112   Resp: 18 17 15 18   Temp:       TempSrc:       SpO2:  96% 97% 98%   Weight:       Height:          Oxygen Baseline RA    Current needs required N/A   LDAs:   Peripheral IV 07/07/22 Right Antecubital (Active)   Site Assessment Clean, dry & intact 07/07/22 2103   Line Status Blood return noted;Normal saline locked 07/07/22 2103     Mobility: Independent  Fall Risk:  low  Pending ED orders: n/a  Present condition: stable  Code Status: full  Consults: IP CONSULT TO HOSPITALIST  []  Hospitalist  Completed  [] yes [] no Who:   []  Medicine  Completed  [] yes [] No Who:   []  Cardiology  Completed  [] yes [] No Who:   []  GI   Completed  [] yes [] No Who:   []  Neurology  Completed  [] yes [] No Who:   []  Nephrology Completed  [] yes [] No Who:    []  Vascular  Completed  [] yes [] No Who:   []  Ortho  Completed  [] yes [] No Who:     []  Surgery  Completed  [] yes [] No Who:    []  Urology  Completed  [] yes [] No Who:    []  CT Surgery Completed  [] yes [] No Who:   []  Podiatry  Completed  [] yes [] No Who:    []  Other    Completed  [] yes [] No Who:  Interventions: IV insertion, labs, Covid.    Important Events: n/a       Electronically signed by Nicolas Fitzgerald RN on 7/7/2022 at 10:34 PM       Nicolas Fitzgerald RN  07/07/22 [id number]

## 2022-07-08 NOTE — H&P
[address]     Department of Internal Medicine - Staff Internal Medicine Teaching Service          ADMISSION NOTE/HISTORY AND PHYSICAL EXAMINATION   Date: 7/8/2022  Patient Name: Vadim Lazar  Date of admission: 7/8/2022 10:13 AM  YOB: 1970  PCP: SAVANA Adorno CNP  History Obtained From:  Patient    CHIEF COMPLAINT     Chief complaint: Shortness of breath, chest pain     HISTORY OF PRESENTING ILLNESS     The patient is a pleasant 54 y.o. female presents with a chief complaint of Shortness of breath, chest pain and dizziness to the ED this morning. Patient has a backgrounf history of CAD (coronary artery disease)- LAD occlusion with stent placemnt 6 yrs ago, Depression, Hyperlipidemia, Kidney stone, L[facility]hans-cell granulomatosis ([facility] Myocardial infarct [facility]), Neck pain, and Spinal stenosis     The patient started experiencing chest pain, difficulty breathing and slight cough yesterday so she went to [facility] emergency department. There she had her bloods drawn and tested for COVID which came back positive. She was sent home with symptomatic treatment. She reports coming into contact with someone with cough last week who tested positive for COVID earlier this week. Her primary care physician contacted her this morning and informed her that her troponin levels were elevated and she needs to go back to the ED. Currently she is complaining of generalized body aches, throat discomfort and chest pain. The chest pain is dull central with no radiation and decreased in intensity from time of arrival to ED after being given nitroglycerin. She is tachycardic (101) with high BP of 167/101, saturating to 97% on room air and not in acute distress. Her EKG done today shows no acute changes. The patient also reports of pain right flank along with pain right lower back. She says she had kidneys stones in past. Denies any urinary symptoms at present.      The patient has a history of Langerhans cell granulomatosis diagnosed 2 years ago. She follows up with her pulmonologist in [facility]. She reports using steroids for shorter durations on multiple occassions over the last 2 years. Review of Systems:  Review of Systems   Constitutional: Positive for appetite change and fatigue. Negative for diaphoresis and fever. HENT: Positive for sore throat. Negative for congestion and rhinorrhea. Eyes: Negative for photophobia and visual disturbance. Respiratory: Positive for cough and shortness of breath. Negative for chest tightness. Cardiovascular: Positive for chest pain. Negative for palpitations and leg swelling. Gastrointestinal: Negative for abdominal pain, constipation, diarrhea, nausea and vomiting. Genitourinary: Positive for flank pain. Negative for dysuria and frequency. Musculoskeletal: Positive for myalgias. Negative for arthralgias. Skin: Negative for color change and pallor. Neurological: Negative for syncope and headaches. Psychiatric/Behavioral: Negative for confusion. The patient is not nervous/anxious.         PAST MEDICAL HISTORY     Past Medical History:   Diagnosis Date    Anxiety     Arthritis     CAD (coronary artery disease)     Depression     Hyperlipidemia     Kidney stone     Langerhans-cell granulomatosis ([facility]     Myocardial infarct ([facility] 2016    Neck pain     Spinal stenosis 2017       PAST SURGICAL HISTORY     Past Surgical History:   Procedure Laterality Date    BACK SURGERY      L5-S1, C5-7    CARDIAC CATHETERIZATION       SECTION      COLONOSCOPY N/A 2021    COLONOSCOPY WITH BIOPSY performed by [facility]ha Dodson [facility] at [facility] Left     LUNG BIOPSY Right     MASTOID SURGERY      TONSILLECTOMY      UPPER GASTROINTESTINAL ENDOSCOPY  2021    EGD BIOPSY performed by Marsha Dodson MD at [address] [place] [address] and Keflex [cephalexin]    MEDICATIONS PRIOR TO ADMISSION Prior to Admission medications    Medication Sig Start Date End Date Taking? Authorizing Provider   nirmatrelvir/ritonavir (PAXLOVID) 20 x 150 MG & 10 x 100MG Take 3 tablets (two 150 mg nirmatrelvir and one 100 mg ritonavir tablets) by mouth every 12 hours for 5 days. 7/7/22 7/12/22  Rere Hamilton DO   dexamethasone (DECADRON) 6 MG tablet Take 1 tablet by mouth daily (with breakfast) for 10 days 7/7/22 7/17/22  Rere Hamilton DO   HYDROcodone-acetaminophen (NORCO) 5-325 MG per tablet Take 1 tablet by mouth every 6 hours as needed for Pain for up to 3 days. Intended supply: 3 days. Take lowest dose possible to manage pain 7/7/22 7/10/22  Rere Hamilton DO   busPIRone (BUSPAR) 15 MG tablet take 1 tablet by mouth twice a day 4/19/22   Historical Provider, MD   Dulaglutide (TRULICITY) 3 PL/6.5MQ SOPN Inject 3 mg into the skin once a week 6/30/22   SAVANA Correa CNP   omeprazole (PRILOSEC) 40 MG delayed release capsule Take 1 capsule by mouth every morning (before breakfast) 6/30/22   SAVANA Correa CNP   insulin detemir (LEVEMIR FLEXTOUCH) 100 UNIT/ML injection pen Inject 26 Units into the skin nightly 5/25/22   SAVANA Correa CNP   Insulin Pen Needle 31G X 5 MM MISC Use pen needle for giving daily insulin injection 5/25/22   SAVANA Correa CNP   TRUE METRIX BLOOD GLUCOSE TEST strip use 1 TEST STRIP to TEST BLOOD SUGAR twice a day and if needed 4/19/22   SAVANA Correa CNP   TRUEplus Lancets 30G MISC use 1 LANCET to TEST BLOOD SUGAR twice a day and if needed 4/19/22   SAVANA Correa CNP   albuterol (PROVENTIL) (2.5 MG/3ML) 0.083% nebulizer solution Take 2.5 mg by nebulization daily as needed for Wheezing    Historical Provider, MD   blood glucose monitor kit and supplies Dispense sufficient amount for BID testing frequency plus additional to accommodate PRN testing needs.  Dispense all needed supplies to include: monitor, strips, lancing device, lancets, control solutions, alcohol swabs.  10/25/21   Diamond Decbi, APRN - CNP   tiZANidine (ZANAFLEX) 2 MG tablet take 1 tablet by mouth three times a day if needed 10/19/21   Rogelio Ojeda DO   nitroGLYCERIN (NITROSTAT) 0.4 MG SL tablet place 1 tablet under the tongue if needed every 5 minutes for chest pain for 3 doses IF NO RELIEF AFTER THIRD DOSE CALL PRESCRIBER . 10/19/21   Rogelio Ojeda DO   calcium carbonate (OS-BRANNON) 1250 (500 Ca) MG chewable tablet Take 500-1,000 mg by mouth 3 times daily 10/20/20   Historical Provider, MD   levothyroxine (SYNTHROID) 50 MCG tablet Take 1 tablet by mouth Daily 9/13/21 6/30/22  Diamond Decbi, SAVANA - CNP   metoprolol succinate (TOPROL XL) 100 MG extended release tablet take 1 tablet by mouth twice a day  Patient taking differently: 50 mg 2 times daily take 1 tablet by mouth twice a day 9/13/21   Diamond Decbi, APRN - CNP   sertraline (ZOLOFT) 100 MG tablet Take 1.5 tablets by mouth daily 9/13/21   [facility], APRN - CNP   ranolazine (RANEXA) 500 MG extended release tablet Take 1 tablet by mouth 2 times daily 9/13/21 6/30/22  [facility], APRN - CNP   atorvastatin (LIPITOR) 80 MG tablet Take 1 tablet by mouth daily 9/13/21 6/30/22  [facility], APRN - CNP   clopidogrel (PLAVIX) 75 MG tablet Take 1 tablet by mouth daily 9/13/21 6/30/22  Diamond Decbi, APRN - CNP   mirabegron (MYRBETRIQ) 50 MG TB24 Take 50 mg by mouth daily 9/13/21   Diamond Decbi, APRN - CNP   topiramate (TOPAMAX) 50 MG tablet Take 1 tablet by mouth 2 times daily 9/13/21 6/30/22  Diamond Decbi, APRN - CNP   albuterol sulfate  (90 Base) MCG/ACT inhaler Inhale 2 puffs into the lungs every 4 hours as needed for Wheezing or Shortness of Breath 9/13/21   Diamond Decbi, APRN - CNP   fluticasone (FLONASE) 50 MCG/ACT nasal spray 1 spray by Each Nostril route daily as needed for Rhinitis    Historical Provider, MD   Multiple Vitamins-Minerals (THERAPEUTIC MULTIVITAMIN-MINERALS) tablet Take 1 tablet by mouth daily Historical Provider, MD   ascorbic acid (VITAMIN C) 500 MG tablet Take 500 mg by mouth daily    Historical Provider, MD   polyethylene glycol (GLYCOLAX) 17 g packet Take 17 g by mouth daily as needed  20   Historical Provider, MD   acetaminophen (TYLENOL) 500 MG tablet Take 2 tablets by mouth every 6 hours as needed for Pain 20   Alicia Chan DO   Cholecalciferol (VITAMIN D) 50 MCG (2000 UT) TABS tablet Take 2,000 Units by mouth daily  20   Historical Provider, MD   aspirin 81 MG chewable tablet Take 81 mg by mouth daily 19   Historical Provider, MD       SOCIAL HISTORY     Tobacco: former smoker  Alcohol: socially  Illicits: Marijuana  Occupation: homemaker    FAMILY HISTORY     Family History   Problem Relation Age of Onset    Dementia Mother     Depression Mother     High Blood Pressure Mother     Coronary Art Dis Mother     COPD Mother     Diabetes Mother     Heart Attack Father     Diabetes Maternal Grandmother     Alzheimer's Disease Paternal Grandfather        PHYSICAL EXAM     Vitals: BP (!) 146/95   Pulse 88   Temp 98.2 °F (36.8 °C) (Oral)   Resp 14   Ht 5' 3\" (1.6 m)   Wt 206 lb 12.7 oz (93.8 kg)   LMP 2022   SpO2 97%   BMI 36.63 kg/m²   Tmax: Temp (24hrs), Av.3 °F (36.8 °C), Min:98.2 °F (36.8 °C), Max:98.4 °F (36.9 °C)    Last Body weight:   Wt Readings from Last 3 Encounters:   22 206 lb 12.7 oz (93.8 kg)   22 205 lb (93 kg)   22 215 lb (97.5 kg)     Body Mass Index : Body mass index is 36.63 kg/m². PHYSICAL EXAMINATION:  Constitutional: This is a well developed, well nourished, 35-39.9 - Obesity Grade II 54y.o. year old female who is alert, oriented, cooperative and in no apparent distress. Head:normocephalic and atraumatic. EENT:  PERRLA. No conjunctival injections. Septum was midline, mucosa was without erythema, exudates or cobblestoning. No thrush was noted. Neck: Supple without thyromegaly. No elevated JVP. Trachea was midline. Respiratory: Chest was symmetrical without dullness to percussion. Breath sounds bilaterally were clear to auscultation. There were no wheezes, rhonchi or rales. There is no intercostal retraction or use of accessory muscles. Cardiovascular: Regular without murmur, clicks, gallops or rubs. Abdomen: Slightly rounded and soft without organomegaly. No rebound, rigidity or guarding was appreciated. Slightly tender in left flank  Lymphatic: No lymphadenopathy. Musculoskeletal: Normal curvature of the spine. No gross muscle weakness. Extremities:  No lower extremity edema, ulcerations, tenderness, varicosities or erythema. Muscle size, tone and strength are normal.  No involuntary movements are noted. Skin:  Warm and dry. Good color, turgor and pigmentation. No lesions or scars.   No cyanosis or clubbing  Neurological/Psychiatric: The patient's general behavior, level of consciousness, thought content and emotional status is normal.          INVESTIGATIONS     Laboratory Testing:     Recent Results (from the past 24 hour(s))   Troponin    Collection Time: 07/08/22 10:45 AM   Result Value Ref Range    Troponin, High Sensitivity 22 (H) 0 - 14 ng/L   CBC    Collection Time: 07/08/22 10:45 AM   Result Value Ref Range    WBC 7.6 3.5 - 11.3 k/uL    RBC 4.59 3.95 - 5.11 m/uL    Hemoglobin 12.1 11.9 - 15.1 g/dL    Hematocrit 36.8 36.3 - 47.1 %    MCV 80.2 (L) 82.6 - 102.9 fL    MCH 26.4 25.2 - 33.5 pg    MCHC 32.9 28.4 - 34.8 g/dL    RDW 15.4 (H) 11.8 - 14.4 %    Platelets 570 (H) 108 - 453 k/uL    MPV 9.1 8.1 - 13.5 fL    NRBC Automated 0.0 0.0 per 100 WBC   Basic Metabolic Panel    Collection Time: 07/08/22 10:45 AM   Result Value Ref Range    Glucose 220 (H) 70 - 99 mg/dL    BUN 11 6 - 20 mg/dL    CREATININE 0.56 0.50 - 0.90 mg/dL    Calcium 8.9 8.6 - 10.4 mg/dL    Sodium 135 135 - 144 mmol/L    Potassium 4.4 3.7 - 5.3 mmol/L    Chloride 105 98 - 107 mmol/L    CO2 18 (L) 20 - 31 mmol/L Anion Gap 12 9 - 17 mmol/L    GFR Non-African American >60 >60 mL/min    GFR African American >60 >60 mL/min    GFR Comment         Troponin    Collection Time: 07/08/22 12:34 PM   Result Value Ref Range    Troponin, High Sensitivity 21 (H) 0 - 14 ng/L   C-Reactive Protein    Collection Time: 07/08/22 12:34 PM   Result Value Ref Range    CRP 7.0 (H) 0.0 - 5.0 mg/L   POC Glucose Fingerstick    Collection Time: 07/08/22  5:58 PM   Result Value Ref Range    POC Glucose 259 (H) 65 - 105 mg/dL   Culture, Blood 1    Collection Time: 07/08/22  8:19 PM    Specimen: Blood   Result Value Ref Range    Specimen Description . BLOOD     Special Requests  L WRIST 10 ML     Culture NO GROWTH <24 HRS    Culture, Blood 1    Collection Time: 07/08/22  8:20 PM    Specimen: Blood   Result Value Ref Range    Specimen Description . BLOOD     Special Requests  R FOREARM 10 ML     Culture NO GROWTH <24 HRS    Procalcitonin    Collection Time: 07/08/22  8:21 PM   Result Value Ref Range    Procalcitonin 0.06 <0.09 ng/mL   Urinalysis with Reflex to Culture    Collection Time: 07/08/22  8:38 PM    Specimen: Urine   Result Value Ref Range    Color, UA Yellow Yellow    Turbidity UA Clear Clear    Glucose, Ur 3+ (A) NEGATIVE    Bilirubin Urine NEGATIVE NEGATIVE    Ketones, Urine NEGATIVE NEGATIVE    Specific Gravity, UA 1.022 1.005 - 1.030    Urine Hgb NEGATIVE NEGATIVE    pH, UA 5.5 5.0 - 8.0    Protein, UA NEGATIVE NEGATIVE    Urobilinogen, Urine Normal Normal    Nitrite, Urine NEGATIVE NEGATIVE    Leukocyte Esterase, Urine NEGATIVE NEGATIVE    Urinalysis Comments       Microscopic exam not performed based on chemical results unless requested in original order. Imaging:   XR CHEST PORTABLE    Result Date: 7/8/2022  1. Stable cardiomegaly. 2. Stable scarring at the right mid lung zone. No acute focal airspace consolidation. XR CHEST PORTABLE    Result Date: 7/7/2022  No acute abnormality.      CT CHEST PULMONARY EMBOLISM W CONTRAST    Result Date: 7/7/2022  No evidence of pulmonary embolism or acute pulmonary abnormality. RECOMMENDATIONS: 3 mm right solid pulmonary nodule within the upper lobe. If patient is low risk for malignancy, no routine follow-up imaging is recommended; if patient is high risk for malignancy, a non-contrast Chest CT at 12 months is optional. If performed and the nodule is stable at 12 months, no further follow-up is recommended. These guidelines do not apply to immunocompromised patients and patients with cancer. Follow up in patients with significant comorbidities as clinically warranted. For lung cancer screening, adhere to Lung-RADS guidelines. Reference: Radiology. 2017; 284(1):228-43. ASSESSMENT & PLAN     ASSESSMENT / PLAN:     IMPRESSION  This is a 54 y.o. female who presented with Chest pain and shortness and found to have elevated Troponin on labs done yesterday. Patient admitted to inpatient status for cardiology workup because of elevated troponin and chest pain. Principal Problem:    Chest pain        Dull chest pain with elevated troponin. No acute changes on EKG  Plan: . Cardiology consult for elevated troponin on the background of LAD stenting. Will follow up. Active Problems:      COVID +ve     Tested positive yesterday. Symptomatic from yesterday morning with malaise, cough, shortness of breath and chest pain. CT chest on 7/07 negative for PE and other acute changes. Plan: droplet isolation, symptomatic treatment. CXR, Infectious disease consult. Will follow up. NSTEMI (non-ST elevated myocardial infarction) (HCC)  Troponins elevated 18>28>22>21. EKG no acute changes.    Plan: Cardiology consult, monitor BP, pulse, Repeat EKG, Repeat Troponin    Pulmonary Langerhans Cell Granulomatosis    CAD s/p PCI - LAD  Plan: Resumed Aspirin, plavix    HTN  Plan: Toprol xl 50 QID    T2DM (HbA1C - 7.8 on 06/22)  Plan: on insulin sliding scale, POCT glucose checks, hypoglycemia protocol. Anxiety  Plan: on buspirone, zoloft, topiramate. Overactive Bladder  Plan: On topiramate    Hypothyroidism: on synthroid 50 QD    3mm solitary lung nodule on CT- outpatient followup with PCP. DVT ppx: Lovenox  GI ppx: none    PT/OT/SW: will provide input in management  Discharge Planning: CM to help with discharge      Fabian Herbert MD  Internal Medicine Resident, PGY-1  [facility], [address] [place] [address]  7/8/2022, 10:59 PM

## 2022-07-08 NOTE — ED PROVIDER NOTES
[facility] ED                                Emergency Department                                               Faculty Attestation     I performed a history and physical examination of the patient and discussed management with the resident. I reviewed the residents note and agree with the documented findings and plan of care. Any areas of disagreement are noted on the chart. I was personally present for the key portions of any procedures. I have documented in the chart those procedures where I was not present during the key portions. I have reviewed the emergency nurses triage note. I agree with the chief complaint, past medical history, past surgical history, allergies, medications, social and family history as documented unless otherwise noted below. For Physician Assistant/ Nurse Practitioner cases/documentation I have personally evaluated this patient and have completed at least one if not all key elements of the E/M (history, physical exam, and MDM). Additional findings are as noted. This patient was evaluated in the Emergency Department for symptoms described in the history of present illness. He/she was evaluated in the context of the global COVID-19 pandemic, which necessitated consideration that the patient might be at risk for infection with the SARS-CoV-2 virus that causes COVID-19. Institutional protocols and algorithms that pertain to the evaluation of patients at risk for COVID-19 are in a state of rapid change based on information released by regulatory bodies including the CDC and federal and state organizations. These policies and algorithms were followed during the patient's care in the ED. 46-year-old female who has a past medical history of Langerhans, prior LAD occlusion with stent in place follows with Dr. Juan Otto from cardiology who presents emergency department with diffuse body aches, chest pain, shortness of breath.   She was seen at [facility] yesterday and diagnosed with COVID-19. Her primary care doctor contacted her this morning to let her know that her troponins increased that she needed to come back to the hospital.  Patient states that she was resting in bed last night Tylenol was not helping her body aches and her resting heart rate was in the 130s. Initial evaluation she is mildly tachycardic and hypertensive. However in no acute distress laying in bed. Heart rate slightly tachycardic but regular rhythm. Lungs clear. No peripheral edema. EKG showed sinus tachycardia with no other changes. We will repeat labs today including troponin, plan on admission. She was given a prescription for Paxil of it as well as Decadron yesterday, however when her  was filling a prescription this morning that is when she received a phone call from her family care doctor and she has not yet received a dosage of any of these medications.      EKG Interpretation    Interpreted by emergency department physician    Rhythm: sinus tachycardia  Rate: 100-110  Axis: normal  Ectopy: none  Conduction: normal  ST Segments: no acute change  T Waves: no acute change  Q Waves: none    Clinical Impression: non-specific EKG    Marlene Dan,     Critical Care: [address], DO  Emergency Medicine Physician        Penny Castillo 1070, DO  07/08/22 1100

## 2022-07-08 NOTE — TELEPHONE ENCOUNTER
Patient went to [facility] [address] ED 07/07/22    Diagnosed with Covid please check her visit labs, Troponin was 18 when she got there and 28 before going home. BP was 147/112,  This morning BP was 137/114    -133    Oxygen 93-92    Patient very scared she's having heart trouble    Please advise.     Sebastian Vieira

## 2022-07-08 NOTE — PROGRESS NOTES
SENIOR NOTE      This is a 54 y.o. female admitted 7/8/2022 for Chest pain [R07.9]  NSTEMI (non-ST elevated myocardial infarction) ([facility] [I21.4]. See H&P of admitting/intern resident for more details. Patient came in for generalized weakness, chest pain, shortness of breath. Patient was seen yesterday and [facility] and was diagnosed with COVID-19. Patient had her troponins done at the hospital and one of the subsequent readings came back elevated and patient was asked by the PCP to go to the hospital. EKG showed sinus tachycardia -  - 110; blood pressure 167/107 on admission saturating well on room air. BMP unremarkable. Elevated glucose due to history of diabetes. EKG - no ST changes; sinus tachycardia  Troponin elevation 18-28-22; covid positive  CT PE yesterday negative; 3 mm solitary nodule in upper lobe right side    Past medical history -GERD, irritable bowel syndrome, anxiety, hypertension, pulmonary Langerhans cell granulomatosis, type 2 diabetes mellitus - on levimert 30 U nightly, hypothyroidism, CAD S/P LAD stent 2017 - f/u with dr. Duy Verma outpatient?; negative cath 2020?, overactive bladder        BMP:   Recent Labs     07/07/22 2054 07/08/22  1045    135   K 4.1 4.4    105   CO2 22 18*   BUN 10 11   CREATININE 0.74 0.56   GLUCOSE 208* 220*     CBC: )  Recent Labs     07/07/22 2054 07/08/22  1045   WBC 9.5 7.6   HGB 12.4 12.1   HCT 40.5 36.8   * 459*          Assessment    Principal Problem:    NSTEMI (non-ST elevated myocardial infarction) ([facility]  Active Problems:    COVID-19    Hypothyroidism    Type 2 diabetes mellitus without complication, with long-term current use of insulin (HCC)    Essential hypertension    Anxiety    Pulmonary Langerhans cell granulomatosis (HCC)    OAB (overactive bladder)    Gastroesophageal reflux disease with esophagitis without hemorrhage  Resolved Problems:    * No resolved hospital problems. *        Plan     1.  NSTEMI - cardiology consulted. Continue aspirin, statin, Plavix. Start on heparin gtt. Awaiting recommendations  2. Hx of CAD s/p PCI - LAD (6 years ago) - resumed asa, plavix  3. COVID-19 pneumonia -saturating well on room air -infectious disease consulted inpatient. 4. DM, type 2 - hba1c 7.8 06/22 - continue medium dose sliding scale. Will start on lantus 15 U nightly and adjust as needed, poct glucose checks and hypoglycemia protocol. 5. Essential hypertension - resume toprol xl 50 qd  6. Anxiety/MDD - resume buspar, zoloft, topiramate  7. Overactive bladder - resume sanctura 20 mg bid (patient on myrbetriq at home)  8.  Hypothyroidism - resume synthroid 50 qd  9. 3 mm solitary nodule on CT scan - out patient follow up with PCP    Carrillo Mosqueda MD            Department of Internal Medicine  [address] [facility]         7/8/2022, 7:49 PM

## 2022-07-08 NOTE — TELEPHONE ENCOUNTER
Spoke with patient. She reports she is short of breath, sat 92%. Her blood pressure is elevated. I recommended she go to [address] [facility] ER for evaluation due to elevated Troponin's from [facility] ER yesterday and her current symptoms. She has a lot of co morbidities and I expressed to her that I feel she should be admitted to the hospital.     I called [address] [facility] ER and spoke with Sasha Downey to give report.

## 2022-07-09 ENCOUNTER — APPOINTMENT (OUTPATIENT)
Dept: CT IMAGING | Age: 52
DRG: 137 | End: 2022-07-09
Payer: COMMERCIAL

## 2022-07-09 VITALS
SYSTOLIC BLOOD PRESSURE: 140 MMHG | RESPIRATION RATE: 17 BRPM | HEART RATE: 98 BPM | HEIGHT: 63 IN | WEIGHT: 206.79 LBS | BODY MASS INDEX: 36.64 KG/M2 | DIASTOLIC BLOOD PRESSURE: 99 MMHG | TEMPERATURE: 98.2 F | OXYGEN SATURATION: 99 %

## 2022-07-09 PROBLEM — R07.89 ATYPICAL CHEST PAIN: Status: ACTIVE | Noted: 2022-07-08

## 2022-07-09 LAB
ABSOLUTE EOS #: 0.11 K/UL (ref 0–0.44)
ABSOLUTE IMMATURE GRANULOCYTE: <0.03 K/UL (ref 0–0.3)
ABSOLUTE LYMPH #: 4.25 K/UL (ref 1.1–3.7)
ABSOLUTE MONO #: 1.05 K/UL (ref 0.1–1.2)
ANION GAP SERPL CALCULATED.3IONS-SCNC: 15 MMOL/L (ref 9–17)
BASOPHILS # BLD: 1 % (ref 0–2)
BASOPHILS ABSOLUTE: 0.04 K/UL (ref 0–0.2)
BUN BLDV-MCNC: 12 MG/DL (ref 6–20)
CALCIUM SERPL-MCNC: 8.8 MG/DL (ref 8.6–10.4)
CHLORIDE BLD-SCNC: 104 MMOL/L (ref 98–107)
CO2: 19 MMOL/L (ref 20–31)
CREAT SERPL-MCNC: 0.65 MG/DL (ref 0.5–0.9)
EKG ATRIAL RATE: 102 BPM
EKG ATRIAL RATE: 84 BPM
EKG P AXIS: 54 DEGREES
EKG P AXIS: 59 DEGREES
EKG P-R INTERVAL: 158 MS
EKG P-R INTERVAL: 174 MS
EKG Q-T INTERVAL: 368 MS
EKG Q-T INTERVAL: 406 MS
EKG QRS DURATION: 72 MS
EKG QRS DURATION: 82 MS
EKG QTC CALCULATION (BAZETT): 479 MS
EKG QTC CALCULATION (BAZETT): 479 MS
EKG R AXIS: 56 DEGREES
EKG R AXIS: 57 DEGREES
EKG T AXIS: 54 DEGREES
EKG T AXIS: 60 DEGREES
EKG VENTRICULAR RATE: 102 BPM
EKG VENTRICULAR RATE: 84 BPM
EOSINOPHILS RELATIVE PERCENT: 1 % (ref 1–4)
FERRITIN: 21 NG/ML (ref 13–150)
GFR AFRICAN AMERICAN: >60 ML/MIN
GFR NON-AFRICAN AMERICAN: >60 ML/MIN
GFR SERPL CREATININE-BSD FRML MDRD: ABNORMAL ML/MIN/{1.73_M2}
GLUCOSE BLD-MCNC: 105 MG/DL (ref 65–105)
GLUCOSE BLD-MCNC: 109 MG/DL (ref 65–105)
GLUCOSE BLD-MCNC: 123 MG/DL (ref 70–99)
HCT VFR BLD CALC: 37.3 % (ref 36.3–47.1)
HEMOGLOBIN: 11.8 G/DL (ref 11.9–15.1)
IMMATURE GRANULOCYTES: 0 %
LYMPHOCYTES # BLD: 50 % (ref 24–43)
MCH RBC QN AUTO: 26.1 PG (ref 25.2–33.5)
MCHC RBC AUTO-ENTMCNC: 31.6 G/DL (ref 28.4–34.8)
MCV RBC AUTO: 82.5 FL (ref 82.6–102.9)
MONOCYTES # BLD: 12 % (ref 3–12)
NRBC AUTOMATED: 0 PER 100 WBC
PDW BLD-RTO: 15.6 % (ref 11.8–14.4)
PLATELET # BLD: 477 K/UL (ref 138–453)
PMV BLD AUTO: 9.2 FL (ref 8.1–13.5)
POTASSIUM SERPL-SCNC: 4.2 MMOL/L (ref 3.7–5.3)
RBC # BLD: 4.52 M/UL (ref 3.95–5.11)
RBC # BLD: ABNORMAL 10*6/UL
SEG NEUTROPHILS: 36 % (ref 36–65)
SEGMENTED NEUTROPHILS ABSOLUTE COUNT: 3.11 K/UL (ref 1.5–8.1)
SODIUM BLD-SCNC: 138 MMOL/L (ref 135–144)
TROPONIN, HIGH SENSITIVITY: 20 NG/L (ref 0–14)
WBC # BLD: 8.6 K/UL (ref 3.5–11.3)

## 2022-07-09 PROCEDURE — 84484 ASSAY OF TROPONIN QUANT: CPT

## 2022-07-09 PROCEDURE — 6360000004 HC RX CONTRAST MEDICATION

## 2022-07-09 PROCEDURE — 6370000000 HC RX 637 (ALT 250 FOR IP)

## 2022-07-09 PROCEDURE — 36415 COLL VENOUS BLD VENIPUNCTURE: CPT

## 2022-07-09 PROCEDURE — 82947 ASSAY GLUCOSE BLOOD QUANT: CPT

## 2022-07-09 PROCEDURE — 2580000003 HC RX 258

## 2022-07-09 PROCEDURE — 85025 COMPLETE CBC W/AUTO DIFF WBC: CPT

## 2022-07-09 PROCEDURE — 80048 BASIC METABOLIC PNL TOTAL CA: CPT

## 2022-07-09 PROCEDURE — 99223 1ST HOSP IP/OBS HIGH 75: CPT | Performed by: INTERNAL MEDICINE

## 2022-07-09 PROCEDURE — 6360000002 HC RX W HCPCS

## 2022-07-09 PROCEDURE — 93005 ELECTROCARDIOGRAM TRACING: CPT

## 2022-07-09 PROCEDURE — 93010 ELECTROCARDIOGRAM REPORT: CPT | Performed by: INTERNAL MEDICINE

## 2022-07-09 PROCEDURE — 99254 IP/OBS CNSLTJ NEW/EST MOD 60: CPT | Performed by: INTERNAL MEDICINE

## 2022-07-09 PROCEDURE — 6370000000 HC RX 637 (ALT 250 FOR IP): Performed by: INTERNAL MEDICINE

## 2022-07-09 PROCEDURE — 74178 CT ABD&PLV WO CNTR FLWD CNTR: CPT

## 2022-07-09 PROCEDURE — APPSS30 APP SPLIT SHARED TIME 16-30 MINUTES: Performed by: NURSE PRACTITIONER

## 2022-07-09 RX ORDER — TOPIRAMATE 25 MG/1
TABLET ORAL
Qty: 14 TABLET | Refills: 0 | Status: SHIPPED | OUTPATIENT
Start: 2022-07-09 | End: 2022-07-18 | Stop reason: SINTOL

## 2022-07-09 RX ORDER — HEPARIN SODIUM AND DEXTROSE 10000; 5 [USP'U]/100ML; G/100ML
5-30 INJECTION INTRAVENOUS CONTINUOUS
Status: DISCONTINUED | OUTPATIENT
Start: 2022-07-09 | End: 2022-07-09

## 2022-07-09 RX ORDER — HEPARIN SODIUM 1000 [USP'U]/ML
2000 INJECTION, SOLUTION INTRAVENOUS; SUBCUTANEOUS PRN
Status: DISCONTINUED | OUTPATIENT
Start: 2022-07-09 | End: 2022-07-09

## 2022-07-09 RX ORDER — HEPARIN SODIUM 1000 [USP'U]/ML
4000 INJECTION, SOLUTION INTRAVENOUS; SUBCUTANEOUS PRN
Status: DISCONTINUED | OUTPATIENT
Start: 2022-07-09 | End: 2022-07-09

## 2022-07-09 RX ORDER — MORPHINE SULFATE 2 MG/ML
2 INJECTION, SOLUTION INTRAMUSCULAR; INTRAVENOUS ONCE
Status: COMPLETED | OUTPATIENT
Start: 2022-07-09 | End: 2022-07-09

## 2022-07-09 RX ORDER — BUSPIRONE HYDROCHLORIDE 15 MG/1
7.5 TABLET ORAL 3 TIMES DAILY
Qty: 30 TABLET | Refills: 1 | Status: SHIPPED | OUTPATIENT
Start: 2022-07-09

## 2022-07-09 RX ORDER — HEPARIN SODIUM 1000 [USP'U]/ML
4000 INJECTION, SOLUTION INTRAVENOUS; SUBCUTANEOUS ONCE
Status: DISCONTINUED | OUTPATIENT
Start: 2022-07-09 | End: 2022-07-09

## 2022-07-09 RX ADMIN — ASPIRIN 81 MG: 81 TABLET, CHEWABLE ORAL at 10:06

## 2022-07-09 RX ADMIN — TOPIRAMATE 50 MG: 50 TABLET, FILM COATED ORAL at 10:10

## 2022-07-09 RX ADMIN — ATORVASTATIN CALCIUM 80 MG: 80 TABLET, FILM COATED ORAL at 00:18

## 2022-07-09 RX ADMIN — HYDROCODONE BITARTRATE AND ACETAMINOPHEN 1 TABLET: 5; 325 TABLET ORAL at 10:02

## 2022-07-09 RX ADMIN — HYDROCODONE BITARTRATE AND ACETAMINOPHEN 1 TABLET: 5; 325 TABLET ORAL at 00:18

## 2022-07-09 RX ADMIN — CLOPIDOGREL 75 MG: 75 TABLET, FILM COATED ORAL at 10:05

## 2022-07-09 RX ADMIN — TROSPIUM CHLORIDE 20 MG: 20 TABLET, FILM COATED ORAL at 10:11

## 2022-07-09 RX ADMIN — TOPIRAMATE 50 MG: 50 TABLET, FILM COATED ORAL at 00:18

## 2022-07-09 RX ADMIN — HYDROCODONE BITARTRATE AND ACETAMINOPHEN 1 TABLET: 5; 325 TABLET ORAL at 17:08

## 2022-07-09 RX ADMIN — IOPAMIDOL 75 ML: 755 INJECTION, SOLUTION INTRAVENOUS at 13:29

## 2022-07-09 RX ADMIN — BUSPIRONE HYDROCHLORIDE 15 MG: 15 TABLET ORAL at 10:05

## 2022-07-09 RX ADMIN — LEVOTHYROXINE SODIUM 50 MCG: 50 TABLET ORAL at 10:10

## 2022-07-09 RX ADMIN — SODIUM CHLORIDE, PRESERVATIVE FREE 10 ML: 5 INJECTION INTRAVENOUS at 10:06

## 2022-07-09 RX ADMIN — SERTRALINE 150 MG: 50 TABLET, FILM COATED ORAL at 10:05

## 2022-07-09 RX ADMIN — MORPHINE SULFATE 2 MG: 2 INJECTION, SOLUTION INTRAMUSCULAR; INTRAVENOUS at 04:34

## 2022-07-09 RX ADMIN — METOPROLOL SUCCINATE 50 MG: 50 TABLET, FILM COATED, EXTENDED RELEASE ORAL at 10:05

## 2022-07-09 RX ADMIN — ATORVASTATIN CALCIUM 80 MG: 80 TABLET, FILM COATED ORAL at 10:06

## 2022-07-09 RX ADMIN — ONDANSETRON 4 MG: 2 INJECTION INTRAMUSCULAR; INTRAVENOUS at 04:12

## 2022-07-09 ASSESSMENT — PAIN DESCRIPTION - LOCATION: LOCATION: ARM;HEAD;NECK

## 2022-07-09 ASSESSMENT — PAIN DESCRIPTION - DESCRIPTORS: DESCRIPTORS: ACHING

## 2022-07-09 ASSESSMENT — PAIN - FUNCTIONAL ASSESSMENT: PAIN_FUNCTIONAL_ASSESSMENT: PREVENTS OR INTERFERES SOME ACTIVE ACTIVITIES AND ADLS

## 2022-07-09 ASSESSMENT — PAIN SCALES - GENERAL
PAINLEVEL_OUTOF10: 10
PAINLEVEL_OUTOF10: 9

## 2022-07-09 ASSESSMENT — PAIN DESCRIPTION - PAIN TYPE: TYPE: ACUTE PAIN;CHRONIC PAIN

## 2022-07-09 NOTE — DISCHARGE INSTR - COC
Continuity of Care Form    Patient Name: Konrad Apgar   :  1970  MRN:  [id number]    Admit date:  2022  Discharge date:  ***    Code Status Order: Full Code   Advance Directives:      Admitting Physician:  Maribell Linares MD  PCP: Ronn Steve, APRN - CNP    Discharging Nurse: [facility] Unit/Room#:   Discharging Unit Phone Number: ***    Emergency Contact:   Extended Emergency Contact Information  Primary Emergency Contact: [address], [address] Phone: [phone]  Mobile Phone: [phone]  Relation: Spouse   needed? No  Secondary Emergency Contact: [address], [address] Phone: [phone]  Mobile Phone: [phone]  Relation: Brother/Sister   needed?  No    Past Surgical History:  Past Surgical History:   Procedure Laterality Date    BACK SURGERY      L5-S1, C5-7    CARDIAC CATHETERIZATION       SECTION      COLONOSCOPY N/A 2021    COLONOSCOPY WITH BIOPSY performed by Jake Bobo MD at [address] [place] [address] Left     LUNG BIOPSY Right     MASTOID SURGERY      TONSILLECTOMY      UPPER GASTROINTESTINAL ENDOSCOPY  2021    EGD BIOPSY performed by Jake Bobo MD at [facility]       Immunization History:   Immunization History   Administered Date(s) Administered    COVID-19, MODERNA BLUE border, Primary or Immunocompromised, (age 12y+), IM, 100 mcg/0.5mL 2021, 2021    Influenza Virus Vaccine 2020    Influenza, MDCK Quadv, IM, PF (Flucelvax 2 yrs and older) 2021    Influenza, Quadv, IM, PF (6 mo and older Fluzone, Flulaval, Fluarix, and 3 yrs and older Afluria) 2019    Pneumococcal Polysaccharide (Yortncugc13) 2019       Active Problems:  Patient Active Problem List   Diagnosis Code    Hypothyroidism E03.9    Type 2 diabetes mellitus without complication, with long-term current use of insulin ([facility] [address] E11.9, Z79.4    Essential hypertension I10    Hip fracture ([facility] [address] S72.009A    Spinal stenosis of cervical region M48.02    Ground glass opacity present on imaging of lung R91.8    Hx of deep venous thrombosis Z86.718    Situational depression F43.21    Anxiety F41.9    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    Mixed hyperlipidemia E78.2    Coronary artery disease involving native heart with angina pectoris (HCC) I25.119    Migraine without status migrainosus, not intractable G43.909    OAB (overactive bladder) N32.81    Smoker F17.200    Irritable bowel syndrome with constipation K58.1    Irritable bowel syndrome with both constipation and diarrhea K58.2    Positive colorectal cancer screening using Cologuard test R19.5    Gastroesophageal reflux disease with esophagitis without hemorrhage K21.00    Gastroesophageal reflux disease K21.9    Chest pain R07.9    NSTEMI (non-ST elevated myocardial infarction) ([facility]) I21.4    COVID-19 U07.1       Isolation/Infection:   Isolation            Droplet  Droplet Plus          Patient Infection Status       Infection Onset Added Last Indicated Last Indicated By Review Planned Expiration Resolved Resolved By    COVID-19 07/07/22 07/07/22 07/07/22 COVID-19, Rapid 07/14/22 07/21/22      Resolved    COVID-19 (Rule Out) 07/07/22 07/07/22 07/07/22 COVID-19, Rapid (Ordered)   07/07/22 Rule-Out Test Resulted    COVID-19 (Rule Out) 12/30/21 12/30/21 12/30/21 COVID-19, Rapid (Ordered)   12/30/21 Rule-Out Test Resulted    COVID-19 (Rule Out) 09/19/21 09/19/21 09/19/21 COVID-19, Rapid (Ordered)   09/19/21 Rule-Out Test Resulted            Nurse Assessment:  Last Vital Signs: /80   Pulse 85   Temp 98.5 °F (36.9 °C) (Oral)   Resp 16   Ht 5' 3\" (1.6 m)   Wt 206 lb 12.7 oz (93.8 kg)   LMP 06/23/2022   SpO2 99%   BMI 36.63 kg/m²     Last documented pain score (0-10 scale): Pain Level: 9  Last Weight:   Wt Readings from Last 1 Encounters:   07/08/22 206 lb 12.7 oz (93.8 kg)     Mental Status:  {IP PT MENTAL STATUS:20030}    IV Access:  {MH SHAHID IV ACCESS:589116747}    Nursing Mobility/ADLs:  Walking   {CHP DME OUYO:288267972}  Transfer  {CHP DME PCVI:484427300}  Bathing  {CHP DME JVXB:250080159}  Dressing  {CHP DME DDWT:173658740}  Toileting  {CHP DME KIQZ:825071478}  Feeding  {CHP DME FMEF:475702747}  Med Admin  {CHP DME VJER:206201815}  Med Delivery   { SHAHID MED Delivery:544639926}    Wound Care Documentation and Therapy:  Wound 07/08/22 Hand Right (Active)   Wound Etiology Burn 07/09/22 0400   Dressing Status Clean;Dry 07/09/22 0400   Wound Cleansed Not Cleansed 07/09/22 0400   Dressing/Treatment Open to air 07/09/22 0400   Wound Assessment Pink/red 07/09/22 0400   Drainage Amount None 07/09/22 0400   Odor None 07/09/22 0400   Bianca-wound Assessment Intact 07/09/22 0400   Margins Defined edges 07/09/22 0400   Number of days: 0        Elimination:  Continence: Bowel: {YES / QA:06740}  Bladder: {YES / SN:41064}  Urinary Catheter: {Urinary Catheter:962891980}   Colostomy/Ileostomy/Ileal Conduit: {YES / BI:45833}       Date of Last BM: ***    Intake/Output Summary (Last 24 hours) at 7/9/2022 1130  Last data filed at 7/8/2022 2330  Gross per 24 hour   Intake 100 ml   Output --   Net 100 ml     I/O last 3 completed shifts:   In: 100 [P.O.:100]  Out: -     Safety Concerns:     [address] Safety Concerns:646192366}    Impairments/Disabilities:      [address] Impairments/Disabilities:159539095}    Nutrition Therapy:  Current Nutrition Therapy:   [address] Diet List:564791428}    Routes of Feeding: {CHP DME Other Feedings:658640853}  Liquids: {Slp liquid thickness:18333}  Daily Fluid Restriction: {CHP DME Yes amt example:682053669}  Last Modified Barium Swallow with Video (Video Swallowing Test): {Done Not Done UK:601338419}    Treatments at the Time of Hospital Discharge:   Respiratory Treatments: ***  Oxygen Therapy:  {Therapy; copd oxygen:16443}  Ventilator:    {EVER EUCEDA Vent DDLI:796019267}    Rehab Therapies: {THERAPEUTIC INTERVENTION:9056443018}  Weight Bearing Status/Restrictions: {EVER EUCEDA Weight

## 2022-07-09 NOTE — ED NOTES
The following labs labeled with pt sticker and tubed to lab:     [] Blue     [] Lavender   [] on ice  [x] Green/yellow  [] Green/black [] on ice  [] Yellow  [] Red  [] Pink      [] COVID-19 swab    [] Rapid  [] PCR  [] Flu Swab  [] Strep Swab  [] Peds Viral Panel     [x] Urine Sample  [] Pelvic Cultures  [x] Blood Cultures   [] Wound Cultures          Wendy Schumacher RN  07/08/22 8620

## 2022-07-09 NOTE — CONSULTS
[facility] - Initial Consult Note COVID 19 Patient  Today's Date and Time: 7/9/2022, 1:30 PM    Impression :     · COVID 19 Confirmed Infection  · Covid tests:  · 7/7/22: Positive  · Elevated troponin  · Right lung nodule  · Langerhans-cell granulomatosis  · CAD s/p stent placement  · Arthritis  · Hyperlipidemia  · Obesity  · Overdue for Covid vaccine booster     Recommendations:   · Antibiotic treatment:  · Monitor off antibiotics  · Covid Rx:    · Remdesivir-Not indicated  · Decadron-Not indicated  · Actemra-Not indicated  · Monoclonal antibodies-Not indicated      Medical Decision Making/Summary/Discussion:7/9/2022     · Patient admitted with COVID 19 infection    Infection Control Recommendations   · Universal Precautions  · Airborne isolation  · Droplet Isolation until 7/17/22    Antimicrobial Stewardship Recommendations     · Discontinuation of therapy  Coordination of Outpatient Care:   · Estimated Length of IV antimicrobials:TBD  · Patient will need Midline Catheter Insertion: TBD  · Patient will need PICC line Insertion: No  · Patient will need: Home IV , [place],  SNF,  LTAC:TBD  · Patient will need outpatient wound care:No    Chief complaint/reason for consultation:   · Concern for COVID infection      History of Present Illness:   Conrad Urias is a 54y.o.-year-old female who was initially admitted on 7/8/2022. Patient seen at the request of Dr. Michelle Wayne:    Patient presented through [address] [place] [address],[address] ER on 7/7/22 with complaints of fatigue, muscle aches and shortness of breath. She has a history of Langerhans granulomatosis but is not on immunomodulatory therapy. She is susceptible to pneumonia. Upon evaluation, the patient had an SpO2 off 97% on room air. Her VS were stable other than a low-grade fever of 99.7. She tested positive for Covid and had other lab work done. She was discharged home on Paxlovid and decadron.      On 7/8/22, she presented to [facility] at the request of her Cardiologist because of increased troponin level of 28 on the labs from [address]. She follows with Dr. Anca Petersen for prior LAD occlusion with stent placement 6 years ago. EKG revealed sinus tach with no other changes. She was hypertensive. Repeat troponin levels have trended down. Other labs are mostly normal.   CRP was mildly elevated at 7.0    Urinalysis not concerning for UTI    3 mm nodule on CT chest   No acute cardiopulmonary process seen. Imaging:     XR CHEST PORTABLE     Result Date: 7/8/2022  1. Stable cardiomegaly. 2. Stable scarring at the right mid lung zone. No acute focal airspace consolidation.      XR CHEST PORTABLE     Result Date: 7/7/2022  No acute abnormality.      CT CHEST PULMONARY EMBOLISM W CONTRAST     Result Date: 7/7/2022  No evidence of pulmonary embolism or acute pulmonary abnormality. RECOMMENDATIONS: 3 mm right solid pulmonary nodule within the upper lobe.     Patient admitted because of concerns with COVID 19.    CURRENT EVALUATION : 7/9/2022    Afebrile  VS stable  Intermittent hypertension    Patient exhibiting respiratory distress. No  Respiratory secretions:     Patient receiving supplemental oxygen. No  RR:17  02 sat:99%    NEWS Score: 0-4 Low risk group; 5-6: Medium risk group; 7 or above: High risk group  Parameters 3 2 1 0 1 2 3   Age    < 65   ? 65   RR ? 8  9-11 12-20  21-24 ? 25   O2 Sats ?  91 92-93 94-95 ? 96      Suppl O2  Yes  No      SBP ? 90  101-110 111-219   ? 220   HR ? 40  41-50 51-90  111-130 ? 131   Consciousness    Alert   Drowsiness, lethargy, or confusion   Temperature ? 35.0 C (95.0 F)  35.1-36.0 C 95.1-96.9 F 36.1-38.0 C 97.0-100.4 F 38.1-39.0 C 100.5-102.3 F ? 39.1 C ? 102.4 F      NEWS Score:   7/922:0 low risk    Overall Daily Picture:    Improving    Presence of secondary bacterial Infection:  No   Additional antibiotics: No    Labs, X rays reviewed: 7/9/2022    BUN: 12  Cr:0.65    WBC:8.6  Hb:11.5  Plat: 477    Absolute Neutrophils:3  Absolute Lymphocytes:4.2  Neutrophil/Lymphocyte Ratio: 0.7 low risk    CRP:7.0  Ferritin: 21    Cultures:  Urine:  ·   Blood:  ·   Sputum :  ·   Wound:       CXR:   22      CAT:  22      Discussed with patient, RN, CC, IM. I have personally reviewed the past medical history, past surgical history, medications, social history, and family history, and I have updated the database accordingly.   Past Medical History:     Past Medical History:   Diagnosis Date    Anxiety     Arthritis     CAD (coronary artery disease)     Depression     Hyperlipidemia     Kidney stone     Langerhans-cell granulomatosis ([facility] [address]     Myocardial infarct ([facility] [address] 2016    Neck pain     Spinal stenosis 2017       Past Surgical  History:     Past Surgical History:   Procedure Laterality Date    BACK SURGERY      L5-S1, C5-7    CARDIAC CATHETERIZATION       SECTION      COLONOSCOPY N/A 2021    COLONOS[facility] WITH BIOPSY [facility] Left     LUNG BIOPSY Right     MASTOID SURGERY      TONSILLECTOMY      UPPER GASTROINTESTINAL ENDOSCOPY  2021    EGD BIOPSY [facility]       Medications:      busPIRone  15 mg Oral TID    metoprolol succinate  50 mg Oral Daily    sertraline  150 mg Oral Daily    topiramate  50 mg Oral BID    sodium chloride flush  5-40 mL IntraVENous 2 times per day    insulin lispro  0-12 Units SubCUTAneous 4x Daily AC & HS    trospium  20 mg Oral BID AC    atorvastatin  80 mg Oral Daily    aspirin  81 mg Oral Daily    clopidogrel  75 mg Oral Daily    levothyroxine  50 mcg Oral Daily    insulin glargine  15 Units SubCUTAneous Nightly       Social History:     Social History     Socioeconomic History    Marital status:      Spouse name: Not on file    Number of children: Not on file    Years of education: Not on file    Highest education level: Not on file   Occupational History    Not on file   Tobacco Use    Smoking status: Former Smoker     Packs/day: 0.50     Years: 25.00     Pack years: 12.50     Types: Cigarettes    Smokeless tobacco: Never Used    Tobacco comment: 3 packs per week   Vaping Use    Vaping Use: Never used   Substance and Sexual Activity    Alcohol use: Yes     Comment: social    Drug use: Yes     Frequency: 7.0 times per week     Types: Marijuana Humberto Cristino)    Sexual activity: Yes   Other Topics Concern    Not on file   Social History Narrative    Not on file     Social Determinants of Health     Financial Resource Strain: Low Risk     Difficulty of Paying Living Expenses: Not hard at all   Food Insecurity: No Food Insecurity    Worried About Running Out of Food in the Last Year: Never true    Jim of Food in the Last Year: Never true   Transportation Needs: No Transportation Needs    Lack of Transportation (Medical): No    Lack of Transportation (Non-Medical):  No   Physical Activity: Unknown    Days of Exercise per Week: 0 days    Minutes of Exercise per Session: Not on file   Stress:     Feeling of Stress : Not on file   Social Connections:     Frequency of Communication with Friends and Family: Not on file    Frequency of Social Gatherings with Friends and Family: Not on file    Attends Mosque Services: Not on file    Active Member of Clubs or Organizations: Not on file    Attends Club or Organization Meetings: Not on file    Marital Status: Not on file   Intimate Partner Violence: Not At Risk    Fear of Current or Ex-Partner: No    Emotionally Abused: No    Physically Abused: No    Sexually Abused: No   Housing Stability:     Unable to Pay for Housing in the Last Year: Not on file    Number of [place] in the Last Year: Not on file    Unstable Housing in the Last Year: Not on file       Family History:     Family History   Problem Relation Age of Onset    Dementia Mother    Kadeem Whitaker Depression Mother     High Blood Pressure Mother     Coronary Art Dis Mother     COPD Mother     Diabetes Mother     Heart Attack Father     Diabetes Maternal Grandmother     Alzheimer's Disease Paternal Grandfather         Allergies:   Seasonal and Keflex [cephalexin]     Review of Systems:       Constitutional: No fevers or chills. No systemic complaints  Head: No headaches  Eyes: No double vision or blurry vision. No conjunctival inflammation. ENT: No sore throat or runny nose. . No hearing loss, tinnitus or vertigo. Cardiovascular: No chest pain or palpitations. No Shortness of breath. No KNIGHT  Lung: No Shortness of breath or cough. No sputum production  Abdomen: No nausea, vomiting, diarrhea, or abdominal pain. [place] No cramps. Genitourinary: No increased urinary frequency, or dysuria. No hematuria. No suprapubic or CVA pain  Musculoskeletal: No muscle aches or pains. No joint effusions, swelling or deformities  Hematologic: No bleeding or bruising. Neurologic: No headache, weakness, numbness, or tingling. Integument: No rash, no ulcers. Psychiatric: No depression. Endocrine: No polyuria, no polydipsia, no polyphagia. Physical Examination :     Patient Vitals for the past 8 hrs:   BP Temp Temp src Pulse Resp SpO2   07/09/22 1253 (!) 140/99 98.2 °F (36.8 °C) Oral 98 17 99 %     General Appearance: Awake, alert, and in no apparent distress  Head:  Normocephalic, no trauma  Eyes: Pupils equal, round, reactive to light; sclera anicteric; conjunctivae pink. No embolic phenomena. ENT: Oropharynx clear, without erythema, exudate, or thrush. No tenderness of sinuses. Mouth/throat: mucosa pink and moist. No lesions. Dentition in good repair. Neck:Supple, without lymphadenopathy. Thyroid normal, No bruits. Pulmonary/Chest: Clear to auscultation, without wheezes, rales, or rhonchi. No dullness to percussion. Cardiovascular: Regular rate and rhythm without murmurs, rubs, or gallops.    Abdomen: Soft, non tender. Bowel sounds normal. No organomegaly  All four Extremities: No cyanosis, clubbing, edema, or effusions. Neurologic: No gross sensory or motor deficits. Skin: Warm and dry with good turgor. No signs of peripheral arterial or venous insufficiency. No ulcerations. No open wounds. Medical Decision Making -Laboratory:   I have independently reviewed/ordered the following labs:    CBC with Differential:   Recent Labs     07/07/22 2054 07/07/22 2054 07/08/22 1045 07/09/22  0425   WBC 9.5   < > 7.6 8.6   HGB 12.4   < > 12.1 11.8*   HCT 40.5   < > 36.8 37.3   *   < > 459* 477*   LYMPHOPCT 14*  --   --  50*   MONOPCT 12  --   --  12    < > = values in this interval not displayed. BMP:   Recent Labs     07/07/22 2054 07/07/22 2054 07/08/22 1045 07/09/22  0425      < > 135 138   K 4.1   < > 4.4 4.2      < > 105 104   CO2 22   < > 18* 19*   BUN 10   < > 11 12   CREATININE 0.74   < > 0.56 0.65   MG 2.0  --   --   --     < > = values in this interval not displayed. Hepatic Function Panel: No results for input(s): PROT, LABALBU, BILIDIR, IBILI, BILITOT, ALKPHOS, ALT, AST in the last 72 hours. No results for input(s): RPR in the last 72 hours. No results for input(s): HIV in the last 72 hours. No results for input(s): BC in the last 72 hours. Lab Results   Component Value Date/Time    MUCUS 1+ 12/31/2021 12:30 AM    RBC 4.52 07/09/2022 04:25 AM    TRICHOMONAS NOT REPORTED 12/31/2021 12:30 AM    WBC 8.6 07/09/2022 04:25 AM    YEAST NOT REPORTED 12/31/2021 12:30 AM    TURBIDITY Clear 07/08/2022 08:38 PM     Lab Results   Component Value Date/Time    CREATININE 0.65 07/09/2022 04:25 AM    GLUCOSE 123 07/09/2022 04:25 AM       Medical Decision Making-Imaging:     Narrative   EXAMINATION:   CTA OF THE CHEST 7/7/2022 9:34 pm       TECHNIQUE:   CTA of the chest was performed after the administration of intravenous   contrast.  Multiplanar reformatted images are provided for review.   MIP images are provided for review. Automated exposure control, iterative   reconstruction, and/or weight based adjustment of the mA/kV was utilized to   reduce the radiation dose to as low as reasonably achievable.       COMPARISON:   September 19, 2021       HISTORY:   ORDERING SYSTEM PROVIDED HISTORY: History of Langerhans' cell cytosis,   COVID-positive   TECHNOLOGIST PROVIDED HISTORY:   History of Langerhans' cell cytosis, COVID-positive   Decision Support Exception - unselect if not a suspected or confirmed   emergency medical condition->Emergency Medical Condition (MA)       FINDINGS:   Pulmonary Arteries: Pulmonary arteries are adequately opacified for   evaluation.  No evidence of intraluminal filling defect to suggest pulmonary   embolism.  Main pulmonary artery is normal in caliber.       Mediastinum: No evidence of mediastinal lymphadenopathy.  The heart and   pericardium demonstrate no acute abnormality.  There is no acute abnormality   of the thoracic aorta.       Lungs/pleura: The lungs are without acute process.  No focal consolidation or   pulmonary edema.  No evidence of pleural effusion or pneumothorax.  Mild   scarring in the right upper lobe.  Tiny 2-3 mm pulmonary nodules in the left   upper lobe are stable from prior study.  Small right upper lobe pulmonary   nodule measure up to 2-3 mm, not requiring follow-up per the Fleischner   Society recommendations.       Upper Abdomen: Limited images of the upper abdomen are unremarkable.       Soft Tissues/Bones: No acute bone or soft tissue abnormality.           Impression   No evidence of pulmonary embolism or acute pulmonary abnormality.       RECOMMENDATIONS:   3 mm right solid pulmonary nodule within the upper lobe.  If patient is low   risk for malignancy, no routine follow-up imaging is recommended; if patient   is high risk for malignancy, a non-contrast Chest CT at 12 months is   optional. If performed and the nodule is stable at 12 months, no further   follow-up is recommended. These guidelines do not apply to immunocompromised patients and patients with   cancer. Follow up in patients with significant comorbidities as clinically   warranted. For lung cancer screening, adhere to Lung-RADS guidelines. Reference: Radiology. 2017; 284(1):228-43.             Narrative   EXAMINATION:   ONE XRAY VIEW OF THE CHEST       7/7/2022 9:03 pm       COMPARISON:   01/01/2022       HISTORY:   Acute shortness of breath.       FINDINGS:   Patient is mildly rotated.  Borderline enlarged cardiomediastinal silhouette. No acute airspace disease, pleural effusion, or pneumothorax.  Postoperative   changes of the cervical spine.           Impression   No acute abnormality.             Medical Decision Gdonhk-Ezijrckz-Gzxsp:       Medical Decision Making-Other:     Note:  · Labs, medications, radiologic studies were reviewed with personal review of films  · Large amounts of data were reviewed  · Discussed with nursing Staff, Discharge planner  · Infection Control and Prevention measures reviewed  · All prior entries were reviewed  · Administer medications as ordered  · Prognosis: Fair  · Discharge planning reviewed    Thank you for allowing us to participate in the care of this patient. Please call with questions. SAVANA Chowdary - CNP     ATTESTATION:    I have discussed the case, including pertinent history and exam findings with the APRN. I have evaluated the  History, physical findings and pictures of the patient and the key elements of the encounter have been performed by me. I have reviewed the laboratory data, other diagnostic studies and discussed them with the APRN. I have updated the medical record where necessary. I agree with the assessment, plan and orders as documented by the APRN.     Wendy Guardado MD.      Pager: [phone] - Office: [phone]

## 2022-07-09 NOTE — PLAN OF CARE
[facility]
fall injury  7/9/2022 1656 by Tesfaye Coburn RN  Outcome: Progressing  7/9/2022 0418 by Ingrid Diamond RN  Outcome: Progressing     Problem: ABCDS Injury Assessment  Goal: Absence of physical injury  7/9/2022 1656 by Tesfaye Coburn RN  Outcome: Progressing  7/9/2022 0418 by Ingrid Diamond RN  Outcome: Progressing     Problem: Chronic Conditions and Co-morbidities  Goal: Patient's chronic conditions and co-morbidity symptoms are monitored and maintained or improved  Outcome: Progressing

## 2022-07-09 NOTE — H&P
[facility]  Internal Medicine Teaching Residency Program  Inpatient Daily Progress Note  ______________________________________________________________________________    Patient: Mary Aguillon  YOB: 1970   HXE:[id number]    Acct: [de-identified]     Room: 2008/2008-01  Admit date: 7/8/2022  Today's date: 07/09/22  Number of days in the hospital: 1    SUBJECTIVE   Admitting Diagnosis: NSTEMI (non-ST elevated myocardial infarction) ([facility] [address]  CC: Chest pain, SOB  Pt examined at bedside. Chart & results reviewed. Patient reports having neck and shoulders pain. Denies any chest, cough or shortness of breath. Her appetite is decreased and her sleep was disturbed due to pain. Bowel movements regular. She reports burning micturition this morning. Hemodynamically stable, afebrile and saturating to 99% on room air. Repeat troponin 20 from 21. EKG shows sinus bradycardia with anterior infarct unchanged from previous. Cardiology onboard- recommends continuing aspirin, plavix, lipitor and toprol. Follow up with primary cardiologist as outpatinet. Urinalysis negative but patient symptomatic with burning micturition and pain B/L flanks and back. Blood cultures negative so far. ROS:  Constitutional:  negative for chills, fevers, sweats  Respiratory:  negative for cough, dyspnea on exertion, hemoptysis, shortness of breath, wheezing  Cardiovascular:  negative for chest pain, chest pressure/discomfort, lower extremity edema, palpitations  Gastrointestinal:  positive for abdominal pain, negative for constipation, diarrhea, nausea, vomiting. Positive urinary symptoms- burning micturition  Neurological:  negative for dizziness, headache    BRIEF HISTORY     The patient is a pleasant 54 y.o. female presents with a chief complaint of Shortness of breath, chest pain and dizziness to the ED this morning.     Patient has a backgrounf history of CAD (coronary artery disease)- LAD occlusion with stent placemnt 6 yrs ago, Depression, Hyperlipidemia, Kidney stone, Langerhans-cell granulomatosis ([facility] [address] Myocardial infarct [facility]), Neck pain, and Spinal stenosis     The patient started experiencing chest pain, difficulty breathing and slight cough yesterday so she went to [facility] emergency department. There she had her bloods drawn and tested for COVID which came back positive. She was sent home with symptomatic treatment. She reports coming into contact with someone with cough last week who tested positive for COVID earlier this week. Her primary care physician contacted her this morning and informed her that her troponin levels were elevated and she needs to go back to the ED. Currently she is complaining of generalized body aches, throat discomfort and chest pain. The chest pain is dull central with no radiation and decreased in intensity from time of arrival to ED after being given nitroglycerin. She is tachycardic (101) with high BP of 167/101, saturating to 97% on room air and not in acute distress. Her EKG done today shows no acute changes. The patient also reports of pain right flank along with pain right lower back. She says she had kidneys stones in past. Denies any urinary symptoms at present. The patient has a history of Langerhans cell granulomatosis diagnosed 2 years ago. She follows up with her pulmonologist [place] [address]. She reports using steroids for shorter durations on multiple occassions over the last 2 years.     OBJECTIVE     Vital Signs:  /80   Pulse 85   Temp 98.5 °F (36.9 °C) (Oral)   Resp 17   Ht 5' 3\" (1.6 m)   Wt 206 lb 12.7 oz (93.8 kg)   LMP 2022   SpO2 99%   BMI 36.63 kg/m²     Temp (24hrs), Av.4 °F (36.9 °C), Min:98.2 °F (36.8 °C), Max:98.5 °F (36.9 °C)    In: 100   Out: -     Physical Exam:  Constitutional: This is a well developed, well nourished, 35-39.9 - Obesity Grade II 54y.o. year old female who is alert, oriented, cooperative and in no apparent distress. Head:normocephalic and atraumatic. EENT:  PERRLA. No conjunctival injections. Septum was midline, mucosa was without erythema, exudates or cobblestoning. No thrush was noted. Neck: Supple without thyromegaly. No elevated JVP. Trachea was midline. Respiratory: Chest was symmetrical without dullness to percussion. Breath sounds bilaterally were clear to auscultation. There were no wheezes, rhonchi or rales. There is no intercostal retraction or use of accessory muscles. No egophony noted. Cardiovascular: Regular without murmur, clicks, gallops or rubs. Abdomen: Slightly rounded and soft without organomegaly. Tender in right lower quadrant, b/l flanks. Lymphatic: No lymphadenopathy. Musculoskeletal: Normal curvature of the spine. No gross muscle weakness. Extremities:  No lower extremity edema, ulcerations, tenderness, varicosities or erythema. Muscle size, tone and strength are normal.  No involuntary movements are noted. Skin:  Warm and dry. Good color, turgor and pigmentation. No lesions or scars.   No cyanosis or clubbing  Neurological/Psychiatric: The patient's general behavior, level of consciousness, thought content and emotional status is normal.        Medications:  Scheduled Medications:    busPIRone  15 mg Oral TID    metoprolol succinate  50 mg Oral Daily    sertraline  150 mg Oral Daily    topiramate  50 mg Oral BID    sodium chloride flush  5-40 mL IntraVENous 2 times per day    enoxaparin  40 mg SubCUTAneous Daily    insulin lispro  0-12 Units SubCUTAneous 4x Daily AC & HS    trospium  20 mg Oral BID AC    atorvastatin  80 mg Oral Daily    aspirin  81 mg Oral Daily    clopidogrel  75 mg Oral Daily    levothyroxine  50 mcg Oral Daily    insulin glargine  15 Units SubCUTAneous Nightly     Continuous Infusions:    sodium chloride      dextrose       PRN Medicationsalbuterol, 2.5 mg, Daily PRN  albuterol sulfate HFA, 2 puff, Q4H PRN  fluticasone, 1 spray, Daily PRN  sodium chloride flush, 5-40 mL, PRN  sodium chloride, , PRN  ondansetron, 4 mg, Q8H PRN   Or  ondansetron, 4 mg, Q6H PRN  polyethylene glycol, 17 g, Daily PRN  acetaminophen, 650 mg, Q6H PRN   Or  acetaminophen, 650 mg, Q6H PRN  glucose, 4 tablet, PRN  dextrose bolus, 125 mL, PRN   Or  dextrose bolus, 250 mL, PRN  glucagon (rDNA), 1 mg, PRN  dextrose, 100 mL/hr, PRN  HYDROcodone-acetaminophen, 1 tablet, Q6H PRN        Diagnostic Labs:  CBC:   Recent Labs     07/07/22 2054 07/08/22  1045 07/09/22  0425   WBC 9.5 7.6 8.6   RBC 4.84 4.59 4.52   HGB 12.4 12.1 11.8*   HCT 40.5 36.8 37.3   MCV 83.7 80.2* 82.5*   RDW 15.4* 15.4* 15.6*   * 459* 477*     BMP:   Recent Labs     07/07/22 2054 07/08/22  1045    135   K 4.1 4.4    105   CO2 22 18*   BUN 10 11   CREATININE 0.74 0.56     BNP: No results for input(s): BNP in the last 72 hours. PT/INR: No results for input(s): PROTIME, INR in the last 72 hours. APTT: No results for input(s): APTT in the last 72 hours. CARDIAC ENZYMES: No results for input(s): CKMB, CKMBINDEX, TROPONINI in the last 72 hours. Invalid input(s): CKTOTAL;3  FASTING LIPID PANEL:  Lab Results   Component Value Date    CHOL 182 01/01/2022    HDL 33 (L) 01/01/2022    TRIG 121 01/01/2022     LIVER PROFILE: No results for input(s): AST, ALT, ALB, BILIDIR, BILITOT, ALKPHOS in the last 72 hours. MICROBIOLOGY:   Lab Results   Component Value Date/Time    CULTURE NO GROWTH <24 HRS 07/08/2022 08:20 PM       Imaging:    XR CHEST PORTABLE    Result Date: 7/8/2022  1. Stable cardiomegaly. 2. Stable scarring at the right mid lung zone. No acute focal airspace consolidation. XR CHEST PORTABLE    Result Date: 7/7/2022  No acute abnormality. CT CHEST PULMONARY EMBOLISM W CONTRAST    Result Date: 7/7/2022  No evidence of pulmonary embolism or acute pulmonary abnormality. RECOMMENDATIONS: 3 mm right solid pulmonary nodule within the upper lobe. If patient is low risk for malignancy, no routine follow-up imaging is recommended; if patient is high risk for malignancy, a non-contrast Chest CT at 12 months is optional. If performed and the nodule is stable at 12 months, no further follow-up is recommended. These guidelines do not apply to immunocompromised patients and patients with cancer. Follow up in patients with significant comorbidities as clinically warranted. For lung cancer screening, adhere to Lung-RADS guidelines. Reference: Radiology. 2017; 284(1):228-43. ASSESSMENT & PLAN       IMPRESSION  This is a 54 y.o. female who presented with Chest pain and shortness and found to have elevated Troponin on labs done yesterday. Patient admitted to inpatient status for cardiology workup because of elevated troponin and chest pain.     Principal Problem:   NSTEMI (non-ST elevated myocardial infarction) ([address]  Troponins elevated 18>28>22>21. EKG no acute changes. Plan: monitor BP, pulse, Repeat EKG. Cradiology onboard- EKG show sinus tachycardia with anterior infarct unchanged from previous. Elevated troponin with flat trend not suggestive of ACS. Echo 9/20- preserved ejection fraction without valvular abnormality  Follow up with primary cardiologist as outpatient. Active Problems:   COVID-19 Pneumonia     Tested positive on Sunday. Symptomatic with malaise, cough, shortness of breath and chest pain. CT chest on 7/07 negative for PE and other acute changes. Plan: droplet isolation, symptomatic treatment. CXR, Infectious disease consult. Will follow up.     Pulmonary Langerhans Cell Granulomatosis  Not on steroids. Outpatient follow up with her pulmonologist.      CAD s/p PCI - LAD 06/21  Plan: Resumed Aspirin, plavix     HTN  Plan: Toprol xl 50 QID     T2DM (HbA1C - 7.8 on 06/22)  Plan: on insulin sliding scale, POCT glucose checks, hypoglycemia protocol. Burning Micturition and Flank pain     Urinary symptoms positive this morning.  Reported flank pain yesterday. Hx of recurrent kidney stones. Urinalysis negative.  in b/l flanks. Order CT Abdomen pelvis to rule out pyelonephritis.     Anxiety  Plan: on buspirone, zoloft, topiramate.     Overactive Bladder  Plan: On topiramate     Hypothyroidism: on synthroid 50 QD     3mm solitary lung nodule on CT- outpatient followup with PCP.        DVT ppx: Lovenox  GI ppx: none     PT/OT/SW: will provide input in management  Discharge Planning: CM to help with discharge    Esperanza Marsh MD  Internal Medicine Resident, PGY-1  [place], [place].  7/9/2022, 5:33 AM    I have discussed the care of Zhane Roger , including pertinent history and exam findings,    today with the resident. I have seen and examined the patient and the key elements of all parts of the encounter have been performed by me . I agree with the assessment, plan and orders as documented by the resident. Principal Problem:    NSTEMI (non-ST elevated myocardial infarction) ([address]  Active Problems:    Atypical chest pain    COVID-19    Hypothyroidism    Type 2 diabetes mellitus without complication, with long-term current use of insulin (HCC)    Essential hypertension    Anxiety    Pulmonary Langerhans cell granulomatosis (HCC)    OAB (overactive bladder)    Gastroesophageal reflux disease with esophagitis without hemorrhage  Resolved Problems:    * No resolved hospital problems. *        Overall  course ;                                   are improving over time.         Complaining of generalized body ache,  COVID-19 infection  Diabetes  History of multiple renal stones  Patient is on Topamax  Advised to gradually wean Topamax  CT abdomen pelvis next likely discharge          Electronically signed by Hue Langley MD

## 2022-07-11 ENCOUNTER — TELEPHONE (OUTPATIENT)
Dept: FAMILY MEDICINE CLINIC | Age: 52
End: 2022-07-11

## 2022-07-11 ENCOUNTER — CARE COORDINATION (OUTPATIENT)
Dept: CASE MANAGEMENT | Age: 52
End: 2022-07-11

## 2022-07-11 DIAGNOSIS — U07.1 COVID-19: Primary | ICD-10-CM

## 2022-07-11 LAB — MYCOPLASMA PNEUMONIAE IGM: 0.56

## 2022-07-11 NOTE — CARE COORDINATION
[facility] Transitions Initial Follow Up Call    Call within 2 business days of discharge: Yes    Patient: Ahsan Marie Patient : 1970   MRN: [id number]  Reason for Admission: Chest pain, COVID 19  Discharge Date: 22 RARS: Readmission Risk Score: 13.8 ( )      Last Discharge [facility]       Complaint Diagnosis Description Type Department Provider    22 Chest Pain Atypical chest pain . .. ED to Hosp-Admission (Discharged) (ADMITTED) [facility] 2 Carola Pelletier MD; Neelima Farris. .. Spoke with: Patient    Facility: [facility]    Non-face-to-face services provided:  Obtained and reviewed discharge summary and/or continuity of care documents     Spoke with patient who said she is feeling \"rough\" today. She has headache, fatigue, cough, shortness of breath with activity. She has a pulse ox, O2 saturations are 93-96% on RA. Patient was sent back to the hospital after ED visit d/t her heart history and elevated troponins. ACS was ruled out and elevation of troponin was thought to be d/t her COVID infection. She denies chest pains as before. She has scheduled her VV with her PCP for Thursday. Discharge instructions reviewed, she has all medications, Paxlovid was prescribed by her pulmonologist which she started taking today and she also is taking her Decadron and pain medication she was prescribed at ED visit earlier last week. She is self isolating until . She will see urology on . Currently being weaned off her Topamax which is thought to be leading to her kidney stones. She denies any needs or concerns at this time. Expressed to call with any questions or concerns. Transitions of Care Initial Call    Was this an external facility discharge?  No Discharge Facility: [facility]    Challenges to be reviewed by the provider   Additional needs identified to be addressed with provider: No  none             Method of communication with provider : none    Advance Care Planning: Does patient have an Advance Directive: not on file; education provided. Care Transition Nurse contacted the patient by telephone to perform post hospital discharge assessment. Verified name and  with patient as identifiers. Provided introduction to self, and explanation of the CTN role. CTN reviewed discharge instructions, medical action plan and red flags with patient who verbalized understanding. Patient given an opportunity to ask questions and does not have any further questions or concerns at this time. Were discharge instructions available to patient? Yes. Reviewed appropriate site of care based on symptoms and resources available to patient including: PCP  Specialist. The patient agrees to contact the PCP office for questions related to their healthcare. Medication reconciliation was performed with patient, who verbalizes understanding of administration of home medications. Advised obtaining a 90-day supply of all daily and as-needed medications. Was patient discharged with a pulse oximeter? yes, discussed and confirmed pulse oximeter discharge instructions and when to notify provider or seek emergency care. CTN provided contact information. Plan for follow-up call in 3-5 days based on severity of symptoms and risk factors.   Plan for next call: check on VV with PCP, check on cough, malaise, dyspnea          Care Transitions 24 Hour Call    Schedule Follow Up Appointment with PCP: Completed  Do you have a copy of your discharge instructions?: Yes  Do you have all of your prescriptions and are they filled?: Yes  Have you been contacted by a [facility] Pharmacist?: No  Have you scheduled your follow up appointment?: Yes  How are you going to get to your appointment?: Other (Comment: VV with PCP on )  Do you have support at home?: Partner/Spouse/SO  Do you feel like you have everything you need to keep you well at home?: Yes  Are you an active caregiver in your home?: No  Care Transitions Interventions         Follow Up  Future Appointments   Date Time Provider Bucky Black   7/14/2022  5:00 PM Lorena [facility], APRN - CNP [facility]   8/17/2022  3:45 PM MD rogerio [facility]   9/30/2022 10:40 AM SAVANA Kaur CNP, RN

## 2022-07-11 NOTE — TELEPHONE ENCOUNTER
----- Message from [facility] sent at 7/11/2022 10:07 AM EDT -----  Subject: Message to Provider    QUESTIONS  Information for Provider? Pt would like nurse to call her asap she was in   hospital with Joshua and has somethings going that she want to talk to the   nurse about. Pt having kidney pain and headaches still, she states she's   in a lot of pain, and coughing up a lot of mucus.   ---------------------------------------------------------------------------  --------------  Barbie Nogueira [facility]  [phone]; OK to leave message on voicemail  ---------------------------------------------------------------------------  --------------  SCRIPT ANSWERS  Relationship to Patient?  Self

## 2022-07-11 NOTE — PROGRESS NOTES
Physician Progress Note      Brenda Johnson  CSN #:                  [id number]  :                       1970  ADMIT DATE:       2022 10:13 AM  DISCH DATE:        2022 5:37 PM  RESPONDING  PROVIDER #:        Jamal Cruz TEXT:    Patient admitted with COVID-19. Noted documentation of NSTEMI in H&P and IM   progress note on . In order to support the diagnosis of NSTEMI, please   include additional clinical indicators in your documentation. Or please   document if the diagnosis of NSTEMI has been ruled out after further study. The medical record reflects the following:  Risk Factors: COVID-19  Clinical Indicators: presents with chest pain and malaise, tested + for COVID   , IM documenting NSTEMI \"Troponins elevated 18>28>22>21. EKG no acute   changes\", per cardiology progress notes \"ECG showed sinus tachycardia with   anterior infarct. Unchanged from previous ECG. Mildly elevated   high-sensitivity troponin likely secondary to COVID infection. Elevated   troponin with flat trend not suggestive of ACS\"  Treatment: serial troponins, EKG, cardiology consult, monitoring    Thank you, Kevin Reeder, CDI  email - [email or web address]  cell- [phone]  office hours [id number]  Options provided:  -- NSTEMI ruled out after study and demand ischemia due to COVID-19 confirmed  -- NSTEMI ruled out after study and non-ischemic myocardial injury due to   COVID-19 confirmed  -- NSTEMI was ruled out  -- NSTEMI present as evidenced by, Please document evidence. -- Other - I will add my own diagnosis  -- Disagree - Not applicable / Not valid  -- Disagree - Clinically unable to determine / Unknown  -- Refer to Clinical Documentation Reviewer    PROVIDER RESPONSE TEXT:    NSTEMI was ruled out after study.     Query created by: Daniel Bynum on 2022 11:40 AM      Electronically signed by:  Wild Marcial 2022 8:17 AM

## 2022-07-11 NOTE — TELEPHONE ENCOUNTER
Pt is complaining of pain and congestion. Her sx have not changed since the ED. She went to [facility] and [facility]. Between the 2 of them multiple meds were prescribed. [facility] said not to take the steroid or pain med. She is confused. And so am I. Shes looking for some direction. Her pulm doc told her to take the steroid. I scheduled her for a VV on 7/14 so this would be easier to explain but until then she is looking for any recommendations as she does not want to go back to the ED. THX.

## 2022-07-11 NOTE — TELEPHONE ENCOUNTER
If her pulmonary doctor told her to take the steroid than she should.  She can call to verify with them

## 2022-07-11 NOTE — PROGRESS NOTES
Physician Progress Note      Tyrell Crowley  CSN #:                  [id number]  :                       1970  ADMIT DATE:       2022 10:13 AM  DISCH DATE:        2022 5:37 PM  RESPONDING  PROVIDER #:        Zoe Young          QUERY TEXT:    Patient admitted with COVID-19. Noted documentation of COVID-19 pneumonia IM   progress note on . In order to support the diagnosis of COVID-19 pneumonia,   please include additional clinical indicators in your documentation. Or   please document if the diagnosis of COVID-19 pneumonia has been ruled out   after further study. The medical record reflects the following:  Risk Factors: COVID-19 +  Clinical Indicators: pt tested + for COVID-19 , per IM progress note   \"COVID-19 pneumonia -saturating well on room air, ID consult\", CXR without   acute findings, WBC 7.6, procalcitonin 0.06, O2 sats upper 90's on RA  Treatment: nebs, continuous pulse ox monitoring, pending ID consult, CXR, labs    Thank you, Betsy Cook, CDI  email - [email or web address]  cell- [phone]  office hours [id number]-[id number]  Options provided:  -- COVID-19 pneumonia present as evidenced by, Please document evidence.   -- COVID-19 pneumonia was ruled out  -- Other - I will add my own diagnosis  -- Disagree - Not applicable / Not valid  -- Disagree - Clinically unable to determine / Unknown  -- Refer to Clinical Documentation Reviewer    PROVIDER RESPONSE TEXT:    Patient has Covid-19 infection without pneumonia    Query created by: Diana Mckeon on 2022 11:47 AM      Electronically signed by:  Zoe Young 2022 8:18 AM

## 2022-07-11 NOTE — PROGRESS NOTES
CLINICAL PHARMACY NOTE: MEDS TO BEDS    Total # of Prescriptions Filled: 2   The following medications were delivered to the patient:  · Buspirone 7.5mg  · Topiramate 25mg    Additional Documentation: Put outside patients room 2008 by chart 7/9 at 1336. RN is aware. Patient covid+. No co-pay.  KT

## 2022-07-11 NOTE — DISCHARGE SUMMARY
[address]     Department of Internal Medicine - Staff Internal Medicine Teaching Service    INPATIENT DISCHARGE SUMMARY      Patient Identification:  Krishna Caro is a 54 y.o. female. :  1970  MRN: [id number]     Acct: [de-identified]   PCP: SAVANA Kaur CNP  Admit Date:  2022  Discharge date and time: 2022  5:37 PM   Attending Provider: No att. providers found                                     [facility] Problem Lists:  Principal Problem:    NSTEMI (non-ST elevated myocardial infarction) ([facility] [address]  Active Problems:    Atypical chest pain    COVID-19    Hypothyroidism    Type 2 diabetes mellitus without complication, with long-term current use of insulin (HCC)    Essential hypertension    Anxiety    Pulmonary Langerhans cell granulomatosis (HCC)    OAB (overactive bladder)    Gastroesophageal reflux disease with esophagitis without hemorrhage  Resolved Problems:    * No resolved hospital problems. *      HOSPITAL STAY     Brief Inpatient course:   Krishna Caro is a 54 y.o. female who was admitted for the management of Atypical chest pain, presented to the emergency department with chest pain, shortness of breath and dizziness. The patient had started experiencing chest pain, difficulty breathing and slight cough day before admission so she went to [facility] emergency department. There she had her bloods drawn and tested for COVID-19 which came back positive. She was sent home with symptomatic treatment. The next morning her primary care physician contacted her and informed her that her troponin levels were elevated and she needed to go to the ED.    On admission she was hemodynamically stable with no acute changes on EKG but elevated troponins to 20. She was seen by cardiology and NSTEMI was ruled out. She was also seen by Infectious diseases and did not recommended active antiviral treatment for her at this time.    The patient is medically stable now and ready for discharge home. Procedures/ Significant Interventions:    none    Consults:     Consults:     Final Specialist Recommendations/Findings:   IP CONSULT TO INTERNAL MEDICINE  IP CONSULT TO CARDIOLOGY  IP CONSULT TO INFECTIOUS DISEASES  IP CONSULT TO CASE MANAGEMENT      Any Hospital Acquired Infections: none    Discharge Functional Status:  stable    DISCHARGE PLAN     Disposition: home    Patient Instructions:   Discharge Medication List as of 7/9/2022  5:14 PM      CONTINUE these medications which have CHANGED    Details   busPIRone (BUSPAR) 15 MG tablet Take 7.5 mg by mouth 3 times daily, Disp-30 tablet, R-1Normal      topiramate (TOPAMAX) 25 MG tablet Take 1 tablet by mouth 2 times daily for 4 days, THEN 1 tablet daily for 4 days, THEN 0.5 tablets daily for 4 days. , Disp-14 tablet, R-0Normal         CONTINUE these medications which have NOT CHANGED    Details   nirmatrelvir/ritonavir (PAXLOVID) 20 x 150 MG & 10 x 100MG Take 3 tablets (two 150 mg nirmatrelvir and one 100 mg ritonavir tablets) by mouth every 12 hours for 5 days. , Disp-30 tablet, R-0Print      Dulaglutide (TRULICITY) 3 LC/1.6IZ SOPN Inject 3 mg into the skin once a week, Disp-5 pen, R-3Normal      omeprazole (PRILOSEC) 40 MG delayed release capsule Take 1 capsule by mouth every morning (before breakfast), Disp-30 capsule, R-3Normal      insulin detemir (LEVEMIR FLEXTOUCH) 100 UNIT/ML injection pen Inject 26 Units into the skin nightly, Disp-5 pen, R-3Normal      Insulin Pen Needle 31G X 5 MM MISC Disp-100 each, R-3, NormalUse pen needle for giving daily insulin injection      TRUE METRIX BLOOD GLUCOSE TEST strip use 1 TEST STRIP to TEST BLOOD SUGAR twice a day and if needed, Disp-100 strip, R-3Normal      TRUEplus Lancets 30G MISC Disp-100 each, R-3, Normal      albuterol (PROVENTIL) (2.5 MG/3ML) 0.083% nebulizer solution Take 2.5 mg by nebulization daily as needed for [address] [facility] [address]      blood glucose monitor kit and supplies Dispense sufficient amount for BID testing frequency plus additional to accommodate PRN testing needs.  Dispense all needed supplies to include: monitor, strips, lancing device, lancets, control solutions, alcohol swabs., Disp-1 kit, R-0, Normal      tiZANidine (ZANAFLEX) 2 MG tablet take 1 tablet by mouth three times a day if needed, Disp-90 tablet, R-0Normal      nitroGLYCERIN (NITROSTAT) 0.4 MG SL tablet place 1 tablet under the tongue if needed every 5 minutes for chest pain for 3 doses IF NO RELIEF AFTER THIRD DOSE CALL PRESCRIBER ., Disp-25 tablet, R-0Normal      calcium carbonate (OS-BRANNON) 1250 (500 Ca) MG chewable tablet Take 500-1,000 mg by mouth 3 times dailyHistorical Med      levothyroxine (SYNTHROID) 50 MCG tablet Take 1 tablet by mouth Daily, Disp-90 tablet, R-1Normal      metoprolol succinate (TOPROL XL) 100 MG extended release tablet take 1 tablet by mouth twice a day, Disp-180 tablet, R-1Normal      sertraline (ZOLOFT) 100 MG tablet Take 1.5 tablets by mouth daily, Disp-135 tablet, R-1Normal      ranolazine (RANEXA) 500 MG extended release tablet Take 1 tablet by mouth 2 times daily, Disp-180 tablet, R-1Normal      atorvastatin (LIPITOR) 80 MG tablet Take 1 tablet by mouth daily, Disp-90 tablet, R-1Normal      clopidogrel (PLAVIX) 75 MG tablet Take 1 tablet by mouth daily, Disp-90 tablet, R-1Normal      mirabegron (MYRBETRIQ) 50 MG TB24 Take 50 mg by mouth daily, Disp-90 tablet, R-1Normal      fluticasone (FLONASE) 50 MCG/ACT nasal spray 1 spray by Each Nostril route daily as needed for RhinitisHistorical Med      Multiple Vitamins-Minerals (THERAPEUTIC MULTIVITAMIN-MINERALS) tablet Take 1 tablet by mouth dailyHistorical Med      ascorbic acid (VITAMIN C) 500 MG tablet Take 500 mg by mouth dailyHistorical Med      polyethylene glycol (GLYCOLAX) 17 g packet Take 17 g by mouth daily as needed Historical Med      acetaminophen (TYLENOL) 500 MG tablet Take 2 tablets by mouth every 6 hours as needed for Pain, Disp-30 tablet, R-0Print      Cholecalciferol (VITAMIN D) 50 MCG (2000 UT) TABS tablet Take 2,000 Units by mouth daily Historical Med      aspirin 81 MG chewable tablet Take 81 mg by mouth dailyHistorical Med         STOP taking these medications       dexamethasone (DECADRON) 6 MG tablet Comments:   Reason for Stopping:         HYDROcodone-acetaminophen ([facility]) 5-325 MG per tablet Comments:   Reason for Stopping:         albuterol sulfate  (90 Base) MCG/ACT inhaler Comments:   Reason for Stopping:               Activity: activity as tolerated    Diet: regular diet    Follow-up:    Elver Harrison, APRN - CNP  [address] [facility] [place]  [phone]    Call in 1 week  post hospitalization follow up     Butch Vincent MD  [facility] Sheryle Buck [address] [address]  [phone]    In 2 days  post hospitalization follow up, optimization of medications. Deasindi Hurst MD  [place] [address]      renal calculus      Patient Instructions: You were admitted for chest pain and shortness of breath evaluation. You were seen by cardiologist, they recommended to continue the same medications and to follow-up with your primary cardiologist.  Please call to schedule appointment. You were also found to be COVID-positive, infectious disease physician was consulted, who recommended observation and no active intervention. He also had flank pain, urine analysis was negative and also CAT scan of abdomen and pelvis was done which showed nonobstructing renal stone. Your symptoms have improved and you are being discharged in stable condition. Please follow-up with your primary care provider and cardiologist for the optimization of medication. Continue to keep yourself well-hydrated. Follow-up with urologist as needed if there is worsening of the flank pain and concern of renal stones.     Also, please note that Topamax is associated with increased risk of kidney stones. Therefore, we recommend discontinuing it gradually. Continue to take 25 mg twice a day for 4 days followed by 25 mg once a day for 4 days followed by 12.5 mg once a day for 4 days. Please discuss this with your primary care provider and/or neurologist.    Carley Gloriach back to ER or seek medical attention soon if your symptoms worsen.         Follow up labs: none  Follow up imaging: none    Note that over 30 minutes was spent in preparing discharge papers, discussing discharge with patient, medication review, etc.      Linsey Butler MD, MD  Internal Medicine Resident, PGY-1  [place], [place].  7/11/2022, 9:17 AM

## 2022-07-13 LAB
CULTURE: NORMAL
CULTURE: NORMAL
Lab: NORMAL
Lab: NORMAL
SPECIMEN DESCRIPTION: NORMAL
SPECIMEN DESCRIPTION: NORMAL

## 2022-07-15 ENCOUNTER — CARE COORDINATION (OUTPATIENT)
Dept: CARE COORDINATION | Age: 52
End: 2022-07-15

## 2022-07-15 NOTE — CARE COORDINATION
plan of care after discharge. Discussed appropriate site of care based on symptoms and resources available to patient including: PCP  Specialist. The patient agrees to contact the PCP office for questions related to their healthcare. Patients top risk factors for readmission: medical condition-COVID  Interventions to address risk factors: Education of patient/family/caregiver/guardian to support self-management-discussed prolonged recovery       Non-[facility] follow up appointment(s): 7/18 with PCP    CTN provided contact information for future needs. Plan for follow-up call in 5-7 days based on severity of symptoms and risk factors. Plan for next call:  check on symptoms, check if fatigue, cough has improved. Care Transitions Subsequent and Final Call    Schedule Follow Up Appointment with PCP: Completed  Subsequent and Final Calls  Do you have any ongoing symptoms?: Yes  Onset of Patient-reported symptoms: In the past 7 days  Patient-reported symptoms: Congestion, Fatigue, Cough, Other  Have your medications changed?: No  Do you have any questions related to your medications?: No  Do you currently have any active services?: No  Do you have any needs or concerns that I can assist you with?: No  Identified Barriers: Lack of Education  Care Transitions Interventions  Other Interventions:              Follow Up  Future Appointments   Date Time Provider Bucky Black   7/18/2022  5:00 PM SAVANA Lopez CNP [facility]   8/17/2022  3:45 PM MD rogerio Motley [facility]   9/30/2022 10:40 AM SAVANA Lopez CNP, RN

## 2022-07-18 ENCOUNTER — TELEMEDICINE (OUTPATIENT)
Dept: FAMILY MEDICINE CLINIC | Age: 52
End: 2022-07-18
Payer: COMMERCIAL

## 2022-07-18 DIAGNOSIS — I21.4 NSTEMI (NON-ST ELEVATED MYOCARDIAL INFARCTION) (HCC): ICD-10-CM

## 2022-07-18 DIAGNOSIS — Z09 HOSPITAL DISCHARGE FOLLOW-UP: Primary | ICD-10-CM

## 2022-07-18 DIAGNOSIS — U07.1 COVID-19: ICD-10-CM

## 2022-07-18 PROCEDURE — 1111F DSCHRG MED/CURRENT MED MERGE: CPT | Performed by: NURSE PRACTITIONER

## 2022-07-18 PROCEDURE — 99214 OFFICE O/P EST MOD 30 MIN: CPT | Performed by: NURSE PRACTITIONER

## 2022-07-18 ASSESSMENT — ENCOUNTER SYMPTOMS
SHORTNESS OF BREATH: 1
COUGH: 1

## 2022-07-18 NOTE — PROGRESS NOTES
Post-Discharge Transitional Care Follow Up      Hodan Fuller   YOB: 1970    Date of Office Visit:  7/18/2022  Date of Hospital Admission: 7/8/22  Date of Hospital Discharge: 7/9/22  Readmission Risk Score(high >=14%. Medium >=10%):Readmission Risk Score: 13.8 ( )      Care management risk score Rising risk (score 2-5) and Complex Care (Scores >=6): 1     Non face to face  following discharge, date last encounter closed (first attempt may have been earlier): 7/11/2022 12:59 PM     Call initiated 2 business days of discharge: Yes     Below is the assessment and plan developed based on review of pertinent history, physical exam, labs, studies, and medications. Hospital discharge follow-up  -     PA DISCHARGE MEDS RECONCILED W/ CURRENT OUTPATIENT MED LIST  NSTEMI (non-ST elevated myocardial infarction) ([facility] [address]  COVID-19    Medical Decision Making: moderate complexity  Return if symptoms worsen or fail to improve. On this date 7/18/2022 I have spent 30 minutes reviewing previous notes, test results and face to face with the patient discussing the diagnosis and importance of compliance with the treatment plan as well as documenting on the day of the visit. Subjective:   HPI    Inpatient course: Discharge summary reviewed- see chart. Interval history/Current status:     Patient [facility]. She was dx with Covid at [facility] but also had elevated troponin's. Was discharged from ED. Went home and says she was not feeling well; shortness of breath, fever, low oxygen level. Went to [facility] and was admitted for NSTEMI. She is still having some shortness of breath and fatigue. Her appetite is ok, says she is not hungry for much. Was having some constipation, taking Miralax. Says it has improved. She is following with Pulmonary and Cardiology and has follow up appointments scheduled.      Patient Active Problem List   Diagnosis    Hypothyroidism    Type 2 diabetes mellitus without complication, with long-term current use of insulin (HCC)    Essential hypertension    Hip fracture (HCC)    Spinal stenosis of cervical region    Ground glass opacity present on imaging of lung    Hx of deep venous thrombosis    Situational depression    Anxiety    Pulmonary Langerhans cell granulomatosis (HCC)    Mixed hyperlipidemia    Coronary artery disease involving native heart with angina pectoris (HCC)    Migraine without status migrainosus, not intractable    OAB (overactive bladder)    Smoker    Irritable bowel syndrome with constipation    Irritable bowel syndrome with both constipation and diarrhea    Positive colorectal cancer screening using Cologuard test    Gastroesophageal reflux disease with esophagitis without hemorrhage    Gastroesophageal reflux disease    Atypical chest pain    NSTEMI (non-ST elevated myocardial infarction) ([facility] [address]    COVID-19       Medications listed as started at the time of discharge from hospital  Cannot display discharge medications since this is not an admission.           Medications marked \"taking\" at this time  Outpatient Medications Marked as Taking for the 7/18/22 encounter (Telemedicine) with SAVANA Patel CNP   Medication Sig Dispense Refill    busPIRone (BUSPAR) 15 MG tablet Take 7.5 mg by mouth 3 times daily 30 tablet 1    Dulaglutide (TRULICITY) 3 HX/2.5RZ SOPN Inject 3 mg into the skin once a week 5 pen 3    omeprazole (PRILOSEC) 40 MG delayed release capsule Take 1 capsule by mouth every morning (before breakfast) 30 capsule 3    insulin detemir (LEVEMIR FLEXTOUCH) 100 UNIT/ML injection pen Inject 26 Units into the skin nightly 5 pen 3    Insulin Pen Needle 31G X 5 MM MISC Use pen needle for giving daily insulin injection 100 each 3    TRUE METRIX BLOOD GLUCOSE TEST strip use 1 TEST STRIP to TEST BLOOD SUGAR twice a day and if needed 100 strip 3    TRUEplus Lancets 30G MISC use 1 LANCET to TEST BLOOD SUGAR twice a day and if needed 100 each 3    albuterol (PROVENTIL) (2.5 MG/3ML) 0.083% nebulizer solution Take 2.5 mg by nebulization daily as needed for Wheezing      blood glucose monitor kit and supplies Dispense sufficient amount for BID testing frequency plus additional to accommodate PRN testing needs. Dispense all needed supplies to include: monitor, strips, lancing device, lancets, control solutions, alcohol swabs. 1 kit 0    tiZANidine (ZANAFLEX) 2 MG tablet take 1 tablet by mouth three times a day if needed 90 tablet 0    nitroGLYCERIN (NITROSTAT) 0.4 MG SL tablet place 1 tablet under the tongue if needed every 5 minutes for chest pain for 3 doses IF NO RELIEF AFTER THIRD DOSE CALL PRESCRIBER . 25 tablet 0    calcium carbonate (OS-BRANNON) 1250 (500 Ca) MG chewable tablet Take 500-1,000 mg by mouth 3 times daily      metoprolol succinate (TOPROL XL) 100 MG extended release tablet take 1 tablet by mouth twice a day (Patient taking differently: 50 mg  in the morning and 50 mg before bedtime. take 1 tablet by mouth twice a day.) 180 tablet 1    sertraline (ZOLOFT) 100 MG tablet Take 1.5 tablets by mouth daily 135 tablet 1    mirabegron (MYRBETRIQ) 50 MG TB24 Take 50 mg by mouth daily 90 tablet 1    Multiple Vitamins-Minerals (THERAPEUTIC MULTIVITAMIN-MINERALS) tablet Take 1 tablet by mouth daily      ascorbic acid (VITAMIN C) 500 MG tablet Take 500 mg by mouth daily      polyethylene glycol (GLYCOLAX) 17 g packet Take 17 g by mouth daily as needed       acetaminophen (TYLENOL) 500 MG tablet Take 2 tablets by mouth every 6 hours as needed for Pain 30 tablet 0    Cholecalciferol (VITAMIN D) 50 MCG (2000 UT) TABS tablet Take 2,000 Units by mouth daily       aspirin 81 MG chewable tablet Take 81 mg by mouth daily          Medications patient taking as of now reconciled against medications ordered at time of hospital discharge: Yes    Review of Systems   Constitutional:  Positive for activity change, appetite change and fatigue. Respiratory:  Positive for cough and shortness of breath. Neurological:  Positive for weakness. All other systems reviewed and are negative. Objective:    Patient-Reported Vitals  No data recorded    Physical Exam  [INSTRUCTIONS:  \"[x]\" Indicates a positive item  \"[]\" Indicates a negative item  -- DELETE ALL ITEMS NOT EXAMINED]    Constitutional: [x] Appears well-developed and well-nourished [x] No apparent distress      [] Abnormal -     Mental status: [x] Alert and awake  [x] Oriented to person/place/time [x] Able to follow commands    [] Abnormal -     Eyes:   EOM    [x]  Normal    [] Abnormal -   Sclera  [x]  Normal    [] Abnormal -          Discharge [x]  None visible   [] Abnormal -     HENT: [x] Normocephalic, atraumatic  [] Abnormal -   [x] Mouth/Throat: Mucous membranes are moist    External Ears [x] Normal  [] Abnormal -    Neck: [x] No visualized mass [] Abnormal -     Pulmonary/Chest: [x] Respiratory effort normal   [x] No visualized signs of difficulty breathing or respiratory distress        [] Abnormal -      Musculoskeletal:   [x] Normal gait with no signs of ataxia         [x] Normal range of motion of neck        [] Abnormal -     Neurological:        [x] No Facial Asymmetry (Cranial nerve 7 motor function) (limited exam due to video visit)          [x] No gaze palsy        [] Abnormal -          Skin:        [x] No significant exanthematous lesions or discoloration noted on facial skin         [] Abnormal -            Psychiatric:       [x] Normal Affect [] Abnormal -        [x] No Hallucinations    Other pertinent observable physical exam findings:-      Chicosharee Méndez, was evaluated through a synchronous (real-time) audio-video encounter. The patient (or guardian if applicable) is aware that this is a billable service, which includes applicable co-pays. This Virtual Visit was conducted with patient's (and/or legal guardian's) consent.  The visit was conducted pursuant to the emergency declaration under the [facility], [facility] waiver authority and the [employer] General Act. Patient identification was verified, and a caregiver was present when appropriate. The patient was located at Home: [address] [place] [address]. Provider was located at [facility] ([facility]): [address],  [facility]. An electronic signature was used to authenticate this note.   --SAVANA Connor - CNP

## 2022-07-21 ENCOUNTER — CARE COORDINATION (OUTPATIENT)
Dept: CASE MANAGEMENT | Age: 52
End: 2022-07-21

## 2022-07-21 NOTE — CARE COORDINATION
[facility] Transitions Follow Up Call    [id number]    Patient: Regi Jara  Patient : 1970   MRN: [id number]  Reason for Admission: COVID  Discharge Date: 22 RARS: Readmission Risk Score: 13.8 ( )             Attempted to reach patient for subsequent transitional call. VM left to return call to [phone]. 1st attempt.         Follow Up  Future Appointments   Date Time Provider Bucky Black   2022  3:45 PM Olga Cleary MD [facility]   2022 10:40 AM El Blizzard, APRN - CNP Shlomo Hoehn FP Jacques Gilford, RN

## 2022-07-22 ENCOUNTER — CARE COORDINATION (OUTPATIENT)
Dept: CASE MANAGEMENT | Age: 52
End: 2022-07-22

## 2022-07-22 NOTE — CARE COORDINATION
[facility] Transitions Follow Up Call        Patient: Sofy Newman  Patient : 1970   MRN: [id number]  Reason for Admission: COVID  Discharge Date: 22 RARS: Readmission Risk Score: 13.8 ( )         Spoke with: Patient    Spoke with patient who reports symptoms are finally improving some. She still has a cough, fatigue has improved some but still is fatigued at end of day. Occasional headaches. O2 saturations have improved, she is running 98% on RA. Discussed prolonged recovery, expressed that some don't feel back to their baseline for upwards of a month or more. She denies any f/c, n/v or other viral symptoms. Will see her PCP next week. Denies any needs or concerns. Episode resolved. Care Transitions Follow Up Call    Needs to be reviewed by the provider   Additional needs identified to be addressed with provider: No  none             Method of communication with provider : none      Care Transition Nurse contacted the patient by telephone to follow up after admission on . Verified name and  with patient as identifiers. Addressed changes since last contact: none  Discussed follow-up appointments. LPN CC reviewed discharge instructions, medical action plan and red flags with patient and discussed any barriers to care and/or understanding of plan of care after discharge. Discussed appropriate site of care based on symptoms and resources available to patient including: PCP  Specialist. The patient agrees to contact the PCP office for questions related to their healthcare. Patients top risk factors for readmission: medical condition-COVID  Interventions to address risk factors: Education of patient/family/caregiver/guardian to support self-management-COVID Zone tool sheet reviewed      Non-[facility] follow up appointment(s): n/a    CTN provided contact information for future needs. No further follow-up call indicated based on severity of symptoms and risk factors.   Plan for next call:  final call, no worsening COVID symptoms. Care Transitions Subsequent and Final Call    Schedule Follow Up Appointment with PCP: Completed  Subsequent and Final Calls  Do you have any ongoing symptoms?: Yes  Onset of Patient-reported symptoms: In the past 7 days  Patient-reported symptoms: Cough  Have your medications changed?: No  Do you have any questions related to your medications?: No  Do you currently have any active services?: No  Do you have any needs or concerns that I can assist you with?: No  Identified Barriers: Lack of Education  Care Transitions Interventions  Other Interventions:              Follow Up  Future Appointments   Date Time Provider Bucky Black   8/17/2022  3:45 PM Johnny Vance MD [facility] exc [facility]   9/30/2022 10:40 AM SAVANA Lane - TONYA Fragoso RN

## 2022-08-19 ENCOUNTER — TELEPHONE (OUTPATIENT)
Dept: GASTROENTEROLOGY | Age: 52
End: 2022-08-19

## 2022-09-06 ENCOUNTER — APPOINTMENT (OUTPATIENT)
Dept: GENERAL RADIOLOGY | Age: 52
End: 2022-09-06
Payer: COMMERCIAL

## 2022-09-06 ENCOUNTER — HOSPITAL ENCOUNTER (EMERGENCY)
Age: 52
Discharge: HOME | End: 2022-09-07
Attending: EMERGENCY MEDICINE
Payer: COMMERCIAL

## 2022-09-06 ENCOUNTER — APPOINTMENT (OUTPATIENT)
Dept: CT IMAGING | Age: 52
End: 2022-09-06
Payer: COMMERCIAL

## 2022-09-06 DIAGNOSIS — S09.90XA CLOSED HEAD INJURY, INITIAL ENCOUNTER: ICD-10-CM
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
## 2022-09-06 DIAGNOSIS — M25.552 LEFT HIP PAIN: ICD-10-CM

## 2022-09-06 LAB
ABSOLUTE EOS #: 0.4 K/UL (ref 0–0.4)
ABSOLUTE LYMPH #: 4.4 K/UL (ref 1–4.8)
ABSOLUTE MONO #: 1 K/UL (ref 0.1–1.3)
ANION GAP SERPL CALCULATED.3IONS-SCNC: 11 MMOL/L (ref 9–17)
BASOPHILS # BLD: 1 % (ref 0–2)
BASOPHILS ABSOLUTE: 0.1 K/UL (ref 0–0.2)
BUN BLDV-MCNC: 13 MG/DL (ref 6–20)
CALCIUM SERPL-MCNC: 9.1 MG/DL (ref 8.6–10.4)
CHLORIDE BLD-SCNC: 101 MMOL/L (ref 98–107)
CO2: 23 MMOL/L (ref 20–31)
CREAT SERPL-MCNC: 0.56 MG/DL (ref 0.5–0.9)
EOSINOPHILS RELATIVE PERCENT: 3 % (ref 0–4)
GFR AFRICAN AMERICAN: >60 ML/MIN
GFR NON-AFRICAN AMERICAN: >60 ML/MIN
GFR SERPL CREATININE-BSD FRML MDRD: ABNORMAL ML/MIN/{1.73_M2}
GLUCOSE BLD-MCNC: 306 MG/DL (ref 70–99)
HCT VFR BLD CALC: 32.4 % (ref 36–46)
HEMOGLOBIN: 10.5 G/DL (ref 12–16)
LYMPHOCYTES # BLD: 30 % (ref 24–44)
MCH RBC QN AUTO: 25.3 PG (ref 26–34)
MCHC RBC AUTO-ENTMCNC: 32.5 G/DL (ref 31–37)
MCV RBC AUTO: 78 FL (ref 80–100)
MONOCYTES # BLD: 7 % (ref 1–7)
PDW BLD-RTO: 17.4 % (ref 11.5–14.9)
PLATELET # BLD: 600 K/UL (ref 150–450)
PMV BLD AUTO: 6.5 FL (ref 6–12)
POTASSIUM SERPL-SCNC: 4 MMOL/L (ref 3.7–5.3)
RBC # BLD: 4.16 M/UL (ref 4–5.2)
SEG NEUTROPHILS: 59 % (ref 36–66)
SEGMENTED NEUTROPHILS ABSOLUTE COUNT: 8.7 K/UL (ref 1.3–9.1)
SODIUM BLD-SCNC: 135 MMOL/L (ref 135–144)
WBC # BLD: 14.6 K/UL (ref 3.5–11)

## 2022-09-06 PROCEDURE — 73502 X-RAY EXAM HIP UNI 2-3 VIEWS: CPT

## 2022-09-06 PROCEDURE — 99284 EMERGENCY DEPT VISIT MOD MDM: CPT

## 2022-09-06 PROCEDURE — 36415 COLL VENOUS BLD VENIPUNCTURE: CPT

## 2022-09-06 PROCEDURE — 73552 X-RAY EXAM OF FEMUR 2/>: CPT

## 2022-09-06 PROCEDURE — 85025 COMPLETE CBC W/AUTO DIFF WBC: CPT

## 2022-09-06 PROCEDURE — 96374 THER/PROPH/DIAG INJ IV PUSH: CPT

## 2022-09-06 PROCEDURE — 6360000002 HC RX W HCPCS: Performed by: EMERGENCY MEDICINE

## 2022-09-06 PROCEDURE — 73590 X-RAY EXAM OF LOWER LEG: CPT

## 2022-09-06 PROCEDURE — 80048 BASIC METABOLIC PNL TOTAL CA: CPT

## 2022-09-06 PROCEDURE — 70450 CT HEAD/BRAIN W/O DYE: CPT

## 2022-09-06 RX ORDER — MORPHINE SULFATE 4 MG/ML
4 INJECTION, SOLUTION INTRAMUSCULAR; INTRAVENOUS ONCE
Status: COMPLETED | OUTPATIENT
Start: 2022-09-06 | End: 2022-09-06

## 2022-09-06 RX ADMIN — MORPHINE SULFATE 4 MG: 4 INJECTION, SOLUTION INTRAMUSCULAR; INTRAVENOUS at 23:26

## 2022-09-06 ASSESSMENT — ENCOUNTER SYMPTOMS
COUGH: 0
EYE DISCHARGE: 0
EYE REDNESS: 0
NAUSEA: 0
CHEST TIGHTNESS: 0
BACK PAIN: 0
FACIAL SWELLING: 0
WHEEZING: 0
EYE PAIN: 0
VOMITING: 0
CONSTIPATION: 0
DIARRHEA: 0
SORE THROAT: 0
SINUS PRESSURE: 0
RHINORRHEA: 0
COLOR CHANGE: 0
SHORTNESS OF BREATH: 0
BLOOD IN STOOL: 0
TROUBLE SWALLOWING: 0
ABDOMINAL PAIN: 0

## 2022-09-06 ASSESSMENT — PAIN SCALES - GENERAL: PAINLEVEL_OUTOF10: 10

## 2022-09-07 ENCOUNTER — APPOINTMENT (OUTPATIENT)
Dept: CT IMAGING | Age: 52
End: 2022-09-07
Payer: COMMERCIAL

## 2022-09-07 VITALS
SYSTOLIC BLOOD PRESSURE: 157 MMHG | OXYGEN SATURATION: 93 % | HEIGHT: 63 IN | DIASTOLIC BLOOD PRESSURE: 93 MMHG | BODY MASS INDEX: 35.08 KG/M2 | RESPIRATION RATE: 17 BRPM | TEMPERATURE: 98.4 F | HEART RATE: 84 BPM | WEIGHT: 198 LBS

## 2022-09-07 PROCEDURE — 6370000000 HC RX 637 (ALT 250 FOR IP): Performed by: EMERGENCY MEDICINE

## 2022-09-07 PROCEDURE — 72125 CT NECK SPINE W/O DYE: CPT

## 2022-09-07 RX ORDER — HYDROCODONE BITARTRATE AND ACETAMINOPHEN 5; 325 MG/1; MG/1
1 TABLET ORAL ONCE
Status: COMPLETED | OUTPATIENT
Start: 2022-09-07 | End: 2022-09-07

## 2022-09-07 RX ORDER — HYDROCODONE BITARTRATE AND ACETAMINOPHEN 5; 325 MG/1; MG/1
1 TABLET ORAL EVERY 6 HOURS PRN
Qty: 10 TABLET | Refills: 0 | Status: SHIPPED | OUTPATIENT
Start: 2022-09-07 | End: 2022-09-10

## 2022-09-07 RX ADMIN — HYDROCODONE BITARTRATE AND ACETAMINOPHEN 1 TABLET: 5; 325 TABLET ORAL at 02:20

## 2022-09-07 NOTE — ED NOTES
Mode of arrival (squad #, walk in, police, etc) : squad        Chief complaint(s): LT hip pain, headache, fall        Arrival Note (brief scenario, treatment PTA, etc). : Pt states falling and tripping over her dog. Pt states landing on the LT hip. Pt has a history of LT hip fracture with pins. Pt states hitting her head no LOC. Pt states having a hx of spinal fusions. Pt states being on blood thinners. Pt was placed in a c-collar upon arrival to the ER. Pt has no further complaints at this time. Pt is A&O x4.         C= \"Have you ever felt that you should Cut down on your drinking? \"  No  A= \"Have people Annoyed you by criticizing your drinking? \"  No  G= \"Have you ever felt bad or Guilty about your drinking? \"  No  E= \"Have you ever had a drink as an Eye-opener first thing in the morning to steady your nerves or to help a hangover? \"  No      Deferred []      Reason for deferring: N/A    *If yes to two or more: probable alcohol abuse. Melody Da Silva  09/06/22 [id number]

## 2022-09-07 NOTE — ED PROVIDER NOTES
[address]  eMERGENCY dEPARTMENT eNCOUnter      Pt Name: Marina Ba  MRN: [id number]  [place] 1970  Date of evaluation: 9/6/22      CHIEF COMPLAINT       Chief Complaint   Patient presents with    Fall    Hip Pain    Headache         HISTORY OF PRESENT ILLNESS    Marina Ba is a 54 y.o. female who presents complaining of fall. Patient states that she tripped over something fell and landed on her left side. Patient states she broke that hip a couple years ago. Patient states she is having severe pain and unable to move that leg. Patient also states that she feels tingling in that leg. Patient states that she did hit her head but no loss of consciousness no other injuries. Patient states that her neck does hurt a little bit more than what her chronic pain is but not very much. No chest pain shortness of breath or abdominal pain. REVIEW OF SYSTEMS       Review of Systems   Constitutional:  Negative for activity change, appetite change, chills, diaphoresis and fever. HENT:  Negative for congestion, ear pain, facial swelling, nosebleeds, rhinorrhea, sinus pressure, sore throat and trouble swallowing. Eyes:  Negative for pain, discharge and redness. Respiratory:  Negative for cough, chest tightness, shortness of breath and wheezing. Cardiovascular:  Negative for chest pain, palpitations and leg swelling. Gastrointestinal:  Negative for abdominal pain, blood in stool, constipation, diarrhea, nausea and vomiting. Genitourinary:  Negative for difficulty urinating, dysuria, flank pain, frequency, genital sores and hematuria. Musculoskeletal:  Positive for neck pain. Negative for arthralgias, back pain, gait problem, joint swelling and myalgias. Fall with injury to left leg   Skin:  Negative for color change, pallor, rash and wound. Neurological:  Negative for dizziness, tremors, seizures, syncope, speech difficulty, weakness, numbness and headaches. Psychiatric/Behavioral:  Negative for confusion, decreased concentration, hallucinations, self-injury, sleep disturbance and suicidal ideas. PAST MEDICAL HISTORY     Past Medical History:   Diagnosis Date    Anxiety     Arthritis     CAD (coronary artery disease)     Depression     Hyperlipidemia     Kidney stone     Langerhans-cell granulomatosis ([facility] [address]     Myocardial infarct ([facility] [address] 2016    Neck pain     Spinal stenosis 2017       SURGICAL HISTORY       Past Surgical History:   Procedure Laterality Date    BACK SURGERY      L5-S1, C5-7    CARDIAC CATHETERIZATION       SECTION      COLONOSCOPY N/A 2021    COLONOSCOPY WITH BIOPSY performed by Sheyla Christensen MD at [facility] Left     LUNG BIOPSY Right     MASTOID SURGERY      TONSILLECTOMY      UPPER GASTROINTESTINAL ENDOSCOPY  2021    EGD BIOPSY performed by Sheyla Christensen MD at [facility]       Previous Medications    ACETAMINOPHEN (TYLENOL) 500 MG TABLET    Take 2 tablets by mouth every 6 hours as needed for Pain    ALBUTEROL (PROVENTIL) (2.5 MG/3ML) 0.083% NEBULIZER SOLUTION    Take 2.5 mg by nebulization daily as needed for Wheezing    ASCORBIC ACID (VITAMIN C) 500 MG TABLET    Take 500 mg by mouth daily    ASPIRIN 81 MG CHEWABLE TABLET    Take 81 mg by mouth daily    ATORVASTATIN (LIPITOR) 80 MG TABLET    Take 1 tablet by mouth daily    BLOOD GLUCOSE MONITOR KIT AND SUPPLIES    Dispense sufficient amount for BID testing frequency plus additional to accommodate PRN testing needs. Dispense all needed supplies to include: monitor, strips, lancing device, lancets, control solutions, alcohol swabs.     BUSPIRONE (BUSPAR) 15 MG TABLET    Take 7.5 mg by mouth 3 times daily    CALCIUM CARBONATE (OS-BRANNON) 1250 (500 CA) MG CHEWABLE TABLET    Take 500-1,000 mg by mouth 3 times daily    CHOLECALCIFEROL (VITAMIN D) 50 MCG (2000 UT) TABS TABLET    Take 2,000 Units by mouth daily     CLOPIDOGREL (PLAVIX) 75 MG TABLET Take 1 tablet by mouth daily    DULAGLUTIDE (TRULICITY) 3 AQ/7.2QC SOPN    Inject 3 mg into the skin once a week    FLUTICASONE (FLONASE) 50 MCG/ACT NASAL SPRAY    1 spray by Each Nostril route daily as needed for Rhinitis    INSULIN DETEMIR (LEVEMIR FLEXTOUCH) 100 UNIT/ML INJECTION PEN    Inject 26 Units into the skin nightly    INSULIN PEN NEEDLE 31G X 5 MM MISC    Use pen needle for giving daily insulin injection    LEVOTHYROXINE (SYNTHROID) 50 MCG TABLET    Take 1 tablet by mouth Daily    METOPROLOL SUCCINATE (TOPROL XL) 100 MG EXTENDED RELEASE TABLET    take 1 tablet by mouth twice a day    MIRABEGRON (MYRBETRIQ) 50 MG TB24    Take 50 mg by mouth daily    MULTIPLE VITAMINS-MINERALS (THERAPEUTIC MULTIVITAMIN-MINERALS) TABLET    Take 1 tablet by mouth daily    NITROGLYCERIN (NITROSTAT) 0.4 MG SL TABLET    place 1 tablet under the tongue if needed every 5 minutes for chest pain for 3 doses IF NO RELIEF AFTER THIRD DOSE CALL PRESCRIBER . OMEPRAZOLE (PRILOSEC) 40 MG DELAYED RELEASE CAPSULE    Take 1 capsule by mouth every morning (before breakfast)    POLYETHYLENE GLYCOL (GLYCOLAX) 17 G PACKET    Take 17 g by mouth daily as needed     RANOLAZINE (RANEXA) 500 MG EXTENDED RELEASE TABLET    Take 1 tablet by mouth 2 times daily    SERTRALINE (ZOLOFT) 100 MG TABLET    Take 1.5 tablets by mouth daily    TIZANIDINE (ZANAFLEX) 2 MG TABLET    take 1 tablet by mouth three times a day if needed    TRUE METRIX BLOOD GLUCOSE TEST STRIP    use 1 TEST STRIP to TEST BLOOD SUGAR twice a day and if needed    TRUEPLUS LANCETS 30G MISC    use 1 LANCET to TEST BLOOD SUGAR twice a day and if needed       ALLERGIES     is allergic to seasonal and keflex [cephalexin]. SOCIAL HISTORY      reports that she has quit smoking. Her smoking use included cigarettes. She has a 12.50 pack-year smoking history. She has never used smokeless tobacco. She reports current alcohol use. She reports current drug use.  Frequency: 7.00 times per week. Drug: Marijuana Bibi Tate). PHYSICAL EXAM     INITIAL VITALS: BP (!) 157/93   Pulse 84   Temp 98.4 °F (36.9 °C) (Oral)   Resp 17   Ht 5' 3\" (1.6 m)   Wt 198 lb (89.8 kg)   SpO2 93%   BMI 35.07 kg/m²      Physical Exam  Vitals and nursing note reviewed. Constitutional:       General: She is not in acute distress. Appearance: She is well-developed. She is not diaphoretic. HENT:      Head: Normocephalic and atraumatic. Eyes:      General: No scleral icterus. Right eye: No discharge. Left eye: No discharge. Conjunctiva/sclera: Conjunctivae normal.      Pupils: Pupils are equal, round, and reactive to light. Cardiovascular:      Rate and Rhythm: Normal rate and regular rhythm. Heart sounds: Normal heart sounds. No murmur heard. No friction rub. No gallop. Pulmonary:      Effort: Pulmonary effort is normal. No respiratory distress. Breath sounds: Normal breath sounds. No wheezing or rales. Chest:      Chest wall: No tenderness. Abdominal:      General: Bowel sounds are normal. There is no distension. Palpations: Abdomen is soft. There is no mass. Tenderness: There is no abdominal tenderness. There is no guarding or rebound. Musculoskeletal:      Cervical back: Signs of trauma and tenderness present. No swelling, edema, deformity, erythema, lacerations, rigidity, spasms, torticollis, bony tenderness or crepitus. No pain with movement. Decreased range of motion (C-collar in place). Left hip: Tenderness and bony tenderness present. No deformity, lacerations or crepitus. Decreased range of motion. Normal strength. Left upper leg: Tenderness and bony tenderness present. No swelling, edema, deformity or lacerations. Left lower leg: Tenderness and bony tenderness present. No swelling, deformity or lacerations. No edema. Skin:     General: Skin is warm and dry. Coloration: Skin is not pale. Findings: No erythema or rash. Articulation of the knee is maintained. 1.  Fixation screws at the left femoral neck are stable. No acute fracture or dislocation at the left hip. 2.  No fracture of the femoral shaft. XR TIBIA FIBULA LEFT (2 VIEWS)    Result Date: 9/7/2022  EXAMINATION: 2 XRAY VIEWS OF THE LEFT TIBIA AND FIBULA 9/7/2022 12:14 am COMPARISON: None. HISTORY: ORDERING SYSTEM PROVIDED HISTORY: Fall, pain TECHNOLOGIST PROVIDED HISTORY: Fall, pain Reason for Exam: Fall, pain Additional signs and symptoms: Fall, pain Relevant Medical/Surgical History: Fall, pain FINDINGS: No fracture, dislocation or joint abnormality identified. Bone density is normal.  There may be soft tissue injury lateral to the knee. Possible soft tissue injury lateral to the knee but otherwise unremarkable exam.     CT HEAD WO CONTRAST    Result Date: 9/7/2022  EXAMINATION: CT OF THE HEAD WITHOUT CONTRAST  9/6/2022 11:58 pm TECHNIQUE: CT of the head was performed without the administration of intravenous contrast. Automated exposure control, iterative reconstruction, and/or weight based adjustment of the mA/kV was utilized to reduce the radiation dose to as low as reasonably achievable. COMPARISON: None. HISTORY: ORDERING SYSTEM PROVIDED HISTORY: Trauma TECHNOLOGIST PROVIDED HISTORY: Trauma Decision Support Exception - unselect if not a suspected or confirmed emergency medical condition->Emergency Medical Condition (MA) Is the patient pregnant?->No Reason for Exam: pt. states \"My dog tripped me and I fell tonight. \" Relevant Medical/Surgical History: pt. c/o pain to posterior head and neck. hx. of previous falls FINDINGS: BRAIN/VENTRICLES: There is no acute intracranial hemorrhage, mass effect or midline shift. No abnormal extra-axial fluid collection. The gray-white differentiation is maintained without evidence of an acute infarct. There is no evidence of hydrocephalus. ORBITS: The visualized portion of the orbits demonstrate no acute abnormality. SINUSES: The visualized paranasal sinuses and mastoid air cells demonstrate no acute abnormality. SOFT TISSUES/SKULL:  No acute abnormality of the visualized skull or soft tissues. No acute intracranial abnormality. CT CERVICAL SPINE WO CONTRAST    Result Date: 9/7/2022  EXAMINATION: CT OF THE CERVICAL SPINE WITHOUT CONTRAST 9/6/2022 11:58 pm TECHNIQUE: CT of the cervical spine was performed without the administration of intravenous contrast. Multiplanar reformatted images are provided for review. Automated exposure control, iterative reconstruction, and/or weight based adjustment of the mA/kV was utilized to reduce the radiation dose to as low as reasonably achievable. COMPARISON: None. HISTORY: ORDERING SYSTEM PROVIDED HISTORY: Fall, pain TECHNOLOGIST PROVIDED HISTORY: Fall, pain Decision Support Exception - unselect if not a suspected or confirmed emergency medical condition->Emergency Medical Condition (MA) Is the patient pregnant?->No Reason for Exam: pt. states \"My dog tripped me and I fell tonight. \" Relevant Medical/Surgical History: c/o pain to posterior head and neck. hx. of previous falls FINDINGS: BONES/ALIGNMENT: No acute fracture or traumatic malalignment. The patient is status post intradiscal fusion at C4-5, C5-6 and C6-7. Associated bony fusion is well-developed. Incidental note of intact canal wall right mastoidectomy. DEGENERATIVE CHANGES: No significant degenerative changes. SOFT TISSUES: There is no prevertebral soft tissue swelling. No acute abnormality of the cervical spine. LABS: All lab results were reviewed by myself, and all abnormals are listed below.   Labs Reviewed   CBC WITH AUTO DIFFERENTIAL - Abnormal; Notable for the following components:       Result Value    WBC 14.6 (*)     Hemoglobin 10.5 (*)     Hematocrit 32.4 (*)     MCV 78.0 (*)     MCH 25.3 (*)     RDW 17.4 (*)     Platelets 720 (*)     All other components within normal limits   BASIC METABOLIC PANEL - Abnormal; Notable for the following components:    Glucose 306 (*)     All other components within normal limits         MEDICAL DECISION MAKING:     Patient had a mechanical fall with some injuries will get x-rays and a CT scan. EMERGENCY DEPARTMENT COURSE:   Vitals:    Vitals:    09/06/22 2301 09/06/22 2330 09/07/22 0130   BP: (!) 148/94 (!) 149/87 (!) 157/93   Pulse: 95 97 84   Resp: 10 12 17   Temp: 98.4 °F (36.9 °C)     TempSrc: Oral     SpO2: 95% 96% 93%   Weight: 198 lb (89.8 kg)     Height: 5' 3\" (1.6 m)         The patient was given the following medications while in the emergency department:  Orders Placed This Encounter   Medications    morphine sulfate (PF) injection 4 mg    HYDROcodone-acetaminophen (NORCO) 5-325 MG per tablet     Sig: Take 1 tablet by mouth every 6 hours as needed for Pain for up to 3 days. Dispense:  10 tablet     Refill:  0    HYDROcodone-acetaminophen (NORCO) 5-325 MG per tablet 1 tablet       -------------------------  2:12 AM EDT  Patient was updated on results and plan of care. I did remove the c-collar. Patient is okay for discharge with some pain medication. CONSULTS:  None    PROCEDURES:  None    FINAL IMPRESSION      1. Fall, initial encounter    2. Left hip pain    3. Closed head injury, initial encounter          DISPOSITION/PLAN   DISPOSITION Decision To Discharge 09/07/2022 02:09:09 AM      PATIENT REFERREDTO:  Jil Gimenez APRN - CNP  [address]    In 1 week      [facility] ED  [facility] [address]  [address] [address] [phone]    If symptoms worsen    DISCHARGEMEDICATIONS:  New Prescriptions    HYDROCODONE-ACETAMINOPHEN (NORCO) 5-325 MG PER TABLET    Take 1 tablet by mouth every 6 hours as needed for Pain for up to 3 days.        (Please note that portions of this note were completed with a voice recognition program.  Efforts were made to edit thedictations but occasionally words are mis-transcribed.)    Guido Floyd MD  Attending Emergency Physician                        Guido Floyd MD  09/07/22 [id number]

## 2022-10-25 ENCOUNTER — APPOINTMENT (OUTPATIENT)
Dept: ULTRASOUND IMAGING | Age: 52
End: 2022-10-25
Payer: COMMERCIAL

## 2022-10-25 ENCOUNTER — HOSPITAL ENCOUNTER (EMERGENCY)
Age: 52
Discharge: HOME | End: 2022-10-25
Attending: EMERGENCY MEDICINE
Payer: COMMERCIAL

## 2022-10-25 VITALS
OXYGEN SATURATION: 97 % | RESPIRATION RATE: 17 BRPM | SYSTOLIC BLOOD PRESSURE: 187 MMHG | HEART RATE: 102 BPM | TEMPERATURE: 98.8 F | DIASTOLIC BLOOD PRESSURE: 108 MMHG

## 2022-10-25 DIAGNOSIS — N93.9 ABNORMAL UTERINE BLEEDING (AUB): ICD-10-CM

## 2022-10-25 DIAGNOSIS — D25.9 UTERINE LEIOMYOMA, UNSPECIFIED LOCATION: Primary | ICD-10-CM

## 2022-10-25 DIAGNOSIS — N30.00 ACUTE CYSTITIS WITHOUT HEMATURIA: ICD-10-CM

## 2022-10-25 LAB
ABO/RH: NORMAL
ABSOLUTE EOS #: 0.36 K/UL (ref 0–0.44)
ABSOLUTE IMMATURE GRANULOCYTE: 0.04 K/UL (ref 0–0.3)
ABSOLUTE LYMPH #: 3.88 K/UL (ref 1.1–3.7)
ABSOLUTE MONO #: 0.63 K/UL (ref 0.1–1.2)
ALBUMIN SERPL-MCNC: 4.1 G/DL (ref 3.5–5.2)
ALBUMIN/GLOBULIN RATIO: 1.3 (ref 1–2.5)
ALP BLD-CCNC: 75 U/L (ref 35–104)
ALT SERPL-CCNC: 13 U/L (ref 5–33)
ANION GAP SERPL CALCULATED.3IONS-SCNC: 13 MMOL/L (ref 9–17)
ANTIBODY SCREEN: NEGATIVE
ARM BAND NUMBER: NORMAL
AST SERPL-CCNC: 11 U/L
BASOPHILS # BLD: 0 % (ref 0–2)
BASOPHILS ABSOLUTE: 0.03 K/UL (ref 0–0.2)
BILIRUB SERPL-MCNC: 0.2 MG/DL (ref 0.3–1.2)
BILIRUBIN URINE: ABNORMAL
BUN BLDV-MCNC: 9 MG/DL (ref 6–20)
CALCIUM SERPL-MCNC: 8.7 MG/DL (ref 8.6–10.4)
CHLORIDE BLD-SCNC: 101 MMOL/L (ref 98–107)
CO2: 22 MMOL/L (ref 20–31)
COLOR: ABNORMAL
CREAT SERPL-MCNC: 0.55 MG/DL (ref 0.5–0.9)
EOSINOPHILS RELATIVE PERCENT: 4 % (ref 1–4)
EPITHELIAL CELLS UA: NORMAL /HPF (ref 0–5)
EXPIRATION DATE: NORMAL
GFR SERPL CREATININE-BSD FRML MDRD: >60 ML/MIN/1.73M2
GLUCOSE BLD-MCNC: 246 MG/DL (ref 70–99)
GLUCOSE URINE: ABNORMAL
HCG QUALITATIVE: NEGATIVE
HCT VFR BLD CALC: 38.4 % (ref 36.3–47.1)
HEMOGLOBIN: 11.9 G/DL (ref 11.9–15.1)
IMMATURE GRANULOCYTES: 0 %
INR BLD: 1.1
KETONES, URINE: NEGATIVE
LEUKOCYTE ESTERASE, URINE: ABNORMAL
LYMPHOCYTES # BLD: 40 % (ref 24–43)
MCH RBC QN AUTO: 24.8 PG (ref 25.2–33.5)
MCHC RBC AUTO-ENTMCNC: 31 G/DL (ref 28.4–34.8)
MCV RBC AUTO: 80.2 FL (ref 82.6–102.9)
MONOCYTES # BLD: 7 % (ref 3–12)
NITRITE, URINE: POSITIVE
NRBC AUTOMATED: 0 PER 100 WBC
PDW BLD-RTO: 15.2 % (ref 11.8–14.4)
PH UA: 5 (ref 5–8)
PLATELET # BLD: 577 K/UL (ref 138–453)
PMV BLD AUTO: 8.7 FL (ref 8.1–13.5)
POTASSIUM SERPL-SCNC: 4.1 MMOL/L (ref 3.7–5.3)
PROTEIN UA: ABNORMAL
PROTHROMBIN TIME: 11.2 SEC (ref 9.1–12.3)
RBC # BLD: 4.79 M/UL (ref 3.95–5.11)
RBC # BLD: ABNORMAL 10*6/UL
RBC UA: NORMAL /HPF (ref 0–4)
SEG NEUTROPHILS: 49 % (ref 36–65)
SEGMENTED NEUTROPHILS ABSOLUTE COUNT: 4.77 K/UL (ref 1.5–8.1)
SODIUM BLD-SCNC: 136 MMOL/L (ref 135–144)
SPECIFIC GRAVITY UA: 1.02 (ref 1–1.03)
TOTAL PROTEIN: 7.3 G/DL (ref 6.4–8.3)
TROPONIN, HIGH SENSITIVITY: 8 NG/L (ref 0–14)
TURBIDITY: ABNORMAL
URINE HGB: ABNORMAL
UROBILINOGEN, URINE: NORMAL
WBC # BLD: 9.7 K/UL (ref 3.5–11.3)
WBC UA: NORMAL /HPF (ref 0–5)

## 2022-10-25 PROCEDURE — 80053 COMPREHEN METABOLIC PANEL: CPT

## 2022-10-25 PROCEDURE — 96375 TX/PRO/DX INJ NEW DRUG ADDON: CPT

## 2022-10-25 PROCEDURE — 84703 CHORIONIC GONADOTROPIN ASSAY: CPT

## 2022-10-25 PROCEDURE — 85025 COMPLETE CBC W/AUTO DIFF WBC: CPT

## 2022-10-25 PROCEDURE — 85610 PROTHROMBIN TIME: CPT

## 2022-10-25 PROCEDURE — 86901 BLOOD TYPING SEROLOGIC RH(D): CPT

## 2022-10-25 PROCEDURE — 76830 TRANSVAGINAL US NON-OB: CPT

## 2022-10-25 PROCEDURE — 81001 URINALYSIS AUTO W/SCOPE: CPT

## 2022-10-25 PROCEDURE — 2500000003 HC RX 250 WO HCPCS: Performed by: EMERGENCY MEDICINE

## 2022-10-25 PROCEDURE — 6370000000 HC RX 637 (ALT 250 FOR IP): Performed by: EMERGENCY MEDICINE

## 2022-10-25 PROCEDURE — 93005 ELECTROCARDIOGRAM TRACING: CPT | Performed by: EMERGENCY MEDICINE

## 2022-10-25 PROCEDURE — 86900 BLOOD TYPING SEROLOGIC ABO: CPT

## 2022-10-25 PROCEDURE — 96376 TX/PRO/DX INJ SAME DRUG ADON: CPT

## 2022-10-25 PROCEDURE — 84484 ASSAY OF TROPONIN QUANT: CPT

## 2022-10-25 PROCEDURE — 2580000003 HC RX 258: Performed by: EMERGENCY MEDICINE

## 2022-10-25 PROCEDURE — 99284 EMERGENCY DEPT VISIT MOD MDM: CPT

## 2022-10-25 PROCEDURE — 6360000002 HC RX W HCPCS: Performed by: EMERGENCY MEDICINE

## 2022-10-25 PROCEDURE — 86850 RBC ANTIBODY SCREEN: CPT

## 2022-10-25 PROCEDURE — 96365 THER/PROPH/DIAG IV INF INIT: CPT

## 2022-10-25 RX ORDER — TRANEXAMIC ACID 10 MG/ML
1000 INJECTION, SOLUTION INTRAVENOUS ONCE
Status: COMPLETED | OUTPATIENT
Start: 2022-10-25 | End: 2022-10-25

## 2022-10-25 RX ORDER — KETOROLAC TROMETHAMINE 15 MG/ML
15 INJECTION, SOLUTION INTRAMUSCULAR; INTRAVENOUS ONCE
Status: COMPLETED | OUTPATIENT
Start: 2022-10-25 | End: 2022-10-25

## 2022-10-25 RX ORDER — FENTANYL CITRATE 50 UG/ML
25 INJECTION, SOLUTION INTRAMUSCULAR; INTRAVENOUS ONCE
Status: COMPLETED | OUTPATIENT
Start: 2022-10-25 | End: 2022-10-25

## 2022-10-25 RX ORDER — LORAZEPAM 0.5 MG/1
0.5 TABLET ORAL ONCE
Status: COMPLETED | OUTPATIENT
Start: 2022-10-25 | End: 2022-10-25

## 2022-10-25 RX ORDER — TRANEXAMIC ACID 650 MG/1
1300 TABLET ORAL 3 TIMES DAILY
Qty: 30 TABLET | Refills: 0 | Status: SHIPPED | OUTPATIENT
Start: 2022-10-25 | End: 2022-11-07

## 2022-10-25 RX ORDER — 0.9 % SODIUM CHLORIDE 0.9 %
1000 INTRAVENOUS SOLUTION INTRAVENOUS ONCE
Status: COMPLETED | OUTPATIENT
Start: 2022-10-25 | End: 2022-10-25

## 2022-10-25 RX ORDER — SULFAMETHOXAZOLE AND TRIMETHOPRIM 800; 160 MG/1; MG/1
1 TABLET ORAL 2 TIMES DAILY
Qty: 14 TABLET | Refills: 0 | Status: ON HOLD | OUTPATIENT
Start: 2022-10-25 | End: 2022-10-29 | Stop reason: HOSPADM

## 2022-10-25 RX ORDER — OXYCODONE HYDROCHLORIDE AND ACETAMINOPHEN 5; 325 MG/1; MG/1
1 TABLET ORAL EVERY 8 HOURS PRN
Qty: 9 TABLET | Refills: 0 | Status: SHIPPED | OUTPATIENT
Start: 2022-10-25 | End: 2022-10-25 | Stop reason: SDUPTHER

## 2022-10-25 RX ORDER — OXYCODONE HYDROCHLORIDE AND ACETAMINOPHEN 5; 325 MG/1; MG/1
1 TABLET ORAL ONCE
Status: COMPLETED | OUTPATIENT
Start: 2022-10-25 | End: 2022-10-25

## 2022-10-25 RX ORDER — OXYCODONE HYDROCHLORIDE AND ACETAMINOPHEN 5; 325 MG/1; MG/1
1 TABLET ORAL EVERY 8 HOURS PRN
Qty: 9 TABLET | Refills: 0 | Status: SHIPPED | OUTPATIENT
Start: 2022-10-25 | End: 2022-10-29 | Stop reason: HOSPADM

## 2022-10-25 RX ADMIN — SODIUM CHLORIDE 1000 ML: 9 INJECTION, SOLUTION INTRAVENOUS at 09:19

## 2022-10-25 RX ADMIN — OXYCODONE HYDROCHLORIDE AND ACETAMINOPHEN 1 TABLET: 5; 325 TABLET ORAL at 12:46

## 2022-10-25 RX ADMIN — KETOROLAC TROMETHAMINE 15 MG: 15 INJECTION, SOLUTION INTRAMUSCULAR; INTRAVENOUS at 11:11

## 2022-10-25 RX ADMIN — FENTANYL CITRATE 25 MCG: 50 INJECTION, SOLUTION INTRAMUSCULAR; INTRAVENOUS at 09:18

## 2022-10-25 RX ADMIN — LORAZEPAM 0.5 MG: 0.5 TABLET ORAL at 13:53

## 2022-10-25 RX ADMIN — FENTANYL CITRATE 25 MCG: 50 INJECTION, SOLUTION INTRAMUSCULAR; INTRAVENOUS at 11:09

## 2022-10-25 RX ADMIN — TRANEXAMIC ACID 1000 MG: 10 INJECTION, SOLUTION INTRAVENOUS at 12:59

## 2022-10-25 ASSESSMENT — ENCOUNTER SYMPTOMS
DIARRHEA: 0
NAUSEA: 0
ABDOMINAL PAIN: 0
SORE THROAT: 0
SHORTNESS OF BREATH: 0
BACK PAIN: 0
VOMITING: 0
RHINORRHEA: 0
EYE REDNESS: 0
COUGH: 0
EYE PAIN: 0

## 2022-10-25 ASSESSMENT — PAIN SCALES - GENERAL: PAINLEVEL_OUTOF10: 3

## 2022-10-25 NOTE — CONSULTS
[address]    Patient Name: Jony Corrigan     Patient : 1970  Room/Bed:   Admission Date/Time: 10/25/2022  8:34 AM  Primary Care Physician: SAVANA Zuñiga CNP    Consulting Provider: Dr. Nicola Fisher MD  Reason for Consult: Heavy vaginal bleeding, uterine fibroid     CC:   Chief Complaint   Patient presents with    Vaginal Bleeding    Chest Pain    Fatigue    Fever                HPI: Jony Corrigan is a 54 y.o. female [id number] who presented to the ED complaining of heavy vaginal bleeding and abdominal pain. The patient reports the bleeding and pain started yesterday. She began bleeding at 1500 and noted gushes of bright red blood and passage of clots the size of a half dollar in diameter. The pain then started shortly after and has progressively worsened overnight. She reports the pain is pressure in her pelvis and abdomen with intermittent sharp pain, midline and left moreso than right. She tried Tylenol consistently, heating pads without any relief. The pain is worse with lots of movement. She denies lightheadedness or dizziness. The patient reports she is still having periods. Her LMP is approximately 1 month ago and she believes this episode of her bleeding is her period, but is much heavier than normal. Her menstrual cycles occur every 28-30 days, lasting 5-7 days, and are very regular. She has moderate-heavy bleeding. She has noted that the pain associated with menstruating and flow has been heavier over time. She has a history of CS x2 and laparoscopic tubal ligation in . No other abdominal surgeries per patient.       REVIEW OF SYSTEMS:   Constitutional: negative fever, negative chills  HEENT: negative visual disturbances, negative headaches  Respiratory: negative dyspnea, negative cough  Cardiovascular: negative chest pain,  negative palpitations  Gastrointestinal: positive abdominal pain, negative RUQ pain, negative N/V, negative diarrhea, negative constipation  Genitourinary: negative dysuria, negative vaginal discharge, positive heavy vaginal bleeding  Dermatological: negative rash  Hematologic: negative bruising  Immunologic/Lymphatic: negative recent illness, negative recent sick contact  Musculoskeletal: negative back pain, negative myalgias, negative arthralgias  Neurological:  negative dizziness, negative weakness  Behavior/Psych: negative depression, negative anxiety    _______________________________________________________________________      OBSTETRIC HISTORY:   OB History   No obstetric history on file. PAST MEDICAL HISTORY:   has a past medical history of Anxiety, Arthritis, CAD (coronary artery disease), Depression, Hyperlipidemia, Kidney stone, Langerhans-cell granulomatosis ([facility] [address] Myocardial infarct [facility]), Neck pain, and Spinal stenosis. PAST SURGICAL HISTORY:   has a past surgical history that includes back surgery;  section; Mastoid surgery; Tonsillectomy; Cardiac catheterization; Lung biopsy (Right); hip surgery (Left); Colonoscopy (N/A, 2021); and Upper gastrointestinal endoscopy (2021). ALLERGIES:  Allergies as of 10/25/2022 - Fully Reviewed 10/25/2022   Allergen Reaction Noted    Seasonal Other (See Comments) 2021    Keflex [cephalexin] Rash 2022       MEDICATIONS:  No current facility-administered medications for this encounter.      Current Outpatient Medications   Medication Sig Dispense Refill    busPIRone (BUSPAR) 15 MG tablet Take 7.5 mg by mouth 3 times daily 30 tablet 1    Dulaglutide (TRULICITY) 3 GG/0.2TX SOPN Inject 3 mg into the skin once a week 5 pen 3    omeprazole (PRILOSEC) 40 MG delayed release capsule Take 1 capsule by mouth every morning (before breakfast) 30 capsule 3    insulin detemir (LEVEMIR FLEXTOUCH) 100 UNIT/ML injection pen Inject 26 Units into the skin nightly 5 pen 3    Insulin Pen Needle 31G X 5 MM MISC Use pen needle for giving daily insulin injection 100 each 3    TRUE METRIX BLOOD GLUCOSE TEST strip use 1 TEST STRIP to TEST BLOOD SUGAR twice a day and if needed 100 strip 3    TRUEplus Lancets 30G MISC use 1 LANCET to TEST BLOOD SUGAR twice a day and if needed 100 each 3    albuterol (PROVENTIL) (2.5 MG/3ML) 0.083% nebulizer solution Take 2.5 mg by nebulization daily as needed for Wheezing      blood glucose monitor kit and supplies Dispense sufficient amount for BID testing frequency plus additional to accommodate PRN testing needs. Dispense all needed supplies to include: monitor, strips, lancing device, lancets, control solutions, alcohol swabs. 1 kit 0    tiZANidine (ZANAFLEX) 2 MG tablet take 1 tablet by mouth three times a day if needed 90 tablet 0    nitroGLYCERIN (NITROSTAT) 0.4 MG SL tablet place 1 tablet under the tongue if needed every 5 minutes for chest pain for 3 doses IF NO RELIEF AFTER THIRD DOSE CALL PRESCRIBER . 25 tablet 0    calcium carbonate (OS-BRANNON) 1250 (500 Ca) MG chewable tablet Take 500-1,000 mg by mouth 3 times daily      levothyroxine (SYNTHROID) 50 MCG tablet Take 1 tablet by mouth Daily 90 tablet 1    metoprolol succinate (TOPROL XL) 100 MG extended release tablet take 1 tablet by mouth twice a day (Patient taking differently: 50 mg  in the morning and 50 mg before bedtime.  take 1 tablet by mouth twice a day.) 180 tablet 1    sertraline (ZOLOFT) 100 MG tablet Take 1.5 tablets by mouth daily 135 tablet 1    ranolazine (RANEXA) 500 MG extended release tablet Take 1 tablet by mouth 2 times daily 180 tablet 1    atorvastatin (LIPITOR) 80 MG tablet Take 1 tablet by mouth daily 90 tablet 1    clopidogrel (PLAVIX) 75 MG tablet Take 1 tablet by mouth daily 90 tablet 1    mirabegron (MYRBETRIQ) 50 MG TB24 Take 50 mg by mouth daily 90 tablet 1    fluticasone (FLONASE) 50 MCG/ACT nasal spray 1 spray by Each Nostril route daily as needed for Rhinitis (Patient not taking: Reported on 7/18/2022)      Multiple Vitamins-Minerals (THERAPEUTIC MULTIVITAMIN-MINERALS) tablet Take 1 tablet by mouth daily      ascorbic acid (VITAMIN C) 500 MG tablet Take 500 mg by mouth daily      polyethylene glycol (GLYCOLAX) 17 g packet Take 17 g by mouth daily as needed       acetaminophen (TYLENOL) 500 MG tablet Take 2 tablets by mouth every 6 hours as needed for Pain 30 tablet 0    Cholecalciferol (VITAMIN D) 50 MCG (2000 UT) TABS tablet Take 2,000 Units by mouth daily       aspirin 81 MG chewable tablet Take 81 mg by mouth daily         FAMILY HISTORY:  family history includes Alzheimer's Disease in her paternal grandfather; COPD in her mother; Coronary Art Dis in her mother; Dementia in her mother; Depression in her mother; Diabetes in her maternal grandmother and mother; Heart Attack in her father; High Blood Pressure in her mother. SOCIAL HISTORY:   reports that she has quit smoking. Her smoking use included cigarettes. She has a 12.50 pack-year smoking history. She has never used smokeless tobacco. She reports current alcohol use. She reports current drug use. Frequency: 7.00 times per week. Drug: Marijuana [address]). ________________________________________________________________________                                    Raysa Sprout:  Vitals:    10/25/22 0853   BP: (!) 187/108   Pulse: (!) 102   Resp: 17   Temp: 98.8 °F (37.1 °C)   TempSrc: Oral   SpO2: 97%                                                                                                                                                                               PHYSICAL EXAM:     General Appearance: Appears healthy. Alert; in moderate distress. Pleasant. Skin: Skin color, texture, turgor normal. No rashes or lesions. Lymphatic: No abnormally enlarged lymph nodes.   Neck and EENT: normal atraumatic, no neck masses, normal thyroid  Respiratory: Nonlabored respirations, no conversational dyspnea  Cardiovascular: Regular rate, distal pulses intact, noncyanotic extremities  Abdomen: Soft, mildly tender to palpation in the suprapubic region and left lower quadrant, no rebound, guarding, or rigidity  Pelvic Exam:   Chaperone for Intimate Exam: Chaperone was present for entire exam, Chaperone Name: Isael Gillespie RN  Small mobile cervix, fullness and firmness of the lower uterine segment that is mobile and free from the pelvic sidewalls. Enlarged, bulky mobile uterus. Dark blood noted.   Musculoskeletal: no gross abnormalities  Extremities: non-tender BLE and non-edematous  Psych:  oriented to time, place and person, mood and affect are within normal limits       LAB RESULTS:  Results for orders placed or performed during the hospital encounter of 10/25/22   CBC with Auto Differential   Result Value Ref Range    WBC 9.7 3.5 - 11.3 k/uL    RBC 4.79 3.95 - 5.11 m/uL    Hemoglobin 11.9 11.9 - 15.1 g/dL    Hematocrit 38.4 36.3 - 47.1 %    MCV 80.2 (L) 82.6 - 102.9 fL    MCH 24.8 (L) 25.2 - 33.5 pg    MCHC 31.0 28.4 - 34.8 g/dL    RDW 15.2 (H) 11.8 - 14.4 %    Platelets 762 (H) 234 - 453 k/uL    MPV 8.7 8.1 - 13.5 fL    NRBC Automated 0.0 0.0 per 100 WBC    Seg Neutrophils 49 36 - 65 %    Lymphocytes 40 24 - 43 %    Monocytes 7 3 - 12 %    Eosinophils % 4 1 - 4 %    Basophils 0 0 - 2 %    Immature Granulocytes 0 0 %    Segs Absolute 4.77 1.50 - 8.10 k/uL    Absolute Lymph # 3.88 (H) 1.10 - 3.70 k/uL    Absolute Mono # 0.63 0.10 - 1.20 k/uL    Absolute Eos # 0.36 0.00 - 0.44 k/uL    Basophils Absolute 0.03 0.00 - 0.20 k/uL    Absolute Immature Granulocyte 0.04 0.00 - 0.30 k/uL    RBC Morphology ANISOCYTOSIS PRESENT    CMP   Result Value Ref Range    Glucose 246 (H) 70 - 99 mg/dL    BUN 9 6 - 20 mg/dL    Creatinine 0.55 0.50 - 0.90 mg/dL    Est, Glom Filt Rate >60 >60 mL/min/1.73m2    Calcium 8.7 8.6 - 10.4 mg/dL    Sodium 136 135 - 144 mmol/L    Potassium 4.1 3.7 - 5.3 mmol/L    Chloride 101 98 - 107 mmol/L    CO2 22 20 - 31 mmol/L    Anion Gap 13 9 - 17 mmol/L    Alkaline Phosphatase 75 35 - 104 U/L    ALT 13 5 - 33 U/L    AST 11 <32 U/L    Total Bilirubin 0.2 (L) 0.3 - 1.2 mg/dL    Total Protein 7.3 6.4 - 8.3 g/dL    Albumin 4.1 3.5 - 5.2 g/dL    Albumin/Globulin Ratio 1.3 1.0 - 2.5   Troponin   Result Value Ref Range    Troponin, High Sensitivity 8 0 - 14 ng/L   Protime-INR   Result Value Ref Range    Protime 11.2 9.1 - 12.3 sec    INR 1.1    Urinalysis   Result Value Ref Range    Color, UA Red (A) Yellow    Turbidity UA Turbid (A) Clear    Glucose, Ur 3+ (A) NEGATIVE    Bilirubin Urine NEGATIVE  Verified by ictotest. (A) NEGATIVE    Ketones, Urine NEGATIVE NEGATIVE    Specific Gravity, UA 1.017 1.005 - 1.030    Urine Hgb LARGE (A) NEGATIVE    pH, UA 5.0 5.0 - 8.0    Protein, UA 3+ (A) NEGATIVE    Urobilinogen, Urine Normal Normal    Nitrite, Urine POSITIVE (A) NEGATIVE    Leukocyte Esterase, Urine MODERATE (A) NEGATIVE   HCG Qualitative, Serum   Result Value Ref Range    hCG Qual NEGATIVE NEGATIVE   Microscopic Urinalysis   Result Value Ref Range    WBC, UA 5 TO 10 0 - 5 /HPF    RBC, UA TOO NUMEROUS TO COUNT 0 - 4 /HPF    Epithelial Cells UA 2 TO 5 0 - 5 /HPF   TYPE AND SCREEN   Result Value Ref Range    Expiration Date 10/28/2022,2359     Arm Band Number BE [id number]     ABO/Rh A POSITIVE     Antibody Screen NEGATIVE          DIAGNOSTICS:  US NON OB TRANSVAGINAL    Result Date: 10/25/2022  EXAMINATION: PELVIC ULTRASOUND 10/25/2022 TECHNIQUE: Transabdominal pelvic ultrasound duplex ultrasound using B-mode/gray scaled imaging, Doppler spectral analysis and color flow Doppler was obtained. COMPARISON: CT abdomen pelvis from 07/09/2022 HISTORY: ORDERING SYSTEM PROVIDED HISTORY: vaginal bleeding, left adnexal tenderness TECHNOLOGIST PROVIDED HISTORY: vaginal bleeding, left adnexal tenderness 53-year-old female with vaginal bleeding, left adnexal tenderness FINDINGS: Measurements: Patient's LMP is 10/24/2022. Uterus: 10.1 x 6.7 x 7.7 cm. Endometrial stripe: 6 mm. Right Ovary:3.2 x 1.7 x 3.4 cm. Left Ovary: Not visualized. Ultrasound Findings: Exam is limited due to patient's pain. Uterus: Uterus demonstrates heterogeneous echotexture. Large hypoechoic uterine fibroid at the fundus measuring 4.4 x 4.4 x 6.6 cm. Endometrial stripe: Endometrial stripe is within normal limits. Nabothian cysts in the cervix. Right Ovary: Right ovary is within normal limits. Right ovarian follicles. Left Ovary:  Not visualized. Free Fluid: No evidence of free fluid. 1. Large hypoechoic uterine fibroid at the fundus measuring up to 6.6 cm. 2. Nonvisualization of the left ovary. 3. Right ovarian follicles. 4. Endometrial stripe thickness measuring 6 mm, within normal limits. ASSESSMENT & PLAN:  Lilly Rush is a 54 y.o. female [id number] with Heavy vaginal bleeding and abdominal pain   - Patient presented to the ED complaining of 24 hours of severe abdominal pain and heavy vaginal bleeding   - Bleeding correlates with onset of menses, patient reports normally regular cycles occurring every 28-30 days, lasting 5-7 days   - BP hypertensive on arrival, VS otherwise stable  - Exam notable for significant pelvic pain and tenderness on exam, bulky uterus, and moderate vaginal bleeding   - Hgb stable at 11.9, increased from 10.5 on 9/6/22   - UA suspicious for UTI with positive nitrites, moderate leukocyte esterase, 5-10 WBCs   - Pelvic US showed a uterus measuring 10.1 x 6.7 x 7.7 cm, 6 mm endometrial stripe, and a large uterine fibroid measuring 4.4 x 4.4 x 6.6 cm   - Suspect heavy bleeding and pain secondary to large, degenerating uterine fibroid   - No indication for acute treatment. Discussed options of management and patient ultimately wants a hysterectomy. - Patient will need an updated pap smear and EMB prior to any surgical intervention.  Patient scheduled in office 10/26/22 at 1:30pm for pre-op surgical planning   - TXA 1g x1 while in ED and discharge with TXA 1.3mg TID PO x5 days   - Pain management per ED protocol.   - Patient is stable for discharge with close outpatient follow up. Patient Active Problem List    Diagnosis Date Noted    Atypical chest pain 07/08/2022     Priority: Medium    NSTEMI (non-ST elevated myocardial infarction) ([address] 07/08/2022     Priority: Medium    COVID-19 07/08/2022     Priority: Medium    Gastroesophageal reflux disease 02/23/2022    Gastroesophageal reflux disease with esophagitis without hemorrhage     Irritable bowel syndrome with constipation 10/13/2021    Irritable bowel syndrome with both constipation and diarrhea 10/13/2021    Positive colorectal cancer screening using Cologuard test 10/13/2021    Mixed hyperlipidemia 09/13/2021    Migraine without status migrainosus, not intractable 09/13/2021    OAB (overactive bladder) 09/13/2021    Smoker 09/13/2021    Pulmonary Langerhans cell granulomatosis ([address]     Coronary artery disease involving native heart with angina pectoris ([address]     Situational depression 03/06/2020    Anxiety 03/06/2020    Hx of deep venous thrombosis 03/04/2020    Ground glass opacity present on imaging of lung 02/18/2020    Spinal stenosis of cervical region     Hip fracture ([address] 01/18/2020    Hypothyroidism     Type 2 diabetes mellitus without complication, with long-term current use of insulin ([address]     Essential hypertension        Plan discussed with Dr. Cem Hadley, who is agreeable.      Attending's Name: Dr. Sam Pastrana DO  Ob/Gyn Resident   [address]  10/25/2022, 11:03 AM

## 2022-10-25 NOTE — ED NOTES
Two large cotton swabs used to control bleeding with clots visible.      Sandip, Replaced by [facility]  10/25/22 [id number]

## 2022-10-25 NOTE — ED PROVIDER NOTES
[facility]  [facility]  EMERGENCY DEPARTMENT ENCOUNTER    Pt Name: Janae Norman  MRN: [id number]  Rositagfkishan 1970  Date of evaluation: 10/25/22  CHIEF COMPLAINT       Chief Complaint   Patient presents with    Vaginal Bleeding    Chest Pain    Fatigue    Fever     HISTORY OF PRESENT ILLNESS   HPI  74-year-old female with history of CAD on aspirin & Plavix presenting with heavy vaginal bleeding. Patient has a known history of fibroid uterus, but has never had bleeding like she has for the last 2 days. Menstrual cycle is a week early. Having significant cramping. Started feeling lightheaded and dizzy yesterday, and woke up with chest pain today. Having significant abdominal pain as well, mostly in the left pelvis. Not on estrogens, had her tubes tied. REVIEW OF SYSTEMS     Review of Systems   Constitutional:  Positive for fatigue. Negative for chills and fever. HENT:  Negative for congestion, rhinorrhea and sore throat. Eyes:  Negative for pain and redness. Respiratory:  Negative for cough and shortness of breath. Cardiovascular:  Positive for chest pain. Negative for leg swelling. Gastrointestinal:  Negative for abdominal pain, diarrhea, nausea and vomiting. Genitourinary:  Positive for pelvic pain and vaginal bleeding. Negative for difficulty urinating, dysuria and frequency. Musculoskeletal:  Negative for back pain and joint swelling. Skin:  Negative for rash and wound. Neurological:  Positive for light-headedness. Negative for dizziness and syncope. All other systems reviewed and are negative.   PASTMEDICAL HISTORY     Past Medical History:   Diagnosis Date    Anxiety     Arthritis     CAD (coronary artery disease)     Depression     Hyperlipidemia     Kidney stone     Langerhans-cell granulomatosis ([address]     Myocardial infarct ([address] 2016    Neck pain     Spinal stenosis 2017     SURGICAL HISTORY       Past Surgical History:   Procedure Laterality Date    BACK SURGERY L5-S1, C5-7    CARDIAC CATHETERIZATION       SECTION      COLONOSCOPY N/A 2021    COLONOSCOPY WITH BIOPSY performed by Mary Marina MD at [address] Left     LUNG BIOPSY Right     MASTOID SURGERY      TONSILLECTOMY      UPPER GASTROINTESTINAL ENDOSCOPY  2021    EGD BIOPSY performed by aMry Marina MD at [facility]       Discharge Medication List as of 10/25/2022  1:49 PM        CONTINUE these medications which have NOT CHANGED    Details   busPIRone (BUSPAR) 15 MG tablet Take 7.5 mg by mouth 3 times daily, Disp-30 tablet, R-1Normal      Dulaglutide (TRULICITY) 3 MY/3.7GM SOPN Inject 3 mg into the skin once a week, Disp-5 pen, R-3Normal      omeprazole (PRILOSEC) 40 MG delayed release capsule Take 1 capsule by mouth every morning (before breakfast), Disp-30 capsule, R-3Normal      insulin detemir (LEVEMIR FLEXTOUCH) 100 UNIT/ML injection pen Inject 26 Units into the skin nightly, Disp-5 pen, R-3Normal      Insulin Pen Needle 31G X 5 MM MISC Disp-100 each, R-3, NormalUse pen needle for giving daily insulin injection      TRUE METRIX BLOOD GLUCOSE TEST strip use 1 TEST STRIP to TEST BLOOD SUGAR twice a day and if needed, Disp-100 strip, R-3Normal      TRUEplus Lancets 30G MISC Disp-100 each, R-3, Normal      albuterol (PROVENTIL) (2.5 MG/3ML) 0.083% nebulizer solution Take 2.5 mg by nebulization daily as needed for WheezingHistorical Med      blood glucose monitor kit and supplies Dispense sufficient amount for BID testing frequency plus additional to accommodate PRN testing needs.  Dispense all needed supplies to include: monitor, strips, lancing device, lancets, control solutions, alcohol swabs., Disp-1 kit, R-0, Normal      tiZANidine (ZANAFLEX) 2 MG tablet take 1 tablet by mouth three times a day if needed, Disp-90 tablet, R-0Normal      nitroGLYCERIN (NITROSTAT) 0.4 MG SL tablet place 1 tablet under the tongue if needed every 5 minutes for chest pain for 3 doses IF NO RELIEF AFTER THIRD DOSE CALL PRESCRIBER ., Disp-25 tablet, R-0Normal      calcium carbonate (OS-BRANNON) 1250 (500 Ca) MG chewable tablet Take 500-1,000 mg by mouth 3 times dailyHistorical Med      levothyroxine (SYNTHROID) 50 MCG tablet Take 1 tablet by mouth Daily, Disp-90 tablet, R-1Normal      metoprolol succinate (TOPROL XL) 100 MG extended release tablet take 1 tablet by mouth twice a day, Disp-180 tablet, R-1Normal      sertraline (ZOLOFT) 100 MG tablet Take 1.5 tablets by mouth daily, Disp-135 tablet, R-1Normal      ranolazine (RANEXA) 500 MG extended release tablet Take 1 tablet by mouth 2 times daily, Disp-180 tablet, R-1Normal      atorvastatin (LIPITOR) 80 MG tablet Take 1 tablet by mouth daily, Disp-90 tablet, R-1Normal      clopidogrel (PLAVIX) 75 MG tablet Take 1 tablet by mouth daily, Disp-90 tablet, R-1Normal      mirabegron (MYRBETRIQ) 50 MG TB24 Take 50 mg by mouth daily, Disp-90 tablet, R-1Normal      fluticasone (FLONASE) 50 MCG/ACT nasal spray 1 spray by Each Nostril route daily as needed for RhinitisHistorical Med      Multiple Vitamins-Minerals (THERAPEUTIC MULTIVITAMIN-MINERALS) tablet Take 1 tablet by mouth dailyHistorical Med      ascorbic acid (VITAMIN C) 500 MG tablet Take 500 mg by mouth dailyHistorical Med      polyethylene glycol (GLYCOLAX) 17 g packet Take 17 g by mouth daily as needed Historical Med      acetaminophen (TYLENOL) 500 MG tablet Take 2 tablets by mouth every 6 hours as needed for Pain, Disp-30 tablet, R-0Print      Cholecalciferol (VITAMIN D) 50 MCG ( UT) TABS tablet Take 2,000 Units by mouth daily Historical Med      aspirin 81 MG chewable tablet Take 81 mg by mouth dailyHistorical Med           ALLERGIES     is allergic to seasonal and keflex [cephalexin]. FAMILY HISTORY     She indicated that her mother is . She indicated that her father is . She indicated that both of her brothers are .  She indicated that her maternal grandmother is . She indicated that her maternal grandfather is . She indicated that her paternal grandmother is . She indicated that her paternal grandfather is . SOCIAL HISTORY       Social History     Tobacco Use    Smoking status: Former     Packs/day: 0.50     Years: 25.00     Pack years: 12.50     Types: Cigarettes    Smokeless tobacco: Never    Tobacco comments:     3 packs per week   Vaping Use    Vaping Use: Never used   Substance Use Topics    Alcohol use: Yes     Comment: social    Drug use: Yes     Frequency: 7.0 times per week     Types: Marijuana (Weed)     PHYSICAL EXAM     INITIAL VITALS: BP (!) 187/108   Pulse (!) 102   Temp 98.8 °F (37.1 °C) (Oral)   Resp 17   SpO2 97%    Physical Exam  Vitals and nursing note reviewed. Constitutional:       Appearance: She is normal weight. HENT:      Head: Normocephalic and atraumatic. Cardiovascular:      Rate and Rhythm: Regular rhythm. Tachycardia present. Pulmonary:      Effort: Pulmonary effort is normal.      Breath sounds: Normal breath sounds. Abdominal:      General: Abdomen is flat. Palpations: Abdomen is soft. Tenderness: There is abdominal tenderness (pelvis, low abdomen). There is no guarding or rebound. Skin:     General: Skin is warm and dry. Findings: No rash. Neurological:      General: No focal deficit present. Mental Status: She is alert and oriented to person, place, and time. Mental status is at baseline. MEDICAL DECISION MAKING:   Heavy vaginal bleeding, with known history of fibroid uterus diagnosed by CT in July but no history of similar bleeding in the past.  No recent medication changes, is on aspirin and Plavix, but does not typically have problems with easy bleeding/bruising (denies gum bleeding, no other mucous membrane bleeds, no bruising). With new chest pain today, concern for demand ischemia with anemia.   Will check CBC, cardiac enzymes, obtain type and screen. Will complete pelvic exam to evaluate amount of bleeding. If significant, will consult OB for recommendations. ED Course as of 10/25/22 1503   Tue Oct 25, 2022   0932 Completed with NALINI Hernandez as chaperone. Vaginal bleeding present with clots, no other lesions visualized. Left adnexal tenderness on bimanual exam.  No cervical motion tenderness. [AN]   1101 With OB team, who reviewed ultrasound findings and recommended TXA and Toradol for symptom management. They will be down to see the patient shortly, plan pending OB attending approval. [AN]      ED Course User Index  [AN] Renzo May MD     OB resident saw and evaluated patient, recommended outpatient TXA for 5 days. They will see the patient in clinic tomorrow for preoperative planning. Patient feeling improved from a pain standpoint, but still very anxious about the situation. States that she has not had any of her antianxiety meds. Will give oral Ativan. Will discharge on TXA orally, percocet for pain control, and Bactrim for UTI. Late entry: After patient was discharged, Bactrim prescription was e-Rx to [facility], was inadvertently not printed during discharge. Attempted to call patient, was unable to reach her to update her. CRITICAL CARE:       PROCEDURES:    Procedures    DIAGNOSTIC RESULTS   EKG:All EKG's are interpreted by the Emergency Department Physician who either signs or Co-signs this chart in the absence of a cardiologist.    EKG with normal sinus rhythm at 100 bpm.  Normal OR, QRS, and QTc intervals. No ST segment elevation or depression. Inverted T waves in V1 and V2, V2 new from EKG on 7/9/2022. Nonspecific EKG. RADIOLOGY:All plain film, CT, MRI, and formal ultrasound images (except ED bedside ultrasound) are read by the radiologist, see reports below, unless otherwisenoted in MDM or here. US NON OB TRANSVAGINAL   Final Result   1. Large hypoechoic uterine fibroid at the fundus measuring up to 6.6 cm. 2. Nonvisualization of the left ovary. 3. Right ovarian follicles. 4. Endometrial stripe thickness measuring 6 mm, within normal limits. LABS: All lab results were reviewed by myself, and all abnormals are listed below. Labs Reviewed   CBC WITH AUTO DIFFERENTIAL - Abnormal; Notable for the following components:       Result Value    MCV 80.2 (*)     MCH 24.8 (*)     RDW 15.2 (*)     Platelets 828 (*)     Absolute Lymph # 3.88 (*)     All other components within normal limits   COMPREHENSIVE METABOLIC PANEL - Abnormal; Notable for the following components:    Glucose 246 (*)     Total Bilirubin 0.2 (*)     All other components within normal limits   URINALYSIS - Abnormal; Notable for the following components:    Color, UA Red (*)     Turbidity UA Turbid (*)     Glucose, Ur 3+ (*)     Bilirubin Urine NEGATIVE  Verified by ictotest. (*)     Urine Hgb LARGE (*)     Protein, UA 3+ (*)     Nitrite, Urine POSITIVE (*)     Leukocyte Esterase, Urine MODERATE (*)     All other components within normal limits   TROPONIN   PROTIME-INR   HCG, SERUM, QUALITATIVE   MICROSCOPIC URINALYSIS   TROPONIN   TYPE AND SCREEN     EMERGENCY DEPARTMENTCOURSE:   Vitals:    Vitals:    10/25/22 0853   BP: (!) 187/108   Pulse: (!) 102   Resp: 17   Temp: 98.8 °F (37.1 °C)   TempSrc: Oral   SpO2: 97%       The patient was given the following medications while in the emergency department:  Orders Placed This Encounter   Medications    fentaNYL (SUBLIMAZE) injection 25 mcg    0.9 % sodium chloride bolus    ketorolac (TORADOL) injection 15 mg    fentaNYL (SUBLIMAZE) injection 25 mcg    oxyCODONE-acetaminophen (PERCOCET) 5-325 MG per tablet 1 tablet    tranexamic acid-NaCl IVPB premix 1,000 mg    LORazepam (ATIVAN) tablet 0.5 mg    DISCONTD: oxyCODONE-acetaminophen (PERCOCET) 5-325 MG per tablet     Sig: Take 1 tablet by mouth every 8 hours as needed for Pain for up to 3 days. Intended supply: 3 days.  Take lowest dose possible to manage pain     Dispense:  9 tablet     Refill:  0    tranexamic acid (LYSTEDA) 650 MG TABS tablet     Sig: Take 2 tablets by mouth 3 times daily for 5 days     Dispense:  30 tablet     Refill:  0    oxyCODONE-acetaminophen (PERCOCET) 5-325 MG per tablet     Sig: Take 1 tablet by mouth every 8 hours as needed for Pain for up to 3 days. Intended supply: 3 days. Take lowest dose possible to manage pain     Dispense:  9 tablet     Refill:  0    sulfamethoxazole-trimethoprim (BACTRIM DS) 800-160 MG per tablet     Sig: Take 1 tablet by mouth 2 times daily for 7 days     Dispense:  14 tablet     Refill:  0     CONSULTS:  IP CONSULT TO OB GYN    FINAL IMPRESSION      1. Uterine leiomyoma, unspecified location    2. Abnormal uterine bleeding (AUB)    3. Acute cystitis without hematuria          DISPOSITION/PLAN   DISPOSITION Decision To Discharge 10/25/2022 01:42:52 PM      PATIENT REFERRED TO:  Galdino Marshall, [address]  [phone]    Go on 10/26/2022  For pap smear, endometrial biopsy, and surgical planning  DISCHARGE MEDICATIONS:  Discharge Medication List as of 10/25/2022  1:49 PM        START taking these medications    Details   tranexamic acid (LYSTEDA) 650 MG TABS tablet Take 2 tablets by mouth 3 times daily for 5 days, Disp-30 tablet, R-0Print      oxyCODONE-acetaminophen (PERCOCET) 5-325 MG per tablet Take 1 tablet by mouth every 8 hours as needed for Pain for up to 3 days. Intended supply: 3 days. Take lowest dose possible to manage pain, Disp-9 tablet, R-0Print           Daniel Sweet MD  Attending Emergency Physician  Jesson voice recognition software used in portions of this document.                     Daniel Sweet MD  10/25/22 9987

## 2022-10-26 ENCOUNTER — PROCEDURE VISIT (OUTPATIENT)
Dept: OBGYN | Age: 52
End: 2022-10-26
Payer: COMMERCIAL

## 2022-10-26 ENCOUNTER — TELEPHONE (OUTPATIENT)
Dept: FAMILY MEDICINE CLINIC | Age: 52
End: 2022-10-26

## 2022-10-26 ENCOUNTER — HOSPITAL ENCOUNTER (OUTPATIENT)
Age: 52
Setting detail: SPECIMEN
Discharge: HOME | End: 2022-10-26

## 2022-10-26 VITALS
BODY MASS INDEX: 35.07 KG/M2 | HEART RATE: 93 BPM | SYSTOLIC BLOOD PRESSURE: 139 MMHG | DIASTOLIC BLOOD PRESSURE: 83 MMHG | WEIGHT: 198 LBS

## 2022-10-26 DIAGNOSIS — Z12.4 SCREENING FOR CERVICAL CANCER: ICD-10-CM

## 2022-10-26 DIAGNOSIS — Z98.891 HISTORY OF 2 CESAREAN SECTIONS: ICD-10-CM

## 2022-10-26 DIAGNOSIS — Z12.31 ENCOUNTER FOR SCREENING MAMMOGRAM FOR MALIGNANT NEOPLASM OF BREAST: ICD-10-CM

## 2022-10-26 DIAGNOSIS — Z98.51 HISTORY OF BILATERAL TUBAL LIGATION: ICD-10-CM

## 2022-10-26 DIAGNOSIS — D21.9 LEIOMYOMA: ICD-10-CM

## 2022-10-26 DIAGNOSIS — N93.9 ABNORMAL UTERINE BLEEDING: Primary | ICD-10-CM

## 2022-10-26 PROBLEM — K21.9 GASTROESOPHAGEAL REFLUX DISEASE: Status: RESOLVED | Noted: 2022-02-23 | Resolved: 2022-10-26

## 2022-10-26 PROBLEM — R91.8 GROUND GLASS OPACITY PRESENT ON IMAGING OF LUNG: Status: RESOLVED | Noted: 2020-02-18 | Resolved: 2022-10-26

## 2022-10-26 PROCEDURE — 99999 PR OFFICE/OUTPT VISIT,PROCEDURE ONLY: CPT | Performed by: STUDENT IN AN ORGANIZED HEALTH CARE EDUCATION/TRAINING PROGRAM

## 2022-10-26 PROCEDURE — 58100 BIOPSY OF UTERUS LINING: CPT | Performed by: STUDENT IN AN ORGANIZED HEALTH CARE EDUCATION/TRAINING PROGRAM

## 2022-10-26 NOTE — PROGRESS NOTES
[facility]   Endometrial Biopsy Procedure Note    Denilson Wayne  [birth year]                       Primary Care Physician: SAVANA Ferraro - CNP      Subjective:   Denilson Wayne 54 y.o. female [id number] is here for previously scheduled pap smear and EMB due to new onset episode of vaginal bleeding. The patient initially presented to the ED yesterday, 10/25/22, complaining of new onset heavy vaginal bleeding and severe abdominal cramping and pressure. From her Ob/Gyn ED consult yesterday completed by this resident: \"The patient reports the bleeding and pain started yesterday. She began bleeding at 1500 and noted gushes of bright red blood and passage of clots the size of a half dollar in diameter. The pain then started shortly after and has progressively worsened overnight. She reports the pain is pressure in her pelvis and abdomen with intermittent sharp pain, midline and left moreso than right. She tried Tylenol consistently, heating pads without any relief. The pain is worse with lots of movement. She denies lightheadedness or dizziness. The patient reports she is still having periods. Her LMP is approximately 1 month ago and she believes this episode of her bleeding is her period, but is much heavier than normal. Her menstrual cycles occur every 28-30 days, lasting 5-7 days, and are very regular. She has moderate-heavy bleeding. She has noted that the pain associated with menstruating and flow has been heavier over time. She has a history of CS x2 and laparoscopic tubal ligation in 2000. No other abdominal surgeries per patient. \"    Hgb was stable at 11.9, increased from 10.5 on 9/6/22. UA was suspicious for UTI, otherwise labs obtained were wnl. Pelvic US obtained showed a uterus measuring 10.1 x 6.7 x 7.7 cm, endometrial stripe 6 mm, heterogenous echotexture, and a fundal uterine fibroid measuring 4.4 x 4.4 x 6.6 cm.  Pelvic exam was significant for a bulky-feeling enlarged uterus and moderate amount of dark blood. Decision was made to treat patient with TXA 1.3 mg TID PO x5 days. She was scheduled for this appointment today to establish care and obtain a pap smear and EMB in preparation for hysterectomy as patient desires definitive surgical management. Patient reports her bleeding has improved on the Lysteda. She reports pain is controlled with the Percocet given by the ED physician. She denies lightheadedness, dizziness, or SOB. Patient did also report today that in addition to her CS x2 and laparoscopic tubal ligation, she believes she also had 2 LEEP procedures when she was younger. She has not had a pap smear in \"quite a while\" and her last mammogram was \"before the pandemic. \"     She has a medical history including HTN, DM, STEMI x1 and NSTEMI, hypothyroidism, Langerhans cell histiocytosis, and GERD. Patient will need to obtain medical clearance from her PCP prior to any surgical procedures. Vitals:   Blood pressure 139/83, pulse 93, weight 198 lb (89.8 kg), last menstrual period 10/24/2022, not currently breastfeeding. Lab:  Pregnancy Test:  Lab Results   Component Value Date    PREGTESTUR NEGATIVE 04/26/2019    HCG NEGATIVE 12/08/2021       Procedure: Pap smear and endometrial biopsy    Indications: Abnormal uterine bleeding      Procedure Details:     Chaperone for Intimate Exam: Chaperone was present for entire exam, Chaperone Name: Marc Eid MA     The patient was counseled on the procedure. Risks, benefits and alternatives were reviewed. The patient is aware that this is diagnostic and not curative and a second procedure may be needed. A consent was reviewed and obtained. The patient was positioned comfortably on the exam table. A sterile speculum was placed into the vagina and the cervix was identified. Dark blood was cleaned from the posterior vaginal vault and cervical surface. A pap smear was collected using a spatula and cytobrush in standard fashion. The cervix  was then cleansed with betadine and the aspirator was then gently passed into the endometrial cavity. Tissue was obtained and sent to pathology. The patient tolerated the procedure well. Post procedure restrictions were reviewed and given to the patient. All counts and instruments were correct at the end of the procedure. ASSESSMENT:  Akua Trivedi 54 y.o. female [id number] here for pap smear and EMB due to abnormal uterine bleeding    - Patient was seen in the ED 10/25/22 due to heavy vaginal bleeding and severe cramping around the time of her expected menstrual period    - Hgb stable at 11.9   - Pelvic US showed a 4.4 x 4.4 x 6.6 cm fundal fibroid, heterogenous appearing uterus, and 6 mm endometrial stripe   - Patient prescribed TXA 1.3g TID x5 days for current episode of bleeding.   - Discussed management options for patients with abnormal uterine bleeding secondary to fibroids and patient desires definitive surgical management with hysterectomy due to the severity of her symptoms.   - Patient is here today for pap smear and EMB to rule out any underlying malignant pathology prior to undergoing hysterectomy   - Pap smear and EMB collected, patient tolerated the procedure well   - Discussion with Dr. Roland Adam pertaining to surgical planning with patient: patient is to receive medical clearance from her PCP prior to scheduling surgery. Plan is to attempt Total laparoscopic hysterectomy, bilateral salpingectomy, and bilateral oophorectomy with cystoscopy; possible laparotomy. - Patient is insistent on oophorectomy due to her mother in law passing from ovarian cancer. Discussed the risks of prematurely performing oophorectomy including increased risks of osteoporosis, worsening cardiovascular disease, dementia, and earlier death. Patient reports understanding of the risks and still wishes to proceed with bilateral oophorectomy.     - R/B/A of [facility], BSO was discussed including risks of pain, bleeding requiring blood transfusion, infection, damage to structures around the uterus including bladder, bowels, ureters, and vasculature, risk of laparotomy if unable to be safely performed laparoscopically. She reports understanding of the risks. Consent signed in the office today. - Message sent to surgical coordinator, Keo Valverde. Will await medical clearance and schedule imminently pending benign pathology. Patient Active Problem List    Diagnosis Date Noted    Atypical chest pain 07/08/2022     Priority: Medium    NSTEMI (non-ST elevated myocardial infarction) ([address] 07/08/2022     Priority: Medium    COVID-19 07/08/2022     Priority: Medium    Gastroesophageal reflux disease with esophagitis without hemorrhage     Irritable bowel syndrome with constipation 10/13/2021    Irritable bowel syndrome with both constipation and diarrhea 10/13/2021    Positive colorectal cancer screening using Cologuard test 10/13/2021    Mixed hyperlipidemia 09/13/2021    Migraine without status migrainosus, not intractable 09/13/2021    OAB (overactive bladder) 09/13/2021    Smoker 09/13/2021    Pulmonary Langerhans cell granulomatosis ([address]     Coronary artery disease involving native heart with angina pectoris ([address]     Situational depression 03/06/2020    Anxiety 03/06/2020    Hx of deep venous thrombosis 03/04/2020    Ground glass opacity present on imaging of lung 02/18/2020    Spinal stenosis of cervical region     Hip fracture ([address] 01/18/2020    Hypothyroidism     Type 2 diabetes mellitus without complication, with long-term current use of insulin ([address]     Essential hypertension        No tampons; danielle-pads only x 8 weeks reviewed  The patient was counseled on follow up and home care with restrictions noted. Post procedure restrictions were reviewed and given to the patient. She was instructed to use barrier protection for sexually transmitted disease prevention as well as string checks/timing.  She was instructed to abstain for two weeks and use danielle-pads for the first 8 weeks post procedure. She is to notify the office or go to the nearest Emergency Department if she experiences Abdominal Pain, Temperatures more than 100.4 F, Odiferous Vaginal Discharge, Dizziness or Shortness of breath. PLAN:  Return in about 2 weeks (around 11/9/2022) for Telehealth - discuss results.       Holland Beth DO  Ob/Gyn Resident  [facility] [place]  10/26/2022, 5:14 PM

## 2022-10-26 NOTE — PROGRESS NOTES
Attending Physician Statement  I have discussed the care of Tiffani Rodriguez, including pertinent history and exam findings,  with the resident. I have seen and examined the patient and the key elements of all parts of the encounter have been performed by me. I agree with the assessment, plan and orders as documented by the resident. [facility] Modifier)    Discussed with patient R/B/A to hysterectomy. Patient is a candidate for Total Laparoscopic Hysterectomy. Discussed with patient risks of procedure and complications including but not limited to: injury to surrounding structures (bowel, bladder, arteries, nerves, veins), infection, bleeding need for further surgery, post-op complications including DVT, PE, atelectasis, anesthesia complications, and death. Need for blood products reviewed and patient is agreeable to receiving blood if needed. Discussed need for 6 weeks off of work and light activity. Discussed nothing in the vagina and no intercourse for 8 weeks. Patient understands plan for discharge home day of surgery if tolerating PO, urinating, and pain is controlled. Also explained risk of converting from laparoscopic to open if needed. Discussed increased recovery time and hospital stay. Patient states understanding and is agreeable. Emphasized risk of bladder injury due to     Ovarian preservation versus removal discussed with patient at length. Patient is requesting removal of bilateral ovaries at time of hysterectomy. Discussed with patient that she may experience menopausal symptoms following removal. She states understanding. Discussed that she is poor a candidate for hormonal replacement later on.      Surgical consent signed: 10/26/2022   Medicaid Hysterectomy consent signed: [id number]/77/[id number]       Nolvia Hatfield DO

## 2022-10-26 NOTE — TELEPHONE ENCOUNTER
Patient states she needs clearance for urgent surgery faxed to Dr. Patricia Evans (GYN). She's going to have a hysterectomy due to abnormal uterine bleeding.

## 2022-10-27 LAB
CYTOLOGY REPORT: NORMAL
EKG ATRIAL RATE: 100 BPM
EKG P AXIS: 47 DEGREES
EKG P-R INTERVAL: 144 MS
EKG Q-T INTERVAL: 368 MS
EKG QRS DURATION: 76 MS
EKG QTC CALCULATION (BAZETT): 474 MS
EKG R AXIS: 46 DEGREES
EKG T AXIS: 58 DEGREES
EKG VENTRICULAR RATE: 100 BPM

## 2022-10-27 PROCEDURE — 93010 ELECTROCARDIOGRAM REPORT: CPT | Performed by: INTERNAL MEDICINE

## 2022-10-27 NOTE — TELEPHONE ENCOUNTER
Your first available is 11/16/22, the patient would like to know if you can squeeze her in sooner or can she do a video visit sooner

## 2022-10-28 ENCOUNTER — HOSPITAL ENCOUNTER (OUTPATIENT)
Age: 52
Setting detail: OBSERVATION
Discharge: HOME | End: 2022-10-29
Attending: EMERGENCY MEDICINE | Admitting: EMERGENCY MEDICINE
Payer: COMMERCIAL

## 2022-10-28 ENCOUNTER — APPOINTMENT (OUTPATIENT)
Dept: GENERAL RADIOLOGY | Age: 52
End: 2022-10-28
Payer: COMMERCIAL

## 2022-10-28 DIAGNOSIS — Z12.31 SCREENING MAMMOGRAM FOR BREAST CANCER: Primary | ICD-10-CM

## 2022-10-28 DIAGNOSIS — R07.9 CHEST PAIN, UNSPECIFIED TYPE: Primary | ICD-10-CM

## 2022-10-28 DIAGNOSIS — D21.9 LEIOMYOMA: ICD-10-CM

## 2022-10-28 LAB
ABSOLUTE EOS #: 0.31 K/UL (ref 0–0.44)
ABSOLUTE IMMATURE GRANULOCYTE: 0.04 K/UL (ref 0–0.3)
ABSOLUTE LYMPH #: 4.05 K/UL (ref 1.1–3.7)
ABSOLUTE MONO #: 0.83 K/UL (ref 0.1–1.2)
ANION GAP SERPL CALCULATED.3IONS-SCNC: 16 MMOL/L (ref 9–17)
BASOPHILS # BLD: 0 % (ref 0–2)
BASOPHILS ABSOLUTE: 0.04 K/UL (ref 0–0.2)
BUN BLDV-MCNC: 14 MG/DL (ref 6–20)
CALCIUM SERPL-MCNC: 9.1 MG/DL (ref 8.6–10.4)
CHLORIDE BLD-SCNC: 99 MMOL/L (ref 98–107)
CO2: 21 MMOL/L (ref 20–31)
CREAT SERPL-MCNC: 0.67 MG/DL (ref 0.5–0.9)
D-DIMER QUANTITATIVE: 0.26 MG/L FEU
EOSINOPHILS RELATIVE PERCENT: 2 % (ref 1–4)
GFR SERPL CREATININE-BSD FRML MDRD: >60 ML/MIN/1.73M2
GLUCOSE BLD-MCNC: 143 MG/DL (ref 70–99)
HCT VFR BLD CALC: 41 % (ref 36.3–47.1)
HEMOGLOBIN: 12.7 G/DL (ref 11.9–15.1)
IMMATURE GRANULOCYTES: 0 %
LYMPHOCYTES # BLD: 32 % (ref 24–43)
MCH RBC QN AUTO: 24.3 PG (ref 25.2–33.5)
MCHC RBC AUTO-ENTMCNC: 31 G/DL (ref 28.4–34.8)
MCV RBC AUTO: 78.5 FL (ref 82.6–102.9)
MONOCYTES # BLD: 7 % (ref 3–12)
NRBC AUTOMATED: 0 PER 100 WBC
PDW BLD-RTO: 15.1 % (ref 11.8–14.4)
PLATELET # BLD: 643 K/UL (ref 138–453)
PMV BLD AUTO: 8.5 FL (ref 8.1–13.5)
POTASSIUM SERPL-SCNC: 3.6 MMOL/L (ref 3.7–5.3)
PRO-BNP: 68 PG/ML
RBC # BLD: 5.22 M/UL (ref 3.95–5.11)
RBC # BLD: ABNORMAL 10*6/UL
SEG NEUTROPHILS: 59 % (ref 36–65)
SEGMENTED NEUTROPHILS ABSOLUTE COUNT: 7.45 K/UL (ref 1.5–8.1)
SODIUM BLD-SCNC: 136 MMOL/L (ref 135–144)
SURGICAL PATHOLOGY REPORT: NORMAL
TROPONIN, HIGH SENSITIVITY: 9 NG/L (ref 0–14)
WBC # BLD: 12.7 K/UL (ref 3.5–11.3)

## 2022-10-28 PROCEDURE — 85379 FIBRIN DEGRADATION QUANT: CPT

## 2022-10-28 PROCEDURE — 99285 EMERGENCY DEPT VISIT HI MDM: CPT

## 2022-10-28 PROCEDURE — 93005 ELECTROCARDIOGRAM TRACING: CPT | Performed by: STUDENT IN AN ORGANIZED HEALTH CARE EDUCATION/TRAINING PROGRAM

## 2022-10-28 PROCEDURE — 80048 BASIC METABOLIC PNL TOTAL CA: CPT

## 2022-10-28 PROCEDURE — 96374 THER/PROPH/DIAG INJ IV PUSH: CPT

## 2022-10-28 PROCEDURE — 6360000002 HC RX W HCPCS: Performed by: STUDENT IN AN ORGANIZED HEALTH CARE EDUCATION/TRAINING PROGRAM

## 2022-10-28 PROCEDURE — 71046 X-RAY EXAM CHEST 2 VIEWS: CPT

## 2022-10-28 PROCEDURE — 2580000003 HC RX 258: Performed by: STUDENT IN AN ORGANIZED HEALTH CARE EDUCATION/TRAINING PROGRAM

## 2022-10-28 PROCEDURE — 83880 ASSAY OF NATRIURETIC PEPTIDE: CPT

## 2022-10-28 PROCEDURE — 96375 TX/PRO/DX INJ NEW DRUG ADDON: CPT

## 2022-10-28 PROCEDURE — 84484 ASSAY OF TROPONIN QUANT: CPT

## 2022-10-28 PROCEDURE — 85025 COMPLETE CBC W/AUTO DIFF WBC: CPT

## 2022-10-28 PROCEDURE — 96361 HYDRATE IV INFUSION ADD-ON: CPT

## 2022-10-28 RX ORDER — SODIUM CHLORIDE 9 MG/ML
INJECTION, SOLUTION INTRAVENOUS CONTINUOUS
Status: DISCONTINUED | OUTPATIENT
Start: 2022-10-28 | End: 2022-10-29

## 2022-10-28 RX ORDER — FENTANYL CITRATE 50 UG/ML
50 INJECTION, SOLUTION INTRAMUSCULAR; INTRAVENOUS ONCE
Status: COMPLETED | OUTPATIENT
Start: 2022-10-28 | End: 2022-10-28

## 2022-10-28 RX ORDER — ONDANSETRON 2 MG/ML
4 INJECTION INTRAMUSCULAR; INTRAVENOUS ONCE
Status: COMPLETED | OUTPATIENT
Start: 2022-10-28 | End: 2022-10-28

## 2022-10-28 RX ORDER — 0.9 % SODIUM CHLORIDE 0.9 %
500 INTRAVENOUS SOLUTION INTRAVENOUS ONCE
Status: COMPLETED | OUTPATIENT
Start: 2022-10-28 | End: 2022-10-29

## 2022-10-28 RX ORDER — 0.9 % SODIUM CHLORIDE 0.9 %
1000 INTRAVENOUS SOLUTION INTRAVENOUS ONCE
Status: COMPLETED | OUTPATIENT
Start: 2022-10-28 | End: 2022-10-29

## 2022-10-28 RX ADMIN — FENTANYL CITRATE 50 MCG: 50 INJECTION, SOLUTION INTRAMUSCULAR; INTRAVENOUS at 22:30

## 2022-10-28 RX ADMIN — ONDANSETRON 4 MG: 2 INJECTION INTRAMUSCULAR; INTRAVENOUS at 21:58

## 2022-10-28 RX ADMIN — SODIUM CHLORIDE 500 ML: 0.9 INJECTION, SOLUTION INTRAVENOUS at 23:12

## 2022-10-28 ASSESSMENT — PAIN DESCRIPTION - LOCATION: LOCATION: CHEST;ABDOMEN

## 2022-10-28 ASSESSMENT — PAIN - FUNCTIONAL ASSESSMENT: PAIN_FUNCTIONAL_ASSESSMENT: 0-10

## 2022-10-28 ASSESSMENT — HEART SCORE: ECG: 1

## 2022-10-28 ASSESSMENT — PAIN DESCRIPTION - DESCRIPTORS: DESCRIPTORS: SHARP

## 2022-10-28 ASSESSMENT — PAIN SCALES - GENERAL
PAINLEVEL_OUTOF10: 8
PAINLEVEL_OUTOF10: 9

## 2022-10-29 VITALS
TEMPERATURE: 98.4 F | RESPIRATION RATE: 16 BRPM | HEART RATE: 88 BPM | SYSTOLIC BLOOD PRESSURE: 131 MMHG | OXYGEN SATURATION: 94 % | DIASTOLIC BLOOD PRESSURE: 87 MMHG

## 2022-10-29 PROBLEM — R07.9 CHEST PAIN: Status: ACTIVE | Noted: 2022-10-29

## 2022-10-29 LAB
BACTERIA: ABNORMAL
BACTERIA: ABNORMAL
BILIRUBIN URINE: NEGATIVE
BILIRUBIN URINE: NEGATIVE
CASTS UA: ABNORMAL /LPF (ref 0–2)
COLOR: YELLOW
COLOR: YELLOW
CRYSTALS, UA: ABNORMAL /HPF
CRYSTALS, UA: ABNORMAL /HPF
EKG ATRIAL RATE: 115 BPM
EKG ATRIAL RATE: 89 BPM
EKG P AXIS: 51 DEGREES
EKG P AXIS: 55 DEGREES
EKG P-R INTERVAL: 154 MS
EKG P-R INTERVAL: 164 MS
EKG Q-T INTERVAL: 336 MS
EKG Q-T INTERVAL: 408 MS
EKG QRS DURATION: 78 MS
EKG QRS DURATION: 80 MS
EKG QTC CALCULATION (BAZETT): 464 MS
EKG QTC CALCULATION (BAZETT): 496 MS
EKG R AXIS: 53 DEGREES
EKG R AXIS: 53 DEGREES
EKG T AXIS: 50 DEGREES
EKG T AXIS: 65 DEGREES
EKG VENTRICULAR RATE: 115 BPM
EKG VENTRICULAR RATE: 89 BPM
EPITHELIAL CELLS UA: ABNORMAL /HPF (ref 0–5)
EPITHELIAL CELLS UA: ABNORMAL /HPF (ref 0–5)
GLUCOSE BLD-MCNC: 111 MG/DL (ref 65–105)
GLUCOSE BLD-MCNC: 122 MG/DL (ref 65–105)
GLUCOSE BLD-MCNC: 137 MG/DL (ref 65–105)
GLUCOSE URINE: NEGATIVE
GLUCOSE URINE: NEGATIVE
HCG(URINE) PREGNANCY TEST: NEGATIVE
HPV SAMPLE: NORMAL
HPV, GENOTYPE 16: NOT DETECTED
HPV, GENOTYPE 18: NOT DETECTED
HPV, HIGH RISK OTHER: NOT DETECTED
HPV, INTERPRETATION: NORMAL
KETONES, URINE: ABNORMAL
KETONES, URINE: ABNORMAL
LEUKOCYTE ESTERASE, URINE: NEGATIVE
LEUKOCYTE ESTERASE, URINE: NEGATIVE
MUCUS: ABNORMAL
MUCUS: ABNORMAL
NITRITE, URINE: NEGATIVE
NITRITE, URINE: NEGATIVE
PH UA: 5 (ref 5–8)
PH UA: 5.5 (ref 5–8)
PROTEIN UA: ABNORMAL
PROTEIN UA: ABNORMAL
RBC UA: ABNORMAL /HPF (ref 0–2)
RBC UA: ABNORMAL /HPF (ref 0–2)
SARS-COV-2, RAPID: NOT DETECTED
SPECIFIC GRAVITY UA: 1.03 (ref 1–1.03)
SPECIFIC GRAVITY UA: 1.03 (ref 1–1.03)
SPECIMEN DESCRIPTION: NORMAL
SPECIMEN DESCRIPTION: NORMAL
TROPONIN, HIGH SENSITIVITY: 8 NG/L (ref 0–14)
TURBIDITY: ABNORMAL
TURBIDITY: CLEAR
URINE HGB: ABNORMAL
URINE HGB: ABNORMAL
UROBILINOGEN, URINE: NORMAL
UROBILINOGEN, URINE: NORMAL
WBC UA: ABNORMAL /HPF (ref 0–5)
WBC UA: ABNORMAL /HPF (ref 0–5)

## 2022-10-29 PROCEDURE — 87086 URINE CULTURE/COLONY COUNT: CPT

## 2022-10-29 PROCEDURE — 6370000000 HC RX 637 (ALT 250 FOR IP): Performed by: STUDENT IN AN ORGANIZED HEALTH CARE EDUCATION/TRAINING PROGRAM

## 2022-10-29 PROCEDURE — C9113 INJ PANTOPRAZOLE SODIUM, VIA: HCPCS | Performed by: STUDENT IN AN ORGANIZED HEALTH CARE EDUCATION/TRAINING PROGRAM

## 2022-10-29 PROCEDURE — 6360000002 HC RX W HCPCS: Performed by: STUDENT IN AN ORGANIZED HEALTH CARE EDUCATION/TRAINING PROGRAM

## 2022-10-29 PROCEDURE — 6360000002 HC RX W HCPCS: Performed by: EMERGENCY MEDICINE

## 2022-10-29 PROCEDURE — 96375 TX/PRO/DX INJ NEW DRUG ADDON: CPT

## 2022-10-29 PROCEDURE — 87635 SARS-COV-2 COVID-19 AMP PRB: CPT

## 2022-10-29 PROCEDURE — 6370000000 HC RX 637 (ALT 250 FOR IP): Performed by: EMERGENCY MEDICINE

## 2022-10-29 PROCEDURE — 96361 HYDRATE IV INFUSION ADD-ON: CPT

## 2022-10-29 PROCEDURE — 6370000000 HC RX 637 (ALT 250 FOR IP)

## 2022-10-29 PROCEDURE — 2580000003 HC RX 258: Performed by: STUDENT IN AN ORGANIZED HEALTH CARE EDUCATION/TRAINING PROGRAM

## 2022-10-29 PROCEDURE — 93005 ELECTROCARDIOGRAM TRACING: CPT | Performed by: STUDENT IN AN ORGANIZED HEALTH CARE EDUCATION/TRAINING PROGRAM

## 2022-10-29 PROCEDURE — G0378 HOSPITAL OBSERVATION PER HR: HCPCS

## 2022-10-29 PROCEDURE — 96376 TX/PRO/DX INJ SAME DRUG ADON: CPT

## 2022-10-29 PROCEDURE — 82947 ASSAY GLUCOSE BLOOD QUANT: CPT

## 2022-10-29 PROCEDURE — A4216 STERILE WATER/SALINE, 10 ML: HCPCS | Performed by: STUDENT IN AN ORGANIZED HEALTH CARE EDUCATION/TRAINING PROGRAM

## 2022-10-29 PROCEDURE — 81025 URINE PREGNANCY TEST: CPT

## 2022-10-29 PROCEDURE — 81001 URINALYSIS AUTO W/SCOPE: CPT

## 2022-10-29 RX ORDER — TROSPIUM CHLORIDE 20 MG/1
20 TABLET, FILM COATED ORAL NIGHTLY
Status: DISCONTINUED | OUTPATIENT
Start: 2022-10-29 | End: 2022-10-29 | Stop reason: HOSPADM

## 2022-10-29 RX ORDER — HYDROCODONE BITARTRATE AND ACETAMINOPHEN 5; 325 MG/1; MG/1
1 TABLET ORAL EVERY 8 HOURS PRN
Qty: 12 TABLET | Refills: 0 | Status: SHIPPED | OUTPATIENT
Start: 2022-10-29 | End: 2022-10-29 | Stop reason: SDUPTHER

## 2022-10-29 RX ORDER — KETOROLAC TROMETHAMINE 15 MG/ML
15 INJECTION, SOLUTION INTRAMUSCULAR; INTRAVENOUS EVERY 6 HOURS
Status: DISCONTINUED | OUTPATIENT
Start: 2022-10-29 | End: 2022-10-29 | Stop reason: HOSPADM

## 2022-10-29 RX ORDER — OXYCODONE HYDROCHLORIDE AND ACETAMINOPHEN 5; 325 MG/1; MG/1
1 TABLET ORAL EVERY 4 HOURS PRN
Status: DISCONTINUED | OUTPATIENT
Start: 2022-10-29 | End: 2022-10-29 | Stop reason: HOSPADM

## 2022-10-29 RX ORDER — ATORVASTATIN CALCIUM 80 MG/1
80 TABLET, FILM COATED ORAL DAILY
Status: DISCONTINUED | OUTPATIENT
Start: 2022-10-29 | End: 2022-10-29 | Stop reason: HOSPADM

## 2022-10-29 RX ORDER — SODIUM CHLORIDE 0.9 % (FLUSH) 0.9 %
5-40 SYRINGE (ML) INJECTION PRN
Status: DISCONTINUED | OUTPATIENT
Start: 2022-10-29 | End: 2022-10-29 | Stop reason: HOSPADM

## 2022-10-29 RX ORDER — FLUTICASONE PROPIONATE 50 MCG
1 SPRAY, SUSPENSION (ML) NASAL DAILY PRN
Status: DISCONTINUED | OUTPATIENT
Start: 2022-10-29 | End: 2022-10-29 | Stop reason: HOSPADM

## 2022-10-29 RX ORDER — TIZANIDINE 4 MG/1
4 TABLET ORAL EVERY 8 HOURS PRN
Status: DISCONTINUED | OUTPATIENT
Start: 2022-10-29 | End: 2022-10-29 | Stop reason: HOSPADM

## 2022-10-29 RX ORDER — ONDANSETRON 4 MG/1
4 TABLET, FILM COATED ORAL EVERY 8 HOURS PRN
Qty: 6 TABLET | Refills: 0 | Status: SHIPPED | OUTPATIENT
Start: 2022-10-29 | End: 2022-10-29 | Stop reason: SDUPTHER

## 2022-10-29 RX ORDER — METOPROLOL SUCCINATE 50 MG/1
50 TABLET, EXTENDED RELEASE ORAL 2 TIMES DAILY
Status: DISCONTINUED | OUTPATIENT
Start: 2022-10-29 | End: 2022-10-29 | Stop reason: HOSPADM

## 2022-10-29 RX ORDER — POTASSIUM CHLORIDE 7.45 MG/ML
10 INJECTION INTRAVENOUS PRN
Status: DISCONTINUED | OUTPATIENT
Start: 2022-10-29 | End: 2022-10-29 | Stop reason: HOSPADM

## 2022-10-29 RX ORDER — ACETAMINOPHEN 500 MG
1000 TABLET ORAL EVERY 6 HOURS PRN
Qty: 30 TABLET | Refills: 0 | Status: SHIPPED | OUTPATIENT
Start: 2022-10-29

## 2022-10-29 RX ORDER — DEXTROSE MONOHYDRATE 100 MG/ML
INJECTION, SOLUTION INTRAVENOUS CONTINUOUS PRN
Status: DISCONTINUED | OUTPATIENT
Start: 2022-10-29 | End: 2022-10-29 | Stop reason: HOSPADM

## 2022-10-29 RX ORDER — SODIUM CHLORIDE 9 MG/ML
25 INJECTION, SOLUTION INTRAVENOUS PRN
Status: DISCONTINUED | OUTPATIENT
Start: 2022-10-29 | End: 2022-10-29 | Stop reason: HOSPADM

## 2022-10-29 RX ORDER — ACETAMINOPHEN 500 MG
1000 TABLET ORAL EVERY 6 HOURS PRN
Qty: 30 TABLET | Refills: 0 | Status: SHIPPED | OUTPATIENT
Start: 2022-10-29 | End: 2022-10-29 | Stop reason: SDUPTHER

## 2022-10-29 RX ORDER — TRANEXAMIC ACID 650 MG/1
1300 TABLET ORAL 3 TIMES DAILY
Status: DISCONTINUED | OUTPATIENT
Start: 2022-10-29 | End: 2022-10-29 | Stop reason: HOSPADM

## 2022-10-29 RX ORDER — CIPROFLOXACIN 250 MG/1
250 TABLET, FILM COATED ORAL EVERY 12 HOURS SCHEDULED
Status: DISCONTINUED | OUTPATIENT
Start: 2022-10-29 | End: 2022-10-29 | Stop reason: HOSPADM

## 2022-10-29 RX ORDER — INSULIN LISPRO 100 [IU]/ML
0-4 INJECTION, SOLUTION INTRAVENOUS; SUBCUTANEOUS NIGHTLY
Status: DISCONTINUED | OUTPATIENT
Start: 2022-10-29 | End: 2022-10-29 | Stop reason: HOSPADM

## 2022-10-29 RX ORDER — ASPIRIN 81 MG/1
81 TABLET, CHEWABLE ORAL DAILY
Status: DISCONTINUED | OUTPATIENT
Start: 2022-10-29 | End: 2022-10-29 | Stop reason: HOSPADM

## 2022-10-29 RX ORDER — CIPROFLOXACIN 250 MG/1
250 TABLET, FILM COATED ORAL EVERY 12 HOURS SCHEDULED
Qty: 5 TABLET | Refills: 0 | Status: SHIPPED | OUTPATIENT
Start: 2022-10-29 | End: 2022-10-29 | Stop reason: SDUPTHER

## 2022-10-29 RX ORDER — CIPROFLOXACIN 250 MG/1
250 TABLET, FILM COATED ORAL EVERY 12 HOURS SCHEDULED
Qty: 6 TABLET | Refills: 0 | Status: SHIPPED | OUTPATIENT
Start: 2022-10-29 | End: 2022-10-29 | Stop reason: SDUPTHER

## 2022-10-29 RX ORDER — CIPROFLOXACIN 250 MG/1
250 TABLET, FILM COATED ORAL EVERY 12 HOURS SCHEDULED
Qty: 5 TABLET | Refills: 0 | Status: SHIPPED | OUTPATIENT
Start: 2022-10-29 | End: 2022-11-01

## 2022-10-29 RX ORDER — HYDROCODONE BITARTRATE AND ACETAMINOPHEN 5; 325 MG/1; MG/1
1 TABLET ORAL EVERY 8 HOURS PRN
Qty: 12 TABLET | Refills: 0 | Status: SHIPPED | OUTPATIENT
Start: 2022-10-29 | End: 2022-11-05

## 2022-10-29 RX ORDER — ACETAMINOPHEN 650 MG/1
650 SUPPOSITORY RECTAL EVERY 6 HOURS PRN
Status: DISCONTINUED | OUTPATIENT
Start: 2022-10-29 | End: 2022-10-29 | Stop reason: HOSPADM

## 2022-10-29 RX ORDER — POTASSIUM CHLORIDE 20 MEQ/1
40 TABLET, EXTENDED RELEASE ORAL PRN
Status: DISCONTINUED | OUTPATIENT
Start: 2022-10-29 | End: 2022-10-29 | Stop reason: HOSPADM

## 2022-10-29 RX ORDER — ONDANSETRON 4 MG/1
4 TABLET, FILM COATED ORAL EVERY 8 HOURS PRN
Qty: 6 TABLET | Refills: 0 | Status: SHIPPED | OUTPATIENT
Start: 2022-10-29

## 2022-10-29 RX ORDER — POLYETHYLENE GLYCOL 3350 17 G/17G
17 POWDER, FOR SOLUTION ORAL DAILY PRN
Status: DISCONTINUED | OUTPATIENT
Start: 2022-10-29 | End: 2022-10-29 | Stop reason: HOSPADM

## 2022-10-29 RX ORDER — SODIUM CHLORIDE 0.9 % (FLUSH) 0.9 %
5-40 SYRINGE (ML) INJECTION EVERY 12 HOURS SCHEDULED
Status: DISCONTINUED | OUTPATIENT
Start: 2022-10-29 | End: 2022-10-29 | Stop reason: HOSPADM

## 2022-10-29 RX ORDER — SULFAMETHOXAZOLE AND TRIMETHOPRIM 800; 160 MG/1; MG/1
1 TABLET ORAL 2 TIMES DAILY
Status: DISCONTINUED | OUTPATIENT
Start: 2022-10-29 | End: 2022-10-29

## 2022-10-29 RX ORDER — INSULIN LISPRO 100 [IU]/ML
0-8 INJECTION, SOLUTION INTRAVENOUS; SUBCUTANEOUS
Status: DISCONTINUED | OUTPATIENT
Start: 2022-10-29 | End: 2022-10-29 | Stop reason: HOSPADM

## 2022-10-29 RX ORDER — ONDANSETRON 2 MG/ML
4 INJECTION INTRAMUSCULAR; INTRAVENOUS EVERY 4 HOURS PRN
Status: DISCONTINUED | OUTPATIENT
Start: 2022-10-29 | End: 2022-10-29 | Stop reason: HOSPADM

## 2022-10-29 RX ORDER — OXYCODONE HYDROCHLORIDE AND ACETAMINOPHEN 5; 325 MG/1; MG/1
1 TABLET ORAL EVERY 8 HOURS PRN
Status: DISCONTINUED | OUTPATIENT
Start: 2022-10-29 | End: 2022-10-29

## 2022-10-29 RX ORDER — ACETAMINOPHEN 325 MG/1
650 TABLET ORAL EVERY 6 HOURS PRN
Status: DISCONTINUED | OUTPATIENT
Start: 2022-10-29 | End: 2022-10-29 | Stop reason: HOSPADM

## 2022-10-29 RX ORDER — ALBUTEROL SULFATE 2.5 MG/3ML
2.5 SOLUTION RESPIRATORY (INHALATION) DAILY PRN
Status: DISCONTINUED | OUTPATIENT
Start: 2022-10-29 | End: 2022-10-29 | Stop reason: HOSPADM

## 2022-10-29 RX ORDER — SODIUM CHLORIDE 9 MG/ML
INJECTION, SOLUTION INTRAVENOUS CONTINUOUS
Status: DISCONTINUED | OUTPATIENT
Start: 2022-10-29 | End: 2022-10-29 | Stop reason: HOSPADM

## 2022-10-29 RX ADMIN — ACETAMINOPHEN 650 MG: 325 TABLET ORAL at 14:40

## 2022-10-29 RX ADMIN — OXYCODONE HYDROCHLORIDE AND ACETAMINOPHEN 1 TABLET: 5; 325 TABLET ORAL at 11:48

## 2022-10-29 RX ADMIN — OXYCODONE HYDROCHLORIDE AND ACETAMINOPHEN 1 TABLET: 5; 325 TABLET ORAL at 16:38

## 2022-10-29 RX ADMIN — OXYCODONE HYDROCHLORIDE AND ACETAMINOPHEN 1 TABLET: 5; 325 TABLET ORAL at 04:25

## 2022-10-29 RX ADMIN — SODIUM CHLORIDE 40 MG: 9 INJECTION INTRAMUSCULAR; INTRAVENOUS; SUBCUTANEOUS at 10:36

## 2022-10-29 RX ADMIN — CIPROFLOXACIN HYDROCHLORIDE 250 MG: 250 TABLET, FILM COATED ORAL at 16:42

## 2022-10-29 RX ADMIN — SERTRALINE 150 MG: 50 TABLET, FILM COATED ORAL at 11:48

## 2022-10-29 RX ADMIN — ONDANSETRON 4 MG: 2 INJECTION INTRAMUSCULAR; INTRAVENOUS at 08:19

## 2022-10-29 RX ADMIN — METOPROLOL SUCCINATE 50 MG: 50 TABLET, FILM COATED, EXTENDED RELEASE ORAL at 11:49

## 2022-10-29 RX ADMIN — TRANEXAMIC ACID 1300 MG: 650 TABLET ORAL at 16:39

## 2022-10-29 RX ADMIN — ATORVASTATIN CALCIUM 80 MG: 80 TABLET, FILM COATED ORAL at 11:49

## 2022-10-29 RX ADMIN — ACETAMINOPHEN 650 MG: 325 TABLET ORAL at 08:19

## 2022-10-29 RX ADMIN — ONDANSETRON 4 MG: 2 INJECTION INTRAMUSCULAR; INTRAVENOUS at 04:25

## 2022-10-29 RX ADMIN — ASPIRIN 81 MG: 81 TABLET, CHEWABLE ORAL at 11:49

## 2022-10-29 RX ADMIN — SODIUM CHLORIDE 1000 ML: 9 INJECTION, SOLUTION INTRAVENOUS at 00:09

## 2022-10-29 RX ADMIN — SODIUM CHLORIDE: 9 INJECTION, SOLUTION INTRAVENOUS at 04:31

## 2022-10-29 RX ADMIN — KETOROLAC TROMETHAMINE 15 MG: 15 INJECTION, SOLUTION INTRAMUSCULAR; INTRAVENOUS at 11:46

## 2022-10-29 ASSESSMENT — PAIN DESCRIPTION - DESCRIPTORS
DESCRIPTORS: ACHING
DESCRIPTORS: SHARP;PRESSURE
DESCRIPTORS: ACHING;PRESSURE

## 2022-10-29 ASSESSMENT — ENCOUNTER SYMPTOMS
ABDOMINAL DISTENTION: 0
CHEST TIGHTNESS: 0
WHEEZING: 0
TROUBLE SWALLOWING: 0
COUGH: 0
NAUSEA: 1
VOMITING: 1
CONSTIPATION: 0
BACK PAIN: 0
ABDOMINAL PAIN: 0
DIARRHEA: 0
SHORTNESS OF BREATH: 1

## 2022-10-29 ASSESSMENT — PAIN DESCRIPTION - ORIENTATION
ORIENTATION: LOWER
ORIENTATION: ANTERIOR
ORIENTATION: LOWER
ORIENTATION: OTHER (COMMENT)
ORIENTATION: LOWER;MID;LEFT
ORIENTATION: LOWER

## 2022-10-29 ASSESSMENT — PAIN - FUNCTIONAL ASSESSMENT
PAIN_FUNCTIONAL_ASSESSMENT: ACTIVITIES ARE NOT PREVENTED
PAIN_FUNCTIONAL_ASSESSMENT: PREVENTS OR INTERFERES SOME ACTIVE ACTIVITIES AND ADLS
PAIN_FUNCTIONAL_ASSESSMENT: ACTIVITIES ARE NOT PREVENTED
PAIN_FUNCTIONAL_ASSESSMENT: ACTIVITIES ARE NOT PREVENTED
PAIN_FUNCTIONAL_ASSESSMENT: PREVENTS OR INTERFERES SOME ACTIVE ACTIVITIES AND ADLS

## 2022-10-29 ASSESSMENT — PAIN DESCRIPTION - LOCATION
LOCATION: ABDOMEN;HEAD
LOCATION: HEAD
LOCATION: HEAD
LOCATION: ABDOMEN;HEAD
LOCATION: ABDOMEN;CHEST
LOCATION: ABDOMEN;CHEST

## 2022-10-29 ASSESSMENT — PAIN SCALES - GENERAL
PAINLEVEL_OUTOF10: 9
PAINLEVEL_OUTOF10: 0
PAINLEVEL_OUTOF10: 9
PAINLEVEL_OUTOF10: 9
PAINLEVEL_OUTOF10: 8
PAINLEVEL_OUTOF10: 9
PAINLEVEL_OUTOF10: 8

## 2022-10-29 ASSESSMENT — PAIN DESCRIPTION - FREQUENCY: FREQUENCY: CONTINUOUS

## 2022-10-29 ASSESSMENT — PAIN DESCRIPTION - ONSET: ONSET: ON-GOING

## 2022-10-29 ASSESSMENT — PAIN DESCRIPTION - PAIN TYPE: TYPE: ACUTE PAIN

## 2022-10-29 ASSESSMENT — HEART SCORE: ECG: 1

## 2022-10-29 NOTE — ED NOTES
Patient arrives to ED with complaints of chest pain, abdominal pain, and nausea. Patient also states she has not had an appetite or been able to tolerate oral fluids over the past 2 days. Patient is alert and oriented x4 and ambulates with a steady gait. Will continue to monitor.       Cachorro Roe RN  10/28/22 3834

## 2022-10-29 NOTE — DISCHARGE INSTRUCTIONS
You were seen in the emergency department for chest pain. Upon cardiology follow-up, it was determined that this pain is most likely musculoskeletal in origin and not cardiac. That said, please follow-up with Dr. Silvio Harrington and book an appointment to be seen in 2 weeks time. Return to the emergency department should you have any worsening of your pain, difficulty breathing, new abdominal pain, or any other acute concern. You are also found to have a urinary tract infection, you were switched from [address] to [place]. Do not take the Macrobid and start taking the Cipro instead. You will be on this for 3 days. As discussed, [place] does come with potential risks including tendon pain, particularly in the heels please return to the emergency department should you experience this pain. You take the full course of the antibiotics until it is complete. Ensure you follow-up with the OB/GYN for your ongoing management of vaginal bleeding.

## 2022-10-29 NOTE — ED PROVIDER NOTES
[facility]  Emergency Department Encounter  EmergencyMedicine Resident     Pt Sylvia Kahlil Young  MRN: [id number]  [place] 1970  Date of evaluation: 10/28/22  PCP:  [facility] Regalado[id number]       Chief Complaint   Patient presents with    Nausea    Dizziness    Chest Pain       HISTORY OF PRESENT ILLNESS  (Location/Symptom, Timing/Onset, Context/Setting, Quality, Duration, Modifying Factors, Severity.)      Rosa Vizcarra is a 54 y.o. female who presents with nausea, vomiting chest pain and shortness of breath. Patient was seen in our department on Tuesday due to abnormal uterine bleeding was given TXA and was started on Bactrim due to UTI. Since then has had progressive nausea and today developed left-sided chest pain that radiates up into her neck as well as shortness of breath. Patient has a history of CAD and has a stent in her LAD, is on Plavix at home. She denies any cough or fevers or chills. PAST MEDICAL / SURGICAL / SOCIAL / FAMILY HISTORY      has a past medical history of Anxiety, Arthritis, CAD (coronary artery disease), Depression, Hyperlipidemia, Kidney stone, Langerhans-cell granulomatosis ([address] Myocardial infarct ([address] Neck pain, and Spinal stenosis. has a past surgical history that includes back surgery;  section; Mastoid surgery; Tonsillectomy; Cardiac catheterization; Lung biopsy (Right); hip surgery (Left); Colonoscopy (N/A, 2021); and Upper gastrointestinal endoscopy (2021).       Social History     Socioeconomic History    Marital status:      Spouse name: Not on file    Number of children: Not on file    Years of education: Not on file    Highest education level: Not on file   Occupational History    Not on file   Tobacco Use    Smoking status: Every Day     Packs/day: 0.50     Years: 25.00     Pack years: 12.50     Types: Cigarettes    Smokeless tobacco: Never    Tobacco comments:     3 packs per week   Vaping Use Vaping Use: Never used   Substance and Sexual Activity    Alcohol use: Yes     Comment: rare    Drug use: Yes     Frequency: 7.0 times per week     Types: Marijuana [place])    Sexual activity: Yes     Partners: Male   Other Topics Concern    Not on file   Social History Narrative    Not on file     Social Determinants of Health     Financial Resource Strain: Not on file   Food Insecurity: Not on file   Transportation Needs: Not on file   Physical Activity: Unknown    Days of Exercise per Week: 0 days    Minutes of Exercise per Session: Not on file   Stress: Not on file   Social Connections: Not on file   Intimate Partner Violence: Not At Risk    Fear of Current or Ex-Partner: No    Emotionally Abused: No    Physically Abused: No    Sexually Abused: No   Housing Stability: Not on file       Family History   Problem Relation Age of Onset    Dementia Mother     Depression Mother     High Blood Pressure Mother     Coronary Art Dis Mother     COPD Mother     Diabetes Mother     Heart Attack Father     Diabetes Maternal Grandmother     Alzheimer's Disease Paternal Grandfather        Allergies:  Seasonal and Keflex [cephalexin]    Home Medications:  Prior to Admission medications    Medication Sig Start Date End Date Taking?  Authorizing Provider   tranexamic acid (LYSTEDA) 650 MG TABS tablet Take 2 tablets by mouth 3 times daily for 5 days 10/25/22 10/30/22  Rhea Pichardo MD   sulfamethoxazole-trimethoprim (BACTRIM DS) 800-160 MG per tablet Take 1 tablet by mouth 2 times daily for 7 days 10/25/22 11/1/22  Rhea Pichardo MD   busPIRone (BUSPAR) 15 MG tablet Take 7.5 mg by mouth 3 times daily 7/9/22   Freya Crocker MD   Dulaglutide (TRULICITY) 3 VD/2.6YS SOPN Inject 3 mg into the skin once a week 6/30/22   SAVANA Oates CNP   omeprazole (PRILOSEC) 40 MG delayed release capsule Take 1 capsule by mouth every morning (before breakfast) 6/30/22   SAVANA Oates CNP   insulin detemir (LEVEMIR [address] [facility]) 100 UNIT/ML injection pen Inject 26 Units into the skin nightly 5/25/22   SAVANA Richard CNP   Insulin Pen Needle 31G X 5 MM MISC Use pen needle for giving daily insulin injection 5/25/22   SAVANA Richard CNP   TRUE METRIX BLOOD GLUCOSE TEST strip use 1 TEST STRIP to TEST BLOOD SUGAR twice a day and if needed 4/19/22   SAVANA Richard CNP   TRUEplus Lancets 30G MISC use 1 LANCET to TEST BLOOD SUGAR twice a day and if needed 4/19/22   SAVANA Richard CNP   albuterol (PROVENTIL) (2.5 MG/3ML) 0.083% nebulizer solution Take 2.5 mg by nebulization daily as needed for Wheezing    Historical Provider, MD   blood glucose monitor kit and supplies Dispense sufficient amount for BID testing frequency plus additional to accommodate PRN testing needs. Dispense all needed supplies to include: monitor, strips, lancing device, lancets, control solutions, alcohol swabs.  10/25/21   SAVANA Richard CNP   tiZANidine (ZANAFLEX) 2 MG tablet take 1 tablet by mouth three times a day if needed 10/19/21   Karolee Kanner, DO   nitroGLYCERIN (NITROSTAT) 0.4 MG SL tablet place 1 tablet under the tongue if needed every 5 minutes for chest pain for 3 doses IF NO RELIEF AFTER THIRD DOSE CALL PRESCRIBER . 10/19/21   Karolee Kanner, DO   calcium carbonate (OS-BRANNON) 1250 (500 Ca) MG chewable tablet Take 500-1,000 mg by mouth 3 times daily 10/20/20   Historical Provider, MD   levothyroxine (SYNTHROID) 50 MCG tablet Take 1 tablet by mouth Daily 9/13/21 6/30/22  SAVANA Richard CNP   metoprolol succinate (TOPROL XL) 100 MG extended release tablet take 1 tablet by mouth twice a day  Patient taking differently: 50 mg 2 times daily take 1 tablet by mouth twice a day 9/13/21   SAVANA Richard CNP   sertraline (ZOLOFT) 100 MG tablet Take 1.5 tablets by mouth daily 9/13/21   SAVANA Richard CNP   ranolazine (RANEXA) 500 MG extended release tablet Take 1 tablet by mouth 2 times daily 9/13/21 6/30/22 SAVANA Zuñiga CNP   atorvastatin (LIPITOR) 80 MG tablet Take 1 tablet by mouth daily 9/13/21 6/30/22  SAVANA Zuñiga CNP   clopidogrel (PLAVIX) 75 MG tablet Take 1 tablet by mouth daily 9/13/21 6/30/22  SAVANA Zuñiga CNP   mirabegron (MYRBETRIQ) 50 MG TB24 Take 50 mg by mouth daily 9/13/21   SAVANA Zuñiga CNP   fluticasone (FLONASE) 50 MCG/ACT nasal spray 1 spray by Each Nostril route daily as needed for Rhinitis  Patient not taking: No sig reported    Historical Provider, MD   Multiple Vitamins-Minerals (THERAPEUTIC MULTIVITAMIN-MINERALS) tablet Take 1 tablet by mouth daily    Historical Provider, MD   ascorbic acid (VITAMIN C) 500 MG tablet Take 500 mg by mouth daily    Historical Provider, MD   polyethylene glycol (GLYCOLAX) 17 g packet Take 17 g by mouth daily as needed  8/31/20   Historical Provider, MD   acetaminophen (TYLENOL) 500 MG tablet Take 2 tablets by mouth every 6 hours as needed for Pain 6/24/20   Alicia Chan DO   Cholecalciferol (VITAMIN D) 50 MCG (2000 UT) TABS tablet Take 2,000 Units by mouth daily  1/23/20   Historical Provider, MD   aspirin 81 MG chewable tablet Take 81 mg by mouth daily 5/31/19   Historical Provider, MD       REVIEW OF SYSTEMS    (2-9 systems for level 4, 10 or more for level 5)      Review of Systems   Constitutional:  Positive for appetite change. Negative for activity change, chills and fever. HENT:  Negative for congestion and trouble swallowing. Respiratory:  Positive for shortness of breath. Negative for cough, chest tightness and wheezing. Cardiovascular:  Positive for chest pain. Negative for leg swelling. Gastrointestinal:  Positive for nausea and vomiting. Negative for abdominal distention, abdominal pain, constipation and diarrhea. Genitourinary:  Negative for difficulty urinating and vaginal bleeding. Musculoskeletal:  Negative for back pain and gait problem. Skin:  Negative for pallor.    Neurological:  Negative for dizziness, seizures, syncope, weakness, numbness and headaches. PHYSICAL EXAM   (up to 7 for level 4, 8 or more for level 5)      INITIAL VITALS:   /72   Pulse 95   Temp 98.6 °F (37 °C) (Oral)   Resp 16   LMP 10/24/2022 (Exact Date)   SpO2 96%     Physical Exam  Constitutional:       Appearance: Normal appearance. HENT:      Head: Normocephalic and atraumatic. Right Ear: External ear normal.      Left Ear: External ear normal.   Eyes:      Extraocular Movements: Extraocular movements intact. Cardiovascular:      Rate and Rhythm: Normal rate. Pulses: Normal pulses. Pulmonary:      Effort: Pulmonary effort is normal.      Breath sounds: Normal breath sounds. Abdominal:      Palpations: Abdomen is soft. Tenderness: There is no abdominal tenderness. Musculoskeletal:         General: Normal range of motion. Cervical back: Normal range of motion. Neurological:      General: No focal deficit present. Mental Status: She is alert and oriented to person, place, and time. Psychiatric:         Mood and Affect: Mood normal.       DIFFERENTIAL  DIAGNOSIS     PLAN (LABS / IMAGING / EKG):  Orders Placed This Encounter   Procedures    Culture, Urine    COVID-19, Rapid    XR CHEST (2 VW)    Troponin    Brain Natriuretic Peptide    CBC with Auto Differential    Basic Metabolic Panel    Urinalysis with Microscopic    PREGNANCY, URINE    D-Dimer, Quantitative    Troponin    Diet NPO    ADULT DIET;  Regular    Vital signs per unit routine    Telemetry monitoring - 72 hour duration    Notify physician    Up as tolerated    Full Code    Inpatient consult to Cardiology    Initiate Oxygen Therapy Protocol    EKG 12 Lead    EKG 12 lead    Place in Observation Service    Place in Observation Service       MEDICATIONS ORDERED:  Orders Placed This Encounter   Medications    ondansetron (ZOFRAN) injection 4 mg    fentaNYL (SUBLIMAZE) injection 50 mcg    0.9 % sodium chloride bolus    0.9 % sodium chloride bolus    0.9 % sodium chloride infusion    albuterol (PROVENTIL) nebulizer solution 2.5 mg     Order Specific Question:   Initiate RT Bronchodilator Protocol     Answer:   Yes - Inpatient Protocol    aspirin chewable tablet 81 mg    atorvastatin (LIPITOR) tablet 80 mg    fluticasone (FLONASE) 50 MCG/ACT nasal spray 1 spray    metoprolol succinate (TOPROL XL) extended release tablet 50 mg    trospium (SANCTURA) tablet 20 mg    oxyCODONE-acetaminophen (PERCOCET) 5-325 MG per tablet 1 tablet    polyethylene glycol (GLYCOLAX) packet 17 g    sertraline (ZOLOFT) tablet 150 mg    sulfamethoxazole-trimethoprim (BACTRIM DS;SEPTRA DS) 800-160 MG per tablet 1 tablet     Order Specific Question:   Antimicrobial Indications     Answer:   Urinary Tract Infection     Order Specific Question:   UTI duration of therapy     Answer: Other    tiZANidine (ZANAFLEX) tablet 4 mg    tranexamic acid (LYSTEDA) tablet 1,300 mg    0.9 % sodium chloride infusion    sodium chloride flush 0.9 % injection 5-40 mL    sodium chloride flush 0.9 % injection 5-40 mL    0.9 % sodium chloride infusion    OR Linked Order Group     potassium chloride (KLOR-CON M) extended release tablet 40 mEq     potassium bicarb-citric acid (EFFER-K) effervescent tablet 40 mEq     potassium chloride 10 mEq/100 mL IVPB (Peripheral Line)    ondansetron (ZOFRAN) injection 4 mg    OR Linked Order Group     acetaminophen (TYLENOL) tablet 650 mg     acetaminophen (TYLENOL) suppository 650 mg       DDX: STEMI, NSTEMI, angina, pulmonary embolism    MDM: 54 y.o. female presents today with chest pain, nausea and vomiting. Patient nausea improved with Zofran. Cardiac work-up ordered, unremarkable. Due to patient's significant history will place in the observation unit for cardiology consultation and work-up. Heart Score for chest pain patients  History:  Moderately Suspicious  ECG: Non-Specifc repolarization disturbance/LBTB/PM  Patient Age: > 45 and < 72 years  *Risk factors for Atherosclerotic disease: Coronary Artery Disease, Hypercholesterolemia, Obesity  Risk Factors: > 3 Risk factors or history of atherosclerotic disease*  Troponin: > 1 and < 3X normal limit  Heart Score Total: 6  [place]n Coma Scale  Eye Opening: Spontaneous  Best Verbal Response: Oriented  Best Motor Response: Obeys commands  [place] Coma Scale Score: 15  DIAGNOSTIC RESULTS / EMERGENCY DEPARTMENT COURSE / MDM   LAB RESULTS:  Results for orders placed or performed during the hospital encounter of 10/28/22   COVID-19, Rapid    Specimen: Nasopharyngeal Swab   Result Value Ref Range    Specimen Description . NASOPHARYNGEAL SWAB     SARS-CoV-2, Rapid Not Detected Not Detected   Troponin   Result Value Ref Range    Troponin, High Sensitivity 9 0 - 14 ng/L   Brain Natriuretic Peptide   Result Value Ref Range    Pro-BNP 68 <300 pg/mL   CBC with Auto Differential   Result Value Ref Range    WBC 12.7 (H) 3.5 - 11.3 k/uL    RBC 5.22 (H) 3.95 - 5.11 m/uL    Hemoglobin 12.7 11.9 - 15.1 g/dL    Hematocrit 41.0 36.3 - 47.1 %    MCV 78.5 (L) 82.6 - 102.9 fL    MCH 24.3 (L) 25.2 - 33.5 pg    MCHC 31.0 28.4 - 34.8 g/dL    RDW 15.1 (H) 11.8 - 14.4 %    Platelets 093 (H) 428 - 453 k/uL    MPV 8.5 8.1 - 13.5 fL    NRBC Automated 0.0 0.0 per 100 WBC    Seg Neutrophils 59 36 - 65 %    Lymphocytes 32 24 - 43 %    Monocytes 7 3 - 12 %    Eosinophils % 2 1 - 4 %    Basophils 0 0 - 2 %    Immature Granulocytes 0 0 %    Segs Absolute 7.45 1.50 - 8.10 k/uL    Absolute Lymph # 4.05 (H) 1.10 - 3.70 k/uL    Absolute Mono # 0.83 0.10 - 1.20 k/uL    Absolute Eos # 0.31 0.00 - 0.44 k/uL    Basophils Absolute 0.04 0.00 - 0.20 k/uL    Absolute Immature Granulocyte 0.04 0.00 - 0.30 k/uL    RBC Morphology ANISOCYTOSIS PRESENT    Basic Metabolic Panel   Result Value Ref Range    Glucose 143 (H) 70 - 99 mg/dL    BUN 14 6 - 20 mg/dL    Creatinine 0.67 0.50 - 0.90 mg/dL    Est, Glom Filt Rate >60 >60 mL/min/1.73m2    Calcium 9.1 8.6 - 10.4 mg/dL    Sodium 136 135 - 144 mmol/L    Potassium 3.6 (L) 3.7 - 5.3 mmol/L    Chloride 99 98 - 107 mmol/L    CO2 21 20 - 31 mmol/L    Anion Gap 16 9 - 17 mmol/L   D-Dimer, Quantitative   Result Value Ref Range    D-Dimer, Quant 0.26 mg/L FEU   Troponin   Result Value Ref Range    Troponin, High Sensitivity 8 0 - 14 ng/L           RADIOLOGY:  XR CHEST (2 VW)   Final Result      No acute intrathoracic pathology. EMERGENCY DEPARTMENT COURSE:  ED Course as of 10/29/22 0554   Fri Oct 28, 2022   2035 Seen Tuesday for AUB, fibroids found. Started on TXA, decreased bleeding. Bactrim for UTI. Nausea, dizzy, vomiting, since weds. Developed CP and SOB along with tachycardia today. Previous stent on plavix. [SS]   2144 CXR- unremarkable  [SS]   9796 500cc bolus given, clinically dehydrated  [SS]   2306 HS 6 [SS]   2317 No concern for infection at this time [SS]      ED Course User Index  [SS] Jia Carter MD        PROCEDURES:  None    CONSULTS:  IP CONSULT TO CARDIOLOGY    CRITICAL CARE:  Please see attending note    FINAL IMPRESSION      1. Chest pain, unspecified type          DISPOSITION / PLAN     DISPOSITION Admitted 10/29/2022 12:03:54 AM      PATIENT REFERRED TO:  No follow-up provider specified.     DISCHARGE MEDICATIONS:  Current Discharge Medication List          Jia Carter MD  Emergency Medicine Resident    (Please note that portions of thisnote were completed with a voice recognition program.  Efforts were made to edit the dictations but occasionally words are mis-transcribed.)       Jia Carter MD  Resident  10/29/22 0712

## 2022-10-29 NOTE — ED PROVIDER NOTES
Faculty Sign-Out Attestation  Handoff taken on the following patient from prior Attending Physician: [place] Hardin    I was available and discussed any additional care issues that arose and coordinated the management plans with the resident(s) caring for the patient during my duty period. Any areas of disagreement with residents documentation of care or procedures are noted on the chart. I was personally present for the key portions of any/all procedures during my duty period. I have documented in the chart those procedures where I was not present during the key portions.     Dizzy / nausea, fibroid / received TXA, d dimer -,   Needing admit    Skippy Revering, DO  Attending Physician       Skippy Revering, DO  10/28/22 7143    Admitted       Skippy Revering, DO  10/29/22 6020

## 2022-10-29 NOTE — CARE COORDINATION
10/29/22 0954   Service Assessment   Patient Orientation Alert and Oriented   Cognition Alert   History Provided By Patient   Primary Caregiver Self   Support Systems Spouse/Significant Other;Children   PCP Verified by CM Yes   Last Visit to PCP Within last 6 months   Prior Functional Level Independent in ADLs/IADLs   Current Functional Level Independent in ADLs/IADLs   Can patient return to prior living arrangement Yes   Ability to make needs known: Good   Family able to assist with home care needs: Yes   Financial Resources Medicaid   Social/Functional History   Lives With Spouse; Son   Type of [address] Two level  (bedroom is upstairs. bath is downstairs)   Home Access Stairs to enter without rails   Entrance Stairs - Number of Steps 3   Bathroom Shower/Tub Tub/Shower unit   Bathroom Equipment Shower chair   Home Equipment Cane;Walker, [address] Responsibilities Yes   Ambulation Assistance Independent   Transfer Assistance Independent   Active  No   Patient's  Info    Occupation Works at home   Discharge [address] Prior To Admission None   Potential Assistance Needed N/A   DME Ordered?  No   Potential Assistance Purchasing Medications No   Type of Home Care Services None   Patient expects to be discharged to: House   One/Two Story Residence Two story   Home independently

## 2022-10-29 NOTE — PROGRESS NOTES
[address]  CDU / OBSERVATION ENCOUNTER  ATTENDING NOTE     On recheck patient is improved.       Nichole Velásquez MD  Attending Emergency  Physician

## 2022-10-29 NOTE — CONSULTS
[facility] Cardiology Cardiology    Consult / H&P               Today's Date: 10/29/2022  Patient Name: Sharon Spann  Date of admission: 10/28/2022  8:19 PM  Patient's age: 54 y.o., 1970  Admission Dx: Chest pain [R07.9]  Chest pain, unspecified type [R07.9]    Reason for Consult: Cardiac work-up, chest pain, history of CAD    Requesting Physician: Luis Alberto Rivera MD    CHIEF COMPLAINT: Chest pain    History Obtained From:  patient, EMR    HISTORY OF PRESENT ILLNESS:      The patient is a 54 y.o. female who presents to the hospital with chest pain and shortness of breath. Patient recently seen in our ED on Tuesday due to abnormal uterine bleeding and was given tranexamic acid and started on Bactrim due to UTI. Patient stated that she got her Bactrim on Wednesday and woke up Thursday at 5 AM with 3 episodes of vomiting, denies any bloody emesis. Patient then stated that she was unable to keep anything down including water due to her nausea. Patient ended up developing chest pain yesterday which she stated was substernal which wraps around the left side of her ribs, she described it as a heaviness or pressure. It initially was intermittent but lasting about an hour, but currently it is constant. No relation to exertion, not relieved by rest.    Cardiology was consulted for cardiac work-up. Patient has a significant history of CAD with previous MI (2016) s/p PCI to LAD, HLD, HTN, DM, depression, and anxiety. Patient had a cardiac catheterization on 7/23/2021 which showed widely patent stent in LAD. At that time patient also had a nuclear stress test which showed intermediate risk with prominently fixed apical and distal septal perfusion defects and a low LVEF of 44%. Patient recently seen by Dr. Martinez Files in July of this year for similar complaint and at that time she had mildly elevated troponins which was due to demand ischemia secondary to COVID infection at that time.     In the ED, patient was noted to be tachycardic and hypertensive. Troponins were negative x2 and her EKG showed sinus tachycardia. Other lab work significant for hypokalemia, mild hyperglycemia, and mild leukocytosis. CXR is negative for any acute intrathoracic abnormality. Patient was given a 500 mL bolus of NS then a 1L bolus followed by continuous IV hydration at 125 cc/h, Zofran, and pain medication. Past Medical History:   has a past medical history of Anxiety, Arthritis, CAD (coronary artery disease), Depression, Hyperlipidemia, Kidney stone, Langerhans-cell granulomatosis ([facility] [address] Myocardial infarct [facility]), Neck pain, and Spinal stenosis. Past Surgical History:   has a past surgical history that includes back surgery;  section; Mastoid surgery; Tonsillectomy; Cardiac catheterization; Lung biopsy (Right); hip surgery (Left); Colonoscopy (N/A, 2021); and Upper gastrointestinal endoscopy (2021). Home Medications:    Prior to Admission medications    Medication Sig Start Date End Date Taking?  Authorizing Provider   tranexamic acid (LYSTEDA) 650 MG TABS tablet Take 2 tablets by mouth 3 times daily for 5 days 10/25/22 10/30/22  Mark Orlando MD   sulfamethoxazole-trimethoprim (BACTRIM DS) 800-160 MG per tablet Take 1 tablet by mouth 2 times daily for 7 days 10/25/22 11/1/22  Mark Orlando MD   busPIRone (BUSPAR) 15 MG tablet Take 7.5 mg by mouth 3 times daily 22   Maureen Smith MD   Dulaglutide (TRULICITY) 3 UF/7.4SW SOPN Inject 3 mg into the skin once a week 22   SAVANA Bucio CNP   omeprazole (PRILOSEC) 40 MG delayed release capsule Take 1 capsule by mouth every morning (before breakfast) 22   SAVANA Bucio CNP   insulin detemir (LEVEMIR FLEXTOUCH) 100 UNIT/ML injection pen Inject 26 Units into the skin nightly 22   SAVANA Bucio CNP   Insulin Pen Needle 31G X 5 MM MISC Use pen needle for giving daily insulin injection 22   SAVANA Bucio CNP   TRUE METRIX BLOOD GLUCOSE TEST strip use 1 TEST STRIP to TEST BLOOD SUGAR twice a day and if needed 4/19/22   SAVANA Zuñiga CNP   TRUEplus Lancets 30G MISC use 1 LANCET to TEST BLOOD SUGAR twice a day and if needed 4/19/22   SAVANA Zuñiga CNP   albuterol (PROVENTIL) (2.5 MG/3ML) 0.083% nebulizer solution Take 2.5 mg by nebulization daily as needed for Wheezing    Historical Provider, MD   blood glucose monitor kit and supplies Dispense sufficient amount for BID testing frequency plus additional to accommodate PRN testing needs. Dispense all needed supplies to include: monitor, strips, lancing device, lancets, control solutions, alcohol swabs.  10/25/21   SAVANA Zuñiga CNP   tiZANidine (ZANAFLEX) 2 MG tablet take 1 tablet by mouth three times a day if needed 10/19/21   Radha Boswellphs, DO   nitroGLYCERIN (NITROSTAT) 0.4 MG SL tablet place 1 tablet under the tongue if needed every 5 minutes for chest pain for 3 doses IF NO RELIEF AFTER THIRD DOSE CALL PRESCRIBER . 10/19/21   Radha Ralphs, DO   calcium carbonate (OS-BRANNON) 1250 (500 Ca) MG chewable tablet Take 500-1,000 mg by mouth 3 times daily 10/20/20   Historical Provider, MD   levothyroxine (SYNTHROID) 50 MCG tablet Take 1 tablet by mouth Daily 9/13/21 6/30/22  SAVANA Zuñiga CNP   metoprolol succinate (TOPROL XL) 100 MG extended release tablet take 1 tablet by mouth twice a day  Patient taking differently: 50 mg 2 times daily take 1 tablet by mouth twice a day 9/13/21   SAVANA Zuñiga CNP   sertraline (ZOLOFT) 100 MG tablet Take 1.5 tablets by mouth daily 9/13/21   SAVANA Zuñiga CNP   ranolazine (RANEXA) 500 MG extended release tablet Take 1 tablet by mouth 2 times daily 9/13/21 6/30/22  SAVANA Zuñiga CNP   atorvastatin (LIPITOR) 80 MG tablet Take 1 tablet by mouth daily 9/13/21 6/30/22  SAVANA Zuñiga CNP   clopidogrel (PLAVIX) 75 MG tablet Take 1 tablet by mouth daily 9/13/21 6/30/22  Ernesto Benitez, APRN - CNP   mirabegron (MYRBETRIQ) 50 MG TB24 Take 50 mg by mouth daily 9/13/21   Stevenson Hemphill APRN - CNP   fluticasone (FLONASE) 50 MCG/ACT nasal spray 1 spray by Each Nostril route daily as needed for Rhinitis  Patient not taking: No sig reported    Historical Provider, MD   Multiple Vitamins-Minerals (THERAPEUTIC MULTIVITAMIN-MINERALS) tablet Take 1 tablet by mouth daily    Historical Provider, MD   ascorbic acid (VITAMIN C) 500 MG tablet Take 500 mg by mouth daily    Historical Provider, MD   polyethylene glycol (GLYCOLAX) 17 g packet Take 17 g by mouth daily as needed  8/31/20   Historical Provider, MD   acetaminophen (TYLENOL) 500 MG tablet Take 2 tablets by mouth every 6 hours as needed for Pain 6/24/20   Alicia Chan DO   Cholecalciferol (VITAMIN D) 50 MCG (2000 UT) TABS tablet Take 2,000 Units by mouth daily  1/23/20   Historical Provider, MD   aspirin 81 MG chewable tablet Take 81 mg by mouth daily 5/31/19   Historical Provider, MD       Allergies:  Seasonal and Keflex [cephalexin]    Social History:   reports that she has been smoking cigarettes. She has a 12.50 pack-year smoking history. She has never used smokeless tobacco. She reports current alcohol use. She reports current drug use. Frequency: 7.00 times per week. Drug: Marijuana [place]). Family History: family history includes Alzheimer's Disease in her paternal grandfather; COPD in her mother; Coronary Art Dis in her mother; Dementia in her mother; Depression in her mother; Diabetes in her maternal grandmother and mother; Heart Attack in her father; High Blood Pressure in her mother. No h/o sudden cardiac death. REVIEW OF SYSTEMS:    Constitutional: there has been no unanticipated weight loss. There's been No change in energy level, No change in activity level. Eyes: No visual changes or diplopia. No scleral icterus.   ENT: No Headaches  Cardiovascular: as above  Respiratory: No previous pulmonary problems, No cough  Gastrointestinal: No abdominal pain. No change in bowel or bladder habits. Genitourinary: No dysuria, trouble voiding, or hematuria. Musculoskeletal:  No gait disturbance, No weakness or joint complaints. Integumentary: No rash or pruritis. Neurological: No headache, diplopia, change in muscle strength, numbness or tingling. No change in gait, balance, coordination, mood, affect, memory, mentation, behavior. Psychiatric: No anxiety, or depression. Endocrine: No temperature intolerance. No excessive thirst, fluid intake, or urination. No tremor. Hematologic/Lymphatic: No abnormal bruising or bleeding, blood clots or swollen lymph nodes. Allergic/Immunologic: No nasal congestion or hives. PHYSICAL EXAM:      /72   Pulse 95   Temp 98.6 °F (37 °C) (Oral)   Resp 16   LMP 10/24/2022 (Exact Date)   SpO2 96%    Constitutional and General Appearance: alert, cooperative, no distress and appears stated age  HEENT: PERRL, no cervical lymphadenopathy. No masses palpable. Normal oral mucosa  Respiratory:  Normal excursion and expansion without use of accessory muscles  Resp Auscultation: Good respiratory effort. No for increased work of breathing. On auscultation: clear to auscultation bilaterally  Cardiovascular: The apical impulse is not displaced  Heart tones are crisp and normal. regular S1 and S2.  Jugular venous pulsation Normal  The carotid upstroke is normal in amplitude and contour without delay or bruit  Peripheral pulses are symmetrical and full   Reproducible chest pain  Abdomen:   No masses or tenderness  Bowel sounds present  Extremities:   No Cyanosis or Clubbing   Lower extremity edema: No   Skin: Warm and dry  Neurological:  Alert and oriented.   Moves all extremities well  No abnormalities of mood, affect, memory, mentation, or behavior are noted      Labs:     CBC:   Recent Labs     10/28/22  2155   WBC 12.7*   HGB 12.7   HCT 41.0   *     BMP:   Recent Labs 10/28/22  2155      K 3.6*   CO2 21   BUN 14   CREATININE 0.67   LABGLOM >60   GLUCOSE 143*     BNP: No results for input(s): BNP in the last 72 hours. PT/INR: No results for input(s): PROTIME, INR in the last 72 hours. APTT:No results for input(s): APTT in the last 72 hours. CARDIAC ENZYMES:No results for input(s): CKTOTAL, CKMB, CKMBINDEX, TROPONINI in the last 72 hours. FASTING LIPID PANEL:  Lab Results   Component Value Date/Time    HDL 33 01/01/2022 07:02 AM    TRIG 121 01/01/2022 07:02 AM     LIVER PROFILE:No results for input(s): AST, ALT, LABALBU in the last 72 hours.       Patient Active Problem List   Diagnosis    Hypothyroidism    Type 2 diabetes mellitus without complication, with long-term current use of insulin ([address]    Essential hypertension    Hip fracture ([address]    Spinal stenosis of cervical region    Hx of deep venous thrombosis    Situational depression    Anxiety    Pulmonary Langerhans cell granulomatosis ([address]    Mixed hyperlipidemia    Coronary artery disease involving native heart with angina pectoris (HCC)    Migraine without status migrainosus, not intractable    OAB (overactive bladder)    Smoker    Irritable bowel syndrome with constipation    Irritable bowel syndrome with both constipation and diarrhea    Positive colorectal cancer screening using Cologuard test    Gastroesophageal reflux disease with esophagitis without hemorrhage    Atypical chest pain    NSTEMI (non-ST elevated myocardial infarction) ([address]    COVID-19    Hx of CS x2    Hx of laparoscopic tubal ligation (2000)    Abnormal uterine bleeding    Fundal uterine fibroid     Chest pain         DATA:    IMPRESSION:    Atypical Chest Pain- reproducible chest pain  CAD s/p PCI to LAD (2016)  HTN  HLD  DM  Hypokalemia    RECOMMENDATIONS:  Tropes negative, EKG reviewed, NSR with changes from her previous MI  Patient had cardiac cath last year showing patent stent in LAD  Replace K, keep K > 4, Mg > 2    Will discuss with rounding attending for final recommendations. Mabel Edwards MD  Cardiology Service  Internal Medicine Residency Program, PGY-1  [facility]       Attending Physician Statement:    I have discussed the care of  Alan Greene , including pertinent history and exam findings, with the Cardiology fellow/resident. I have seen and examined the patient and the key elements of all parts of the encounter have been performed by me. I agree with the assessment, plan and orders as documented by the fellow/resident, after I modified exam findings and plan of treatments, and the final version is my approved version of the assessment. Additional Comments: recent ER visit for uterine bleeding and was give bactrim and tranexamic acid. She had nausea and vomiting on Thursday. She has been having persistent chest pain which is very reproducible to touch left side of sternum worse than right side. Ok to discharge from cardiology perspective and patient to follow up with primary cardiologist Dr. Kaylyn Joseph in 1-2 weeks.      Treva Che MD

## 2022-10-29 NOTE — ED PROVIDER NOTES
[address] [facility]     Emergency Department     Faculty Attestation    I performed a history and physical examination of the patient and discussed management with the resident. I reviewed the residents note and agree with the documented findings and plan of care. Any areas of disagreement are noted on the chart. I was personally present for the key portions of any procedures. I have documented in the chart those procedures where I was not present during the key portions. I have reviewed the emergency nurses triage note. I agree with the chief complaint, past medical history, past surgical history, allergies, medications, social and family history as documented unless otherwise noted below. For Physician Assistant/ Nurse Practitioner cases/documentation I have personally evaluated this patient and have completed at least one if not all key elements of the E/M (history, physical exam, and MDM). Additional findings are as noted. I have personally seen and evaluated the patient. I find the patient's history and physical exam are consistent with the NP/PA documentation. I agree with the care provided, treatment rendered, disposition and follow-up plan. Patient reporting chest pain difficulty breathing noted to be tachycardic here recent treatment for fibroids including getting TN K patient is experiencing some chest pain at the present time is also had decreased p.o. intake over the last day. EKG Interpretation    Interpreted by emergency department physician    Rhythm: normal sinus   Rate: Tachycardia  Axis: normal  Conduction: normal  ST Segments: no acute change  T Waves: no acute change  Q Waves: no acute change    Clinical Impression:  nonspecific EKG. Critical Care     Atilio Soni M.D.   Attending Emergency  Physician           Adin Don MD  10/28/22 [id number]

## 2022-10-30 LAB
CULTURE: NORMAL
CULTURE: NORMAL
SPECIMEN DESCRIPTION: NORMAL
SPECIMEN DESCRIPTION: NORMAL

## 2022-10-31 NOTE — H&P
[address]  CDU / OBSERVATION ENCOUNTER  ATTENDING NOTE     Pt Name: Tiffani Rodriguez  MRN: [id number]  [place] 1970  Date of evaluation: 10/31/22  Patient's PCP is :  SAVANA Smith CNP    CHIEF COMPLAINT       Chief Complaint   Patient presents with    Nausea    Dizziness    Chest Pain         HISTORY OF PRESENT ILLNESS    Tiffani Rodriguez is a 54 y.o. female who presents complaints of chest pain. Pain is consistent with musculoskeletal type discomfort given the prolonged nature of it and reproducibility. Patient developed chest pain after vomiting. Patient had substernal chest pain which wrapped around her left side and ribs. Patient has had constant pain. Patient with previous cardiac history. Recent evaluation by cardiology. Location/Symptom: Chest pain  Timing/Onset: Several days  Provocation: After vomiting  Quality: Pleuritic. Reproducible. Radiation: Left-sided  Severity: Moderate  Timing/Duration: Days  Modifying Factors: Improved with anti-inflammatories    REVIEW OF SYSTEMS        General ROS - No fevers, No malaise   Ophthalmic ROS - No discharge, No changes in vision  ENT ROS -  No sore throat, No rhinorrhea,   Respiratory ROS - no shortness of breath, no cough, no  wheezing  Cardiovascular ROS - chest pain, no dyspnea on exertion  Gastrointestinal ROS - No abdominal pain, no nausea or vomiting, no change in bowel habits, no black or bloody stools  Genito-Urinary ROS - dysuria, trouble voiding, or hematuria  Musculoskeletal ROS - No myalgias, No arthalgias  Neurological ROS - No headache, no dizziness/lightheadedness, No focal weakness, no loss of sensation  Dermatological ROS - No lesions, No rash     (PQRS) Advance directives on face sheet per hospital policy.  No change unless specifically mentioned in chart    PAST MEDICAL HISTORY    has a past medical history of Anxiety, Arthritis, CAD (coronary artery disease), Depression, Hyperlipidemia, Kidney stone, Langerhans-cell granulomatosis ([facility] [address] Myocardial infarct [facility]), Neck pain, and Spinal stenosis. I have reviewed the past medical history with the patient and it is  pertinent to this complaint. SURGICAL HISTORY      has a past surgical history that includes back surgery;  section; Mastoid surgery; Tonsillectomy; Cardiac catheterization; Lung biopsy (Right); hip surgery (Left); Colonoscopy (N/A, 2021); and Upper gastrointestinal endoscopy (2021). I have reviewed and agree with Surgical History entered and it is  pertinent to this complaint. CURRENT MEDICATIONS     No current facility-administered medications for this encounter. All medication charted and reviewed. ALLERGIES     is allergic to seasonal and keflex [cephalexin]. FAMILY HISTORY     She indicated that her mother is . She indicated that her father is . She indicated that both of her brothers are . She indicated that her maternal grandmother is . She indicated that her maternal grandfather is . She indicated that her paternal grandmother is . She indicated that her paternal grandfather is . family history includes Alzheimer's Disease in her paternal grandfather; COPD in her mother; Coronary Art Dis in her mother; Dementia in her mother; Depression in her mother; Diabetes in her maternal grandmother and mother; Heart Attack in her father; High Blood Pressure in her mother. The patient denies any pertinent family history. I have reviewed and agree with the family history entered. I have reviewed the Family History and it is not significant to the case    SOCIAL HISTORY      reports that she has been smoking cigarettes. She has a 12.50 pack-year smoking history. She has never used smokeless tobacco. She reports current alcohol use. She reports current drug use. Frequency: 7.00 times per week. Drug: Marijuana Shellye Burkitt).   I have reviewed and agree with all Social.  There are no  concerns for substance abuse/use. PHYSICAL EXAM     INITIAL VITALS:  temperature is 98.4 °F (36.9 °C). Her blood pressure is 131/87 and her pulse is 88. Her respiration is 16 and oxygen saturation is 94%. CONSTITUTIONAL: AOx4, no apparent distress, appears stated age     HEAD: normocephalic, atraumatic   EYES: PERRLA, EOMI    ENT: moist mucous membranes, uvula midline   NECK: supple, symmetric   BACK: symmetric   LUNGS: clear to auscultation bilaterally   CARDIOVASCULAR: regular rate and rhythm, no murmurs, rubs or gallops   ABDOMEN: soft, non-tender, non-distended with normal active bowel sounds   NEUROLOGIC:  MAEx4, no focal sensory or motor deficits   MUSCULOSKELETAL: no clubbing, cyanosis or edema   SKIN: no rash or wounds       DIFFERENTIAL DIAGNOSIS/MDM:       Patient with symptoms much more consistent with muscle skeletal type pain. Patient describes constant none exertional chest discomfort worse with palpation and movement. Patient had negative enzymes and EKG. Not felt to have coronary artery disease as a etiology of her chest discomfort. Seen by cardiology. Patient does have some urinary symptoms. Patient has been on Macrobid for 3 days. Rechecking a new sample. Patient does not have fever but does have increased white count. Aggressive supportive therapy. We will recheck urinalysis. Awaiting new culture. Patient did have a sample sent this morning which was on the counter for 3 hours. We will send fresh specimen and increase fluids. Patient for treatment of musculoskeletal type pain. Appreciate cardiology input.     DIAGNOSTIC RESULTS     EKG: All EKG's are interpreted by the Observation Physician who either signs or Co-signs this chart in the absence of a cardiologist.    EKG Interpretation    Interpreted by observation physician    Rhythm: normal sinus   Rate: tachycardia  Axis: normal  Ectopy: none  Conduction: normal  ST Segments: no acute change  T Waves: no acute change  Q Waves: none    Clinical Impression: no acute changes    Unknown MD Alvaro         RADIOLOGY:   I directly visualized the following  images and reviewed the radiologist interpretations:    XR CHEST (2 VW)    Result Date: 10/28/2022  EXAMINATION: TWO XRAY VIEWS OF THE CHEST 10/28/2022 8:53 pm COMPARISON: 07/08/2022 HISTORY: ORDERING SYSTEM PROVIDED HISTORY: DIONE,MARY TECHNOLOGIST PROVIDED HISTORY: SOB,CP FINDINGS: Chest radiograph: The cardiomediastinal silhouette and hilar contours are normal. The lungs are clear with no focal consolidation, pleural effusion or pneumothorax. The overlying soft tissue and osseous structures do not demonstrate acute abnormality. Unchanged chronic fracture deformity of right rib cage     No acute intrathoracic pathology. LABS:  I have reviewed and interpreted all available lab results.   Labs Reviewed   CBC WITH AUTO DIFFERENTIAL - Abnormal; Notable for the following components:       Result Value    WBC 12.7 (*)     RBC 5.22 (*)     MCV 78.5 (*)     MCH 24.3 (*)     RDW 15.1 (*)     Platelets 689 (*)     Absolute Lymph # 4.05 (*)     All other components within normal limits   BASIC METABOLIC PANEL - Abnormal; Notable for the following components:    Glucose 143 (*)     Potassium 3.6 (*)     All other components within normal limits   URINALYSIS WITH MICROSCOPIC - Abnormal; Notable for the following components:    Ketones, Urine SMALL (*)     Urine Hgb MODERATE (*)     Protein, UA TRACE (*)     Crystals, UA FEW (*)     Crystals, UA CALCIUM OXALATE (*)     Bacteria, UA FEW (*)     Mucus, UA 3+ (*)     All other components within normal limits   URINALYSIS WITH MICROSCOPIC - Abnormal; Notable for the following components:    Turbidity UA Cloudy (*)     Ketones, Urine SMALL (*)     Urine Hgb LARGE (*)     Protein, UA TRACE (*)     Bacteria, UA MODERATE (*)     Mucus, UA 2+ (*)     All other components within normal limits   POC GLUCOSE FINGERSTICK - Abnormal; Notable for the following components:    POC Glucose 122 (*)     All other components within normal limits   POC GLUCOSE FINGERSTICK - Abnormal; Notable for the following components:    POC Glucose 111 (*)     All other components within normal limits   POC GLUCOSE FINGERSTICK - Abnormal; Notable for the following components:    POC Glucose 137 (*)     All other components within normal limits   COVID-19, RAPID   CULTURE, URINE   CULTURE, URINE   TROPONIN   BRAIN NATRIURETIC PEPTIDE   D-DIMER, QUANTITATIVE   TROPONIN   PREGNANCY, URINE         CDU IMPRESSION / PLAN      Christian De Luna is a 54 y.o. female who presents with       Chest pain. Uncertain etiology. Acute onset. Evaluated by cardiology. [place] to be likely musculoskeletal in nature. Exactly reproducible chest wall pain. Negative enzymes and EKG  Pain over several days. Consistent with muscle skeletal pain  Treated with nonsteroidals. Improved. Urinary symptoms. Improved. Culture sent. Antibiotics changed. Already treated with Macrobid  Persistent symptoms  Patient for change of antibiotics and recheck. Will resend new culture. Continue home medications and pain control  Monitor vitals, labs, and imaging  DISPO: pending consults and clinical improvement    CONSULTS:    IP CONSULT TO CARDIOLOGY  IP CONSULT TO IV TEAM    PROCEDURES:  Not indicated        PATIENT REFERRED TO:    Dorian Olea, APRN - CNP  [address] [facility]  [address] [facility] [address]    Go to  As needed    [facility] ED  [address] [facility]  [phone]  Go to  If symptoms worsen    MD oRbert AgrawalHenry J. Carter [facility], [address] [place] [address] [address] [facility]  [phone]    Schedule an appointment as soon as possible for a visit in 2 week(s)  Call Dr. Kassy Kong to schedule an appointment in 2 weeks time.       --  Jing Fregoso MD   Emergency Medicine Attending    This dictation was generated by voice recognition computer software. Although all attempts are made to edit the dictation for accuracy, there may be errors in the transcription that are not intended.

## 2022-10-31 NOTE — DISCHARGE SUMMARY
CDU Discharge Summary        Patient:  Candace Godwin  YOB: 1970    MRN: [id number]   Acct: [de-identified]    Primary Care Physician: SAVANA Valdez CNP    Admit date:  10/28/2022  8:19 PM  Discharge date: 10/29/2022  6:12 PM      Discharge Diagnoses:     Chest pain. Uncertain etiology. Acute onset. Evaluated by cardiology. [place] to be likely musculoskeletal in nature. Urinary symptoms. Improved. Culture sent. Antibiotics changed. Follow-up:  Call today/tomorrow for a follow up appointment with SAVANA Valdez CNP , or return to the Emergency Room with worsening symptoms    Stressed to patient the importance of following up with primary care doctor for further workup/management of symptoms. Pt verbalizes understanding and agrees with plan. Discharge Medication Changes:       Medication List        START taking these medications      ciprofloxacin 250 MG tablet  Commonly known as: CIPRO  Take 1 tablet by mouth every 12 hours for 3 days     HYDROcodone-acetaminophen 5-325 MG per tablet  Commonly known as: Norco  Take 1 tablet by mouth every 8 hours as needed for Pain for up to 12 doses. Intended supply: 3 days.  Take lowest dose possible to manage pain     ondansetron 4 MG tablet  Commonly known as: ZOFRAN  Take 1 tablet by mouth every 8 hours as needed for Nausea or Vomiting            CHANGE how you take these medications      metoprolol succinate 100 MG extended release tablet  Commonly known as: TOPROL XL  take 1 tablet by mouth twice a day  What changed:   how much to take  when to take this            CONTINUE taking these medications      acetaminophen 500 MG tablet  Commonly known as: TYLENOL  Take 2 tablets by mouth every 6 hours as needed for Pain     albuterol (2.5 MG/3ML) 0.083% nebulizer solution  Commonly known as: PROVENTIL     ascorbic acid 500 MG tablet  Commonly known as: VITAMIN C     aspirin 81 MG chewable tablet     atorvastatin 80 MG tablet  Commonly known as: LIPITOR  Take 1 tablet by mouth daily     blood glucose monitor kit and supplies  Dispense sufficient amount for BID testing frequency plus additional to accommodate PRN testing needs. Dispense all needed supplies to include: monitor, strips, lancing device, lancets, control solutions, alcohol swabs.      busPIRone 15 MG tablet  Commonly known as: BUSPAR  Take 7.5 mg by mouth 3 times daily     calcium carbonate 1250 (500 Ca) MG chewable tablet  Commonly known as: OS-BRANNON     clopidogrel 75 MG tablet  Commonly known as: PLAVIX  Take 1 tablet by mouth daily     Insulin Pen Needle 31G X 5 MM Misc  Use pen needle for giving daily insulin injection     Levemir FlexTouch 100 UNIT/ML injection pen  Generic drug: insulin detemir  Inject 26 Units into the skin nightly     levothyroxine 50 MCG tablet  Commonly known as: SYNTHROID  Take 1 tablet by mouth Daily     mirabegron 50 MG Tb24  Commonly known as: MYRBETRIQ  Take 50 mg by mouth daily     nitroGLYCERIN 0.4 MG SL tablet  Commonly known as: NITROSTAT  place 1 tablet under the tongue if needed every 5 minutes for chest pain for 3 doses IF NO RELIEF AFTER THIRD DOSE CALL PRESCRIBER .     omeprazole 40 MG delayed release capsule  Commonly known as: PRILOSEC  Take 1 capsule by mouth every morning (before breakfast)     polyethylene glycol 17 g packet  Commonly known as: GLYCOLAX     ranolazine 500 MG extended release tablet  Commonly known as: RANEXA  Take 1 tablet by mouth 2 times daily     sertraline 100 MG tablet  Commonly known as: ZOLOFT  Take 1.5 tablets by mouth daily     therapeutic multivitamin-minerals tablet     tiZANidine 2 MG tablet  Commonly known as: ZANAFLEX  take 1 tablet by mouth three times a day if needed     tranexamic acid 650 MG Tabs tablet  Commonly known as: LYSTEDA  Take 2 tablets by mouth 3 times daily for 5 days     True Metrix Blood Glucose Test strip  Generic drug: blood glucose test strips  use 1 TEST STRIP to TEST BLOOD SUGAR twice a day and if needed     TRUEplus Lancets 30G Misc  use 1 LANCET to TEST BLOOD SUGAR twice a day and if needed     Trulicity 3 VX/2.6ID Sopn  Generic drug: Dulaglutide  Inject 3 mg into the skin once a week     vitamin D 50 MCG (2000 UT) Tabs tablet  Commonly known as: CHOLECALCIFEROL            STOP taking these medications      oxyCODONE-acetaminophen 5-325 MG per tablet  Commonly known as: Percocet     sulfamethoxazole-trimethoprim 800-160 MG per tablet  Commonly known as: Bactrim DS            ASK your doctor about these medications      fluticasone 50 MCG/ACT nasal spray  Commonly known as: FLONASE               Where to Get Your Medications        You can get these medications from any pharmacy    Bring a paper prescription for each of these medications  acetaminophen 500 MG tablet  ciprofloxacin 250 MG tablet  HYDROcodone-acetaminophen 5-325 MG per tablet  ondansetron 4 MG tablet         Diet:  No diet orders on file, advance as tolerated     Activity:  As tolerated    Consultants: IP CONSULT TO CARDIOLOGY  IP CONSULT TO IV TEAM    Procedures:  Not indicated      Diagnostic Test:   Results for orders placed or performed during the hospital encounter of 10/28/22   COVID-19, Rapid    Specimen: Nasopharyngeal Swab   Result Value Ref Range    Specimen Description . NASOPHARYNGEAL SWAB     SARS-CoV-2, Rapid Not Detected Not Detected   Culture, Urine    Specimen: Urine, clean catch   Result Value Ref Range    Specimen Description . CLEAN CATCH URINE     Culture NO SIGNIFICANT GROWTH    Culture, Urine    Specimen: Urine, clean catch   Result Value Ref Range    Specimen Description . CLEAN CATCH URINE     Culture NO SIGNIFICANT GROWTH    Troponin   Result Value Ref Range    Troponin, High Sensitivity 9 0 - 14 ng/L   Brain Natriuretic Peptide   Result Value Ref Range    Pro-BNP 68 <300 pg/mL   CBC with Auto Differential   Result Value Ref Range    WBC 12.7 (H) 3.5 - 11.3 k/uL    RBC 5.22 (H) 3.95 - 5.11 m/uL    Hemoglobin 12.7 11.9 - 15.1 g/dL    Hematocrit 41.0 36.3 - 47.1 %    MCV 78.5 (L) 82.6 - 102.9 fL    MCH 24.3 (L) 25.2 - 33.5 pg    MCHC 31.0 28.4 - 34.8 g/dL    RDW 15.1 (H) 11.8 - 14.4 %    Platelets 847 (H) 880 - 453 k/uL    MPV 8.5 8.1 - 13.5 fL    NRBC Automated 0.0 0.0 per 100 WBC    Seg Neutrophils 59 36 - 65 %    Lymphocytes 32 24 - 43 %    Monocytes 7 3 - 12 %    Eosinophils % 2 1 - 4 %    Basophils 0 0 - 2 %    Immature Granulocytes 0 0 %    Segs Absolute 7.45 1.50 - 8.10 k/uL    Absolute Lymph # 4.05 (H) 1.10 - 3.70 k/uL    Absolute Mono # 0.83 0.10 - 1.20 k/uL    Absolute Eos # 0.31 0.00 - 0.44 k/uL    Basophils Absolute 0.04 0.00 - 0.20 k/uL    Absolute Immature Granulocyte 0.04 0.00 - 0.30 k/uL    RBC Morphology ANISOCYTOSIS PRESENT    Basic Metabolic Panel   Result Value Ref Range    Glucose 143 (H) 70 - 99 mg/dL    BUN 14 6 - 20 mg/dL    Creatinine 0.67 0.50 - 0.90 mg/dL    Est, Glom Filt Rate >60 >60 mL/min/1.73m2    Calcium 9.1 8.6 - 10.4 mg/dL    Sodium 136 135 - 144 mmol/L    Potassium 3.6 (L) 3.7 - 5.3 mmol/L    Chloride 99 98 - 107 mmol/L    CO2 21 20 - 31 mmol/L    Anion Gap 16 9 - 17 mmol/L   D-Dimer, Quantitative   Result Value Ref Range    D-Dimer, Quant 0.26 mg/L FEU   Troponin   Result Value Ref Range    Troponin, High Sensitivity 8 0 - 14 ng/L   Urinalysis with Microscopic   Result Value Ref Range    Color, UA Yellow Yellow    Turbidity UA Clear Clear    Glucose, Ur NEGATIVE NEGATIVE    Bilirubin Urine NEGATIVE NEGATIVE    Ketones, Urine SMALL (A) NEGATIVE    Specific Gravity, UA 1.026 1.005 - 1.030    Urine Hgb MODERATE (A) NEGATIVE    pH, UA 5.5 5.0 - 8.0    Protein, UA TRACE (A) NEGATIVE    Urobilinogen, Urine Normal Normal    Nitrite, Urine NEGATIVE NEGATIVE    Leukocyte Esterase, Urine NEGATIVE NEGATIVE    WBC, UA None 0 - 5 /HPF    RBC, UA 2 TO 5 0 - 2 /HPF    Crystals, UA FEW (A) None /HPF    Crystals, UA CALCIUM OXALATE (A) None /HPF    Epithelial Cells UA 2 TO 5 0 - 5 /HPF    Bacteria, UA FEW (A) None    Mucus, UA 3+ (A) None   Pregnancy, Urine   Result Value Ref Range    HCG(Urine) Pregnancy Test NEGATIVE NEGATIVE   Urinalysis with Microscopic   Result Value Ref Range    Color, UA Yellow Yellow    Turbidity UA Cloudy (A) Clear    Glucose, Ur NEGATIVE NEGATIVE    Bilirubin Urine NEGATIVE NEGATIVE    Ketones, Urine SMALL (A) NEGATIVE    Specific Gravity, UA 1.027 1.005 - 1.030    Urine Hgb LARGE (A) NEGATIVE    pH, UA 5.0 5.0 - 8.0    Protein, UA TRACE (A) NEGATIVE    Urobilinogen, Urine Normal Normal    Nitrite, Urine NEGATIVE NEGATIVE    Leukocyte Esterase, Urine NEGATIVE NEGATIVE    WBC, UA 10 TO 20 0 - 5 /HPF    RBC, UA 5 TO 10 0 - 2 /HPF    Casts UA 0 TO 2 0 - 2 /LPF    Epithelial Cells UA None 0 - 5 /HPF    Bacteria, UA MODERATE (A) None    Mucus, UA 2+ (A) None   POC Glucose Fingerstick   Result Value Ref Range    POC Glucose 122 (H) 65 - 105 mg/dL   POC Glucose Fingerstick   Result Value Ref Range    POC Glucose 111 (H) 65 - 105 mg/dL   POC Glucose Fingerstick   Result Value Ref Range    POC Glucose 137 (H) 65 - 105 mg/dL   EKG 12 Lead   Result Value Ref Range    Ventricular Rate 89 BPM    Atrial Rate 89 BPM    P-R Interval 164 ms    QRS Duration 78 ms    Q-T Interval 408 ms    QTc Calculation (Bazett) 496 ms    P Axis 51 degrees    R Axis 53 degrees    T Axis 65 degrees   EKG 12 Lead   Result Value Ref Range    Ventricular Rate 115 BPM    Atrial Rate 115 BPM    P-R Interval 154 ms    QRS Duration 80 ms    Q-T Interval 336 ms    QTc Calculation (Bazett) 464 ms    P Axis 55 degrees    R Axis 53 degrees    T Axis 50 degrees     XR CHEST (2 VW)    Result Date: 10/28/2022  EXAMINATION: TWO XRAY VIEWS OF THE CHEST 10/28/2022 8:53 pm COMPARISON: 07/08/2022 HISTORY: ORDERING SYSTEM PROVIDED HISTORY: SOB,CP TECHNOLOGIST PROVIDED HISTORY: SOB,CP FINDINGS: Chest radiograph:  The cardiomediastinal silhouette and hilar contours are normal. The lungs are clear with no focal consolidation, pleural effusion or pneumothorax. The overlying soft tissue and osseous structures do not demonstrate acute abnormality. Unchanged chronic fracture deformity of right rib cage     No acute intrathoracic pathology. Physical Exam:    General appearance - NAD, AOx 3    Lungs -CTAB, no R/R/R  Heart - RRR, no M/R/G  Abdomen - Soft, NT/ND  Neurological:  MAEx4, No focal motor deficit, sensory loss  Extremities - Cap refil <2 sec in all ext., no edema  Skin -warm, dry      Hospital Course:  Clinical course has improved, labs and imaging reviewed. Cat Minor originally presented to the hospital on 10/28/2022  8:19 PM with chest pain. Reproducible. Associated with recent hospitalization for vaginal bleeding and urinary symptoms. .  At that time it was determined that She required further observation and cardiology evaluation. Patient had serial enzymes and EKGs. Patient was seen by cardiology. Pain was assessed as being muscle skeletal.  Patient with symptoms very consistent with muscle skeletal pain. No further cardiac work-up was necessary but patient did need resending of her urine for her persistent urinary symptoms. Patient had cultures done. Symptomatic treatment. Patient was reassessed and was improved. . Labs and imaging were followed daily. Imaging results as above. She is medically stable to be discharged. Disposition: Home    Patient stated that they will not drive themselves home from the hospital if they have gotten pain killers/ narcotics earlier that day and that they will arrange for transportation on their own or work with the  for a ride. Patient counseled NOT to drive while under the influence of narcotics/ pain killers. Condition: Good    Patient stable and ready for discharge home. I have discussed plan of care with patient and they are in understanding. They were instructed to read discharge paperwork. All of their questions and concerns were addressed.      Time Spent: 0 day      --  Lindsay Mercer MD  Emergency Medicine Attending Physician    This dictation was generated by voice recognition computer software. Although all attempts are made to edit the dictation for accuracy, there may be errors in the transcription that are not intended.

## 2022-11-01 NOTE — TELEPHONE ENCOUNTER
Patient states they won't schedule her surgery until she has her clearance for you. She states that she was in hospital over the weekend from an allergic reaction to bactrim. She needs needs a hospital f/u now as well.  Please advise where to schedule

## 2022-11-07 ENCOUNTER — OFFICE VISIT (OUTPATIENT)
Dept: FAMILY MEDICINE CLINIC | Age: 52
End: 2022-11-07
Payer: COMMERCIAL

## 2022-11-07 ENCOUNTER — HOSPITAL ENCOUNTER (OUTPATIENT)
Age: 52
Setting detail: SPECIMEN
Discharge: HOME | End: 2022-11-07

## 2022-11-07 VITALS
HEART RATE: 105 BPM | WEIGHT: 215 LBS | TEMPERATURE: 98.5 F | OXYGEN SATURATION: 98 % | DIASTOLIC BLOOD PRESSURE: 80 MMHG | SYSTOLIC BLOOD PRESSURE: 128 MMHG | BODY MASS INDEX: 38.09 KG/M2

## 2022-11-07 DIAGNOSIS — Z01.818 PRE-OP EVALUATION: ICD-10-CM

## 2022-11-07 DIAGNOSIS — E11.9 TYPE 2 DIABETES MELLITUS WITHOUT COMPLICATION, WITH LONG-TERM CURRENT USE OF INSULIN (HCC): ICD-10-CM

## 2022-11-07 DIAGNOSIS — Z79.4 TYPE 2 DIABETES MELLITUS WITHOUT COMPLICATION, WITH LONG-TERM CURRENT USE OF INSULIN (HCC): ICD-10-CM

## 2022-11-07 DIAGNOSIS — Z01.818 PRE-OP EVALUATION: Primary | ICD-10-CM

## 2022-11-07 LAB
ABSOLUTE EOS #: 0.32 K/UL (ref 0–0.4)
ABSOLUTE IMMATURE GRANULOCYTE: 0 K/UL (ref 0–0.3)
ABSOLUTE LYMPH #: 4.58 K/UL (ref 1–4.8)
ABSOLUTE MONO #: 0.47 K/UL (ref 0.1–0.8)
ALBUMIN SERPL-MCNC: 4.2 G/DL (ref 3.5–5.2)
ALBUMIN/GLOBULIN RATIO: 1.3 (ref 1–2.5)
ALP BLD-CCNC: 68 U/L (ref 35–104)
ALT SERPL-CCNC: 12 U/L (ref 5–33)
ANION GAP SERPL CALCULATED.3IONS-SCNC: 17 MMOL/L (ref 9–17)
AST SERPL-CCNC: 10 U/L
BACTERIA: ABNORMAL
BASOPHILS # BLD: 0 % (ref 0–2)
BASOPHILS ABSOLUTE: 0 K/UL (ref 0–0.2)
BILIRUB SERPL-MCNC: <0.1 MG/DL (ref 0.3–1.2)
BILIRUBIN URINE: NEGATIVE
BUN BLDV-MCNC: 8 MG/DL (ref 6–20)
CALCIUM SERPL-MCNC: 9.3 MG/DL (ref 8.6–10.4)
CHLORIDE BLD-SCNC: 101 MMOL/L (ref 98–107)
CO2: 20 MMOL/L (ref 20–31)
COLOR: YELLOW
CREAT SERPL-MCNC: 0.55 MG/DL (ref 0.5–0.9)
EOSINOPHILS RELATIVE PERCENT: 2 % (ref 1–4)
EPITHELIAL CELLS UA: ABNORMAL /HPF (ref 0–5)
ESTIMATED AVERAGE GLUCOSE: 200 MG/DL
GFR SERPL CREATININE-BSD FRML MDRD: >60 ML/MIN/1.73M2
GLUCOSE BLD-MCNC: 150 MG/DL (ref 70–99)
GLUCOSE URINE: NEGATIVE
HBA1C MFR BLD: 8.6 % (ref 4–6)
HCT VFR BLD CALC: 39.2 % (ref 36.3–47.1)
HEMOGLOBIN: 11.4 G/DL (ref 11.9–15.1)
IMMATURE GRANULOCYTES: 0 %
KETONES, URINE: NEGATIVE
LEUKOCYTE ESTERASE, URINE: ABNORMAL
LYMPHOCYTES # BLD: 29 % (ref 24–44)
MCH RBC QN AUTO: 24.1 PG (ref 25.2–33.5)
MCHC RBC AUTO-ENTMCNC: 29.1 G/DL (ref 28.4–34.8)
MCV RBC AUTO: 82.9 FL (ref 82.6–102.9)
MONOCYTES # BLD: 3 % (ref 1–7)
MORPHOLOGY: ABNORMAL
NITRITE, URINE: NEGATIVE
NRBC AUTOMATED: 0 PER 100 WBC
PDW BLD-RTO: 15.3 % (ref 11.8–14.4)
PH UA: 6 (ref 5–8)
PLATELET # BLD: 583 K/UL (ref 138–453)
PMV BLD AUTO: 9.1 FL (ref 8.1–13.5)
POTASSIUM SERPL-SCNC: 4.6 MMOL/L (ref 3.7–5.3)
PROTEIN UA: NEGATIVE
RBC # BLD: 4.73 M/UL (ref 3.95–5.11)
RBC UA: ABNORMAL /HPF (ref 0–4)
SEG NEUTROPHILS: 66 % (ref 36–66)
SEGMENTED NEUTROPHILS ABSOLUTE COUNT: 10.43 K/UL (ref 1.8–7.7)
SODIUM BLD-SCNC: 138 MMOL/L (ref 135–144)
SPECIFIC GRAVITY UA: 1.02 (ref 1–1.03)
TOTAL PROTEIN: 7.5 G/DL (ref 6.4–8.3)
TURBIDITY: CLEAR
URINE HGB: NEGATIVE
UROBILINOGEN, URINE: NORMAL
WBC # BLD: 15.8 K/UL (ref 3.5–11.3)
WBC UA: ABNORMAL /HPF (ref 0–5)

## 2022-11-07 PROCEDURE — 99214 OFFICE O/P EST MOD 30 MIN: CPT | Performed by: NURSE PRACTITIONER

## 2022-11-07 PROCEDURE — G8417 CALC BMI ABV UP PARAM F/U: HCPCS | Performed by: NURSE PRACTITIONER

## 2022-11-07 PROCEDURE — 4004F PT TOBACCO SCREEN RCVD TLK: CPT | Performed by: NURSE PRACTITIONER

## 2022-11-07 PROCEDURE — G8427 DOCREV CUR MEDS BY ELIG CLIN: HCPCS | Performed by: NURSE PRACTITIONER

## 2022-11-07 PROCEDURE — G8484 FLU IMMUNIZE NO ADMIN: HCPCS | Performed by: NURSE PRACTITIONER

## 2022-11-07 PROCEDURE — 3052F HG A1C>EQUAL 8.0%<EQUAL 9.0%: CPT | Performed by: NURSE PRACTITIONER

## 2022-11-07 PROCEDURE — 3074F SYST BP LT 130 MM HG: CPT | Performed by: NURSE PRACTITIONER

## 2022-11-07 PROCEDURE — 2022F DILAT RTA XM EVC RTNOPTHY: CPT | Performed by: NURSE PRACTITIONER

## 2022-11-07 PROCEDURE — 3078F DIAST BP <80 MM HG: CPT | Performed by: NURSE PRACTITIONER

## 2022-11-07 RX ORDER — HYDROCODONE BITARTRATE AND ACETAMINOPHEN 5; 325 MG/1; MG/1
1 TABLET ORAL EVERY 6 HOURS PRN
COMMUNITY
Start: 2022-07-08

## 2022-11-07 RX ORDER — IBUPROFEN 200 MG
CAPSULE ORAL
COMMUNITY

## 2022-11-07 SDOH — ECONOMIC STABILITY: FOOD INSECURITY: WITHIN THE PAST 12 MONTHS, THE FOOD YOU BOUGHT JUST DIDN'T LAST AND YOU DIDN'T HAVE MONEY TO GET MORE.: NEVER TRUE

## 2022-11-07 SDOH — ECONOMIC STABILITY: FOOD INSECURITY: WITHIN THE PAST 12 MONTHS, YOU WORRIED THAT YOUR FOOD WOULD RUN OUT BEFORE YOU GOT MONEY TO BUY MORE.: NEVER TRUE

## 2022-11-07 ASSESSMENT — SOCIAL DETERMINANTS OF HEALTH (SDOH): HOW HARD IS IT FOR YOU TO PAY FOR THE VERY BASICS LIKE FOOD, HOUSING, MEDICAL CARE, AND HEATING?: NOT HARD AT ALL

## 2022-11-07 NOTE — PROGRESS NOTES
Preoperative Consultation      [facility]  YOB: 1970    Date of Service:  2022      Chief Complaint   Patient presents with    Pre-op Exam     hysterectomy         This patient presents to the office today for apreoperative consultation at the request of surgeon, Dr. Jeanne Burroughs, who plans on performing total hysterectomy on tbd at [address]. The current problem began 2weeks ago, and symptoms have been worsening with time.       Planned anesthesia: General     Review of Systems  Known anesthesia problems:None   Bleeding risk: No recent or remote history of abnormal bleeding  Personal or FHof DVT/PE: No    Patient objection to receiving blood products: No    Past Medical History:   Diagnosis Date    Anxiety     Arthritis     CAD (coronary artery disease)     Depression     Hyperlipidemia     Kidney stone     Langerhans-cell granulomatosis ([address]     Myocardial infarct ([address] 2016    Neck pain     Spinal stenosis 2017     Past Surgical History:   Procedure Laterality Date    BACK SURGERY      L5-S1, C5-7    CARDIAC CATHETERIZATION       SECTION      COLONOSCOPY N/A 2021    COLONOSCOPY WITH BIOPSY performed by Renata Gross MD at [address] [place] [address] Left     LUNG BIOPSY Right     MASTOID SURGERY      TONSILLECTOMY      UPPER GASTROINTESTINAL ENDOSCOPY  2021    EGD BIOPSY performed by Renata Gross MD at [facility] History   Problem Relation Age of Onset    Dementia Mother     Depression Mother     High Blood Pressure Mother     Coronary Art Dis Mother     COPD Mother     Diabetes Mother     Heart Attack Father     Diabetes Maternal Grandmother     Alzheimer's Disease Paternal Grandfather      Social History     Tobacco Use    Smoking status: Every Day     Packs/day: 0.50     Years: 25.00     Pack years: 12.50     Types: Cigarettes    Smokeless tobacco: Never    Tobacco comments:     3 packs per week   Vaping Use    Vaping Use: Never used   Substance Use Topics Alcohol use: Yes     Comment: rare    Drug use: Yes     Frequency: 7.0 times per week     Types: Marijuana (Weed)       Allergies   Allergen Reactions    Seasonal Other (See Comments)     Sneezing, watery eyes, wheezing    Keflex [Cephalexin] Rash     Outpatient Medications Marked as Taking for the 11/7/22 encounter (Office Visit) with SAVANA Clarke CNP   Medication Sig Dispense Refill    calcium citrate (CALCITRATE) 950 (200 Ca) MG tablet Calcitrate 200 mg (950 mg) tablet      HYDROcodone-acetaminophen (NORCO) 5-325 MG per tablet Take 1 tablet by mouth every 6 hours as needed. acetaminophen (TYLENOL) 500 MG tablet Take 2 tablets by mouth every 6 hours as needed for Pain 30 tablet 0    ondansetron (ZOFRAN) 4 MG tablet Take 1 tablet by mouth every 8 hours as needed for Nausea or Vomiting 6 tablet 0    tranexamic acid (LYSTEDA) 650 MG TABS tablet Take 2 tablets by mouth 3 times daily for 5 days 30 tablet 0    busPIRone (BUSPAR) 15 MG tablet Take 7.5 mg by mouth 3 times daily 30 tablet 1    Dulaglutide (TRULICITY) 3 II/4.3EA SOPN Inject 3 mg into the skin once a week 5 pen 3    omeprazole (PRILOSEC) 40 MG delayed release capsule Take 1 capsule by mouth every morning (before breakfast) 30 capsule 3    insulin detemir (LEVEMIR FLEXTOUCH) 100 UNIT/ML injection pen Inject 26 Units into the skin nightly 5 pen 3    Insulin Pen Needle 31G X 5 MM MISC Use pen needle for giving daily insulin injection 100 each 3    TRUE METRIX BLOOD GLUCOSE TEST strip use 1 TEST STRIP to TEST BLOOD SUGAR twice a day and if needed 100 strip 3    TRUEplus Lancets 30G MISC use 1 LANCET to TEST BLOOD SUGAR twice a day and if needed 100 each 3    albuterol (PROVENTIL) (2.5 MG/3ML) 0.083% nebulizer solution Take 2.5 mg by nebulization daily as needed for Wheezing      blood glucose monitor kit and supplies Dispense sufficient amount for BID testing frequency plus additional to accommodate PRN testing needs.  Dispense all needed supplies to include: monitor, strips, lancing device, lancets, control solutions, alcohol swabs. 1 kit 0    tiZANidine (ZANAFLEX) 2 MG tablet take 1 tablet by mouth three times a day if needed 90 tablet 0    nitroGLYCERIN (NITROSTAT) 0.4 MG SL tablet place 1 tablet under the tongue if needed every 5 minutes for chest pain for 3 doses IF NO RELIEF AFTER THIRD DOSE CALL PRESCRIBER . 25 tablet 0    calcium carbonate (OS-BRANNON) 1250 (500 Ca) MG chewable tablet Take 500-1,000 mg by mouth 3 times daily      levothyroxine (SYNTHROID) 50 MCG tablet Take 1 tablet by mouth Daily 90 tablet 1    metoprolol succinate (TOPROL XL) 100 MG extended release tablet take 1 tablet by mouth twice a day (Patient taking differently: 50 mg 2 times daily take 1 tablet by mouth twice a day) 180 tablet 1    sertraline (ZOLOFT) 100 MG tablet Take 1.5 tablets by mouth daily 135 tablet 1    atorvastatin (LIPITOR) 80 MG tablet Take 1 tablet by mouth daily 90 tablet 1    clopidogrel (PLAVIX) 75 MG tablet Take 1 tablet by mouth daily 90 tablet 1    mirabegron (MYRBETRIQ) 50 MG TB24 Take 50 mg by mouth daily 90 tablet 1    fluticasone (FLONASE) 50 MCG/ACT nasal spray 1 spray by Each Nostril route daily as needed for Rhinitis      Multiple Vitamins-Minerals (THERAPEUTIC MULTIVITAMIN-MINERALS) tablet Take 1 tablet by mouth daily      ascorbic acid (VITAMIN C) 500 MG tablet Take 500 mg by mouth daily      polyethylene glycol (GLYCOLAX) 17 g packet Take 17 g by mouth daily as needed       Cholecalciferol (VITAMIN D) 50 MCG (2000 UT) TABS tablet Take 2,000 Units by mouth daily       aspirin 81 MG chewable tablet Take 81 mg by mouth daily            Physical Exam   Constitutional: She is oriented to person, place, and time. She appears well-developed and well-nourished. No distress. HENT:   Head: Normocephalic and atraumatic.    Mouth/Throat: Uvula is midline, oropharynx isclear and moist and mucous membranes are normal.   Eyes: Conjunctivae and EOM arenormal. Pupils are equal, round, and reactive to light. Neck: Trachea normaland normal range of motion. Neck supple. No JVD present. Carotid bruit is not present. No mass and no thyromegaly present. Cardiovascular: Normal rate, regular rhythm, normal heart sounds and intact distal pulses. Exam reveals no gallop and no friction rub. No murmur heard. Pulmonary/Chest: Effort normal and breath sounds normal. No respiratory distress. She has no wheezes. She has no rales. Abdominal: Soft. Normal aorta andbowel sounds are normal. She exhibits no distension and no mass. There is no hepatosplenomegaly. No tenderness. Musculoskeletal: She exhibits no edema andno tenderness. Neurological: She is alert and oriented to person, place, and time. She has normal strength. No cranial nerve deficit or sensory deficit. Coordination and gait normal.   Skin: Skin is warm and dry. No rash noted. Noerythema. Psychiatric: She has a normal mood and affect. Her behavioris normal.     EKG Interpretation:  normal EKG, normal sinus rhythm, unchanged from previous tracings.     Lab Review   Admission on 10/28/2022, Discharged on 10/29/2022   Component Date Value    Ventricular Rate 10/29/2022 89     Atrial Rate 10/29/2022 89     P-R Interval 10/29/2022 164     QRS Duration 10/29/2022 78     Q-T Interval 10/29/2022 408     QTc Calculation (Bazett) 10/29/2022 496     P Axis 10/29/2022 51     R Axis 10/29/2022 53     T Axis 10/29/2022 65     Troponin, High Sensitivi* 10/28/2022 9     Pro-BNP 10/28/2022 68     WBC 10/28/2022 12.7 (A)     RBC 10/28/2022 5.22 (A)     Hemoglobin 10/28/2022 12.7     Hematocrit 10/28/2022 41.0     MCV 10/28/2022 78.5 (A)     MCH 10/28/2022 24.3 (A)     MCHC 10/28/2022 31.0     RDW 10/28/2022 15.1 (A)     Platelets 80/44/8323 643 (A)     MPV 10/28/2022 8.5     NRBC Automated 10/28/2022 0.0     Seg Neutrophils 10/28/2022 59     Lymphocytes 10/28/2022 32     Monocytes 10/28/2022 7     Eosinophils % 10/28/2022 2 Basophils 10/28/2022 0     Immature Granulocytes 10/28/2022 0     Segs Absolute 10/28/2022 7.45     Absolute Lymph # 10/28/2022 4.05 (A)     Absolute Mono # 10/28/2022 0.83     Absolute Eos # 10/28/2022 0.31     Basophils Absolute 10/28/2022 0.04     Absolute Immature Granul* 10/28/2022 0.04     RBC Morphology 10/28/2022 ANISOCYTOSIS PRESENT     Glucose 10/28/2022 143 (A)     BUN 10/28/2022 14     Creatinine 10/28/2022 0.67     Est, Glom Filt Rate 10/28/2022 >60     Calcium 10/28/2022 9.1     Sodium 10/28/2022 136     Potassium 10/28/2022 3.6 (A)     Chloride 10/28/2022 99     CO2 10/28/2022 21     Anion Gap 10/28/2022 16     D-Dimer, Quant 10/28/2022 0.26     Specimen Description 10/29/2022 . NASOPHARYNGEAL SWAB     SARS-CoV-2, Rapid 10/29/2022 Not Detected     Troponin, High Sensitivi* 10/28/2022 8     Color, UA 10/29/2022 Yellow     Turbidity UA 10/29/2022 Clear     Glucose, Ur 10/29/2022 NEGATIVE     Bilirubin Urine 10/29/2022 NEGATIVE     Ketones, Urine 10/29/2022 SMALL (A)     Specific [place], UA 10/29/2022 1.026     Urine Hgb 10/29/2022 MODERATE (A)     pH, UA 10/29/2022 5.5     Protein, UA 10/29/2022 TRACE (A)     Urobilinogen, Urine 10/29/2022 Normal     Nitrite, Urine 10/29/2022 NEGATIVE     Leukocyte Esterase, Urine 10/29/2022 NEGATIVE     WBC, UA 10/29/2022 None     RBC, UA 10/29/2022 2 TO 5     Crystals, UA 10/29/2022 FEW (A)     Crystals, UA 10/29/2022 CALCIUM OXALATE (A)     Epithelial Cells UA 10/29/2022 2 TO 5     Bacteria, UA 10/29/2022 FEW (A)     Mucus, UA 10/29/2022 3+ (A)     HCG(Urine) Pregnancy Test 10/29/2022 NEGATIVE     Specimen Description 10/29/2022 . CLEAN CATCH URINE     Culture 10/29/2022 NO SIGNIFICANT GROWTH     POC Glucose 10/29/2022 122 (A)     Ventricular Rate 10/28/2022 115     Atrial Rate 10/28/2022 115     P-R Interval 10/28/2022 154     QRS Duration 10/28/2022 80     Q-T Interval 10/28/2022 336     QTc Calculation (Bazett) 10/28/2022 464     P Axis 10/28/2022 55     R Axis 10/28/2022 53     T Axis 10/28/2022 50     Specimen Description 10/29/2022 . CLEAN CATCH URINE     Culture 10/29/2022 NO SIGNIFICANT GROWTH     Color, UA 10/29/2022 Yellow     Turbidity UA 10/29/2022 Cloudy (A)     Glucose, Ur 10/29/2022 NEGATIVE     Bilirubin Urine 10/29/2022 NEGATIVE     Ketones, Urine 10/29/2022 SMALL (A)     Specific [place], UA 10/29/2022 1.027     Urine Hgb 10/29/2022 LARGE (A)     pH, UA 10/29/2022 5.0     Protein, UA 10/29/2022 TRACE (A)     Urobilinogen, Urine 10/29/2022 Normal     Nitrite, Urine 10/29/2022 NEGATIVE     Leukocyte Esterase, Urine 10/29/2022 NEGATIVE     WBC, UA 10/29/2022 10 TO 20     RBC, UA 10/29/2022 5 TO 10     Casts UA 10/29/2022 0 TO 2     Epithelial Cells UA 10/29/2022 None     Bacteria, UA 10/29/2022 MODERATE (A)     Mucus, UA 10/29/2022 2+ (A)     POC Glucose 10/29/2022 111 (A)     POC Glucose 10/29/2022 137 (A)    Hospital Outpatient Visit on 10/26/2022   Component Date Value    Surgical Pathology Report 10/26/2022                      Value:-- Diagnosis --  ENDOMETRIUM, BIOPSY:  - SLOUGHING (MENSTRUAL PHASE) ENDOMETRIUM. Conchis Quiroz. Israel Caal M.D.  **Electronically Signed Out**         sls/10/28/2022       Clinical Information  Pre-op Diagnosis:  ABNORMAL UTERINE BLEEDING    Operative Findings:  EMB  Operation Performed:  VA BIOPSY OF UTERUS LINING     Source of Specimen  A: EMB    Gross Description  \"SERGEY KELSEA, EMB\" Pink-red fragments, 3.0 x 2.0 x 1.9 cm in  aggregate. Entirely 2cs. mm tm      Microscopic Description  The endometrial tissue has menstrual features (sloughing endometrium)  with intermixed blood and fibrin. There is no evidence of atypia or  malignancy. SURGICAL PATHOLOGY CONSULTATION       Patient Name: Virgilio Pardo Med Rec: [id number]  Path Number: [id number]    [facility]  ANATOMIC PATHOLOGY  [address] [place] [address], [facility] [address].   [facility], [address]  [phone]  Fax: [phone] cytology Report 10/26/2022                      Value:INTERPRETATION    Cervical material, (ThinPrep vial, Imaging-assisted review):  Specimen Adequacy:       Unsatisfactory for evaluation. Specimen processed and examined but unsatisfactory   for evaluation of epithelial abnormality due to:       - Scant squamous component. Descriptive Diagnosis:       Interpretation not possible due to unsatisfactory specimen  adequacy. Cytotechnologist:   ARGELIA Rios. Doug Torres D.O.  **Electronically Signed Out**         [facility]/10/27/2022        Procedure/Addendum  HPV Procedure Report  High Risk HPV testing was ordered     Date Ordered:     10/27/2022      Status: Ordered      Source:  A: Cervical material, (ThinPrep vial, Imaging-assisted review)    Clinical History  Abnormal bleeding: N93.9  Z12.4 Encounter for screening for malignant neoplasm of cervix  Co-Test:  ThinPrep Pap with high risk HPV testing    GYNECOLOGIC CYTOLOGY REPORT    Patient Name: Claudia Valverde [facility] Rec: [id number]  Path Number: [id number]  [place] [address]. [place], [address]  [phone]  Fax: [phone]      Specimen Description 10/26/2022 . GENITAL - NOT SPECIFIED     HPV Sample 10/26/2022 . THIN PREP     HPV, Genotype 16 10/26/2022 Not Detected     HPV, Genotype 18 10/26/2022 Not Detected     HPV, High Risk Other 10/26/2022 Not Detected     HPV, Interpretation 10/26/2022         Admission on 10/25/2022, Discharged on 10/25/2022   Component Date Value    WBC 10/25/2022 9.7     RBC 10/25/2022 4.79     Hemoglobin 10/25/2022 11.9     Hematocrit 10/25/2022 38.4     MCV 10/25/2022 80.2 (A)     MCH 10/25/2022 24.8 (A)     MCHC 10/25/2022 31.0     RDW 10/25/2022 15.2 (A)     Platelets 61/62/9902 577 (A)     MPV 10/25/2022 8.7     NRBC Automated 10/25/2022 0.0     Seg Neutrophils 10/25/2022 49     Lymphocytes 10/25/2022 40     Monocytes 10/25/2022 7     Eosinophils % 10/25/2022 4     Basophils 10/25/2022 0     Immature Granulocytes 10/25/2022 0     Segs Absolute 10/25/2022 4.77     Absolute Lymph # 10/25/2022 3.88 (A)     Absolute Mono # 10/25/2022 0.63     Absolute Eos # 10/25/2022 0.36     Basophils Absolute 10/25/2022 0.03     Absolute Immature Granul* 10/25/2022 0.04     RBC Morphology 10/25/2022 ANISOCYTOSIS PRESENT     Glucose 10/25/2022 246 (A)     BUN 10/25/2022 9     Creatinine 10/25/2022 0.55     Est, Glom Filt Rate 10/25/2022 >60     Calcium 10/25/2022 8.7     Sodium 10/25/2022 136     Potassium 10/25/2022 4.1     Chloride 10/25/2022 101     CO2 10/25/2022 22     Anion Gap 10/25/2022 13     Alkaline Phosphatase 10/25/2022 75     ALT 10/25/2022 13     AST 10/25/2022 11     Total Bilirubin 10/25/2022 0.2 (A)     Total Protein 10/25/2022 7.3     Albumin 10/25/2022 4.1     Albumin/Globulin Ratio 10/25/2022 1.3     Troponin, High Sensitivi* 10/25/2022 8     Ventricular Rate 10/25/2022 100     Atrial Rate 10/25/2022 100     P-R Interval 10/25/2022 144     QRS Duration 10/25/2022 76     Q-T Interval 10/25/2022 368     QTc Calculation (Bazett) 10/25/2022 474     P Axis 10/25/2022 47     R Axis 10/25/2022 46     T Axis 10/25/2022 58     Protime 10/25/2022 11.2     INR 10/25/2022 1.1     Color, UA 10/25/2022 Red (A)     Turbidity UA 10/25/2022 Turbid (A)     Glucose, Ur 10/25/2022 3+ (A)     Bilirubin Urine 10/25/2022 NEGATIVE  Verified by ictotest. (A)     Ketones, Urine 10/25/2022 NEGATIVE     Specific Gravity, UA 10/25/2022 1.017     Urine Hgb 10/25/2022 LARGE (A)     pH, UA 10/25/2022 5.0     Protein, UA 10/25/2022 3+ (A)     Urobilinogen, Urine 10/25/2022 Normal     Nitrite, Urine 10/25/2022 POSITIVE (A)     Leukocyte Esterase, Urine 10/25/2022 MODERATE (A)     hCG Qual 10/25/2022 NEGATIVE     Expiration Date 10/25/2022 10/28/2022,2359     Arm Band Number 10/25/2022 BE [id number]     ABO/Rh 10/25/2022 A POSITIVE     Antibody Screen 10/25/2022 NEGATIVE WBC, UA 10/25/2022 5 TO 10     RBC, UA 10/25/2022 TOO NUMEROUS TO COUNT     Epithelial Cells UA 10/25/2022 2 TO 5            Assessment:       54 y.o. patient with planned surgery as above. Known risk factors for perioperative complications: Coronary artery disease, Diabetes mellitus, Hypertension  Current medications which may produce withdrawal symptoms if withheld perioperatively:none      Plan:     1. Preoperative workup as follows: chest x-ray, ECG, hemoglobin, hematocrit, electrolytes, creatinine, glucose, liver function studies, coagulation studies, urinalysis (urinary tract instrumentation planned)  2. Change in medication regimen before surgery:tbd by surgeon  3. Prophylaxis for cardiac events with perioperative beta-blockers: Currently taking  metoprolol  ACC/AHA indications for pre-operative beta-blocker use:    Vascular surgery with history of postitive stress test  Intermediate or high risk surgery withhistory of CAD   Intermediate or high risk surgery with multiple clinical predictors of CAD- 2 of thefollowing: history of compensated or prior heart failure, history of cerebrovascular disease, DM, or renal insufficiency    Routine administration of higher-dose, long-acting metoprolol in beta-blocker-naïve patients on the day of surgery, and in the absence of dose titration is associated with an overall increase in mortality. Beta-blockers should be started days to weeks prior tosurgery and titrated to pulse < 70.  4. Deep vein thrombosis prophylaxis: regimen to be chosen by surgical team  5.  No contraindications to planned surgery             Electronically Signed by: SAVANA Clarke-TONYA

## 2022-11-08 DIAGNOSIS — E11.9 TYPE 2 DIABETES MELLITUS WITHOUT COMPLICATION, WITH LONG-TERM CURRENT USE OF INSULIN (HCC): Primary | ICD-10-CM

## 2022-11-08 DIAGNOSIS — Z79.4 TYPE 2 DIABETES MELLITUS WITHOUT COMPLICATION, WITH LONG-TERM CURRENT USE OF INSULIN (HCC): Primary | ICD-10-CM

## 2022-11-09 ENCOUNTER — TELEPHONE (OUTPATIENT)
Dept: FAMILY MEDICINE CLINIC | Age: 52
End: 2022-11-09

## 2022-11-09 NOTE — TELEPHONE ENCOUNTER
Kelly from [address] called and stated that pt had called and told them that Iris Armando needed there fax number to fax the clearance letter for surgery. Kelly stated that we can just put the clearance letter in epic and they will get it as they use epic as well. Kelly also stated that [facility] does not have a formal form for clearance and it can be a typed letter.  Please Advise Thank you

## 2022-11-09 NOTE — TELEPHONE ENCOUNTER
Writer tried calling [facility] and recording comes on stating that they are closed.  Thank you

## 2022-11-10 ENCOUNTER — SCHEDULED TELEPHONE ENCOUNTER (OUTPATIENT)
Dept: OBGYN | Age: 52
End: 2022-11-10
Payer: COMMERCIAL

## 2022-11-10 DIAGNOSIS — Z71.2 ENCOUNTER TO DISCUSS TEST RESULTS: Primary | ICD-10-CM

## 2022-11-10 PROCEDURE — 99212 OFFICE O/P EST SF 10 MIN: CPT | Performed by: STUDENT IN AN ORGANIZED HEALTH CARE EDUCATION/TRAINING PROGRAM

## 2022-11-10 NOTE — PROGRESS NOTES
Saji Salas is a 54 y.o. female evaluated via telephone on 11/10/2022 for No chief complaint on file. .        Documentation:  I communicated with the patient and/or health care decision maker about pap and emb results. Details of this discussion including any medical advice provided: Called to discuss pap and emb results with patient. EMB wnl but pap unsatisfactory. Patient scheduled for repeat pap smear. Can proceed w/ hysterectomy pending normal pap result. Total Time: minutes: 5-16 minutes    Saji Salas was evaluated through a synchronous (real-time) audio encounter. Patient identification was verified at the start of the visit. She (or guardian if applicable) is aware that this is a billable service, which includes applicable co-pays. This visit was conducted with the patient's (and/or legal guardian's) verbal consent. She has not had a related appointment within my department in the past 7 days or scheduled within the next 24 hours. The patient was located at Home: [place] [address] [place] [address]. The provider was located at Home ([address]: [place].     Note: not billable if this call serves to triage the patient into an appointment for the relevant concern    Shimon Miranda DO

## 2022-11-14 ENCOUNTER — TELEPHONE (OUTPATIENT)
Dept: OBGYN | Age: 52
End: 2022-11-14

## 2022-11-14 NOTE — TELEPHONE ENCOUNTER
Pt called the office stating she has not heard from anyone regarding her surgery date. She said she was tenatively given 12/12 and was wondering if she has been added to the schedule for it. Patient was informed that our surgery scheduler is also leatha Moreno and was not able to schedule surgeries towards the end of last week because she has been doing US.  She stated that she saw her PCP and received clearance and they ordered pre op labs and EKG for her to have done

## 2022-11-28 RX ORDER — SODIUM CHLORIDE, SODIUM LACTATE, POTASSIUM CHLORIDE, CALCIUM CHLORIDE 600; 310; 30; 20 MG/100ML; MG/100ML; MG/100ML; MG/100ML
1000 INJECTION, SOLUTION INTRAVENOUS CONTINUOUS
OUTPATIENT
Start: 2022-11-28

## 2022-11-28 NOTE — DISCHARGE INSTRUCTIONS
Pre-operative Instructions           NOTHING to eat or drink after midnight the night prior to surgery   (This includes gum, candy, mints, chewing tobacco, etc). Please arrive at the surgery center (Entrance B) by 5:30-5:45 AM on 12/12/2022 (or as directed by your surgeon's office). See Directons to Surgery Center below. Please take only the following medication(s) the day of surgery with a small sip of water: Metoprolol (Toprol), Ranolazine (Ranexa), Levothyroxine (Synthroid), Omeprazole (Prilosec), Buspirone (Buspar),  Zoloft    Please stop any blood thinning medications  AS DIRECTED BY PRESCRIBING PROVIDER! : Plavix and Aspirin  Failure to stop these medications as instructed (too soon or too late) may result in injury to you, or your surgery may need to be rescheduled. Below is a list of some examples for your reference. Antiplatelets : (stop blood cells (called platelets) from sticking together and forming a blood clot):   Aspirin (Bufferin, Ecotrin), Clopidogrel (Plavix), Ticagrelor (Brilinta), Prasugrel (Effient), Dipyridamole/aspirin (Aggrenox), Ticlopidine (Ticlid), Eptifibatide (Integrilin)    Anticoagulants: (slow down your body's process of making clots): Warfarin (Coumadin), Rivaroxaban (Xarelto), Dabigatran (Pradaxa), Apixaban (Eliquis), Edoxaban (Savaysa), Heparin/Enoxaparin (Lovenox), Fondaparinux (Arixtra)    NSAIDS: Aspirin (Bufferin, Ecotrin), Ibuprofen (Motrin, Nuprin,Advil), Naproxen (Aleve),Meloxicam (Mobic), Celecoxib (Celebrex), Diclofenac (Voltaren), Etodolac (Lodine), Indomethacin, Ketorolac, Nabumetone, Oxaprozin (Daypro), Piroxicam (Feldene), Excedrin (has aspirin in it)    Herbal supplements: Bromelain, Cinnamon, [employer], [employer] (female ginseng), Fish oil, Garlic, Dottie,Ginkgo biloba, Grape seed extract, Turmeric, Vitamin E, etc....)    You may continue the rest of your medications through the night before surgery unless instructed otherwise.     If applicable: Do not take diabetic medications on the day of surgery. Please use/bring daily inhalers with you     11/30/22  1:00 PM      ___________________  _______________________  Signature (Provider)              Signature (Patient)     Day of Surgery/Procedure    As a patient at [facility] you can expect quality medical and nursing care that is centered on your individual needs. Our goal is to make your surgical experience as comfortable as possible    Directions to the [address] [place] [address] at [address]. [place] is located in the Emergency Room parking lot on [facility] or there is additional parking across the street. The address is [place] [address]. Please check in at the [facility] upon arrival.     Patient Instructions  In case of any illness please contact your surgeons office for instructions prior to coming to the hospital.    Due to current restrictions you are only allowed 2 adults to be with you. Masks are to be worn and screening will take place on arrival.     Bring your current list of medications, vitamins, herbals and anything you might take on an  \"as needed \" basis. It is important we have a correct list with dosages and frequencies. Please verify your list with your medications at home or bring all of your bottles with you. If you have been given a blood band be sure to bring it with you on the day of surgery. Do not put it on or close the clasp. Use and bring any inhalers if you are currently using one. It is ok to brush your teeth but do not swallow any water. You may be required to provide a urine sample upon your arrival to the pre-op area, so please take this into consideration prior to using the restroom. No jewelry or piercing's to be worn into surgery because you might be injured because of them. No contact lenses to be worn.   It is ok to wear your glasses in pre op but they will be removed prior to going into the operating room. Dentures/partials will likely need to be removed in pre op depending on your type of anesthesia, please do not use adhesives on the day of surgery. Bathe as instructed with the special soap given to you. No lotions, powders or creams after bathing. Wear loose comfortable clothing / shoes that are easy to get on and off over wounds or casts. If you are going to be admitted after surgery please bring your Cpap or BiPap if you have it at home. Keep patient belongings to a minimum and leave valuables at home. If you are staying overnight with us, please bring a SMALL bag of personal items. We cannot accommodate large items, like suitcases. If you are going home after anesthesia or sedation then you must have a responsible adult with you to take you home and to be with you for the first 24 hours after surgery. If you do not have someone to stay with you your surgery might get cancelled. Please contact your surgeon's office to see if other arrangements can be made if you can not find someone to stay with you. If you have any other questions on the day of surgery please contact [phone] or [phone]    If you have any other questions regarding your procedure/surgery please call  your surgeon's office.

## 2022-11-30 ENCOUNTER — HOSPITAL ENCOUNTER (OUTPATIENT)
Dept: PREADMISSION TESTING | Age: 52
Discharge: HOME | End: 2022-11-30
Payer: COMMERCIAL

## 2022-11-30 VITALS
BODY MASS INDEX: 37.56 KG/M2 | TEMPERATURE: 96.3 F | OXYGEN SATURATION: 96 % | DIASTOLIC BLOOD PRESSURE: 89 MMHG | SYSTOLIC BLOOD PRESSURE: 135 MMHG | HEART RATE: 95 BPM | RESPIRATION RATE: 18 BRPM | HEIGHT: 63 IN | WEIGHT: 212 LBS

## 2022-11-30 LAB
ABO/RH: NORMAL
ANION GAP SERPL CALCULATED.3IONS-SCNC: 13 MMOL/L (ref 9–17)
ANTIBODY SCREEN: NEGATIVE
ARM BAND NUMBER: NORMAL
BUN BLDV-MCNC: 11 MG/DL (ref 6–20)
CALCIUM SERPL-MCNC: 9.3 MG/DL (ref 8.6–10.4)
CHLORIDE BLD-SCNC: 102 MMOL/L (ref 98–107)
CO2: 23 MMOL/L (ref 20–31)
CREAT SERPL-MCNC: 0.51 MG/DL (ref 0.5–0.9)
EXPIRATION DATE: NORMAL
GFR SERPL CREATININE-BSD FRML MDRD: >60 ML/MIN/1.73M2
GLUCOSE BLD-MCNC: 263 MG/DL (ref 70–99)
HCT VFR BLD CALC: 37.2 % (ref 36.3–47.1)
HEMOGLOBIN: 11.3 G/DL (ref 11.9–15.1)
MCH RBC QN AUTO: 24.4 PG (ref 25.2–33.5)
MCHC RBC AUTO-ENTMCNC: 30.4 G/DL (ref 28.4–34.8)
MCV RBC AUTO: 80.3 FL (ref 82.6–102.9)
NRBC AUTOMATED: 0 PER 100 WBC
PDW BLD-RTO: 16.3 % (ref 11.8–14.4)
PLATELET # BLD: 599 K/UL (ref 138–453)
PMV BLD AUTO: 8.7 FL (ref 8.1–13.5)
POTASSIUM SERPL-SCNC: 4 MMOL/L (ref 3.7–5.3)
RBC # BLD: 4.63 M/UL (ref 3.95–5.11)
SODIUM BLD-SCNC: 138 MMOL/L (ref 135–144)
WBC # BLD: 14.1 K/UL (ref 3.5–11.3)

## 2022-11-30 PROCEDURE — 86850 RBC ANTIBODY SCREEN: CPT

## 2022-11-30 PROCEDURE — 85027 COMPLETE CBC AUTOMATED: CPT

## 2022-11-30 PROCEDURE — 80048 BASIC METABOLIC PNL TOTAL CA: CPT

## 2022-11-30 PROCEDURE — 93005 ELECTROCARDIOGRAM TRACING: CPT | Performed by: ANESTHESIOLOGY

## 2022-11-30 PROCEDURE — 86900 BLOOD TYPING SEROLOGIC ABO: CPT

## 2022-11-30 PROCEDURE — 36415 COLL VENOUS BLD VENIPUNCTURE: CPT

## 2022-11-30 PROCEDURE — 86901 BLOOD TYPING SEROLOGIC RH(D): CPT

## 2022-11-30 ASSESSMENT — PAIN DESCRIPTION - ORIENTATION: ORIENTATION: LEFT

## 2022-11-30 ASSESSMENT — PAIN DESCRIPTION - LOCATION: LOCATION: NECK;HIP

## 2022-11-30 ASSESSMENT — PAIN SCALES - GENERAL: PAINLEVEL_OUTOF10: 5

## 2022-11-30 NOTE — PROGRESS NOTES
Anesthesia Focused Assessment    Hx of anesthesia complications:  prolonged emergence x 1 with neck fusion in 2016, but states b/c \"Knicked jugular\"  Family hx of anesthesia complications:  no    METS functional capacity:   Moderate/Excellent: >4 climbing >/= 1 flight of stairs without stopping or walking up hill  >1-2 blocks    Walks one mile daily, in the summer a little more. Denies cardiopulmonary complaints. EKG today NSR. Patient states she follows with her PCP closely who has cleared her for upcoming surgery with Dr. Rell Brewer. Pt states her PCP writes for Plavix and aspirin and that in the past she has had to stop x 7 days. I am unable to find any directions, so will request these. Prior + Covid-19 test? July 2022  Symptoms/Hospitalization?:   Covid vaccinated?: fever, slight cough, fatigue,lost taste,  states admitted for couple days    STOP-BANG Sleep Apnea Questionnaire    SNORE loudly (heard through closed doors)? Yes  TIRED, fatigued, sleepy during daytime? Yes  OBSERVED stopping breathing during sleep? No  High blood PRESSURE or being treated? Yes    BMI over 35? Yes  AGE over 48? No  NECK circumference over 16\"? No  GENDER (male)? No             Total 4  High risk 5-8  Intermediate risk 3-4  Low risk 0-2    ----------------------------------------------------------------------------------------------------------------------  VAL:                                             no  If yes, machine?:                              Pt reports she follows with Dr. Rosalind Parker for pulmonary Langerhans cell histiocytosis, she denies acute pulmonary complaints or any interval changes. Most recent office visit 9/28/21 in care everywhere and states has upcoming appt in January. Ct from 7/2022 in epic:   Impression   No evidence of pulmonary embolism or acute pulmonary abnormality.        Type 1 DM:                                   no  T2DM: yes, on insulin and trulicity, last KXTN7C 8.6 on 11/7/22    Coronary Artery Disease:             yes, stent x 1 LAD (2016) Pt reports her last visit with Dr. Nels Cowden was over the summer after she had covid. I called Dr. Nels Cowden office to obtain these records, but the most recent office note available was from last September 2021.     -Pt with recent admission to [facility]. Vs in October 10/28/22 through 10/29/22 for chest pain; the following is pasted from that encounter discharge summary and all notes of this encounter in epic. Discharge Diagnoses:    Chest pain. Uncertain etiology. Acute onset. Evaluated by cardiology. Quinn to be likely musculoskeletal in nature. Hypertension:                               yes  Defib / AICD / Pacemaker:           no    Cardiac cath 7/23/21:   Narrative  · Previously placed stent in left anterior descending artery is widely   patent. No significant changes in coronary anatomy since her last cardiac   catheterization. · Global left ventricular systolic function is at the low end normal.   · Minimally elevated left ventricular end-diastolic pressure. Recommendations:  Medical management. ECHO: 9/26/20  Narrative      Left Ventricle: Systolic function is normal with an ejection fraction   of 55-60%. Left Ventricle: There is mild concentric increased wall   thickness/hypertrophy. Mitral Valve: There is mild regurgitation. There is no evidence of   mitral valve stenosis. Tricuspid Valve: There is mild to moderate regurgitation. Tricuspid Valve: The right ventricular systolic pressure normal. RVSP   calculated at 30 mmHg. RVSP is based on RA pressure of 3 mmHg. Renal Failure:                               no  If yes, on dialysis? :                           Active smoker:                              1/2   Drinks Alcohol:                              rare/seldom  Illicit drugs:                                    edible daily for pain    Dentition: poor dentition, denies loose teeth, several broken     Past Medical History:   Diagnosis Date    Abnormal uterine bleeding (AUB)     Anxiety     Arthritis     CAD (coronary artery disease)     Dr. Luis Bryson    Chronic neck pain     COVID-19     fever, slight cough, fatigue,lost taste,  admitted for couple days bc of elevated troponins    Depression     Diabetes mellitus ([facility] 2017    managed by PCP    Hyperlipidemia     [facility]interstitial lung disease) [facility]     Kidney stone     Langerhans-cell granulomatosis ([facility]     Myocardial infarct ([facility] 2016    Neck pain     Pulmonary nodule     Spinal stenosis 2017    Under care of team     [facility]: Amado Blanco    Under care of team     PCP: Ernesto FARRIS CNP    Under care of team     GYN: Dr. Lory Ponce    Under care of team     cardiologist: Dr. Luis Bryson last visit 7/2022    Uterine fibroid    +limited ROM c-spine, prior neck surgeries     Patient was evaluated in PAT & anesthesia guidelines were applied. NPO guidelines, medication instructions and scheduled arrival time were reviewed with patient. I advised patient/patient family to please contact surgeons office, ahead of time if possible, if any new signs or symptoms of illness, infection, rash, etc. Patient/ patient family verbalize understanding. Anesthesia contacted:   yes  I spoke with Dr. Savannah Plasencia Patient history and pertinent findings reviewed (as documented above). No further orders. Medical clearance on file in epic. Will request written directions re: plavix and aspirin from PCP.      SAVANA Farias CNP  Electronically signed 11/30/2022 at 1:42 PM

## 2022-12-02 LAB
EKG ATRIAL RATE: 91 BPM
EKG P AXIS: 52 DEGREES
EKG P-R INTERVAL: 154 MS
EKG Q-T INTERVAL: 388 MS
EKG QRS DURATION: 78 MS
EKG QTC CALCULATION (BAZETT): 477 MS
EKG R AXIS: 53 DEGREES
EKG T AXIS: 59 DEGREES
EKG VENTRICULAR RATE: 91 BPM

## 2022-12-02 PROCEDURE — 93010 ELECTROCARDIOGRAM REPORT: CPT | Performed by: INTERNAL MEDICINE

## 2022-12-06 ENCOUNTER — TELEPHONE (OUTPATIENT)
Dept: FAMILY MEDICINE CLINIC | Age: 52
End: 2022-12-06

## 2022-12-06 NOTE — TELEPHONE ENCOUNTER
[facility] called in stating they need to know when should Patient stop her Plavix and Aspirin before surgery December 12th. She request we DO NOT call her back an instead send instructions via fax.      Fax Instructions: [phone]

## 2022-12-07 ENCOUNTER — HOSPITAL ENCOUNTER (OUTPATIENT)
Age: 52
Setting detail: SPECIMEN
Discharge: HOME | End: 2022-12-07

## 2022-12-07 ENCOUNTER — OFFICE VISIT (OUTPATIENT)
Dept: OBGYN | Age: 52
End: 2022-12-07
Payer: COMMERCIAL

## 2022-12-07 VITALS
WEIGHT: 214.8 LBS | BODY MASS INDEX: 38.05 KG/M2 | HEART RATE: 92 BPM | DIASTOLIC BLOOD PRESSURE: 93 MMHG | SYSTOLIC BLOOD PRESSURE: 143 MMHG

## 2022-12-07 DIAGNOSIS — R87.615 UNSATISFACTORY CERVICAL PAPANICOLAOU SMEAR: Primary | ICD-10-CM

## 2022-12-07 PROCEDURE — 3074F SYST BP LT 130 MM HG: CPT | Performed by: OBSTETRICS & GYNECOLOGY

## 2022-12-07 PROCEDURE — 3078F DIAST BP <80 MM HG: CPT | Performed by: OBSTETRICS & GYNECOLOGY

## 2022-12-07 PROCEDURE — G8427 DOCREV CUR MEDS BY ELIG CLIN: HCPCS | Performed by: OBSTETRICS & GYNECOLOGY

## 2022-12-07 PROCEDURE — G8417 CALC BMI ABV UP PARAM F/U: HCPCS | Performed by: OBSTETRICS & GYNECOLOGY

## 2022-12-07 PROCEDURE — G8482 FLU IMMUNIZE ORDER/ADMIN: HCPCS | Performed by: OBSTETRICS & GYNECOLOGY

## 2022-12-07 PROCEDURE — 99213 OFFICE O/P EST LOW 20 MIN: CPT | Performed by: OBSTETRICS & GYNECOLOGY

## 2022-12-07 PROCEDURE — 4004F PT TOBACCO SCREEN RCVD TLK: CPT | Performed by: OBSTETRICS & GYNECOLOGY

## 2022-12-07 PROCEDURE — 99211 OFF/OP EST MAY X REQ PHY/QHP: CPT | Performed by: OBSTETRICS & GYNECOLOGY

## 2022-12-07 NOTE — PROGRESS NOTES
Zhao Garcia      YOB: 1970          The patient was seen today. She is here regarding pap smear . Her bowels are regular and she is voiding without difficulty. HPI:  Zhao Garcia is a 54 y.o. female [id number]     Patient scheduled for a hysterectomy on 22. She is here for a pap smear because her last one is unsatisfactory. Pt made aware today that her pap result will not be back by the time her surgery is scheduled. She was informed that if her pap or the pathology from her hysterectomy shows any cancerous or precancerous cells, then she will need a further surgery and consultation with gyn/onc. Pt voiced her understanding and still desires to proceed with hysterectomy. Pt had questions regarding her surgery. She is concerned about bladder and leakage issues after the surgery. Pt already established with a urologist.  Informed pt that if there are any surgical concerns at the time of her surgery, a specialist will be called into the OR. Pt informed that if leaking persists, then she may want to follow up with her urologist regarding possible surgical interventions for stress incontinence. Pt also wondered if she would be started on HRT immediately after surgery, or wait and see. Explained that most likely Dr. Justo Marroquin will want to wait and see how she does after the removal of her ovaries before starting her on HRT.       OB History    Para Term  AB Living   3 2 1 1 1 3   SAB IAB Ectopic Molar Multiple Live Births   1 0 0 0 1 3      # Outcome Date GA Lbr Toni/2nd Weight Sex Delivery Anes PTL Lv   3A   33w0d   F CS-LTranv   PAULA      Complications: Failure to Progress in First Stage   3B      M CS-LTranv   PAULA      Complications: Failure to Progress in First Stage   2 SAB      SAB      1 Term  39w6d   F CS-LTranv   PAULA      Complications: Failure to Progress in First Stage      Obstetric Comments   1993: 39w6d IOL > CS (didn't dilate past 2), F; GDMA and HTN   SAB x1 somewhere in between    8/18/2000: sPTL, 33w twin delivery, CS, M,F        Past Medical History:   Diagnosis Date    Abnormal uterine bleeding (AUB)     Anxiety     Arthritis     CAD (coronary artery disease)     Dr. Marcey Primrose    Chronic neck pain     COVID-19     fever, slight cough, fatigue,lost taste,  admitted for couple days bc of elevated troponins    Depression     Diabetes mellitus ([facility] 2017    managed by PCP    Hyperlipidemia     ILD (interstitial lung disease) ([facility]     Kidney stone     Langerhans-cell granulomatosis ([address]     Myocardial infarct ([facility] 2016    Neck pain     Pulmonary nodule     Spinal stenosis 2017    Under care of team     [facility]: Francoise Kerr    Under care of team     PCP: Giulia FARRIS CNP    Under care of team     GYN: Dr. Delmi Bee    Under care of team     cardiologist: Dr. Marcey Primrose last visit 7/2022    Uterine fibroid        Past Surgical History:   Procedure Laterality Date    BACK SURGERY  2004    L5-S1 fusion, Dr. Arabella Greenberg  2016    one stent LAD    CERVICAL FUSION  12/2016    C5-7, [facility]  2019    C4, Dr. Sofy Porter      x 2 1993 and 2000    COLONOSCOPY N/A 12/08/2021    COLONOSCOPY WITH BIOPSY performed by Thang Chacon MD at [address] Left     [facility]    [address], lumbar    LUNG BIOPSY Right     open    MASTOID SURGERY      as a child    TONSILLECTOMY      as a child    UPPER GASTROINTESTINAL ENDOSCOPY  12/08/2021    EGD BIOPSY performed by Thang Chacon MD at [address] History   Problem Relation Age of Onset    Dementia Mother     Depression Mother     High Blood Pressure Mother     Coronary Art Dis Mother         5 stents    COPD Mother     Diabetes Mother     Heart Attack Father     Diabetes Maternal Grandmother     Alzheimer's Disease Paternal Grandfather     Heart Disease Maternal Aunt        Social History     Socioeconomic History    Marital status:      Spouse name: Not on file    Number of children: Not on file    Years of education: Not on file    Highest education level: Not on file   Occupational History    Not on file   Tobacco Use    Smoking status: Every Day     Packs/day: 0.50     Years: 25.00     Pack years: 12.50     Types: Cigarettes    Smokeless tobacco: Never    Tobacco comments:     3 packs per week   Vaping Use    Vaping Use: Never used   Substance and Sexual Activity    Alcohol use: Yes     Comment: rare    Drug use: Yes     Frequency: 7.0 times per week     Types: Marijuana Alyse Coad)     Comment: edibles    Sexual activity: Yes     Partners: Male   Other Topics Concern    Not on file   Social History Narrative    Not on file     Social Determinants of Health     Financial Resource Strain: Low Risk     Difficulty of Paying Living Expenses: Not hard at all   Food Insecurity: No Food Insecurity    Worried About Running Out of Food in the Last Year: Never true    [address] in the Last Year: Never true   Transportation Needs: No Transportation Needs    Lack of Transportation (Medical): No    Lack of Transportation (Non-Medical): No   Physical Activity: Unknown    Days of Exercise per Week: 0 days    Minutes of Exercise per Session: Not on file   Stress: Not on file   Social Connections: Not on file   Intimate Partner Violence: Not At Risk    Fear of Current or Ex-Partner: No    Emotionally Abused: No    Physically Abused: No    Sexually Abused: No   Housing Stability: Not on file         MEDICATIONS:  Current Outpatient Medications   Medication Sig Dispense Refill    empagliflozin (JARDIANCE) 25 MG tablet Take 1 tablet by mouth daily 90 tablet 1    HYDROcodone-acetaminophen (NORCO) 5-325 MG per tablet Take 1 tablet by mouth every 6 hours as needed.       acetaminophen (TYLENOL) 500 MG tablet Take 2 tablets by mouth every 6 hours as needed for Pain 30 tablet 0    busPIRone (BUSPAR) 15 MG tablet Take 7.5 mg by mouth 3 times daily 30 tablet 1    Dulaglutide (TRULICITY) 3 RC/2.3QQ SOPN Inject 3 mg into the skin once a week 5 pen 3    omeprazole (PRILOSEC) 40 MG delayed release capsule Take 1 capsule by mouth every morning (before breakfast) 30 capsule 3    insulin detemir (LEVEMIR FLEXTOUCH) 100 UNIT/ML injection pen Inject 26 Units into the skin nightly 5 pen 3    Insulin Pen Needle 31G X 5 MM MISC Use pen needle for giving daily insulin injection 100 each 3    TRUE METRIX BLOOD GLUCOSE TEST strip use 1 TEST STRIP to TEST BLOOD SUGAR twice a day and if needed 100 strip 3    TRUEplus Lancets 30G MISC use 1 LANCET to TEST BLOOD SUGAR twice a day and if needed 100 each 3    albuterol (PROVENTIL) (2.5 MG/3ML) 0.083% nebulizer solution Take 2.5 mg by nebulization daily as needed for Wheezing      blood glucose monitor kit and supplies Dispense sufficient amount for BID testing frequency plus additional to accommodate PRN testing needs. Dispense all needed supplies to include: monitor, strips, lancing device, lancets, control solutions, alcohol swabs.  1 kit 0    tiZANidine (ZANAFLEX) 2 MG tablet take 1 tablet by mouth three times a day if needed 90 tablet 0    nitroGLYCERIN (NITROSTAT) 0.4 MG SL tablet place 1 tablet under the tongue if needed every 5 minutes for chest pain for 3 doses IF NO RELIEF AFTER THIRD DOSE CALL PRESCRIBER . 25 tablet 0    calcium carbonate (OS-BRANNON) 1250 (500 Ca) MG chewable tablet Take 500-1,000 mg by mouth 3 times daily      metoprolol succinate (TOPROL XL) 100 MG extended release tablet take 1 tablet by mouth twice a day (Patient taking differently: 50 mg 2 times daily take 1 tablet by mouth twice a day) 180 tablet 1    sertraline (ZOLOFT) 100 MG tablet Take 1.5 tablets by mouth daily 135 tablet 1    mirabegron (MYRBETRIQ) 50 MG TB24 Take 50 mg by mouth daily 90 tablet 1    Multiple Vitamins-Minerals (THERAPEUTIC MULTIVITAMIN-MINERALS) tablet Take 1 tablet by mouth daily ascorbic acid (VITAMIN C) 500 MG tablet Take 500 mg by mouth daily      polyethylene glycol (GLYCOLAX) 17 g packet Take 17 g by mouth daily as needed       Cholecalciferol (VITAMIN D) 50 MCG (2000 UT) TABS tablet Take 2,000 Units by mouth daily       calcium citrate (CALCITRATE) 950 (200 Ca) MG tablet Calcitrate 200 mg (950 mg) tablet (Patient not taking: No sig reported)      ondansetron (ZOFRAN) 4 MG tablet Take 1 tablet by mouth every 8 hours as needed for Nausea or Vomiting (Patient not taking: No sig reported) 6 tablet 0    tranexamic acid (LYSTEDA) 650 MG TABS tablet Take 2 tablets by mouth 3 times daily for 5 days 30 tablet 0    levothyroxine (SYNTHROID) 50 MCG tablet Take 1 tablet by mouth Daily 90 tablet 1    ranolazine (RANEXA) 500 MG extended release tablet Take 1 tablet by mouth 2 times daily 180 tablet 1    atorvastatin (LIPITOR) 80 MG tablet Take 1 tablet by mouth daily 90 tablet 1    clopidogrel (PLAVIX) 75 MG tablet Take 1 tablet by mouth daily 90 tablet 1    fluticasone (FLONASE) 50 MCG/ACT nasal spray 1 spray by Each Nostril route daily as needed for Rhinitis (Patient not taking: No sig reported)      aspirin 81 MG chewable tablet Take 81 mg by mouth daily (Patient not taking: Reported on 12/7/2022)       No current facility-administered medications for this visit. ALLERGIES:  Allergies as of 12/07/2022 - Fully Reviewed 12/07/2022   Allergen Reaction Noted    Seasonal Other (See Comments) 05/05/2021    Bactrim [sulfamethoxazole-trimethoprim] Nausea And Vomiting 11/10/2022    Keflex [cephalexin] Rash 03/14/2022         REVIEW OF SYSTEMS:       A minimum of an eleven point review of systems was completed. Review Of Systems (11 point):  Constitutional: No fever, chills or malaise;  No weight change or fatigue  Head and Eyes: No vision, Headache, Dizziness or trauma in last 12 months  ENT ROS: No hearing, Tinnitis, sinus or taste problems  Hematological and Lymphatic ROS:No Lymphoma, [place], Hemophillia or Bleeding History  Psych ROS: No Depression, Homicidal thoughts,suicidal thoughts, or anxiety  Breast ROS: No prior breast abnormalities or lumps  Respiratory ROS: No SOB, Pneumoniae,Cough, or Pulmonary Embolism History  Cardiovascular ROS: No Chest Pain with Exertion, Palpitations, Syncope, Edema, Arrhythmia  Gastrointestinal ROS: No Indigestion, Heartburn, Nausea, vomiting, Diarrhea, Constipation,or Bowel Changes; No Bloody Stools or melena  Genito-Urinary ROS: No Dysuria, Hematuria or Nocturia. No Urinary Incontinence or Vaginal Discharge  Musculoskeletal ROS: No Arthralgia, Arthritis,Gout,Osteoporosis or Rheumatism  Neurological ROS: No CVA, Migraines, Epilepsy, Seizure Hx, or Limb Weakness  Dermatological ROS: No Rash, Itching, Hives, Mole Changes or Cancer          Blood pressure (!) 140/93, pulse 96, weight 214 lb 12.8 oz (97.4 kg), last menstrual period 11/26/2022, not currently breastfeeding. Chaperone for Intimate Exam  Chaperone was offered and accepted as part of the rooming process. Chaperone: Medardo Downing MA         Abdomen: Soft non-tender; good bowel sounds. No guarding, rebound or rigidity. No CVA tenderness bilaterally. Extremities: No calf tenderness, DTR 2/4, and No edema bilaterally    Pelvic: External genitalia: normal general appearance  Urinary system: urethral meatus normal  Vaginal: normal mucosa without prolapse or lesions and normal rugae  Cervix: normal appearance and GC prep obtained  Adnexa: normal bimanual exam  Uterus: normal single, nontender, anteverted, and mid-position  Negative bladder sweep, no lymphadenopathy, negative CMT          Assessment:   Diagnosis Orders   1.  Unsatisfactory cervical Papanicolaou smear  PAP Smear        Patient Active Problem List    Diagnosis Date Noted    Chest pain 10/29/2022     Priority: Medium    Hx of CS x2 10/26/2022     Priority: Medium    Hx of laparoscopic tubal ligation (2000) 10/26/2022 Priority: Medium    Abnormal uterine bleeding 10/26/2022     Priority: Medium    Fundal uterine fibroid  10/26/2022     Priority: Medium    Atypical chest pain 07/08/2022     Priority: Medium    NSTEMI (non-ST elevated myocardial infarction) ([address] 07/08/2022     Priority: Medium    COVID-19 07/08/2022     Priority: Medium    Gastroesophageal reflux disease with esophagitis without hemorrhage     Irritable bowel syndrome with constipation 10/13/2021    Irritable bowel syndrome with both constipation and diarrhea 10/13/2021    Positive colorectal cancer screening using Cologuard test 10/13/2021    Mixed hyperlipidemia 09/13/2021    Migraine without status migrainosus, not intractable 09/13/2021    OAB (overactive bladder) 09/13/2021    Smoker 09/13/2021    Pulmonary Langerhans cell granulomatosis ([address]     Coronary artery disease involving native heart with angina pectoris ([address]     Situational depression 03/06/2020    Anxiety 03/06/2020    Hx of deep venous thrombosis 03/04/2020    Spinal stenosis of cervical region     Hip fracture ([address] 01/18/2020    Hypothyroidism     Type 2 diabetes mellitus without complication, with long-term current use of insulin ([address]     Essential hypertension            PLAN:  Pap smear collected - pt counseled on how her pap result won't be back before her surgery on Monday, and she is aware that pending it's result, she may or may not need further surgery. Pt knows to be at the hospital on Monday 2 hours prior to her scheduled surgery time    Return as scheduled, for postop appt. Repeat Annual every 1 year  Cervical Cytology Evaluation begins at 24years old. If Negative Cytology, Follow-up screening per current guidelines. Counseled on preventative health maintenance follow-up. Orders Placed This Encounter   Procedures    PAP Smear     Patient History:    Patient's last menstrual period was 11/26/2022. OBGYN Status: Having periods  Past Surgical History:  2004:  BACK SURGERY      Comment:  L5-S1 fusion, Dr. Darlene Mena  2016: CARDIAC CATHETERIZATION      Comment:  one stent LAD  2016: CERVICAL FUSION      Comment:  [id number]  : CERVICAL SPINE SURGERY      Comment:  C4Dr. Debby   No date:  SECTION      Comment:  x 2  and 2021: COLONOSCOPY; N/A      Comment:  COLONOSCOPY WITH BIOPSY performed by Sabiha Ramos MD                at [address] [place] [address]  No date: HIP SURGERY; Left      Comment:  pin  : LAMINECTOMY      Comment:  [place], lumbar  No date: LUNG BIOPSY; Right      Comment:  open  No date: MASTOID SURGERY      Comment:  as a child  No date: TONSILLECTOMY      Comment:  as a child  2021: UPPER GASTROINTESTINAL ENDOSCOPY      Comment:  EGD BIOPSY performed by Sabiha Ramos MD at [place] [address] History    Tobacco Use      Smoking status: Every Day        Packs/day: 0.50        Years: 25.00        Pack years: 12.5        Types: Cigarettes      Smokeless tobacco: Never      Tobacco comments: 3 packs per week       Standing Status:   Future     Standing Expiration Date:   2023     Order Specific Question:   Collection Type     Answer: Thin Prep     Order Specific Question:   Prior Abnormal Pap Test     Answer:   No     Order Specific Question:   Screening or Diagnostic     Answer:   Screening     Order Specific Question:   HPV Requested? Answer:   Yes     Order Specific Question:   High Risk Patient     Answer:   N/A           The patient, Zhao Garcia is a 54 y.o. female, was seen with a total time spent of 20 minutes for the visit on this date of service by the E/M provider. The time component had both face to face and non face to face time spent in determining the total time component. Counseling and education regarding her diagnosis listed below and her options regarding those diagnoses were also included in determining her time component. Diagnosis Orders   1.  Unsatisfactory cervical Papanicolaou smear PAP Smear           The patient had her preventative health maintenance recommendations and follow-up reviewed with her at the completion of her visit.     Elliot Garcia DO

## 2022-12-09 ENCOUNTER — TELEPHONE (OUTPATIENT)
Dept: FAMILY MEDICINE CLINIC | Age: 52
End: 2022-12-09

## 2022-12-09 LAB
HPV SAMPLE: NORMAL
HPV, GENOTYPE 16: NOT DETECTED
HPV, GENOTYPE 18: NOT DETECTED
HPV, HIGH RISK OTHER: NOT DETECTED
HPV, INTERPRETATION: NORMAL
SPECIMEN DESCRIPTION: NORMAL

## 2022-12-09 NOTE — TELEPHONE ENCOUNTER
If [facility] pre op office calls back please let them know that we have told the patient to stop Plavix a week before surgery we keep getting duplicate faxes.

## 2022-12-12 ENCOUNTER — HOSPITAL ENCOUNTER (OUTPATIENT)
Age: 52
Setting detail: OUTPATIENT SURGERY
Discharge: HOME | End: 2022-12-12
Attending: OBSTETRICS & GYNECOLOGY | Admitting: OBSTETRICS & GYNECOLOGY
Payer: COMMERCIAL

## 2022-12-12 ENCOUNTER — ANESTHESIA EVENT (OUTPATIENT)
Dept: OPERATING ROOM | Age: 52
End: 2022-12-12
Payer: COMMERCIAL

## 2022-12-12 ENCOUNTER — ANESTHESIA (OUTPATIENT)
Dept: OPERATING ROOM | Age: 52
End: 2022-12-12
Payer: COMMERCIAL

## 2022-12-12 VITALS
SYSTOLIC BLOOD PRESSURE: 149 MMHG | WEIGHT: 218 LBS | TEMPERATURE: 97.3 F | DIASTOLIC BLOOD PRESSURE: 98 MMHG | OXYGEN SATURATION: 93 % | HEIGHT: 63 IN | BODY MASS INDEX: 38.62 KG/M2 | RESPIRATION RATE: 14 BRPM | HEART RATE: 88 BPM

## 2022-12-12 DIAGNOSIS — Z98.890 POSTOPERATIVE STATE: Primary | ICD-10-CM

## 2022-12-12 DIAGNOSIS — N93.9 ABNORMAL UTERINE BLEEDING: ICD-10-CM

## 2022-12-12 LAB
GLUCOSE BLD-MCNC: 202 MG/DL (ref 65–105)
GLUCOSE BLD-MCNC: 229 MG/DL (ref 65–105)
HCG, PREGNANCY URINE (POC): NEGATIVE

## 2022-12-12 PROCEDURE — 3700000000 HC ANESTHESIA ATTENDED CARE: Performed by: OBSTETRICS & GYNECOLOGY

## 2022-12-12 PROCEDURE — 2500000003 HC RX 250 WO HCPCS: Performed by: OBSTETRICS & GYNECOLOGY

## 2022-12-12 PROCEDURE — 6360000002 HC RX W HCPCS

## 2022-12-12 PROCEDURE — 6360000002 HC RX W HCPCS: Performed by: ANESTHESIOLOGY

## 2022-12-12 PROCEDURE — 82947 ASSAY GLUCOSE BLOOD QUANT: CPT

## 2022-12-12 PROCEDURE — 2580000003 HC RX 258: Performed by: NURSE ANESTHETIST, CERTIFIED REGISTERED

## 2022-12-12 PROCEDURE — 2580000003 HC RX 258: Performed by: ANESTHESIOLOGY

## 2022-12-12 PROCEDURE — 2709999900 HC NON-CHARGEABLE SUPPLY: Performed by: OBSTETRICS & GYNECOLOGY

## 2022-12-12 PROCEDURE — 2720000010 HC SURG SUPPLY STERILE: Performed by: OBSTETRICS & GYNECOLOGY

## 2022-12-12 PROCEDURE — 3600000014 HC SURGERY LEVEL 4 ADDTL 15MIN: Performed by: OBSTETRICS & GYNECOLOGY

## 2022-12-12 PROCEDURE — 3700000001 HC ADD 15 MINUTES (ANESTHESIA): Performed by: OBSTETRICS & GYNECOLOGY

## 2022-12-12 PROCEDURE — 2500000003 HC RX 250 WO HCPCS: Performed by: NURSE ANESTHETIST, CERTIFIED REGISTERED

## 2022-12-12 PROCEDURE — 81025 URINE PREGNANCY TEST: CPT

## 2022-12-12 PROCEDURE — 6370000000 HC RX 637 (ALT 250 FOR IP): Performed by: NURSE ANESTHETIST, CERTIFIED REGISTERED

## 2022-12-12 PROCEDURE — 3600000004 HC SURGERY LEVEL 4 BASE: Performed by: OBSTETRICS & GYNECOLOGY

## 2022-12-12 PROCEDURE — 7100000000 HC PACU RECOVERY - FIRST 15 MIN: Performed by: OBSTETRICS & GYNECOLOGY

## 2022-12-12 PROCEDURE — 6370000000 HC RX 637 (ALT 250 FOR IP): Performed by: ANESTHESIOLOGY

## 2022-12-12 PROCEDURE — 6360000002 HC RX W HCPCS: Performed by: NURSE ANESTHETIST, CERTIFIED REGISTERED

## 2022-12-12 PROCEDURE — 7100000040 HC SPAR PHASE II RECOVERY - FIRST 15 MIN: Performed by: OBSTETRICS & GYNECOLOGY

## 2022-12-12 PROCEDURE — 6360000002 HC RX W HCPCS: Performed by: STUDENT IN AN ORGANIZED HEALTH CARE EDUCATION/TRAINING PROGRAM

## 2022-12-12 PROCEDURE — 88307 TISSUE EXAM BY PATHOLOGIST: CPT

## 2022-12-12 PROCEDURE — 7100000001 HC PACU RECOVERY - ADDTL 15 MIN: Performed by: OBSTETRICS & GYNECOLOGY

## 2022-12-12 PROCEDURE — 7100000041 HC SPAR PHASE II RECOVERY - ADDTL 15 MIN: Performed by: OBSTETRICS & GYNECOLOGY

## 2022-12-12 RX ORDER — OXYCODONE HYDROCHLORIDE 5 MG/1
5 TABLET ORAL
Status: COMPLETED | OUTPATIENT
Start: 2022-12-12 | End: 2022-12-12

## 2022-12-12 RX ORDER — KETOROLAC TROMETHAMINE 30 MG/ML
INJECTION, SOLUTION INTRAMUSCULAR; INTRAVENOUS PRN
Status: DISCONTINUED | OUTPATIENT
Start: 2022-12-12 | End: 2022-12-12 | Stop reason: SDUPTHER

## 2022-12-12 RX ORDER — ONDANSETRON 4 MG/1
4 TABLET, FILM COATED ORAL DAILY PRN
Qty: 30 TABLET | Refills: 0 | Status: SHIPPED | OUTPATIENT
Start: 2022-12-12

## 2022-12-12 RX ORDER — ROCURONIUM BROMIDE 10 MG/ML
INJECTION, SOLUTION INTRAVENOUS PRN
Status: DISCONTINUED | OUTPATIENT
Start: 2022-12-12 | End: 2022-12-12 | Stop reason: SDUPTHER

## 2022-12-12 RX ORDER — LIDOCAINE HYDROCHLORIDE 10 MG/ML
INJECTION, SOLUTION EPIDURAL; INFILTRATION; INTRACAUDAL; PERINEURAL PRN
Status: DISCONTINUED | OUTPATIENT
Start: 2022-12-12 | End: 2022-12-12 | Stop reason: SDUPTHER

## 2022-12-12 RX ORDER — MIDAZOLAM HYDROCHLORIDE 1 MG/ML
INJECTION INTRAMUSCULAR; INTRAVENOUS PRN
Status: DISCONTINUED | OUTPATIENT
Start: 2022-12-12 | End: 2022-12-12 | Stop reason: SDUPTHER

## 2022-12-12 RX ORDER — KETAMINE HCL IN NACL, ISO-OSM 100MG/10ML
SYRINGE (ML) INJECTION PRN
Status: DISCONTINUED | OUTPATIENT
Start: 2022-12-12 | End: 2022-12-12 | Stop reason: SDUPTHER

## 2022-12-12 RX ORDER — ALBUTEROL SULFATE 90 UG/1
AEROSOL, METERED RESPIRATORY (INHALATION) PRN
Status: DISCONTINUED | OUTPATIENT
Start: 2022-12-12 | End: 2022-12-12 | Stop reason: SDUPTHER

## 2022-12-12 RX ORDER — DOCUSATE SODIUM 100 MG/1
100 CAPSULE, LIQUID FILLED ORAL 2 TIMES DAILY
Qty: 60 CAPSULE | Refills: 1 | Status: SHIPPED | OUTPATIENT
Start: 2022-12-12 | End: 2023-02-10

## 2022-12-12 RX ORDER — SODIUM CHLORIDE 0.9 % (FLUSH) 0.9 %
5-40 SYRINGE (ML) INJECTION PRN
Status: DISCONTINUED | OUTPATIENT
Start: 2022-12-12 | End: 2022-12-12 | Stop reason: HOSPADM

## 2022-12-12 RX ORDER — SODIUM CHLORIDE 0.9 % (FLUSH) 0.9 %
5-40 SYRINGE (ML) INJECTION EVERY 12 HOURS SCHEDULED
Status: DISCONTINUED | OUTPATIENT
Start: 2022-12-12 | End: 2022-12-12 | Stop reason: HOSPADM

## 2022-12-12 RX ORDER — SIMETHICONE 80 MG
80 TABLET,CHEWABLE ORAL 4 TIMES DAILY PRN
Qty: 180 TABLET | Refills: 3 | Status: SHIPPED | OUTPATIENT
Start: 2022-12-12

## 2022-12-12 RX ORDER — SODIUM CHLORIDE, SODIUM LACTATE, POTASSIUM CHLORIDE, CALCIUM CHLORIDE 600; 310; 30; 20 MG/100ML; MG/100ML; MG/100ML; MG/100ML
INJECTION, SOLUTION INTRAVENOUS CONTINUOUS PRN
Status: DISCONTINUED | OUTPATIENT
Start: 2022-12-12 | End: 2022-12-12 | Stop reason: SDUPTHER

## 2022-12-12 RX ORDER — MIDAZOLAM HYDROCHLORIDE 2 MG/2ML
2 INJECTION, SOLUTION INTRAMUSCULAR; INTRAVENOUS ONCE
Status: COMPLETED | OUTPATIENT
Start: 2022-12-12 | End: 2022-12-12

## 2022-12-12 RX ORDER — DIPHENHYDRAMINE HYDROCHLORIDE 50 MG/ML
INJECTION INTRAMUSCULAR; INTRAVENOUS PRN
Status: DISCONTINUED | OUTPATIENT
Start: 2022-12-12 | End: 2022-12-12 | Stop reason: SDUPTHER

## 2022-12-12 RX ORDER — IBUPROFEN 600 MG/1
600 TABLET ORAL EVERY 6 HOURS PRN
Qty: 40 TABLET | Refills: 1 | Status: SHIPPED | OUTPATIENT
Start: 2022-12-12

## 2022-12-12 RX ORDER — OXYCODONE HYDROCHLORIDE 5 MG/1
5 TABLET ORAL EVERY 6 HOURS PRN
Qty: 20 TABLET | Refills: 0 | Status: SHIPPED | OUTPATIENT
Start: 2022-12-12 | End: 2022-12-17

## 2022-12-12 RX ORDER — SODIUM CHLORIDE, SODIUM LACTATE, POTASSIUM CHLORIDE, CALCIUM CHLORIDE 600; 310; 30; 20 MG/100ML; MG/100ML; MG/100ML; MG/100ML
1000 INJECTION, SOLUTION INTRAVENOUS CONTINUOUS
Status: DISCONTINUED | OUTPATIENT
Start: 2022-12-12 | End: 2022-12-12 | Stop reason: HOSPADM

## 2022-12-12 RX ORDER — FENTANYL CITRATE 50 UG/ML
50 INJECTION, SOLUTION INTRAMUSCULAR; INTRAVENOUS
Status: COMPLETED | OUTPATIENT
Start: 2022-12-12 | End: 2022-12-12

## 2022-12-12 RX ORDER — DEXAMETHASONE SODIUM PHOSPHATE 10 MG/ML
INJECTION INTRAMUSCULAR; INTRAVENOUS PRN
Status: DISCONTINUED | OUTPATIENT
Start: 2022-12-12 | End: 2022-12-12 | Stop reason: SDUPTHER

## 2022-12-12 RX ORDER — SODIUM CHLORIDE 9 MG/ML
INJECTION, SOLUTION INTRAVENOUS PRN
Status: DISCONTINUED | OUTPATIENT
Start: 2022-12-12 | End: 2022-12-12 | Stop reason: HOSPADM

## 2022-12-12 RX ORDER — MAGNESIUM SULFATE HEPTAHYDRATE 40 MG/ML
INJECTION, SOLUTION INTRAVENOUS PRN
Status: DISCONTINUED | OUTPATIENT
Start: 2022-12-12 | End: 2022-12-12 | Stop reason: SDUPTHER

## 2022-12-12 RX ORDER — FENTANYL CITRATE 50 UG/ML
INJECTION, SOLUTION INTRAMUSCULAR; INTRAVENOUS PRN
Status: DISCONTINUED | OUTPATIENT
Start: 2022-12-12 | End: 2022-12-12 | Stop reason: SDUPTHER

## 2022-12-12 RX ORDER — PROPOFOL 10 MG/ML
INJECTION, EMULSION INTRAVENOUS PRN
Status: DISCONTINUED | OUTPATIENT
Start: 2022-12-12 | End: 2022-12-12 | Stop reason: SDUPTHER

## 2022-12-12 RX ORDER — ONDANSETRON 2 MG/ML
INJECTION INTRAMUSCULAR; INTRAVENOUS PRN
Status: DISCONTINUED | OUTPATIENT
Start: 2022-12-12 | End: 2022-12-12 | Stop reason: SDUPTHER

## 2022-12-12 RX ORDER — ACETAMINOPHEN 500 MG
1000 TABLET ORAL EVERY 6 HOURS PRN
Qty: 30 TABLET | Refills: 1 | Status: SHIPPED | OUTPATIENT
Start: 2022-12-12

## 2022-12-12 RX ORDER — BUPIVACAINE HYDROCHLORIDE 5 MG/ML
INJECTION, SOLUTION PERINEURAL PRN
Status: DISCONTINUED | OUTPATIENT
Start: 2022-12-12 | End: 2022-12-12 | Stop reason: ALTCHOICE

## 2022-12-12 RX ADMIN — HYDROMORPHONE HYDROCHLORIDE 0.5 MG: 1 INJECTION, SOLUTION INTRAMUSCULAR; INTRAVENOUS; SUBCUTANEOUS at 10:32

## 2022-12-12 RX ADMIN — HYDROMORPHONE HYDROCHLORIDE 0.25 MG: 1 INJECTION, SOLUTION INTRAMUSCULAR; INTRAVENOUS; SUBCUTANEOUS at 10:56

## 2022-12-12 RX ADMIN — SUGAMMADEX 200 MG: 100 INJECTION, SOLUTION INTRAVENOUS at 09:47

## 2022-12-12 RX ADMIN — PROPOFOL 30 MG: 10 INJECTION, EMULSION INTRAVENOUS at 08:38

## 2022-12-12 RX ADMIN — Medication 2000 MG: at 08:00

## 2022-12-12 RX ADMIN — KETOROLAC TROMETHAMINE 30 MG: 30 INJECTION, SOLUTION INTRAMUSCULAR; INTRAVENOUS at 09:39

## 2022-12-12 RX ADMIN — ONDANSETRON 4 MG: 2 INJECTION INTRAMUSCULAR; INTRAVENOUS at 09:39

## 2022-12-12 RX ADMIN — ROCURONIUM BROMIDE 40 MG: 10 INJECTION, SOLUTION INTRAVENOUS at 07:38

## 2022-12-12 RX ADMIN — SODIUM CHLORIDE, POTASSIUM CHLORIDE, SODIUM LACTATE AND CALCIUM CHLORIDE: 600; 310; 30; 20 INJECTION, SOLUTION INTRAVENOUS at 07:44

## 2022-12-12 RX ADMIN — ALBUTEROL SULFATE 2 PUFF: 90 AEROSOL, METERED RESPIRATORY (INHALATION) at 09:52

## 2022-12-12 RX ADMIN — MIDAZOLAM 2 MG: 1 INJECTION INTRAMUSCULAR; INTRAVENOUS at 07:22

## 2022-12-12 RX ADMIN — FENTANYL CITRATE 50 MCG: 50 INJECTION, SOLUTION INTRAMUSCULAR; INTRAVENOUS at 11:39

## 2022-12-12 RX ADMIN — ALBUTEROL SULFATE 2 PUFF: 90 AEROSOL, METERED RESPIRATORY (INHALATION) at 09:50

## 2022-12-12 RX ADMIN — DIPHENHYDRAMINE HYDROCHLORIDE 12.5 MG: 50 INJECTION, SOLUTION INTRAMUSCULAR; INTRAVENOUS at 07:52

## 2022-12-12 RX ADMIN — PROPOFOL 150 MG: 10 INJECTION, EMULSION INTRAVENOUS at 07:38

## 2022-12-12 RX ADMIN — HYDROMORPHONE HYDROCHLORIDE 0.25 MG: 1 INJECTION, SOLUTION INTRAMUSCULAR; INTRAVENOUS; SUBCUTANEOUS at 11:02

## 2022-12-12 RX ADMIN — PROPOFOL 20 MG: 10 INJECTION, EMULSION INTRAVENOUS at 08:34

## 2022-12-12 RX ADMIN — MAGNESIUM SULFATE HEPTAHYDRATE 2000 MG: 40 INJECTION, SOLUTION INTRAVENOUS at 08:40

## 2022-12-12 RX ADMIN — Medication 20 MG: at 08:23

## 2022-12-12 RX ADMIN — SODIUM CHLORIDE, POTASSIUM CHLORIDE, SODIUM LACTATE AND CALCIUM CHLORIDE 1000 ML: 600; 310; 30; 20 INJECTION, SOLUTION INTRAVENOUS at 06:45

## 2022-12-12 RX ADMIN — FENTANYL CITRATE 100 MCG: 50 INJECTION, SOLUTION INTRAMUSCULAR; INTRAVENOUS at 08:29

## 2022-12-12 RX ADMIN — HYDROMORPHONE HYDROCHLORIDE 0.5 MG: 1 INJECTION, SOLUTION INTRAMUSCULAR; INTRAVENOUS; SUBCUTANEOUS at 10:40

## 2022-12-12 RX ADMIN — MIDAZOLAM 2 MG: 1 INJECTION INTRAMUSCULAR; INTRAVENOUS at 08:39

## 2022-12-12 RX ADMIN — LIDOCAINE HYDROCHLORIDE 50 MG: 10 SOLUTION INTRAVENOUS at 07:37

## 2022-12-12 RX ADMIN — ROCURONIUM BROMIDE 10 MG: 10 INJECTION, SOLUTION INTRAVENOUS at 08:26

## 2022-12-12 RX ADMIN — Medication 30 MG: at 08:01

## 2022-12-12 RX ADMIN — OXYCODONE 5 MG: 5 TABLET ORAL at 11:56

## 2022-12-12 RX ADMIN — FENTANYL CITRATE 100 MCG: 50 INJECTION, SOLUTION INTRAMUSCULAR; INTRAVENOUS at 08:56

## 2022-12-12 RX ADMIN — FENTANYL CITRATE 100 MCG: 50 INJECTION, SOLUTION INTRAMUSCULAR; INTRAVENOUS at 07:37

## 2022-12-12 RX ADMIN — ROCURONIUM BROMIDE 10 MG: 10 INJECTION, SOLUTION INTRAVENOUS at 08:04

## 2022-12-12 RX ADMIN — DEXAMETHASONE SODIUM PHOSPHATE 10 MG: 10 INJECTION INTRAMUSCULAR; INTRAVENOUS at 07:52

## 2022-12-12 ASSESSMENT — PAIN SCALES - GENERAL
PAINLEVEL_OUTOF10: 8
PAINLEVEL_OUTOF10: 8
PAINLEVEL_OUTOF10: 7
PAINLEVEL_OUTOF10: 6
PAINLEVEL_OUTOF10: 6
PAINLEVEL_OUTOF10: 7
PAINLEVEL_OUTOF10: 8

## 2022-12-12 ASSESSMENT — PAIN DESCRIPTION - LOCATION
LOCATION: ABDOMEN
LOCATION: VAGINA
LOCATION: ABDOMEN

## 2022-12-12 ASSESSMENT — PAIN DESCRIPTION - DESCRIPTORS
DESCRIPTORS: PRESSURE
DESCRIPTORS: CRAMPING

## 2022-12-12 ASSESSMENT — PAIN - FUNCTIONAL ASSESSMENT: PAIN_FUNCTIONAL_ASSESSMENT: 0-10

## 2022-12-12 ASSESSMENT — PAIN DESCRIPTION - PAIN TYPE: TYPE: SURGICAL PAIN

## 2022-12-12 ASSESSMENT — ENCOUNTER SYMPTOMS: SHORTNESS OF BREATH: 0

## 2022-12-12 NOTE — OP NOTE
Operative Note  Department of Obstetrics and Gynecology  [facility]       Patient: Candace Godwin   :   MRN: [id number]       Acct: [de-identified]   Date of Procedure: 22     Pre-operative Diagnosis: 54 y.o. female [id number]   Heavy Vaginal Bleeding  Fibroid Uterus  History of  Section x2  History of Laparoscopic Tubal Ligation  BMI 38     Post-operative Diagnosis: same as above, bladder adhesions to anterior uterus     Procedure: laparoscopic assisted vaginal hysterectomy, bilateral salping-oophorectomy, and cystoscopy     Surgeon: Dr. Slick Tomas): Barbara Call, PGY4; Дмитрий Canela, PGY1     Anesthesia: general   Medications: 2g Ancef    Indications: Patient is a 55 yo [id number] with a significant past medical history of heavy vaginal bleeding and abdominal pain that led her to the ED for symptom management. Patient's ultrasound was significant for a fundal fibroid and 6 mm endometrial stripe. Patient states the bleeding has affected her quality of life and desires surgical management. R/B/A of expectant vs medical vs surgical management were discussed. Patient desires surgical management. Procedure Details: The patient was seen in the pre-op room. The risks, benefits, complications, treatment options, and expected outcomes were discussed with the patient. The patient concurred with the proposed plan, giving informed consent. The patient was taken to the Operating Room and identified as Candace Godwin and the procedure was verified. A Time Out was held and the above information confirmed. After administration of general anesthesia, the patient was placed in the dorsolithotomy position with yellow-fin stirrups and examination under general anesthesia was performed with findings as noted below. The patient was prepped and draped in the usual sterile fashion. A weighted speculum was placed into the vagina to visualize the cervix.  The cervix was grasped with a single-tooth tenaculum. The cervix was dilated slightly with hegar dilators. A Elvia II uterine manipulator was inserted into the cervical canal to aid in visualization during the laparoscopic portion of the case. A velazquez catheter was inserted into the bladder with a 1L bag of saline drained to 500 ml to aid with backfilling of the bladder later in the case. Attention was then turned to the abdominal portion of the case. Local was injected supraumbilically. A 5 mm supraumbilical incision was made using a scalpel. The skin was grasped on either side of the incision and manually elevated with perforating towel clamps. A 5 mm Optiview trocar was used to enter the abdomen. Intraperitoneal placement was confirmed with direct visualization. CO2 gas was then used to create a pneumoperitoneum. The patient was then placed in trendelenberg position. Survey of the pelvis was then performed, revealing normal appearing bulky uterus with fundal fibroid, normal appearing bilateral tubes, and ovaries. Evidence of previously tubal ligation noted. Some adhesions between the bladder and anterior uterus were noted. Two 5 mm ports were then placed, one in the RLQ and the other in the LLQ under direct visualization with local being injected at time of port placement. At this time the left utero-ovarian ligament was cauterized and cut using Ligasure. The round ligament on the left was then grasped, cauterized and cut with Ligasure device. The broad ligament was then elevated and coagulated and cut extending this over the anterior portion of the uterocervical junction to create the bladder flap. Bladder adhesions were dissected carefully using cautery and blunt dissection. Uterine vessels were coagulated and cut on the left. The same procedure was repeated on the right, making sure that the bladder flap was advanced to meet the bladder flap on the contralateral side.  The uterine vessels were then skeletonized and were coagulated with the Ligasure device. The left uterine vessels were then examined again and any additional blood supply was coagulated and cut. Blanching of the uterus was noted. Bovie hook  was then used to create colpotomy starting anteriorly. Colpotomy was created layer by layer until Elvia II cup was visualized. Sharlett Rakers was then used to create colpotomy around manipulator cup. Uterus was then removed from the vagina. Glove with lap sponge was placed in the vagina to maintain pneumoperitoneum. Copious irrigation was then performed and cuff was noted to be largely hemostatic. At this time attention was turned to the ovaries and fallopian tubes. Laparoscopic grasper was used to elevated the right tube and ovary away from the side wall. Ligasure was used to cauterize then cut the IP ligament, until tube and ovary until it was entirely removed. The same was repeated on the left. Both tubes and ovaries were removed through the vagina. Both pedicles were noted to be hemostatic. Attention was then turned to the vaginal cuff closure which was performed vaginally. Vaginal cuff visualization was achieved by using Leda retractors. The anterior and posterior vaginal cuff edge was visualized and grasped with long allis clamp. The right lateral apex of the vaginal cuff was closed using a figure of eight suture. The same procedure was repeated on the left apex. Both apexes were tagged with hemostats. The vaginal cuff was then reapproximated with multiple figure of eight sutures 0-Vicryl, making sure to incorporate the posterior peritoneum. The previously tagged left and right vaginal cuff apex sutures incorporating the uterosacral ligaments were securely tied and then cut. After cuff closure, the vagina was inspected and there was excellent hemostasis. Harris catheter was noted to be draining clear yellow urine throughout and at end of the case. All instruments were then removed from the vagina. Cystoscopy was then performed. Bladder appeared intact with no evidence of injury. Bilateral ureteral jets were identified. The vaginal cuff was palpated and noted to be intact. Attention was then turned again laparoscopically. Vaginal cuff again appeared hemostatic and intact. Survey of the pelvis was again completed and no abnormalities noted. Ureters were identified and peristalsis was noted. All instruments were then removed. Pneumoperitoneum was evacuated. Skin was closed with 4-0 Monocryl interrupted sutures. Incisions were clean, dried and sterile telfa was placed over incisions and affixed with tegaderm. Antibiotics were given prior to skin incision. SCDs remained in place for entire operative course and for recovery period. Harris was removed at conclusion of case. Patient was taken to recovery room in stable condition. Findings:  Normal appearing external genitalia, normal appearing vagina and urethra, normal appearing cervix, enlarged fibroid uterus with concern for adenomyosis, normal appearing bilateral fallopian tubes and ovaries with evidence of previous tubal ligation. Right ovary with simple ovarian cyst present. Some adhesions noted between bladder and anterior uterus.  Normal appearing bladder mucosa without defect, bilateral ureteral jets visualized  Total IV fluids/Blood products:  900 ml crystalloid  Urine Output:  unable to measure due to backfilling of bladder    Estimated blood loss:  50ml  Drains:  none  Specimens:  uterus, cervix, bilateral fallopian tubes and overs  Instrument and Sponge Count: Correct  Complications:  none  Condition:  stable, transferred to post anesthesia recovery    Lenon Landau, DO  Ob/Gyn   12/12/2022, 10:24 AM

## 2022-12-12 NOTE — BRIEF OP NOTE
Brief Operative Note  Department of Obstetrics and Gynecology  [facility]     Patient: Rsoa Vizcarra   :   MRN: [id number]       Acct: [de-identified]   Date of Procedure: 22    Pre-operative Diagnosis: 54 y.o. female [id number]   Heavy Vaginal Bleeding  Fibroid Uterus  History of  Section x2  History of Laparoscopic Tubal Ligation  BMI 38    Post-operative Diagnosis: same as above, bladder adhesions to anterior uterus    Procedure: laparoscopic assisted vaginal hysterectomy, bilateral salping-oophorectomy, and cystoscopy    Surgeon: Dr. Allen Bethea): Genaro Cannon, PGY4; Joseline Mcnair, PGY1    Anesthesia: general     Findings:  Normal appearing external genitalia, normal appearing vagina and urethra, normal appearing cervix, enlarged fibroid uterus with concern for adenomyosis, normal appearing bilateral fallopian tubes and ovaries with evidence of previous tubal ligation. Right ovary with simple ovarian cyst present. Some adhesions noted between bladder and anterior uterus. Normal appearing bladder mucosa without defect, bilateral ureteral jets visualized  Total IV fluids/Blood products:  900 ml crystalloid  Urine Output:  unable to measure due to backfilling bladder    Estimated blood loss:  50ml  Drains:  none  Specimens:  Uterus, cervix, bilateral fallopian tubes and ovaries  Instrument and Sponge Count: Correct  Complications:  none  Condition:  stable, transferred to post anesthesia recovery    See full operative report for further details.     Charles Caballero DO  Ob/Gyn Resident  2022, 9:45 AM

## 2022-12-12 NOTE — ANESTHESIA POSTPROCEDURE EVALUATION
Department of Anesthesiology  Postprocedure Note    Patient: Leatha Olvera  MRN: [id number]  YOB: 1970  Date of evaluation: 12/12/2022      Procedure Summary     Date: 12/12/22 Room / Location: [address]    Anesthesia Start: 0730 Anesthesia Stop: 0310    Procedure: TOTAL LAPAROSCOPIC HYSTERECTOMY, BSO, CYSTOSCOPY Diagnosis:       Abnormal uterine bleeding      (ABNORMAL UTERINE BLEEDING)    Surgeons: Radha Jose DO Responsible Provider: Samir Gallagher MD    Anesthesia Type: general ASA Status: 3          Anesthesia Type: No value filed.     Joshua Phase I: Joshua Score: 10    Joshua Phase II: Joshua Score: 10      Anesthesia Post Evaluation    Patient location during evaluation: PACU  Patient participation: complete - patient participated  Level of consciousness: awake and alert  Pain score: 3  Airway patency: patent  Nausea & Vomiting: no nausea and no vomiting  Complications: no  Cardiovascular status: hemodynamically stable  Respiratory status: acceptable  Hydration status: euvolemic

## 2022-12-12 NOTE — ANESTHESIA PRE PROCEDURE
Department of Anesthesiology  Preprocedure Note       Name:  Ana Sharma   Age:  54 y.o.  :  1970                                          MRN:  [id number]         Date:  2022      Surgeon: Ryanne Alvarez):  Rizwana Thomason DO    Procedure: Procedure(s):  TOTAL LAPAROSCOPIC HYSTERECTOMY, BSO, CYSTOSCOPY    Medications prior to admission:   Prior to Admission medications    Medication Sig Start Date End Date Taking? Authorizing Provider   empagliflozin (JARDIANCE) 25 MG tablet Take 1 tablet by mouth daily 22   [place] Agent, APRN - CNP   calcium citrate (CALCITRATE) 950 (200 Ca) MG tablet Calcitrate 200 mg (950 mg) tablet  Patient not taking: No sig reported    Historical Provider, MD   HYDROcodone-acetaminophen (NORCO) 5-325 MG per tablet Take 1 tablet by mouth every 6 hours as needed.  22   Historical Provider, MD   acetaminophen (TYLENOL) 500 MG tablet Take 2 tablets by mouth every 6 hours as needed for Pain 10/29/22   Ashia Dugan MD   ondansetron (ZOFRAN) 4 MG tablet Take 1 tablet by mouth every 8 hours as needed for Nausea or Vomiting  Patient not taking: No sig reported 10/29/22   Ashia Dugan MD   tranexamic acid (LYSTEDA) 650 MG TABS tablet Take 2 tablets by mouth 3 times daily for 5 days 10/25/22 11/7/22  Patrick Rosario MD   busPIRone (BUSPAR) 15 MG tablet Take 7.5 mg by mouth 3 times daily 22   Antoni Farrar MD   Dulaglutide (TRULICITY) 3 OI/0.9ZI SOPN Inject 3 mg into the skin once a week 22   Kristine Agent, APRN - CNP   omeprazole (PRILOSEC) 40 MG delayed release capsule Take 1 capsule by mouth every morning (before breakfast) 22   [place] Agent, APRN - CNP   insulin detemir (LEVEMIR FLEXTOUCH) 100 UNIT/ML injection pen Inject 26 Units into the skin nightly 22   [place] Agent, APRN - CNP   Insulin Pen Needle 31G X 5 MM MISC Use pen needle for giving daily insulin injection 22   Kristine Agent, APRN - CNP   TRUE METRIX BLOOD GLUCOSE TEST strip use 1 TEST STRIP to TEST BLOOD SUGAR twice a day and if needed 4/19/22   SAVANA Bah CNP   TRUEplus Lancets 30G MISC use 1 LANCET to TEST BLOOD SUGAR twice a day and if needed 4/19/22   SAVANA Bah CNP   albuterol (PROVENTIL) (2.5 MG/3ML) 0.083% nebulizer solution Take 2.5 mg by nebulization daily as needed for Wheezing    Historical Provider, MD   blood glucose monitor kit and supplies Dispense sufficient amount for BID testing frequency plus additional to accommodate PRN testing needs. Dispense all needed supplies to include: monitor, strips, lancing device, lancets, control solutions, alcohol swabs.  10/25/21   SAVANA Bah CNP   tiZANidine (ZANAFLEX) 2 MG tablet take 1 tablet by mouth three times a day if needed 10/19/21   Diamond Washington DO   nitroGLYCERIN (NITROSTAT) 0.4 MG SL tablet place 1 tablet under the tongue if needed every 5 minutes for chest pain for 3 doses IF NO RELIEF AFTER THIRD DOSE CALL PRESCRIBER . 10/19/21   Diamond Washington DO   calcium carbonate (OS-BRANNON) 1250 (500 Ca) MG chewable tablet Take 500-1,000 mg by mouth 3 times daily 10/20/20   Historical Provider, MD   levothyroxine (SYNTHROID) 50 MCG tablet Take 1 tablet by mouth Daily 9/13/21 11/30/22  SAVANA Bah CNP   metoprolol succinate (TOPROL XL) 100 MG extended release tablet take 1 tablet by mouth twice a day  Patient taking differently: 50 mg 2 times daily take 1 tablet by mouth twice a day 9/13/21   SAVANA Bah CNP   sertraline (ZOLOFT) 100 MG tablet Take 1.5 tablets by mouth daily 9/13/21   SAVANA Bah CNP   ranolazine (RANEXA) 500 MG extended release tablet Take 1 tablet by mouth 2 times daily 9/13/21 11/30/22  SAVANA Bah CNP   atorvastatin (LIPITOR) 80 MG tablet Take 1 tablet by mouth daily 9/13/21 11/30/22  SAVANA Bah CNP   clopidogrel (PLAVIX) 75 MG tablet Take 1 tablet by mouth daily 9/13/21 11/30/22  SAVANA Bah - CNP   mirabegron (MYRBETRIQ) 50 MG TB24 Take 50 mg by mouth daily 9/13/21   Berl Daughters, APRN - CNP   fluticasone (FLONASE) 50 MCG/ACT nasal spray 1 spray by Each Nostril route daily as needed for Rhinitis  Patient not taking: No sig reported    Historical Provider, MD   Multiple Vitamins-Minerals (THERAPEUTIC MULTIVITAMIN-MINERALS) tablet Take 1 tablet by mouth daily    Historical Provider, MD   ascorbic acid (VITAMIN C) 500 MG tablet Take 500 mg by mouth daily    Historical Provider, MD   polyethylene glycol (GLYCOLAX) 17 g packet Take 17 g by mouth daily as needed  8/31/20   Historical Provider, MD   Cholecalciferol (VITAMIN D) 50 MCG (2000 UT) TABS tablet Take 2,000 Units by mouth daily  1/23/20   Historical Provider, MD   aspirin 81 MG chewable tablet Take 81 mg by mouth daily 5/31/19   Historical Provider, MD       Current medications:    No current facility-administered medications for this visit. No current outpatient medications on file. Facility-Administered Medications Ordered in Other Visits   Medication Dose Route Frequency Provider Last Rate Last Admin    lactated ringers infusion 1,000 mL  1,000 mL IntraVENous Continuous Deepa Guido MD           Allergies:     Allergies   Allergen Reactions    Seasonal Other (See Comments)     Sneezing, watery eyes, wheezing    Bactrim [Sulfamethoxazole-Trimethoprim] Nausea And Vomiting    Keflex [Cephalexin] Rash       Problem List:    Patient Active Problem List   Diagnosis Code    Hypothyroidism E03.9    Type 2 diabetes mellitus without complication, with long-term current use of insulin ([facility] E11.9, Z79.4    Essential hypertension I10    Hip fracture ([address] S72.009A    Spinal stenosis of cervical region M48.02    Hx of deep venous thrombosis Z86.718    Situational depression F43.21    Anxiety F41.9    Pulmonary Langerhans cell granulomatosis ([address] J84.82    Mixed hyperlipidemia E78.2    Coronary artery disease involving native heart with angina pectoris ([address] I25.119    Migraine without status migrainosus, not intractable G43.909    OAB (overactive bladder) N32.81    Smoker F17.200    Irritable bowel syndrome with constipation K58.1    Irritable bowel syndrome with both constipation and diarrhea K58.2    Positive colorectal cancer screening using Cologuard test R19.5    Gastroesophageal reflux disease with esophagitis without hemorrhage K21.00    Atypical chest pain R07.89    NSTEMI (non-ST elevated myocardial infarction) [facility] I21.4    COVID-19 U07.1    Hx of CS x2 Z98.891    Hx of laparoscopic tubal ligation (2000) Z98.51    Abnormal uterine bleeding N93.9    Fundal uterine fibroid  D21.9    Chest pain R07.9       Past Medical History:        Diagnosis Date    Abnormal uterine bleeding (AUB)     Anxiety     Arthritis     CAD (coronary artery disease)     Dr. Melquiades Fitch Chronic neck pain     COVID-19     fever, slight cough, fatigue,lost taste,  admitted for couple days bc of elevated troponins    Depression     Diabetes mellitus ([facility] 2017    managed by PCP    Hyperlipidemia     ILD (interstitial lung disease) ([facility] [facility]     Kidney stone     Langerhans-cell granulomatosis ([address]     Myocardial infarct ([facility] [address] 2016    Neck pain     Pulmonary nodule     Spinal stenosis 2017    Under care of team     [facility]: Jing Gravely    Under care of team     PCP: Ernesto SAWANT- CNP    Under care of team     GYN: Dr. Trino Kc Under care of team     cardiologist: Dr. Nels Cowden last visit 7/2022    Uterine fibroid        Past Surgical History:        Procedure Laterality Date    BACK SURGERY  2004    L5-S1 fusion, Dr. Wood Liter  2016    one stent LAD    CERVICAL FUSION  12/2016    C5-7, [facility]. CERVICAL SPINE SURGERY  2019    C4, Dr. Genet Bustamante 2019   [address]      x 2 1993 and 2000    COLONOSCOPY N/A 12/08/2021    COLONOSCOPY WITH BIOPSY performed by [facility]oma Ellis [facility] at [facility]     pin    LAMINECTOMY 1997    [place], lumbar    LUNG BIOPSY Right     open    MASTOID SURGERY      as a child    TONSILLECTOMY      as a child    UPPER GASTROINTESTINAL ENDOSCOPY  12/08/2021    EGD BIOPSY performed by Sabi Somers MD at [place] [address] History:    Social History     Tobacco Use    Smoking status: Every Day     Packs/day: 0.50     Years: 25.00     Pack years: 12.50     Types: Cigarettes    Smokeless tobacco: Never    Tobacco comments:     3 packs per week   Substance Use Topics    Alcohol use: Yes     Comment: rare                                Ready to quit: Not Answered  Counseling given: Not Answered  Tobacco comments: 3 packs per week      Vital Signs (Current): There were no vitals filed for this visit.                                            BP Readings from Last 3 Encounters:   12/12/22 128/84   12/07/22 (!) 143/93   11/30/22 135/89       NPO Status:                                                                                 BMI:   Wt Readings from Last 3 Encounters:   12/12/22 218 lb (98.9 kg)   12/07/22 214 lb 12.8 oz (97.4 kg)   11/30/22 212 lb (96.2 kg)     There is no height or weight on file to calculate BMI.    CBC:   Lab Results   Component Value Date/Time    WBC 14.1 11/30/2022 12:25 PM    RBC 4.63 11/30/2022 12:25 PM    HGB 11.3 11/30/2022 12:25 PM    HCT 37.2 11/30/2022 12:25 PM    MCV 80.3 11/30/2022 12:25 PM    RDW 16.3 11/30/2022 12:25 PM     11/30/2022 12:25 PM       CMP:   Lab Results   Component Value Date/Time     11/30/2022 12:25 PM    K 4.0 11/30/2022 12:25 PM     11/30/2022 12:25 PM    CO2 23 11/30/2022 12:25 PM    BUN 11 11/30/2022 12:25 PM    CREATININE 0.51 11/30/2022 12:25 PM    GFRAA >60 09/06/2022 11:30 PM    LABGLOM >60 11/30/2022 12:25 PM    GLUCOSE 263 11/30/2022 12:25 PM    PROT 7.5 11/07/2022 11:28 AM    CALCIUM 9.3 11/30/2022 12:25 PM    BILITOT <0.1 11/07/2022 11:28 AM    ALKPHOS 68 11/07/2022 11:28 AM    AST 10 11/07/2022 11:28 AM ALT 12 11/07/2022 11:28 AM       POC Tests:   No results for input(s): POCGLU, POCNA, POCK, POCCL, POCBUN, POCHEMO, POCHCT in the last 72 hours. Coags:   Lab Results   Component Value Date/Time    PROTIME 11.2 10/25/2022 09:18 AM    INR 1.1 10/25/2022 09:18 AM       HCG (If Applicable):   Lab Results   Component Value Date    PREGTESTUR NEGATIVE 10/29/2022    HCG NEGATIVE 12/08/2021        ABGs: No results found for: PHART, PO2ART, CAF8WCJ, XBV6WWD, BEART, F8ZQTPBF     Type & Screen (If Applicable):  No results found for: LABABO, LABRH    Drug/Infectious Status (If Applicable):  No results found for: HIV, HEPCAB    COVID-19 Screening (If Applicable):   Lab Results   Component Value Date/Time    COVID19 Not Detected 10/29/2022 01:45 AM           Anesthesia Evaluation    Airway: Mallampati: I  TM distance: >3 FB   Neck ROM: full  Mouth opening: > = 3 FB   Dental: normal exam         Pulmonary: breath sounds clear to auscultation      (-) shortness of breath                           Cardiovascular:    (+) hypertension:, past MI:, CAD:, CABG/stent:,     (-)  angina      Rhythm: regular  Rate: normal                 ROS comment:   Left Ventricle: Systolic function is normal with an ejection fraction   of 55-60%.   Left Ventricle: There is mild concentric increased wall   thickness/hypertrophy.   Mitral Valve: There is mild regurgitation. There is no evidence of   mitral valve stenosis.   Tricuspid Valve: There is mild to moderate regurgitation.   Tricuspid Valve: The right ventricular systolic pressure normal. RVSP   calculated at 30 mmHg. RVSP is based on RA pressure of 3 mmHg. Neuro/Psych:   (+) headaches:, psychiatric history:            GI/Hepatic/Renal:             Endo/Other:    (+) Diabetes, hypothyroidism::., .                 Abdominal:   (+) obese,           Vascular:           Other Findings:        Department of Anesthesiology  Preprocedure Note       Name:  Juliocesar Negrete   Age:  54 y.o.  :  1970                                          MRN:  [id number]         Date:  2022      Surgeon: Maria Guadalupe Araiza):  Jonas Gandhi DO    Procedure: Procedure(s):  TOTAL LAPAROSCOPIC HYSTERECTOMY, BSO, CYSTOSCOPY    Medications prior to admission:   Prior to Admission medications    Medication Sig Start Date End Date Taking? Authorizing Provider   empagliflozin (JARDIANCE) 25 MG tablet Take 1 tablet by mouth daily 22   SAVANA Houston CNP   calcium citrate (CALCITRATE) 950 (200 Ca) MG tablet Calcitrate 200 mg (950 mg) tablet  Patient not taking: No sig reported    Historical Provider, MD   HYDROcodone-acetaminophen (NORCO) 5-325 MG per tablet Take 1 tablet by mouth every 6 hours as needed.  22   Historical Provider, MD   acetaminophen (TYLENOL) 500 MG tablet Take 2 tablets by mouth every 6 hours as needed for Pain 10/29/22   Tati Hauser MD   ondansetron (ZOFRAN) 4 MG tablet Take 1 tablet by mouth every 8 hours as needed for Nausea or Vomiting  Patient not taking: No sig reported 10/29/22   Tati Hauser MD   tranexamic acid (LYSTEDA) 650 MG TABS tablet Take 2 tablets by mouth 3 times daily for 5 days 10/25/22 11/7/22  Becky Jay MD   busPIRone (BUSPAR) 15 MG tablet Take 7.5 mg by mouth 3 times daily 22   Courtney Daley MD   Dulaglutide (TRULICITY) 3 FF/6.4IE SOPN Inject 3 mg into the skin once a week 22   SAVANA Houston CNP   omeprazole (PRILOSEC) 40 MG delayed release capsule Take 1 capsule by mouth every morning (before breakfast) 22   SAVANA Houston CNP   insulin detemir (LEVEMIR FLEXTOUCH) 100 UNIT/ML injection pen Inject 26 Units into the skin nightly 22   SAVANA Houston CNP   Insulin Pen Needle 31G X 5 MM MISC Use pen needle for giving daily insulin injection 22   SAVANA Houston CNP   TRUE METRIX BLOOD GLUCOSE TEST strip use 1 TEST STRIP to TEST BLOOD SUGAR twice a day and if needed 22   SAVANA Houston CNP   TRUEplus Lancets 30G MISC use 1 LANCET to TEST BLOOD SUGAR twice a day and if needed 4/19/22   SAVANA Bucio CNP   albuterol (PROVENTIL) (2.5 MG/3ML) 0.083% nebulizer solution Take 2.5 mg by nebulization daily as needed for Wheezing    Historical Provider, MD   blood glucose monitor kit and supplies Dispense sufficient amount for BID testing frequency plus additional to accommodate PRN testing needs. Dispense all needed supplies to include: monitor, strips, lancing device, lancets, control solutions, alcohol swabs.  10/25/21   SAVANA Bucio CNP   tiZANidine (ZANAFLEX) 2 MG tablet take 1 tablet by mouth three times a day if needed 10/19/21   Jamal Joya DO   nitroGLYCERIN (NITROSTAT) 0.4 MG SL tablet place 1 tablet under the tongue if needed every 5 minutes for chest pain for 3 doses IF NO RELIEF AFTER THIRD DOSE CALL PRESCRIBER . 10/19/21   Jamal Joya DO   calcium carbonate (OS-BRANNON) 1250 (500 Ca) MG chewable tablet Take 500-1,000 mg by mouth 3 times daily 10/20/20   Historical Provider, MD   levothyroxine (SYNTHROID) 50 MCG tablet Take 1 tablet by mouth Daily 9/13/21 11/30/22  SAVANA Bucio CNP   metoprolol succinate (TOPROL XL) 100 MG extended release tablet take 1 tablet by mouth twice a day  Patient taking differently: 50 mg 2 times daily take 1 tablet by mouth twice a day 9/13/21   SAVANA Bucio CNP   sertraline (ZOLOFT) 100 MG tablet Take 1.5 tablets by mouth daily 9/13/21   SAVANA Bucio CNP   ranolazine (RANEXA) 500 MG extended release tablet Take 1 tablet by mouth 2 times daily 9/13/21 11/30/22  SAVANA Bucio CNP   atorvastatin (LIPITOR) 80 MG tablet Take 1 tablet by mouth daily 9/13/21 11/30/22  SAVANA Bucio CNP   clopidogrel (PLAVIX) 75 MG tablet Take 1 tablet by mouth daily 9/13/21 11/30/22  SAVANA Bucio CNP   mirabegron (MYRBETRIQ) 50 MG TB24 Take 50 mg by mouth daily 9/13/21   Familia Borden, APRN - CNP   fluticasone (FLONASE) 50 MCG/ACT nasal spray 1 spray by Each Nostril route daily as needed for Rhinitis  Patient not taking: No sig reported    Historical Provider, MD   Multiple Vitamins-Minerals (THERAPEUTIC MULTIVITAMIN-MINERALS) tablet Take 1 tablet by mouth daily    Historical Provider, MD   ascorbic acid (VITAMIN C) 500 MG tablet Take 500 mg by mouth daily    Historical Provider, MD   polyethylene glycol (GLYCOLAX) 17 g packet Take 17 g by mouth daily as needed  8/31/20   Historical Provider, MD   Cholecalciferol (VITAMIN D) 50 MCG (2000 UT) TABS tablet Take 2,000 Units by mouth daily  1/23/20   Historical Provider, MD   aspirin 81 MG chewable tablet Take 81 mg by mouth daily 5/31/19   Historical Provider, MD       Current medications:    No current facility-administered medications for this visit. No current outpatient medications on file. Facility-Administered Medications Ordered in Other Visits   Medication Dose Route Frequency Provider Last Rate Last Admin    lactated ringers infusion 1,000 mL  1,000 mL IntraVENous Continuous Emanuel Calvin MD           Allergies:     Allergies   Allergen Reactions    Seasonal Other (See Comments)     Sneezing, watery eyes, wheezing    Bactrim [Sulfamethoxazole-Trimethoprim] Nausea And Vomiting    Keflex [Cephalexin] Rash       Problem List:    Patient Active Problem List   Diagnosis Code    Hypothyroidism E03.9    Type 2 diabetes mellitus without complication, with long-term current use of insulin ([facility] [facility] E11.9, Z79.4    Essential hypertension I10    Hip fracture ([facility] [address] S72.009A    Spinal stenosis of cervical region M48.02    Hx of deep venous thrombosis Z86.718    Situational depression F43.21    Anxiety F41.9    Pulmonary Langerhans cell granulomatosis ([facility] [address] [facility] J84.82    Mixed hyperlipidemia E78.2    Coronary artery disease involving native heart with angina pectoris ([facility] [facility] I25.119    Migraine without status migrainosus, not intractable G43.909    OAB (overactive bladder) N32.81    Smoker F17.200    Irritable bowel syndrome with constipation K58.1    Irritable bowel syndrome with both constipation and diarrhea K58.2    Positive colorectal cancer screening using Cologuard test R19.5    Gastroesophageal reflux disease with esophagitis without hemorrhage K21.00    Atypical chest pain R07.89    NSTEMI (non-ST elevated myocardial infarction) ([facility]) I21.4    COVID-19 U07.1    Hx of CS x2 Z98.891    Hx of laparoscopic tubal ligation (2000) Z98.51    Abnormal uterine bleeding N93.9    Fundal uterine fibroid  D21.9    Chest pain R07.9       Past Medical History:        Diagnosis Date    Abnormal uterine bleeding (AUB)     Anxiety     Arthritis     CAD (coronary artery disease)     Dr. Leana Osborn Chronic neck pain     COVID-19     fever, slight cough, fatigue,lost taste,  admitted for couple days bc of elevated troponins    Depression     Diabetes mellitus ([facility] [address] 2017    managed by PCP    Hyperlipidemia     ILD (interstitial lung disease) ([facility] [facility]     Kidney stone     Langerhans-cell granulomatosis ([facility]     Myocardial infarct ([facility] [address] 2016    Neck pain     Pulmonary nodule     Spinal stenosis 2017    Under care of team     [facility]: Alejandro Frye    Under care of team     PCP: Noy Roberts APRN- CNP    Under care of team     GYN: Dr. Malhotra Covert Under care of team     cardiologist: Dr. Shayne Mixon last visit 7/2022    Uterine fibroid        Past Surgical History:        Procedure Laterality Date    BACK SURGERY  2004    L5-S1 fusion, Dr. Fabian Bedolla  2016    one stent LAD    CERVICAL FUSION  12/2016    [facility]. CERVICAL SPINE SURGERY  2019    C4, Dr. Valention Kay 2019   [place]      x 2 1993 and 2000    COLONOSCOPY N/A 12/08/2021    COLONOSCOPY WITH BIOPSY performed by Murray Davies MD at [address], lumbar    LUNG BIOPSY Right     open    MASTOID SURGERY as a child    TONSILLECTOMY      as a child    UPPER GASTROINTESTINAL ENDOSCOPY  12/08/2021    EGD BIOPSY performed by Lou Paz MD at [place] [address] History:    Social History     Tobacco Use    Smoking status: Every Day     Packs/day: 0.50     Years: 25.00     Pack years: 12.50     Types: Cigarettes    Smokeless tobacco: Never    Tobacco comments:     3 packs per week   Substance Use Topics    Alcohol use: Yes     Comment: rare                                Ready to quit: Not Answered  Counseling given: Not Answered  Tobacco comments: 3 packs per week      Vital Signs (Current): There were no vitals filed for this visit.                                            BP Readings from Last 3 Encounters:   12/12/22 128/84   12/07/22 (!) 143/93   11/30/22 135/89       NPO Status:                                                                                 BMI:   Wt Readings from Last 3 Encounters:   12/12/22 218 lb (98.9 kg)   12/07/22 214 lb 12.8 oz (97.4 kg)   11/30/22 212 lb (96.2 kg)     There is no height or weight on file to calculate BMI.    CBC:   Lab Results   Component Value Date/Time    WBC 14.1 11/30/2022 12:25 PM    RBC 4.63 11/30/2022 12:25 PM    HGB 11.3 11/30/2022 12:25 PM    HCT 37.2 11/30/2022 12:25 PM    MCV 80.3 11/30/2022 12:25 PM    RDW 16.3 11/30/2022 12:25 PM     11/30/2022 12:25 PM       CMP:   Lab Results   Component Value Date/Time     11/30/2022 12:25 PM    K 4.0 11/30/2022 12:25 PM     11/30/2022 12:25 PM    CO2 23 11/30/2022 12:25 PM    BUN 11 11/30/2022 12:25 PM    CREATININE 0.51 11/30/2022 12:25 PM    GFRAA >60 09/06/2022 11:30 PM    LABGLOM >60 11/30/2022 12:25 PM    GLUCOSE 263 11/30/2022 12:25 PM    PROT 7.5 11/07/2022 11:28 AM    CALCIUM 9.3 11/30/2022 12:25 PM    BILITOT <0.1 11/07/2022 11:28 AM    ALKPHOS 68 11/07/2022 11:28 AM    AST 10 11/07/2022 11:28 AM    ALT 12 11/07/2022 11:28 AM       POC Tests:   No results for input(s): POCGLU, Jilda Spire, POCCL, POCBUN, POCHEMO, POCHCT in the last 72 hours. Coags:   Lab Results   Component Value Date/Time    PROTIME 11.2 10/25/2022 09:18 AM    INR 1.1 10/25/2022 09:18 AM       HCG (If Applicable):   Lab Results   Component Value Date    PREGTESTUR NEGATIVE 10/29/2022    HCG NEGATIVE 12/08/2021        ABGs: No results found for: PHART, PO2ART, FLL6FQV, NCA4WHV, BEART, T5WBTRRQ     Type & Screen (If Applicable):  No results found for: LABABO, LABRH    Drug/Infectious Status (If Applicable):  No results found for: HIV, HEPCAB    COVID-19 Screening (If Applicable):   Lab Results   Component Value Date/Time    COVID19 Not Detected 10/29/2022 01:45 AM           Anesthesia Evaluation    Airway: Mallampati: I  TM distance: >3 FB   Neck ROM: full  Mouth opening: > = 3 FB Dental:          Pulmonary:       (-) shortness of breath                           Cardiovascular:    (+) CABG/stent:,     (-)  angina                Neuro/Psych:               GI/Hepatic/Renal:             Endo/Other:    (+) Diabetes, . Abdominal:             Vascular: Other Findings:             Anesthesia Plan      general     ASA 4                                 Lakeisha Ingram MD   12/12/2022               Anesthesia Plan      general     ASA 3       Induction: intravenous. Anesthetic plan and risks discussed with patient. Use of blood products discussed with patient whom consented to blood products. Plan discussed with CRNA.                     Lakeisha Ingram MD   12/12/2022

## 2022-12-12 NOTE — H&P
Assessment/Plan





- Problem List


(1) Dementia


Qualifiers: 


   Dementia type: unspecified type   Dementia behavioral disturbance: without 

behavioral disturbance   Qualified Code(s): F03.90 - Unspecified dementia 

without behavioral disturbance   


Assessment/Plan: 


Continue Namenda.








(2) NPH (normal pressure hydrocephalus)


Assessment/Plan: 


Unchanged but stable symptoms.








(3) Problem situation relating to social and personal history


Assessment/Plan: 


Still awaiting guardianship to become official, POLST to be finalized then, and 

a facility to then accept him for permanent residence.








- Current Meds


Current Meds: 





                               Current Medications











Generic Name Dose Route Start Last Admin





  Trade Name Freq  PRN Reason Stop Dose Admin


 


Acetaminophen  650 mg  03/10/19 11:56  03/31/19 18:55





  Tylenol  PO   650 mg





  Q4HR PRN   Administration





  Pain 1 to 4   





     





     





     


 


Famotidine  20 mg  03/10/19 21:00  04/15/19 10:16





  Pepcid  PO   20 mg





  BID JAISON   Administration





     





     





     





     


 


Memantine  5 mg  03/10/19 21:00  04/15/19 10:16





  Namenda  PO   5 mg





  BID JAISON   Administration





     





     





     





     


 


Multivitamins/Minerals  1 tab  03/11/19 16:00  04/15/19 10:15





  Theragran  M  PO   1 tab





  DAILYWM JAISON   Administration





     





     





     





     


 


Nystatin  1 applic  04/07/19 21:00  04/15/19 10:16





  Nystop  TOP   1 applic





  BID JAISON   Administration





     





     





     





     


 


Polyethylene Glycol  17 gm  03/11/19 09:00  04/15/19 10:16





  Miralax  PO   17 gm





  DAILY JAISON   Administration





     





     





     





     


 


Trazodone HCl  50 mg  03/11/19 21:44  04/14/19 21:14





  Desyrel  PO   50 mg





  QPM PRN   Administration





  Insomnia   





     





     





     














- Lab Result


[place] Diagrams: 


                                 04/05/19 11:45





                                 03/15/19 05:31





Subjective





- Subjective


Patient Reports: No Complaints


Nursing Reports: No Complaints





Objective


Vital Signs: 





                               Vital Signs - 24 hr











  04/15/19





  08:28


 


Temperature 36.4 C L


 


Heart Rate [ 61





Brachial] 


 


Respiratory 16





Rate 


 


Blood Pressure 153/87 H





[Right Brachial 





artery] 


 


O2 Saturation 99








                                     Oxygen











O2 Source                      Room air














I&O (Last 24 Hrs): 





                          Intake and Output Totals x24h











 04/13/19 04/14/19 04/15/19





 23:59 23:59 23:59


 


Intake Total 960 1140 1200


 


Balance 960 1140 1200











General: Alert


HEENT: Atraumatic


Neuro: Disoriented


Respiratory: No respiratory distress


Extremities: No edema





- Results


Results: 





                               Laboratory Results











WBC  5.5 x10^3/uL (4.8-10.8)   04/05/19  11:45    


 


RBC  3.55 10^6/uL (4.70-6.10)  L  04/05/19  11:45    


 


Hgb  12.1 g/dL (14.0-18.0)  L  04/05/19  11:45    


 


Hct  36.6 % (42.0-52.0)  L  04/05/19  11:45    


 


MCV  103.0 fL (80.0-94.0)  H  04/05/19  11:45    


 


MCH  34.2 pg (27.0-31.0)  H  04/05/19  11:45    


 


MCHC  33.2 g/dL (32.0-36.0)   04/05/19  11:45    


 


RDW  14.9 % (12.0-15.0)   04/05/19  11:45    


 


Plt Count  216 10^3/uL (130-450)   04/05/19  11:45    


 


MPV  7.9 fL (7.4-11.4)   04/05/19  11:45    


 


Neut # (Auto)  3.4 10^3/uL (1.5-6.6)   04/05/19  11:45    


 


Lymph # (Auto)  1.0 10^3/uL (1.5-3.5)  L  04/05/19  11:45    


 


Mono # (Auto)  0.6 10^3/uL (0.0-1.0)   04/05/19  11:45    


 


Eos # (Auto)  0.5 10^3/uL (0.0-0.7)   04/05/19  11:45    


 


Baso # (Auto)  0.1 10^3/uL (0.0-0.1)   04/05/19  11:45    


 


Absolute Nucleated RBC  0.01 x10^3/uL  04/05/19  11:45    


 


Total Counted  100   03/13/19  05:05    


 


Band Neuts % (Manual)  0 % (0-10)  03/13/19  05:05    


 


Reactive Lymphs % (Man)  6 %  03/06/19  08:37    


 


Abnorm Lymph % (Manual)  0 %  03/13/19  05:05    


 


Nucleated RBC %  0.1 /100WBC  04/05/19  11:45    


 


Neutrophils # (Manual)  1.5 10^3/uL (1.5-6.6)   03/13/19  05:05    


 


Lymphocytes # (Manual)  0.6 10^3/uL (1.5-3.5)  L  03/13/19  05:05    


 


Monocytes # (Manual)  0.3 10^3/uL (0.0-1.0)   03/13/19  05:05    


 


Eosinophils # (Manual)  0.0 10^3/uL (0-0.7)   03/13/19  05:05    


 


Basophils # (Manual)  0.0 10^3/uL (0-0.1)   03/13/19  05:05    


 


Differential Comment  MANUAL DIFFERENTIAL   03/06/19  08:37    


 


Manual Slide Review  Indicated   03/14/19  04:20    


 


Platelet Estimate  DECREASED (<130,000)  (NORMAL)   03/14/19  04:20    


 


Platelet Morphology  RARE GIANT PLATELETS  (NORMAL)   03/06/19  08:37    


 


RBC Morph Micro Appear  1+ ANISOCYTOSIS  (NORMAL)   03/14/19  04:20    


 


PT  12.7 secs (9.9-12.6)  H  04/05/19  11:45    


 


INR  1.1  (0.8-1.2)   04/05/19  11:45    


 


Sodium  139 mmol/L (135-145)   03/15/19  05:31    


 


Potassium  3.6 mmol/L (3.5-5.0)   03/15/19  05:31    


 


Chloride  105 mmol/L (101-111)   03/15/19  05:31    


 


Carbon Dioxide  26 mmol/L (21-32)   03/15/19  05:31    


 


Anion Gap  8.0  (6-13)   03/15/19  05:31    


 


BUN  15 mg/dL (6-20)   03/15/19  05:31    


 


Creatinine  0.9 mg/dL (0.6-1.2)   03/15/19  05:31    


 


Estimated GFR (MDRD)  82  (>89)  L  03/15/19  05:31    


 


Glucose  93 mg/dL ()   03/15/19  05:31    


 


POC Whole Bld Glucose  70 mg/dL (70 - 100)   03/09/19  11:34    


 


Lactic Acid  1.0 mmol/L (0.5-2.2)   03/10/19  09:15    


 


Calcium  7.9 mg/dL (8.5-10.3)  L  03/15/19  05:31    


 


Phosphorus  1.7 mg/dL (2.5-4.6)  L  03/12/19  06:07    


 


Total Bilirubin  0.6 mg/dL (0.2-1.0)   03/09/19  13:37    


 


AST  30 IU/L (10-42)   03/09/19  13:37    


 


ALT  14 IU/L (10-60)   03/09/19  13:37    


 


Alkaline Phosphatase  98 IU/L ()   03/09/19  13:37    


 


Total Creatine Kinase  122 IU/L ()   03/06/19  08:37    


 


Troponin I  < 0.04 ng/mL (<0.49)   03/09/19  13:37    


 


Total Protein  6.6 g/dL (6.7-8.2)  L  03/09/19  13:37    


 


Albumin  2.7 g/dL (3.2-5.5)  L  03/12/19  06:07    


 


Globulin  3.2 g/dL (2.1-4.2)   03/09/19  13:37    


 


Albumin/Globulin Ratio  1.1  (1.0-2.2)   03/09/19  13:37    


 


Lipase  30 U/L (22-51)   03/09/19  13:37    


 


Urine Color  YELLOW   03/08/19  18:29    


 


Urine Clarity  CLEAR  (CLEAR)   03/08/19  18:29    


 


Urine pH  6.0 PH (5.0-7.5)   03/08/19  18:29    


 


Ur Specific Gravity  1.025  (1.002-1.030)   03/08/19  18:29    


 


Urine Protein  NEGATIVE mg/dL (NEGATIVE)   03/08/19  18:29    


 


Urine Glucose (UA)  NEGATIVE mg/dL (NEGATIVE)   03/08/19  18:29    


 


Urine Ketones  15 mg/dL (NEGATIVE)  H  03/08/19  18:29    


 


Urine Occult Blood  SMALL  (NEGATIVE)  H  03/08/19  18:29    


 


Urine Nitrite  NEGATIVE  (NEGATIVE)   03/08/19  18:29    


 


Urine Bilirubin  NEGATIVE  (NEGATIVE)   03/08/19  18:29    


 


Urine Urobilinogen  0.2 (NORMAL) E.U./dL (NORMAL)   03/08/19  18:29    


 


Ur Leukocyte Esterase  NEGATIVE  (NEGATIVE)   03/08/19  18:29    


 


Urine RBC  6-10 /HPF (0-5)  H  03/08/19  18:29    


 


Urine WBC  0-3 /HPF (0-3)   03/08/19  18:29    


 


Ur Squamous Epith Cells  RARE Squamous  (<= Few)   03/08/19  18:29    


 


Urine Bacteria  Rare /HPF (None Seen)   03/08/19  18:29    


 


Urine Mucus  Few Strands   03/08/19  18:29    


 


Ur Microscopic Review  INDICATED   03/08/19  18:29    


 


Urine Culture Comments  NOT INDICATED   03/08/19  18:29    


 


Last Dose Date  UNK   03/12/19  09:30    


 


Last Dose Time  UNK   03/12/19  09:30    


 


Vancomycin Trough  17.4 ug/mL (10.0-20.0)   03/12/19  09:30    


 


Urine Opiates Screen  NEGATIVE  (NEGATIVE)   03/04/19  00:00    


 


Ur Oxycodone Screen  NEGATIVE  (NEGATIVE)   03/04/19  00:00    


 


Urine Methadone Screen  NEGATIVE  (NEGATIVE)   03/04/19  00:00    


 


Ur Propoxyphene Screen  NEGATIVE  (NEGATIVE)   03/04/19  00:00    


 


Ur Barbiturates Screen  NEGATIVE  (NEGATIVE)   03/04/19  00:00    


 


Ur Tricyclics Screen  NEGATIVE  (NEGATIVE)   03/04/19  00:00    


 


Ur Phencyclidine Scrn  NEGATIVE  (NEGATIVE)   03/04/19  00:00    


 


Ur Amphetamine Screen  NEGATIVE  (NEGATIVE)   03/04/19  00:00    


 


U Methamphetamines Scrn  NEGATIVE  (NEGATIVE)   03/04/19  00:00    


 


U Benzodiazepines Scrn  NEGATIVE  (NEGATIVE)   03/04/19  00:00    


 


Urine Cocaine Screen  NEGATIVE  (NEGATIVE)   03/04/19  00:00    


 


U Cannabinoids Screen  NEGATIVE  (NEGATIVE)   03/04/19  00:00    


 


C. difficile Tox B Gene  NEGATIVE  (NEGATIVE)   03/10/19  07:43    














Sepsis Event Note (H)





- Evaluation


Current Stage of Sepsis: Ruled out OB/GYN Pre-Op H&P  [facility]    Patient Name: Saji Salas     Patient : 1970  Room/Bed: Tiffanie /NONE  Admission Date/Time: 2022  5:25 AM  Primary Care Physician: SAVANA Rodriguez CNP  MRN: [id number]    Date: 22  Time: 7:15 AM    The patient was seen in pre-op holding. She is here for total laparoscopic hysterectomy, bilateral salping-oophorectomy, and cystoscopy with possible laparotomy    Patient is a 55 yo [id number] with a significant past medical history of heavy vaginal bleeding and abdominal pain. Patient's ultrasound was significant for a fundal fibroid and 6 mm endometrial stripe. Patient states the bleeding has affected her quality of life and desires surgical management. R/B/A of expectant vs medical vs surgical management were discussed. Patient desires to pursue surgical management. The procedure risks and complications were reviewed. The labs, Consent, and H&P were reviewed and updated. The patient was counseled on the possibility of  the need of a second surgery. The patient voiced understanding and had all of her questions answered. The possibility of incomplete removal of abnormal tissue was discussed.     OBSTETRICAL HISTORY:   OB History    Para Term  AB Living   3 2 1 1 1 3   SAB IAB Ectopic Molar Multiple Live Births   1 0 0 0 1 3      # Outcome Date GA Lbr Toni/2nd Weight Sex Delivery Anes PTL Lv   3A   33w0d   F CS-LTranv   PAULA      Complications: Failure to Progress in First Stage   3B      M CS-LTranv   PAULA      Complications: Failure to Progress in First Stage   2 SAB      SAB      1 Term  39w6d   F CS-LTranv   PAULA      Complications: Failure to Progress in First Stage      Obstetric Comments   1993: 39w6d IOL > CS (didn't dilate past 2), F; GDMA and HTN   SAB x1 somewhere in between    2000: sPTL, 33w twin delivery, CS, M,F        PAST MEDICAL HISTORY:   has a past medical history of Abnormal uterine bleeding (AUB), Anxiety, Arthritis, CAD (coronary artery disease), Chronic neck pain, COVID-19, Depression, Diabetes mellitus ([facility] [place] [address] Hyperlipidemia, ILD (interstitial lung disease) ([facility] [place] [address] Kidney stone, Langerhans-cell granulomatosis ([facility] [place] [address] Myocardial infarct [facility]), Neck pain, Pulmonary nodule, Spinal stenosis, Under care of team, Under care of team, Under care of team, Under care of team, and Uterine fibroid. PAST SURGICAL HISTORY:   has a past surgical history that includes back surgery ();  section; Mastoid surgery; Tonsillectomy; Cardiac catheterization (); Lung biopsy (Right); hip surgery (Left); Colonoscopy (N/A, 2021); Upper gastrointestinal endoscopy (2021); Cervical spine surgery (); cervical fusion (2016); and laminectomy (). ALLERGIES:  Allergies as of 2022 - Fully Reviewed 11/10/2022   Allergen Reaction Noted    Seasonal Other (See Comments) 2021    Bactrim [sulfamethoxazole-trimethoprim] Nausea And Vomiting 11/10/2022    Keflex [cephalexin] Rash 2022       MEDICATIONS:  Current Facility-Administered Medications   Medication Dose Route Frequency Provider Last Rate Last Admin    lactated ringers infusion 1,000 mL  1,000 mL IntraVENous Continuous Abimael Tellez MD 50 mL/hr at 22 0645 1,000 mL at 22 0645    midazolam PF (VERSED) injection 2 mg  2 mg IntraVENous Once Marysol Bethea MD           FAMILY HISTORY:  family history includes Alzheimer's Disease in her paternal grandfather; COPD in her mother; Coronary Art Dis in her mother; Dementia in her mother; Depression in her mother; Diabetes in her maternal grandmother and mother; Heart Attack in her father; Heart Disease in her maternal aunt; High Blood Pressure in her mother. SOCIAL HISTORY:   reports that she has been smoking cigarettes. She has a 12.50 pack-year smoking history. She has never used smokeless tobacco. She reports current alcohol use.  She reports current drug use. Frequency: 7.00 times per week. Drug: Marijuana Tiffanysharee Rizo). VITALS:  Vitals:    12/12/22 0622   BP: 128/84   Pulse: 94   Resp: 20   Temp: 97 °F (36.1 °C)   TempSrc: Temporal   SpO2: 96%   Weight: 218 lb (98.9 kg)   Height: 5' 3\" (1.6 m)                                                                                                                               PHYSICAL EXAM:     Unchanged from Prior H&P  CONSTITUTIONAL:  Alert and oriented, no acute distress  HEAD: normocephalic, atraumatic  EYES: Pupils equal and reactive to light, Extraocular muscles intact, sclera non icteric  ENT: Mucus membranes moist, No otorrhea, no rhinorrhea  NECK:  supple, symmetrical, trachea midline   LUNGS:  Good air movement bilaterally, unlabored respirations, no wheezes or rhonchi  CARDIOVASCULAR: Regular rate and rhythm, no murmurs rubs or gallops  ABDOMEN: soft, non tender, non distended, no rebound or guarding, no hernias, no hepatomegaly, no splenomegly  MUSCULOSKELETAL:  Equal strength bilaterally, normal muscle tone  SKIN: No abscess or rash  NEUROLOGIC:  Cranial nerves 2-12 grossly intact, no focal deficits  PSYCH: affect appropriate  Pelvic Exam: deferred to OR      LAB RESULTS:  Admission on 12/12/2022   Component Date Value Ref Range Status    POC Glucose 12/12/2022 229 (A)  65 - 105 mg/dL Final   Hospital Outpatient Visit on 12/07/2022   Component Date Value Ref Range Status    Specimen Description 12/07/2022 . GENITAL - NOT SPECIFIED   Final    HPV Sample 12/07/2022 . THIN PREP   Final    HPV, Genotype 16 12/07/2022 Not Detected  Not Detected Final    HPV, Genotype 18 12/07/2022 Not Detected  Not Detected Final    HPV, High Risk Other 12/07/2022 Not Detected  Not Detected Final    HPV, Interpretation 12/07/2022        Final    Comment:  This test amplifies and detects DNA of 14 high-risk HPV types associated with cervical   cancer and its precursor lesions (HPV types 16,18, 31, 33, 35, 39, 45, 51, 52, 56, 58, 59,   66, and 68). Sensitivity may be affected by specimen collection methods, stage of infection, and the   presence of interfering substances. Results should be interpreted in conjunction with other available laboratory and clinical   data. A negative high-risk HPV result does not exclude the possibility of future cytologic HSIL or   underlying CIN2-3 or cancer. This test is intended for medical purposes only and is not valid for the evaluation of   suspected sexual abuse or for other forensic purposes. Hospital Outpatient Visit on 11/30/2022   Component Date Value Ref Range Status    WBC 11/30/2022 14.1 (A)  3.5 - 11.3 k/uL Final    RBC 11/30/2022 4.63  3.95 - 5.11 m/uL Final    Hemoglobin 11/30/2022 11.3 (A)  11.9 - 15.1 g/dL Final    Hematocrit 11/30/2022 37.2  36.3 - 47.1 % Final    MCV 11/30/2022 80.3 (A)  82.6 - 102.9 fL Final    MCH 11/30/2022 24.4 (A)  25.2 - 33.5 pg Final    MCHC 11/30/2022 30.4  28.4 - 34.8 g/dL Final    RDW 11/30/2022 16.3 (A)  11.8 - 14.4 % Final    Platelets 39/00/8910 599 (A)  138 - 453 k/uL Final    MPV 11/30/2022 8.7  8.1 - 13.5 fL Final    NRBC Automated 11/30/2022 0.0  0.0 per 100 WBC Final    Glucose 11/30/2022 263 (A)  70 - 99 mg/dL Final    BUN 11/30/2022 11  6 - 20 mg/dL Final    Creatinine 11/30/2022 0.51  0.50 - 0.90 mg/dL Final    Est, Glom Filt Rate 11/30/2022 >60  >60 mL/min/1.73m2 Final    Comment:       Effective Oct 3, 2022        These results are not intended for use in patients <25years of age. eGFR results are calculated without a race factor using the 2021 CKD-EPI equation. Careful clinical correlation is recommended, particularly when comparing to results   calculated using previous equations. The CKD-EPI equation is less accurate in patients with extremes of muscle mass, extra-renal   metabolism of creatine, excessive creatine ingestion, or following therapy that affects   renal tubular secretion.       Calcium 11/30/2022 9.3  8.6 - 10.4 mg/dL Final    Sodium 11/30/2022 138  135 - 144 mmol/L Final    Potassium 11/30/2022 4.0  3.7 - 5.3 mmol/L Final    Chloride 11/30/2022 102  98 - 107 mmol/L Final    CO2 11/30/2022 23  20 - 31 mmol/L Final    Anion Gap 11/30/2022 13  9 - 17 mmol/L Final    Expiration Date 11/30/2022 12/15/2022,2359   Final    Arm Band Number 11/30/2022 BE [id number]   Final    ABO/Rh 11/30/2022 A POSITIVE   Final    Antibody Screen 11/30/2022 NEGATIVE   Final    Ventricular Rate 11/30/2022 91  BPM Final    Atrial Rate 11/30/2022 91  BPM Final    P-R Interval 11/30/2022 154  ms Final    QRS Duration 11/30/2022 78  ms Final    Q-T Interval 11/30/2022 388  ms Final    QTc Calculation (Bazett) 11/30/2022 477  ms Final    P Axis 11/30/2022 52  degrees Final    R Axis 11/30/2022 53  degrees Final    T Axis 11/30/2022 59  degrees Final       DIAGNOSTICS:  Transvaginal Ultrasound 10/25/22  1. Large hypoechoic uterine fibroid at the fundus measuring up to 6.6 cm.   2. Nonvisualization of the left ovary. 3. Right ovarian follicles. 4. Endometrial stripe thickness measuring 6 mm, within normal limits. DIAGNOSIS & PLAN:  1. Heavy Vaginal Bleeding      Fibroid Uterus   - Proceed with planned procedure: total laparoscopic hysterectomy, bilateral salping-oophorectomy, and cystoscopy with possible laparotomy   - Consent signed, on chart. - The patient is ready for transport to the operative suite. Counseling: The patient was counseled on all options both medical and surgical, conservative as well as definitive. She has elected to proceed with the procedure as stated above. The patient was counseled on the procedure. Risks and complications were reviewed in detail. The patients orders, labs, consents have been completed. The history and physical as well as all supporting surgical documentation will be forwarded to the pre-operative holding area.      The patient is aware that this procedure may not alleviate her symptoms. That there may be a necessity for a second surgery and that there may be an incomplete removal of abnormal tissue.     Kiara Nielsen DO  Ob/Gyn Resident   [facility] [place]  12/12/2022, 7:15 AM

## 2022-12-14 LAB — SURGICAL PATHOLOGY REPORT: NORMAL

## 2022-12-26 ENCOUNTER — HOSPITAL ENCOUNTER (OUTPATIENT)
Age: 52
Setting detail: OBSERVATION
Discharge: HOME | End: 2022-12-27
Attending: EMERGENCY MEDICINE | Admitting: OBSTETRICS & GYNECOLOGY
Payer: COMMERCIAL

## 2022-12-26 ENCOUNTER — APPOINTMENT (OUTPATIENT)
Dept: CT IMAGING | Age: 52
End: 2022-12-26
Payer: COMMERCIAL

## 2022-12-26 DIAGNOSIS — L76.82 INCISIONAL PAIN: ICD-10-CM

## 2022-12-26 DIAGNOSIS — N20.0 NEPHROLITHIASIS: ICD-10-CM

## 2022-12-26 DIAGNOSIS — Z98.890 POSTOPERATIVE STATE: ICD-10-CM

## 2022-12-26 DIAGNOSIS — N89.8 VAGINAL DISCHARGE: Primary | ICD-10-CM

## 2022-12-26 PROBLEM — T81.40XA POSTOPERATIVE INFECTION, INITIAL ENCOUNTER: Status: ACTIVE | Noted: 2022-12-26

## 2022-12-26 LAB
ABSOLUTE EOS #: 0.51 K/UL (ref 0–0.4)
ABSOLUTE IMMATURE GRANULOCYTE: 0 K/UL (ref 0–0.3)
ABSOLUTE LYMPH #: 6.25 K/UL (ref 1–4.8)
ABSOLUTE MONO #: 0.34 K/UL (ref 0.1–0.8)
ALBUMIN SERPL-MCNC: 3.9 G/DL (ref 3.5–5.2)
ALBUMIN/GLOBULIN RATIO: 1.2 (ref 1–2.5)
ALP BLD-CCNC: 81 U/L (ref 35–104)
ALT SERPL-CCNC: 14 U/L (ref 5–33)
ANION GAP SERPL CALCULATED.3IONS-SCNC: 12 MMOL/L (ref 9–17)
AST SERPL-CCNC: 12 U/L
BASOPHILS # BLD: 1 % (ref 0–2)
BASOPHILS ABSOLUTE: 0.17 K/UL (ref 0–0.2)
BILIRUB SERPL-MCNC: <0.1 MG/DL (ref 0.3–1.2)
BUN BLDV-MCNC: 11 MG/DL (ref 6–20)
CALCIUM SERPL-MCNC: 8.8 MG/DL (ref 8.6–10.4)
CHLORIDE BLD-SCNC: 102 MMOL/L (ref 98–107)
CO2: 23 MMOL/L (ref 20–31)
CREAT SERPL-MCNC: 0.5 MG/DL (ref 0.5–0.9)
EOSINOPHILS RELATIVE PERCENT: 3 % (ref 1–4)
GFR SERPL CREATININE-BSD FRML MDRD: >60 ML/MIN/1.73M2
GLUCOSE BLD-MCNC: 167 MG/DL (ref 70–99)
HCT VFR BLD CALC: 35.2 % (ref 36.3–47.1)
HEMOGLOBIN: 10.7 G/DL (ref 11.9–15.1)
IMMATURE GRANULOCYTES: 0 %
LACTIC ACID, WHOLE BLOOD: 2 MMOL/L (ref 0.7–2.1)
LYMPHOCYTES # BLD: 37 % (ref 24–44)
MCH RBC QN AUTO: 24.3 PG (ref 25.2–33.5)
MCHC RBC AUTO-ENTMCNC: 30.4 G/DL (ref 28.4–34.8)
MCV RBC AUTO: 80 FL (ref 82.6–102.9)
MONOCYTES # BLD: 2 % (ref 1–7)
MORPHOLOGY: ABNORMAL
MORPHOLOGY: ABNORMAL
NRBC AUTOMATED: 0 PER 100 WBC
PDW BLD-RTO: 17.5 % (ref 11.8–14.4)
PLATELET # BLD: 599 K/UL (ref 138–453)
PMV BLD AUTO: 9 FL (ref 8.1–13.5)
POTASSIUM SERPL-SCNC: 3.6 MMOL/L (ref 3.7–5.3)
RBC # BLD: 4.4 M/UL (ref 3.95–5.11)
SEG NEUTROPHILS: 57 % (ref 36–66)
SEGMENTED NEUTROPHILS ABSOLUTE COUNT: 9.63 K/UL (ref 1.8–7.7)
SODIUM BLD-SCNC: 137 MMOL/L (ref 135–144)
TOTAL PROTEIN: 7.2 G/DL (ref 6.4–8.3)
WBC # BLD: 16.9 K/UL (ref 3.5–11.3)

## 2022-12-26 PROCEDURE — 6360000002 HC RX W HCPCS: Performed by: HEALTH CARE PROVIDER

## 2022-12-26 PROCEDURE — 96365 THER/PROPH/DIAG IV INF INIT: CPT

## 2022-12-26 PROCEDURE — 6360000004 HC RX CONTRAST MEDICATION: Performed by: EMERGENCY MEDICINE

## 2022-12-26 PROCEDURE — 87205 SMEAR GRAM STAIN: CPT

## 2022-12-26 PROCEDURE — 87491 CHLMYD TRACH DNA AMP PROBE: CPT

## 2022-12-26 PROCEDURE — G0378 HOSPITAL OBSERVATION PER HR: HCPCS

## 2022-12-26 PROCEDURE — 87480 CANDIDA DNA DIR PROBE: CPT

## 2022-12-26 PROCEDURE — 99285 EMERGENCY DEPT VISIT HI MDM: CPT

## 2022-12-26 PROCEDURE — 87660 TRICHOMONAS VAGIN DIR PROBE: CPT

## 2022-12-26 PROCEDURE — 2580000003 HC RX 258: Performed by: HEALTH CARE PROVIDER

## 2022-12-26 PROCEDURE — 2580000003 HC RX 258: Performed by: EMERGENCY MEDICINE

## 2022-12-26 PROCEDURE — 6360000002 HC RX W HCPCS

## 2022-12-26 PROCEDURE — 96375 TX/PRO/DX INJ NEW DRUG ADDON: CPT

## 2022-12-26 PROCEDURE — 96361 HYDRATE IV INFUSION ADD-ON: CPT

## 2022-12-26 PROCEDURE — 87075 CULTR BACTERIA EXCEPT BLOOD: CPT

## 2022-12-26 PROCEDURE — 87510 GARDNER VAG DNA DIR PROBE: CPT

## 2022-12-26 PROCEDURE — 80053 COMPREHEN METABOLIC PANEL: CPT

## 2022-12-26 PROCEDURE — 83605 ASSAY OF LACTIC ACID: CPT

## 2022-12-26 PROCEDURE — 74177 CT ABD & PELVIS W/CONTRAST: CPT

## 2022-12-26 PROCEDURE — 87591 N.GONORRHOEAE DNA AMP PROB: CPT

## 2022-12-26 PROCEDURE — 87040 BLOOD CULTURE FOR BACTERIA: CPT

## 2022-12-26 PROCEDURE — 6360000002 HC RX W HCPCS: Performed by: EMERGENCY MEDICINE

## 2022-12-26 PROCEDURE — 96376 TX/PRO/DX INJ SAME DRUG ADON: CPT

## 2022-12-26 PROCEDURE — 87086 URINE CULTURE/COLONY COUNT: CPT

## 2022-12-26 PROCEDURE — 87070 CULTURE OTHR SPECIMN AEROBIC: CPT

## 2022-12-26 PROCEDURE — 36415 COLL VENOUS BLD VENIPUNCTURE: CPT

## 2022-12-26 PROCEDURE — 85025 COMPLETE CBC W/AUTO DIFF WBC: CPT

## 2022-12-26 PROCEDURE — 81001 URINALYSIS AUTO W/SCOPE: CPT

## 2022-12-26 RX ORDER — METRONIDAZOLE 500 MG/1
500 TABLET ORAL EVERY 12 HOURS SCHEDULED
Status: DISCONTINUED | OUTPATIENT
Start: 2022-12-26 | End: 2022-12-26

## 2022-12-26 RX ORDER — MORPHINE SULFATE 4 MG/ML
4 INJECTION, SOLUTION INTRAMUSCULAR; INTRAVENOUS ONCE
Status: COMPLETED | OUTPATIENT
Start: 2022-12-26 | End: 2022-12-26

## 2022-12-26 RX ORDER — 0.9 % SODIUM CHLORIDE 0.9 %
1000 INTRAVENOUS SOLUTION INTRAVENOUS ONCE
Status: COMPLETED | OUTPATIENT
Start: 2022-12-26 | End: 2022-12-26

## 2022-12-26 RX ORDER — ACETAMINOPHEN 500 MG
1000 TABLET ORAL EVERY 8 HOURS PRN
Status: DISCONTINUED | OUTPATIENT
Start: 2022-12-26 | End: 2022-12-27

## 2022-12-26 RX ORDER — OXYCODONE HYDROCHLORIDE 5 MG/1
5 TABLET ORAL EVERY 4 HOURS PRN
Status: DISCONTINUED | OUTPATIENT
Start: 2022-12-26 | End: 2022-12-27

## 2022-12-26 RX ORDER — KETOROLAC TROMETHAMINE 30 MG/ML
30 INJECTION, SOLUTION INTRAMUSCULAR; INTRAVENOUS EVERY 6 HOURS
Status: DISCONTINUED | OUTPATIENT
Start: 2022-12-26 | End: 2022-12-27 | Stop reason: HOSPADM

## 2022-12-26 RX ORDER — ONDANSETRON 2 MG/ML
4 INJECTION INTRAMUSCULAR; INTRAVENOUS ONCE
Status: COMPLETED | OUTPATIENT
Start: 2022-12-26 | End: 2022-12-26

## 2022-12-26 RX ADMIN — MORPHINE SULFATE 4 MG: 4 INJECTION INTRAVENOUS at 23:10

## 2022-12-26 RX ADMIN — SODIUM CHLORIDE 1000 ML: 9 INJECTION, SOLUTION INTRAVENOUS at 21:24

## 2022-12-26 RX ADMIN — MORPHINE SULFATE 4 MG: 4 INJECTION INTRAVENOUS at 21:23

## 2022-12-26 RX ADMIN — ONDANSETRON 4 MG: 2 INJECTION INTRAMUSCULAR; INTRAVENOUS at 21:23

## 2022-12-26 RX ADMIN — KETOROLAC TROMETHAMINE 30 MG: 30 INJECTION, SOLUTION INTRAMUSCULAR; INTRAVENOUS at 23:10

## 2022-12-26 RX ADMIN — IOPAMIDOL 75 ML: 755 INJECTION, SOLUTION INTRAVENOUS at 22:04

## 2022-12-26 RX ADMIN — PIPERACILLIN AND TAZOBACTAM 3375 MG: 3; .375 INJECTION, POWDER, FOR SOLUTION INTRAVENOUS at 23:06

## 2022-12-26 ASSESSMENT — ENCOUNTER SYMPTOMS
NAUSEA: 1
ABDOMINAL PAIN: 1
SHORTNESS OF BREATH: 0
COLOR CHANGE: 1

## 2022-12-27 VITALS
TEMPERATURE: 98.1 F | OXYGEN SATURATION: 93 % | HEART RATE: 83 BPM | RESPIRATION RATE: 18 BRPM | BODY MASS INDEX: 37.16 KG/M2 | SYSTOLIC BLOOD PRESSURE: 115 MMHG | DIASTOLIC BLOOD PRESSURE: 75 MMHG | WEIGHT: 209.7 LBS | HEIGHT: 63 IN

## 2022-12-27 PROBLEM — G89.18 POST-OP PAIN: Status: ACTIVE | Noted: 2022-12-27

## 2022-12-27 PROBLEM — N39.0 URINARY TRACT INFECTION: Status: ACTIVE | Noted: 2022-12-27

## 2022-12-27 LAB
ANION GAP SERPL CALCULATED.3IONS-SCNC: 11 MMOL/L (ref 9–17)
BACTERIA: ABNORMAL
BILIRUBIN URINE: NEGATIVE
BUN BLDV-MCNC: 11 MG/DL (ref 6–20)
C. TRACHOMATIS DNA ,URINE: NEGATIVE
CALCIUM SERPL-MCNC: 8 MG/DL (ref 8.6–10.4)
CANDIDA SPECIES, DNA PROBE: NEGATIVE
CHLORIDE BLD-SCNC: 106 MMOL/L (ref 98–107)
CO2: 20 MMOL/L (ref 20–31)
COLOR: YELLOW
CREAT SERPL-MCNC: 0.56 MG/DL (ref 0.5–0.9)
EPITHELIAL CELLS UA: ABNORMAL /HPF (ref 0–5)
GARDNERELLA VAGINALIS, DNA PROBE: POSITIVE
GFR SERPL CREATININE-BSD FRML MDRD: >60 ML/MIN/1.73M2
GLUCOSE BLD-MCNC: 200 MG/DL (ref 65–105)
GLUCOSE BLD-MCNC: 221 MG/DL (ref 70–99)
GLUCOSE BLD-MCNC: 268 MG/DL (ref 65–105)
GLUCOSE URINE: NEGATIVE
HCT VFR BLD CALC: 32.7 % (ref 36.3–47.1)
HEMOGLOBIN: 9.5 G/DL (ref 11.9–15.1)
KETONES, URINE: NEGATIVE
LEUKOCYTE ESTERASE, URINE: ABNORMAL
MCH RBC QN AUTO: 24.5 PG (ref 25.2–33.5)
MCHC RBC AUTO-ENTMCNC: 29.1 G/DL (ref 28.4–34.8)
MCV RBC AUTO: 84.3 FL (ref 82.6–102.9)
N. GONORRHOEAE DNA, URINE: NEGATIVE
NITRITE, URINE: NEGATIVE
NRBC AUTOMATED: 0 PER 100 WBC
PDW BLD-RTO: 17.7 % (ref 11.8–14.4)
PH UA: 6 (ref 5–8)
PLATELET # BLD: 498 K/UL (ref 138–453)
PMV BLD AUTO: 8.9 FL (ref 8.1–13.5)
POTASSIUM SERPL-SCNC: 4 MMOL/L (ref 3.7–5.3)
PROTEIN UA: NEGATIVE
RBC # BLD: 3.88 M/UL (ref 3.95–5.11)
RBC UA: ABNORMAL /HPF (ref 0–2)
SODIUM BLD-SCNC: 137 MMOL/L (ref 135–144)
SOURCE: ABNORMAL
SPECIFIC GRAVITY UA: 1.01 (ref 1–1.03)
SPECIMEN DESCRIPTION: NORMAL
TRICHOMONAS VAGINALIS DNA: NEGATIVE
TURBIDITY: ABNORMAL
URINE HGB: ABNORMAL
UROBILINOGEN, URINE: NORMAL
WBC # BLD: 12.6 K/UL (ref 3.5–11.3)
WBC UA: ABNORMAL /HPF (ref 0–5)

## 2022-12-27 PROCEDURE — 82947 ASSAY GLUCOSE BLOOD QUANT: CPT

## 2022-12-27 PROCEDURE — 6360000002 HC RX W HCPCS

## 2022-12-27 PROCEDURE — 96376 TX/PRO/DX INJ SAME DRUG ADON: CPT

## 2022-12-27 PROCEDURE — 6370000000 HC RX 637 (ALT 250 FOR IP)

## 2022-12-27 PROCEDURE — 2580000003 HC RX 258: Performed by: STUDENT IN AN ORGANIZED HEALTH CARE EDUCATION/TRAINING PROGRAM

## 2022-12-27 PROCEDURE — 85027 COMPLETE CBC AUTOMATED: CPT

## 2022-12-27 PROCEDURE — G0378 HOSPITAL OBSERVATION PER HR: HCPCS

## 2022-12-27 PROCEDURE — 96372 THER/PROPH/DIAG INJ SC/IM: CPT

## 2022-12-27 PROCEDURE — 6360000002 HC RX W HCPCS: Performed by: STUDENT IN AN ORGANIZED HEALTH CARE EDUCATION/TRAINING PROGRAM

## 2022-12-27 PROCEDURE — 96366 THER/PROPH/DIAG IV INF ADDON: CPT

## 2022-12-27 PROCEDURE — 80048 BASIC METABOLIC PNL TOTAL CA: CPT

## 2022-12-27 PROCEDURE — 6370000000 HC RX 637 (ALT 250 FOR IP): Performed by: STUDENT IN AN ORGANIZED HEALTH CARE EDUCATION/TRAINING PROGRAM

## 2022-12-27 PROCEDURE — 36415 COLL VENOUS BLD VENIPUNCTURE: CPT

## 2022-12-27 RX ORDER — INSULIN LISPRO 100 [IU]/ML
0-4 INJECTION, SOLUTION INTRAVENOUS; SUBCUTANEOUS NIGHTLY
Status: DISCONTINUED | OUTPATIENT
Start: 2022-12-27 | End: 2022-12-27 | Stop reason: HOSPADM

## 2022-12-27 RX ORDER — OXYCODONE HYDROCHLORIDE 5 MG/1
5 TABLET ORAL EVERY 6 HOURS PRN
Qty: 6 TABLET | Refills: 0 | Status: SHIPPED | OUTPATIENT
Start: 2022-12-27 | End: 2022-12-29

## 2022-12-27 RX ORDER — CYCLOBENZAPRINE HCL 10 MG
10 TABLET ORAL 3 TIMES DAILY PRN
Status: DISCONTINUED | OUTPATIENT
Start: 2022-12-27 | End: 2022-12-27 | Stop reason: HOSPADM

## 2022-12-27 RX ORDER — GABAPENTIN 300 MG/1
300 CAPSULE ORAL 3 TIMES DAILY
Status: DISCONTINUED | OUTPATIENT
Start: 2022-12-27 | End: 2022-12-27 | Stop reason: HOSPADM

## 2022-12-27 RX ORDER — ACETAMINOPHEN 500 MG
1000 TABLET ORAL EVERY 8 HOURS SCHEDULED
Status: DISCONTINUED | OUTPATIENT
Start: 2022-12-27 | End: 2022-12-27 | Stop reason: HOSPADM

## 2022-12-27 RX ORDER — ONDANSETRON 4 MG/1
4 TABLET, ORALLY DISINTEGRATING ORAL EVERY 8 HOURS PRN
Qty: 10 TABLET | Refills: 0 | Status: SHIPPED | OUTPATIENT
Start: 2022-12-27

## 2022-12-27 RX ORDER — OXYCODONE HYDROCHLORIDE 5 MG/1
10 TABLET ORAL EVERY 4 HOURS PRN
Status: DISCONTINUED | OUTPATIENT
Start: 2022-12-27 | End: 2022-12-27 | Stop reason: HOSPADM

## 2022-12-27 RX ORDER — GABAPENTIN 300 MG/1
300 CAPSULE ORAL 3 TIMES DAILY
Qty: 15 CAPSULE | Refills: 0 | Status: SHIPPED | OUTPATIENT
Start: 2022-12-27 | End: 2022-12-27 | Stop reason: SDUPTHER

## 2022-12-27 RX ORDER — METRONIDAZOLE 500 MG/1
500 TABLET ORAL 2 TIMES DAILY
Qty: 14 TABLET | Refills: 0 | Status: SHIPPED | OUTPATIENT
Start: 2022-12-27 | End: 2023-01-03

## 2022-12-27 RX ORDER — OXYCODONE HYDROCHLORIDE 5 MG/1
5 TABLET ORAL EVERY 4 HOURS PRN
Status: DISCONTINUED | OUTPATIENT
Start: 2022-12-27 | End: 2022-12-27 | Stop reason: HOSPADM

## 2022-12-27 RX ORDER — GABAPENTIN 300 MG/1
300 CAPSULE ORAL 3 TIMES DAILY
Qty: 15 CAPSULE | Refills: 0 | Status: SHIPPED | OUTPATIENT
Start: 2022-12-27 | End: 2023-01-01

## 2022-12-27 RX ORDER — AMOXICILLIN AND CLAVULANATE POTASSIUM 875; 125 MG/1; MG/1
1 TABLET, FILM COATED ORAL 2 TIMES DAILY
Qty: 20 TABLET | Refills: 0 | Status: SHIPPED | OUTPATIENT
Start: 2022-12-27 | End: 2023-01-06

## 2022-12-27 RX ORDER — INSULIN LISPRO 100 [IU]/ML
0-8 INJECTION, SOLUTION INTRAVENOUS; SUBCUTANEOUS
Status: DISCONTINUED | OUTPATIENT
Start: 2022-12-27 | End: 2022-12-27 | Stop reason: HOSPADM

## 2022-12-27 RX ORDER — LIDOCAINE 4 G/G
1 PATCH TOPICAL DAILY
Status: DISCONTINUED | OUTPATIENT
Start: 2022-12-27 | End: 2022-12-27 | Stop reason: HOSPADM

## 2022-12-27 RX ORDER — ENOXAPARIN SODIUM 100 MG/ML
40 INJECTION SUBCUTANEOUS DAILY
Status: DISCONTINUED | OUTPATIENT
Start: 2022-12-27 | End: 2022-12-27 | Stop reason: HOSPADM

## 2022-12-27 RX ORDER — PHENAZOPYRIDINE HYDROCHLORIDE 100 MG/1
100 TABLET, FILM COATED ORAL 3 TIMES DAILY PRN
Qty: 10 TABLET | Refills: 0 | Status: SHIPPED | OUTPATIENT
Start: 2022-12-27 | End: 2022-12-30

## 2022-12-27 RX ADMIN — OXYCODONE 5 MG: 5 TABLET ORAL at 03:21

## 2022-12-27 RX ADMIN — CYCLOBENZAPRINE 10 MG: 10 TABLET, FILM COATED ORAL at 03:21

## 2022-12-27 RX ADMIN — ACETAMINOPHEN 1000 MG: 500 TABLET ORAL at 01:03

## 2022-12-27 RX ADMIN — INSULIN LISPRO 2 UNITS: 100 INJECTION, SOLUTION INTRAVENOUS; SUBCUTANEOUS at 09:00

## 2022-12-27 RX ADMIN — GABAPENTIN 300 MG: 300 CAPSULE ORAL at 08:45

## 2022-12-27 RX ADMIN — KETOROLAC TROMETHAMINE 30 MG: 30 INJECTION, SOLUTION INTRAMUSCULAR; INTRAVENOUS at 10:44

## 2022-12-27 RX ADMIN — PIPERACILLIN AND TAZOBACTAM 3375 MG: 3; .375 INJECTION, POWDER, FOR SOLUTION INTRAVENOUS at 08:57

## 2022-12-27 RX ADMIN — GABAPENTIN 300 MG: 300 CAPSULE ORAL at 03:40

## 2022-12-27 RX ADMIN — ENOXAPARIN SODIUM 40 MG: 100 INJECTION SUBCUTANEOUS at 08:45

## 2022-12-27 RX ADMIN — INSULIN LISPRO 4 UNITS: 100 INJECTION, SOLUTION INTRAVENOUS; SUBCUTANEOUS at 12:44

## 2022-12-27 RX ADMIN — KETOROLAC TROMETHAMINE 30 MG: 30 INJECTION, SOLUTION INTRAMUSCULAR; INTRAVENOUS at 05:20

## 2022-12-27 RX ADMIN — OXYCODONE 10 MG: 5 TABLET ORAL at 12:42

## 2022-12-27 RX ADMIN — OXYCODONE HYDROCHLORIDE 5 MG: 5 TABLET ORAL at 01:03

## 2022-12-27 RX ADMIN — OXYCODONE 10 MG: 5 TABLET ORAL at 08:46

## 2022-12-27 ASSESSMENT — PAIN DESCRIPTION - DESCRIPTORS
DESCRIPTORS: CRAMPING

## 2022-12-27 ASSESSMENT — PAIN SCALES - GENERAL
PAINLEVEL_OUTOF10: 8
PAINLEVEL_OUTOF10: 7
PAINLEVEL_OUTOF10: 6
PAINLEVEL_OUTOF10: 8
PAINLEVEL_OUTOF10: 8

## 2022-12-27 ASSESSMENT — PAIN - FUNCTIONAL ASSESSMENT
PAIN_FUNCTIONAL_ASSESSMENT: PREVENTS OR INTERFERES SOME ACTIVE ACTIVITIES AND ADLS

## 2022-12-27 ASSESSMENT — PAIN DESCRIPTION - ORIENTATION
ORIENTATION: LOWER
ORIENTATION: LOWER;RIGHT
ORIENTATION: LOWER

## 2022-12-27 ASSESSMENT — PAIN DESCRIPTION - LOCATION
LOCATION: ABDOMEN

## 2022-12-27 NOTE — ED NOTES
The following labs were labeled with appropriate pt sticker and tubed to lab:     [x] Blue     [x] Lavender   [] on ice  [x] Green/yellow  [x] Green/black [x] on ice  [] Roise Clack  [] on ice  [] Yellow  [x] Red  [] Type/ Screen  [] ABG  [] VBG    [] COVID-19 swab    [] Rapid  [] PCR  [] Flu swab  [] Peds Viral Panel     [] Urine Sample  [] Fecal Sample  [] Pelvic Cultures  [x] Blood Cultures  [x] X 2  [] STREP Cultures         Susan Cano RN  12/26/22 2297

## 2022-12-27 NOTE — ED PROVIDER NOTES
[facility]  Emergency Department Encounter  Emergency Medicine Resident     Pt Nathan Mike  MRN: [id number]  [place] 1970  Date of evaluation: 22  PCP:  SAVANA Sierra CNP      CHIEF COMPLAINT       Chief Complaint   Patient presents with    Incisional Pain     Hysterectomy two weeks ago    Vaginal Discharge     States brown in color       HISTORY OF PRESENT ILLNESS  (Location/Symptom, Timing/Onset, Context/Setting, Quality, Duration, Modifying Factors, Severity.)      Juliocesar Negrete is a 50 y.o. female who presents with abd pain and incisional tenderness, brown foul smelling vaginal discharge over the last few days. Patient states she has had fevers, chills, has been taking tylenol and motrin for pain and fevers. Some nausea as well. Patient states she called OB resident tonight and was instructed to come to the ER    PAST MEDICAL / SURGICAL / SOCIAL / FAMILY HISTORY      has a past medical history of Abnormal uterine bleeding (AUB), Anxiety, Arthritis, CAD (coronary artery disease), Chronic neck pain, COVID-19, Depression, Diabetes mellitus ([address] Hyperlipidemia, ILD (interstitial lung disease) ([address] Kidney stone, Langerhans-cell granulomatosis ([address] Myocardial infarct ([address] Neck pain, Pulmonary nodule, Spinal stenosis, Under care of team, Under care of team, Under care of team, Under care of team, and Uterine fibroid. has a past surgical history that includes back surgery ();  section; Mastoid surgery; Tonsillectomy; Cardiac catheterization (); Lung biopsy (Right); hip surgery (Left); Colonoscopy (N/A, 2021); Upper gastrointestinal endoscopy (2021); Cervical spine surgery (); cervical fusion (2016); laminectomy (); Abdomen surgery (2022); and Hysterectomy (N/A, 2022).       Social History     Socioeconomic History    Marital status:      Spouse name: Not on file    Number of children: Not on file Years of education: Not on file    Highest education level: Not on file   Occupational History    Not on file   Tobacco Use    Smoking status: Every Day     Packs/day: 0.50     Years: 25.00     Pack years: 12.50     Types: Cigarettes    Smokeless tobacco: Never    Tobacco comments:     3 packs per week   Vaping Use    Vaping Use: Never used   Substance and Sexual Activity    Alcohol use: Yes     Comment: rare    Drug use: Yes     Frequency: 7.0 times per week     Types: Marijuana Isaac Hail)     Comment: edibles    Sexual activity: Yes     Partners: Male   Other Topics Concern    Not on file   Social History Narrative    Not on file     Social Determinants of Health     Financial Resource Strain: Low Risk     Difficulty of Paying Living Expenses: Not hard at all   Food Insecurity: No Food Insecurity    Worried About Running Out of Food in the Last Year: Never true    Ran Out of Food in the Last Year: Never true   Transportation Needs: No Transportation Needs    Lack of Transportation (Medical): No    Lack of Transportation (Non-Medical): No   Physical Activity: Unknown    Days of Exercise per Week: 0 days    Minutes of Exercise per Session: Not on file   Stress: Not on file   Social Connections: Not on file   Intimate Partner Violence: Not At Risk    Fear of Current or Ex-Partner: No    Emotionally Abused: No    Physically Abused: No    Sexually Abused: No   Housing Stability: Not on file       Family History   Problem Relation Age of Onset    Dementia Mother     Depression Mother     High Blood Pressure Mother     Coronary Art Dis Mother         9 stents    COPD Mother     Diabetes Mother     Heart Attack Father     Diabetes Maternal Grandmother     Alzheimer's Disease Paternal Grandfather     Heart Disease Maternal Aunt        Allergies:  Seasonal, Bactrim [sulfamethoxazole-trimethoprim], and Keflex [cephalexin]    Home Medications:  Prior to Admission medications    Medication Sig Start Date End Date Taking? Authorizing Provider   ibuprofen (ADVIL;MOTRIN) 600 MG tablet Take 1 tablet by mouth every 6 hours as needed for Pain 12/12/22   Nicol Persaud MD   acetaminophen (TYLENOL) 500 MG tablet Take 2 tablets by mouth every 6 hours as needed for Pain 12/12/22   Nicol Persaud MD   docusate sodium (COLACE) 100 MG capsule Take 1 capsule by mouth 2 times daily 12/12/22 2/10/23  Nicol Persaud MD   simethicone (MYLICON) 80 MG chewable tablet Take 1 tablet by mouth 4 times daily as needed for Flatulence 12/12/22   Nicol Persaud MD   ondansetron (ZOFRAN) 4 MG tablet Take 1 tablet by mouth daily as needed for Nausea or Vomiting 12/12/22   Nicol Persaud MD   empagliflozin (JARDIANCE) 25 MG tablet Take 1 tablet by mouth daily 11/8/22   SAVANA Houston CNP   calcium citrate (CALCITRATE) 950 (200 Ca) MG tablet Calcitrate 200 mg (950 mg) tablet    Historical Provider, MD   HYDROcodone-acetaminophen (NORCO) 5-325 MG per tablet Take 1 tablet by mouth every 6 hours as needed.  7/8/22   Historical Provider, MD   acetaminophen (TYLENOL) 500 MG tablet Take 2 tablets by mouth every 6 hours as needed for Pain 10/29/22   Tati Hauser MD   tranexamic acid (LYSTEDA) 650 MG TABS tablet Take 2 tablets by mouth 3 times daily for 5 days 10/25/22 11/7/22  Becky Jay MD   busPIRone (BUSPAR) 15 MG tablet Take 7.5 mg by mouth 3 times daily 7/9/22   Courtney Daley MD   Dulaglutide (TRULICITY) 3 ON/3.2IF SOPN Inject 3 mg into the skin once a week 6/30/22   SAVANA Houston CNP   omeprazole (PRILOSEC) 40 MG delayed release capsule Take 1 capsule by mouth every morning (before breakfast) 6/30/22   SAVANA Houston CNP   insulin detemir (LEVEMIR FLEXTOUCH) 100 UNIT/ML injection pen Inject 26 Units into the skin nightly 5/25/22   SAVANA Houston CNP   Insulin Pen Needle 31G X 5 MM MISC Use pen needle for giving daily insulin injection 5/25/22   Carolina Benitez, APRN - CNP   TRUE METRIX BLOOD GLUCOSE TEST strip use 1 TEST STRIP to TEST BLOOD SUGAR twice a day and if needed 4/19/22   SAVANA Smith CNP   TRUEplus Lancets 30G MISC use 1 LANCET to TEST BLOOD SUGAR twice a day and if needed 4/19/22   SAVANA Smith CNP   albuterol (PROVENTIL) (2.5 MG/3ML) 0.083% nebulizer solution Take 2.5 mg by nebulization daily as needed for Wheezing    Historical Provider, MD   blood glucose monitor kit and supplies Dispense sufficient amount for BID testing frequency plus additional to accommodate PRN testing needs. Dispense all needed supplies to include: monitor, strips, lancing device, lancets, control solutions, alcohol swabs.  10/25/21   SAVANA Smith CNP   tiZANidine (ZANAFLEX) 2 MG tablet take 1 tablet by mouth three times a day if needed 10/19/21   Alexa Hayes DO   nitroGLYCERIN (NITROSTAT) 0.4 MG SL tablet place 1 tablet under the tongue if needed every 5 minutes for chest pain for 3 doses IF NO RELIEF AFTER THIRD DOSE CALL PRESCRIBER . 10/19/21   Alexa Hayes DO   calcium carbonate (OS-BRANNON) 1250 (500 Ca) MG chewable tablet Take 500-1,000 mg by mouth 3 times daily 10/20/20   Historical Provider, MD   levothyroxine (SYNTHROID) 50 MCG tablet Take 1 tablet by mouth Daily 9/13/21 11/30/22  SAVANA Smith CNP   metoprolol succinate (TOPROL XL) 100 MG extended release tablet take 1 tablet by mouth twice a day  Patient taking differently: 50 mg 2 times daily take 1 tablet by mouth twice a day 9/13/21   SAVANA Smith CNP   sertraline (ZOLOFT) 100 MG tablet Take 1.5 tablets by mouth daily 9/13/21   SAVANA Smith CNP   ranolazine (RANEXA) 500 MG extended release tablet Take 1 tablet by mouth 2 times daily 9/13/21 11/30/22  SAVANA Smith CNP   atorvastatin (LIPITOR) 80 MG tablet Take 1 tablet by mouth daily 9/13/21 11/30/22  SAVANA Smith CNP   clopidogrel (PLAVIX) 75 MG tablet Take 1 tablet by mouth daily 9/13/21 11/30/22  SAVANA Smith - TONYA   mirabegron (MYRBETRIQ) 50 MG TB24 Take 50 mg by mouth daily 9/13/21   Benita Mcgowan APRN - CNP   fluticasone (FLONASE) 50 MCG/ACT nasal spray 1 spray by Each Nostril route daily as needed for Rhinitis    Historical Provider, MD   Multiple Vitamins-Minerals (THERAPEUTIC MULTIVITAMIN-MINERALS) tablet Take 1 tablet by mouth daily    Historical Provider, MD   ascorbic acid (VITAMIN C) 500 MG tablet Take 500 mg by mouth daily    Historical Provider, MD   polyethylene glycol (GLYCOLAX) 17 g packet Take 17 g by mouth daily as needed  8/31/20   Historical Provider, MD   Cholecalciferol (VITAMIN D) 50 MCG (2000 UT) TABS tablet Take 2,000 Units by mouth daily  1/23/20   Historical Provider, MD   aspirin 81 MG chewable tablet Take 81 mg by mouth daily 5/31/19   Historical Provider, MD       REVIEW OF SYSTEMS    (2-9 systems for level 4, 10 or more for level 5)      Review of Systems   Constitutional:  Positive for chills and fever. HENT:  Negative for congestion. Respiratory:  Negative for shortness of breath. Cardiovascular:  Negative for chest pain. Gastrointestinal:  Positive for abdominal pain and nausea. Genitourinary:  Positive for vaginal discharge and vaginal pain. Negative for dysuria and hematuria. Skin:  Positive for color change and wound. Allergic/Immunologic: Positive for immunocompromised state. Neurological:  Negative for dizziness and light-headedness. Hematological:  Does not bruise/bleed easily. PHYSICAL EXAM   (up to 7 for level 4, 8 or more for level 5)      INITIAL VITALS:   BP (!) 149/102   Pulse 94   Temp 99 °F (37.2 °C) (Oral)   Resp 20   Wt 213 lb (96.6 kg)   LMP 11/26/2022   SpO2 96%   BMI 37.73 kg/m²     Physical Exam  Constitutional:       General: She is not in acute distress. HENT:      Head: Atraumatic. Eyes:      Conjunctiva/sclera: Conjunctivae normal.   Cardiovascular:      Rate and Rhythm: Regular rhythm. Tachycardia present. Heart sounds: Normal heart sounds. Pulmonary:      Effort: Pulmonary effort is normal.      Breath sounds: Normal breath sounds. Abdominal:      General: There is no distension. Palpations: Abdomen is soft. Tenderness: There is no guarding. Comments: Diffuse tenderness  Incisional site with erythema and tenderness    Genitourinary:     Comments: Defer  exam since OB consulted   Musculoskeletal:         General: Normal range of motion. Right lower leg: No edema. Left lower leg: No edema. Skin:     General: Skin is warm. Neurological:      General: No focal deficit present. Mental Status: She is alert. Psychiatric:         Mood and Affect: Mood normal.       DIFFERENTIAL  DIAGNOSIS     PLAN (LABS / IMAGING / EKG):  Orders Placed This Encounter   Procedures    Blood Culture 1    Culture, Blood 2    Culture, Urine    CT ABDOMEN PELVIS W IV CONTRAST Additional Contrast? None    CBC with Auto Differential    CMP    Urinalysis    Lactic Acid    Inpatient consult to Obstetrics / Gynecology       MEDICATIONS ORDERED:  Orders Placed This Encounter   Medications    morphine injection 4 mg    ondansetron (ZOFRAN) injection 4 mg    0.9 % sodium chloride bolus    iopamidol (ISOVUE-370) 76 % injection 75 mL    morphine injection 4 mg       MDM: Patient with incisional tenderness and erythema s/p hysterectomy on 12/12. Patient with fevers at home, using tylenol and motrin for pain and fever. She endorses nausea as well. She also states she has had brown vaginal discharge that is foul smelling for the last few days. She is currently afebrile however is tachycardic and tachypneic. Will order blood culture x2, lactate, UA, CBC, CMP, and CT A/P. Will give IVF. Consult to OBGYN places as she is post op.      DIAGNOSTIC RESULTS / EMERGENCY DEPARTMENT COURSE / MDM   LAB RESULTS:  Results for orders placed or performed during the hospital encounter of 12/26/22   Blood Culture 1    Specimen: Blood   Result Value Ref Range    Specimen Description . BLOOD     Special Requests RT WRIST 10ML     Culture NO GROWTH <24 HRS    Culture, Blood 2    Specimen: Blood   Result Value Ref Range    Specimen Description . BLOOD     Special Requests LT WRIST 10ML     Culture NO GROWTH <24 HRS    CBC with Auto Differential   Result Value Ref Range    WBC 16.9 (H) 3.5 - 11.3 k/uL    RBC 4.40 3.95 - 5.11 m/uL    Hemoglobin 10.7 (L) 11.9 - 15.1 g/dL    Hematocrit 35.2 (L) 36.3 - 47.1 %    MCV 80.0 (L) 82.6 - 102.9 fL    MCH 24.3 (L) 25.2 - 33.5 pg    MCHC 30.4 28.4 - 34.8 g/dL    RDW 17.5 (H) 11.8 - 14.4 %    Platelets 836 (H) 220 - 453 k/uL    MPV 9.0 8.1 - 13.5 fL    NRBC Automated 0.0 0.0 per 100 WBC    Immature Granulocytes 0 0 %    Seg Neutrophils 57 36 - 66 %    Lymphocytes 37 24 - 44 %    Monocytes 2 1 - 7 %    Eosinophils % 3 1 - 4 %    Basophils 1 0 - 2 %    Absolute Immature Granulocyte 0.00 0.00 - 0.30 k/uL    Segs Absolute 9.63 (H) 1.8 - 7.7 k/uL    Absolute Lymph # 6.25 (H) 1.0 - 4.8 k/uL    Absolute Mono # 0.34 0.1 - 0.8 k/uL    Absolute Eos # 0.51 (H) 0.0 - 0.4 k/uL    Basophils Absolute 0.17 0.0 - 0.2 k/uL    Morphology ANISOCYTOSIS PRESENT     Morphology MICROCYTOSIS PRESENT    CMP   Result Value Ref Range    Glucose 167 (H) 70 - 99 mg/dL    BUN 11 6 - 20 mg/dL    Creatinine 0.50 0.50 - 0.90 mg/dL    Est, Glom Filt Rate >60 >60 mL/min/1.73m2    Calcium 8.8 8.6 - 10.4 mg/dL    Sodium 137 135 - 144 mmol/L    Potassium 3.6 (L) 3.7 - 5.3 mmol/L    Chloride 102 98 - 107 mmol/L    CO2 23 20 - 31 mmol/L    Anion Gap 12 9 - 17 mmol/L    Alkaline Phosphatase 81 35 - 104 U/L    ALT 14 5 - 33 U/L    AST 12 <32 U/L    Total Bilirubin <0.1 (L) 0.3 - 1.2 mg/dL    Total Protein 7.2 6.4 - 8.3 g/dL    Albumin 3.9 3.5 - 5.2 g/dL    Albumin/Globulin Ratio 1.2 1.0 - 2.5       IMPRESSION: Pending further workup.  Patient signed out to Dr. Ean Alcantar:  CT ABDOMEN PELVIS W IV CONTRAST Additional Contrast? None    (Results Pending)       EMERGENCY DEPARTMENT COURSE:      ED Course as of 12/26/22 2217   Mon Dec 26, 2022   2118 Discussed with OB, will be down to eval [SK]   2146 CBC with Auto Differential(!):    WBC 16.9(!)   RBC 4.40   Hemoglobin Quant 10.7(!)   Hematocrit 35.2(!)   MCV 80.0(!)   MCH 24.3(!)   MCHC 30.4   RDW 17.5(!)   Platelet Count 515(!)   MPV 9.0   NRBC Automated 0.0   Seg Neutrophils PENDING   Lymphocytes PENDING   Monocytes PENDING   Eosinophils % PENDING   Basophils PENDING   Immature Granulocytes PENDING   Segs Absolute PENDING   Absolute Lymph # PENDING   Absolute Mono # PENDING   Absolute Eos # PENDING   Basophils Absolute PENDING   Absolute Immature Granulocyte PENDING [SK]      ED Course User Index  [SK] DO [facility] notes of EC Admission Criteria type on file. PROCEDURES:      CONSULTS:  IP CONSULT TO OB GYN    CRITICAL CARE:      FINAL IMPRESSION      1. Vaginal discharge    2. Incisional pain          DISPOSITION / PLAN     DISPOSITION        PATIENT REFERRED TO:  No follow-up provider specified.     DISCHARGE MEDICATIONS:  New Prescriptions    No medications on file       Dilshad Phelps DO  Emergency Medicine Resident    (Please note that portions of thisnote were completed with a voice recognition program.  Efforts were made to edit the dictations but occasionally words are mis-transcribed.)        Dilshad Phelps DO  Resident  12/26/22 2217

## 2022-12-27 NOTE — PLAN OF CARE
Problem: Chronic Conditions and Co-morbidities  Goal: Patient's chronic conditions and co-morbidity symptoms are monitored and maintained or improved  12/27/2022 1724 by Syed Galindo RN  Outcome: Adequate for Discharge  12/27/2022 1724 by Syed Galindo RN  Outcome: Adequate for Discharge     Problem: Skin/Tissue Integrity  Goal: Absence of new skin breakdown  Description: 1. Monitor for areas of redness and/or skin breakdown  2. Assess vascular access sites hourly  3. Every 4-6 hours minimum:  Change oxygen saturation probe site  4. Every 4-6 hours:  If on nasal continuous positive airway pressure, respiratory therapy assess nares and determine need for appliance change or resting period.   12/27/2022 1724 by Syed Galindo RN  Outcome: Adequate for Discharge  12/27/2022 1724 by Syed Galindo RN  Outcome: Adequate for Discharge     Problem: Safety - Adult  Goal: Free from fall injury  12/27/2022 1724 by Syed Galindo RN  Outcome: Adequate for Discharge  12/27/2022 1724 by Syed Galindo RN  Outcome: Adequate for Discharge

## 2022-12-27 NOTE — ED NOTES
Pt reports to the ED with complaints of incisional pain and vaginal discharge. Pt states she had a hysterectomy about two weeks ago and has been taking tylenol and motrin with good relief, but for the last two days it has not been working. Pt reports redness at the site. Pt reports dysuria since the surgery. Pt also reports nausea for a day or two. Pt reports the vaginal discharge is brown in color. Pt also reports getting SOB when using stairs for the past two days, denies at rest. Pt does not have any other complaints at this time. Pt is A&O x4 and speaking in complete sentences. Pt is resting in bed comfortably, NAD noted. Pt denies chest pain, SOB, emesis, diarrhea.  Pt placed on SpO2 and BP monitor       Della Anguiano RN  12/26/22 3541

## 2022-12-27 NOTE — DISCHARGE INSTRUCTIONS
Charlotte Arriaza!!!    From [facility] Emergency Department    On behalf of the Emergency Department staff at [facility], I would like to thank you for giving [facility] the opportunity to address your health care needs and concerns. You were seen today for pain at you rincision site. If you notice any concerning symptoms please return to the ER immediately. These can include but are not limited to: fevers, chills, shortness of breath, vomiting, weakness of the extremities, changes in your mental status, numbness, pale extremities, or chest pain. Wound care: none    Diet: You may resume your normal diet     Activity: resume activity as tolerated. Medications: Continue taking your home medications as previously directed. For pain You may take tylenol 1,000mg by mouth every 6 hours as needed for pain. Do not exceed 4,000mg per day. If you have liver disease don't take tylenol. You may also take ibuprofen 600mg every 6-8 hours as needed for pain. Do not exceed 2,400 mg per day. If you experience stomach pain or you have a history of kidney disease stop taking ibuprofen. You may alternate application of ice and heat 20 minutes at a time as desired. Follow up: Please follow up with your primary care doctor within one week or as needed. Urinary Tract Infection (UTI) in Women: Care Instructions  Overview     A urinary tract infection, or UTI, is a general term for an infection anywhere between the kidneys and the urethra (where urine comes out). Most UTIs are bladder infections. They often cause pain or burning when you urinate. UTIs are caused by bacteria and can be cured with antibiotics. Be sure to complete your treatment so that the infection does not get worse. Follow-up care is a key part of your treatment and safety. Be sure to make and go to all appointments, and call your doctor if you are having problems.  It's also a good idea to know your test results and keep a list of the medicines you take. How can you care for yourself at home? Take your antibiotics as directed. Do not stop taking them just because you feel better. You need to take the full course of antibiotics. Drink extra water and other fluids for the next day or two. This will help make the urine less concentrated and help wash out the bacteria that are causing the infection. (If you have kidney, heart, or liver disease and have to limit fluids, talk with your doctor before you increase the amount of fluids you drink.)  Avoid drinks that are carbonated or have caffeine. They can irritate the bladder. Urinate often. Try to empty your bladder each time. To relieve pain, take a hot bath or lay a heating pad set on low over your lower belly or genital area. Never go to sleep with a heating pad in place. To prevent UTIs  Drink plenty of water each day. This helps you urinate often, which clears bacteria from your system. (If you have kidney, heart, or liver disease and have to limit fluids, talk with your doctor before you increase the amount of fluids you drink.)  Urinate when you need to. If you are sexually active, urinate right after you have sex. Change sanitary pads often. Avoid douches, bubble baths, feminine hygiene sprays, and other feminine hygiene products that have deodorants. After going to the bathroom, wipe from front to back. When should you call for help? Call your doctor now or seek immediate medical care if:    Symptoms such as fever, chills, nausea, or vomiting get worse or appear for the first time. You have new pain in your back just below your rib cage. This is called flank pain. There is new blood or pus in your urine. You have any problems with your antibiotic medicine. Watch closely for changes in your health, and be sure to contact your doctor if:    You are not getting better after taking an antibiotic for 2 days.      Your symptoms go away but then come back. Where can you learn more? Go to http://www.woods.com/ and enter K848 to learn more about \"Urinary Tract Infection (UTI) in Women: Care Instructions. \"  Current as of: June 16, 2022               Content Version: 13.5  © 1235-1215 [employer]. Care instructions adapted under license by [facility] ([facility]). If you have questions about a medical condition or this instruction, always ask your healthcare professional. [address] any warranty or liability for your use of this information.

## 2022-12-27 NOTE — CONSULTS
[address] [facility]    Patient Name: Candace Godwin     Patient : 1970  Room/Bed: 1249/1216-34  Admission Date/Time: 2022  8:40 PM  Primary Care Physician: SAVANA Valdez CNP      CC:   Chief Complaint   Patient presents with    Incisional Pain     Hysterectomy two weeks ago    Vaginal Discharge     States brown in color                HPI: Candace Godwin is a 50 y.o. female [id number] presents with complaints of intermittent headaches, abdominal pain, and foul-smelling brown vaginal discharge. Patient is post op day 14 from a laparoscopic-assisted vaginal hysterectomy with BSO and cysto. Overall the patient reports surgery was uncomplicated and she states she recovered well. However last week, she started to describe intermittent diffuse abdominal pain that was originally relieved by taking her Motrin Tylenol and Roxicodone. Patient states the pain increased in intensity over the last few days and the ibuprofen and Tylenol did not help at all. She also states that she had been noting increased brown, foul-smelling vaginal discharge on her underwear and when she wiped. She states that she would feel febrile at times and T-max was 101. On exam, patient's blood pressure was elevated to 180/100 with a heart rate of 103. Patient was afebrile at the time however met SIRS criteria. Appropriate lab work was drawn and patient was sent to CT for imaging of the abdomen and pelvis. On exam, patient appeared uncomfortable. She states she has not had any sexual intercourse or anything in the vagina since her surgery.     REVIEW OF SYSTEMS:   Constitutional: negative fever, negative chills, negative weight changes   HEENT: negative visual disturbances, negative headaches, negative dizziness, negative hearing loss  Breast: Negative breast abnormalities, negative breast lumps, negative nipple discharge  Respiratory: negative dyspnea, negative cough, negative SOB  Cardiovascular: negative chest pain,  negative palpitations, negative arrhythmia, negative syncope   Gastrointestinal: positive abdominal pain, negative RUQ pain, positive N/V, negative diarrhea, positive constipation, negative bowel changes, negative heartburn   Genitourinary: negative dysuria, negative hematuria, negative urinary incontinence, positive brown colored, foul smelling vaginal discharge, negative vaginal bleeding or spotting  Dermatological: negative rash, negative pruritis, negative mole or other skin changes  Hematologic: negative bruising  Immunologic/Lymphatic: negative recent illness, negative recent sick contact  Musculoskeletal: negative back pain, negative myalgias, negative arthralgias  Neurological:  negative dizziness, negative migraines, negative seizures, negative weakness  Behavior/Psych: negative depression, negative anxiety, negative SI, negative HI    OBSTETRIC HISTORY:   OB History    Para Term  AB Living   3 2 1 1 1 3   SAB IAB Ectopic Molar Multiple Live Births   1 0 0 0 1 3      # Outcome Date GA Lbr Toni/2nd Weight Sex Delivery Anes PTL Lv   3A   33w0d   F CS-LTranv   PAULA      Complications: Failure to Progress in First Stage   3B      M CS-LTranv   PAULA      Complications: Failure to Progress in First Stage   2 SAB      SAB      1 Term  39w6d   F CS-LTranv   PAULA      Complications: Failure to Progress in First Stage      Obstetric Comments   1993: 39w6d IOL > CS (didn't dilate past 2), F; GDMA and HTN   SAB x1 somewhere in between    2000: sPTL, 33w twin delivery, CS, M,F        PAST MEDICAL HISTORY:   has a past medical history of Abnormal uterine bleeding (AUB), Anxiety, Arthritis, CAD (coronary artery disease), Chronic neck pain, COVID-19, Depression, Diabetes mellitus ([address] Hyperlipidemia, ILD (interstitial lung disease) ([address] Kidney stone, Langerhans-cell granulomatosis ([address] Myocardial infarct ([address] Neck pain, Pulmonary nodule, Spinal stenosis, Under care of team, Under care of team, Under care of team, Under care of team, and Uterine fibroid. PAST SURGICAL HISTORY:   has a past surgical history that includes back surgery ();  section; Mastoid surgery; Tonsillectomy; Cardiac catheterization (); Lung biopsy (Right); hip surgery (Left); Colonoscopy (N/A, 2021); Upper gastrointestinal endoscopy (2021); Cervical spine surgery (); cervical fusion (2016); laminectomy (); Abdomen surgery (2022); and Hysterectomy (N/A, 2022).     ALLERGIES:  Allergies as of 2022 - Fully Reviewed 2022   Allergen Reaction Noted    Seasonal Other (See Comments) 2021    Bactrim [sulfamethoxazole-trimethoprim] Nausea And Vomiting 11/10/2022    Keflex [cephalexin] Rash 2022       MEDICATIONS:  Current Facility-Administered Medications   Medication Dose Route Frequency Provider Last Rate Last Admin    enoxaparin (LOVENOX) injection 40 mg  40 mg SubCUTAneous Daily Cecille Adams DO        insulin lispro (HUMALOG) injection vial 0-8 Units  0-8 Units SubCUTAneous TID WC Guanaco Garza MD        insulin lispro (HUMALOG) injection vial 0-4 Units  0-4 Units SubCUTAneous Nightly Guanaco Garza MD        acetaminophen (TYLENOL) tablet 1,000 mg  1,000 mg Oral 3 times per day Guanaco Garza MD        gabapentin (NEURONTIN) capsule 300 mg  300 mg Oral TID Guanaco Garza MD   300 mg at 22 0340    cyclobenzaprine (FLEXERIL) tablet 10 mg  10 mg Oral TID PRN Guanaco Garza MD   10 mg at 22 0321    lidocaine 4 % external patch 1 patch  1 patch TransDERmal Daily Guanaco Garza MD   1 patch at 22 0340    oxyCODONE (ROXICODONE) immediate release tablet 5 mg  5 mg Oral Q4H PRN Munir Fox DO   5 mg at 22 0321    Or    oxyCODONE (ROXICODONE) immediate release tablet 10 mg  10 mg Oral Q4H PRN Munir Fox DO        ketorolac (TORADOL) injection 30 mg  30 mg IntraVENous Q6H Janice Wellington MD   30 mg at 12/27/22 0520    piperacillin-tazobactam (ZOSYN) 3,375 mg in dextrose 5 % 50 mL IVPB extended infusion (mini-bag)  3,375 mg IntraVENous Q8H Thang Cox DO           FAMILY HISTORY:  family history includes Alzheimer's Disease in her paternal grandfather; COPD in her mother; Coronary Art Dis in her mother; Dementia in her mother; Depression in her mother; Diabetes in her maternal grandmother and mother; Heart Attack in her father; Heart Disease in her maternal aunt; High Blood Pressure in her mother. SOCIAL HISTORY:   reports that she has been smoking cigarettes. She has a 12.50 pack-year smoking history. She has never used smokeless tobacco. She reports current alcohol use. She reports current drug use. Frequency: 7.00 times per week. Drug: Marijuana Hazel Garcia). ________________________________________________________________________                                    [facility]:  Vitals:    12/27/22 0200 12/27/22 0210 12/27/22 0303 12/27/22 0332   BP: 109/82  98/60    Pulse:  86 87    Resp:  14 16    Temp:   97 °F (36.1 °C)    TempSrc:   Oral    SpO2:  94% 97%    Weight:    209 lb 11.2 oz (95.1 kg)   Height:    5' 3\" (1.6 m)                                                    INPUT/OUTPUT:  No intake/output data recorded. No intake/output data recorded. PHYSICAL EXAM:     General Appearance: Appears healthy. Alert; in mild distress. Pleasant. Skin: Skin color, texture, turgor normal. No rashes or lesions. Three well healing laparoscopic scars noted. Neck and EENT: normal atraumatic, no neck masses, normal thyroid  Respiratory: Normal expansion. Clear to auscultation. No rales, rhonchi, or wheezing. Cardiovascular: regular rate and rhythm  Abdomen: diffuse abdominal tenderness to palpation noted.  Most significantly in epigastric region. Pelvic Exam:   Chaperone for Intimate Exam: Chaperone was present for entire exam, Chaperone Name: Stephanie Dawn NALINI  External genitalia: General appearance; normal, Hair distribution; normal, Lesions absent  Urinary system: urethral meatus normal  Vaginal: SSE demonstrated brown discharge in vaginal cault. Vaginal cuff closer visualized and subsequently palpated with SVE. No evidence of vaginal cuff dehiscence. Musculoskeletal: no gross abnormalities  Extremities: non-tender BLE and non-edematous  Psych:  oriented to time, place and person     LAB RESULTS:  Results for orders placed or performed during the hospital encounter of 12/26/22   Blood Culture 1    Specimen: Blood   Result Value Ref Range    Specimen Description . BLOOD     Special Requests RT WRIST 10ML     Culture NO GROWTH <24 HRS    Culture, Blood 2    Specimen: Blood   Result Value Ref Range    Specimen Description . BLOOD     Special Requests LT WRIST 10ML     Culture NO GROWTH <24 HRS    Vaginitis DNA Probe    Specimen: Vaginal   Result Value Ref Range    Source . VAGINAL SWAB     Trichomonas Vaginalis DNA NEGATIVE NEGATIVE    Gardnerella Vaginalis, DNA Probe POSITIVE (A) NEGATIVE    Candida Species, DNA Probe NEGATIVE NEGATIVE   Culture, Anaerobic and Aerobic    Specimen: Perineum   Result Value Ref Range    Specimen Description . PERINEUM     Direct Exam MODERATE NEUTROPHILS (A)     Direct Exam MANY GRAM NEGATIVE RODS (A)     Direct Exam MANY GRAM POSITIVE RODS (A)     Direct Exam MODERATE GRAM POSITIVE COCCI IN PAIRS (A)     Culture PENDING    CBC with Auto Differential   Result Value Ref Range    WBC 16.9 (H) 3.5 - 11.3 k/uL    RBC 4.40 3.95 - 5.11 m/uL    Hemoglobin 10.7 (L) 11.9 - 15.1 g/dL    Hematocrit 35.2 (L) 36.3 - 47.1 %    MCV 80.0 (L) 82.6 - 102.9 fL    MCH 24.3 (L) 25.2 - 33.5 pg    MCHC 30.4 28.4 - 34.8 g/dL    RDW 17.5 (H) 11.8 - 14.4 %    Platelets 856 (H) 509 - 453 k/uL    MPV 9.0 8.1 - 13.5 fL    NRBC Automated 0.0 0.0 per 100 WBC    Immature Granulocytes 0 0 %    Seg Neutrophils 57 36 - 66 %    Lymphocytes 37 24 - 44 %    Monocytes 2 1 - 7 %    Eosinophils % 3 1 - 4 %    Basophils 1 0 - 2 %    Absolute Immature Granulocyte 0.00 0.00 - 0.30 k/uL    Segs Absolute 9.63 (H) 1.8 - 7.7 k/uL    Absolute Lymph # 6.25 (H) 1.0 - 4.8 k/uL    Absolute Mono # 0.34 0.1 - 0.8 k/uL    Absolute Eos # 0.51 (H) 0.0 - 0.4 k/uL    Basophils Absolute 0.17 0.0 - 0.2 k/uL    Morphology ANISOCYTOSIS PRESENT     Morphology MICROCYTOSIS PRESENT    CMP   Result Value Ref Range    Glucose 167 (H) 70 - 99 mg/dL    BUN 11 6 - 20 mg/dL    Creatinine 0.50 0.50 - 0.90 mg/dL    Est, Glom Filt Rate >60 >60 mL/min/1.73m2    Calcium 8.8 8.6 - 10.4 mg/dL    Sodium 137 135 - 144 mmol/L    Potassium 3.6 (L) 3.7 - 5.3 mmol/L    Chloride 102 98 - 107 mmol/L    CO2 23 20 - 31 mmol/L    Anion Gap 12 9 - 17 mmol/L    Alkaline Phosphatase 81 35 - 104 U/L    ALT 14 5 - 33 U/L    AST 12 <32 U/L    Total Bilirubin <0.1 (L) 0.3 - 1.2 mg/dL    Total Protein 7.2 6.4 - 8.3 g/dL    Albumin 3.9 3.5 - 5.2 g/dL    Albumin/Globulin Ratio 1.2 1.0 - 2.5   Urinalysis   Result Value Ref Range    Color, UA Yellow Yellow    Turbidity UA Cloudy (A) Clear    Glucose, Ur NEGATIVE NEGATIVE    Bilirubin Urine NEGATIVE NEGATIVE    Ketones, Urine NEGATIVE NEGATIVE    Specific Gravity, UA 1.014 1.005 - 1.030    Urine Hgb MODERATE (A) NEGATIVE    pH, UA 6.0 5.0 - 8.0    Protein, UA NEGATIVE NEGATIVE    Urobilinogen, Urine Normal Normal    Nitrite, Urine NEGATIVE NEGATIVE    Leukocyte Esterase, Urine LARGE (A) NEGATIVE   Lactic Acid   Result Value Ref Range    Lactic Acid, Whole Blood 2.0 0.7 - 2.1 mmol/L   Microscopic Urinalysis   Result Value Ref Range    WBC, UA 20 TO 50 0 - 5 /HPF    RBC, UA 2 TO 5 0 - 2 /HPF    Epithelial Cells UA None 0 - 5 /HPF    Bacteria, UA MODERATE (A) None         DIAGNOSTICS:  CT Abdomen/Pelvis 12/27/22:   1.  Hysterectomy, without postoperative abscess or other acute abnormality in   the pelvis. 2. Nonobstructive renal calculi. 3. Other incidental findings as above. ASSESSMENT & PLAN:    Chato Barcenas is a 50 y.o. female [id number] who [facility] of fevers, discharge and abdominal pain   -Patient appears uncomfortable on exam   -Vital signs at this time unstable and SIRS criteria activated. Blood cultures lactic acid, urinalysis, and CT ordered. White blood cell count elevated at 13.6. Patient afebrile at this time.   -On physical exam, patient was acutely diffuse to palpation throughout her abdomen specifically in her epigastric region. The 3 laparoscopic port scars appeared well-healed however patient reported 8 out of 10 pain in the supraumbilical site.   -On pelvic exam, sterile speculum exam showed an intact vaginal cuff with a minimal amount of brown discharge in the vaginal vault   -Anaerobic, aerobic cultures as well as vaginitis were collected. Gonorrhea and Chlamydia are added onto urine.   -Bimanual exam and palpation of the vaginal cuff indicated no vaginal cuff dehiscence, with the suture intact from the right lateral corner to the left. At this time low suspicion for vaginal cuff dehiscence. -Patient was visibly uncomfortable during her pelvic exam   -Patient describes her pain as a 7-8 out of 10. She states the morphine helped slightly. At this time, Toradol was ordered for the patient as well as scheduled Tylenol and Andreina as needed   -CT scan read states that there is no sign of a postoperative abscess or any other acute abnormality in the pelvis   -Patient to be admitted under observation and given broad-spectrum antibiotics due to uncontrolled pain at home.   Zosyn ordered IV for 24 hours   -For added pain control, gabapentin, Flexeril and lidocaine patch were prescribed.  -Patient to be admitted under the attending: Dr. Franki Chan    T2DM   - POCT AC HS glucose ordered   - MDISS in place   - Continue to monitor closely    Essential Hypertension   - BP on admission 183/102   - BP normotensive at this time   - Continue to monitor closely    History of DVT   - Lovenox 40 mg qd ordered on admission   - Ambulation encouraged    Anxiety/Depression   - Patient denies SI/HI   - Clinically asymptomatic at this time    BMI 37         Patient Active Problem List    Diagnosis Date Noted    Post-op pain 12/27/2022     Priority: Medium    Postoperative infection, initial encounter 12/26/2022     Priority: Medium    S/p LAVH, BSO, Cysto 12/12/22 12/12/2022     Priority: Medium    Chest pain 10/29/2022     Priority: Medium    Hx of CS x2 10/26/2022     Priority: Medium    Hx of laparoscopic tubal ligation (2000) 10/26/2022     Priority: Medium    Abnormal uterine bleeding 10/26/2022     Priority: Medium    Fundal uterine fibroid  10/26/2022     Priority: Medium    Atypical chest pain 07/08/2022     Priority: Medium    NSTEMI (non-ST elevated myocardial infarction) ([address] 07/08/2022     Priority: Medium    COVID-19 07/08/2022     Priority: Medium    Gastroesophageal reflux disease with esophagitis without hemorrhage     Irritable bowel syndrome with constipation 10/13/2021    Irritable bowel syndrome with both constipation and diarrhea 10/13/2021    Positive colorectal cancer screening using Cologuard test 10/13/2021    Mixed hyperlipidemia 09/13/2021    Migraine without status migrainosus, not intractable 09/13/2021    OAB (overactive bladder) 09/13/2021    Smoker 09/13/2021    Pulmonary Langerhans cell granulomatosis ([address]     Coronary artery disease involving native heart with angina pectoris ([address]     Situational depression 03/06/2020    Anxiety 03/06/2020    Hx of deep venous thrombosis 03/04/2020    Spinal stenosis of cervical region     Hip fracture ([address] 01/18/2020    Hypothyroidism     Type 2 diabetes mellitus without complication, with long-term current use of insulin ([address]     Essential hypertension        Plan discussed with  Man, who is agreeable. Attending's Name: Dr. Nancy Hardy MD  Ob/Gyn Resident   [facility]  2022, 6:08 AM    Date: 2022  Time: 7:49 AM      Patient Name: Candace Godwin  Patient : 1970  Room/Bed: 7514/5800-65  Admission Date/Time: 2022  8:40 PM        Attending Physician Statement  I have personally seen, evaluated and discussed the care of Candace Godwin, including pertinent history and exam findings with the resident. I have reviewed and edited their note in the electronic medical record. The key elements of all parts of the encounter have been performed/reviewed by me. I agree with the assessment, plan and orders as documented by the resident. The level of care submitted represents to the best of my ability the care documented in the medical record today. GC Modifier. This service has been performed in part by a resident under the direction of a teaching physician.         Attending's Name:  Bruna Quintana MD

## 2022-12-27 NOTE — PROGRESS NOTES
Gynecology Progress Note      Jimmy Lizarraga is a 50 y.o. female s/p LAVH, BSO, Cysto on 12/23/22 admitted for abdominal pain and vaginal discharge    Patient seen and examined. She reports improvement of pain with medications. Patient states she has been voiding normally since the procedure however does admit to discomfort. She states internal and external discomfort. Reports feeling that she completely empties the bladder with voiding. When asked about back pain patient states she always has bilateral low back pain. She endorses suprapubic discomfort as well as superficial epigastric discomfort for which a lidocaine patch has helped. Patient reports normal bowel function for her although she does have IBS but currently no complaints. Reports flatus. She is tolerating a general diet. She does endorse brown vaginal discharge that has a foul smell but does not smell or look like fecal matter. She denies fever, chills, nausea, vomiting, chest pain, shortness of breath, calf pain. Vitals:  Vitals:    12/27/22 0210 12/27/22 0303 12/27/22 0332 12/27/22 0737   BP:  98/60  115/75   Pulse: 86 87  83   Resp: 14 16     Temp:  97 °F (36.1 °C)  98.1 °F (36.7 °C)   TempSrc:  Oral  Oral   SpO2: 94% 97%  93%   Weight:   209 lb 11.2 oz (95.1 kg)    Height:   5' 3\" (1.6 m)        Intake/Output:   Last Shift: I/O last 3 completed shifts: In: 150 [P.O.:150]  Out: -   Current Shift: No intake/output data recorded. Intake and output has not been recorded by RN however patient reports no difficulties with urination and ongoing complete emptying of bladder. Physical Exam:  General Appearance: Appears healthy. Alert; in no acute distress. Pleasant. Skin: No rashes or lesions. HEENT: normocephalic and atraumatic. Trachea midline.   Mucous membranes moist.  Respiratory: No conversational dyspnea  Cardiovascular: normal rate and rhythm  Abdomen: soft, voluntary guarding, pain out of proportion to exam with exquisite tenderness to deep palpation of the LLQ however patient was distracted and repeat palpation without any reaction from the patient. All incisions are well healed without erythema, warmth, or drainage. Musculoskeletal: no gross abnormalities  Extremities: non-tender BLE and non-edematous  Psych:  oriented to time, place and person       Lab:  Recent Results (from the past 12 hour(s))   Blood Culture 1    Collection Time: 12/26/22  8:54 PM    Specimen: Blood   Result Value Ref Range    Specimen Description . BLOOD     Special Requests RT WRIST 10ML     Culture NO GROWTH <24 HRS    Culture, Blood 2    Collection Time: 12/26/22  9:04 PM    Specimen: Blood   Result Value Ref Range    Specimen Description . BLOOD     Special Requests LT WRIST 10ML     Culture NO GROWTH <24 HRS    CBC with Auto Differential    Collection Time: 12/26/22  9:23 PM   Result Value Ref Range    WBC 16.9 (H) 3.5 - 11.3 k/uL    RBC 4.40 3.95 - 5.11 m/uL    Hemoglobin 10.7 (L) 11.9 - 15.1 g/dL    Hematocrit 35.2 (L) 36.3 - 47.1 %    MCV 80.0 (L) 82.6 - 102.9 fL    MCH 24.3 (L) 25.2 - 33.5 pg    MCHC 30.4 28.4 - 34.8 g/dL    RDW 17.5 (H) 11.8 - 14.4 %    Platelets 941 (H) 900 - 453 k/uL    MPV 9.0 8.1 - 13.5 fL    NRBC Automated 0.0 0.0 per 100 WBC    Immature Granulocytes 0 0 %    Seg Neutrophils 57 36 - 66 %    Lymphocytes 37 24 - 44 %    Monocytes 2 1 - 7 %    Eosinophils % 3 1 - 4 %    Basophils 1 0 - 2 %    Absolute Immature Granulocyte 0.00 0.00 - 0.30 k/uL    Segs Absolute 9.63 (H) 1.8 - 7.7 k/uL    Absolute Lymph # 6.25 (H) 1.0 - 4.8 k/uL    Absolute Mono # 0.34 0.1 - 0.8 k/uL    Absolute Eos # 0.51 (H) 0.0 - 0.4 k/uL    Basophils Absolute 0.17 0.0 - 0.2 k/uL    Morphology ANISOCYTOSIS PRESENT     Morphology MICROCYTOSIS PRESENT    CMP    Collection Time: 12/26/22  9:23 PM   Result Value Ref Range    Glucose 167 (H) 70 - 99 mg/dL    BUN 11 6 - 20 mg/dL    Creatinine 0.50 0.50 - 0.90 mg/dL    Est, Glom Filt Rate >60 >60 mL/min/1.73m2 Calcium 8.8 8.6 - 10.4 mg/dL    Sodium 137 135 - 144 mmol/L    Potassium 3.6 (L) 3.7 - 5.3 mmol/L    Chloride 102 98 - 107 mmol/L    CO2 23 20 - 31 mmol/L    Anion Gap 12 9 - 17 mmol/L    Alkaline Phosphatase 81 35 - 104 U/L    ALT 14 5 - 33 U/L    AST 12 <32 U/L    Total Bilirubin <0.1 (L) 0.3 - 1.2 mg/dL    Total Protein 7.2 6.4 - 8.3 g/dL    Albumin 3.9 3.5 - 5.2 g/dL    Albumin/Globulin Ratio 1.2 1.0 - 2.5   Lactic Acid    Collection Time: 12/26/22 10:10 PM   Result Value Ref Range    Lactic Acid, Whole Blood 2.0 0.7 - 2.1 mmol/L   Basic Metabolic Panel    Collection Time: 12/27/22  7:05 AM   Result Value Ref Range    Glucose 221 (H) 70 - 99 mg/dL    BUN 11 6 - 20 mg/dL    Creatinine 0.56 0.50 - 0.90 mg/dL    Est, Glom Filt Rate >60 >60 mL/min/1.73m2    Calcium 8.0 (L) 8.6 - 10.4 mg/dL    Sodium 137 135 - 144 mmol/L    Potassium 4.0 3.7 - 5.3 mmol/L    Chloride 106 98 - 107 mmol/L    CO2 20 20 - 31 mmol/L    Anion Gap 11 9 - 17 mmol/L   CBC    Collection Time: 12/27/22  7:05 AM   Result Value Ref Range    WBC 12.6 (H) 3.5 - 11.3 k/uL    RBC 3.88 (L) 3.95 - 5.11 m/uL    Hemoglobin 9.5 (L) 11.9 - 15.1 g/dL    Hematocrit 32.7 (L) 36.3 - 47.1 %    MCV 84.3 82.6 - 102.9 fL    MCH 24.5 (L) 25.2 - 33.5 pg    MCHC 29.1 28.4 - 34.8 g/dL    RDW 17.7 (H) 11.8 - 14.4 %    Platelets 856 (H) 584 - 453 k/uL    MPV 8.9 8.1 - 13.5 fL    NRBC Automated 0.0 0.0 per 100 WBC   ]      Assessment/Plan:  Dinora Lambert 50 y.o. female LAVH, BSO, Cysto on 12/23/22 admitted for abdominal pain and vaginal discharge   - Doing well, vitals stable. Afebrile   - Urine output normal and adequate per patient report   - Cr 0.50 on admission (baseline 0.51 on 11/30).  Repeat Cr 0.56 this AM. Receiving toradol   - Leukocytosis resolving   - saline lock   - Pain control: gabapentin, tylenol, toradol, donnell   - Labs:CBC, BMP reviewed this AM   - DVT Proph:Lovenox and SCDs   - Abx:Zosyn    - Diet:general   - Dressing: none   - Encourage ambulation and use of incentive spirometer   - Home care, wound care and follow up care/restrictions were reviewed    - Continue post-op care, please page with any questions    Bacterial Vaginosis   - Currently receiving Zosyn   - Rx for flagyl sent    Suspected Urine Tract Infection   - Awaiting UCx    - Rx for Augmentin and pyridium sent    Diabetes Type 2   - Continue home meds    Anxiety    - Continue Buspar      Please perfect the resident named below with questions and concerns.     Lynsey Rodriguez DO  OBGYN Resident  12/27/2022  7:58 AM

## 2022-12-27 NOTE — ED PROVIDER NOTES
[facility]  Emergency Department  Emergency Medicine Resident Sign-out     Care of Paz Floyd was assumed from Dr. Jose Hendricks and is being seen for Incisional Pain (Hysterectomy two weeks ago) and Vaginal Discharge (States brown in color)  . The patient's initial evaluation and plan have been discussed with the prior provider who initially evaluated the patient. EMERGENCY DEPARTMENT COURSE / MEDICAL DECISION MAKING:       MEDICATIONS GIVEN:  Orders Placed This Encounter   Medications    morphine injection 4 mg    ondansetron (ZOFRAN) injection 4 mg    0.9 % sodium chloride bolus    iopamidol (ISOVUE-370) 76 % injection 75 mL    morphine injection 4 mg    ketorolac (TORADOL) injection 30 mg    oxyCODONE (ROXICODONE) immediate release tablet 5 mg    acetaminophen (TYLENOL) tablet 1,000 mg    piperacillin-tazobactam (ZOSYN) 3,375 mg in dextrose 5 % 50 mL IVPB extended infusion (mini-bag)     Order Specific Question:   Antimicrobial Indications     Answer:   Surgical Site Infection     Order Specific Question:   Antimicrobial Indications     Answer:   Intra-Abdominal Infection       LABS / RADIOLOGY:     Results for orders placed or performed during the hospital encounter of 12/26/22   Blood Culture 1    Specimen: Blood   Result Value Ref Range    Specimen Description . BLOOD     Special Requests RT WRIST 10ML     Culture NO GROWTH <24 HRS    Culture, Blood 2    Specimen: Blood   Result Value Ref Range    Specimen Description . BLOOD     Special Requests LT WRIST 10ML     Culture NO GROWTH <24 HRS    CBC with Auto Differential   Result Value Ref Range    WBC 16.9 (H) 3.5 - 11.3 k/uL    RBC 4.40 3.95 - 5.11 m/uL    Hemoglobin 10.7 (L) 11.9 - 15.1 g/dL    Hematocrit 35.2 (L) 36.3 - 47.1 %    MCV 80.0 (L) 82.6 - 102.9 fL    MCH 24.3 (L) 25.2 - 33.5 pg    MCHC 30.4 28.4 - 34.8 g/dL    RDW 17.5 (H) 11.8 - 14.4 %    Platelets 234 (H) 667 - 453 k/uL    MPV 9.0 8.1 - 13.5 fL    NRBC Automated 0.0 0.0 per 100 WBC    Immature Granulocytes 0 0 %    Seg Neutrophils 57 36 - 66 %    Lymphocytes 37 24 - 44 %    Monocytes 2 1 - 7 %    Eosinophils % 3 1 - 4 %    Basophils 1 0 - 2 %    Absolute Immature Granulocyte 0.00 0.00 - 0.30 k/uL    Segs Absolute 9.63 (H) 1.8 - 7.7 k/uL    Absolute Lymph # 6.25 (H) 1.0 - 4.8 k/uL    Absolute Mono # 0.34 0.1 - 0.8 k/uL    Absolute Eos # 0.51 (H) 0.0 - 0.4 k/uL    Basophils Absolute 0.17 0.0 - 0.2 k/uL    Morphology ANISOCYTOSIS PRESENT     Morphology MICROCYTOSIS PRESENT    CMP   Result Value Ref Range    Glucose 167 (H) 70 - 99 mg/dL    BUN 11 6 - 20 mg/dL    Creatinine 0.50 0.50 - 0.90 mg/dL    Est, Glom Filt Rate >60 >60 mL/min/1.73m2    Calcium 8.8 8.6 - 10.4 mg/dL    Sodium 137 135 - 144 mmol/L    Potassium 3.6 (L) 3.7 - 5.3 mmol/L    Chloride 102 98 - 107 mmol/L    CO2 23 20 - 31 mmol/L    Anion Gap 12 9 - 17 mmol/L    Alkaline Phosphatase 81 35 - 104 U/L    ALT 14 5 - 33 U/L    AST 12 <32 U/L    Total Bilirubin <0.1 (L) 0.3 - 1.2 mg/dL    Total Protein 7.2 6.4 - 8.3 g/dL    Albumin 3.9 3.5 - 5.2 g/dL    Albumin/Globulin Ratio 1.2 1.0 - 2.5   Lactic Acid   Result Value Ref Range    Lactic Acid, Whole Blood 2.0 0.7 - 2.1 mmol/L       No results found. RECENT VITALS:     Temp: 99 °F (37.2 °C),  Heart Rate: 94, Resp: 20, BP: (!) 149/102, SpO2: 96 %    This patient is a 50 y.o. Female with abdominal incisional pain and erythema with increasing brown foul-smelling vaginal discharge. Patient underwent hysterectomy on 12/12. Endorses fevers, nausea, body aches. Has been taking Tylenol and Motrin around-the-clock. Sepsis labs ordered, will obtain CT abdomen pelvis. Discussed with OB who is on board and evaluating the patient.     ED Course as of 12/26/22 2246   Mon Dec 26, 2022   2118 Discussed with OB, will be down to eval [SK]   2144 CBC with Auto Differential(!):    WBC 16.9(!)   RBC 4.40   Hemoglobin Quant 10.7(!)   Hematocrit 35.2(!)   MCV 80.0(!)   MCH 24.3(!)   MCHC 30.4 RDW 17.5(!)   Platelet Count 883(!)   MPV 9.0   NRBC Automated 0.0   Seg Neutrophils PENDING   Lymphocytes PENDING   Monocytes PENDING   Eosinophils % PENDING   Basophils PENDING   Immature Granulocytes PENDING   Segs Absolute PENDING   Absolute Lymph # PENDING   Absolute Mono # PENDING   Absolute Eos # PENDING   Basophils Absolute PENDING   Absolute Immature Granulocyte PENDING [SK]   6748 Spoke with OB team, will start on Zosyn at this time and admit to their service under observation status under Dr. Christian Alcantar. [JS]      ED Course User Index  [JS] Cooper Hernandez DO  [SK] Eliza Parikh DO       OUTSTANDING TASKS / RECOMMENDATIONS:    Labs  CT abdomen pelvis  OB consult  Dispo     FINAL IMPRESSION:     1. Vaginal discharge    2. Incisional pain        DISPOSITION:         DISPOSITION:  []  Home   []  Nursing Facility   []  Transfer -    [x]  Admission -  OB under obs   FOLLOW-UP: No follow-up provider specified.    DISCHARGE MEDICATIONS: New Prescriptions    No medications on file          Germaine Jorge DO  Emergency Medicine Resident  [facility]        Cooper Hernandez [place]  Resident  12/26/22 9511

## 2022-12-27 NOTE — ED PROVIDER NOTES
[facility]     Emergency Department     Faculty Attestation        I performed a history and physical examination of the patient and discussed management with the resident. I reviewed the residents note and agree with the documented findings and plan of care. Any areas of disagreement are noted on the chart. I was personally present for the key portions of any procedures. I have documented in the chart those procedures where I was not present during the key portions. I have reviewed the emergency nurses triage note. I agree with the chief complaint, past medical history, past surgical history, allergies, medications, social and family history as documented unless otherwise noted below. For Physician Assistant/ Nurse Practitioner cases/documentation I have personally evaluated this patient and have completed at least one if not all key elements of the E/M (history, physical exam, and MDM). Additional findings are as noted. Vital Signs: BP: (!) 183/102  Heart Rate: (!) 103  Resp: 20  Temp: 99 °F (37.2 °C) SpO2: 97 %  PCP:  SAVANA Medellin - CNP    Pertinent Comments:     Patient is a 82-year-old female with history of diabetes as well as 2 weeks approximately postop hysterectomy. Patient was doing well until the last few days when she had increasing lower abdominal pain as well as thick brownish discharge. Denies any diarrhea or vomiting but occasional nausea. Assessment/plan: Patient with possible postoperative infection or other and will obtain basic laboratory/septic work-up as well as CT abdomen/pelvis. Attempt symptomatic control and obviously consult OB/GYN    Critical Care  None      (Please note that portions of this note were completed with a voice recognition program. Efforts were made to edit the dictations but occasionally words are mis-transcribed.  Whenever words are used in this note in any gender, they shall be construed as though they were used in the gender appropriate to the circumstances; and whenever words are used in this note in the singular or plural form, they shall be construed as though they were used in the form appropriate to the circumstances.)    MD Jasmin Garcia Mt  Attending Emergency Medicine Physician           Navdeep Rosas MD  12/26/22 6973       Navdeep Rosas MD  12/26/22 2885

## 2022-12-27 NOTE — DISCHARGE SUMMARY
Discharge teaching and instructions completed with patient using teachback method. AVS reviewed. Meds to beds prescriptions given to patient. Patient voiced understanding regarding prescriptions, follow up appointments, and care of self at home. Discharged home with family. All questions answered.

## 2022-12-27 NOTE — ED NOTES
The following labs were labeled with appropriate pt sticker and tubed to lab:     [] Blue     [] Lavender   [] on ice  [] Green/yellow  [] Green/black [] on ice  [] Berneda Abdon  [] on ice  [] Yellow  [] Red  [] Type/ Screen  [] ABG  [] VBG    [] COVID-19 swab    [] Rapid  [] PCR  [] Flu swab  [] Peds Viral Panel     [x] Urine Sample  [] Fecal Sample  [] Pelvic Cultures  [] Blood Cultures  [] X 2  [] STREP Cultures         Naman Mai RN  12/27/22 [id number]

## 2022-12-27 NOTE — DISCHARGE SUMMARY
Gyn Discharge Summary  [facility]      Patient Name: Cat Minor  Patient : 1970  Primary Care Physician: SAVANA Fisher - CNP  Admit Date: 2022    Principal Diagnosis: Subjective post-operative fevers, Leukocytosis    Other Diagnosis:   Vaginal discharge [N89.8]  Incisional pain [L76.82]  Postoperative infection, initial encounter [T81.40XA]  Post-op pain [G89.18]  Patient Active Problem List   Diagnosis    Hypothyroidism    Type 2 diabetes mellitus without complication, with long-term current use of insulin ([facility] [address]    Essential hypertension    Hip fracture ([facility]    Spinal stenosis of cervical region    Hx of deep venous thrombosis    Situational depression    Anxiety    Pulmonary Langerhans cell granulomatosis ([facility] [address] [facility]    Mixed hyperlipidemia    Coronary artery disease involving native heart with angina pectoris (HCC)    Migraine without status migrainosus, not intractable    OAB (overactive bladder)    Smoker    Irritable bowel syndrome with constipation    Irritable bowel syndrome with both constipation and diarrhea    Positive colorectal cancer screening using Cologuard test    Gastroesophageal reflux disease with esophagitis without hemorrhage    Atypical chest pain    NSTEMI (non-ST elevated myocardial infarction) ([facility] [address]    COVID-19    Hx of CS x2    Hx of laparoscopic tubal ligation ()    Abnormal uterine bleeding    Fundal uterine fibroid     Chest pain    S/p LAVH, BSO, Cysto 22    Postoperative infection, initial encounter    Post-op pain    Urinary tract infection       Infection: Yes. UTI  Hospital Acquired: Yes likely from prior admission/surgery      Consultations: none and Anesthesia    Pertinent Findings & Procedures:   Cat Minor is a 50 y.o. female [id number], admitted for subjective post-operative fevers, leukocytosis; received Zosyn x24 hours. Post-op course normal, discharged home. Follow up in 1 day.  Discharge instructions reviewed and questions answered. Course of patient: normal    Discharge to: Home    Readmission planned: No    Recommendations on Discharge:     Medications:     Medication List        START taking these medications      amoxicillin-clavulanate 875-125 MG per tablet  Commonly known as: AUGMENTIN  Take 1 tablet by mouth 2 times daily for 10 days     gabapentin 300 MG capsule  Commonly known as: NEURONTIN  Take 1 capsule by mouth 3 times daily for 5 days. Intended supply: 5 days  Decrease dose as pain improves     metroNIDAZOLE 500 MG tablet  Commonly known as: FLAGYL  Take 1 tablet by mouth 2 times daily for 7 days     ondansetron 4 MG disintegrating tablet  Commonly known as: ZOFRAN-ODT  Take 1 tablet by mouth every 8 hours as needed for Nausea or Vomiting     phenazopyridine 100 MG tablet  Commonly known as: Pyridium  Take 1 tablet by mouth 3 times daily as needed for Pain            CHANGE how you take these medications      metoprolol succinate 100 MG extended release tablet  Commonly known as: TOPROL XL  take 1 tablet by mouth twice a day  What changed:   how much to take  when to take this            CONTINUE taking these medications      * acetaminophen 500 MG tablet  Commonly known as: TYLENOL  Take 2 tablets by mouth every 6 hours as needed for Pain     * acetaminophen 500 MG tablet  Commonly known as: TYLENOL  Take 2 tablets by mouth every 6 hours as needed for Pain     albuterol (2.5 MG/3ML) 0.083% nebulizer solution  Commonly known as: PROVENTIL     ascorbic acid 500 MG tablet  Commonly known as: VITAMIN C     aspirin 81 MG chewable tablet     atorvastatin 80 MG tablet  Commonly known as: LIPITOR  Take 1 tablet by mouth daily     blood glucose monitor kit and supplies  Dispense sufficient amount for BID testing frequency plus additional to accommodate PRN testing needs. Dispense all needed supplies to include: monitor, strips, lancing device, lancets, control solutions, alcohol swabs.      busPIRone 15 MG tablet  Commonly known as: BUSPAR  Take 7.5 mg by mouth 3 times daily     calcium carbonate 1250 (500 Ca) MG chewable tablet  Commonly known as: OS-BRANNON     calcium citrate 950 (200 Ca) MG tablet  Commonly known as: CALCITRATE     clopidogrel 75 MG tablet  Commonly known as: PLAVIX  Take 1 tablet by mouth daily     docusate sodium 100 MG capsule  Commonly known as: COLACE  Take 1 capsule by mouth 2 times daily     empagliflozin 25 MG tablet  Commonly known as: Jardiance  Take 1 tablet by mouth daily     fluticasone 50 MCG/ACT nasal spray  Commonly known as: FLONASE     ibuprofen 600 MG tablet  Commonly known as: ADVIL;MOTRIN  Take 1 tablet by mouth every 6 hours as needed for Pain     Insulin Pen Needle 31G X 5 MM Misc  Use pen needle for giving daily insulin injection     Levemir FlexTouch 100 UNIT/ML injection pen  Generic drug: insulin detemir  Inject 26 Units into the skin nightly     levothyroxine 50 MCG tablet  Commonly known as: SYNTHROID  Take 1 tablet by mouth Daily     mirabegron 50 MG Tb24  Commonly known as: MYRBETRIQ  Take 50 mg by mouth daily     nitroGLYCERIN 0.4 MG SL tablet  Commonly known as: NITROSTAT  place 1 tablet under the tongue if needed every 5 minutes for chest pain for 3 doses IF NO RELIEF AFTER THIRD DOSE CALL PRESCRIBER .     omeprazole 40 MG delayed release capsule  Commonly known as: PRILOSEC  Take 1 capsule by mouth every morning (before breakfast)     ondansetron 4 MG tablet  Commonly known as: ZOFRAN  Take 1 tablet by mouth daily as needed for Nausea or Vomiting     polyethylene glycol 17 g packet  Commonly known as: GLYCOLAX     ranolazine 500 MG extended release tablet  Commonly known as: RANEXA  Take 1 tablet by mouth 2 times daily     sertraline 100 MG tablet  Commonly known as: ZOLOFT  Take 1.5 tablets by mouth daily     simethicone 80 MG chewable tablet  Commonly known as: MYLICON  Take 1 tablet by mouth 4 times daily as needed for Flatulence     therapeutic multivitamin-minerals tablet     tiZANidine 2 MG tablet  Commonly known as: Marsoila Burner  take 1 tablet by mouth three times a day if needed     True Metrix Blood Glucose Test strip  Generic drug: blood glucose test strips  use 1 TEST STRIP to TEST BLOOD SUGAR twice a day and if needed     TRUEplus Lancets 30G Misc  use 1 LANCET to TEST BLOOD SUGAR twice a day and if needed     Trulicity 3 BD/9.1NV Sopn  Generic drug: Dulaglutide  Inject 3 mg into the skin once a week     vitamin D 50 MCG (2000 UT) Tabs tablet  Commonly known as: CHOLECALCIFEROL           * This list has 2 medication(s) that are the same as other medications prescribed for you. Read the directions carefully, and ask your doctor or other care provider to review them with you. STOP taking these medications      tranexamic acid 650 MG Tabs tablet  Commonly known as: LYSTEDA            ASK your doctor about these medications      HYDROcodone-acetaminophen 5-325 MG per tablet  Commonly known as: [place] Legacy               Where to Get Your Medications        These medications were sent to [facility] [address] [facility], [address] [place] [id number]      Phone: [phone]   amoxicillin-clavulanate 875-125 MG per tablet  metroNIDAZOLE 500 MG tablet  ondansetron 4 MG disintegrating tablet  phenazopyridine 100 MG tablet       You can get these medications from any pharmacy    Bring a paper prescription for each of these medications  gabapentin 300 MG capsule           Activity: pelvic rest x 8 weeks, no driving on narcotics, no lifting greater than 15 lbs  Diet: diabetic diet  Follow up: 1 day    Condition on discharge: good and stable   Discharge Date: 12/27/22    Comments:  Home care, Follow-up care, restrictions reviewed.     Ricky Glass DO  Ob/Gyn Resident  [facility]  12/27/2022, 8:44 AM

## 2022-12-27 NOTE — H&P
[address] [facility]    Patient Name: Raheem Ramírez     Patient : 1970  Room/Bed: 9407/9833-38  Admission Date/Time: 2022  8:40 PM  Primary Care Physician: SAVANA Turner CNP      CC:   Chief Complaint   Patient presents with    Incisional Pain     Hysterectomy two weeks ago    Vaginal Discharge     States brown in color                HPI: Raheem Ramírez is a 50 y.o. female [id number] presents with complaints of intermittent headaches, abdominal pain, and foul-smelling brown vaginal discharge. Patient is post op day 14 from a laparoscopic-assisted vaginal hysterectomy with BSO and cysto. Overall the patient reports surgery was uncomplicated and she states she recovered well. However last week, she started to describe intermittent diffuse abdominal pain that was originally relieved by taking her Motrin Tylenol and Roxicodone. Patient states the pain increased in intensity over the last few days and the ibuprofen and Tylenol did not help at all. She also states that she had been noting increased brown, foul-smelling vaginal discharge on her underwear and when she wiped. She states that she would feel febrile at times and T-max was 101. On exam, patient's blood pressure was elevated to 180/100 with a heart rate of 103. Patient was afebrile at the time however met SIRS criteria. Appropriate lab work was drawn and patient was sent to CT for imaging of the abdomen and pelvis. On exam, patient appeared uncomfortable. She states she has not had any sexual intercourse or anything in the vagina since her surgery.     REVIEW OF SYSTEMS:   Constitutional: negative fever, negative chills, negative weight changes   HEENT: negative visual disturbances, negative headaches, negative dizziness, negative hearing loss  Breast: Negative breast abnormalities, negative breast lumps, negative nipple discharge  Respiratory: negative dyspnea, negative cough, negative SOB  Cardiovascular: negative chest pain,  negative palpitations, negative arrhythmia, negative syncope   Gastrointestinal: positive abdominal pain, negative RUQ pain, positive N/V, negative diarrhea, positive constipation, negative bowel changes, negative heartburn   Genitourinary: negative dysuria, negative hematuria, negative urinary incontinence, positive brown colored, foul smelling vaginal discharge, negative vaginal bleeding or spotting  Dermatological: negative rash, negative pruritis, negative mole or other skin changes  Hematologic: negative bruising  Immunologic/Lymphatic: negative recent illness, negative recent sick contact  Musculoskeletal: negative back pain, negative myalgias, negative arthralgias  Neurological:  negative dizziness, negative migraines, negative seizures, negative weakness  Behavior/Psych: negative depression, negative anxiety, negative SI, negative HI    OBSTETRIC HISTORY:   OB History    Para Term  AB Living   3 2 1 1 1 3   SAB IAB Ectopic Molar Multiple Live Births   1 0 0 0 1 3      # Outcome Date GA Lbr Toni/2nd Weight Sex Delivery Anes PTL Lv   3A   33w0d   F CS-LTranv   PAULA      Complications: Failure to Progress in First Stage   3B      M CS-LTranv   PAULA      Complications: Failure to Progress in First Stage   2 SAB      SAB      1 Term  39w6d   F CS-LTranv   PAULA      Complications: Failure to Progress in First Stage      Obstetric Comments   1993: 39w6d IOL > CS (didn't dilate past 2), F; GDMA and HTN   SAB x1 somewhere in between    2000: sPTL, 33w twin delivery, CS, M,F        PAST MEDICAL HISTORY:   has a past medical history of Abnormal uterine bleeding (AUB), Anxiety, Arthritis, CAD (coronary artery disease), Chronic neck pain, COVID-19, Depression, Diabetes mellitus ([address] Hyperlipidemia, ILD (interstitial lung disease) ([address] Kidney stone, Langerhans-cell granulomatosis ([address] Myocardial infarct ([address] Neck pain, Pulmonary nodule, Spinal stenosis, Under care of team, Under care of team, Under care of team, Under care of team, and Uterine fibroid. PAST SURGICAL HISTORY:   has a past surgical history that includes back surgery ();  section; Mastoid surgery; Tonsillectomy; Cardiac catheterization (); Lung biopsy (Right); hip surgery (Left); Colonoscopy (N/A, 2021); Upper gastrointestinal endoscopy (2021); Cervical spine surgery (); cervical fusion (2016); laminectomy (); Abdomen surgery (2022); and Hysterectomy (N/A, 2022).     ALLERGIES:  Allergies as of 2022 - Fully Reviewed 2022   Allergen Reaction Noted    Seasonal Other (See Comments) 2021    Bactrim [sulfamethoxazole-trimethoprim] Nausea And Vomiting 11/10/2022    Keflex [cephalexin] Rash 2022       MEDICATIONS:  Current Facility-Administered Medications   Medication Dose Route Frequency Provider Last Rate Last Admin    enoxaparin (LOVENOX) injection 40 mg  40 mg SubCUTAneous Daily Cecille Adams DO        insulin lispro (HUMALOG) injection vial 0-8 Units  0-8 Units SubCUTAneous TID WC Cici Kathleen MD        insulin lispro (HUMALOG) injection vial 0-4 Units  0-4 Units SubCUTAneous Nightly Cici Kathleen MD        acetaminophen (TYLENOL) tablet 1,000 mg  1,000 mg Oral 3 times per day Cici Kathleen MD        gabapentin (NEURONTIN) capsule 300 mg  300 mg Oral TID Cici Kathleen MD   300 mg at 22 0340    cyclobenzaprine (FLEXERIL) tablet 10 mg  10 mg Oral TID PRN Cici Kathleen MD   10 mg at 22 0321    lidocaine 4 % external patch 1 patch  1 patch TransDERmal Daily Cici Kathleen MD   1 patch at 22 0340    oxyCODONE (ROXICODONE) immediate release tablet 5 mg  5 mg Oral Q4H PRN Sari Thurman, DO   5 mg at 22 0321    Or    oxyCODONE (ROXICODONE) immediate release tablet 10 mg  10 mg Oral Q4H PRN Sari Thurman, DO        ketorolac (TORADOL) injection 30 mg  30 mg IntraVENous Q6H Pilar Renee MD   30 mg at 12/27/22 0520    piperacillin-tazobactam (ZOSYN) 3,375 mg in dextrose 5 % 50 mL IVPB extended infusion (mini-bag)  3,375 mg IntraVENous Q8H Len López DO           FAMILY HISTORY:  family history includes Alzheimer's Disease in her paternal grandfather; COPD in her mother; Coronary Art Dis in her mother; Dementia in her mother; Depression in her mother; Diabetes in her maternal grandmother and mother; Heart Attack in her father; Heart Disease in her maternal aunt; High Blood Pressure in her mother. SOCIAL HISTORY:   reports that she has been smoking cigarettes. She has a 12.50 pack-year smoking history. She has never used smokeless tobacco. She reports current alcohol use. She reports current drug use. Frequency: 7.00 times per week. Drug: Marijuana Alyse Coad). ________________________________________________________________________                                    Abraham Haggis:  Vitals:    12/27/22 0200 12/27/22 0210 12/27/22 0303 12/27/22 0332   BP: 109/82  98/60    Pulse:  86 87    Resp:  14 16    Temp:   97 °F (36.1 °C)    TempSrc:   Oral    SpO2:  94% 97%    Weight:    209 lb 11.2 oz (95.1 kg)   Height:    5' 3\" (1.6 m)                                                    INPUT/OUTPUT:  No intake/output data recorded. No intake/output data recorded. PHYSICAL EXAM:     General Appearance: Appears healthy. Alert; in mild distress. Pleasant. Skin: Skin color, texture, turgor normal. No rashes or lesions. Three well healing laparoscopic scars noted. Neck and EENT: normal atraumatic, no neck masses, normal thyroid  Respiratory: Normal expansion. Clear to auscultation. No rales, rhonchi, or wheezing. Cardiovascular: regular rate and rhythm  Abdomen: diffuse abdominal tenderness to palpation noted.  Most significantly in epigastric region. Pelvic Exam:   Chaperone for Intimate Exam: Chaperone was present for entire exam, Chaperone Name: Sharlene Koch RN  External genitalia: General appearance; normal, Hair distribution; normal, Lesions absent  Urinary system: urethral meatus normal  Vaginal: SSE demonstrated brown discharge in vaginal cault. Vaginal cuff closer visualized and subsequently palpated with SVE. No evidence of vaginal cuff dehiscence. Musculoskeletal: no gross abnormalities  Extremities: non-tender BLE and non-edematous  Psych:  oriented to time, place and person     LAB RESULTS:  Results for orders placed or performed during the hospital encounter of 12/26/22   Blood Culture 1    Specimen: Blood   Result Value Ref Range    Specimen Description . BLOOD     Special Requests RT WRIST 10ML     Culture NO GROWTH <24 HRS    Culture, Blood 2    Specimen: Blood   Result Value Ref Range    Specimen Description . BLOOD     Special Requests LT WRIST 10ML     Culture NO GROWTH <24 HRS    Vaginitis DNA Probe    Specimen: Vaginal   Result Value Ref Range    Source . VAGINAL SWAB     Trichomonas Vaginalis DNA NEGATIVE NEGATIVE    Gardnerella Vaginalis, DNA Probe POSITIVE (A) NEGATIVE    Candida Species, DNA Probe NEGATIVE NEGATIVE   Culture, Anaerobic and Aerobic    Specimen: Perineum   Result Value Ref Range    Specimen Description . PERINEUM     Direct Exam MODERATE NEUTROPHILS (A)     Direct Exam MANY GRAM NEGATIVE RODS (A)     Direct Exam MANY GRAM POSITIVE RODS (A)     Direct Exam MODERATE GRAM POSITIVE COCCI IN PAIRS (A)     Culture PENDING    CBC with Auto Differential   Result Value Ref Range    WBC 16.9 (H) 3.5 - 11.3 k/uL    RBC 4.40 3.95 - 5.11 m/uL    Hemoglobin 10.7 (L) 11.9 - 15.1 g/dL    Hematocrit 35.2 (L) 36.3 - 47.1 %    MCV 80.0 (L) 82.6 - 102.9 fL    MCH 24.3 (L) 25.2 - 33.5 pg    MCHC 30.4 28.4 - 34.8 g/dL    RDW 17.5 (H) 11.8 - 14.4 %    Platelets 712 (H) 085 - 453 k/uL    MPV 9.0 8.1 - 13.5 fL    NRBC Automated 0.0 0.0 per 100 WBC    Immature Granulocytes 0 0 %    Seg Neutrophils 57 36 - 66 %    Lymphocytes 37 24 - 44 %    Monocytes 2 1 - 7 %    Eosinophils % 3 1 - 4 %    Basophils 1 0 - 2 %    Absolute Immature Granulocyte 0.00 0.00 - 0.30 k/uL    Segs Absolute 9.63 (H) 1.8 - 7.7 k/uL    Absolute Lymph # 6.25 (H) 1.0 - 4.8 k/uL    Absolute Mono # 0.34 0.1 - 0.8 k/uL    Absolute Eos # 0.51 (H) 0.0 - 0.4 k/uL    Basophils Absolute 0.17 0.0 - 0.2 k/uL    Morphology ANISOCYTOSIS PRESENT     Morphology MICROCYTOSIS PRESENT    CMP   Result Value Ref Range    Glucose 167 (H) 70 - 99 mg/dL    BUN 11 6 - 20 mg/dL    Creatinine 0.50 0.50 - 0.90 mg/dL    Est, Glom Filt Rate >60 >60 mL/min/1.73m2    Calcium 8.8 8.6 - 10.4 mg/dL    Sodium 137 135 - 144 mmol/L    Potassium 3.6 (L) 3.7 - 5.3 mmol/L    Chloride 102 98 - 107 mmol/L    CO2 23 20 - 31 mmol/L    Anion Gap 12 9 - 17 mmol/L    Alkaline Phosphatase 81 35 - 104 U/L    ALT 14 5 - 33 U/L    AST 12 <32 U/L    Total Bilirubin <0.1 (L) 0.3 - 1.2 mg/dL    Total Protein 7.2 6.4 - 8.3 g/dL    Albumin 3.9 3.5 - 5.2 g/dL    Albumin/Globulin Ratio 1.2 1.0 - 2.5   Urinalysis   Result Value Ref Range    Color, UA Yellow Yellow    Turbidity UA Cloudy (A) Clear    Glucose, Ur NEGATIVE NEGATIVE    Bilirubin Urine NEGATIVE NEGATIVE    Ketones, Urine NEGATIVE NEGATIVE    Specific Gravity, UA 1.014 1.005 - 1.030    Urine Hgb MODERATE (A) NEGATIVE    pH, UA 6.0 5.0 - 8.0    Protein, UA NEGATIVE NEGATIVE    Urobilinogen, Urine Normal Normal    Nitrite, Urine NEGATIVE NEGATIVE    Leukocyte Esterase, Urine LARGE (A) NEGATIVE   Lactic Acid   Result Value Ref Range    Lactic Acid, Whole Blood 2.0 0.7 - 2.1 mmol/L   Microscopic Urinalysis   Result Value Ref Range    WBC, UA 20 TO 50 0 - 5 /HPF    RBC, UA 2 TO 5 0 - 2 /HPF    Epithelial Cells UA None 0 - 5 /HPF    Bacteria, UA MODERATE (A) None         DIAGNOSTICS:  CT Abdomen/Pelvis 12/27/22:   1.  Hysterectomy, without postoperative abscess or other acute abnormality in   the pelvis. 2. Nonobstructive renal calculi. 3. Other incidental findings as above. ASSESSMENT & PLAN:    Marissa Marquez is a 50 y.o. female [id number] who is POD #14 from a LAVH complaining of fevers, discharge and abdominal pain   -Patient appears uncomfortable on exam   -Vital signs at this time unstable and SIRS criteria activated. Blood cultures lactic acid, urinalysis, and CT ordered. White blood cell count elevated at 13.6. Patient afebrile at this time.   -On physical exam, patient was acutely diffuse to palpation throughout her abdomen specifically in her epigastric region. The 3 laparoscopic port scars appeared well-healed however patient reported 8 out of 10 pain in the supraumbilical site.   -On pelvic exam, sterile speculum exam showed an intact vaginal cuff with a minimal amount of brown discharge in the vaginal vault   -Anaerobic, aerobic cultures as well as vaginitis were collected. Gonorrhea and Chlamydia are added onto urine.   -Bimanual exam and palpation of the vaginal cuff indicated no vaginal cuff dehiscence, with the suture intact from the right lateral corner to the left. At this time low suspicion for vaginal cuff dehiscence. -Patient was visibly uncomfortable during her pelvic exam   -Patient describes her pain as a 7-8 out of 10. She states the morphine helped slightly. At this time, Toradol was ordered for the patient as well as scheduled Tylenol and Andreina as needed   -CT scan read states that there is no sign of a postoperative abscess or any other acute abnormality in the pelvis   -Patient to be admitted under observation and given broad-spectrum antibiotics due to uncontrolled pain at home.   Zosyn ordered IV for 24 hours   -For added pain control, gabapentin, Flexeril and lidocaine patch were prescribed.  -Patient to be admitted under the attending: Dr. Jame Garza    T2DM   - POCT AC HS glucose ordered   - MDISS in place   - Continue to monitor closely    Essential Hypertension   - BP on admission 183/102   - BP normotensive at this time   - Continue to monitor closely    History of DVT   - Lovenox 40 mg qd ordered on admission   - Ambulation encouraged    Anxiety/Depression   - Patient denies SI/HI   - Clinically asymptomatic at this time    BMI 37         Patient Active Problem List    Diagnosis Date Noted    Post-op pain 12/27/2022     Priority: Medium    Postoperative infection, initial encounter 12/26/2022     Priority: Medium    S/p LAVH, BSO, Cysto 12/12/22 12/12/2022     Priority: Medium    Chest pain 10/29/2022     Priority: Medium    Hx of CS x2 10/26/2022     Priority: Medium    Hx of laparoscopic tubal ligation (2000) 10/26/2022     Priority: Medium    Abnormal uterine bleeding 10/26/2022     Priority: Medium    Fundal uterine fibroid  10/26/2022     Priority: Medium    Atypical chest pain 07/08/2022     Priority: Medium    NSTEMI (non-ST elevated myocardial infarction) ([address] 07/08/2022     Priority: Medium    COVID-19 07/08/2022     Priority: Medium    Gastroesophageal reflux disease with esophagitis without hemorrhage     Irritable bowel syndrome with constipation 10/13/2021    Irritable bowel syndrome with both constipation and diarrhea 10/13/2021    Positive colorectal cancer screening using Cologuard test 10/13/2021    Mixed hyperlipidemia 09/13/2021    Migraine without status migrainosus, not intractable 09/13/2021    OAB (overactive bladder) 09/13/2021    Smoker 09/13/2021    Pulmonary Langerhans cell granulomatosis ([address]     Coronary artery disease involving native heart with angina pectoris ([address]     Situational depression 03/06/2020    Anxiety 03/06/2020    Hx of deep venous thrombosis 03/04/2020    Spinal stenosis of cervical region     Hip fracture ([address] 01/18/2020    Hypothyroidism     Type 2 diabetes mellitus without complication, with long-term current use of insulin ([address]     Essential hypertension        Plan discussed with  Man, who is agreeable. Attending's Name: Dr. León Whitaker MD  Ob/Gyn Resident   [facility]  2022, 6:08 AM    Date: 2022  Time: 7:49 AM      Patient Name: Juliocesar Negrete  Patient : 1970  Room/Bed: 9880/7799-29  Admission Date/Time: 2022  8:40 PM        Attending Physician Statement  I have personally seen, evaluated and discussed the care of Juliocesar Negrete, including pertinent history and exam findings with the resident. I have reviewed and edited their note in the electronic medical record. The key elements of all parts of the encounter have been performed/reviewed by me. I agree with the assessment, plan and orders as documented by the resident. The level of care submitted represents to the best of my ability the care documented in the medical record today. GC Modifier. This service has been performed in part by a resident under the direction of a teaching physician.         Attending's Name:  Saurabh Christianson MD

## 2022-12-27 NOTE — PROGRESS NOTES
CLINICAL PHARMACY NOTE: MEDS TO BEDS    Total # of Prescriptions Filled: 5   The following medications were delivered to the patient:  Gabapentin   Augmentin 875   Flagyl 500  Phenazopyridine 100  Zofran 4mg odt     Additional Documentation:

## 2022-12-27 NOTE — CARE COORDINATION
12/27/22 0959   Service Assessment   Patient Orientation Alert and Oriented   Cognition Alert   History Provided By Patient   Primary Caregiver Self   Support Systems Spouse/Significant Other;Children   PCP Verified by CM Yes   Last Visit to PCP Within last 3 months   Prior Functional Level Independent in ADLs/IADLs   Current Functional Level Independent in ADLs/IADLs   Can patient return to prior living arrangement Yes   Ability to make needs known: Good   Family able to assist with home care needs: Yes   Social/Functional History   Lives With Spouse; Son   Type of [address] Two level   [address] to enter with rails   Entrance Stairs - Number of Steps 3   Entrance Stairs - Rails Both   Bathroom Shower/Tub Tub/Shower unit   Home Equipment Cane;Walker, rolling   Receives Help From Family   ADL Assistance Independent   Homemaking Assistance Independent   Homemaking Responsibilities Yes   Ambulation Assistance Independent   Transfer Assistance Independent   Active  No   Patient's  Info    Mode of Transportation Family   Occupation Unemployed   Discharge Planning   Type of [address] Prior To Admission None   Potential Assistance Needed N/A   DME Ordered?  No   Potential Assistance Purchasing Medications No   Type of Home Care Services None   Patient expects to be discharged to: House   One/Two Story Residence Two story   # of Interior Steps [id number] Discharge   Transition of Care Consult (CM Consult) Discharge Planning   Services At/After Discharge None   Mode of Transport at Discharge Self   Confirm Follow Up Transport Self   Condition of Participation: Discharge Planning   The Plan for Transition of Care is related to the following treatment goals: healing

## 2022-12-27 NOTE — PROGRESS NOTES
CLINICAL PHARMACY NOTE: MEDS TO BEDS    Total # of Prescriptions Filled: 1   The following medications were delivered to the patient:  Oxycodone 5mg    Additional Documentation: delivered to patient in room 348 12/27 at 1:06pm. No co-pay.

## 2022-12-28 ENCOUNTER — OFFICE VISIT (OUTPATIENT)
Dept: OBGYN | Age: 52
End: 2022-12-28
Payer: COMMERCIAL

## 2022-12-28 VITALS
SYSTOLIC BLOOD PRESSURE: 143 MMHG | BODY MASS INDEX: 38.41 KG/M2 | HEIGHT: 63 IN | HEART RATE: 98 BPM | WEIGHT: 216.8 LBS | TEMPERATURE: 98.9 F | DIASTOLIC BLOOD PRESSURE: 85 MMHG

## 2022-12-28 DIAGNOSIS — Z98.890 POST-OPERATIVE STATE: Primary | ICD-10-CM

## 2022-12-28 LAB
CULTURE: ABNORMAL
CULTURE: ABNORMAL
CULTURE: NORMAL
DIRECT EXAM: ABNORMAL
SPECIMEN DESCRIPTION: ABNORMAL
SPECIMEN DESCRIPTION: NORMAL

## 2022-12-28 PROCEDURE — 99211 OFF/OP EST MAY X REQ PHY/QHP: CPT | Performed by: STUDENT IN AN ORGANIZED HEALTH CARE EDUCATION/TRAINING PROGRAM

## 2022-12-28 PROCEDURE — 99024 POSTOP FOLLOW-UP VISIT: CPT | Performed by: STUDENT IN AN ORGANIZED HEALTH CARE EDUCATION/TRAINING PROGRAM

## 2022-12-28 RX ORDER — OXYCODONE HYDROCHLORIDE 5 MG/1
5 TABLET ORAL EVERY 6 HOURS PRN
Qty: 20 TABLET | Refills: 0 | Status: SHIPPED | OUTPATIENT
Start: 2022-12-28 | End: 2023-01-02

## 2022-12-28 NOTE — PROGRESS NOTES
OB/GYN Post-operative Visit     Akua Trivedi                         Primary Care Physician: Jessy Landau, APRN - CNP    CC:   Chief Complaint   Patient presents with    Post-Op Check     S/p [address]          HPI: Akua Trivedi is a 50 y.o. female [id number]    The patient was seen and examined. She is here for post-operative recheck from [facility], Melva WARE on 22. She states she is doing well. Patient was readmitted secondary to post-op pain and fevers at home on 22 and was discharged yesterday. UTI was suspected however Urine culture has resulted as negative. CT abd/pelvis also was unremarkable. She complains of vague abdominal discomfort, nausea, and fatigue.      REVIEW OF SYSTEMS:  Constitutional: negative fever, negative chills  HEENT: negative visual disturbances, negative headaches  Respiratory: negative dyspnea, negative cough  Cardiovascular: negative chest pain,  negative palpitations  Gastrointestinal: negative abdominal pain, negative RUQ pain, negative N/V, negative diarrhea, negative constipation  Genitourinary: negative dysuria, negative vaginal discharge  Dermatological: negative rash  Hematologic: negative bruising  Immunologic/Lymphatic: negative recent illness, negative recent sick contact  Musculoskeletal: negative back pain, negative myalgias, negative arthralgias  Neurological:  negative dizziness, negative weakness  Behavior/Psych: negative depression, negative anxiety      OBSTETRICAL HISTORY:  OB History    Para Term  AB Living   3 2 1 1 1 3   SAB IAB Ectopic Molar Multiple Live Births   1       1 3      # Outcome Date GA Lbr Toni/2nd Weight Sex Delivery Anes PTL Lv   3A   33w0d   F CS-LTranv   PAULA      Complications: Failure to Progress in First Stage   3B      M CS-LTranv   PAULA      Complications: Failure to Progress in First Stage   2 SAB      SAB      1 Term  39w6d   F CS-LTranv   PAULA      Complications: Failure to Progress in First Stage      Obstetric Comments   4/16/1993: 39w6d IOL > CS (didn't dilate past 2), F; GDMA and HTN   SAB x1 somewhere in between    8/18/2000: sPTL, 33w twin delivery, CS, M,F        PAST MEDICAL HISTORY:      Diagnosis Date    Abnormal uterine bleeding (AUB)     Anxiety     Arthritis     CAD (coronary artery disease)     Dr. Jumana Snow    Chronic neck pain     COVID-19     fever, slight cough, fatigue,lost taste,  admitted for couple days bc of elevated troponins    Depression     Diabetes mellitus ([address] 2017    managed by PCP    Hyperlipidemia     ILD (interstitial lung disease) ([address]     Kidney stone     Langerhans-cell granulomatosis ([address]     Myocardial infarct ([address] 2016    Neck pain     Pulmonary nodule     Spinal stenosis 2017    Under care of team     [facility]: [id number]    Under care of team     PCP: Jenifer SAWANT- TONYA    Under care of team     GYN: Dr. Cara Larose    Under care of team     cardiologist: Dr. Jumana Snow last visit 7/2022    Uterine fibroid        PAST SURGICAL HISTORY:                                                                    Procedure Laterality Date    ABDOMEN SURGERY  12/12/2022    TOTAL LAPAROSCOPIC HYSTERECTOMY, BSO, CYSTOSCOPY    BACK SURGERY  2004    L5-S1 fusion, Dr. Palmira Link  2016    one stent LAD    CERVICAL FUSION  12/2016    C5-7, [facility]  2019    C4, Dr. Ned Wren 2019    [facility]      x 2 1993 and 2000    COLONOSCOPY N/A 12/08/2021    COLONOSCOPY WITH BIOPSY performed by Kierra Garcia MD at [address] Left     pin    HYSTERECTOMY (CERVIX STATUS UNKNOWN) N/A 12/12/2022    TOTAL LAPAROSCOPIC HYSTERECTOMY, BSO, CYSTOSCOPY performed by Sumanth Bennett DO at [address] [facility], lumbar    LUNG BIOPSY Right     open    MASTOID SURGERY      as a child    TONSILLECTOMY      as a child    UPPER GASTROINTESTINAL ENDOSCOPY  12/08/2021    EGD BIOPSY performed by Kierra Garcia MD at [facility]       MEDICATIONS:  Current Outpatient Medications   Medication Sig Dispense Refill    oxyCODONE (ROXICODONE) 5 MG immediate release tablet Take 1 tablet by mouth every 6 hours as needed for Pain for up to 5 days. Intended supply: 3 days. Take lowest dose possible to manage pain Max Daily Amount: 20 mg 20 tablet 0    amoxicillin-clavulanate (AUGMENTIN) 875-125 MG per tablet Take 1 tablet by mouth 2 times daily for 10 days 20 tablet 0    phenazopyridine (PYRIDIUM) 100 MG tablet Take 1 tablet by mouth 3 times daily as needed for Pain 10 tablet 0    metroNIDAZOLE (FLAGYL) 500 MG tablet Take 1 tablet by mouth 2 times daily for 7 days 14 tablet 0    ondansetron (ZOFRAN-ODT) 4 MG disintegrating tablet Take 1 tablet by mouth every 8 hours as needed for Nausea or Vomiting 10 tablet 0    gabapentin (NEURONTIN) 300 MG capsule Take 1 capsule by mouth 3 times daily for 5 days. Intended supply: 5 days  Decrease dose as pain improves 15 capsule 0    oxyCODONE (ROXICODONE) 5 MG immediate release tablet Take 1 tablet by mouth every 6 hours as needed for Pain for up to 2 days. Intended supply: 3 days.  Take lowest dose possible to manage pain Max Daily Amount: 20 mg 6 tablet 0    ibuprofen (ADVIL;MOTRIN) 600 MG tablet Take 1 tablet by mouth every 6 hours as needed for Pain 40 tablet 1    acetaminophen (TYLENOL) 500 MG tablet Take 2 tablets by mouth every 6 hours as needed for Pain 30 tablet 1    docusate sodium (COLACE) 100 MG capsule Take 1 capsule by mouth 2 times daily 60 capsule 1    simethicone (MYLICON) 80 MG chewable tablet Take 1 tablet by mouth 4 times daily as needed for Flatulence 180 tablet 3    empagliflozin (JARDIANCE) 25 MG tablet Take 1 tablet by mouth daily 90 tablet 1    calcium citrate (CALCITRATE) 950 (200 Ca) MG tablet Calcitrate 200 mg (950 mg) tablet      busPIRone (BUSPAR) 15 MG tablet Take 7.5 mg by mouth 3 times daily 30 tablet 1    Dulaglutide (TRULICITY) 3 TU/0.0WM SOPN Inject 3 mg into the skin once a week 5 pen 3    omeprazole (PRILOSEC) 40 MG delayed release capsule Take 1 capsule by mouth every morning (before breakfast) 30 capsule 3    insulin detemir (LEVEMIR FLEXTOUCH) 100 UNIT/ML injection pen Inject 26 Units into the skin nightly 5 pen 3    Insulin Pen Needle 31G X 5 MM MISC Use pen needle for giving daily insulin injection 100 each 3    TRUE METRIX BLOOD GLUCOSE TEST strip use 1 TEST STRIP to TEST BLOOD SUGAR twice a day and if needed 100 strip 3    TRUEplus Lancets 30G MISC use 1 LANCET to TEST BLOOD SUGAR twice a day and if needed 100 each 3    albuterol (PROVENTIL) (2.5 MG/3ML) 0.083% nebulizer solution Take 2.5 mg by nebulization daily as needed for Wheezing      blood glucose monitor kit and supplies Dispense sufficient amount for BID testing frequency plus additional to accommodate PRN testing needs. Dispense all needed supplies to include: monitor, strips, lancing device, lancets, control solutions, alcohol swabs.  1 kit 0    tiZANidine (ZANAFLEX) 2 MG tablet take 1 tablet by mouth three times a day if needed 90 tablet 0    nitroGLYCERIN (NITROSTAT) 0.4 MG SL tablet place 1 tablet under the tongue if needed every 5 minutes for chest pain for 3 doses IF NO RELIEF AFTER THIRD DOSE CALL PRESCRIBER . 25 tablet 0    calcium carbonate (OS-BRANNON) 1250 (500 Ca) MG chewable tablet Take 500-1,000 mg by mouth 3 times daily      metoprolol succinate (TOPROL XL) 100 MG extended release tablet take 1 tablet by mouth twice a day (Patient taking differently: 50 mg 2 times daily take 1 tablet by mouth twice a day) 180 tablet 1    sertraline (ZOLOFT) 100 MG tablet Take 1.5 tablets by mouth daily 135 tablet 1    ranolazine (RANEXA) 500 MG extended release tablet Take 1 tablet by mouth 2 times daily 180 tablet 1    atorvastatin (LIPITOR) 80 MG tablet Take 1 tablet by mouth daily 90 tablet 1    clopidogrel (PLAVIX) 75 MG tablet Take 1 tablet by mouth daily 90 tablet 1    mirabegron (MYRBETRIQ) 50 MG TB24 States she is taking gabapentin, Tylenol, Motrin, without significant improvement in pain. Does have Roxicodone at home which she states is helping her pain. She is almost out. Discussed one additional refill of Andreina and continuing to take other meds and only take roxicodone when needed. - DC Amoxicillin and Pyridium    - Continue Flagyl   - Patient was around her grandchildren and family for [place] discussed that she could also have a viral illness as post-op work up for source of fatigue and subjective fever has been negative. - Follow up in 1 week    Patient Active Problem List    Diagnosis Date Noted    Post-op pain 12/27/2022     Priority: Medium    Postoperative infection, initial encounter 12/26/2022     Priority: Medium    S/p LAVH, BSO, Cysto 12/12/22 12/12/2022     Priority: Medium    Chest pain 10/29/2022     Priority: Medium    Hx of CS x2 10/26/2022     Priority: Medium    Hx of laparoscopic tubal ligation (2000) 10/26/2022     Priority: Medium    Abnormal uterine bleeding 10/26/2022     Priority: Medium    Fundal uterine fibroid  10/26/2022     Priority: Medium    Atypical chest pain 07/08/2022     Priority: Medium    NSTEMI (non-ST elevated myocardial infarction) ([facility] 07/08/2022     Priority: Medium    COVID-19 07/08/2022     Priority: Medium    Urinary tract infection 12/27/2022     Rx for Augmentin for complicated UTI awaiting Cx and sensitivity.  Had zosyn inpatient      Gastroesophageal reflux disease with esophagitis without hemorrhage     Irritable bowel syndrome with constipation 10/13/2021    Irritable bowel syndrome with both constipation and diarrhea 10/13/2021    Positive colorectal cancer screening using Cologuard test 10/13/2021    Mixed hyperlipidemia 09/13/2021    Migraine without status migrainosus, not intractable 09/13/2021    OAB (overactive bladder) 09/13/2021    Smoker 09/13/2021    Pulmonary Langerhans cell granulomatosis ([facility]     Coronary artery disease involving native heart with angina pectoris [facility])     Situational depression 03/06/2020    Anxiety 03/06/2020    Hx of deep venous thrombosis 03/04/2020    Spinal stenosis of cervical region     Hip fracture ([facility] 01/18/2020    Hypothyroidism     Type 2 diabetes mellitus without complication, with long-term current use of insulin ([facility]     Essential hypertension        Return for Recheck Dr. Rosa Jenkins. Diagnosis Orders   1.  Post-operative state  oxyCODONE (ROXICODONE) 5 MG immediate release tablet          Ulysses Clever, DO  Ob/Gyn   [facility], [address]  12/28/2022, 3:43 PM

## 2023-01-04 ENCOUNTER — OFFICE VISIT (OUTPATIENT)
Dept: OBGYN | Age: 53
End: 2023-01-04
Payer: COMMERCIAL

## 2023-01-04 VITALS
HEART RATE: 132 BPM | SYSTOLIC BLOOD PRESSURE: 144 MMHG | DIASTOLIC BLOOD PRESSURE: 93 MMHG | HEIGHT: 63 IN | BODY MASS INDEX: 37.21 KG/M2 | WEIGHT: 210 LBS

## 2023-01-04 DIAGNOSIS — G89.18 POST-OP PAIN: Primary | ICD-10-CM

## 2023-01-04 DIAGNOSIS — Z90.710 S/P HYSTERECTOMY: ICD-10-CM

## 2023-01-04 PROCEDURE — 99024 POSTOP FOLLOW-UP VISIT: CPT | Performed by: STUDENT IN AN ORGANIZED HEALTH CARE EDUCATION/TRAINING PROGRAM

## 2023-01-04 PROCEDURE — 99211 OFF/OP EST MAY X REQ PHY/QHP: CPT | Performed by: STUDENT IN AN ORGANIZED HEALTH CARE EDUCATION/TRAINING PROGRAM

## 2023-01-04 RX ORDER — ACETAMINOPHEN 500 MG
1000 TABLET ORAL EVERY 6 HOURS PRN
Qty: 90 TABLET | Refills: 2 | Status: SHIPPED | OUTPATIENT
Start: 2023-01-04

## 2023-01-04 RX ORDER — IBUPROFEN 600 MG/1
600 TABLET ORAL EVERY 6 HOURS PRN
Qty: 40 TABLET | Refills: 3 | Status: SHIPPED | OUTPATIENT
Start: 2023-01-04

## 2023-01-04 RX ORDER — GABAPENTIN 300 MG/1
300 CAPSULE ORAL 3 TIMES DAILY
Qty: 90 CAPSULE | Refills: 0 | Status: SHIPPED | OUTPATIENT
Start: 2023-01-04 | End: 2023-02-03

## 2023-01-04 NOTE — PROGRESS NOTES
Postoperative Visit    Meliton Abreu is a 50 y.o. female [id number], 3 weeks Post Operative s/p LAVH, BSO, Cysto on 12/12/22    The patient was seen. She is feeling much better today since her last visit. She states she has only been taking 1 donnell per day and only has one left but that she has not needed it much. She madsen note some urinary frequency since her hysterectomy but states it is gradually improving. She states she thinks whatever infection she had has resolved. She has been able to do small things around the house like the dishes and walking. Patient is otherwise urinating well and having regular BM. Patient Active Problem List   Diagnosis    Hypothyroidism    Type 2 diabetes mellitus without complication, with long-term current use of insulin ([address]    Essential hypertension    Hip fracture ([address]    Spinal stenosis of cervical region    Hx of deep venous thrombosis    Situational depression    Anxiety    Pulmonary Langerhans cell granulomatosis ([address]    Mixed hyperlipidemia    Coronary artery disease involving native heart with angina pectoris (HCC)    Migraine without status migrainosus, not intractable    OAB (overactive bladder)    Smoker    Irritable bowel syndrome with constipation    Irritable bowel syndrome with both constipation and diarrhea    Positive colorectal cancer screening using Cologuard test    Gastroesophageal reflux disease with esophagitis without hemorrhage    Atypical chest pain    NSTEMI (non-ST elevated myocardial infarction) ([address]    COVID-19    Hx of CS x2    Hx of laparoscopic tubal ligation (2000)    Abnormal uterine bleeding    Fundal uterine fibroid     Chest pain    S/p LAVH, BSO, Cysto 12/12/22    Postoperative infection, initial encounter    Post-op pain    Urinary tract infection       Vitals:   Blood pressure (!) 148/95, pulse (!) 132, height 5' 3\" (1.6 m), weight 210 lb (95.3 kg), last menstrual period 11/26/2022, not currently breastfeeding.     Physical Exam:  General:  no apparent distress, alert, and cooperative  Lungs:  no increased work of breathing, normal chest wall rise bilaterally, voice changes consistent with chronic smoking  Heart:  regular rate and rhythm and no murmur    Abdomen: Abdomen soft, non-tender. BS normal. No masses,  No organomegaly  Incision: clean, dry, and intact, in stages of healing. RLQ incision appears slightly erythematous, but patient states she picked the scab today in the shower  Extremities:  no calf tenderness, non edematous    Assessment:  Britney Camp is a 50 y.o. female [id number], 3 weeks Post Operative s/p LAVH, BSO, Cysto on 12/12/22   - Patient was readmitted for pain 12/26/22, and subsequently discharged   - Ucx negative at that time   - Small refill of Donnell given at last appointment for continued pain, notified patient would not refill donnell at this time - patient amenable    - Gabapentin, Tylenol, Ibuprofen refilled   - F/u @ 6 weeks post-op for cuff inspection   - Doing well, continue routine post operative care    Patient Active Problem List    Diagnosis Date Noted    Post-op pain 12/27/2022     Priority: Medium    Postoperative infection, initial encounter 12/26/2022     Priority: Medium    S/p LAVH, BSO, Cysto 12/12/22 12/12/2022     Priority: Medium    Chest pain 10/29/2022     Priority: Medium    Hx of CS x2 10/26/2022     Priority: Medium    Hx of laparoscopic tubal ligation (2000) 10/26/2022     Priority: Medium    Abnormal uterine bleeding 10/26/2022     Priority: Medium    Fundal uterine fibroid  10/26/2022     Priority: Medium    Atypical chest pain 07/08/2022     Priority: Medium    NSTEMI (non-ST elevated myocardial infarction) ([facility] 07/08/2022     Priority: Medium    COVID-19 07/08/2022     Priority: Medium    Urinary tract infection 12/27/2022     Rx for Augmentin for complicated UTI awaiting Cx and sensitivity.  Had zosyn inpatient      Gastroesophageal reflux disease with esophagitis without hemorrhage Irritable bowel syndrome with constipation 10/13/2021    Irritable bowel syndrome with both constipation and diarrhea 10/13/2021    Positive colorectal cancer screening using Cologuard test 10/13/2021    Mixed hyperlipidemia 09/13/2021    Migraine without status migrainosus, not intractable 09/13/2021    OAB (overactive bladder) 09/13/2021    Smoker 09/13/2021    Pulmonary Langerhans cell granulomatosis ([address]     Coronary artery disease involving native heart with angina pectoris ([address]     Situational depression 03/06/2020    Anxiety 03/06/2020    Hx of deep venous thrombosis 03/04/2020    Spinal stenosis of cervical region     Hip fracture ([address] 01/18/2020    Hypothyroidism     Type 2 diabetes mellitus without complication, with long-term current use of insulin ([address]     Essential hypertension      Return in about 4 weeks (around 2/1/2023).     Counseling Completed:  Continue with post operative restrictions until 8 weeks post op  No heavy lifting or [place] until 8 weeks post operative  Strict return precautions with fever, chills, nausea, vomiting, diarrhea, worsening abdominal pain, worsening vaginal bleeding, foul smelling vaginal discharge    Brenton Banks DO  Ob/Gyn Resident  [facility], [facility]  1/4/2023, 2:45 PM

## 2023-01-09 NOTE — PROGRESS NOTES
Attending Physician Statement  I have discussed the care of Sterling Roman, including pertinent history and exam findings,  with the resident. I have reviewed the key elements of all parts of the encounter with the resident. I agree with the assessment, plan and orders as documented by the resident.   (GE Modifier)    Jeanie Prior, DO

## 2023-01-25 ENCOUNTER — TELEPHONE (OUTPATIENT)
Dept: OBGYN | Age: 53
End: 2023-01-25

## 2023-01-25 NOTE — TELEPHONE ENCOUNTER
Left message for pt to call back.   We are closed this afternoon due to the weather and her appointment needs to be rescheduled

## 2023-01-26 PROBLEM — N39.0 URINARY TRACT INFECTION: Status: RESOLVED | Noted: 2022-12-27 | Resolved: 2023-01-26

## 2023-02-01 ENCOUNTER — HOSPITAL ENCOUNTER (OUTPATIENT)
Age: 53
Setting detail: SPECIMEN
Discharge: HOME | End: 2023-02-01

## 2023-02-01 ENCOUNTER — OFFICE VISIT (OUTPATIENT)
Dept: OBGYN | Age: 53
End: 2023-02-01

## 2023-02-01 VITALS
WEIGHT: 213 LBS | HEART RATE: 120 BPM | HEIGHT: 63 IN | DIASTOLIC BLOOD PRESSURE: 98 MMHG | SYSTOLIC BLOOD PRESSURE: 147 MMHG | BODY MASS INDEX: 37.74 KG/M2

## 2023-02-01 DIAGNOSIS — G47.00 INSOMNIA, UNSPECIFIED TYPE: ICD-10-CM

## 2023-02-01 DIAGNOSIS — N89.8 VAGINAL DISCHARGE: ICD-10-CM

## 2023-02-01 DIAGNOSIS — N89.8 VAGINAL DISCHARGE: Primary | ICD-10-CM

## 2023-02-01 PROCEDURE — 99024 POSTOP FOLLOW-UP VISIT: CPT | Performed by: OBSTETRICS & GYNECOLOGY

## 2023-02-01 RX ORDER — ZOLPIDEM TARTRATE 5 MG/1
5 TABLET ORAL NIGHTLY PRN
Qty: 30 TABLET | Refills: 2 | Status: SHIPPED | OUTPATIENT
Start: 2023-02-01 | End: 2023-03-03

## 2023-02-01 RX ORDER — FLUCONAZOLE 150 MG/1
150 TABLET ORAL ONCE
Qty: 2 TABLET | Refills: 0 | Status: SHIPPED | OUTPATIENT
Start: 2023-02-01 | End: 2023-02-01

## 2023-02-01 NOTE — PROGRESS NOTES
Elizabeth Barboza  4/6/2169  2:29 PM      Elizabeth Barboza  Procedure: LAVH, BSO, cystoscopy      Elizabeth Barboza is a 50 y.o. female [id number]      The patient was seen. She denied any shortness of breath, chest pain or dizziness. She denied any nausea, vomiting, or diarrhea. There is no fever, chills, or rigors. The patient denies any vaginal bleeding, discharge or odor. All of her pre-operative complaints are now resolved. Pt is c/o not sleeping, not sure if this is because of the hot flashes, or if she is just not able to sleep. She has tried Tylenol PM and melatonin and it is not helping. She would really like to try a sleep aid, because she is not getting enough sleep and this is effecting her ADLs. She also c/o having a yeast infection and the OTC monistat is not helping. Will get cultures today and also give her diflucan. BP elevated today - she denies HA/CP/numbness/tingling - encouraged her to go to ER if these occur. Pt sees her PCP on 2/7/23    Blood pressure (!) 147/98, pulse (!) 120, height 5' 3\" (1.6 m), weight 213 lb (96.6 kg), last menstrual period 11/26/2022, not currently breastfeeding. Chaperone for Intimate Exam  Chaperone was offered and accepted as part of the rooming process. Chaperone: Preet Zepeda MA        Abdominal Exam: soft non-tender. Good bowel sounds. No guarding, rebound or rigidity. No costal vertebral angle tenderness bilateral. No hernias    Incision: healing well, no drainage, no erythema, no hernia, no seroma, no swelling, well approximated    Extremities: No edema or calf pain noted bilaterally.     Pelvic Exam:External genitalia: normal general appearance  Urinary system: urethral meatus normal  Vaginal: normal mucosa without prolapse or lesions, normal rugae, and small amount of thick, white discharge - vaginitis probe obtained  Cervix: removed surgically  Adnexa: removed surgically  Uterus: removed surgically  No vaginal defect noted, no granulation tissue, no suture - pt has healed well from surgery. Results for orders placed or performed during the hospital encounter of 12/26/22   Blood Culture 1    Specimen: Blood   Result Value Ref Range    Specimen Description . BLOOD     Special Requests RT WRIST 10ML     Culture NO GROWTH 5 DAYS    Culture, Blood 2    Specimen: Blood   Result Value Ref Range    Specimen Description . BLOOD     Special Requests LT WRIST 10ML     Culture NO GROWTH 5 DAYS    Culture, Urine    Specimen: Urine, clean catch   Result Value Ref Range    Specimen Description . CLEAN CATCH URINE     Culture NO SIGNIFICANT GROWTH    Vaginitis DNA Probe    Specimen: Vaginal   Result Value Ref Range    Source . VAGINAL SWAB     Trichomonas Vaginalis DNA NEGATIVE NEGATIVE    Gardnerella Vaginalis, DNA Probe POSITIVE (A) NEGATIVE    Candida Species, DNA Probe NEGATIVE NEGATIVE   C.trachomatis N.gonorrhoeae DNA, Urine    Specimen: Urine   Result Value Ref Range    Specimen Description . URINE     C. trachomatis DNA ,Urine NEGATIVE NEGATIVE    N. gonorrhoeae DNA, Urine NEGATIVE NEGATIVE   Culture, Anaerobic and Aerobic    Specimen: Perineum   Result Value Ref Range    Specimen Description . PERINEUM     Direct Exam MODERATE NEUTROPHILS (A)     Direct Exam MANY GRAM NEGATIVE RODS (A)     Direct Exam MANY GRAM POSITIVE RODS (A)     Direct Exam MODERATE GRAM POSITIVE COCCI IN PAIRS (A)     Culture NORMAL SKIN FRANK     Culture       MULTIPLE SPECIES OF AEROBIC AND ANAEROBIC ENTERIC FRANK   CBC with Auto Differential   Result Value Ref Range    WBC 16.9 (H) 3.5 - 11.3 k/uL    RBC 4.40 3.95 - 5.11 m/uL    Hemoglobin 10.7 (L) 11.9 - 15.1 g/dL    Hematocrit 35.2 (L) 36.3 - 47.1 %    MCV 80.0 (L) 82.6 - 102.9 fL    MCH 24.3 (L) 25.2 - 33.5 pg    MCHC 30.4 28.4 - 34.8 g/dL    RDW 17.5 (H) 11.8 - 14.4 %    Platelets 157 (H) 396 - 453 k/uL    MPV 9.0 8.1 - 13.5 fL    NRBC Automated 0.0 0.0 per 100 WBC    Immature Granulocytes 0 0 %    Seg Neutrophils 57 36 - 66 % Lymphocytes 37 24 - 44 %    Monocytes 2 1 - 7 %    Eosinophils % 3 1 - 4 %    Basophils 1 0 - 2 %    Absolute Immature Granulocyte 0.00 0.00 - 0.30 k/uL    Segs Absolute 9.63 (H) 1.8 - 7.7 k/uL    Absolute Lymph # 6.25 (H) 1.0 - 4.8 k/uL    Absolute Mono # 0.34 0.1 - 0.8 k/uL    Absolute Eos # 0.51 (H) 0.0 - 0.4 k/uL    Basophils Absolute 0.17 0.0 - 0.2 k/uL    Morphology ANISOCYTOSIS PRESENT     Morphology MICROCYTOSIS PRESENT    CMP   Result Value Ref Range    Glucose 167 (H) 70 - 99 mg/dL    BUN 11 6 - 20 mg/dL    Creatinine 0.50 0.50 - 0.90 mg/dL    Est, Glom Filt Rate >60 >60 mL/min/1.73m2    Calcium 8.8 8.6 - 10.4 mg/dL    Sodium 137 135 - 144 mmol/L    Potassium 3.6 (L) 3.7 - 5.3 mmol/L    Chloride 102 98 - 107 mmol/L    CO2 23 20 - 31 mmol/L    Anion Gap 12 9 - 17 mmol/L    Alkaline Phosphatase 81 35 - 104 U/L    ALT 14 5 - 33 U/L    AST 12 <32 U/L    Total Bilirubin <0.1 (L) 0.3 - 1.2 mg/dL    Total Protein 7.2 6.4 - 8.3 g/dL    Albumin 3.9 3.5 - 5.2 g/dL    Albumin/Globulin Ratio 1.2 1.0 - 2.5   Urinalysis   Result Value Ref Range    Color, UA Yellow Yellow    Turbidity UA Cloudy (A) Clear    Glucose, Ur NEGATIVE NEGATIVE    Bilirubin Urine NEGATIVE NEGATIVE    Ketones, Urine NEGATIVE NEGATIVE    Specific Gravity, UA 1.014 1.005 - 1.030    Urine Hgb MODERATE (A) NEGATIVE    pH, UA 6.0 5.0 - 8.0    Protein, UA NEGATIVE NEGATIVE    Urobilinogen, Urine Normal Normal    Nitrite, Urine NEGATIVE NEGATIVE    Leukocyte Esterase, Urine LARGE (A) NEGATIVE   Lactic Acid   Result Value Ref Range    Lactic Acid, Whole Blood 2.0 0.7 - 2.1 mmol/L   Microscopic Urinalysis   Result Value Ref Range    WBC, UA 20 TO 50 0 - 5 /HPF    RBC, UA 2 TO 5 0 - 2 /HPF    Epithelial Cells UA None 0 - 5 /HPF    Bacteria, UA MODERATE (A) None   Basic Metabolic Panel   Result Value Ref Range    Glucose 221 (H) 70 - 99 mg/dL    BUN 11 6 - 20 mg/dL    Creatinine 0.56 0.50 - 0.90 mg/dL    Est, Glom Filt Rate >60 >60 mL/min/1.73m2    Calcium 8.0 (L) 8.6 - 10.4 mg/dL    Sodium 137 135 - 144 mmol/L    Potassium 4.0 3.7 - 5.3 mmol/L    Chloride 106 98 - 107 mmol/L    CO2 20 20 - 31 mmol/L    Anion Gap 11 9 - 17 mmol/L   CBC   Result Value Ref Range    WBC 12.6 (H) 3.5 - 11.3 k/uL    RBC 3.88 (L) 3.95 - 5.11 m/uL    Hemoglobin 9.5 (L) 11.9 - 15.1 g/dL    Hematocrit 32.7 (L) 36.3 - 47.1 %    MCV 84.3 82.6 - 102.9 fL    MCH 24.5 (L) 25.2 - 33.5 pg    MCHC 29.1 28.4 - 34.8 g/dL    RDW 17.7 (H) 11.8 - 14.4 %    Platelets 498 (H) 138 - 453 k/uL    MPV 8.9 8.1 - 13.5 fL    NRBC Automated 0.0 0.0 per 100 WBC   POC Glucose Fingerstick   Result Value Ref Range    POC Glucose 200 (H) 65 - 105 mg/dL   POC Glucose Fingerstick   Result Value Ref Range    POC Glucose 268 (H) 65 - 105 mg/dL           Assessment:      Diagnosis Orders   1. Vaginal discharge  fluconazole (DIFLUCAN) 150 MG tablet    Vaginitis DNA Probe    Chlamydia Trachomatis & Neisseria gonorrhoeae (GC) by amplified detection      2. Insomnia, unspecified type  zolpidem (AMBIEN) 5 MG tablet           Patient Active Problem List    Diagnosis Date Noted    Post-op pain 12/27/2022     Priority: Medium    Postoperative infection, initial encounter 12/26/2022     Priority: Medium    S/p LAVH, BSO, Cysto 12/12/22 12/12/2022     Priority: Medium    Chest pain 10/29/2022     Priority: Medium    Hx of CS x2 10/26/2022     Priority: Medium    Hx of laparoscopic tubal ligation (2000) 10/26/2022     Priority: Medium    Abnormal uterine bleeding 10/26/2022     Priority: Medium    Fundal uterine fibroid  10/26/2022     Priority: Medium    Atypical chest pain 07/08/2022     Priority: Medium    NSTEMI (non-ST elevated myocardial infarction) (HCC) 07/08/2022     Priority: Medium    COVID-19 07/08/2022     Priority: Medium    Gastroesophageal reflux disease with esophagitis without hemorrhage     Irritable bowel syndrome with constipation 10/13/2021    Irritable bowel syndrome with both constipation and diarrhea 10/13/2021  Positive colorectal cancer screening using Cologuard test 10/13/2021    Mixed hyperlipidemia 09/13/2021    Migraine without status migrainosus, not intractable 09/13/2021    OAB (overactive bladder) 09/13/2021    Smoker 09/13/2021    Pulmonary Langerhans cell granulomatosis ([facility]     Coronary artery disease involving native heart with angina pectoris ([facility]     Situational depression 03/06/2020    Anxiety 03/06/2020    Hx of deep venous thrombosis 03/04/2020    Spinal stenosis of cervical region     Hip fracture ([facility] 01/18/2020    Hypothyroidism     Type 2 diabetes mellitus without complication, with long-term current use of insulin ([facility]     Essential hypertension           POD# 7 weeks   Procedure: see above   Stable   Pathology reviewed and found to be benign. Yes    Plan:   Return in about 1 year (around 2/1/2024) for Annual Exam.   Continue with restrictions x 1 week   Pt given Rx for ambien and diflucan  Vaginal cultures collected - will treat anything else.     Tiara aGrza, DO

## 2023-02-02 LAB
C TRACH DNA SPEC QL PROBE+SIG AMP: NEGATIVE
CANDIDA SPECIES, DNA PROBE: NEGATIVE
GARDNERELLA VAGINALIS, DNA PROBE: NEGATIVE
N GONORRHOEA DNA SPEC QL PROBE+SIG AMP: NEGATIVE
SOURCE: NORMAL
SPECIMEN DESCRIPTION: NORMAL
TRICHOMONAS VAGINALIS DNA: NEGATIVE

## 2023-02-22 ENCOUNTER — TELEPHONE (OUTPATIENT)
Dept: OBGYN | Age: 53
End: 2023-02-22

## 2023-02-22 NOTE — TELEPHONE ENCOUNTER
Patient was called regarding scheduling mammogram with Luis Alfredo Balderas. Patient is interested in being scheduled but just lost her brother so was not available to schedule right this second. Patient will call back when she is ready.

## 2023-04-21 ENCOUNTER — APPOINTMENT (OUTPATIENT)
Dept: CT IMAGING | Age: 53
DRG: 347 | End: 2023-04-21
Payer: COMMERCIAL

## 2023-04-21 ENCOUNTER — TELEPHONE (OUTPATIENT)
Dept: FAMILY MEDICINE CLINIC | Age: 53
End: 2023-04-21

## 2023-04-21 ENCOUNTER — HOSPITAL ENCOUNTER (INPATIENT)
Age: 53
LOS: 2 days | Discharge: HOME | DRG: 347 | End: 2023-04-24
Attending: EMERGENCY MEDICINE | Admitting: EMERGENCY MEDICINE
Payer: COMMERCIAL

## 2023-04-21 ENCOUNTER — APPOINTMENT (OUTPATIENT)
Dept: MRI IMAGING | Age: 53
DRG: 347 | End: 2023-04-21
Payer: COMMERCIAL

## 2023-04-21 DIAGNOSIS — M54.9 BACK PAIN, SUBACUTE: ICD-10-CM

## 2023-04-21 DIAGNOSIS — M54.2 NECK PAIN: Primary | ICD-10-CM

## 2023-04-21 DIAGNOSIS — F41.9 ANXIETY: ICD-10-CM

## 2023-04-21 LAB
ABSOLUTE EOS #: 0.23 K/UL (ref 0–0.44)
ABSOLUTE IMMATURE GRANULOCYTE: 0.03 K/UL (ref 0–0.3)
ABSOLUTE LYMPH #: 4.37 K/UL (ref 1.1–3.7)
ABSOLUTE MONO #: 0.63 K/UL (ref 0.1–1.2)
ANION GAP SERPL CALCULATED.3IONS-SCNC: 16 MMOL/L (ref 9–17)
BASOPHILS # BLD: 0 % (ref 0–2)
BASOPHILS ABSOLUTE: 0.04 K/UL (ref 0–0.2)
BUN SERPL-MCNC: 14 MG/DL (ref 6–20)
CA-I BLD-SCNC: 1.16 MMOL/L (ref 1.13–1.33)
CALCIUM SERPL-MCNC: 9.6 MG/DL (ref 8.6–10.4)
CHLORIDE SERPL-SCNC: 94 MMOL/L (ref 98–107)
CO2 SERPL-SCNC: 20 MMOL/L (ref 20–31)
CREAT SERPL-MCNC: 0.77 MG/DL (ref 0.5–0.9)
CRP SERPL HS-MCNC: 11.6 MG/L (ref 0–5)
EOSINOPHILS RELATIVE PERCENT: 2 % (ref 1–4)
ERYTHROCYTE [SEDIMENTATION RATE] IN BLOOD BY WESTERGREN METHOD: 61 MM/HR (ref 0–30)
GFR SERPL CREATININE-BSD FRML MDRD: >60 ML/MIN/1.73M2
GLUCOSE BLD-MCNC: 367 MG/DL (ref 65–105)
GLUCOSE SERPL-MCNC: 532 MG/DL (ref 70–99)
HCT VFR BLD AUTO: 45.8 % (ref 36.3–47.1)
HGB BLD-MCNC: 13.9 G/DL (ref 11.9–15.1)
IMMATURE GRANULOCYTES: 0 %
LYMPHOCYTES # BLD: 38 % (ref 24–43)
MCH RBC QN AUTO: 25 PG (ref 25.2–33.5)
MCHC RBC AUTO-ENTMCNC: 30.3 G/DL (ref 28.4–34.8)
MCV RBC AUTO: 82.2 FL (ref 82.6–102.9)
MONOCYTES # BLD: 6 % (ref 3–12)
NRBC AUTOMATED: 0 PER 100 WBC
PDW BLD-RTO: 16.7 % (ref 11.8–14.4)
PLATELET # BLD AUTO: 495 K/UL (ref 138–453)
PMV BLD AUTO: 9.7 FL (ref 8.1–13.5)
POTASSIUM SERPL-SCNC: 4.1 MMOL/L (ref 3.7–5.3)
RBC # BLD: 5.57 M/UL (ref 3.95–5.11)
RBC # BLD: ABNORMAL 10*6/UL
SARS-COV-2 RDRP RESP QL NAA+PROBE: NOT DETECTED
SEG NEUTROPHILS: 54 % (ref 36–65)
SEGMENTED NEUTROPHILS ABSOLUTE COUNT: 6.14 K/UL (ref 1.5–8.1)
SODIUM SERPL-SCNC: 130 MMOL/L (ref 135–144)
SPECIMEN DESCRIPTION: NORMAL
WBC # BLD AUTO: 11.4 K/UL (ref 3.5–11.3)

## 2023-04-21 PROCEDURE — 6360000002 HC RX W HCPCS

## 2023-04-21 PROCEDURE — 96375 TX/PRO/DX INJ NEW DRUG ADDON: CPT

## 2023-04-21 PROCEDURE — 72125 CT NECK SPINE W/O DYE: CPT

## 2023-04-21 PROCEDURE — 6370000000 HC RX 637 (ALT 250 FOR IP): Performed by: STUDENT IN AN ORGANIZED HEALTH CARE EDUCATION/TRAINING PROGRAM

## 2023-04-21 PROCEDURE — 2580000003 HC RX 258: Performed by: STUDENT IN AN ORGANIZED HEALTH CARE EDUCATION/TRAINING PROGRAM

## 2023-04-21 PROCEDURE — 93005 ELECTROCARDIOGRAM TRACING: CPT | Performed by: EMERGENCY MEDICINE

## 2023-04-21 PROCEDURE — 85652 RBC SED RATE AUTOMATED: CPT

## 2023-04-21 PROCEDURE — 85025 COMPLETE CBC W/AUTO DIFF WBC: CPT

## 2023-04-21 PROCEDURE — 87635 SARS-COV-2 COVID-19 AMP PRB: CPT

## 2023-04-21 PROCEDURE — 80048 BASIC METABOLIC PNL TOTAL CA: CPT

## 2023-04-21 PROCEDURE — G0378 HOSPITAL OBSERVATION PER HR: HCPCS

## 2023-04-21 PROCEDURE — 99285 EMERGENCY DEPT VISIT HI MDM: CPT

## 2023-04-21 PROCEDURE — 82947 ASSAY GLUCOSE BLOOD QUANT: CPT

## 2023-04-21 PROCEDURE — 6360000002 HC RX W HCPCS: Performed by: STUDENT IN AN ORGANIZED HEALTH CARE EDUCATION/TRAINING PROGRAM

## 2023-04-21 PROCEDURE — 72141 MRI NECK SPINE W/O DYE: CPT

## 2023-04-21 PROCEDURE — 82330 ASSAY OF CALCIUM: CPT

## 2023-04-21 PROCEDURE — 72128 CT CHEST SPINE W/O DYE: CPT

## 2023-04-21 PROCEDURE — 72146 MRI CHEST SPINE W/O DYE: CPT

## 2023-04-21 PROCEDURE — 86140 C-REACTIVE PROTEIN: CPT

## 2023-04-21 PROCEDURE — 72148 MRI LUMBAR SPINE W/O DYE: CPT

## 2023-04-21 PROCEDURE — 96374 THER/PROPH/DIAG INJ IV PUSH: CPT

## 2023-04-21 PROCEDURE — 72131 CT LUMBAR SPINE W/O DYE: CPT

## 2023-04-21 PROCEDURE — 96361 HYDRATE IV INFUSION ADD-ON: CPT

## 2023-04-21 RX ORDER — CLOPIDOGREL BISULFATE 75 MG/1
75 TABLET ORAL DAILY
Status: DISCONTINUED | OUTPATIENT
Start: 2023-04-22 | End: 2023-04-24 | Stop reason: HOSPADM

## 2023-04-21 RX ORDER — MORPHINE SULFATE 4 MG/ML
4 INJECTION, SOLUTION INTRAMUSCULAR; INTRAVENOUS ONCE
Status: COMPLETED | OUTPATIENT
Start: 2023-04-21 | End: 2023-04-21

## 2023-04-21 RX ORDER — FENTANYL CITRATE 50 UG/ML
50 INJECTION, SOLUTION INTRAMUSCULAR; INTRAVENOUS ONCE
Status: COMPLETED | OUTPATIENT
Start: 2023-04-21 | End: 2023-04-21

## 2023-04-21 RX ORDER — METOPROLOL SUCCINATE 50 MG/1
50 TABLET, EXTENDED RELEASE ORAL 2 TIMES DAILY
Status: DISCONTINUED | OUTPATIENT
Start: 2023-04-21 | End: 2023-04-24 | Stop reason: HOSPADM

## 2023-04-21 RX ORDER — GABAPENTIN 300 MG/1
300 CAPSULE ORAL 3 TIMES DAILY
Status: DISCONTINUED | OUTPATIENT
Start: 2023-04-21 | End: 2023-04-24 | Stop reason: HOSPADM

## 2023-04-21 RX ORDER — ONDANSETRON 4 MG/1
4 TABLET, ORALLY DISINTEGRATING ORAL EVERY 8 HOURS PRN
Status: DISCONTINUED | OUTPATIENT
Start: 2023-04-21 | End: 2023-04-22

## 2023-04-21 RX ORDER — ACETAMINOPHEN 325 MG/1
650 TABLET ORAL EVERY 4 HOURS PRN
Status: DISCONTINUED | OUTPATIENT
Start: 2023-04-21 | End: 2023-04-24 | Stop reason: HOSPADM

## 2023-04-21 RX ORDER — DEXTROSE MONOHYDRATE 100 MG/ML
INJECTION, SOLUTION INTRAVENOUS CONTINUOUS PRN
Status: DISCONTINUED | OUTPATIENT
Start: 2023-04-21 | End: 2023-04-24 | Stop reason: HOSPADM

## 2023-04-21 RX ORDER — SODIUM CHLORIDE 0.9 % (FLUSH) 0.9 %
5-40 SYRINGE (ML) INJECTION EVERY 12 HOURS SCHEDULED
Status: DISCONTINUED | OUTPATIENT
Start: 2023-04-21 | End: 2023-04-24 | Stop reason: HOSPADM

## 2023-04-21 RX ORDER — ONDANSETRON 2 MG/ML
4 INJECTION INTRAMUSCULAR; INTRAVENOUS ONCE
Status: COMPLETED | OUTPATIENT
Start: 2023-04-21 | End: 2023-04-21

## 2023-04-21 RX ORDER — ATORVASTATIN CALCIUM 80 MG/1
80 TABLET, FILM COATED ORAL DAILY
Status: DISCONTINUED | OUTPATIENT
Start: 2023-04-22 | End: 2023-04-24 | Stop reason: HOSPADM

## 2023-04-21 RX ORDER — OXYCODONE HYDROCHLORIDE 5 MG/1
5 TABLET ORAL EVERY 4 HOURS PRN
Status: DISCONTINUED | OUTPATIENT
Start: 2023-04-21 | End: 2023-04-22

## 2023-04-21 RX ORDER — SODIUM CHLORIDE 0.9 % (FLUSH) 0.9 %
5-40 SYRINGE (ML) INJECTION PRN
Status: DISCONTINUED | OUTPATIENT
Start: 2023-04-21 | End: 2023-04-24 | Stop reason: HOSPADM

## 2023-04-21 RX ORDER — ONDANSETRON 2 MG/ML
4 INJECTION INTRAMUSCULAR; INTRAVENOUS EVERY 6 HOURS PRN
Status: DISCONTINUED | OUTPATIENT
Start: 2023-04-21 | End: 2023-04-24 | Stop reason: HOSPADM

## 2023-04-21 RX ORDER — ASPIRIN 81 MG/1
81 TABLET, CHEWABLE ORAL DAILY
Status: DISCONTINUED | OUTPATIENT
Start: 2023-04-22 | End: 2023-04-24 | Stop reason: HOSPADM

## 2023-04-21 RX ORDER — LORAZEPAM 2 MG/ML
1 INJECTION INTRAMUSCULAR ONCE
Status: COMPLETED | OUTPATIENT
Start: 2023-04-21 | End: 2023-04-21

## 2023-04-21 RX ORDER — TIZANIDINE 2 MG/1
2 TABLET ORAL 3 TIMES DAILY PRN
Status: DISCONTINUED | OUTPATIENT
Start: 2023-04-21 | End: 2023-04-22

## 2023-04-21 RX ORDER — RANOLAZINE 500 MG/1
500 TABLET, EXTENDED RELEASE ORAL 2 TIMES DAILY
Status: DISCONTINUED | OUTPATIENT
Start: 2023-04-21 | End: 2023-04-24 | Stop reason: HOSPADM

## 2023-04-21 RX ORDER — SODIUM CHLORIDE 9 MG/ML
INJECTION, SOLUTION INTRAVENOUS PRN
Status: DISCONTINUED | OUTPATIENT
Start: 2023-04-21 | End: 2023-04-24 | Stop reason: HOSPADM

## 2023-04-21 RX ORDER — 0.9 % SODIUM CHLORIDE 0.9 %
1000 INTRAVENOUS SOLUTION INTRAVENOUS ONCE
Status: COMPLETED | OUTPATIENT
Start: 2023-04-21 | End: 2023-04-21

## 2023-04-21 RX ORDER — IBUPROFEN 400 MG/1
600 TABLET ORAL EVERY 6 HOURS PRN
Status: DISCONTINUED | OUTPATIENT
Start: 2023-04-21 | End: 2023-04-22

## 2023-04-21 RX ORDER — LIDOCAINE 4 G/G
1 PATCH TOPICAL ONCE
Status: COMPLETED | OUTPATIENT
Start: 2023-04-21 | End: 2023-04-21

## 2023-04-21 RX ORDER — KETOROLAC TROMETHAMINE 15 MG/ML
15 INJECTION, SOLUTION INTRAMUSCULAR; INTRAVENOUS ONCE
Status: COMPLETED | OUTPATIENT
Start: 2023-04-21 | End: 2023-04-21

## 2023-04-21 RX ORDER — INSULIN GLARGINE 100 [IU]/ML
26 INJECTION, SOLUTION SUBCUTANEOUS NIGHTLY
Status: DISCONTINUED | OUTPATIENT
Start: 2023-04-21 | End: 2023-04-24 | Stop reason: HOSPADM

## 2023-04-21 RX ORDER — LEVOTHYROXINE SODIUM 0.05 MG/1
50 TABLET ORAL DAILY
Status: DISCONTINUED | OUTPATIENT
Start: 2023-04-22 | End: 2023-04-24 | Stop reason: HOSPADM

## 2023-04-21 RX ADMIN — ACETAMINOPHEN 650 MG: 325 TABLET ORAL at 21:24

## 2023-04-21 RX ADMIN — IBUPROFEN 600 MG: 400 TABLET, FILM COATED ORAL at 22:56

## 2023-04-21 RX ADMIN — TIZANIDINE 2 MG: 2 TABLET ORAL at 22:56

## 2023-04-21 RX ADMIN — KETOROLAC TROMETHAMINE 15 MG: 15 INJECTION, SOLUTION INTRAMUSCULAR; INTRAVENOUS at 10:16

## 2023-04-21 RX ADMIN — SODIUM CHLORIDE 1000 ML: 9 INJECTION, SOLUTION INTRAVENOUS at 10:50

## 2023-04-21 RX ADMIN — FENTANYL CITRATE 50 MCG: 50 INJECTION, SOLUTION INTRAMUSCULAR; INTRAVENOUS at 10:49

## 2023-04-21 RX ADMIN — MORPHINE SULFATE 4 MG: 4 INJECTION, SOLUTION INTRAMUSCULAR; INTRAVENOUS at 14:20

## 2023-04-21 RX ADMIN — ONDANSETRON 4 MG: 2 INJECTION INTRAMUSCULAR; INTRAVENOUS at 10:17

## 2023-04-21 RX ADMIN — SODIUM CHLORIDE, PRESERVATIVE FREE 10 ML: 5 INJECTION INTRAVENOUS at 22:47

## 2023-04-21 RX ADMIN — OXYCODONE 5 MG: 5 TABLET ORAL at 16:51

## 2023-04-21 RX ADMIN — RANOLAZINE 500 MG: 500 TABLET, FILM COATED, EXTENDED RELEASE ORAL at 22:46

## 2023-04-21 RX ADMIN — LORAZEPAM 1 MG: 2 INJECTION INTRAMUSCULAR; INTRAVENOUS at 17:44

## 2023-04-21 RX ADMIN — OXYCODONE 5 MG: 5 TABLET ORAL at 21:24

## 2023-04-21 RX ADMIN — METOPROLOL SUCCINATE 50 MG: 50 TABLET, EXTENDED RELEASE ORAL at 22:46

## 2023-04-21 RX ADMIN — INSULIN GLARGINE 26 UNITS: 100 INJECTION, SOLUTION SUBCUTANEOUS at 22:59

## 2023-04-21 ASSESSMENT — PAIN SCALES - WONG BAKER
WONGBAKER_NUMERICALRESPONSE: 0

## 2023-04-21 ASSESSMENT — ENCOUNTER SYMPTOMS
ABDOMINAL PAIN: 0
SHORTNESS OF BREATH: 0
SORE THROAT: 0
COUGH: 0
BACK PAIN: 1
VOMITING: 0

## 2023-04-21 ASSESSMENT — PAIN DESCRIPTION - DESCRIPTORS
DESCRIPTORS: STABBING;TINGLING
DESCRIPTORS: STABBING;TINGLING

## 2023-04-21 ASSESSMENT — PAIN SCALES - GENERAL
PAINLEVEL_OUTOF10: 0
PAINLEVEL_OUTOF10: 10
PAINLEVEL_OUTOF10: 0
PAINLEVEL_OUTOF10: 8
PAINLEVEL_OUTOF10: 0
PAINLEVEL_OUTOF10: 10
PAINLEVEL_OUTOF10: 8
PAINLEVEL_OUTOF10: 0

## 2023-04-21 ASSESSMENT — PAIN DESCRIPTION - PAIN TYPE: TYPE: CHRONIC PAIN;ACUTE PAIN

## 2023-04-21 ASSESSMENT — PAIN - FUNCTIONAL ASSESSMENT
PAIN_FUNCTIONAL_ASSESSMENT: PREVENTS OR INTERFERES SOME ACTIVE ACTIVITIES AND ADLS
PAIN_FUNCTIONAL_ASSESSMENT: PREVENTS OR INTERFERES SOME ACTIVE ACTIVITIES AND ADLS
PAIN_FUNCTIONAL_ASSESSMENT: 0-10
PAIN_FUNCTIONAL_ASSESSMENT: 0-10

## 2023-04-21 ASSESSMENT — PAIN DESCRIPTION - ONSET: ONSET: ON-GOING

## 2023-04-21 ASSESSMENT — PAIN DESCRIPTION - ORIENTATION
ORIENTATION: RIGHT;LEFT
ORIENTATION: LEFT

## 2023-04-21 ASSESSMENT — PAIN DESCRIPTION - FREQUENCY: FREQUENCY: CONTINUOUS

## 2023-04-22 ENCOUNTER — APPOINTMENT (OUTPATIENT)
Dept: MRI IMAGING | Age: 53
DRG: 347 | End: 2023-04-22
Payer: COMMERCIAL

## 2023-04-22 ENCOUNTER — APPOINTMENT (OUTPATIENT)
Dept: GENERAL RADIOLOGY | Age: 53
DRG: 347 | End: 2023-04-22
Payer: COMMERCIAL

## 2023-04-22 PROBLEM — M54.9 INTRACTABLE BACK PAIN: Status: ACTIVE | Noted: 2023-04-22

## 2023-04-22 LAB
EKG ATRIAL RATE: 105 BPM
EKG P AXIS: 57 DEGREES
EKG P-R INTERVAL: 162 MS
EKG Q-T INTERVAL: 368 MS
EKG QRS DURATION: 78 MS
EKG QTC CALCULATION (BAZETT): 486 MS
EKG R AXIS: 50 DEGREES
EKG T AXIS: 76 DEGREES
EKG VENTRICULAR RATE: 105 BPM
GLUCOSE BLD-MCNC: 198 MG/DL (ref 65–105)
GLUCOSE BLD-MCNC: 264 MG/DL (ref 65–105)
GLUCOSE BLD-MCNC: 325 MG/DL (ref 65–105)

## 2023-04-22 PROCEDURE — 72120 X-RAY BEND ONLY L-S SPINE: CPT

## 2023-04-22 PROCEDURE — 6360000004 HC RX CONTRAST MEDICATION: Performed by: REGISTERED NURSE

## 2023-04-22 PROCEDURE — 99233 SBSQ HOSP IP/OBS HIGH 50: CPT | Performed by: PHYSICIAN ASSISTANT

## 2023-04-22 PROCEDURE — 96376 TX/PRO/DX INJ SAME DRUG ADON: CPT

## 2023-04-22 PROCEDURE — 72040 X-RAY EXAM NECK SPINE 2-3 VW: CPT

## 2023-04-22 PROCEDURE — 6370000000 HC RX 637 (ALT 250 FOR IP): Performed by: STUDENT IN AN ORGANIZED HEALTH CARE EDUCATION/TRAINING PROGRAM

## 2023-04-22 PROCEDURE — 6370000000 HC RX 637 (ALT 250 FOR IP): Performed by: EMERGENCY MEDICINE

## 2023-04-22 PROCEDURE — 51798 US URINE CAPACITY MEASURE: CPT

## 2023-04-22 PROCEDURE — 6370000000 HC RX 637 (ALT 250 FOR IP)

## 2023-04-22 PROCEDURE — 72147 MRI CHEST SPINE W/DYE: CPT

## 2023-04-22 PROCEDURE — 6360000002 HC RX W HCPCS

## 2023-04-22 PROCEDURE — 6360000002 HC RX W HCPCS: Performed by: EMERGENCY MEDICINE

## 2023-04-22 PROCEDURE — 82947 ASSAY GLUCOSE BLOOD QUANT: CPT

## 2023-04-22 PROCEDURE — 93010 ELECTROCARDIOGRAM REPORT: CPT | Performed by: INTERNAL MEDICINE

## 2023-04-22 PROCEDURE — A9576 INJ PROHANCE MULTIPACK: HCPCS | Performed by: REGISTERED NURSE

## 2023-04-22 PROCEDURE — 1200000000 HC SEMI PRIVATE

## 2023-04-22 PROCEDURE — 2580000003 HC RX 258: Performed by: STUDENT IN AN ORGANIZED HEALTH CARE EDUCATION/TRAINING PROGRAM

## 2023-04-22 RX ORDER — INSULIN LISPRO 100 [IU]/ML
0-4 INJECTION, SOLUTION INTRAVENOUS; SUBCUTANEOUS NIGHTLY
Status: DISCONTINUED | OUTPATIENT
Start: 2023-04-22 | End: 2023-04-24 | Stop reason: HOSPADM

## 2023-04-22 RX ORDER — OXYCODONE HYDROCHLORIDE 5 MG/1
10 TABLET ORAL EVERY 4 HOURS PRN
Status: DISCONTINUED | OUTPATIENT
Start: 2023-04-22 | End: 2023-04-24 | Stop reason: HOSPADM

## 2023-04-22 RX ORDER — MORPHINE SULFATE 4 MG/ML
4 INJECTION, SOLUTION INTRAMUSCULAR; INTRAVENOUS EVERY 4 HOURS PRN
Status: DISCONTINUED | OUTPATIENT
Start: 2023-04-22 | End: 2023-04-22

## 2023-04-22 RX ORDER — ONDANSETRON 4 MG/1
4 TABLET, FILM COATED ORAL EVERY 8 HOURS PRN
Status: DISCONTINUED | OUTPATIENT
Start: 2023-04-22 | End: 2023-04-24 | Stop reason: HOSPADM

## 2023-04-22 RX ORDER — LORAZEPAM 2 MG/ML
1 INJECTION INTRAMUSCULAR EVERY 6 HOURS PRN
Status: DISCONTINUED | OUTPATIENT
Start: 2023-04-22 | End: 2023-04-24 | Stop reason: HOSPADM

## 2023-04-22 RX ORDER — SODIUM CHLORIDE 0.9 % (FLUSH) 0.9 %
10 SYRINGE (ML) INJECTION PRN
Status: DISCONTINUED | OUTPATIENT
Start: 2023-04-22 | End: 2023-04-24 | Stop reason: HOSPADM

## 2023-04-22 RX ORDER — KETOROLAC TROMETHAMINE 15 MG/ML
15 INJECTION, SOLUTION INTRAMUSCULAR; INTRAVENOUS EVERY 6 HOURS
Status: DISCONTINUED | OUTPATIENT
Start: 2023-04-22 | End: 2023-04-24 | Stop reason: HOSPADM

## 2023-04-22 RX ORDER — INSULIN LISPRO 100 [IU]/ML
0-16 INJECTION, SOLUTION INTRAVENOUS; SUBCUTANEOUS
Status: DISCONTINUED | OUTPATIENT
Start: 2023-04-22 | End: 2023-04-24 | Stop reason: HOSPADM

## 2023-04-22 RX ADMIN — ATORVASTATIN CALCIUM 80 MG: 80 TABLET, FILM COATED ORAL at 09:25

## 2023-04-22 RX ADMIN — HYDROMORPHONE HYDROCHLORIDE 0.5 MG: 1 INJECTION, SOLUTION INTRAMUSCULAR; INTRAVENOUS; SUBCUTANEOUS at 16:27

## 2023-04-22 RX ADMIN — METOPROLOL SUCCINATE 50 MG: 50 TABLET, EXTENDED RELEASE ORAL at 22:14

## 2023-04-22 RX ADMIN — RANOLAZINE 500 MG: 500 TABLET, FILM COATED, EXTENDED RELEASE ORAL at 22:14

## 2023-04-22 RX ADMIN — OXYCODONE 5 MG: 5 TABLET ORAL at 01:33

## 2023-04-22 RX ADMIN — IBUPROFEN 600 MG: 400 TABLET, FILM COATED ORAL at 05:30

## 2023-04-22 RX ADMIN — RANOLAZINE 500 MG: 500 TABLET, FILM COATED, EXTENDED RELEASE ORAL at 09:24

## 2023-04-22 RX ADMIN — LEVOTHYROXINE SODIUM 50 MCG: 50 TABLET ORAL at 09:24

## 2023-04-22 RX ADMIN — KETOROLAC TROMETHAMINE 15 MG: 15 INJECTION, SOLUTION INTRAMUSCULAR; INTRAVENOUS at 19:13

## 2023-04-22 RX ADMIN — INSULIN GLARGINE 26 UNITS: 100 INJECTION, SOLUTION SUBCUTANEOUS at 22:23

## 2023-04-22 RX ADMIN — OXYCODONE 5 MG: 5 TABLET ORAL at 05:29

## 2023-04-22 RX ADMIN — METOPROLOL SUCCINATE 50 MG: 50 TABLET, EXTENDED RELEASE ORAL at 09:24

## 2023-04-22 RX ADMIN — MORPHINE SULFATE 4 MG: 4 INJECTION INTRAVENOUS at 09:25

## 2023-04-22 RX ADMIN — SODIUM CHLORIDE, PRESERVATIVE FREE 10 ML: 5 INJECTION INTRAVENOUS at 22:23

## 2023-04-22 RX ADMIN — ACETAMINOPHEN 650 MG: 325 TABLET ORAL at 09:27

## 2023-04-22 RX ADMIN — OXYCODONE HYDROCHLORIDE 10 MG: 5 TABLET ORAL at 22:16

## 2023-04-22 RX ADMIN — ASPIRIN 81 MG: 81 TABLET, CHEWABLE ORAL at 09:25

## 2023-04-22 RX ADMIN — OXYCODONE HYDROCHLORIDE 10 MG: 5 TABLET ORAL at 19:15

## 2023-04-22 RX ADMIN — ACETAMINOPHEN 650 MG: 325 TABLET ORAL at 05:30

## 2023-04-22 RX ADMIN — LORAZEPAM 1 MG: 2 INJECTION INTRAMUSCULAR; INTRAVENOUS at 22:17

## 2023-04-22 RX ADMIN — ACETAMINOPHEN 650 MG: 325 TABLET ORAL at 22:14

## 2023-04-22 RX ADMIN — CLOPIDOGREL BISULFATE 75 MG: 75 TABLET, FILM COATED ORAL at 09:24

## 2023-04-22 RX ADMIN — ACETAMINOPHEN 650 MG: 325 TABLET ORAL at 01:33

## 2023-04-22 RX ADMIN — GADOTERIDOL 20 ML: 279.3 INJECTION, SOLUTION INTRAVENOUS at 18:35

## 2023-04-22 RX ADMIN — SODIUM CHLORIDE, PRESERVATIVE FREE 10 ML: 5 INJECTION INTRAVENOUS at 09:28

## 2023-04-22 RX ADMIN — EMPAGLIFLOZIN 25 MG: 25 TABLET, FILM COATED ORAL at 09:24

## 2023-04-22 RX ADMIN — LORAZEPAM 1 MG: 2 INJECTION INTRAMUSCULAR; INTRAVENOUS at 16:48

## 2023-04-22 RX ADMIN — HYDROMORPHONE HYDROCHLORIDE 0.5 MG: 1 INJECTION, SOLUTION INTRAMUSCULAR; INTRAVENOUS; SUBCUTANEOUS at 12:32

## 2023-04-22 RX ADMIN — ONDANSETRON HYDROCHLORIDE 4 MG: 4 TABLET, FILM COATED ORAL at 12:48

## 2023-04-22 RX ADMIN — INSULIN LISPRO 8 UNITS: 100 INJECTION, SOLUTION INTRAVENOUS; SUBCUTANEOUS at 12:33

## 2023-04-22 ASSESSMENT — PAIN DESCRIPTION - ORIENTATION
ORIENTATION: LEFT

## 2023-04-22 ASSESSMENT — PAIN SCALES - GENERAL
PAINLEVEL_OUTOF10: 0
PAINLEVEL_OUTOF10: 10
PAINLEVEL_OUTOF10: 8
PAINLEVEL_OUTOF10: 0
PAINLEVEL_OUTOF10: 8
PAINLEVEL_OUTOF10: 10
PAINLEVEL_OUTOF10: 0
PAINLEVEL_OUTOF10: 10
PAINLEVEL_OUTOF10: 9
PAINLEVEL_OUTOF10: 0
PAINLEVEL_OUTOF10: 9
PAINLEVEL_OUTOF10: 8
PAINLEVEL_OUTOF10: 0

## 2023-04-22 ASSESSMENT — PAIN DESCRIPTION - DESCRIPTORS
DESCRIPTORS: STABBING;THROBBING
DESCRIPTORS: TINGLING;STABBING
DESCRIPTORS: ACHING
DESCRIPTORS: TINGLING;STABBING
DESCRIPTORS: STABBING;TINGLING

## 2023-04-22 ASSESSMENT — PAIN - FUNCTIONAL ASSESSMENT
PAIN_FUNCTIONAL_ASSESSMENT: ACTIVITIES ARE NOT PREVENTED

## 2023-04-22 ASSESSMENT — PAIN DESCRIPTION - LOCATION: LOCATION: ARM;SHOULDER

## 2023-04-22 ASSESSMENT — PAIN SCALES - WONG BAKER

## 2023-04-22 ASSESSMENT — PAIN DESCRIPTION - PAIN TYPE: TYPE: ACUTE PAIN;CHRONIC PAIN

## 2023-04-22 ASSESSMENT — PAIN DESCRIPTION - ONSET: ONSET: ON-GOING

## 2023-04-22 ASSESSMENT — PAIN DESCRIPTION - FREQUENCY: FREQUENCY: CONTINUOUS

## 2023-04-23 PROBLEM — M54.2 NECK PAIN: Status: ACTIVE | Noted: 2023-04-23

## 2023-04-23 LAB
GLUCOSE BLD-MCNC: 192 MG/DL (ref 65–105)
GLUCOSE BLD-MCNC: 207 MG/DL (ref 65–105)
GLUCOSE BLD-MCNC: 222 MG/DL (ref 65–105)
GLUCOSE BLD-MCNC: 316 MG/DL (ref 65–105)

## 2023-04-23 PROCEDURE — 82947 ASSAY GLUCOSE BLOOD QUANT: CPT

## 2023-04-23 PROCEDURE — 6370000000 HC RX 637 (ALT 250 FOR IP): Performed by: STUDENT IN AN ORGANIZED HEALTH CARE EDUCATION/TRAINING PROGRAM

## 2023-04-23 PROCEDURE — 1200000000 HC SEMI PRIVATE

## 2023-04-23 PROCEDURE — 6370000000 HC RX 637 (ALT 250 FOR IP)

## 2023-04-23 PROCEDURE — 6360000002 HC RX W HCPCS: Performed by: EMERGENCY MEDICINE

## 2023-04-23 PROCEDURE — 2580000003 HC RX 258: Performed by: REGISTERED NURSE

## 2023-04-23 PROCEDURE — 2580000003 HC RX 258: Performed by: STUDENT IN AN ORGANIZED HEALTH CARE EDUCATION/TRAINING PROGRAM

## 2023-04-23 PROCEDURE — 6370000000 HC RX 637 (ALT 250 FOR IP): Performed by: EMERGENCY MEDICINE

## 2023-04-23 RX ORDER — BUTALBITAL, ACETAMINOPHEN AND CAFFEINE 50; 325; 40 MG/1; MG/1; MG/1
1 TABLET ORAL EVERY 4 HOURS PRN
Status: DISCONTINUED | OUTPATIENT
Start: 2023-04-23 | End: 2023-04-24 | Stop reason: HOSPADM

## 2023-04-23 RX ADMIN — INSULIN GLARGINE 26 UNITS: 100 INJECTION, SOLUTION SUBCUTANEOUS at 21:31

## 2023-04-23 RX ADMIN — RANOLAZINE 500 MG: 500 TABLET, FILM COATED, EXTENDED RELEASE ORAL at 21:42

## 2023-04-23 RX ADMIN — SODIUM CHLORIDE, PRESERVATIVE FREE 10 ML: 5 INJECTION INTRAVENOUS at 21:00

## 2023-04-23 RX ADMIN — LORAZEPAM 1 MG: 2 INJECTION INTRAMUSCULAR; INTRAVENOUS at 21:40

## 2023-04-23 RX ADMIN — CLOPIDOGREL BISULFATE 75 MG: 75 TABLET, FILM COATED ORAL at 08:25

## 2023-04-23 RX ADMIN — KETOROLAC TROMETHAMINE 15 MG: 15 INJECTION, SOLUTION INTRAMUSCULAR; INTRAVENOUS at 06:49

## 2023-04-23 RX ADMIN — EMPAGLIFLOZIN 25 MG: 25 TABLET, FILM COATED ORAL at 08:25

## 2023-04-23 RX ADMIN — SODIUM CHLORIDE, PRESERVATIVE FREE 10 ML: 5 INJECTION INTRAVENOUS at 13:21

## 2023-04-23 RX ADMIN — BUTALBITAL, ACETAMINOPHEN, AND CAFFEINE 1 TABLET: 325; 50; 40 TABLET ORAL at 21:45

## 2023-04-23 RX ADMIN — OXYCODONE HYDROCHLORIDE 10 MG: 5 TABLET ORAL at 16:04

## 2023-04-23 RX ADMIN — ACETAMINOPHEN 650 MG: 325 TABLET ORAL at 02:45

## 2023-04-23 RX ADMIN — METOPROLOL SUCCINATE 50 MG: 50 TABLET, EXTENDED RELEASE ORAL at 21:42

## 2023-04-23 RX ADMIN — OXYCODONE HYDROCHLORIDE 10 MG: 5 TABLET ORAL at 11:14

## 2023-04-23 RX ADMIN — ATORVASTATIN CALCIUM 80 MG: 80 TABLET, FILM COATED ORAL at 08:25

## 2023-04-23 RX ADMIN — HYDROMORPHONE HYDROCHLORIDE 0.5 MG: 1 INJECTION, SOLUTION INTRAMUSCULAR; INTRAVENOUS; SUBCUTANEOUS at 00:11

## 2023-04-23 RX ADMIN — HYDROMORPHONE HYDROCHLORIDE 0.5 MG: 1 INJECTION, SOLUTION INTRAMUSCULAR; INTRAVENOUS; SUBCUTANEOUS at 08:26

## 2023-04-23 RX ADMIN — POLYETHYLENE GLYCOL 3350, SODIUM SULFATE ANHYDROUS, SODIUM BICARBONATE, SODIUM CHLORIDE, POTASSIUM CHLORIDE 4000 ML: 236; 22.74; 6.74; 5.86; 2.97 POWDER, FOR SOLUTION ORAL at 16:05

## 2023-04-23 RX ADMIN — INSULIN LISPRO 4 UNITS: 100 INJECTION, SOLUTION INTRAVENOUS; SUBCUTANEOUS at 09:27

## 2023-04-23 RX ADMIN — KETOROLAC TROMETHAMINE 15 MG: 15 INJECTION, SOLUTION INTRAMUSCULAR; INTRAVENOUS at 00:11

## 2023-04-23 RX ADMIN — KETOROLAC TROMETHAMINE 15 MG: 15 INJECTION, SOLUTION INTRAMUSCULAR; INTRAVENOUS at 13:20

## 2023-04-23 RX ADMIN — METOPROLOL SUCCINATE 50 MG: 50 TABLET, EXTENDED RELEASE ORAL at 08:25

## 2023-04-23 RX ADMIN — OXYCODONE HYDROCHLORIDE 10 MG: 5 TABLET ORAL at 02:45

## 2023-04-23 RX ADMIN — OXYCODONE HYDROCHLORIDE 10 MG: 5 TABLET ORAL at 21:44

## 2023-04-23 RX ADMIN — LEVOTHYROXINE SODIUM 50 MCG: 50 TABLET ORAL at 08:25

## 2023-04-23 RX ADMIN — INSULIN LISPRO 4 UNITS: 100 INJECTION, SOLUTION INTRAVENOUS; SUBCUTANEOUS at 21:31

## 2023-04-23 RX ADMIN — RANOLAZINE 500 MG: 500 TABLET, FILM COATED, EXTENDED RELEASE ORAL at 08:25

## 2023-04-23 RX ADMIN — ASPIRIN 81 MG: 81 TABLET, CHEWABLE ORAL at 08:25

## 2023-04-23 RX ADMIN — INSULIN LISPRO 4 UNITS: 100 INJECTION, SOLUTION INTRAVENOUS; SUBCUTANEOUS at 13:21

## 2023-04-23 ASSESSMENT — PAIN SCALES - WONG BAKER

## 2023-04-23 ASSESSMENT — PAIN DESCRIPTION - DESCRIPTORS
DESCRIPTORS: STABBING
DESCRIPTORS: THROBBING;STABBING
DESCRIPTORS: STABBING;THROBBING

## 2023-04-23 ASSESSMENT — PAIN SCALES - GENERAL
PAINLEVEL_OUTOF10: 0
PAINLEVEL_OUTOF10: 10
PAINLEVEL_OUTOF10: 0
PAINLEVEL_OUTOF10: 9
PAINLEVEL_OUTOF10: 8
PAINLEVEL_OUTOF10: 0
PAINLEVEL_OUTOF10: 9
PAINLEVEL_OUTOF10: 0
PAINLEVEL_OUTOF10: 0
PAINLEVEL_OUTOF10: 10
PAINLEVEL_OUTOF10: 0
PAINLEVEL_OUTOF10: 0
PAINLEVEL_OUTOF10: 8
PAINLEVEL_OUTOF10: 0
PAINLEVEL_OUTOF10: 0
PAINLEVEL_OUTOF10: 8
PAINLEVEL_OUTOF10: 8

## 2023-04-23 ASSESSMENT — PAIN DESCRIPTION - ORIENTATION
ORIENTATION: LEFT

## 2023-04-23 ASSESSMENT — PAIN DESCRIPTION - ONSET: ONSET: SUDDEN

## 2023-04-23 ASSESSMENT — PAIN - FUNCTIONAL ASSESSMENT
PAIN_FUNCTIONAL_ASSESSMENT: ACTIVITIES ARE NOT PREVENTED
PAIN_FUNCTIONAL_ASSESSMENT: PREVENTS OR INTERFERES SOME ACTIVE ACTIVITIES AND ADLS

## 2023-04-23 ASSESSMENT — PAIN DESCRIPTION - FREQUENCY: FREQUENCY: CONTINUOUS

## 2023-04-23 ASSESSMENT — PAIN DESCRIPTION - PAIN TYPE: TYPE: ACUTE PAIN;CHRONIC PAIN

## 2023-04-24 ENCOUNTER — TELEPHONE (OUTPATIENT)
Dept: FAMILY MEDICINE CLINIC | Age: 53
End: 2023-04-24

## 2023-04-24 VITALS
OXYGEN SATURATION: 98 % | SYSTOLIC BLOOD PRESSURE: 146 MMHG | HEART RATE: 98 BPM | HEIGHT: 63 IN | DIASTOLIC BLOOD PRESSURE: 98 MMHG | WEIGHT: 221.34 LBS | BODY MASS INDEX: 39.22 KG/M2 | RESPIRATION RATE: 18 BRPM | TEMPERATURE: 99 F

## 2023-04-24 DIAGNOSIS — M54.50 CHRONIC LOW BACK PAIN WITHOUT SCIATICA, UNSPECIFIED BACK PAIN LATERALITY: Primary | ICD-10-CM

## 2023-04-24 DIAGNOSIS — G89.29 CHRONIC LOW BACK PAIN WITHOUT SCIATICA, UNSPECIFIED BACK PAIN LATERALITY: Primary | ICD-10-CM

## 2023-04-24 DIAGNOSIS — M54.2 NECK PAIN: ICD-10-CM

## 2023-04-24 LAB
GLUCOSE BLD-MCNC: 192 MG/DL (ref 65–105)
INR PPP: 1.1
PROTHROMBIN TIME: 13.8 SEC (ref 11.7–14.9)

## 2023-04-24 PROCEDURE — 6370000000 HC RX 637 (ALT 250 FOR IP): Performed by: STUDENT IN AN ORGANIZED HEALTH CARE EDUCATION/TRAINING PROGRAM

## 2023-04-24 PROCEDURE — 36415 COLL VENOUS BLD VENIPUNCTURE: CPT

## 2023-04-24 PROCEDURE — 6360000002 HC RX W HCPCS: Performed by: EMERGENCY MEDICINE

## 2023-04-24 PROCEDURE — APPSS15 APP SPLIT SHARED TIME 0-15 MINUTES: Performed by: NURSE PRACTITIONER

## 2023-04-24 PROCEDURE — 6370000000 HC RX 637 (ALT 250 FOR IP): Performed by: EMERGENCY MEDICINE

## 2023-04-24 PROCEDURE — 82947 ASSAY GLUCOSE BLOOD QUANT: CPT

## 2023-04-24 PROCEDURE — 85610 PROTHROMBIN TIME: CPT

## 2023-04-24 RX ORDER — LORAZEPAM 1 MG/1
1 TABLET ORAL 2 TIMES DAILY
Qty: 28 TABLET | Refills: 0 | Status: SHIPPED | OUTPATIENT
Start: 2023-04-24 | End: 2023-05-08

## 2023-04-24 RX ORDER — OXYCODONE HYDROCHLORIDE 10 MG/1
10 TABLET ORAL EVERY 4 HOURS PRN
Qty: 30 TABLET | Refills: 0 | Status: SHIPPED | OUTPATIENT
Start: 2023-04-24 | End: 2023-04-29

## 2023-04-24 RX ADMIN — BUTALBITAL, ACETAMINOPHEN, AND CAFFEINE 1 TABLET: 325; 50; 40 TABLET ORAL at 01:35

## 2023-04-24 RX ADMIN — OXYCODONE HYDROCHLORIDE 10 MG: 5 TABLET ORAL at 01:35

## 2023-04-24 RX ADMIN — METOPROLOL SUCCINATE 50 MG: 50 TABLET, EXTENDED RELEASE ORAL at 08:53

## 2023-04-24 RX ADMIN — EMPAGLIFLOZIN 25 MG: 25 TABLET, FILM COATED ORAL at 08:54

## 2023-04-24 RX ADMIN — LORAZEPAM 1 MG: 2 INJECTION INTRAMUSCULAR; INTRAVENOUS at 08:48

## 2023-04-24 RX ADMIN — KETOROLAC TROMETHAMINE 15 MG: 15 INJECTION, SOLUTION INTRAMUSCULAR; INTRAVENOUS at 01:35

## 2023-04-24 RX ADMIN — OXYCODONE HYDROCHLORIDE 10 MG: 5 TABLET ORAL at 06:35

## 2023-04-24 RX ADMIN — KETOROLAC TROMETHAMINE 15 MG: 15 INJECTION, SOLUTION INTRAMUSCULAR; INTRAVENOUS at 06:35

## 2023-04-24 RX ADMIN — LEVOTHYROXINE SODIUM 50 MCG: 50 TABLET ORAL at 08:53

## 2023-04-24 RX ADMIN — ATORVASTATIN CALCIUM 80 MG: 80 TABLET, FILM COATED ORAL at 08:52

## 2023-04-24 RX ADMIN — OXYCODONE HYDROCHLORIDE 10 MG: 5 TABLET ORAL at 11:17

## 2023-04-24 RX ADMIN — ACETAMINOPHEN 650 MG: 325 TABLET ORAL at 06:35

## 2023-04-24 RX ADMIN — RANOLAZINE 500 MG: 500 TABLET, FILM COATED, EXTENDED RELEASE ORAL at 08:52

## 2023-04-24 ASSESSMENT — PAIN DESCRIPTION - ORIENTATION
ORIENTATION: LEFT
ORIENTATION: LEFT

## 2023-04-24 ASSESSMENT — PAIN SCALES - GENERAL
PAINLEVEL_OUTOF10: 0
PAINLEVEL_OUTOF10: 8
PAINLEVEL_OUTOF10: 9
PAINLEVEL_OUTOF10: 0
PAINLEVEL_OUTOF10: 0
PAINLEVEL_OUTOF10: 8
PAINLEVEL_OUTOF10: 0

## 2023-04-24 ASSESSMENT — PAIN DESCRIPTION - DESCRIPTORS: DESCRIPTORS: DULL;THROBBING

## 2023-04-24 ASSESSMENT — PAIN SCALES - WONG BAKER

## 2023-04-24 ASSESSMENT — PAIN DESCRIPTION - LOCATION: LOCATION: BACK

## 2023-04-24 ASSESSMENT — PAIN - FUNCTIONAL ASSESSMENT
PAIN_FUNCTIONAL_ASSESSMENT: ACTIVITIES ARE NOT PREVENTED
PAIN_FUNCTIONAL_ASSESSMENT: ACTIVITIES ARE NOT PREVENTED

## 2023-04-24 NOTE — PLAN OF CARE
Problem: Chronic Conditions and Co-morbidities  Goal: Patient's chronic conditions and co-morbidity symptoms are monitored and maintained or improved  4/23/2023 2318 by Bhavya Rausch RN  Outcome: Progressing  4/23/2023 1654 by Lucila Saxena RN  Outcome: Progressing     Problem: Pain  Goal: Verbalizes/displays adequate comfort level or baseline comfort level  4/23/2023 2318 by Bhavya Rausch RN  Outcome: Progressing  4/23/2023 1654 by Lucila Saxena RN  Outcome: Progressing     Problem: Safety - Adult  Goal: Free from fall injury  Outcome: Progressing

## 2023-04-24 NOTE — PLAN OF CARE
Problem: Chronic Conditions and Co-morbidities  Goal: Patient's chronic conditions and co-morbidity symptoms are monitored and maintained or improved  4/24/2023 1146 by Pcao Clements RN  Outcome: Adequate for Discharge  4/23/2023 2318 by Zoe Reyes RN  Outcome: Progressing

## 2023-04-24 NOTE — DISCHARGE INSTR - COC
Continuity of Care Form    Patient Name: Saji Salas   :  1970  MRN:  [id number]    Admit date:  2023  Discharge date:  ***    Code Status Order: Full Code   Advance Directives:     Admitting Physician:  Stevie Murray MD  PCP: SAVANA Rodriguez CNP    Discharging Nurse: [facility] Unit/Room#: 9032/5992-97  Discharging Unit Phone Number: ***    Emergency Contact:   Extended Emergency Contact Information  Primary Emergency Contact: [address], [address] Phone: [phone]  Mobile Phone: [phone]  Relation: Spouse   needed? No  Secondary Emergency Contact: [address], [address] Phone: [phone]  Mobile Phone: [phone]  Relation: Brother/Sister   needed?  No    Past Surgical History:  Past Surgical History:   Procedure Laterality Date    ABDOMEN SURGERY  2022    TOTAL LAPAROSCOPIC HYSTERECTOMY, BSO, CYSTOSCOPY    BACK SURGERY      L5-S1 fusion, Dr. Silvana River  2016    one stent LAD    CERVICAL FUSION  2016    C5-7, [facility]  2019    C4, Dr. Hardin Seat 2019    Suzzanna Buzzard      x 2  and     COLONOSCOPY N/A 2021    COLONOSCOPY WITH BIOPSY performed by Taz Canela MD at [facility] Left     pin    HYSTERECTOMY (CERVIX STATUS UNKNOWN) N/A 2022    TOTAL LAPAROSCOPIC HYSTERECTOMY, BSO, CYSTOSCOPY performed by Bernardo Valente DO at [address] [place] [address], lumbar    LUNG BIOPSY Right     open    MASTOID SURGERY      as a child    TONSILLECTOMY      as a child    UPPER GASTROINTESTINAL ENDOSCOPY  2021    EGD BIOPSY performed by Taz Canela MD at [facility]       Immunization History:   Immunization History   Administered Date(s) Administered    COVID-19, MODERNA BLUE border, Primary or Immunocompromised, (age 12y+), IM, 100 mcg/0.5mL 2021, 2021    Influenza Virus Vaccine 2020    Influenza, FLUARIX, FLULAVAL, Cathren Hammans (age 12

## 2023-04-24 NOTE — CARE COORDINATION
Notified by Dr. Sasha Hughes that patient needs to be scheduled with IR for a steroid injection on Friday 4/28/2023. Called IR scheduling at [phone], no answer, unable to leave VM, will try again later    1140 spoke with Pablo Alberto in IR, patient scheduled for 12 noon on Friday.   Will update patient and AVS    1151 patient updated to the above, states understanding

## 2023-04-24 NOTE — TELEPHONE ENCOUNTER
Patient discharged from hospital on oxycodone 10mg and lorazepam 1mg. Given enough to last till Friday. You have no opening until 05/04. Patient is trying to get into Pain Management.  She is asking if you will fill when she runs out

## 2023-04-24 NOTE — DISCHARGE INSTRUCTIONS
You are seen in our observation unit due to pain caused by chronic back issues. While here, we worked to manage your pain as well as provide options for more definitive treatment. Our current plan is to have you return for steroid injections. However, to do this, you need to abstain from taking your aspirin and Plavix until Friday. I have discharged you with additional pain medication to manage her pain until you return to have your procedure done. Until then, please follow-up with your primary care physician or return to the emergency department for any worsening of your pain or difficulties with your condition. Take any medications as prescribed, otherwise for pain Use ibuprofen or Tylenol (unless prescribed medications that have Tylenol in it). You can take over the counter Ibuprofen (advil) tablets (4 tablets every 8 hours or 3 tablets every 6 hours or 2 tablets every 4 hours)     PLEASE RETURN TO THE ED IMMEDIATELY for worsening symptoms, or if you develop any concerning symptoms such as: high fever not relieved by tylenol and/or motrin, chills, shortness of breath, chest pain, persistent nausea and/or vomiting, numbness, weakness or tingling in the arms or legs or change in color of the extremities, changes in mental status, persistent headache, blurry vision, inability to urinate, unable to follow up with your physician, or other any other  Care or concern.

## 2023-04-24 NOTE — PROGRESS NOTES
Neurosurgery LOTUS/Resident    Daily Progress Note   CC:  Chief Complaint   Patient presents with    Headache    Back Pain     4/24/2023  12:09 PM    Chart reviewed. No acute events overnight. No new complaints. C/o low back pain and pain that radiated down left leg     Vitals:    04/24/23 0135 04/24/23 0205 04/24/23 0635 04/24/23 0820   BP:    (!) 146/98   Pulse:    98   Resp: 16 16 16 18   Temp:    99 °F (37.2 °C)   TempSrc:    Oral   SpO2:    98%   Weight:       Height:           PE:   AOx3   PERRL, EOMI  Motor  L deltoid 5/5; R deltoid 5/5  L biceps 5/5; R biceps 5/5  L triceps 5/5; R triceps 5/5  L wrist extension 5/5; R wrist extension 5/5  L intrinsics 5/5; R intrinsics 5/5      L iliopsoas 5/5 , R iliopsoas 5/5  L quadriceps 5/5; R quadriceps 5/5  L Dorsiflexion 2-3/5; R dorsiflexion 5/5  L Plantarflexion 3/5; R plantarflexion 5/5  L EHL 3/5; R EHL 5/5    Numbness L5       Lab Results   Component Value Date    WBC 11.4 (H) 04/21/2023    HGB 13.9 04/21/2023    HCT 45.8 04/21/2023     (H) 04/21/2023    CHOL 182 01/01/2022    TRIG 121 01/01/2022    HDL 33 (L) 01/01/2022    ALT 14 12/26/2022    AST 12 12/26/2022     (L) 04/21/2023    K 4.1 04/21/2023    CL 94 (L) 04/21/2023    CREATININE 0.77 04/21/2023    BUN 14 04/21/2023    CO2 20 04/21/2023    TSH 0.56 07/07/2022    INR 1.1 04/24/2023    LABA1C 8.6 (H) 11/07/2022    LABMICR CANNOT BE CALCULATED 09/13/2021    CRP 11.6 (H) 04/21/2023       A/P  50 y.o. female who presents with severe spondylosis L4-5 with facet disease and bilateral foraminal stenosis    Recommend PT and IR for injections- since patient was on ASpirin and Plavix plan is for outpatient injections   Patient can follow up in the office in 3-4 weeks       Please contact neurosurgery with any changes in patients neurologic status.        Rosalina Bray, TONYA  4/24/23  12:09 PM

## 2023-04-24 NOTE — DISCHARGE SUMMARY
CDU Discharge Summary        Patient:  Trey Dowling  YOB: 1970    MRN: [id number]   Acct: [de-identified]    Primary Care Physician: SAVANA Swartz CNP    Admit date:  4/21/2023  9:40 AM  Discharge date: 4/24/2023 12:19 PM     Discharge Diagnoses:     Acute back pain due to disc fusion  Improved with pain management    Follow-up:  Call today/tomorrow for a follow up appointment with SAVANA Swartz CNP , or return to the Emergency Room with worsening symptoms    Stressed to patient the importance of following up with primary care doctor for further workup/management of symptoms. Pt verbalizes understanding and agrees with plan. Discharge Medications:  Changes to medications            Medication List        START taking these medications      LORazepam 1 MG tablet  Commonly known as: ATIVAN  Take 1 tablet by mouth 2 times daily for 14 days. Max Daily Amount: 2 mg     oxyCODONE HCl 10 MG immediate release tablet  Commonly known as: OXY-IR  Take 1 tablet by mouth every 4 hours as needed for Pain for up to 5 days. Max Daily Amount: 60 mg            CHANGE how you take these medications      metoprolol succinate 100 MG extended release tablet  Commonly known as: TOPROL XL  take 1 tablet by mouth twice a day  What changed:   how much to take  when to take this            CONTINUE taking these medications      acetaminophen 500 MG tablet  Commonly known as: TYLENOL  Take 2 tablets by mouth every 6 hours as needed for Pain     albuterol (2.5 MG/3ML) 0.083% nebulizer solution  Commonly known as: PROVENTIL     ascorbic acid 500 MG tablet  Commonly known as: VITAMIN C     aspirin 81 MG chewable tablet     atorvastatin 80 MG tablet  Commonly known as: LIPITOR  Take 1 tablet by mouth daily     blood glucose monitor kit and supplies  Dispense sufficient amount for BID testing frequency plus additional to accommodate PRN testing needs.  Dispense all needed supplies to include: monitor, strips, lancing

## 2023-04-24 NOTE — PROGRESS NOTES
OBS/CDU   RESIDENT NOTE      Patients PCP is:  Jakob Nunez, SAVANA - TONYA        SUBJECTIVE    Patient states that the pain has mildly improved, however they are concerned that they would be unable to maintain this degree of pain control as an outpatient. They are agreeable to steroid injections. Discussion was had with the interventional radiologist, who requires 1 week ASA and Plavix. Discussed this with patient, patient is agreeable, however would like to ensure that pain is controlled during this time. Patient has been admitted for chronic back pain with headache. PHYSICAL EXAM      General: NAD, AO X 3  Heent: EMOI, PERRL  Neck: SUPPLE, NO JVD  Cardiovascular: RRR, S1S2  Pulmonary: CTAB, NO SOB  Abdomen: SOFT, NTTP, ND, +BS  Extremities: +2/4 PULSES DISTAL, NO SWELLING  Neuro / Psych: NO NUMBNESS OR TINGLING, MENTATION AT BASELINE    PERTINENT TEST /EXAMS      I have reviewed all available laboratory results.     MEDICATIONS CURRENT   butalbital-acetaminophen-caffeine (FIORICET, ESGIC) per tablet 1 tablet, Q4H PRN  insulin lispro (HUMALOG) injection vial 0-16 Units, TID WC  insulin lispro (HUMALOG) injection vial 0-4 Units, Nightly  oxyCODONE (ROXICODONE) immediate release tablet 10 mg, Q4H PRN  ketorolac (TORADOL) injection 15 mg, Q6H  LORazepam (ATIVAN) injection 1 mg, Q6H PRN  ondansetron (ZOFRAN) tablet 4 mg, Q8H PRN  sodium chloride flush 0.9 % injection 10 mL, PRN  sodium chloride flush 0.9 % injection 5-40 mL, 2 times per day  sodium chloride flush 0.9 % injection 5-40 mL, PRN  0.9 % sodium chloride infusion, PRN  acetaminophen (TYLENOL) tablet 650 mg, Q4H PRN  ondansetron (ZOFRAN) injection 4 mg, Q6H PRN  aspirin chewable tablet 81 mg, Daily  atorvastatin (LIPITOR) tablet 80 mg, Daily  clopidogrel (PLAVIX) tablet 75 mg, Daily  empagliflozin (JARDIANCE) tablet 25 mg, Daily  gabapentin (NEURONTIN) capsule 300 mg, TID  levothyroxine (SYNTHROID) tablet 50 mcg, Daily  metoprolol succinate (TOPROL

## 2023-04-24 NOTE — PROGRESS NOTES
[facility]  CDU / OBSERVATION ENCOUNTER  ATTENDING NOTE       I performed a history and physical examination of the patient and discussed management with the resident or midlevel provider. I reviewed the resident or midlevel provider's note and agree with the documented findings and plan of care. Any areas of disagreement are noted on the chart. I was personally present for the key portions of any procedures. I have documented in the chart those procedures where I was not present during the key portions. I have reviewed the nurses notes. I agree with the chief complaint, past medical history, past surgical history, allergies, medications, social and family history as documented unless otherwise noted below. The Family history, social history, and ROS are effectively unchanged since admission unless noted elsewhere in the chart. This patient was placed in the observation unit for reevaluation for possible admission to the hospital    The patient is a 61-year-old female who presents for evaluation of acute on chronic back pain. She uses a cane and walker at home. She states that she has been doing physical therapy on her own without any improvement. Her MRI of the thoracic spine and cervical spine were unremarkable. MRI of the lumbar spine showed mild L2-L5 spinal canal stenosis and L5-S1 post decompression and fusion. Neurosurgery evaluated her and states that she is a nonsurgical candidate. They recommended injections by IR. IR wants her off of her Plavix and aspirin for 5 days and we made arrangements for her to come back this Friday for her IR injections. She was given pain medication to manage her symptoms at home. Plan for discharge.     [address], [facility]  Attending Emergency  Physician

## 2023-04-25 NOTE — TELEPHONE ENCOUNTER
Patient was notified Pain Management referral done and given name and phone number. Patient would also like a referral for Dr. Irving Loja   For second opinion.     Javed Rowan

## 2023-04-26 ENCOUNTER — TELEPHONE (OUTPATIENT)
Dept: FAMILY MEDICINE CLINIC | Age: 53
End: 2023-04-26

## 2023-04-26 NOTE — TELEPHONE ENCOUNTER
Care Transitions Initial Follow Up Call    Outreach made within 2 business days of discharge: Yes    Patient: Tiffani Rodriguez Patient : 1970   MRN: [id number]  Reason for Admission: There are no discharge diagnoses documented for the most recent discharge. Discharge Date: 23       Spoke with: Tona Arnold     Discharge department/facility: [facility]    TCM Interactive Patient Contact:  Was patient able to fill all prescriptions: Yes  Was patient instructed to bring all medications to the follow-up visit: Yes  Is patient taking all medications as directed in the discharge summary? Yes  Does patient understand their discharge instructions: Yes  Does patient have questions or concerns that need addressed prior to 7-14 day follow up office visit: yes - Patient had questions regarding pain management and pain medication that was addressed in another telephone encounter.     Scheduled appointment with PCP within 7-14 days    Follow Up  Future Appointments   Date Time Provider Bucky Black   2023  1:00 PM [facility] INTERVENT RM 1 [facility]   2023  3:00 PM SAVANA Smith CNP [facility]   2023  9:00 AM [address] [address], APRN - CNP [facility] Shasha Majano MA

## 2023-04-27 RX ORDER — SODIUM CHLORIDE 9 MG/ML
INJECTION, SOLUTION INTRAVENOUS CONTINUOUS
OUTPATIENT
Start: 2023-04-27

## 2023-04-28 ENCOUNTER — HOSPITAL ENCOUNTER (OUTPATIENT)
Dept: INTERVENTIONAL RADIOLOGY/VASCULAR | Age: 53
End: 2023-04-28
Payer: COMMERCIAL

## 2023-04-28 VITALS — OXYGEN SATURATION: 95 % | HEART RATE: 99 BPM | SYSTOLIC BLOOD PRESSURE: 136 MMHG | DIASTOLIC BLOOD PRESSURE: 90 MMHG

## 2023-04-28 DIAGNOSIS — M54.9 BACK PAIN, SUBACUTE: ICD-10-CM

## 2023-04-28 PROCEDURE — 2500000003 HC RX 250 WO HCPCS

## 2023-04-28 PROCEDURE — C1760 CLOSURE DEV, VASC: HCPCS

## 2023-04-28 PROCEDURE — 6360000004 HC RX CONTRAST MEDICATION

## 2023-04-28 PROCEDURE — 2709999900 HC NON-CHARGEABLE SUPPLY

## 2023-04-28 PROCEDURE — 6360000002 HC RX W HCPCS: Performed by: RADIOLOGY

## 2023-04-28 PROCEDURE — 6360000002 HC RX W HCPCS

## 2023-04-28 PROCEDURE — 62323 NJX INTERLAMINAR LMBR/SAC: CPT

## 2023-04-28 RX ORDER — FENTANYL CITRATE 50 UG/ML
INJECTION, SOLUTION INTRAMUSCULAR; INTRAVENOUS PRN
Status: COMPLETED | OUTPATIENT
Start: 2023-04-28 | End: 2023-04-28

## 2023-04-28 RX ORDER — BUPIVACAINE HYDROCHLORIDE 2.5 MG/ML
5 INJECTION, SOLUTION EPIDURAL; INFILTRATION; INTRACAUDAL ONCE
Status: COMPLETED | OUTPATIENT
Start: 2023-04-28 | End: 2023-04-28

## 2023-04-28 RX ORDER — DEXAMETHASONE SODIUM PHOSPHATE 10 MG/ML
8 INJECTION, SOLUTION INTRAMUSCULAR; INTRAVENOUS ONCE
Status: COMPLETED | OUTPATIENT
Start: 2023-04-28 | End: 2023-04-28

## 2023-04-28 RX ADMIN — FENTANYL CITRATE 50 MCG: 50 INJECTION, SOLUTION INTRAMUSCULAR; INTRAVENOUS at 12:48

## 2023-04-28 RX ADMIN — BUPIVACAINE HYDROCHLORIDE 12.5 MG: 2.5 INJECTION, SOLUTION EPIDURAL; INFILTRATION; INTRACAUDAL; PERINEURAL at 12:23

## 2023-04-28 RX ADMIN — IOPAMIDOL 2 ML: 612 INJECTION, SOLUTION INTRATHECAL at 12:58

## 2023-04-28 RX ADMIN — DEXAMETHASONE SODIUM PHOSPHATE 8 MG: 10 INJECTION, SOLUTION INTRAMUSCULAR; INTRAVENOUS at 12:21

## 2023-04-28 ASSESSMENT — PAIN SCALES - GENERAL: PAINLEVEL_OUTOF10: 8

## 2023-04-28 ASSESSMENT — PAIN DESCRIPTION - LOCATION: LOCATION: BACK

## 2023-04-28 NOTE — BRIEF OP NOTE
Brief Postoperative Note    Ana Sharma  YOB: 1970  [id number]    Pre-operative Diagnosis: Back pain    Post-operative Diagnosis: Same    Procedure: ANDRIA    Anesthesia: Local and fentanyl    Surgeons/Assistants: Claudia Rios MD    Estimated Blood Loss: less than 50     Complications: None    Specimens: Was Not Obtained    Findings: Successful ANDRIA at L4-L5.       Electronically signed by MOLLY Bowen on 4/28/2023 at 1:13 PM

## 2023-05-02 ENCOUNTER — TELEPHONE (OUTPATIENT)
Dept: FAMILY MEDICINE CLINIC | Age: 53
End: 2023-05-02

## 2023-05-02 NOTE — TELEPHONE ENCOUNTER
Claudio Almonte with Dr Terri Espinosa office called  to request copies of MRI's, CT's, ER Discharge summary  All  Faxed to [phone]   [facility]

## 2023-05-02 NOTE — TELEPHONE ENCOUNTER
Pt called asking fo [facility] Discharge summary, MRI, CT reports faxed to Dr Jonnie Clinton office [phone] Attn: Ok Drain   ( pt has an Appt.  Here Thursday )

## 2023-05-04 ENCOUNTER — OFFICE VISIT (OUTPATIENT)
Dept: FAMILY MEDICINE CLINIC | Age: 53
End: 2023-05-04

## 2023-05-04 VITALS
BODY MASS INDEX: 36.85 KG/M2 | HEART RATE: 114 BPM | OXYGEN SATURATION: 97 % | WEIGHT: 208 LBS | DIASTOLIC BLOOD PRESSURE: 80 MMHG | SYSTOLIC BLOOD PRESSURE: 122 MMHG | TEMPERATURE: 98.2 F

## 2023-05-04 DIAGNOSIS — M54.50 CHRONIC LOW BACK PAIN WITHOUT SCIATICA, UNSPECIFIED BACK PAIN LATERALITY: ICD-10-CM

## 2023-05-04 DIAGNOSIS — G89.29 CHRONIC LOW BACK PAIN WITHOUT SCIATICA, UNSPECIFIED BACK PAIN LATERALITY: ICD-10-CM

## 2023-05-04 DIAGNOSIS — Z09 HOSPITAL DISCHARGE FOLLOW-UP: Primary | ICD-10-CM

## 2023-05-04 RX ORDER — HYDROCODONE BITARTRATE AND ACETAMINOPHEN 5; 325 MG/1; MG/1
1 TABLET ORAL EVERY 4 HOURS PRN
Qty: 42 TABLET | Refills: 0 | Status: SHIPPED | OUTPATIENT
Start: 2023-05-04 | End: 2023-05-11

## 2023-05-04 SDOH — ECONOMIC STABILITY: HOUSING INSECURITY
IN THE LAST 12 MONTHS, WAS THERE A TIME WHEN YOU DID NOT HAVE A STEADY PLACE TO SLEEP OR SLEPT IN A SHELTER (INCLUDING NOW)?: NO

## 2023-05-04 SDOH — ECONOMIC STABILITY: INCOME INSECURITY: HOW HARD IS IT FOR YOU TO PAY FOR THE VERY BASICS LIKE FOOD, HOUSING, MEDICAL CARE, AND HEATING?: NOT HARD AT ALL

## 2023-05-04 SDOH — ECONOMIC STABILITY: FOOD INSECURITY: WITHIN THE PAST 12 MONTHS, YOU WORRIED THAT YOUR FOOD WOULD RUN OUT BEFORE YOU GOT MONEY TO BUY MORE.: NEVER TRUE

## 2023-05-04 SDOH — ECONOMIC STABILITY: FOOD INSECURITY: WITHIN THE PAST 12 MONTHS, THE FOOD YOU BOUGHT JUST DIDN'T LAST AND YOU DIDN'T HAVE MONEY TO GET MORE.: NEVER TRUE

## 2023-05-04 ASSESSMENT — PATIENT HEALTH QUESTIONNAIRE - PHQ9
SUM OF ALL RESPONSES TO PHQ9 QUESTIONS 1 & 2: 0
3. TROUBLE FALLING OR STAYING ASLEEP: 0
SUM OF ALL RESPONSES TO PHQ QUESTIONS 1-9: 0
5. POOR APPETITE OR OVEREATING: 0
2. FEELING DOWN, DEPRESSED OR HOPELESS: 0
1. LITTLE INTEREST OR PLEASURE IN DOING THINGS: 0
SUM OF ALL RESPONSES TO PHQ QUESTIONS 1-9: 0
4. FEELING TIRED OR HAVING LITTLE ENERGY: 0
6. FEELING BAD ABOUT YOURSELF - OR THAT YOU ARE A FAILURE OR HAVE LET YOURSELF OR YOUR FAMILY DOWN: 0
8. MOVING OR SPEAKING SO SLOWLY THAT OTHER PEOPLE COULD HAVE NOTICED. OR THE OPPOSITE, BEING SO FIGETY OR RESTLESS THAT YOU HAVE BEEN MOVING AROUND A LOT MORE THAN USUAL: 0
10. IF YOU CHECKED OFF ANY PROBLEMS, HOW DIFFICULT HAVE THESE PROBLEMS MADE IT FOR YOU TO DO YOUR WORK, TAKE CARE OF THINGS AT HOME, OR GET ALONG WITH OTHER PEOPLE: 0
9. THOUGHTS THAT YOU WOULD BE BETTER OFF DEAD, OR OF HURTING YOURSELF: 0
SUM OF ALL RESPONSES TO PHQ QUESTIONS 1-9: 0
SUM OF ALL RESPONSES TO PHQ QUESTIONS 1-9: 0
7. TROUBLE CONCENTRATING ON THINGS, SUCH AS READING THE NEWSPAPER OR WATCHING TELEVISION: 0

## 2023-05-04 ASSESSMENT — ENCOUNTER SYMPTOMS: BACK PAIN: 1

## 2023-05-04 NOTE — PROGRESS NOTES
Post-Discharge Transitional Care Follow Up      Maddie Montgomery   YOB: 1970    Date of Office Visit:  5/4/2023  Date of Hospital Admission: 4/21/23  Date of Hospital Discharge: 4/24/23  Readmission Risk Score (high >=14%. Medium >=10%):Readmission Risk Score: 7.9      Care management risk score Rising risk (score 2-5) and Complex Care (Scores >=6): No Risk Score On File     Non face to face  following discharge, date last encounter closed (first attempt may have been earlier): 04/26/2023     Call initiated 2 business days of discharge: Yes     Hospital discharge follow-up  -     MS DISCHARGE MEDS RECONCILED W/ CURRENT OUTPATIENT MED LIST  Chronic low back pain without sciatica, unspecified back pain laterality  -     HYDROcodone-acetaminophen (NORCO) 5-325 MG per tablet; Take 1 tablet by mouth every 4 hours as needed for Pain for up to 7 days. Intended supply: 7 days. Take lowest dose possible to manage pain Max Daily Amount: 6 tablets, Disp-42 tablet, R-0Normal  -      PDMP reviewed and appropriate    Medical Decision Making: moderate complexity  Return in about 3 months (around 8/4/2023) for chronic conditions. On this date 5/4/2023 I have spent 30 minutes reviewing previous notes, test results and face to face with the patient discussing the diagnosis and importance of compliance with the treatment plan as well as documenting on the day of the visit. Subjective:   HPI    Inpatient course: Discharge summary reviewed- see chart. Interval history/Current status:     Patient here for hospital follow up from [facility] for back pain. She was admitted on 4/21 because she was in severe pain and couldn't climb up the stairs. Has appointment with Neurosurgery on 5/16. Had steroid injection without relief. Walking with walker. Is waiting to hear back from PM.     MRI Lumbar:  IMPRESSION:  1. Mild L2-L3 through L4-L5 spinal canal stenosis. No severe lumbar spinal  canal stenosis at any level.

## 2023-05-09 ENCOUNTER — TELEPHONE (OUTPATIENT)
Dept: FAMILY MEDICINE CLINIC | Age: 53
End: 2023-05-09

## 2023-05-10 DIAGNOSIS — M54.50 CHRONIC LOW BACK PAIN WITHOUT SCIATICA, UNSPECIFIED BACK PAIN LATERALITY: ICD-10-CM

## 2023-05-10 DIAGNOSIS — G89.29 CHRONIC LOW BACK PAIN WITHOUT SCIATICA, UNSPECIFIED BACK PAIN LATERALITY: ICD-10-CM

## 2023-05-10 RX ORDER — HYDROCODONE BITARTRATE AND ACETAMINOPHEN 5; 325 MG/1; MG/1
1 TABLET ORAL EVERY 4 HOURS PRN
Qty: 42 TABLET | Refills: 0 | Status: SHIPPED | OUTPATIENT
Start: 2023-05-10 | End: 2023-05-17

## 2023-05-11 DIAGNOSIS — M48.02 SPINAL STENOSIS OF CERVICAL REGION: ICD-10-CM

## 2023-05-11 RX ORDER — TIZANIDINE 2 MG/1
TABLET ORAL
Qty: 90 TABLET | Refills: 0 | Status: SHIPPED | OUTPATIENT
Start: 2023-05-11

## 2023-05-16 ENCOUNTER — TELEPHONE (OUTPATIENT)
Dept: NEUROSURGERY | Age: 53
End: 2023-05-16

## 2023-05-16 ENCOUNTER — OFFICE VISIT (OUTPATIENT)
Dept: NEUROSURGERY | Age: 53
End: 2023-05-16
Payer: COMMERCIAL

## 2023-05-16 VITALS
HEART RATE: 102 BPM | TEMPERATURE: 97.4 F | OXYGEN SATURATION: 98 % | SYSTOLIC BLOOD PRESSURE: 132 MMHG | HEIGHT: 63 IN | DIASTOLIC BLOOD PRESSURE: 83 MMHG | WEIGHT: 208 LBS | BODY MASS INDEX: 36.86 KG/M2

## 2023-05-16 DIAGNOSIS — M25.511 CHRONIC PAIN OF BOTH SHOULDERS: ICD-10-CM

## 2023-05-16 DIAGNOSIS — G89.29 CHRONIC PAIN OF BOTH SHOULDERS: ICD-10-CM

## 2023-05-16 DIAGNOSIS — M25.512 CHRONIC PAIN OF BOTH SHOULDERS: ICD-10-CM

## 2023-05-16 DIAGNOSIS — M47.812 CERVICAL SPONDYLOSIS: ICD-10-CM

## 2023-05-16 DIAGNOSIS — Z98.1 HISTORY OF LUMBAR FUSION: ICD-10-CM

## 2023-05-16 DIAGNOSIS — Z98.1 HISTORY OF FUSION OF CERVICAL SPINE: ICD-10-CM

## 2023-05-16 DIAGNOSIS — M47.816 LUMBAR SPONDYLOSIS: Primary | ICD-10-CM

## 2023-05-16 PROCEDURE — G8417 CALC BMI ABV UP PARAM F/U: HCPCS | Performed by: NURSE PRACTITIONER

## 2023-05-16 PROCEDURE — 3079F DIAST BP 80-89 MM HG: CPT | Performed by: NURSE PRACTITIONER

## 2023-05-16 PROCEDURE — 1111F DSCHRG MED/CURRENT MED MERGE: CPT | Performed by: NURSE PRACTITIONER

## 2023-05-16 PROCEDURE — 3075F SYST BP GE 130 - 139MM HG: CPT | Performed by: NURSE PRACTITIONER

## 2023-05-16 PROCEDURE — 3017F COLORECTAL CA SCREEN DOC REV: CPT | Performed by: NURSE PRACTITIONER

## 2023-05-16 PROCEDURE — 99214 OFFICE O/P EST MOD 30 MIN: CPT | Performed by: NURSE PRACTITIONER

## 2023-05-16 PROCEDURE — 4004F PT TOBACCO SCREEN RCVD TLK: CPT | Performed by: NURSE PRACTITIONER

## 2023-05-16 PROCEDURE — G8427 DOCREV CUR MEDS BY ELIG CLIN: HCPCS | Performed by: NURSE PRACTITIONER

## 2023-05-16 NOTE — TELEPHONE ENCOUNTER
[facility] Carbon called to inform our office that they do not accept the patient's insurance. Reached out to patient for an alternative suggestion. Patient states that [facility] accepts her insurance.

## 2023-05-19 ENCOUNTER — INITIAL CONSULT (OUTPATIENT)
Dept: PAIN MANAGEMENT | Age: 53
End: 2023-05-19
Payer: COMMERCIAL

## 2023-05-19 VITALS — BODY MASS INDEX: 36.86 KG/M2 | WEIGHT: 208 LBS | HEIGHT: 63 IN

## 2023-05-19 DIAGNOSIS — M96.1 LUMBAR POST-LAMINECTOMY SYNDROME: ICD-10-CM

## 2023-05-19 DIAGNOSIS — M47.817 LUMBOSACRAL SPONDYLOSIS WITHOUT MYELOPATHY: Primary | ICD-10-CM

## 2023-05-19 DIAGNOSIS — M51.36 LUMBAR DEGENERATIVE DISC DISEASE: ICD-10-CM

## 2023-05-19 PROCEDURE — G8417 CALC BMI ABV UP PARAM F/U: HCPCS | Performed by: STUDENT IN AN ORGANIZED HEALTH CARE EDUCATION/TRAINING PROGRAM

## 2023-05-19 PROCEDURE — 99204 OFFICE O/P NEW MOD 45 MIN: CPT | Performed by: STUDENT IN AN ORGANIZED HEALTH CARE EDUCATION/TRAINING PROGRAM

## 2023-05-19 PROCEDURE — G8427 DOCREV CUR MEDS BY ELIG CLIN: HCPCS | Performed by: STUDENT IN AN ORGANIZED HEALTH CARE EDUCATION/TRAINING PROGRAM

## 2023-05-19 PROCEDURE — 4004F PT TOBACCO SCREEN RCVD TLK: CPT | Performed by: STUDENT IN AN ORGANIZED HEALTH CARE EDUCATION/TRAINING PROGRAM

## 2023-05-19 PROCEDURE — 1111F DSCHRG MED/CURRENT MED MERGE: CPT | Performed by: STUDENT IN AN ORGANIZED HEALTH CARE EDUCATION/TRAINING PROGRAM

## 2023-05-19 PROCEDURE — 3017F COLORECTAL CA SCREEN DOC REV: CPT | Performed by: STUDENT IN AN ORGANIZED HEALTH CARE EDUCATION/TRAINING PROGRAM

## 2023-05-19 RX ORDER — METHYLPREDNISOLONE 4 MG/1
TABLET ORAL
Qty: 1 KIT | Refills: 0 | Status: SHIPPED | OUTPATIENT
Start: 2023-05-19

## 2023-05-19 NOTE — PROGRESS NOTES
and insight, non-pressured speech  SKIN: No rashes or lesions  MUSCULOSKELETAL:  Inspection: The back and extremities are symmetric and aligned. Muscle bulk is normal in appearance. Palpation: There is tenderness to palpation along the lumbar paraspinal musculature bilaterally  Lumbar range of motion is full  NEUROMUSCULAR:  Patient ambulates unassisted  Gait is nonantalgic  Sensation to light touch is grossly intact in lower extremities  Strength is grossly intact in lower extremities  No ankle clonus    Special Tests:  Lumbar facet loading is positive bilaterally  Seated straight leg raise is negative bilaterally      DIAGNOSIS:    ICD-10-CM    1. Lumbosacral spondylosis without myelopathy  M47.817 methylPREDNISolone (MEDROL DOSEPACK) 4 MG tablet     FACET JOINT L/S     FACET JOINT L/S 2ND LEVEL     CANCELED: FACET JOINT L/S     CANCELED: FACET JOINT L/S 2ND LEVEL      2. Lumbar degenerative disc disease  M51.36       3. Lumbar post-laminectomy syndrome  M96.1            ASSESSMENT:    Cat Minor is a 50 y. o.female who presents with chronic low back pain and leg pain. To review, patient has history of previous L5-S1 decompression and fusion. She was recently hospitalized and received an epidural steroid injection for her leg pain on 4/24/2023. She was referred by neurosurgery for injection therapy. The patient's history and physical examination are consistent with lumbosacral spondylosis as the patient has axial low back pain that is mechanical in nature. There is tenderness to palpation along the lumbar paraspinal musculature with positive lumbar facet loading bilaterally. The lumbar MRI on 4/21/2023 reveals multilevel degenerative changes with facet hypertrophy throughout the lumbar spine specifically at L4-5 and L5-S1 facet joints. I will plan for bilateral lumbar L3, L4, L5 medial branch blocks with progression to radiofrequency ablation if successful.      Neurologically, it appears the patient has

## 2023-05-24 ENCOUNTER — TELEPHONE (OUTPATIENT)
Dept: NEUROSURGERY | Age: 53
End: 2023-05-24

## 2023-05-24 ENCOUNTER — TELEPHONE (OUTPATIENT)
Dept: PAIN MANAGEMENT | Age: 53
End: 2023-05-24

## 2023-05-24 NOTE — TELEPHONE ENCOUNTER
Patient is calling to report that since attending Pain Management, she has experienced more pain while being on a Medrol Dose pack. She states that the pain is right between the shoulder blades, radiating to both arms. She states that she can hardly lift her arms, the pain feels like a burning sensation.

## 2023-05-24 NOTE — TELEPHONE ENCOUNTER
Would stop Medrol Dosepak if it seems like symptoms are worsening. Recommend proceeding with pain management interventions upcoming as planned.

## 2023-06-15 ENCOUNTER — APPOINTMENT (OUTPATIENT)
Dept: MRI IMAGING | Age: 53
End: 2023-06-15
Payer: COMMERCIAL

## 2023-06-15 ENCOUNTER — HOSPITAL ENCOUNTER (EMERGENCY)
Age: 53
Discharge: HOME | End: 2023-06-15
Attending: EMERGENCY MEDICINE
Payer: COMMERCIAL

## 2023-06-15 ENCOUNTER — TELEPHONE (OUTPATIENT)
Dept: PAIN MANAGEMENT | Age: 53
End: 2023-06-15

## 2023-06-15 VITALS
HEIGHT: 63 IN | SYSTOLIC BLOOD PRESSURE: 132 MMHG | RESPIRATION RATE: 18 BRPM | HEART RATE: 98 BPM | TEMPERATURE: 98.5 F | WEIGHT: 207.01 LBS | OXYGEN SATURATION: 96 % | BODY MASS INDEX: 36.68 KG/M2 | DIASTOLIC BLOOD PRESSURE: 89 MMHG

## 2023-06-15 DIAGNOSIS — G89.29 CHRONIC MIDLINE BACK PAIN, UNSPECIFIED BACK LOCATION: Primary | ICD-10-CM

## 2023-06-15 DIAGNOSIS — M54.9 CHRONIC MIDLINE BACK PAIN, UNSPECIFIED BACK LOCATION: Primary | ICD-10-CM

## 2023-06-15 PROCEDURE — 72141 MRI NECK SPINE W/O DYE: CPT

## 2023-06-15 PROCEDURE — 6370000000 HC RX 637 (ALT 250 FOR IP): Performed by: STUDENT IN AN ORGANIZED HEALTH CARE EDUCATION/TRAINING PROGRAM

## 2023-06-15 PROCEDURE — 72148 MRI LUMBAR SPINE W/O DYE: CPT

## 2023-06-15 PROCEDURE — 99284 EMERGENCY DEPT VISIT MOD MDM: CPT

## 2023-06-15 PROCEDURE — 72146 MRI CHEST SPINE W/O DYE: CPT

## 2023-06-15 RX ORDER — METHOCARBAMOL 500 MG/1
500 TABLET, FILM COATED ORAL ONCE
Status: COMPLETED | OUTPATIENT
Start: 2023-06-15 | End: 2023-06-15

## 2023-06-15 RX ORDER — LORAZEPAM 0.5 MG/1
1 TABLET ORAL ONCE
Status: COMPLETED | OUTPATIENT
Start: 2023-06-15 | End: 2023-06-15

## 2023-06-15 RX ORDER — OXYCODONE HYDROCHLORIDE 5 MG/1
5 TABLET ORAL ONCE
Status: COMPLETED | OUTPATIENT
Start: 2023-06-15 | End: 2023-06-15

## 2023-06-15 RX ORDER — LIDOCAINE 4 G/G
1 PATCH TOPICAL DAILY
Status: DISCONTINUED | OUTPATIENT
Start: 2023-06-15 | End: 2023-06-15 | Stop reason: HOSPADM

## 2023-06-15 RX ORDER — LIDOCAINE 4 G/G
1 PATCH TOPICAL DAILY
Qty: 10 PATCH | Refills: 0 | Status: SHIPPED | OUTPATIENT
Start: 2023-06-15 | End: 2023-06-25

## 2023-06-15 RX ORDER — METHOCARBAMOL 750 MG/1
750 TABLET, FILM COATED ORAL 3 TIMES DAILY
Qty: 30 TABLET | Refills: 0 | Status: SHIPPED | OUTPATIENT
Start: 2023-06-15 | End: 2023-06-25

## 2023-06-15 RX ADMIN — METHOCARBAMOL 500 MG: 500 TABLET ORAL at 10:20

## 2023-06-15 RX ADMIN — OXYCODONE 5 MG: 5 TABLET ORAL at 10:20

## 2023-06-15 RX ADMIN — LORAZEPAM 1 MG: 0.5 TABLET ORAL at 10:44

## 2023-06-15 RX ADMIN — OXYCODONE HYDROCHLORIDE 5 MG: 5 TABLET ORAL at 13:32

## 2023-06-15 ASSESSMENT — ENCOUNTER SYMPTOMS
COLOR CHANGE: 0
SORE THROAT: 0
NAUSEA: 0
CONSTIPATION: 0
RHINORRHEA: 0
ABDOMINAL PAIN: 0
VOMITING: 0
DIARRHEA: 0
COUGH: 0
CHEST TIGHTNESS: 0
BACK PAIN: 1
SHORTNESS OF BREATH: 0
SINUS PRESSURE: 0

## 2023-06-15 ASSESSMENT — PAIN - FUNCTIONAL ASSESSMENT: PAIN_FUNCTIONAL_ASSESSMENT: 0-10

## 2023-06-15 ASSESSMENT — PAIN DESCRIPTION - LOCATION: LOCATION: NECK;BACK

## 2023-06-15 ASSESSMENT — PAIN SCALES - GENERAL
PAINLEVEL_OUTOF10: 9

## 2023-06-15 ASSESSMENT — PAIN DESCRIPTION - ORIENTATION: ORIENTATION: LOWER

## 2023-06-15 NOTE — TELEPHONE ENCOUNTER
Pt called stating she needs to speak with Dr. Andrei campos d/t her back/neck pain. She states she has been taken off all her pain medications and she is left in excurciating pain. She went to the ER today and they could not give her anything to take home but Lidocaine patches. She was scheduled for a procedure yesterday which had to be postponed d/t lack of ins auth. She states she is at her breaking point. Please call pt asap.

## 2023-06-15 NOTE — ED TRIAGE NOTES
Pt arrived to ED 02 via triage. Pt co neck and back pain. Pt states that she has had this neck and lower back pain for awhile. Pt states that she had spinal stenosis and degenerative disk disease. Pt states that she has followed with the pain clinic. Pt states that she is taking advil dual release at home. Pt states that her pain has been getting worse over the past 3 days. Pt denies any CP or SOB. Pt is resting on stretcher with call light within reach. Breathing is non labored and no acute distress is noted.    Will continue to follow plan of care

## 2023-06-15 NOTE — ED PROVIDER NOTES
[facility]     Emergency Department     Faculty Attestation    I performed a history and physical examination of the patient and discussed management with the resident. I reviewed the residents note and agree with the documented findings including all diagnostic interpretations and plan of care. Any areas of disagreement are noted on the chart. I was personally present for the key portions of any procedures. I have documented in the chart those procedures where I was not present during the key portions. I have reviewed the emergency nurses triage note. I agree with the chief complaint, past medical history, past surgical history, allergies, medications, social and family history as documented unless otherwise noted below. Documentation of the HPI, Physical Exam and Medical Decision Making performed by rudolphibbrooke is based on my personal performance of the HPI, PE and MDM. For Physician Assistant/ Nurse Practitioner cases/documentation I have personally evaluated this patient and have completed at least one if not all key elements of the E/M (history, physical exam, and MDM). Additional findings are as noted. Primary Care Physician: SAVANA Levi CNP    Note Started: 10:14 AM EDT     VITAL SIGNS:   height is 5' 3\" (1.6 m) and weight is 207 lb 0.2 oz (93.9 kg). Her oral temperature is 97.9 °F (36.6 °C). Her blood pressure is 141/95 (abnormal) and her pulse is 109 (abnormal). Her respiration is 17 and oxygen saturation is 99%. Medical Decision Making  Neck and back pain. Chronic history of neck and back pain with radiculopathy undergoing conservative treatment with physical therapy injections and symptomatic treatment via pain management. Patient reports increasing pain on the legs, decreasing sensation when wiping after the bathroom no urinary or fecal retention or incontinence. Does report decreased sensation through the legs as well.   Does
Ran Out of Food in the Last Year: Never true   Transportation Needs: No Transportation Needs    Lack of Transportation (Medical): No    Lack of Transportation (Non-Medical): No   Physical Activity: Not on file   Stress: Not on file   Social Connections: Not on file   Intimate Partner Violence: Not on file   Housing Stability: Unknown    Unable to Pay for Housing in the Last Year: Not on file    Number of Places Lived in the Last Year: Not on file    Unstable Housing in the Last Year: No       Family History   Problem Relation Age of Onset    Dementia Mother     Depression Mother     High Blood Pressure Mother     Coronary Art Dis Mother         9 stents    COPD Mother     Diabetes Mother     Heart Attack Father     Diabetes Maternal Grandmother     Alzheimer's Disease Paternal Grandfather     Heart Disease Maternal Aunt        Allergies:  Seasonal, Bactrim [sulfamethoxazole-trimethoprim], and Keflex [cephalexin]    Home Medications:  Prior to Admission medications    Medication Sig Start Date End Date Taking? Authorizing Provider   lidocaine 4 % external patch Place 1 patch onto the skin daily for 10 days 6/15/23 6/25/23 Yes Sofia Robertson MD   methocarbamol (ROBAXIN-750) 750 MG tablet Take 1 tablet by mouth 3 times daily for 10 days 6/15/23 6/25/23 Yes Sofia Robertson MD   methylPREDNISolone (MEDROL DOSEPACK) 4 MG tablet Take by mouth.  5/19/23   De Cedeno, DO   acetaminophen (TYLENOL) 500 MG tablet Take 2 tablets by mouth every 6 hours as needed for Pain 1/4/23   Nahed Naranjo, DO   ondansetron (ZOFRAN-ODT) 4 MG disintegrating tablet Take 1 tablet by mouth every 8 hours as needed for Nausea or Vomiting 12/27/22   Lindsey Be, DO   ibuprofen (ADVIL;MOTRIN) 600 MG tablet Take 1 tablet by mouth every 6 hours as needed for Pain 12/12/22   Valeriy Brian MD   empagliflozin (JARDIANCE) 25 MG tablet Take 1 tablet by mouth daily 11/8/22   Doe Parisi, APRN - CNP   calcium citrate (CALCITRATE) 950 (200

## 2023-06-15 NOTE — DISCHARGE INSTRUCTIONS
You were seen in the emergency department for chronic back pain and concern for worsening weakness. We obtained MRI imaging of your cervical spine thoracic spine and lumbar spine which showed no signs of cord compression and no change from the prior MRI imaging. Most continue to follow-up with your pain management physician regarding your ongoing chronic pain management. We prescribed you lidocaine patches as well as Robaxin which is another type of muscle relaxer that you can try. Return to the Emergency Department for inability to move legs, worsening of pain, tingling / loss of sensation, any other care or concern.

## 2023-07-07 ENCOUNTER — TELEPHONE (OUTPATIENT)
Dept: PAIN MANAGEMENT | Age: 53
End: 2023-07-07

## 2023-07-28 ENCOUNTER — OFFICE VISIT (OUTPATIENT)
Dept: PAIN MANAGEMENT | Age: 53
End: 2023-07-28
Payer: COMMERCIAL

## 2023-07-28 VITALS — BODY MASS INDEX: 35.97 KG/M2 | WEIGHT: 203 LBS | HEIGHT: 63 IN

## 2023-07-28 DIAGNOSIS — M54.12 CERVICAL RADICULOPATHY: ICD-10-CM

## 2023-07-28 DIAGNOSIS — M47.817 LUMBOSACRAL SPONDYLOSIS WITHOUT MYELOPATHY: Primary | ICD-10-CM

## 2023-07-28 DIAGNOSIS — M96.1 LUMBAR POST-LAMINECTOMY SYNDROME: ICD-10-CM

## 2023-07-28 DIAGNOSIS — M51.36 LUMBAR DEGENERATIVE DISC DISEASE: ICD-10-CM

## 2023-07-28 PROCEDURE — 3017F COLORECTAL CA SCREEN DOC REV: CPT | Performed by: STUDENT IN AN ORGANIZED HEALTH CARE EDUCATION/TRAINING PROGRAM

## 2023-07-28 PROCEDURE — G8417 CALC BMI ABV UP PARAM F/U: HCPCS | Performed by: STUDENT IN AN ORGANIZED HEALTH CARE EDUCATION/TRAINING PROGRAM

## 2023-07-28 PROCEDURE — 99215 OFFICE O/P EST HI 40 MIN: CPT | Performed by: STUDENT IN AN ORGANIZED HEALTH CARE EDUCATION/TRAINING PROGRAM

## 2023-07-28 PROCEDURE — 4004F PT TOBACCO SCREEN RCVD TLK: CPT | Performed by: STUDENT IN AN ORGANIZED HEALTH CARE EDUCATION/TRAINING PROGRAM

## 2023-07-28 PROCEDURE — G8427 DOCREV CUR MEDS BY ELIG CLIN: HCPCS | Performed by: STUDENT IN AN ORGANIZED HEALTH CARE EDUCATION/TRAINING PROGRAM

## 2023-07-28 NOTE — PROGRESS NOTES
Chronic Pain Clinic Note     Encounter Date: 2023     SUBJECTIVE:  Chief Complaint   Patient presents with    Back Pain     History of Present Illness:   Grazyna Brown is a 50 y.o. female who presents with back pain    Medication Refill: Gabapentin - pt states she has been taking medication as supposed to but is not getting any relief. Pt states she did take a dose today. Current Complaints of Pain:   Location: back   Radiation: both legs, Left is worse  Severity: moderate  Pain Numerical Score - 8 today   Average:  9     Highest: 10  Lowest: 7  Character/Quality: Complains of pain that is aching, burning, cramping, shooting and stabbing  Timing: Constant  Associated symptoms: none  Numbness: yes  Weakness: yes  Exacerbating factors:  pain is just there  Alleviating factors:  pain meds  Length of time pain has been present: Started about years ago, past 2 months has gotten worse  Inciting event/injury:    Bowel/Bladder incontinence: no  Falls: 2 in the past month  Physical Therapy: yes, done in     History of Interventions:   Surgery: 1 neck / 1 back  Injections: [facility]I [facility]    Imagin/15/2023 MRI Cervical Spine   6/15/2023 MRI Thoracic Spine   6/15/2023 MRI Lumbar Spine  Lumbar MRI 2023    FINDINGS:  BONES/ALIGNMENT: Postsurgical changes of L5-S1 posterior decompression and  interbody fusion. Susceptibility artifact from the fusion hardware partially  obscures the surrounding structures. No acute or subacute vertebral body  compression fracture. 3 mm retrolisthesis of L2 on L3 and L3 on L4. No  definite pars defect. No marrow replacing process. No foci of suspicious  bone marrow edema. Multilevel degenerative disc desiccation and height loss is most pronounced  at L2-L3. Multilevel marginal osteophytes and degenerative endplate changes,  most pronounced at L2-L3. SPINAL CORD: The conus terminates normally. SOFT TISSUES: No paraspinal mass identified.      L1-L2:

## 2023-08-02 ENCOUNTER — HOSPITAL ENCOUNTER (OUTPATIENT)
Age: 53
Setting detail: SPECIMEN
Discharge: HOME | End: 2023-08-02

## 2023-08-02 DIAGNOSIS — I10 ESSENTIAL HYPERTENSION: ICD-10-CM

## 2023-08-02 DIAGNOSIS — E11.9 TYPE 2 DIABETES MELLITUS WITHOUT COMPLICATION, WITH LONG-TERM CURRENT USE OF INSULIN (HCC): ICD-10-CM

## 2023-08-02 DIAGNOSIS — Z79.4 TYPE 2 DIABETES MELLITUS WITHOUT COMPLICATION, WITH LONG-TERM CURRENT USE OF INSULIN (HCC): ICD-10-CM

## 2023-08-02 DIAGNOSIS — E03.9 HYPOTHYROIDISM, UNSPECIFIED TYPE: ICD-10-CM

## 2023-08-02 DIAGNOSIS — E78.2 MIXED HYPERLIPIDEMIA: ICD-10-CM

## 2023-08-02 DIAGNOSIS — K21.9 GASTROESOPHAGEAL REFLUX DISEASE, UNSPECIFIED WHETHER ESOPHAGITIS PRESENT: ICD-10-CM

## 2023-08-02 LAB
ALBUMIN SERPL-MCNC: 4 G/DL (ref 3.5–5.2)
ALBUMIN/GLOB SERPL: 1.1 {RATIO} (ref 1–2.5)
ALP SERPL-CCNC: 110 U/L (ref 35–104)
ALT SERPL-CCNC: 16 U/L (ref 5–33)
ANION GAP SERPL CALCULATED.3IONS-SCNC: 18 MMOL/L (ref 9–17)
AST SERPL-CCNC: 19 U/L
BASOPHILS # BLD: 0 K/UL (ref 0–0.2)
BASOPHILS NFR BLD: 0 % (ref 0–2)
BILIRUB SERPL-MCNC: 0.3 MG/DL (ref 0.3–1.2)
BUN SERPL-MCNC: 10 MG/DL (ref 6–20)
CALCIUM SERPL-MCNC: 9.6 MG/DL (ref 8.6–10.4)
CHLORIDE SERPL-SCNC: 99 MMOL/L (ref 98–107)
CHOLEST SERPL-MCNC: 246 MG/DL
CHOLESTEROL/HDL RATIO: 5.6
CO2 SERPL-SCNC: 19 MMOL/L (ref 20–31)
CREAT SERPL-MCNC: 0.6 MG/DL (ref 0.5–0.9)
CREAT UR-MCNC: 64.7 MG/DL (ref 28–217)
EOSINOPHIL # BLD: 0.57 K/UL (ref 0–0.4)
EOSINOPHILS RELATIVE PERCENT: 5 % (ref 1–4)
ERYTHROCYTE [DISTWIDTH] IN BLOOD BY AUTOMATED COUNT: 16.1 % (ref 11.8–14.4)
EST. AVERAGE GLUCOSE BLD GHB EST-MCNC: 344 MG/DL
GFR SERPL CREATININE-BSD FRML MDRD: >60 ML/MIN/1.73M2
GLUCOSE SERPL-MCNC: 379 MG/DL (ref 70–99)
HBA1C MFR BLD: 13.6 % (ref 4–6)
HCT VFR BLD AUTO: 45.8 % (ref 36.3–47.1)
HDLC SERPL-MCNC: 44 MG/DL
HGB BLD-MCNC: 15.3 G/DL (ref 11.9–15.1)
IMM GRANULOCYTES # BLD AUTO: 0 K/UL (ref 0–0.3)
IMM GRANULOCYTES NFR BLD: 0 %
LDLC SERPL CALC-MCNC: 156 MG/DL (ref 0–130)
LYMPHOCYTES NFR BLD: 6.44 K/UL (ref 1–4.8)
LYMPHOCYTES RELATIVE PERCENT: 57 % (ref 24–44)
MCH RBC QN AUTO: 29.1 PG (ref 25.2–33.5)
MCHC RBC AUTO-ENTMCNC: 33.4 G/DL (ref 28.4–34.8)
MCV RBC AUTO: 87.2 FL (ref 82.6–102.9)
MICROALBUMIN UR-MCNC: 20 MG/L
MICROALBUMIN/CREAT UR-RTO: 31 MCG/MG CREAT
MONOCYTES NFR BLD: 0.45 K/UL (ref 0.1–0.8)
MONOCYTES NFR BLD: 4 % (ref 1–7)
MORPHOLOGY: ABNORMAL
NEUTROPHILS NFR BLD: 34 % (ref 36–66)
NEUTS SEG NFR BLD: 3.84 K/UL (ref 1.8–7.7)
NRBC BLD-RTO: 0 PER 100 WBC
PLATELET # BLD AUTO: 415 K/UL (ref 138–453)
PMV BLD AUTO: 9.8 FL (ref 8.1–13.5)
POTASSIUM SERPL-SCNC: 4.6 MMOL/L (ref 3.7–5.3)
PROT SERPL-MCNC: 7.6 G/DL (ref 6.4–8.3)
RBC # BLD AUTO: 5.25 M/UL (ref 3.95–5.11)
SODIUM SERPL-SCNC: 136 MMOL/L (ref 135–144)
TRIGL SERPL-MCNC: 231 MG/DL
WBC OTHER # BLD: 11.3 K/UL (ref 3.5–11.3)

## 2023-08-03 LAB — TSH SERPL DL<=0.05 MIU/L-ACNC: 1.01 UIU/ML (ref 0.3–5)

## 2023-08-04 ENCOUNTER — OFFICE VISIT (OUTPATIENT)
Dept: FAMILY MEDICINE CLINIC | Age: 53
End: 2023-08-04
Payer: COMMERCIAL

## 2023-08-04 VITALS
OXYGEN SATURATION: 98 % | SYSTOLIC BLOOD PRESSURE: 140 MMHG | BODY MASS INDEX: 35.96 KG/M2 | DIASTOLIC BLOOD PRESSURE: 84 MMHG | TEMPERATURE: 97.7 F | WEIGHT: 203 LBS | HEART RATE: 86 BPM

## 2023-08-04 DIAGNOSIS — M54.50 CHRONIC LOW BACK PAIN WITHOUT SCIATICA, UNSPECIFIED BACK PAIN LATERALITY: ICD-10-CM

## 2023-08-04 DIAGNOSIS — E11.65 TYPE 2 DIABETES MELLITUS WITH HYPERGLYCEMIA, WITH LONG-TERM CURRENT USE OF INSULIN (HCC): ICD-10-CM

## 2023-08-04 DIAGNOSIS — E03.9 HYPOTHYROIDISM, UNSPECIFIED TYPE: ICD-10-CM

## 2023-08-04 DIAGNOSIS — R79.89 ABNORMAL CBC: ICD-10-CM

## 2023-08-04 DIAGNOSIS — J84.82: ICD-10-CM

## 2023-08-04 DIAGNOSIS — R22.1 NECK MASS: ICD-10-CM

## 2023-08-04 DIAGNOSIS — E78.2 MIXED HYPERLIPIDEMIA: ICD-10-CM

## 2023-08-04 DIAGNOSIS — I10 ESSENTIAL HYPERTENSION: Primary | ICD-10-CM

## 2023-08-04 DIAGNOSIS — Z79.4 TYPE 2 DIABETES MELLITUS WITH HYPERGLYCEMIA, WITH LONG-TERM CURRENT USE OF INSULIN (HCC): ICD-10-CM

## 2023-08-04 DIAGNOSIS — K21.00 GASTROESOPHAGEAL REFLUX DISEASE WITH ESOPHAGITIS WITHOUT HEMORRHAGE: ICD-10-CM

## 2023-08-04 DIAGNOSIS — I25.119 CORONARY ARTERY DISEASE INVOLVING NATIVE HEART WITH ANGINA PECTORIS, UNSPECIFIED VESSEL OR LESION TYPE (HCC): ICD-10-CM

## 2023-08-04 DIAGNOSIS — G89.29 CHRONIC LOW BACK PAIN WITHOUT SCIATICA, UNSPECIFIED BACK PAIN LATERALITY: ICD-10-CM

## 2023-08-04 PROBLEM — M54.2 NECK PAIN: Status: RESOLVED | Noted: 2023-04-23 | Resolved: 2023-08-04

## 2023-08-04 PROBLEM — T81.40XA POSTOPERATIVE INFECTION, INITIAL ENCOUNTER: Status: RESOLVED | Noted: 2022-12-26 | Resolved: 2023-08-04

## 2023-08-04 PROBLEM — N93.9 ABNORMAL UTERINE BLEEDING: Status: RESOLVED | Noted: 2022-10-26 | Resolved: 2023-08-04

## 2023-08-04 PROBLEM — G89.18 POST-OP PAIN: Status: RESOLVED | Noted: 2022-12-27 | Resolved: 2023-08-04

## 2023-08-04 PROCEDURE — 3077F SYST BP >= 140 MM HG: CPT | Performed by: NURSE PRACTITIONER

## 2023-08-04 PROCEDURE — 2022F DILAT RTA XM EVC RTNOPTHY: CPT | Performed by: NURSE PRACTITIONER

## 2023-08-04 PROCEDURE — 3046F HEMOGLOBIN A1C LEVEL >9.0%: CPT | Performed by: NURSE PRACTITIONER

## 2023-08-04 PROCEDURE — 3017F COLORECTAL CA SCREEN DOC REV: CPT | Performed by: NURSE PRACTITIONER

## 2023-08-04 PROCEDURE — 3079F DIAST BP 80-89 MM HG: CPT | Performed by: NURSE PRACTITIONER

## 2023-08-04 PROCEDURE — G8427 DOCREV CUR MEDS BY ELIG CLIN: HCPCS | Performed by: NURSE PRACTITIONER

## 2023-08-04 PROCEDURE — G8417 CALC BMI ABV UP PARAM F/U: HCPCS | Performed by: NURSE PRACTITIONER

## 2023-08-04 PROCEDURE — 99215 OFFICE O/P EST HI 40 MIN: CPT | Performed by: NURSE PRACTITIONER

## 2023-08-04 PROCEDURE — 4004F PT TOBACCO SCREEN RCVD TLK: CPT | Performed by: NURSE PRACTITIONER

## 2023-08-04 RX ORDER — INSULIN GLARGINE 300 U/ML
36 INJECTION, SOLUTION SUBCUTANEOUS
Qty: 5 ADJUSTABLE DOSE PRE-FILLED PEN SYRINGE | Refills: 2 | Status: SHIPPED | OUTPATIENT
Start: 2023-08-04

## 2023-08-04 RX ORDER — EVOLOCUMAB 140 MG/ML
140 INJECTION, SOLUTION SUBCUTANEOUS
Qty: 2.1 ML | Refills: 2 | Status: SHIPPED | OUTPATIENT
Start: 2023-08-04

## 2023-08-04 RX ORDER — TRAMADOL HYDROCHLORIDE 50 MG/1
50 TABLET ORAL EVERY 6 HOURS PRN
Qty: 28 TABLET | Refills: 0 | Status: SHIPPED | OUTPATIENT
Start: 2023-08-04 | End: 2023-08-11

## 2023-08-04 RX ORDER — TIRZEPATIDE 2.5 MG/.5ML
2.5 INJECTION, SOLUTION SUBCUTANEOUS WEEKLY
Qty: 4 ADJUSTABLE DOSE PRE-FILLED PEN SYRINGE | Refills: 0 | Status: SHIPPED | OUTPATIENT
Start: 2023-08-04

## 2023-08-04 ASSESSMENT — PATIENT HEALTH QUESTIONNAIRE - PHQ9
8. MOVING OR SPEAKING SO SLOWLY THAT OTHER PEOPLE COULD HAVE NOTICED. OR THE OPPOSITE, BEING SO FIGETY OR RESTLESS THAT YOU HAVE BEEN MOVING AROUND A LOT MORE THAN USUAL: 2
SUM OF ALL RESPONSES TO PHQ9 QUESTIONS 1 & 2: 6
2. FEELING DOWN, DEPRESSED OR HOPELESS: 3
1. LITTLE INTEREST OR PLEASURE IN DOING THINGS: 3
9. THOUGHTS THAT YOU WOULD BE BETTER OFF DEAD, OR OF HURTING YOURSELF: 2
10. IF YOU CHECKED OFF ANY PROBLEMS, HOW DIFFICULT HAVE THESE PROBLEMS MADE IT FOR YOU TO DO YOUR WORK, TAKE CARE OF THINGS AT HOME, OR GET ALONG WITH OTHER PEOPLE: 1
SUM OF ALL RESPONSES TO PHQ QUESTIONS 1-9: 21
6. FEELING BAD ABOUT YOURSELF - OR THAT YOU ARE A FAILURE OR HAVE LET YOURSELF OR YOUR FAMILY DOWN: 0
SUM OF ALL RESPONSES TO PHQ QUESTIONS 1-9: 21
7. TROUBLE CONCENTRATING ON THINGS, SUCH AS READING THE NEWSPAPER OR WATCHING TELEVISION: 2
SUM OF ALL RESPONSES TO PHQ QUESTIONS 1-9: 19
3. TROUBLE FALLING OR STAYING ASLEEP: 3
SUM OF ALL RESPONSES TO PHQ QUESTIONS 1-9: 21
4. FEELING TIRED OR HAVING LITTLE ENERGY: 3
5. POOR APPETITE OR OVEREATING: 3

## 2023-08-04 ASSESSMENT — COLUMBIA-SUICIDE SEVERITY RATING SCALE - C-SSRS
6. HAVE YOU EVER DONE ANYTHING, STARTED TO DO ANYTHING, OR PREPARED TO DO ANYTHING TO END YOUR LIFE?: NO
1. WITHIN THE PAST MONTH, HAVE YOU WISHED YOU WERE DEAD OR WISHED YOU COULD GO TO SLEEP AND NOT WAKE UP?: NO
2. HAVE YOU ACTUALLY HAD ANY THOUGHTS OF KILLING YOURSELF?: NO

## 2023-08-04 ASSESSMENT — ENCOUNTER SYMPTOMS: BACK PAIN: 1

## 2023-08-04 NOTE — PROGRESS NOTES
Last 3 Encounters:   08/04/23 (!) 140/84   06/15/23 132/89   05/16/23 132/83       Hospital Outpatient Visit on 08/02/2023   Component Date Value Ref Range Status    WBC 08/02/2023 11.3  3.5 - 11.3 k/uL Final    RBC 08/02/2023 5.25 (H)  3.95 - 5.11 m/uL Final    Hemoglobin 08/02/2023 15.3 (H)  11.9 - 15.1 g/dL Final    Hematocrit 08/02/2023 45.8  36.3 - 47.1 % Final    MCV 08/02/2023 87.2  82.6 - 102.9 fL Final    MCH 08/02/2023 29.1  25.2 - 33.5 pg Final    MCHC 08/02/2023 33.4  28.4 - 34.8 g/dL Final    RDW 08/02/2023 16.1 (H)  11.8 - 14.4 % Final    Platelets 88/42/2681 415  138 - 453 k/uL Final    MPV 08/02/2023 9.8  8.1 - 13.5 fL Final    NRBC Automated 08/02/2023 0.0  0.0 per 100 WBC Final    Immature Granulocytes 08/02/2023 0  0 % Final    Neutrophils % 08/02/2023 34 (L)  36 - 66 % Final    Lymphocytes % 08/02/2023 57 (H)  24 - 44 % Final    Monocytes % 08/02/2023 4  1 - 7 % Final    Eosinophils % 08/02/2023 5 (H)  1 - 4 % Final    Basophils % 08/02/2023 0  0 - 2 % Final    Absolute Immature Granulocyte 08/02/2023 0.00  0.00 - 0.30 k/uL Final    Neutrophils Absolute 08/02/2023 3.84  1.8 - 7.7 k/uL Final    Lymphocytes Absolute 08/02/2023 6.44 (H)  1.0 - 4.8 k/uL Final    Comment: R/O Chronic Lymphoproliferative Disorder,  recommend peripheral blood Flow Cytometry,  if clinically indicated. Monocytes Absolute 08/02/2023 0.45  0.1 - 0.8 k/uL Final    Eosinophils Absolute 08/02/2023 0.57 (H)  0.0 - 0.4 k/uL Final    Basophils Absolute 08/02/2023 0.00  0.0 - 0.2 k/uL Final    Morphology 08/02/2023 ANISOCYTOSIS PRESENT   Final    Glucose 08/02/2023 379 (H)  70 - 99 mg/dL Final    BUN 08/02/2023 10  6 - 20 mg/dL Final    Creatinine 08/02/2023 0.6  0.5 - 0.9 mg/dL Final    Est, Glom Filt Rate 08/02/2023 >60  >60 mL/min/1.73m2 Final    Comment:       These results are not intended for use in patients <25years of age.         eGFR results are calculated without a race factor using the 2021 CKD-EPI

## 2023-08-07 DIAGNOSIS — I10 ESSENTIAL HYPERTENSION: ICD-10-CM

## 2023-08-07 DIAGNOSIS — I25.10 CORONARY ARTERY DISEASE INVOLVING NATIVE HEART WITHOUT ANGINA PECTORIS, UNSPECIFIED VESSEL OR LESION TYPE: ICD-10-CM

## 2023-08-07 DIAGNOSIS — E11.9 TYPE 2 DIABETES MELLITUS WITHOUT COMPLICATION, WITH LONG-TERM CURRENT USE OF INSULIN (HCC): ICD-10-CM

## 2023-08-07 DIAGNOSIS — E03.9 HYPOTHYROIDISM, UNSPECIFIED TYPE: ICD-10-CM

## 2023-08-07 DIAGNOSIS — Z79.4 TYPE 2 DIABETES MELLITUS WITHOUT COMPLICATION, WITH LONG-TERM CURRENT USE OF INSULIN (HCC): ICD-10-CM

## 2023-08-07 RX ORDER — RANOLAZINE 500 MG/1
TABLET, EXTENDED RELEASE ORAL
Qty: 180 TABLET | Refills: 1 | Status: SHIPPED | OUTPATIENT
Start: 2023-08-07

## 2023-08-07 RX ORDER — GLUCOSAM/CHON-MSM1/C/MANG/BOSW 500-416.6
TABLET ORAL
Qty: 100 EACH | Refills: 3 | Status: SHIPPED | OUTPATIENT
Start: 2023-08-07

## 2023-08-07 RX ORDER — CALCIUM CITRATE/VITAMIN D3 200MG-6.25
TABLET ORAL
Qty: 100 STRIP | Refills: 3 | Status: SHIPPED | OUTPATIENT
Start: 2023-08-07

## 2023-08-07 RX ORDER — METOPROLOL SUCCINATE 100 MG/1
TABLET, EXTENDED RELEASE ORAL
Qty: 180 TABLET | Refills: 1 | Status: SHIPPED | OUTPATIENT
Start: 2023-08-07

## 2023-08-07 RX ORDER — CLOPIDOGREL BISULFATE 75 MG/1
TABLET ORAL
Qty: 90 TABLET | Refills: 1 | Status: SHIPPED | OUTPATIENT
Start: 2023-08-07

## 2023-08-07 RX ORDER — LEVOTHYROXINE SODIUM 0.05 MG/1
TABLET ORAL
Qty: 90 TABLET | Refills: 1 | Status: SHIPPED | OUTPATIENT
Start: 2023-08-07

## 2023-08-07 NOTE — TELEPHONE ENCOUNTER
Holli Ceballos is calling to request a refill on the following medication(s):    Medication Request:  Requested Prescriptions     Pending Prescriptions Disp Refills    levothyroxine (SYNTHROID) 50 MCG tablet [Pharmacy Med Name: LEVOTHYROXINE 50 MCG TABLET] 90 tablet 1     Sig: take 1 tablet by mouth once daily    metoprolol succinate (TOPROL XL) 100 MG extended release tablet [Pharmacy Med Name: METOPROLOL SUCC  MG TAB] 180 tablet 1     Sig: take 1 tablet by mouth twice a day    clopidogrel (PLAVIX) 75 MG tablet [Pharmacy Med Name: CLOPIDOGREL 75 MG TABLET] 90 tablet 1     Sig: take 1 tablet by mouth once daily    ranolazine (RANEXA) 500 MG extended release tablet [Pharmacy Med Name: RANOLAZINE  MG TABLET] 180 tablet 1     Sig: take 1 tablet by mouth twice a day    TRUE METRIX BLOOD GLUCOSE TEST strip [Pharmacy Med Name: TRUE METRIX GLUCOSE TEST STRIP] 100 strip 3     Sig: use 1 TEST STRIP to TEST BLOOD SUGAR twice a day and if needed    [facility] [Pharmacy Med Name: María Pressley 30G LANCET] 100 each 3     Sig: use 1 LANCET to TEST BLOOD SUGAR twice a day and if needed       Last Visit Date (If Applicable):  5/7/8898    Next Visit Date:    9/6/2023

## 2023-08-08 ENCOUNTER — HOSPITAL ENCOUNTER (OUTPATIENT)
Dept: PHYSICAL THERAPY | Facility: CLINIC | Age: 53
Setting detail: THERAPIES SERIES
Discharge: HOME | End: 2023-08-08
Payer: COMMERCIAL

## 2023-08-08 PROCEDURE — 97162 PT EVAL MOD COMPLEX 30 MIN: CPT

## 2023-08-08 NOTE — CONSULTS
standing, walking, and completing ADLs. Gait: Pt will be able to complete the TUG in less than or equal to 18\" with the least restrictive AD in order to promote a safety with walking. Activity tolerance: Pt will be able to transition from aquatic therapy to land based therapy to improve activity tolerance in a home and community setting. ? Function: Pt will score less than or equal to 68% on the JOSE CRUZ in order to demonstrate an improvement in function and QOL. Independent with Home Exercise Programs      Patient goals:     Rehab Potential:  [] Good  [x] Fair  [] Poor   Suggested Professional Referral:  [] No  [x] Yes: medical clearance from neuro sx prior to beginning PT due to reports of incontinence and saddle region paresthesias   Barriers to Goal Achievement[de-identified]  [] No  [x] Yes: PMH  Domestic Concerns:  [x] No  [] Yes:    Pt. Education:  [x] Plans/Goals, Risks/Benefits discussed  [] Home exercise program  Method of Education: [x] Verbal  [] Demo  [] Written  Aquatic therapy  Fall handout provided  Educated minimally on chronic pain  Comprehension of Education:  [x] Verbalizes understanding. [] Demonstrates understanding. [] Needs Review. [] Demonstrates/verbalizes understanding of HEP/Ed previously given.     Treatment Plan:  [x] Therapeutic Exercise   75539  [] Iontophoresis: 4 mg/mL Dexamethasone Sodium Phosphate  mAmin  81455   [x] Therapeutic Activity  82410 [] Vasopneumatic cold with compression  23543    [x] Gait Training   01832 [] Ultrasound   05235   [x] Neuromuscular Re-education  59239 [] Electrical Stimulation Unattended  14505   [x] Manual Therapy  15041 [] Electrical Stimulation Attended  20417   [x] Instruction in HEP  [] Lumbar/Cervical Traction  67161   [x] Aquatic Therapy   10455 [x] Cold/hotpack    [] Massage   22757      [] Dry Needling, 1 or 2 muscles  56242   [] Biofeedback, first 15 minutes   66043  [] Biofeedback, additional 15 minutes   30547 [] Dry Needling, 3 or more

## 2023-08-09 DIAGNOSIS — E78.2 MIXED HYPERLIPIDEMIA: ICD-10-CM

## 2023-08-09 RX ORDER — ATORVASTATIN CALCIUM 80 MG/1
80 TABLET, FILM COATED ORAL DAILY
Qty: 90 TABLET | Refills: 1 | Status: SHIPPED | OUTPATIENT
Start: 2023-08-09 | End: 2024-02-05

## 2023-08-09 NOTE — TELEPHONE ENCOUNTER
Dahiana Hinojosa is calling to request a refill on the following medication(s):    Medication Request:  Requested Prescriptions     Pending Prescriptions Disp Refills    atorvastatin (LIPITOR) 80 MG tablet 90 tablet 1     Sig: Take 1 tablet by mouth daily       Last Visit Date (If Applicable):  2/3/9824    Next Visit Date:    9/6/2023

## 2023-08-11 ENCOUNTER — OFFICE VISIT (OUTPATIENT)
Dept: NEUROSURGERY | Age: 53
End: 2023-08-11
Payer: COMMERCIAL

## 2023-08-11 VITALS
HEART RATE: 92 BPM | SYSTOLIC BLOOD PRESSURE: 119 MMHG | TEMPERATURE: 97.2 F | WEIGHT: 203 LBS | HEIGHT: 63 IN | OXYGEN SATURATION: 96 % | DIASTOLIC BLOOD PRESSURE: 82 MMHG | BODY MASS INDEX: 35.97 KG/M2

## 2023-08-11 DIAGNOSIS — M54.16 LUMBAR RADICULOPATHY: ICD-10-CM

## 2023-08-11 DIAGNOSIS — M47.812 CERVICAL SPONDYLOSIS: Primary | ICD-10-CM

## 2023-08-11 PROCEDURE — 3078F DIAST BP <80 MM HG: CPT | Performed by: NEUROLOGICAL SURGERY

## 2023-08-11 PROCEDURE — 3017F COLORECTAL CA SCREEN DOC REV: CPT | Performed by: NEUROLOGICAL SURGERY

## 2023-08-11 PROCEDURE — 4004F PT TOBACCO SCREEN RCVD TLK: CPT | Performed by: NEUROLOGICAL SURGERY

## 2023-08-11 PROCEDURE — 3074F SYST BP LT 130 MM HG: CPT | Performed by: NEUROLOGICAL SURGERY

## 2023-08-11 PROCEDURE — 99214 OFFICE O/P EST MOD 30 MIN: CPT | Performed by: NEUROLOGICAL SURGERY

## 2023-08-11 PROCEDURE — G8417 CALC BMI ABV UP PARAM F/U: HCPCS | Performed by: NEUROLOGICAL SURGERY

## 2023-08-11 PROCEDURE — G8427 DOCREV CUR MEDS BY ELIG CLIN: HCPCS | Performed by: NEUROLOGICAL SURGERY

## 2023-08-11 NOTE — PROGRESS NOTES
Department of Neurosurgery                                                      Follow up visit      History Obtained From:  patient    CHIEF COMPLAINT:         Chief Complaint   Patient presents with    Neurologic Problem     -Lumbar spondylosis       HISTORY OF PRESENT ILLNESS:       The patient is a 50 y.o. female who presents for follow up for lumbar spondylosis    Patient reports that she is still experiencing lower back pain. She is unable to stand or sit for 10-15 minutes without being in pain. Injections provided her with no relief at  all. This pain begins in the center of her lower back and radiates slightly to the right    Patient reports pain and numbness in her right leg. This is aggravated while she is seated and is relieved after she stretches. This pain is more severe than her back pain and accounts for around 60% of her overall pain. This pain Is aggravated by sitting or standing in an upright position so she spends most of her time in a recliner seated at an angle. She had a lumbar fusion and laminectomy in 2004 and felt better after these operations but now the pain has returned.     Patient presented with a cane for ambulation    PAST MEDICAL HISTORY :       Past Medical History:        Diagnosis Date    Abnormal uterine bleeding (AUB)     Anxiety     Arthritis     CAD (coronary artery disease)     Dr. Mary Tran    Chronic neck pain     COVID-19     fever, slight cough, fatigue,lost taste,  admitted for couple days bc of elevated troponins    Depression     Diabetes mellitus ([address]) 2017    managed by PCP    Hyperlipidemia     ILD (interstitial lung disease) ([address])     Kidney stone     Langerhans-cell granulomatosis ([address])     Myocardial infarct ([address]) 2016    Neck pain     Pulmonary nodule     Spinal stenosis 2017    Under care of team     [facility]: Renae Lyons    Under care of team     PCP: Teodora SAWANT- TONYA    Under care of team     GYN: Dr. Amee Zhang    Under care of team

## 2023-08-14 ENCOUNTER — HOSPITAL ENCOUNTER (OUTPATIENT)
Age: 53
Discharge: HOME | End: 2023-08-16
Payer: COMMERCIAL

## 2023-08-14 ENCOUNTER — TELEPHONE (OUTPATIENT)
Dept: FAMILY MEDICINE CLINIC | Age: 53
End: 2023-08-14

## 2023-08-14 DIAGNOSIS — B37.9 YEAST INFECTION: Primary | ICD-10-CM

## 2023-08-14 DIAGNOSIS — R22.1 NECK MASS: Primary | ICD-10-CM

## 2023-08-14 DIAGNOSIS — R22.1 NECK MASS: ICD-10-CM

## 2023-08-14 PROCEDURE — 70491 CT SOFT TISSUE NECK W/DYE: CPT

## 2023-08-14 PROCEDURE — 6360000004 HC RX CONTRAST MEDICATION

## 2023-08-14 RX ORDER — FLUCONAZOLE 150 MG/1
150 TABLET ORAL ONCE
Qty: 1 TABLET | Refills: 0 | Status: SHIPPED | OUTPATIENT
Start: 2023-08-14 | End: 2023-08-14

## 2023-08-14 RX ADMIN — IOPAMIDOL 75 ML: 755 INJECTION, SOLUTION INTRAVENOUS at 09:58

## 2023-08-14 NOTE — TELEPHONE ENCOUNTER
CT order needs to be changed to CT neck with IV contrast per [facility] TIFFANI [facility]

## 2023-08-14 NOTE — TELEPHONE ENCOUNTER
Patient states Mundo Morgan has caused  to get a Yeast infection can you please send n a script ?     [address] [facility]

## 2023-08-14 NOTE — TELEPHONE ENCOUNTER
Adriana Alfonso if out of office until next week, if she cannot wait until then she can always reach out to neurosurgery to place these orders as they are managing her chronic back pain.

## 2023-08-15 ENCOUNTER — HOSPITAL ENCOUNTER (OUTPATIENT)
Age: 53
Setting detail: SPECIMEN
Discharge: HOME | End: 2023-08-15
Payer: COMMERCIAL

## 2023-08-15 ENCOUNTER — INITIAL CONSULT (OUTPATIENT)
Dept: ONCOLOGY | Age: 53
End: 2023-08-15
Payer: COMMERCIAL

## 2023-08-15 ENCOUNTER — TELEPHONE (OUTPATIENT)
Dept: ONCOLOGY | Age: 53
End: 2023-08-15

## 2023-08-15 VITALS
DIASTOLIC BLOOD PRESSURE: 69 MMHG | RESPIRATION RATE: 169 BRPM | WEIGHT: 208.4 LBS | BODY MASS INDEX: 36.93 KG/M2 | SYSTOLIC BLOOD PRESSURE: 101 MMHG | HEIGHT: 63 IN | TEMPERATURE: 97.2 F | HEART RATE: 88 BPM

## 2023-08-15 DIAGNOSIS — D76.3 HISTIOCYTOSIS (HCC): ICD-10-CM

## 2023-08-15 DIAGNOSIS — Z72.0 TOBACCO ABUSE: ICD-10-CM

## 2023-08-15 DIAGNOSIS — M89.8X9 BONE MASS: ICD-10-CM

## 2023-08-15 DIAGNOSIS — Z71.6 TOBACCO ABUSE COUNSELING: ICD-10-CM

## 2023-08-15 DIAGNOSIS — D76.3 HISTIOCYTOSIS (HCC): Primary | ICD-10-CM

## 2023-08-15 LAB
BASOPHILS # BLD: 0.05 K/UL (ref 0–0.2)
BASOPHILS NFR BLD: 1 % (ref 0–2)
EOSINOPHIL # BLD: 0.35 K/UL (ref 0–0.44)
EOSINOPHILS RELATIVE PERCENT: 3 % (ref 1–4)
ERYTHROCYTE [DISTWIDTH] IN BLOOD BY AUTOMATED COUNT: 15.6 % (ref 11.8–14.4)
FREE KAPPA/LAMBDA RATIO: 1.46 (ref 0.26–1.65)
HCT VFR BLD AUTO: 47.3 % (ref 36.3–47.1)
HGB BLD-MCNC: 15.3 G/DL (ref 11.9–15.1)
IGA SERPL-MCNC: 232 MG/DL (ref 70–400)
IGG SERPL-MCNC: 881 MG/DL (ref 700–1600)
IGM SERPL-MCNC: 77 MG/DL (ref 40–230)
IMM GRANULOCYTES # BLD AUTO: 0.02 K/UL (ref 0–0.3)
IMM GRANULOCYTES NFR BLD: 0 %
KAPPA LC FREE SER-MCNC: 21.7 MG/L (ref 3.7–19.4)
LAMBDA LC FREE SERPL-MCNC: 14.9 MG/L (ref 5.7–26.3)
LYMPHOCYTES NFR BLD: 4.49 K/UL (ref 1.1–3.7)
LYMPHOCYTES RELATIVE PERCENT: 42 % (ref 24–43)
MCH RBC QN AUTO: 28.8 PG (ref 25.2–33.5)
MCHC RBC AUTO-ENTMCNC: 32.3 G/DL (ref 28.4–34.8)
MCV RBC AUTO: 88.9 FL (ref 82.6–102.9)
MONOCYTES NFR BLD: 0.56 K/UL (ref 0.1–1.2)
MONOCYTES NFR BLD: 5 % (ref 3–12)
NEUTROPHILS NFR BLD: 49 % (ref 36–65)
NEUTS SEG NFR BLD: 5.23 K/UL (ref 1.5–8.1)
NRBC BLD-RTO: 0 PER 100 WBC
PLATELET # BLD AUTO: 390 K/UL (ref 138–453)
PMV BLD AUTO: 9.8 FL (ref 8.1–13.5)
RBC # BLD AUTO: 5.32 M/UL (ref 3.95–5.11)
RBC # BLD: ABNORMAL 10*6/UL
WBC OTHER # BLD: 10.7 K/UL (ref 3.5–11.3)

## 2023-08-15 PROCEDURE — 99202 OFFICE O/P NEW SF 15 MIN: CPT | Performed by: INTERNAL MEDICINE

## 2023-08-15 PROCEDURE — 85025 COMPLETE CBC W/AUTO DIFF WBC: CPT

## 2023-08-15 PROCEDURE — 86334 IMMUNOFIX E-PHORESIS SERUM: CPT

## 2023-08-15 PROCEDURE — G8427 DOCREV CUR MEDS BY ELIG CLIN: HCPCS | Performed by: INTERNAL MEDICINE

## 2023-08-15 PROCEDURE — 82784 ASSAY IGA/IGD/IGG/IGM EACH: CPT

## 2023-08-15 PROCEDURE — 83521 IG LIGHT CHAINS FREE EACH: CPT

## 2023-08-15 PROCEDURE — G8417 CALC BMI ABV UP PARAM F/U: HCPCS | Performed by: INTERNAL MEDICINE

## 2023-08-15 PROCEDURE — 3074F SYST BP LT 130 MM HG: CPT | Performed by: INTERNAL MEDICINE

## 2023-08-15 PROCEDURE — 99245 OFF/OP CONSLTJ NEW/EST HI 55: CPT | Performed by: INTERNAL MEDICINE

## 2023-08-15 PROCEDURE — 36415 COLL VENOUS BLD VENIPUNCTURE: CPT

## 2023-08-15 PROCEDURE — 3078F DIAST BP <80 MM HG: CPT | Performed by: INTERNAL MEDICINE

## 2023-08-15 NOTE — TELEPHONE ENCOUNTER
[facility] ARRIVES AMBULATORY FOR CONSULTATION APPOINTMENT  DR MURGUIA IN TO SEE PATIENT  ORDERS RECEIVED  PET/CT SOON  CBC FLOW CYTOLOGY IGG IGA IGM SIFX KAPPA LAMBDA FREE LIGHT CHAINS SOON  RV AFTER TEST RESULTS  PET/CT SCHEDULED FOR 08/25/23 @8AM ARRIVE BY 7:30AM @[facility]  LABS CDP FLOW CYTOMETRY IGG IGA IGM SIFX KAPPA LAMBDA FREE LIGHT CHAINS TO BE DONE 08/16/23, ORDERS GIVEN TO PT  MD VISIT 08/29/23 @11:45AM  AVS PRINTED AND GIVEN TO PATIENT WITH INSTRUCTIONS  PATIENT DISCHARGED AMBULATORY

## 2023-08-15 NOTE — TELEPHONE ENCOUNTER
[facility] ARRIVES AMBULATORY FOR CONSULTATION APPOINTMENT  DR Bradley Bronson IN TO SEE PATIENT  ORDERS RECEIVED  PET/CT  SOON  PET/CT SCHEDULED FOR 08/25/23 @8AM ARRIVE BY 7:30AM @[facility]  LABS CDP FLOW CYTOMETRY IGG IGA IGM KAPPA LAMBDA FREE LIGHT CHAINS SIFX DONE ON EXIT  MD VISIT 08/29/23 @11:45AM  AVS PRINTED AND GIVEN TO PATIENT WITH INSTRUCTIONS  PATIENT DISCHARGED AMBULATORY

## 2023-08-16 LAB — SURGICAL PATHOLOGY REPORT: NORMAL

## 2023-08-17 LAB
FLOW CYTOMETRY BL: NORMAL
INTERPRETATION SERPL IFE-IMP: NORMAL
PATH REV: NORMAL

## 2023-08-21 ENCOUNTER — HOSPITAL ENCOUNTER (OUTPATIENT)
Dept: PHYSICAL THERAPY | Age: 53
Setting detail: THERAPIES SERIES
Discharge: HOME | End: 2023-08-21
Payer: COMMERCIAL

## 2023-08-21 PROCEDURE — 97113 AQUATIC THERAPY/EXERCISES: CPT

## 2023-08-21 PROCEDURE — 97110 THERAPEUTIC EXERCISES: CPT

## 2023-08-21 NOTE — PROGRESS NOTES
[x] [facility]  [address]  [place]: [phone]   F: [phone]    Physical Therapy Progress Note    Date:       Patient: Kris King  :   MRN: [id number]    Physician: Maryjo Simmonds, CNP      Insurance: Chad Cockayne. Auth approved through 10/31/23 (64 units therex, 32 units ea of theract, gait, manual, aquatics)  Diagnosis: M54.50, G89.29 (ICD-10-CM) - Chronic low back pain without sciatica   Onset Date: 23    Next Dr. Caitlyn Kline: Lumbar surgery 10/30/23  Total visits attended:   Cancels/No shows: 0/0  Date of initial visit: 23                    Subjective: Pt continues to report very high pain levels in her lower back radiating into B LEs and feels like she has lost a considerable amount of strength especially into her L LE. Uses a cane for all mobility tasks and continues to report unchanged saddle anesthesia which her physicians are aware of. Denies any bowel incontinence issues and states that she is able to control her bladder, but typically only has a very short window to make it to a restroom. Currently scheduled for a lumbar surgery with Dr. Praveen Woods on 10/30/23, possible hemilaminectomy versus fusion depending on presentation intra-operatively.    Pain:  [x] Yes  [] No  Location: Low back, neck, B LEs Pain Rating: (0-10 scale) 7/10  Pain altered Tx:  [] No  [] Yes  Action:  Comments:    Objective:    Range of Motion  Left Range of Motion  Right Strength  Left Strength  Right   Lumbar Flexion 50%     Lumbar Extension 50%     Lumbar Rotation 50% 50%     Lumbar Side Bend 50% 50%     Hip Flexion   3/5 4-/5   Hip Abduction   3/5 4-/5   Hip Adduction   3+/5 4/5   Hip Extension   2+/5 4-/5   Hip ER       Hip IR       Knee Flexion   3+/5 4/5   Knee Extension   3+/5 4/5   Dorsiflexion   3-/5 4-/5   Plantar flexion   3-/5 4-/5   Inversion       Eversion       *All LE MMT assessed in modified (seated)

## 2023-08-21 NOTE — FLOWSHEET NOTE
(to be met in 16 treatments)  ? Pain: Pt will be able to report a max of 6-7/10 low back and leg pain when going for a short walk, standing and washing dishes, getting up from a chair, and sitting in a car.  ? ROM: Pt will be able to perform lumbar and LE ROM in all planes with less hesitancy and 0-1/10 increase in pain to promote movement at home. ? Strength: Pt will demonstrate a minimum of 4/5 hip girdle strength to assist with standing from a chair, standing, walking, and completing ADLs. Gait: Pt will be able to complete the TUG in less than or equal to 18\" with the least restrictive AD in order to promote a safety with walking. Activity tolerance: Pt will be able to transition from aquatic therapy to land based therapy to improve activity tolerance in a home and community setting. ? Function: Pt will score less than or equal to 68% on the JOSE CRUZ in order to demonstrate an improvement in function and QOL. Independent with Home Exercise Programs    Pt. Education:  [x] Yes  [] No  [x] Reviewed Prior HEP/Ed  Method of Education: [x] Verbal  [x] Demo  [] Written  Comprehension of Education:  [x] Verbalizes understanding. [x] Demonstrates understanding. [] Needs review. [] Demonstrates/verbalizes HEP/Ed previously given. Plan: [x] Continue per plan of care.    [] Other:      Treatment Charges: Mins Units Units used   []  Modalities      [x]  Ther Exercise 15 1 1/64   []  Manual Therapy   0/32   []  Ther Activities   0/32   [x]  Aquatics 27 2 2/32   []  Neuromuscular      [] Vasocompression      [] Gait Training   0/32   [] Dry needling        [] 1 or 2 muscles        [] 3 or more muscles      []  Other      Total Treatment time 42 3      Time In: 1299            Time Out: 7983  PT Re-Evaluation Time: 10:30am-10:50am (15' billable time for skilled reassessment of all recent subjective sx, objective measures, establishing transfer of care with new primary PT)- see separate PT progress note    Physical

## 2023-08-24 ENCOUNTER — HOSPITAL ENCOUNTER (OUTPATIENT)
Dept: PHYSICAL THERAPY | Age: 53
Setting detail: THERAPIES SERIES
Discharge: HOME | End: 2023-08-24
Payer: COMMERCIAL

## 2023-08-24 PROBLEM — M89.8X9 BONE MASS: Status: ACTIVE | Noted: 2023-08-24

## 2023-08-24 PROBLEM — D76.3 HISTIOCYTOSIS (HCC): Status: ACTIVE | Noted: 2023-08-24

## 2023-08-24 PROCEDURE — 97113 AQUATIC THERAPY/EXERCISES: CPT

## 2023-08-24 NOTE — FLOWSHEET NOTE
or 2 muscles        [] 3 or more muscles      []  Other      Total Treatment time 27 2      Time In: 3379            Time Out: 84    Electronically signed by:  Ishan Alberto PTA

## 2023-08-25 ENCOUNTER — HOSPITAL ENCOUNTER (OUTPATIENT)
Dept: NUCLEAR MEDICINE | Age: 53
Discharge: HOME | End: 2023-08-27
Attending: INTERNAL MEDICINE
Payer: COMMERCIAL

## 2023-08-25 DIAGNOSIS — M89.8X9 BONE MASS: ICD-10-CM

## 2023-08-25 DIAGNOSIS — D76.3 HISTIOCYTOSIS (HCC): ICD-10-CM

## 2023-08-25 LAB — GLUCOSE BLD-MCNC: 176 MG/DL (ref 65–105)

## 2023-08-25 PROCEDURE — A9552 F18 FDG: HCPCS | Performed by: INTERNAL MEDICINE

## 2023-08-25 PROCEDURE — 3430000000 HC RX DIAGNOSTIC RADIOPHARMACEUTICAL: Performed by: INTERNAL MEDICINE

## 2023-08-25 PROCEDURE — 78816 PET IMAGE W/CT FULL BODY: CPT

## 2023-08-25 PROCEDURE — 82947 ASSAY GLUCOSE BLOOD QUANT: CPT

## 2023-08-25 PROCEDURE — 2580000003 HC RX 258: Performed by: INTERNAL MEDICINE

## 2023-08-25 RX ORDER — FLUDEOXYGLUCOSE F 18 200 MCI/ML
10 INJECTION, SOLUTION INTRAVENOUS
Status: COMPLETED | OUTPATIENT
Start: 2023-08-25 | End: 2023-08-25

## 2023-08-25 RX ORDER — SODIUM CHLORIDE 0.9 % (FLUSH) 0.9 %
10 SYRINGE (ML) INJECTION PRN
Status: DISCONTINUED | OUTPATIENT
Start: 2023-08-25 | End: 2023-08-28 | Stop reason: HOSPADM

## 2023-08-25 RX ADMIN — FLUDEOXYGLUCOSE F 18 12.12 MILLICURIE: 200 INJECTION, SOLUTION INTRAVENOUS at 07:53

## 2023-08-25 RX ADMIN — SODIUM CHLORIDE, PRESERVATIVE FREE 10 ML: 5 INJECTION INTRAVENOUS at 07:55

## 2023-08-29 ENCOUNTER — TELEPHONE (OUTPATIENT)
Dept: ONCOLOGY | Age: 53
End: 2023-08-29

## 2023-08-29 ENCOUNTER — OFFICE VISIT (OUTPATIENT)
Dept: ONCOLOGY | Age: 53
End: 2023-08-29
Payer: COMMERCIAL

## 2023-08-29 VITALS
WEIGHT: 211.1 LBS | HEART RATE: 87 BPM | SYSTOLIC BLOOD PRESSURE: 105 MMHG | RESPIRATION RATE: 16 BRPM | BODY MASS INDEX: 37.39 KG/M2 | DIASTOLIC BLOOD PRESSURE: 73 MMHG | TEMPERATURE: 97.4 F

## 2023-08-29 DIAGNOSIS — D76.3 HISTIOCYTOSIS (HCC): ICD-10-CM

## 2023-08-29 DIAGNOSIS — M89.319 CLAVICLE ENLARGEMENT: Primary | ICD-10-CM

## 2023-08-29 DIAGNOSIS — M89.8X9 BONE MASS: ICD-10-CM

## 2023-08-29 PROCEDURE — 3074F SYST BP LT 130 MM HG: CPT | Performed by: INTERNAL MEDICINE

## 2023-08-29 PROCEDURE — 3078F DIAST BP <80 MM HG: CPT | Performed by: INTERNAL MEDICINE

## 2023-08-29 PROCEDURE — 3017F COLORECTAL CA SCREEN DOC REV: CPT | Performed by: INTERNAL MEDICINE

## 2023-08-29 PROCEDURE — G8417 CALC BMI ABV UP PARAM F/U: HCPCS | Performed by: INTERNAL MEDICINE

## 2023-08-29 PROCEDURE — 99214 OFFICE O/P EST MOD 30 MIN: CPT | Performed by: INTERNAL MEDICINE

## 2023-08-29 PROCEDURE — 99211 OFF/OP EST MAY X REQ PHY/QHP: CPT

## 2023-08-29 PROCEDURE — 4004F PT TOBACCO SCREEN RCVD TLK: CPT | Performed by: INTERNAL MEDICINE

## 2023-08-29 PROCEDURE — G8428 CUR MEDS NOT DOCUMENT: HCPCS | Performed by: INTERNAL MEDICINE

## 2023-08-29 NOTE — PROGRESS NOTES
clicks or gallops  Abdomen - soft, nontender, nondistended, no masses or organomegaly  Neurological - alert, oriented, normal speech, no focal findings or movement disorder noted  Musculoskeletal -right clavicle bulge/mass. Extremities - peripheral pulses normal, no pedal edema, no clubbing or cyanosis  Skin - normal coloration and turgor, no rashes, no suspicious skin lesions noted     Review of Diagnostic data:   Lab Results   Component Value Date    WBC 10.7 08/15/2023    HGB 15.3 (H) 08/15/2023    HCT 47.3 (H) 08/15/2023    MCV 88.9 08/15/2023     08/15/2023       Chemistry        Component Value Date/Time     08/02/2023 0852    K 4.6 08/02/2023 0852    CL 99 08/02/2023 0852    CO2 19 (L) 08/02/2023 0852    BUN 10 08/02/2023 0852    CREATININE 0.6 08/02/2023 0852        Component Value Date/Time    CALCIUM 9.6 08/02/2023 0852    ALKPHOS 110 (H) 08/02/2023 0852    AST 19 08/02/2023 0852    ALT 16 08/02/2023 0852    BILITOT 0.3 08/02/2023 0852          @[facility]@      IMPRESSION:   Pulmonary Langerhans cells granulomatosis  Right clavicle mass  Histocytosis  Leukocytosis and erythrocytosis with previous work-up negative. Likely related to chronic tobacco abuse  Chronic tobacco abuse  Multiple comorbidities as listed    PLAN: Records, labs and images were reviewed and discussed with the patient. I explained to the patient the nature of this abnormality and the rare nature of this disease and management plans. She continued to have follow-up with pulmonary and she received steroids on and off for pulmonary Langerhans' cells granulomatosis. However the findings in the right clavicle are concerning for possible systemic histocytosis. PET CT scan was done for evaluation. Results were reviewed and discussed with the patient and her significant other. No increased activity in the clavicle. No other systemic illnesses. Borderline activity in the left cervical lymph nodes.   Recent CT scan of the

## 2023-08-29 NOTE — TELEPHONE ENCOUNTER
Ángel Ledezma MD VISIT  Rt clavicle biopsy by IR  Call with results and recommendations.    IR WAS CALLED DR HAS TO REVIEW IT THEY WILL CALL PT TO SCHEDULE AN APPT W/ PT   TRIAGE TO WATCH FOR RESULT AND TO NOTIFY DR Bradley Bronson W/ RESULTS  AVS PRINTED W/ INSTRUCTIONS AND GIVEN TO PT ON EXIT

## 2023-08-31 ENCOUNTER — HOSPITAL ENCOUNTER (OUTPATIENT)
Dept: PHYSICAL THERAPY | Age: 53
Setting detail: THERAPIES SERIES
Discharge: HOME | End: 2023-08-31
Payer: COMMERCIAL

## 2023-08-31 ENCOUNTER — TELEPHONE (OUTPATIENT)
Dept: INTERVENTIONAL RADIOLOGY/VASCULAR | Age: 53
End: 2023-08-31

## 2023-08-31 PROCEDURE — 97113 AQUATIC THERAPY/EXERCISES: CPT

## 2023-08-31 NOTE — TELEPHONE ENCOUNTER
Left message for Dr. Shaw Clarity office. Per Dr. Mara Hackett, \" do they want pet positive left cervical lymph node? Don't see a right clavicle lesion. \" Call back number given.

## 2023-08-31 NOTE — FLOWSHEET NOTE
[x] [facility]  [address]  [place]: [phone]   F: [phone]    Physical Therapy Daily Treatment Note    Date:  2023  Patient Name:  Kit Lee    :  1970  MRN: [id number]  Physician: Taiwo Mckeon CNP                                                Insurance: [facility]. Auth approved through 10/31/23 (64 units therex, 32 units ea of theract, gait, manual, aquatics)  Diagnosis: M54.50, G89.29 (ICD-10-CM) - Chronic low back pain without sciatica             Onset Date: 23                 Next  [address] [place] [address]: Lumbar surgery 10/30/23  Total visits attended:   Cancels/No shows: 0/0  Date of initial visit: 23        Subjective:  Pt reports having a rough morning this morning. PT reports R LE giving out on her when walking, lost her balance but was able to use the kitchen counter to catch balance. States is having to \"tough it out\" due to not having anything to help with pain. Pt reports moderately sore for most of the weekend. Reports getting testing done on right clavicle at some point this weekend or week- may have to cancel next week. Pain:  [x] Yes  [] No Location: Lumbar back down (B) LEs along with numbness   Pain Rating: (0-10 scale) 8/10   Pain altered Tx:  [x] No  [] Yes  Action:  Comments: reviewed postural awareness, core stability and working in pain free ranges. Objective:      [facility]  Protocol Fibromyalgia    Date of Eval:    23                            Primary PT:Tamera Hodges, PT  Diagnosis:   Things to Focus On (goals):   Surgical Precautions:  Medical Precautions:  [] C-9 dates  [] Occ Med   [] -Thru 10/31/23 (64 units therex, 32 units ea of theract, gait, manual, aquatics)      Date 23     Visit # 2/16 3/16 4/16     Walk F/L/R 2 Laps @ Rail 2 Laps @ Rail 3 Laps @ Rail     Marching 10x 10x 10x     LE Exercise        Squats 7x3\"

## 2023-09-05 ENCOUNTER — TELEPHONE (OUTPATIENT)
Dept: INFUSION THERAPY | Facility: MEDICAL CENTER | Age: 53
End: 2023-09-05

## 2023-09-05 ENCOUNTER — HOSPITAL ENCOUNTER (OUTPATIENT)
Dept: PHYSICAL THERAPY | Age: 53
Setting detail: THERAPIES SERIES
Discharge: HOME | End: 2023-09-05

## 2023-09-05 NOTE — FLOWSHEET NOTE
[x] [facility]  [address]  [place]: [phone]   F: [phone]     Physical Therapy Cancel/No Show note    Date: 9434  Patient: Marcelo Lazo  :   MRN: [id number]    Visit Count:   Cancels/No Shows to date:     For today's appointment patient:    [x]  Cancelled    [] Rescheduled appointment    [] No-show     Reason given by patient:    []  Patient ill    []  Conflicting appointment    [] No transportation      [] Conflict with work    [] No reason given    [] Weather related    [] [id number]    [x] Other:      Comments: fell yesterday       [x] Next appointment was confirmed    Electronically signed by: Miguel Leblanc PTA

## 2023-09-05 NOTE — TELEPHONE ENCOUNTER
Pt then called IR and Marleny at IR called writer to say Dr Ramona Webb, questioning biospy order. If different site will need new order placed and writer placed note in md office to review. Notified pt to call back on Thursday 9/7/23, regarding scheduling biopsy.

## 2023-09-05 NOTE — TELEPHONE ENCOUNTER
Order placed with IR on 8/29/23 with md visit with Dr Abbie Cardoza and pt needs right clavicle biopsy done, do not see scheduled in pt appts. Writer called and left message to ask if pt to be scheduled soon and to call [facility] if needs assistance.

## 2023-09-06 ENCOUNTER — OFFICE VISIT (OUTPATIENT)
Dept: FAMILY MEDICINE CLINIC | Age: 53
End: 2023-09-06
Payer: COMMERCIAL

## 2023-09-06 VITALS
WEIGHT: 209.8 LBS | TEMPERATURE: 97.3 F | HEART RATE: 94 BPM | DIASTOLIC BLOOD PRESSURE: 80 MMHG | BODY MASS INDEX: 37.16 KG/M2 | SYSTOLIC BLOOD PRESSURE: 118 MMHG | OXYGEN SATURATION: 97 %

## 2023-09-06 DIAGNOSIS — M54.50 CHRONIC LOW BACK PAIN WITHOUT SCIATICA, UNSPECIFIED BACK PAIN LATERALITY: ICD-10-CM

## 2023-09-06 DIAGNOSIS — E78.2 MIXED HYPERLIPIDEMIA: ICD-10-CM

## 2023-09-06 DIAGNOSIS — Z74.09 IMPAIRED MOBILITY: ICD-10-CM

## 2023-09-06 DIAGNOSIS — K59.01 SLOW TRANSIT CONSTIPATION: ICD-10-CM

## 2023-09-06 DIAGNOSIS — B37.9 YEAST INFECTION: ICD-10-CM

## 2023-09-06 DIAGNOSIS — Z79.4 TYPE 2 DIABETES MELLITUS WITH HYPERGLYCEMIA, WITH LONG-TERM CURRENT USE OF INSULIN (HCC): Primary | ICD-10-CM

## 2023-09-06 DIAGNOSIS — E11.65 TYPE 2 DIABETES MELLITUS WITH HYPERGLYCEMIA, WITH LONG-TERM CURRENT USE OF INSULIN (HCC): Primary | ICD-10-CM

## 2023-09-06 DIAGNOSIS — G89.29 CHRONIC LOW BACK PAIN WITHOUT SCIATICA, UNSPECIFIED BACK PAIN LATERALITY: ICD-10-CM

## 2023-09-06 PROCEDURE — G8417 CALC BMI ABV UP PARAM F/U: HCPCS | Performed by: NURSE PRACTITIONER

## 2023-09-06 PROCEDURE — 3046F HEMOGLOBIN A1C LEVEL >9.0%: CPT | Performed by: NURSE PRACTITIONER

## 2023-09-06 PROCEDURE — G8427 DOCREV CUR MEDS BY ELIG CLIN: HCPCS | Performed by: NURSE PRACTITIONER

## 2023-09-06 PROCEDURE — 4004F PT TOBACCO SCREEN RCVD TLK: CPT | Performed by: NURSE PRACTITIONER

## 2023-09-06 PROCEDURE — 99214 OFFICE O/P EST MOD 30 MIN: CPT | Performed by: NURSE PRACTITIONER

## 2023-09-06 PROCEDURE — 2022F DILAT RTA XM EVC RTNOPTHY: CPT | Performed by: NURSE PRACTITIONER

## 2023-09-06 PROCEDURE — 3074F SYST BP LT 130 MM HG: CPT | Performed by: NURSE PRACTITIONER

## 2023-09-06 PROCEDURE — 3078F DIAST BP <80 MM HG: CPT | Performed by: NURSE PRACTITIONER

## 2023-09-06 PROCEDURE — 3017F COLORECTAL CA SCREEN DOC REV: CPT | Performed by: NURSE PRACTITIONER

## 2023-09-06 RX ORDER — DOCUSATE SODIUM 100 MG/1
100 CAPSULE, LIQUID FILLED ORAL 2 TIMES DAILY
Qty: 60 CAPSULE | Refills: 2 | Status: SHIPPED | OUTPATIENT
Start: 2023-09-06 | End: 2023-12-05

## 2023-09-06 RX ORDER — FLUCONAZOLE 150 MG/1
150 TABLET ORAL
Qty: 2 TABLET | Refills: 1 | Status: SHIPPED | OUTPATIENT
Start: 2023-09-06 | End: 2023-09-18

## 2023-09-06 RX ORDER — EVOLOCUMAB 140 MG/ML
140 INJECTION, SOLUTION SUBCUTANEOUS
Qty: 2.1 ML | Refills: 2 | Status: SHIPPED | OUTPATIENT
Start: 2023-09-06

## 2023-09-06 RX ORDER — INSULIN GLARGINE 300 U/ML
50 INJECTION, SOLUTION SUBCUTANEOUS
Qty: 5 ADJUSTABLE DOSE PRE-FILLED PEN SYRINGE | Refills: 2 | Status: SHIPPED
Start: 2023-09-06

## 2023-09-06 RX ORDER — POLYETHYLENE GLYCOL 3350 17 G/17G
17 POWDER, FOR SOLUTION ORAL DAILY PRN
Qty: 510 G | Refills: 0 | Status: SHIPPED | OUTPATIENT
Start: 2023-09-06 | End: 2023-10-06

## 2023-09-06 RX ORDER — TRAMADOL HYDROCHLORIDE 50 MG/1
50 TABLET ORAL EVERY 6 HOURS PRN
Qty: 28 TABLET | Refills: 0 | Status: SHIPPED | OUTPATIENT
Start: 2023-09-06 | End: 2023-09-13

## 2023-09-06 RX ORDER — EVOLOCUMAB 140 MG/ML
140 INJECTION, SOLUTION SUBCUTANEOUS
Qty: 2 ML | Refills: 0 | COMMUNITY
Start: 2023-09-06 | End: 2023-10-06

## 2023-09-06 RX ORDER — DULAGLUTIDE 0.75 MG/.5ML
0.75 INJECTION, SOLUTION SUBCUTANEOUS WEEKLY
Qty: 12 ADJUSTABLE DOSE PRE-FILLED PEN SYRINGE | Refills: 1 | Status: SHIPPED | OUTPATIENT
Start: 2023-09-06

## 2023-09-06 ASSESSMENT — PATIENT HEALTH QUESTIONNAIRE - PHQ9
9. THOUGHTS THAT YOU WOULD BE BETTER OFF DEAD, OR OF HURTING YOURSELF: 0
3. TROUBLE FALLING OR STAYING ASLEEP: 3
4. FEELING TIRED OR HAVING LITTLE ENERGY: 3
2. FEELING DOWN, DEPRESSED OR HOPELESS: 3
7. TROUBLE CONCENTRATING ON THINGS, SUCH AS READING THE NEWSPAPER OR WATCHING TELEVISION: 2
SUM OF ALL RESPONSES TO PHQ QUESTIONS 1-9: 19
1. LITTLE INTEREST OR PLEASURE IN DOING THINGS: 3
10. IF YOU CHECKED OFF ANY PROBLEMS, HOW DIFFICULT HAVE THESE PROBLEMS MADE IT FOR YOU TO DO YOUR WORK, TAKE CARE OF THINGS AT HOME, OR GET ALONG WITH OTHER PEOPLE: 1
SUM OF ALL RESPONSES TO PHQ QUESTIONS 1-9: 19
6. FEELING BAD ABOUT YOURSELF - OR THAT YOU ARE A FAILURE OR HAVE LET YOURSELF OR YOUR FAMILY DOWN: 0
SUM OF ALL RESPONSES TO PHQ9 QUESTIONS 1 & 2: 6
SUM OF ALL RESPONSES TO PHQ QUESTIONS 1-9: 19
5. POOR APPETITE OR OVEREATING: 3
SUM OF ALL RESPONSES TO PHQ QUESTIONS 1-9: 19
8. MOVING OR SPEAKING SO SLOWLY THAT OTHER PEOPLE COULD HAVE NOTICED. OR THE OPPOSITE, BEING SO FIGETY OR RESTLESS THAT YOU HAVE BEEN MOVING AROUND A LOT MORE THAN USUAL: 2

## 2023-09-06 NOTE — PROGRESS NOTES
laterality    - [facility]  - DME Order for Walker as OP  - traMADol (ULTRAM) 50 MG tablet; Take 1 tablet by mouth every 6 hours as needed for Pain for up to 7 days. Intended supply: 7 days. Take lowest dose possible to manage pain Max Daily Amount: 200 mg  Dispense: 28 tablet; Refill: 0    6. Impaired mobility    - DME Order for Derek Mixer as OP      Return in about 4 weeks (around 10/4/2023) for pre op/diabetes check. 1.  Agnes Gutiérrez received counseling on the following healthy behaviors: nutrition, exercise, and medication adherence  2. Patient given educational materials - see patient instructions  3. Was a self-tracking handout given in paper form or via [employer]? No  If yes, see orders or list here. 4.  Discussed use, benefit, and side effects of prescribed medications. Barriers to medication compliance addressed. All patient questions answered. Pt voiced understanding. 5.  Reviewed prior labs, imaging, consultation, follow up, and health maintenance  6. Continue current medications, diet and exercise. 7. Discussed use, benefit, and side effects of prescribed medications. Barriers to medication compliance addressed. All her questions were answered. Pt voiced understanding. Agnes Gutiérrez will continue current medications, diet and exercise. Of the  30  minute duration appointment visit, Alayna Chacon CNP spent at least 50% of the face-to-face time in counseling, explanation of diagnosis, planning of further management, and answering all questions.           Signed:  Alayna Chacon CNP

## 2023-09-07 ENCOUNTER — TELEPHONE (OUTPATIENT)
Dept: INFUSION THERAPY | Facility: MEDICAL CENTER | Age: 53
End: 2023-09-07

## 2023-09-07 ENCOUNTER — HOSPITAL ENCOUNTER (OUTPATIENT)
Dept: PHYSICAL THERAPY | Age: 53
Setting detail: THERAPIES SERIES
Discharge: HOME | End: 2023-09-07

## 2023-09-07 ENCOUNTER — TELEPHONE (OUTPATIENT)
Dept: FAMILY MEDICINE CLINIC | Age: 53
End: 2023-09-07

## 2023-09-07 NOTE — TELEPHONE ENCOUNTER
Patient calling stating that she took the Repatha that you gave her. She injected about 2pm and by diner time she was feeling fatigue, upset stomach. Started vomiting by midnight. Patient has vomited 4 times since then.

## 2023-09-07 NOTE — FLOWSHEET NOTE
[x] [facility]  [address]  [place]: [phone]   F: [phone]     Physical Therapy Cancel/No Show note    Date: 1340  Patient: Sergei Crespo  :   MRN: [id number]    Visit Count:   Cancels/No Shows to date: 20    For today's appointment patient:    [x]  Cancelled    [] Rescheduled appointment    [] No-show     Reason given by patient:    []  Patient ill    []  Conflicting appointment    [] No transportation      [] Conflict with work    [x] No reason given    [] Weather related    [] COVID-19    [] Other:      Comments:        [x] Next appointment was confirmed    Electronically signed by: Jakob Magana PTA

## 2023-09-07 NOTE — TELEPHONE ENCOUNTER
Writer spoke with Dr Nicolette Becerra and confirmed order for rt clavicle lesion biopsy is correct as ordered. Dr Nicolette Becerra states OK for Dr Valentina Tubbs to also biopsy left cervical lymph node, but per Dr Nicolette Becerra the area of main concern in his opinion is pt presents with mass to rt clavicle. Writer notified Lamont of Dr Nicolette Becerra states wants rt clavicle biopsy first, Dr Valentina Tubbs may do left cervical lymph node site also if desired. Lamont states Dr Valentina Tubbs is not available today, but will probably call directly to Dr Nicolette Becerra tomorrow, 9/8/23, md cell number given.

## 2023-09-07 NOTE — TELEPHONE ENCOUNTER
Patient states that she is starting to get itchy on her face. She took a benadryl 30 min ago. I advise patient to go to ED if she starts having any facial swelling.

## 2023-09-08 ENCOUNTER — APPOINTMENT (OUTPATIENT)
Dept: CT IMAGING | Age: 53
End: 2023-09-08
Payer: COMMERCIAL

## 2023-09-08 ENCOUNTER — HOSPITAL ENCOUNTER (EMERGENCY)
Age: 53
Discharge: HOME | End: 2023-09-08
Attending: EMERGENCY MEDICINE
Payer: COMMERCIAL

## 2023-09-08 VITALS
TEMPERATURE: 98.2 F | RESPIRATION RATE: 10 BRPM | HEIGHT: 63 IN | DIASTOLIC BLOOD PRESSURE: 99 MMHG | BODY MASS INDEX: 36.5 KG/M2 | SYSTOLIC BLOOD PRESSURE: 165 MMHG | HEART RATE: 94 BPM | OXYGEN SATURATION: 96 % | WEIGHT: 206 LBS

## 2023-09-08 DIAGNOSIS — T50.905A ADVERSE EFFECT OF DRUG, INITIAL ENCOUNTER: Primary | ICD-10-CM

## 2023-09-08 LAB
ALBUMIN SERPL-MCNC: 4 G/DL (ref 3.5–5.2)
ALP SERPL-CCNC: 118 U/L (ref 35–104)
ALT SERPL-CCNC: 24 U/L (ref 5–33)
ANION GAP SERPL CALCULATED.3IONS-SCNC: 14 MMOL/L (ref 9–17)
AST SERPL-CCNC: 25 U/L
BASOPHILS # BLD: 0 K/UL (ref 0–0.2)
BASOPHILS NFR BLD: 0 %
BILIRUB DIRECT SERPL-MCNC: 0.1 MG/DL
BILIRUB INDIRECT SERPL-MCNC: 0.5 MG/DL (ref 0–1)
BILIRUB SERPL-MCNC: 0.6 MG/DL (ref 0.3–1.2)
BILIRUB UR QL STRIP: NEGATIVE
BUN SERPL-MCNC: 12 MG/DL (ref 6–20)
BUN/CREAT SERPL: 17 (ref 9–20)
CALCIUM SERPL-MCNC: 9.7 MG/DL (ref 8.6–10.4)
CHLORIDE SERPL-SCNC: 102 MMOL/L (ref 98–107)
CLARITY UR: ABNORMAL
CO2 SERPL-SCNC: 21 MMOL/L (ref 20–31)
COLOR UR: YELLOW
CREAT SERPL-MCNC: 0.7 MG/DL (ref 0.5–0.9)
EKG ATRIAL RATE: 100 BPM
EKG P AXIS: 70 DEGREES
EKG P-R INTERVAL: 164 MS
EKG Q-T INTERVAL: 406 MS
EKG QRS DURATION: 98 MS
EKG QTC CALCULATION (BAZETT): 523 MS
EKG R AXIS: 78 DEGREES
EKG T AXIS: 69 DEGREES
EKG VENTRICULAR RATE: 100 BPM
EOSINOPHIL # BLD: 0.36 K/UL (ref 0–0.4)
EOSINOPHILS RELATIVE PERCENT: 3 % (ref 1–4)
EPI CELLS #/AREA URNS HPF: NORMAL /HPF (ref 0–5)
ERYTHROCYTE [DISTWIDTH] IN BLOOD BY AUTOMATED COUNT: 14.5 % (ref 11.8–14.4)
GFR SERPL CREATININE-BSD FRML MDRD: >60 ML/MIN/1.73M2
GLUCOSE SERPL-MCNC: 106 MG/DL (ref 70–99)
GLUCOSE UR STRIP-MCNC: ABNORMAL MG/DL
HCT VFR BLD AUTO: 47.8 % (ref 36.3–47.1)
HGB BLD-MCNC: 15.4 G/DL (ref 11.9–15.1)
HGB UR QL STRIP.AUTO: ABNORMAL
IMM GRANULOCYTES # BLD AUTO: 0.12 K/UL (ref 0–0.3)
IMM GRANULOCYTES NFR BLD: 1 %
KETONES UR STRIP-MCNC: ABNORMAL MG/DL
LACTATE BLDV-SCNC: 1.1 MMOL/L (ref 0.5–1.9)
LACTATE BLDV-SCNC: 1.3 MMOL/L (ref 0.5–1.9)
LEUKOCYTE ESTERASE UR QL STRIP: ABNORMAL
LIPASE SERPL-CCNC: 22 U/L (ref 13–60)
LYMPHOCYTES NFR BLD: 5.2 K/UL (ref 1–4.8)
LYMPHOCYTES RELATIVE PERCENT: 43 % (ref 24–44)
MAGNESIUM SERPL-MCNC: 2 MG/DL (ref 1.6–2.6)
MCH RBC QN AUTO: 28.2 PG (ref 25.2–33.5)
MCHC RBC AUTO-ENTMCNC: 32.2 G/DL (ref 28.4–34.8)
MCV RBC AUTO: 87.4 FL (ref 82.6–102.9)
MONOCYTES NFR BLD: 0.61 K/UL (ref 0.2–0.8)
MONOCYTES NFR BLD: 5 % (ref 1–7)
NEUTROPHILS NFR BLD: 48 % (ref 36–66)
NEUTS SEG NFR BLD: 5.81 K/UL (ref 1.8–7.7)
NITRITE UR QL STRIP: POSITIVE
NRBC BLD-RTO: 0 PER 100 WBC
PH UR STRIP: 6 [PH] (ref 5–8)
PLATELET # BLD AUTO: 414 K/UL (ref 138–453)
PMV BLD AUTO: 9.5 FL (ref 8.1–13.5)
POTASSIUM SERPL-SCNC: 3.9 MMOL/L (ref 3.7–5.3)
PROT SERPL-MCNC: 7.5 G/DL (ref 6.4–8.3)
PROT UR STRIP-MCNC: NEGATIVE MG/DL
RBC # BLD AUTO: 5.47 M/UL (ref 3.95–5.11)
RBC #/AREA URNS HPF: NORMAL /HPF (ref 0–2)
SODIUM SERPL-SCNC: 137 MMOL/L (ref 135–144)
SP GR UR STRIP: 1.01 (ref 1–1.03)
TROPONIN I SERPL HS-MCNC: 15 NG/L (ref 0–14)
TROPONIN I SERPL HS-MCNC: 17 NG/L (ref 0–14)
UROBILINOGEN UR STRIP-ACNC: NORMAL EU/DL (ref 0–1)
WBC #/AREA URNS HPF: NORMAL /HPF (ref 0–5)
WBC OTHER # BLD: 12.1 K/UL (ref 3.5–11.3)

## 2023-09-08 PROCEDURE — 96375 TX/PRO/DX INJ NEW DRUG ADDON: CPT | Performed by: EMERGENCY MEDICINE

## 2023-09-08 PROCEDURE — 87077 CULTURE AEROBIC IDENTIFY: CPT

## 2023-09-08 PROCEDURE — 84484 ASSAY OF TROPONIN QUANT: CPT

## 2023-09-08 PROCEDURE — 6360000002 HC RX W HCPCS: Performed by: EMERGENCY MEDICINE

## 2023-09-08 PROCEDURE — 99285 EMERGENCY DEPT VISIT HI MDM: CPT | Performed by: EMERGENCY MEDICINE

## 2023-09-08 PROCEDURE — 2580000003 HC RX 258: Performed by: EMERGENCY MEDICINE

## 2023-09-08 PROCEDURE — 96374 THER/PROPH/DIAG INJ IV PUSH: CPT | Performed by: EMERGENCY MEDICINE

## 2023-09-08 PROCEDURE — 85025 COMPLETE CBC W/AUTO DIFF WBC: CPT

## 2023-09-08 PROCEDURE — 87086 URINE CULTURE/COLONY COUNT: CPT

## 2023-09-08 PROCEDURE — 83735 ASSAY OF MAGNESIUM: CPT

## 2023-09-08 PROCEDURE — 87186 SC STD MICRODIL/AGAR DIL: CPT

## 2023-09-08 PROCEDURE — 93005 ELECTROCARDIOGRAM TRACING: CPT | Performed by: EMERGENCY MEDICINE

## 2023-09-08 PROCEDURE — 96361 HYDRATE IV INFUSION ADD-ON: CPT | Performed by: EMERGENCY MEDICINE

## 2023-09-08 PROCEDURE — 96365 THER/PROPH/DIAG IV INF INIT: CPT | Performed by: EMERGENCY MEDICINE

## 2023-09-08 PROCEDURE — 80048 BASIC METABOLIC PNL TOTAL CA: CPT

## 2023-09-08 PROCEDURE — 81001 URINALYSIS AUTO W/SCOPE: CPT

## 2023-09-08 PROCEDURE — 80076 HEPATIC FUNCTION PANEL: CPT

## 2023-09-08 PROCEDURE — 6360000004 HC RX CONTRAST MEDICATION: Performed by: EMERGENCY MEDICINE

## 2023-09-08 PROCEDURE — 83690 ASSAY OF LIPASE: CPT

## 2023-09-08 PROCEDURE — 83605 ASSAY OF LACTIC ACID: CPT

## 2023-09-08 PROCEDURE — 74177 CT ABD & PELVIS W/CONTRAST: CPT

## 2023-09-08 RX ORDER — CIPROFLOXACIN 500 MG/1
500 TABLET, FILM COATED ORAL 2 TIMES DAILY
Qty: 14 TABLET | Refills: 0 | Status: SHIPPED | OUTPATIENT
Start: 2023-09-08 | End: 2023-09-15

## 2023-09-08 RX ORDER — ONDANSETRON 4 MG/1
4 TABLET, ORALLY DISINTEGRATING ORAL 3 TIMES DAILY PRN
Qty: 21 TABLET | Refills: 0 | Status: SHIPPED | OUTPATIENT
Start: 2023-09-08

## 2023-09-08 RX ORDER — CIPROFLOXACIN 2 MG/ML
400 INJECTION, SOLUTION INTRAVENOUS ONCE
Status: COMPLETED | OUTPATIENT
Start: 2023-09-08 | End: 2023-09-08

## 2023-09-08 RX ORDER — SODIUM CHLORIDE 0.9 % (FLUSH) 0.9 %
10 SYRINGE (ML) INJECTION PRN
Status: DISCONTINUED | OUTPATIENT
Start: 2023-09-08 | End: 2023-09-08 | Stop reason: HOSPADM

## 2023-09-08 RX ORDER — MORPHINE SULFATE 4 MG/ML
4 INJECTION, SOLUTION INTRAMUSCULAR; INTRAVENOUS ONCE
Status: COMPLETED | OUTPATIENT
Start: 2023-09-08 | End: 2023-09-08

## 2023-09-08 RX ORDER — 0.9 % SODIUM CHLORIDE 0.9 %
80 INTRAVENOUS SOLUTION INTRAVENOUS ONCE
Status: COMPLETED | OUTPATIENT
Start: 2023-09-08 | End: 2023-09-08

## 2023-09-08 RX ORDER — KETOROLAC TROMETHAMINE 30 MG/ML
30 INJECTION, SOLUTION INTRAMUSCULAR; INTRAVENOUS ONCE
Status: COMPLETED | OUTPATIENT
Start: 2023-09-08 | End: 2023-09-08

## 2023-09-08 RX ORDER — 0.9 % SODIUM CHLORIDE 0.9 %
1000 INTRAVENOUS SOLUTION INTRAVENOUS ONCE
Status: COMPLETED | OUTPATIENT
Start: 2023-09-08 | End: 2023-09-08

## 2023-09-08 RX ORDER — OXYCODONE HYDROCHLORIDE AND ACETAMINOPHEN 5; 325 MG/1; MG/1
1 TABLET ORAL EVERY 6 HOURS PRN
Qty: 10 TABLET | Refills: 0 | Status: SHIPPED | OUTPATIENT
Start: 2023-09-08 | End: 2023-09-11

## 2023-09-08 RX ORDER — ONDANSETRON 2 MG/ML
4 INJECTION INTRAMUSCULAR; INTRAVENOUS ONCE
Status: COMPLETED | OUTPATIENT
Start: 2023-09-08 | End: 2023-09-08

## 2023-09-08 RX ADMIN — SODIUM CHLORIDE 80 ML: 9 INJECTION, SOLUTION INTRAVENOUS at 10:21

## 2023-09-08 RX ADMIN — ONDANSETRON 4 MG: 2 INJECTION INTRAMUSCULAR; INTRAVENOUS at 08:47

## 2023-09-08 RX ADMIN — SODIUM CHLORIDE 1000 ML: 9 INJECTION, SOLUTION INTRAVENOUS at 08:52

## 2023-09-08 RX ADMIN — IOPAMIDOL 75 ML: 755 INJECTION, SOLUTION INTRAVENOUS at 10:21

## 2023-09-08 RX ADMIN — MORPHINE SULFATE 4 MG: 4 INJECTION, SOLUTION INTRAMUSCULAR; INTRAVENOUS at 11:28

## 2023-09-08 RX ADMIN — CIPROFLOXACIN 400 MG: 400 INJECTION, SOLUTION INTRAVENOUS at 11:30

## 2023-09-08 RX ADMIN — KETOROLAC TROMETHAMINE 30 MG: 30 INJECTION, SOLUTION INTRAMUSCULAR at 10:31

## 2023-09-08 RX ADMIN — SODIUM CHLORIDE, PRESERVATIVE FREE 10 ML: 5 INJECTION INTRAVENOUS at 10:22

## 2023-09-08 ASSESSMENT — ENCOUNTER SYMPTOMS
SORE THROAT: 0
COLOR CHANGE: 0
EYE DISCHARGE: 0
ABDOMINAL PAIN: 1
VOMITING: 1
COUGH: 0
NAUSEA: 1
EYE REDNESS: 0
DIARRHEA: 0
SHORTNESS OF BREATH: 0
RHINORRHEA: 0

## 2023-09-08 ASSESSMENT — PAIN SCALES - GENERAL
PAINLEVEL_OUTOF10: 8
PAINLEVEL_OUTOF10: 8

## 2023-09-08 NOTE — ED PROVIDER NOTES
EMERGENCY DEPARTMENT ENCOUNTER    Pt Name: Kota Hansen  MRN: [id number]  [address] [place] 1970  Date of evaluation: 9/8/23  CHIEF COMPLAINT       Chief Complaint   Patient presents with    Medication Reaction     Had a Rapatha injection two days ago; nausea, vomiting, abdominal pains and now chest pains     HISTORY OF PRESENT ILLNESS   This is a 65-year-old female who presents with complaints of nausea and vomiting, abdominal pain and some chest discomfort. The patient states that she had a Repatha injection 2 days ago for her history of hyperlipidemia, she states that she began having the symptoms about 6 hours after her injection and has been having them since then. She describes nausea vomiting, some abdominal pain diarrhea and she began having some chest discomfort today. Patient denies any shortness of breath, she denies any fevers or chills, no cough or sputum production         REVIEW OF SYSTEMS     Review of Systems   Constitutional:  Negative for chills and fever. HENT:  Negative for rhinorrhea and sore throat. Eyes:  Negative for discharge, redness and visual disturbance. Respiratory:  Negative for cough and shortness of breath. Cardiovascular:  Positive for chest pain. Negative for palpitations and leg swelling. Gastrointestinal:  Positive for abdominal pain, nausea and vomiting. Negative for diarrhea. Genitourinary:  Negative for dysuria and hematuria. Musculoskeletal:  Negative for arthralgias, myalgias and neck pain. Skin:  Negative for color change and rash. Neurological:  Negative for seizures, weakness and headaches. Psychiatric/Behavioral:  Negative for hallucinations, self-injury and suicidal ideas.     PASTMEDICAL HISTORY     Past Medical History:   Diagnosis Date    Abnormal uterine bleeding (AUB)     Anxiety     Arthritis     CAD (coronary artery disease)     Dr. Nidia Manjarrez    Chronic neck pain     COVID-19     fever, slight cough, fatigue,lost taste,  admitted for couple days

## 2023-09-10 LAB
MICROORGANISM SPEC CULT: ABNORMAL
SPECIMEN DESCRIPTION: ABNORMAL

## 2023-09-11 ENCOUNTER — TELEPHONE (OUTPATIENT)
Dept: INFUSION THERAPY | Facility: MEDICAL CENTER | Age: 53
End: 2023-09-11

## 2023-09-11 LAB
EKG ATRIAL RATE: 100 BPM
EKG P AXIS: 70 DEGREES
EKG P-R INTERVAL: 164 MS
EKG Q-T INTERVAL: 406 MS
EKG QRS DURATION: 98 MS
EKG QTC CALCULATION (BAZETT): 523 MS
EKG R AXIS: 78 DEGREES
EKG T AXIS: 69 DEGREES
EKG VENTRICULAR RATE: 100 BPM

## 2023-09-11 PROCEDURE — 93010 ELECTROCARDIOGRAM REPORT: CPT | Performed by: INTERNAL MEDICINE

## 2023-09-12 ENCOUNTER — HOSPITAL ENCOUNTER (OUTPATIENT)
Dept: PHYSICAL THERAPY | Age: 53
Setting detail: THERAPIES SERIES
Discharge: HOME | End: 2023-09-12

## 2023-09-12 NOTE — FLOWSHEET NOTE
[x] [facility] [place]  [address]  [place]: [phone]   F: [phone]     Physical Therapy Cancel/No Show note    Date: 2244  Patient: Alan Hewitt  :   MRN: [id number]    Visit Count:   Cancels/No Shows to date: 3/0    For today's appointment patient:    [x]  Cancelled    [] Rescheduled appointment    [] No-show     Reason given by patient:    []  Patient ill    []  Conflicting appointment    [] No transportation      [] Conflict with work    [] No reason given    [] Weather related    [] COVID-19    [x] Other:      Comments:  Reaction to a medication and unable to come in today.       [x] Next appointment was confirmed    Electronically signed by: Lisette Motley PTA

## 2023-09-13 ENCOUNTER — TELEPHONE (OUTPATIENT)
Dept: INFUSION THERAPY | Facility: MEDICAL CENTER | Age: 53
End: 2023-09-13

## 2023-09-13 ASSESSMENT — ENCOUNTER SYMPTOMS
CONSTIPATION: 1
BACK PAIN: 1

## 2023-09-13 NOTE — TELEPHONE ENCOUNTER
Left message at after 5 p on 9/12/23 and Neelima Perera in [place] returned message to say pt is scheduled on 9/15/23 Friday at 1000. Biopsy scheduled. Spoke with patient. He was provided number for WC to call our office

## 2023-09-14 ENCOUNTER — HOSPITAL ENCOUNTER (OUTPATIENT)
Dept: PHYSICAL THERAPY | Age: 53
Setting detail: THERAPIES SERIES
Discharge: HOME | End: 2023-09-14

## 2023-09-14 ENCOUNTER — TELEPHONE (OUTPATIENT)
Dept: ONCOLOGY | Age: 53
End: 2023-09-14

## 2023-09-14 NOTE — FLOWSHEET NOTE
[x] [facility] [place]  [address]  [place]: [phone]   F: [phone]     Physical Therapy Cancel/No Show note    Date: 9456  Patient: Dahiana Hinojosa  : [birth year]  MRN: [id number]    Visit Count:   Cancels/No Shows to date:     For today's appointment patient:    [x]  Cancelled    [] Rescheduled appointment    [] No-show     Reason given by patient:    []  Patient ill    []  Conflicting appointment    [] No transportation      [] Conflict with work    [] No reason given    [] Weather related    [] COVID-19    [x] Other:      Comments:  Reaction to a medication and unable to come in today.  informed patient to call back and reschedule when she gets a chance.       [] Next appointment was confirmed    Electronically signed by: Sheba Marie, PTA

## 2023-09-15 ENCOUNTER — HOSPITAL ENCOUNTER (OUTPATIENT)
Dept: ULTRASOUND IMAGING | Age: 53
End: 2023-09-15
Payer: COMMERCIAL

## 2023-09-15 VITALS
OXYGEN SATURATION: 100 % | WEIGHT: 200 LBS | SYSTOLIC BLOOD PRESSURE: 129 MMHG | HEART RATE: 81 BPM | BODY MASS INDEX: 35.44 KG/M2 | TEMPERATURE: 97.3 F | RESPIRATION RATE: 16 BRPM | DIASTOLIC BLOOD PRESSURE: 87 MMHG | HEIGHT: 63 IN

## 2023-09-15 DIAGNOSIS — M89.319 CLAVICLE ENLARGEMENT: ICD-10-CM

## 2023-09-15 LAB
GLUCOSE BLD-MCNC: 104 MG/DL (ref 65–105)
GLUCOSE BLD-MCNC: 114 MG/DL (ref 65–105)
INR PPP: 1
PARTIAL THROMBOPLASTIN TIME: 29 SEC (ref 23–36.5)
PLATELET # BLD AUTO: 393 K/UL (ref 138–453)
PROTHROMBIN TIME: 13.2 SEC (ref 11.7–14.9)

## 2023-09-15 PROCEDURE — 2580000003 HC RX 258: Performed by: PHYSICIAN ASSISTANT

## 2023-09-15 PROCEDURE — 88172 CYTP DX EVAL FNA 1ST EA SITE: CPT

## 2023-09-15 PROCEDURE — 38505 NEEDLE BIOPSY LYMPH NODES: CPT

## 2023-09-15 PROCEDURE — 88173 CYTOPATH EVAL FNA REPORT: CPT

## 2023-09-15 PROCEDURE — 85049 AUTOMATED PLATELET COUNT: CPT

## 2023-09-15 PROCEDURE — 85730 THROMBOPLASTIN TIME PARTIAL: CPT

## 2023-09-15 PROCEDURE — 76942 ECHO GUIDE FOR BIOPSY: CPT

## 2023-09-15 PROCEDURE — 7100000011 HC PHASE II RECOVERY - ADDTL 15 MIN: Performed by: RADIOLOGY

## 2023-09-15 PROCEDURE — 82947 ASSAY GLUCOSE BLOOD QUANT: CPT

## 2023-09-15 PROCEDURE — 88305 TISSUE EXAM BY PATHOLOGIST: CPT

## 2023-09-15 PROCEDURE — 36415 COLL VENOUS BLD VENIPUNCTURE: CPT

## 2023-09-15 PROCEDURE — 85610 PROTHROMBIN TIME: CPT

## 2023-09-15 PROCEDURE — 6360000002 HC RX W HCPCS: Performed by: RADIOLOGY

## 2023-09-15 PROCEDURE — 7100000010 HC PHASE II RECOVERY - FIRST 15 MIN: Performed by: RADIOLOGY

## 2023-09-15 RX ORDER — SODIUM CHLORIDE 9 MG/ML
INJECTION, SOLUTION INTRAVENOUS CONTINUOUS
Status: DISCONTINUED | OUTPATIENT
Start: 2023-09-15 | End: 2023-09-18 | Stop reason: HOSPADM

## 2023-09-15 RX ORDER — MIDAZOLAM HYDROCHLORIDE 2 MG/2ML
INJECTION, SOLUTION INTRAMUSCULAR; INTRAVENOUS PRN
Status: COMPLETED | OUTPATIENT
Start: 2023-09-15 | End: 2023-09-15

## 2023-09-15 RX ORDER — FENTANYL CITRATE 50 UG/ML
INJECTION, SOLUTION INTRAMUSCULAR; INTRAVENOUS PRN
Status: COMPLETED | OUTPATIENT
Start: 2023-09-15 | End: 2023-09-15

## 2023-09-15 RX ADMIN — MIDAZOLAM HYDROCHLORIDE 0.5 MG: 1 INJECTION, SOLUTION INTRAMUSCULAR; INTRAVENOUS at 10:45

## 2023-09-15 RX ADMIN — MIDAZOLAM HYDROCHLORIDE 0.5 MG: 1 INJECTION, SOLUTION INTRAMUSCULAR; INTRAVENOUS at 10:52

## 2023-09-15 RX ADMIN — FENTANYL CITRATE 50 MCG: 50 INJECTION, SOLUTION INTRAMUSCULAR; INTRAVENOUS at 10:45

## 2023-09-15 RX ADMIN — SODIUM CHLORIDE: 9 INJECTION, SOLUTION INTRAVENOUS at 09:40

## 2023-09-15 ASSESSMENT — PAIN SCALES - GENERAL
PAINLEVEL_OUTOF10: 6

## 2023-09-15 ASSESSMENT — PAIN DESCRIPTION - LOCATION: LOCATION: NECK

## 2023-09-15 ASSESSMENT — PAIN - FUNCTIONAL ASSESSMENT
PAIN_FUNCTIONAL_ASSESSMENT: 0-10
PAIN_FUNCTIONAL_ASSESSMENT: PREVENTS OR INTERFERES SOME ACTIVE ACTIVITIES AND ADLS

## 2023-09-15 ASSESSMENT — PAIN DESCRIPTION - PAIN TYPE: TYPE: ACUTE PAIN

## 2023-09-15 ASSESSMENT — PAIN DESCRIPTION - FREQUENCY: FREQUENCY: CONTINUOUS

## 2023-09-15 ASSESSMENT — PAIN DESCRIPTION - DESCRIPTORS
DESCRIPTORS: ACHING
DESCRIPTORS: ACHING

## 2023-09-15 ASSESSMENT — PAIN DESCRIPTION - ORIENTATION: ORIENTATION: LEFT

## 2023-09-15 ASSESSMENT — PAIN DESCRIPTION - ONSET: ONSET: ON-GOING

## 2023-09-15 NOTE — POST SEDATION
Sedation Post Procedure Note    Patient Name: Dia Alicea   YOB: 1970  Room/Bed: Room/bed info not found  Medical Record Number: [id number]  Date: 9/15/2023   Time: 11:16 AM         Physicians/Assistants: MOLLY Johansen    Procedure Performed:  LN bx    Post-Sedation Vital Signs:  Vitals:    09/15/23 1101   BP: 129/89   Pulse: 93   Resp: 16   Temp:    SpO2: 94%      Vital signs were reviewed and were stable after the procedure (see flow sheet for vitals)            Post-Sedation Exam: Pt remains stable           Complications: none    Electronically signed by MOLLY Fang on 9/15/2023 at 11:16 AM

## 2023-09-15 NOTE — OR NURSING
Left site prepped and draped  Access obtained to left submandibular lymph node  Specimens given to pathology

## 2023-09-15 NOTE — BRIEF OP NOTE
Brief Postoperative Note    Maile More  YOB: 1970  [id number]    Pre-operative Diagnosis: submandibular lymphadenopathy    Post-operative Diagnosis: Same    Procedure: LN bx    Anesthesia: Local and moderate sedation    Surgeons/Assistants: Luis Enrique Angel MD    Estimated Blood Loss: less than 50     Complications: None    Specimens: Was Not Obtained    Findings: Successful submandibular LN bx 3 FNA samples obtained.      Electronically signed by MOLLY Flores on 9/15/2023 at 11:16 AM

## 2023-09-15 NOTE — H&P
History and Physical    Pt Name: Maureen Mann  MRN: [id number]  YOB: 1970  Date of evaluation: 9/15/2023  Primary Care Physician: SAVANA Shen CNP    SUBJECTIVE:   History of Chief Complaint:    Maureen Mann is a 50 y.o. female who is scheduled today for cervical, clavicle biopsy. Patient states she noticed her right clavicle with swelling, increasing over the last 1-2 months. Patient states this area is painful at rest. She denies any other areas of notable lymphadenopathy. Patient has a history of  has a past medical history of Langerhans-cell granulomatosis. Allergies  is allergic to dust mite extract, grass pollen(k-o-r-t-swt ciara), seasonal, bactrim [sulfamethoxazole-trimethoprim], and keflex [cephalexin]. Medications  Prior to Admission medications    Medication Sig Start Date End Date Taking?  Authorizing Provider   ciprofloxacin (CIPRO) 500 MG tablet Take 1 tablet by mouth 2 times daily for 7 days 9/8/23 9/15/23  Cristin Bliss MD   ondansetron (ZOFRAN-ODT) 4 MG disintegrating tablet Take 1 tablet by mouth 3 times daily as needed for Nausea or Vomiting  Patient not taking: Reported on 9/15/2023 9/8/23   Cristin Bliss MD   Insulin Glargine, 2 Unit Dial, (PATRICK ANDERSENAR) 300 UNIT/ML SOPN Inject 50 Units into the skin daily (with breakfast) 9/6/23   SAVANA Shen CNP   Dulaglutide (TRULICITY) 9.35 QU/8.4SM SOPN Inject 0.75 mg into the skin once a week 9/6/23   Pino ShirSAVANA hernandez CNP   fluconazole (DIFLUCAN) 150 MG tablet Take 1 tablet by mouth every 72 hours for 12 days 9/6/23 9/18/23  Pino ShirSAVANA hernandez - CNP   Evolocumab (REPATHA) SOSY syringe Inject 1 mL into the skin every 14 days 9/6/23   Pino ShirSAVANA hernandez CNP   docusate sodium (COLACE) 100 MG capsule Take 1 capsule by mouth 2 times daily 9/6/23 12/5/23  Pino ShirSAVANA hernandez - CNP   polyethylene glycol [facility]) 17 GM/SCOOP powder Take 17 g by mouth daily as needed (constipation) 9/6/23 10/6/23  [place] intact. NECK: Supple, no lymphadenopathy. LUNGS: Respirations even and non-labored. Clear to auscultation bilaterally, no wheezes, rales, or rhonchi. CARDIOVASCULAR: Regular rate and rhythm, no murmurs/rubs/gallops. ABDOMEN: soft, non-tender and non-distended, bowel sounds active x 4. EXTREMITIES: No edema to bilateral lower extremities. No varicosities to bilateral lower extremities. Right clavicle with lymphadenopathy. NEUROLOGIC: CN II-XII are grossly intact. Gait not assessed. IMPRESSIONS:   Clavicle enlargement. PLANS:   Cervical, clavicle biopsy.     Lynn Elizabeth APRN - CNP   Electronically signed 9/15/2023 at 9:50 AM

## 2023-09-18 LAB — SURGICAL PATHOLOGY REPORT: NORMAL

## 2023-09-19 LAB
FLOW CYTOMETRY SOURCE: NORMAL
FLOW CYTOMETRY, NODE/FLUID: NORMAL

## 2023-09-20 ENCOUNTER — TELEPHONE (OUTPATIENT)
Dept: INFUSION THERAPY | Facility: MEDICAL CENTER | Age: 53
End: 2023-09-20

## 2023-09-20 NOTE — TELEPHONE ENCOUNTER
Pt calling for 9/15/23 biopsy results, writer called and spoke with pt and then called and spoke with Dr Primitivo Mckeon and received permission to notify pt, negative for malignancy. Pt req appt with Dr Primitivo Mckeon to ask about why lymph nodes are swelling and if anything can be done for her lymph node swelling. Pt given appt for 10/3/23 at 1130, rt clavicle swelling not biopsied Dr Primitivo Mckeon said, due to assured per md at IR that is prob due to arthritis.

## 2023-10-03 ENCOUNTER — OFFICE VISIT (OUTPATIENT)
Dept: ONCOLOGY | Age: 53
End: 2023-10-03
Payer: COMMERCIAL

## 2023-10-03 ENCOUNTER — TELEPHONE (OUTPATIENT)
Dept: ONCOLOGY | Age: 53
End: 2023-10-03

## 2023-10-03 VITALS
WEIGHT: 205.6 LBS | RESPIRATION RATE: 16 BRPM | BODY MASS INDEX: 36.42 KG/M2 | TEMPERATURE: 97.2 F | DIASTOLIC BLOOD PRESSURE: 89 MMHG | HEART RATE: 99 BPM | SYSTOLIC BLOOD PRESSURE: 135 MMHG

## 2023-10-03 DIAGNOSIS — M89.319 CLAVICLE ENLARGEMENT: Primary | ICD-10-CM

## 2023-10-03 DIAGNOSIS — M89.8X9 BONE MASS: ICD-10-CM

## 2023-10-03 DIAGNOSIS — D76.3 HISTIOCYTOSIS (HCC): ICD-10-CM

## 2023-10-03 PROCEDURE — 4004F PT TOBACCO SCREEN RCVD TLK: CPT | Performed by: INTERNAL MEDICINE

## 2023-10-03 PROCEDURE — G8417 CALC BMI ABV UP PARAM F/U: HCPCS | Performed by: INTERNAL MEDICINE

## 2023-10-03 PROCEDURE — 99211 OFF/OP EST MAY X REQ PHY/QHP: CPT | Performed by: INTERNAL MEDICINE

## 2023-10-03 PROCEDURE — G8484 FLU IMMUNIZE NO ADMIN: HCPCS | Performed by: INTERNAL MEDICINE

## 2023-10-03 PROCEDURE — 3017F COLORECTAL CA SCREEN DOC REV: CPT | Performed by: INTERNAL MEDICINE

## 2023-10-03 PROCEDURE — 3075F SYST BP GE 130 - 139MM HG: CPT | Performed by: INTERNAL MEDICINE

## 2023-10-03 PROCEDURE — 99214 OFFICE O/P EST MOD 30 MIN: CPT | Performed by: INTERNAL MEDICINE

## 2023-10-03 PROCEDURE — G8428 CUR MEDS NOT DOCUMENT: HCPCS | Performed by: INTERNAL MEDICINE

## 2023-10-03 PROCEDURE — 3079F DIAST BP 80-89 MM HG: CPT | Performed by: INTERNAL MEDICINE

## 2023-10-03 NOTE — TELEPHONE ENCOUNTER
Jami Celeste MD VISIT  Rt clavicle biopsy by IR at [facility]  Call with results and recommendations.    PAULA WAS CALLED [phone] SPOKE TO CAROL SHE WILL CALL PT AND SCHEDULE AN APPT W/ PT ONCE REFERRAL IS FAXED [phone]  TRIAGE TO WATCH FOR RESULTS AND LET DR Jacob Maza KNOW RESULTS   AVS PRINTED W/ INSTRUCTIONS AND GIVEN TO PT ON EXIT

## 2023-10-03 NOTE — PROGRESS NOTES
_        Chief Complaint   Patient presents with    Follow-up    Discuss Labs    Results     Biopsy / has questions about results      DIAGNOSIS:       Pulmonary Langerhans cells granulomatosis  Right medial clavicle mass  Histocytosis  Leukocytosis and erythrocytosis with previous work-up negative. Likely related to chronic tobacco abuse  Left cervical lymph node increased uptake on PET scan, negative biopsy. Chronic tobacco abuse  Multiple comorbidities as listed  CURRENT THERAPY:         Work-up in progress  BRIEF CASE HISTORY:      Ms. Conner Rueda is a very pleasant 50 y.o. female with history of multiple co morbidities as listed. She is referred for evaluation of leukocytosis and mass in the right clavicle. Patient was diagnosed with pulmonary Langerhans cell granulomatosis. She has been on steroids on and off. Recently she is off treatment. No chest pain or shortness of breath. No headaches or dizziness. The patient noticed a mass in the right clavicle over the last 3 to 4 months. She is not tender. It is getting larger. No history of trauma or a fall. No other bone lesions. She has chronic back pain. No other recent changes. Patient was previously noted to have high white blood cells and she had work-up in [place] 8 years ago. Oncology work-up at that time was negative. Records are not available to me. Smokes more than 1 pack/day since age 18. Social alcohol drinking. INTERIM HISTORY:   Patient seen for follow-up right clavicle mass. Continues to have soreness in that area. No injuries. No other bone aches. Patient continues hot flashes. She has weakness and fatigue. No dizziness. She had aches and pains in multiple areas including the back and hips. No other complaints.   PAST MEDICAL HISTORY: has a past medical history of Abnormal uterine bleeding (AUB), Anxiety, Arthritis, CAD

## 2023-10-04 ENCOUNTER — OFFICE VISIT (OUTPATIENT)
Dept: FAMILY MEDICINE CLINIC | Age: 53
End: 2023-10-04
Payer: COMMERCIAL

## 2023-10-04 VITALS
DIASTOLIC BLOOD PRESSURE: 82 MMHG | HEIGHT: 63 IN | BODY MASS INDEX: 35.93 KG/M2 | HEART RATE: 101 BPM | SYSTOLIC BLOOD PRESSURE: 128 MMHG | WEIGHT: 202.8 LBS | TEMPERATURE: 96.7 F | OXYGEN SATURATION: 97 %

## 2023-10-04 DIAGNOSIS — E11.65 TYPE 2 DIABETES MELLITUS WITH HYPERGLYCEMIA, WITH LONG-TERM CURRENT USE OF INSULIN (HCC): ICD-10-CM

## 2023-10-04 DIAGNOSIS — Z01.818 PRE-OP EVALUATION: Primary | ICD-10-CM

## 2023-10-04 DIAGNOSIS — Z23 INFLUENZA VACCINE NEEDED: ICD-10-CM

## 2023-10-04 DIAGNOSIS — Z79.4 TYPE 2 DIABETES MELLITUS WITH HYPERGLYCEMIA, WITH LONG-TERM CURRENT USE OF INSULIN (HCC): ICD-10-CM

## 2023-10-04 PROCEDURE — G8482 FLU IMMUNIZE ORDER/ADMIN: HCPCS | Performed by: NURSE PRACTITIONER

## 2023-10-04 PROCEDURE — G8417 CALC BMI ABV UP PARAM F/U: HCPCS | Performed by: NURSE PRACTITIONER

## 2023-10-04 PROCEDURE — 2022F DILAT RTA XM EVC RTNOPTHY: CPT | Performed by: NURSE PRACTITIONER

## 2023-10-04 PROCEDURE — 99214 OFFICE O/P EST MOD 30 MIN: CPT | Performed by: NURSE PRACTITIONER

## 2023-10-04 PROCEDURE — 3079F DIAST BP 80-89 MM HG: CPT | Performed by: NURSE PRACTITIONER

## 2023-10-04 PROCEDURE — 3046F HEMOGLOBIN A1C LEVEL >9.0%: CPT | Performed by: NURSE PRACTITIONER

## 2023-10-04 PROCEDURE — 3074F SYST BP LT 130 MM HG: CPT | Performed by: NURSE PRACTITIONER

## 2023-10-04 PROCEDURE — 3017F COLORECTAL CA SCREEN DOC REV: CPT | Performed by: NURSE PRACTITIONER

## 2023-10-04 PROCEDURE — G8427 DOCREV CUR MEDS BY ELIG CLIN: HCPCS | Performed by: NURSE PRACTITIONER

## 2023-10-04 PROCEDURE — 90471 IMMUNIZATION ADMIN: CPT | Performed by: NURSE PRACTITIONER

## 2023-10-04 PROCEDURE — 90674 CCIIV4 VAC NO PRSV 0.5 ML IM: CPT | Performed by: NURSE PRACTITIONER

## 2023-10-04 PROCEDURE — 4004F PT TOBACCO SCREEN RCVD TLK: CPT | Performed by: NURSE PRACTITIONER

## 2023-10-04 RX ORDER — DULAGLUTIDE 1.5 MG/.5ML
1.5 INJECTION, SOLUTION SUBCUTANEOUS WEEKLY
Qty: 2 ML | Refills: 2 | Status: SHIPPED | OUTPATIENT
Start: 2023-10-04 | End: 2023-12-27

## 2023-10-04 ASSESSMENT — PATIENT HEALTH QUESTIONNAIRE - PHQ9
9. THOUGHTS THAT YOU WOULD BE BETTER OFF DEAD, OR OF HURTING YOURSELF: 0
SUM OF ALL RESPONSES TO PHQ QUESTIONS 1-9: 0
1. LITTLE INTEREST OR PLEASURE IN DOING THINGS: 0
5. POOR APPETITE OR OVEREATING: 0
3. TROUBLE FALLING OR STAYING ASLEEP: 0
7. TROUBLE CONCENTRATING ON THINGS, SUCH AS READING THE NEWSPAPER OR WATCHING TELEVISION: 0
8. MOVING OR SPEAKING SO SLOWLY THAT OTHER PEOPLE COULD HAVE NOTICED. OR THE OPPOSITE, BEING SO FIGETY OR RESTLESS THAT YOU HAVE BEEN MOVING AROUND A LOT MORE THAN USUAL: 0
SUM OF ALL RESPONSES TO PHQ QUESTIONS 1-9: 0
2. FEELING DOWN, DEPRESSED OR HOPELESS: 0
10. IF YOU CHECKED OFF ANY PROBLEMS, HOW DIFFICULT HAVE THESE PROBLEMS MADE IT FOR YOU TO DO YOUR WORK, TAKE CARE OF THINGS AT HOME, OR GET ALONG WITH OTHER PEOPLE: 0
SUM OF ALL RESPONSES TO PHQ9 QUESTIONS 1 & 2: 0
6. FEELING BAD ABOUT YOURSELF - OR THAT YOU ARE A FAILURE OR HAVE LET YOURSELF OR YOUR FAMILY DOWN: 0
SUM OF ALL RESPONSES TO PHQ QUESTIONS 1-9: 0
4. FEELING TIRED OR HAVING LITTLE ENERGY: 0
SUM OF ALL RESPONSES TO PHQ QUESTIONS 1-9: 0

## 2023-10-04 NOTE — PROGRESS NOTES
Preoperative Consultation      Betina Huff  YOB: 1970    Date of Service:  10/4/2023      Chief Complaint   Patient presents with    Pre-op Exam     Pt having Sx. On back 10/30/23 [facility]         This patient presents to the office today for apreoperative consultation at the request of surgeon, Dr. Karine Reyes, who plans on performing posterior lumbar hemilaminectomy on October 30 at [facility]. The current problem began years ago, and symptoms have been worsening with time. Conservative therapy: Yes: injections/PT , which has been not very effective. .    Planned anesthesia: General     Review of Systems  Known anesthesia problems:None   Bleeding risk: No recent or remote history of abnormal bleeding  Personal or FHof DVT/PE: Yes - DVT left leg in 20's when on Birth control    Patient objection to receiving blood products: No    Past Medical History:   Diagnosis Date    Abnormal uterine bleeding (AUB)     Anxiety     Arthritis     CAD (coronary artery disease)     Dr. Delvin Pop    Chronic neck pain     COVID-19     fever, slight cough, fatigue,lost taste,  admitted for couple days bc of elevated troponins    Depression     Diabetes mellitus ([address]) 2017    managed by PCP    Fatty liver     HTN (hypertension)     Hyperlipidemia     Hypothyroid     ILD (interstitial lung disease) ([address])     Kidney stone     Langerhans-cell granulomatosis ([address])     Myocardial infarct ([address]) 2016    Neck pain     Pulmonary nodule     Spinal stenosis 2017    Under care of team     [facility]:     Under care of team     PCP: Gely Charlton APRN- CNP    Under care of team     GYN: Dr. Seema Omalley    Under care of team     cardiologist: Dr. Delvin Pop last visit 7/2022    Uterine fibroid      Past Surgical History:   Procedure Laterality Date    BACK SURGERY  2004    L5-S1 fusion, Dr. Charles Zuniga  2016    one stent LAD    CERVICAL FUSION  12/2016    C5-7, Rudy Zimmerman  2019    C4,

## 2023-10-10 ENCOUNTER — CLINICAL DOCUMENTATION (OUTPATIENT)
Dept: PHYSICAL THERAPY | Age: 53
End: 2023-10-10

## 2023-10-10 RX ORDER — SODIUM CHLORIDE, SODIUM LACTATE, POTASSIUM CHLORIDE, CALCIUM CHLORIDE 600; 310; 30; 20 MG/100ML; MG/100ML; MG/100ML; MG/100ML
INJECTION, SOLUTION INTRAVENOUS CONTINUOUS
OUTPATIENT
Start: 2023-10-10

## 2023-10-10 NOTE — DISCHARGE INSTRUCTIONS
Preoperative Instructions:    Stop eating solid foods at midnight the night prior to surgery. Stop drinking clear liquids at midnight the night prior to surgery. Arrive at the surgery center (Entrance B) by 8:50 on 10/30/2023  (or as directed by your surgeon's office). If you have been given a blood band, you must bring it with you the day of surgery. Please stop any blood thinning medications as directed by your surgeon or prescribing physician. Failure to stop certain medications may interfere with your scheduled surgery. These may include:  Aspirin, Warfarin (Coumadin), Clopidogrel (Plavix), Ibuprofen (Motrin, Advil), Naproxen (Aleve), Meloxicam (Mobic), Celecoxib (Celebrex), Eliquis, Pradaxa, Xarelto, Effient, Fish Oil, Herbal supplements. You may continue the rest of your medications through the night before surgery unless instructed otherwise. Please take only the following medication(s) the day of surgery with a small sip of water:  Ranexa, synthroid/levothyroxine, toprol/metoprolol       (No trulicity injection to be given within 7 days of surgery date)    Please use and bring inhalers the day of surgery. Please bring CPAP the day of surgery. PLEASE NOTE:  THE ABOVE (IF ANY) DISCONTINUED MEDS MAY ONLY BE FROM   \"CLEANING UP\" THE MED LIST AND WERE NOT ACTUALLY CANCELLED; SEE CHART FOR DETAILS AND ALWAYS CHECK WITH PRESCRIBING PROVIDER BEFORE DISCONTINUING ANY MEDICATIONS          ____________________________  ____________________________  Signature (Patient)           Signature/date(Provider)      REMINDERS:  ** If you are going home the day of your procedure, you will need a friend or family member to drive you home after your procedure. Your  must be 25years of age or older and able to sign off on your discharge instructions. Taxi cabs or any form of public transportation is not acceptable.     ** It is preferable that the friend or family member stay at the hospital

## 2023-10-10 NOTE — DISCHARGE SUMMARY
Patient returned to previous functional level. [x] Patient's current functional status is unknown due to no-shows  [] Other:     Recommendations/Comments:           Treatment Included:     [] Therapeutic Exercise   73573  [] Iontophoresis: 4 mg/mL Dexamethasone Sodium Phosphate  mAmin  28555   [] Therapeutic Activity  05077 [] Vasopneumatic cold with compression  53655    [] Gait Training   30693 [] Ultrasound   58622   [] Neuromuscular Re-education  33801 [] Electrical Stimulation Unattended  70960   [] Manual Therapy  08175 [] Electrical Stimulation Attended  54433   [x] Instruction in HEP  [] Lumbar/Cervical Traction  00653   [x] Aquatic Therapy   00150 [] Cold/hotpack    [] Massage   08579      [] Dry Needling, 1 or 2 muscles  45338   [] Biofeedback, first 15 minutes   47675  [] Biofeedback, additional 15 minutes   49823 [] Dry Needling, 3 or more muscles  44228                If you have any questions or concerns regarding this patient's care, please contact us.    Thank you for your referral.      Electronically signed by: Alina Blake PT

## 2023-10-16 ENCOUNTER — TELEPHONE (OUTPATIENT)
Dept: FAMILY MEDICINE CLINIC | Age: 53
End: 2023-10-16

## 2023-10-16 ENCOUNTER — HOSPITAL ENCOUNTER (OUTPATIENT)
Dept: PREADMISSION TESTING | Age: 53
Discharge: HOME | End: 2023-10-20
Payer: COMMERCIAL

## 2023-10-16 VITALS
WEIGHT: 205 LBS | HEIGHT: 63 IN | RESPIRATION RATE: 18 BRPM | TEMPERATURE: 96.7 F | BODY MASS INDEX: 36.32 KG/M2 | DIASTOLIC BLOOD PRESSURE: 88 MMHG | HEART RATE: 102 BPM | OXYGEN SATURATION: 96 % | SYSTOLIC BLOOD PRESSURE: 143 MMHG

## 2023-10-16 LAB
ABO + RH BLD: NORMAL
ANION GAP SERPL CALCULATED.3IONS-SCNC: 12 MMOL/L (ref 9–17)
ARM BAND NUMBER: NORMAL
BLOOD BANK SAMPLE EXPIRATION: NORMAL
BLOOD GROUP ANTIBODIES SERPL: NEGATIVE
BUN SERPL-MCNC: 11 MG/DL (ref 6–20)
CHLORIDE SERPL-SCNC: 104 MMOL/L (ref 98–107)
CO2 SERPL-SCNC: 26 MMOL/L (ref 20–31)
CREAT SERPL-MCNC: 0.7 MG/DL (ref 0.5–0.9)
ERYTHROCYTE [DISTWIDTH] IN BLOOD BY AUTOMATED COUNT: 15.1 % (ref 11.8–14.4)
GFR SERPL CREATININE-BSD FRML MDRD: >60 ML/MIN/1.73M2
GLUCOSE SERPL-MCNC: 113 MG/DL (ref 70–99)
HCT VFR BLD AUTO: 48.6 % (ref 36.3–47.1)
HGB BLD-MCNC: 15.5 G/DL (ref 11.9–15.1)
INR PPP: 1
MCH RBC QN AUTO: 28.9 PG (ref 25.2–33.5)
MCHC RBC AUTO-ENTMCNC: 31.9 G/DL (ref 28.4–34.8)
MCV RBC AUTO: 90.5 FL (ref 82.6–102.9)
NRBC BLD-RTO: 0 PER 100 WBC
PARTIAL THROMBOPLASTIN TIME: 29 SEC (ref 23–36.5)
PLATELET # BLD AUTO: 426 K/UL (ref 138–453)
PMV BLD AUTO: 9.4 FL (ref 8.1–13.5)
POTASSIUM SERPL-SCNC: 4.4 MMOL/L (ref 3.7–5.3)
PROTHROMBIN TIME: 12.7 SEC (ref 11.7–14.9)
RBC # BLD AUTO: 5.37 M/UL (ref 3.95–5.11)
SODIUM SERPL-SCNC: 142 MMOL/L (ref 135–144)
WBC OTHER # BLD: 13.3 K/UL (ref 3.5–11.3)

## 2023-10-16 PROCEDURE — 86900 BLOOD TYPING SEROLOGIC ABO: CPT

## 2023-10-16 PROCEDURE — 36415 COLL VENOUS BLD VENIPUNCTURE: CPT

## 2023-10-16 PROCEDURE — 85027 COMPLETE CBC AUTOMATED: CPT

## 2023-10-16 PROCEDURE — 82947 ASSAY GLUCOSE BLOOD QUANT: CPT

## 2023-10-16 PROCEDURE — 86901 BLOOD TYPING SEROLOGIC RH(D): CPT

## 2023-10-16 PROCEDURE — 85730 THROMBOPLASTIN TIME PARTIAL: CPT

## 2023-10-16 PROCEDURE — 82565 ASSAY OF CREATININE: CPT

## 2023-10-16 PROCEDURE — 93005 ELECTROCARDIOGRAM TRACING: CPT | Performed by: ANESTHESIOLOGY

## 2023-10-16 PROCEDURE — 86850 RBC ANTIBODY SCREEN: CPT

## 2023-10-16 PROCEDURE — 84520 ASSAY OF UREA NITROGEN: CPT

## 2023-10-16 PROCEDURE — 85610 PROTHROMBIN TIME: CPT

## 2023-10-16 PROCEDURE — 80051 ELECTROLYTE PANEL: CPT

## 2023-10-16 ASSESSMENT — PAIN SCALES - GENERAL: PAINLEVEL_OUTOF10: 8

## 2023-10-16 ASSESSMENT — PAIN DESCRIPTION - ONSET: ONSET: ON-GOING

## 2023-10-16 ASSESSMENT — PAIN DESCRIPTION - LOCATION: LOCATION: BACK

## 2023-10-16 ASSESSMENT — PAIN DESCRIPTION - FREQUENCY: FREQUENCY: CONTINUOUS

## 2023-10-16 ASSESSMENT — PAIN DESCRIPTION - PAIN TYPE: TYPE: ACUTE PAIN;CHRONIC PAIN

## 2023-10-16 NOTE — TELEPHONE ENCOUNTER
Patient calling to let you know  she had her Pre-Op Testing done today 10/16/23 at [facility]. [facility] so you can get the clearance letter to surgeon Dr. Pavel Donoheu is 10/30/23    Gia Serrano

## 2023-10-16 NOTE — PROGRESS NOTES
Anesthesia Focused Assessment    STOP-BANG Sleep Apnea Questionnaire    SNORE loudly (heard through closed doors)? No  TIRED, fatigued, sleepy during daytime? No  OBSERVED stopping breathing during sleep? No  High blood PRESSURE being treated? Yes    BMI over 35? Yes  AGE over 48? Yes  NECK circumference over 16\"? No  GENDER (male)? No             Total 3  High risk 5-8  Intermediate risk 3-4  Low risk 0-2    Obstructive Sleep Apnea: denies  If YES, machine used: no     Type 1 DM:   no  T2DM:  yes    Coronary Artery Disease:  yes, stent x 1 (2016), follows with [facility]. Hypertension:  yes    Active smoker:  1/2 ppd x 25 years  Drinks Alcohol:  rarely    Dentition: benign    Defib / AICD / Pacemaker: no      Renal Failure/dialysis:  no    Patient was evaluated in PAT & anesthesia guidelines were applied. NPO guidelines, medication instructions and scheduled arrival time were reviewed with patient. I advised patient to please contact the surgeon's office, ahead of time if possible, if any new signs or symptoms of illness, infection, rash, etc    Hx of anesthesia complications:  prolonged emergence x 1 after cervical spine surgery (was under longer than anticipated patient states), but has not had any other difficulty with anesthesia. Family hx of anesthesia complications:  no                                                                                                                     Anesthesia contacted:   yes, Dr. Radha Clemons, see notes below. Medical or cardiac clearance ordered:     PCP clearance is pending from PCP [address], will forward all results to her for clearance and obtain copy of clearance for chart. Patient follows with [facility] for her cardiology care, office was contacted and last visit 2021/2022, office states due for follow up. Dr. Radha Clemons contacted, EKG reviewed, cardiac clearance to be requested.     Adia Tate PA-C  10/16/23  9:18 AM

## 2023-10-17 LAB
EKG ATRIAL RATE: 98 BPM
EKG P AXIS: 64 DEGREES
EKG P-R INTERVAL: 172 MS
EKG Q-T INTERVAL: 388 MS
EKG QRS DURATION: 104 MS
EKG QTC CALCULATION (BAZETT): 495 MS
EKG R AXIS: 90 DEGREES
EKG T AXIS: -13 DEGREES
EKG VENTRICULAR RATE: 98 BPM

## 2023-10-17 PROCEDURE — 93010 ELECTROCARDIOGRAM REPORT: CPT | Performed by: INTERNAL MEDICINE

## 2023-10-18 NOTE — TELEPHONE ENCOUNTER
[facility] calling asking for a letter or for you to addend you note stating that patient is cleared or not please

## 2023-10-18 NOTE — PROGRESS NOTES
Cardiac clearance with asa and plavix instructions for or 10/30/23.  Pt notified,verbalizes understanding

## 2023-10-30 ENCOUNTER — ANESTHESIA (OUTPATIENT)
Dept: OPERATING ROOM | Age: 53
End: 2023-10-30
Payer: COMMERCIAL

## 2023-10-30 ENCOUNTER — HOSPITAL ENCOUNTER (OUTPATIENT)
Age: 53
Setting detail: OUTPATIENT SURGERY
Discharge: HOME | End: 2023-10-30
Attending: NEUROLOGICAL SURGERY | Admitting: NEUROLOGICAL SURGERY
Payer: COMMERCIAL

## 2023-10-30 ENCOUNTER — ANESTHESIA EVENT (OUTPATIENT)
Dept: OPERATING ROOM | Age: 53
End: 2023-10-30
Payer: COMMERCIAL

## 2023-10-30 ENCOUNTER — APPOINTMENT (OUTPATIENT)
Dept: GENERAL RADIOLOGY | Age: 53
End: 2023-10-30
Attending: NEUROLOGICAL SURGERY
Payer: COMMERCIAL

## 2023-10-30 VITALS
TEMPERATURE: 97.3 F | RESPIRATION RATE: 11 BRPM | SYSTOLIC BLOOD PRESSURE: 127 MMHG | DIASTOLIC BLOOD PRESSURE: 84 MMHG | HEART RATE: 95 BPM | WEIGHT: 201 LBS | BODY MASS INDEX: 35.61 KG/M2 | HEIGHT: 63 IN | OXYGEN SATURATION: 97 %

## 2023-10-30 DIAGNOSIS — M54.16 LUMBAR RADICULOPATHY, RIGHT: Primary | ICD-10-CM

## 2023-10-30 LAB
GLUCOSE BLD-MCNC: 133 MG/DL (ref 65–105)
GLUCOSE BLD-MCNC: 181 MG/DL (ref 65–105)

## 2023-10-30 PROCEDURE — 2500000003 HC RX 250 WO HCPCS: Performed by: NURSE ANESTHETIST, CERTIFIED REGISTERED

## 2023-10-30 PROCEDURE — 7100000001 HC PACU RECOVERY - ADDTL 15 MIN: Performed by: NEUROLOGICAL SURGERY

## 2023-10-30 PROCEDURE — C9290 INJ, BUPIVACAINE LIPOSOME: HCPCS | Performed by: NEUROLOGICAL SURGERY

## 2023-10-30 PROCEDURE — 3700000001 HC ADD 15 MINUTES (ANESTHESIA): Performed by: NEUROLOGICAL SURGERY

## 2023-10-30 PROCEDURE — 7100000011 HC PHASE II RECOVERY - ADDTL 15 MIN: Performed by: NEUROLOGICAL SURGERY

## 2023-10-30 PROCEDURE — 7100000000 HC PACU RECOVERY - FIRST 15 MIN: Performed by: NEUROLOGICAL SURGERY

## 2023-10-30 PROCEDURE — 6360000002 HC RX W HCPCS: Performed by: ANESTHESIOLOGY

## 2023-10-30 PROCEDURE — 2580000003 HC RX 258: Performed by: NEUROLOGICAL SURGERY

## 2023-10-30 PROCEDURE — 63047 LAM FACETEC & FORAMOT LUMBAR: CPT | Performed by: PHYSICIAN ASSISTANT

## 2023-10-30 PROCEDURE — 82947 ASSAY GLUCOSE BLOOD QUANT: CPT

## 2023-10-30 PROCEDURE — 2709999900 HC NON-CHARGEABLE SUPPLY: Performed by: NEUROLOGICAL SURGERY

## 2023-10-30 PROCEDURE — 6370000000 HC RX 637 (ALT 250 FOR IP): Performed by: NEUROLOGICAL SURGERY

## 2023-10-30 PROCEDURE — 2500000003 HC RX 250 WO HCPCS

## 2023-10-30 PROCEDURE — 7100000010 HC PHASE II RECOVERY - FIRST 15 MIN: Performed by: NEUROLOGICAL SURGERY

## 2023-10-30 PROCEDURE — 3700000000 HC ANESTHESIA ATTENDED CARE: Performed by: NEUROLOGICAL SURGERY

## 2023-10-30 PROCEDURE — 6360000002 HC RX W HCPCS: Performed by: NURSE ANESTHETIST, CERTIFIED REGISTERED

## 2023-10-30 PROCEDURE — 2500000003 HC RX 250 WO HCPCS: Performed by: ANESTHESIOLOGY

## 2023-10-30 PROCEDURE — 2720000010 HC SURG SUPPLY STERILE: Performed by: NEUROLOGICAL SURGERY

## 2023-10-30 PROCEDURE — 6360000002 HC RX W HCPCS: Performed by: NEUROLOGICAL SURGERY

## 2023-10-30 PROCEDURE — 3600000005 HC SURGERY LEVEL 5 BASE: Performed by: NEUROLOGICAL SURGERY

## 2023-10-30 PROCEDURE — 2500000003 HC RX 250 WO HCPCS: Performed by: NEUROLOGICAL SURGERY

## 2023-10-30 PROCEDURE — 2580000003 HC RX 258: Performed by: ANESTHESIOLOGY

## 2023-10-30 PROCEDURE — 63047 LAM FACETEC & FORAMOT LUMBAR: CPT | Performed by: NEUROLOGICAL SURGERY

## 2023-10-30 PROCEDURE — 3600000015 HC SURGERY LEVEL 5 ADDTL 15MIN: Performed by: NEUROLOGICAL SURGERY

## 2023-10-30 RX ORDER — PROPOFOL 10 MG/ML
INJECTION, EMULSION INTRAVENOUS PRN
Status: DISCONTINUED | OUTPATIENT
Start: 2023-10-30 | End: 2023-10-30 | Stop reason: SDUPTHER

## 2023-10-30 RX ORDER — FENTANYL CITRATE 50 UG/ML
INJECTION, SOLUTION INTRAMUSCULAR; INTRAVENOUS PRN
Status: DISCONTINUED | OUTPATIENT
Start: 2023-10-30 | End: 2023-10-30 | Stop reason: SDUPTHER

## 2023-10-30 RX ORDER — ONDANSETRON 2 MG/ML
INJECTION INTRAMUSCULAR; INTRAVENOUS PRN
Status: DISCONTINUED | OUTPATIENT
Start: 2023-10-30 | End: 2023-10-30 | Stop reason: SDUPTHER

## 2023-10-30 RX ORDER — VANCOMYCIN HYDROCHLORIDE 1 G/20ML
INJECTION, POWDER, LYOPHILIZED, FOR SOLUTION INTRAVENOUS PRN
Status: DISCONTINUED | OUTPATIENT
Start: 2023-10-30 | End: 2023-10-30 | Stop reason: SDUPTHER

## 2023-10-30 RX ORDER — HYDRALAZINE HYDROCHLORIDE 20 MG/ML
10 INJECTION INTRAMUSCULAR; INTRAVENOUS
Status: DISCONTINUED | OUTPATIENT
Start: 2023-10-30 | End: 2023-10-30 | Stop reason: HOSPADM

## 2023-10-30 RX ORDER — MAGNESIUM HYDROXIDE 1200 MG/15ML
LIQUID ORAL CONTINUOUS PRN
Status: COMPLETED | OUTPATIENT
Start: 2023-10-30 | End: 2023-10-30

## 2023-10-30 RX ORDER — DROPERIDOL 2.5 MG/ML
0.62 INJECTION, SOLUTION INTRAMUSCULAR; INTRAVENOUS
Status: DISCONTINUED | OUTPATIENT
Start: 2023-10-30 | End: 2023-10-30 | Stop reason: HOSPADM

## 2023-10-30 RX ORDER — LIDOCAINE HYDROCHLORIDE AND EPINEPHRINE 10; 10 MG/ML; UG/ML
INJECTION, SOLUTION INFILTRATION; PERINEURAL PRN
Status: DISCONTINUED | OUTPATIENT
Start: 2023-10-30 | End: 2023-10-30 | Stop reason: ALTCHOICE

## 2023-10-30 RX ORDER — PHENYLEPHRINE HCL IN 0.9% NACL 1 MG/10 ML
SYRINGE (ML) INTRAVENOUS PRN
Status: DISCONTINUED | OUTPATIENT
Start: 2023-10-30 | End: 2023-10-30 | Stop reason: SDUPTHER

## 2023-10-30 RX ORDER — SODIUM CHLORIDE, SODIUM LACTATE, POTASSIUM CHLORIDE, CALCIUM CHLORIDE 600; 310; 30; 20 MG/100ML; MG/100ML; MG/100ML; MG/100ML
INJECTION, SOLUTION INTRAVENOUS CONTINUOUS
Status: DISCONTINUED | OUTPATIENT
Start: 2023-10-30 | End: 2023-10-30 | Stop reason: HOSPADM

## 2023-10-30 RX ORDER — METHOCARBAMOL 750 MG/1
750 TABLET, FILM COATED ORAL 3 TIMES DAILY
Qty: 21 TABLET | Refills: 0 | Status: SHIPPED | OUTPATIENT
Start: 2023-10-30 | End: 2023-11-09

## 2023-10-30 RX ORDER — LIDOCAINE HYDROCHLORIDE 10 MG/ML
INJECTION, SOLUTION EPIDURAL; INFILTRATION; INTRACAUDAL; PERINEURAL PRN
Status: DISCONTINUED | OUTPATIENT
Start: 2023-10-30 | End: 2023-10-30 | Stop reason: SDUPTHER

## 2023-10-30 RX ORDER — SODIUM CHLORIDE 0.9 % (FLUSH) 0.9 %
5-40 SYRINGE (ML) INJECTION EVERY 12 HOURS SCHEDULED
Status: DISCONTINUED | OUTPATIENT
Start: 2023-10-30 | End: 2023-10-30 | Stop reason: HOSPADM

## 2023-10-30 RX ORDER — SODIUM CHLORIDE 9 MG/ML
INJECTION, SOLUTION INTRAVENOUS PRN
Status: DISCONTINUED | OUTPATIENT
Start: 2023-10-30 | End: 2023-10-30 | Stop reason: HOSPADM

## 2023-10-30 RX ORDER — DEXAMETHASONE SODIUM PHOSPHATE 10 MG/ML
INJECTION INTRAMUSCULAR; INTRAVENOUS PRN
Status: DISCONTINUED | OUTPATIENT
Start: 2023-10-30 | End: 2023-10-30 | Stop reason: SDUPTHER

## 2023-10-30 RX ORDER — MIDAZOLAM HYDROCHLORIDE 1 MG/ML
INJECTION INTRAMUSCULAR; INTRAVENOUS PRN
Status: DISCONTINUED | OUTPATIENT
Start: 2023-10-30 | End: 2023-10-30 | Stop reason: SDUPTHER

## 2023-10-30 RX ORDER — CLINDAMYCIN HYDROCHLORIDE 300 MG/1
300 CAPSULE ORAL 3 TIMES DAILY
Qty: 3 CAPSULE | Refills: 0 | Status: SHIPPED | OUTPATIENT
Start: 2023-10-30 | End: 2023-11-02

## 2023-10-30 RX ORDER — SODIUM CHLORIDE 0.9 % (FLUSH) 0.9 %
SYRINGE (ML) INJECTION PRN
Status: DISCONTINUED | OUTPATIENT
Start: 2023-10-30 | End: 2023-10-30 | Stop reason: ALTCHOICE

## 2023-10-30 RX ORDER — METOCLOPRAMIDE HYDROCHLORIDE 5 MG/ML
10 INJECTION INTRAMUSCULAR; INTRAVENOUS
Status: DISCONTINUED | OUTPATIENT
Start: 2023-10-30 | End: 2023-10-30 | Stop reason: HOSPADM

## 2023-10-30 RX ORDER — MEPERIDINE HYDROCHLORIDE 50 MG/ML
12.5 INJECTION INTRAMUSCULAR; INTRAVENOUS; SUBCUTANEOUS EVERY 5 MIN PRN
Status: DISCONTINUED | OUTPATIENT
Start: 2023-10-30 | End: 2023-10-30 | Stop reason: HOSPADM

## 2023-10-30 RX ORDER — MAGNESIUM SULFATE 1 G/100ML
INJECTION INTRAVENOUS PRN
Status: DISCONTINUED | OUTPATIENT
Start: 2023-10-30 | End: 2023-10-30 | Stop reason: SDUPTHER

## 2023-10-30 RX ORDER — SODIUM CHLORIDE 0.9 % (FLUSH) 0.9 %
5-40 SYRINGE (ML) INJECTION PRN
Status: DISCONTINUED | OUTPATIENT
Start: 2023-10-30 | End: 2023-10-30 | Stop reason: HOSPADM

## 2023-10-30 RX ORDER — MIDAZOLAM HYDROCHLORIDE 2 MG/2ML
2 INJECTION, SOLUTION INTRAMUSCULAR; INTRAVENOUS ONCE
Status: COMPLETED | OUTPATIENT
Start: 2023-10-30 | End: 2023-10-30

## 2023-10-30 RX ORDER — DIPHENHYDRAMINE HYDROCHLORIDE 50 MG/ML
12.5 INJECTION INTRAMUSCULAR; INTRAVENOUS
Status: DISCONTINUED | OUTPATIENT
Start: 2023-10-30 | End: 2023-10-30 | Stop reason: HOSPADM

## 2023-10-30 RX ORDER — ROCURONIUM BROMIDE 10 MG/ML
INJECTION, SOLUTION INTRAVENOUS PRN
Status: DISCONTINUED | OUTPATIENT
Start: 2023-10-30 | End: 2023-10-30 | Stop reason: SDUPTHER

## 2023-10-30 RX ORDER — METOPROLOL TARTRATE 5 MG/5ML
INJECTION INTRAVENOUS PRN
Status: DISCONTINUED | OUTPATIENT
Start: 2023-10-30 | End: 2023-10-30 | Stop reason: SDUPTHER

## 2023-10-30 RX ORDER — OXYCODONE HYDROCHLORIDE AND ACETAMINOPHEN 5; 325 MG/1; MG/1
1 TABLET ORAL EVERY 4 HOURS PRN
Qty: 42 TABLET | Refills: 0 | Status: SHIPPED | OUTPATIENT
Start: 2023-10-30 | End: 2023-11-06

## 2023-10-30 RX ADMIN — PROPOFOL 200 MG: 10 INJECTION, EMULSION INTRAVENOUS at 11:30

## 2023-10-30 RX ADMIN — FENTANYL CITRATE 25 MCG: 0.05 INJECTION, SOLUTION INTRAMUSCULAR; INTRAVENOUS at 13:29

## 2023-10-30 RX ADMIN — FENTANYL CITRATE 50 MCG: 0.05 INJECTION, SOLUTION INTRAMUSCULAR; INTRAVENOUS at 14:01

## 2023-10-30 RX ADMIN — Medication 100 MCG: at 13:02

## 2023-10-30 RX ADMIN — METOPROLOL TARTRATE 4 MG: 5 INJECTION INTRAVENOUS at 14:59

## 2023-10-30 RX ADMIN — MAGNESIUM SULFATE HEPTAHYDRATE 1000 MG: 1 INJECTION, SOLUTION INTRAVENOUS at 13:16

## 2023-10-30 RX ADMIN — FENTANYL CITRATE 50 MCG: 0.05 INJECTION, SOLUTION INTRAMUSCULAR; INTRAVENOUS at 11:30

## 2023-10-30 RX ADMIN — SODIUM CHLORIDE, POTASSIUM CHLORIDE, SODIUM LACTATE AND CALCIUM CHLORIDE: 600; 310; 30; 20 INJECTION, SOLUTION INTRAVENOUS at 09:35

## 2023-10-30 RX ADMIN — MIDAZOLAM 2 MG: 1 INJECTION INTRAMUSCULAR; INTRAVENOUS at 11:27

## 2023-10-30 RX ADMIN — FENTANYL CITRATE 50 MCG: 0.05 INJECTION, SOLUTION INTRAMUSCULAR; INTRAVENOUS at 14:59

## 2023-10-30 RX ADMIN — ROCURONIUM BROMIDE 10 MG: 10 INJECTION, SOLUTION INTRAVENOUS at 13:59

## 2023-10-30 RX ADMIN — SUGAMMADEX 200 MG: 100 INJECTION, SOLUTION INTRAVENOUS at 15:05

## 2023-10-30 RX ADMIN — FENTANYL CITRATE 50 MCG: 0.05 INJECTION, SOLUTION INTRAMUSCULAR; INTRAVENOUS at 12:44

## 2023-10-30 RX ADMIN — FENTANYL CITRATE 25 MCG: 0.05 INJECTION, SOLUTION INTRAMUSCULAR; INTRAVENOUS at 13:15

## 2023-10-30 RX ADMIN — ONDANSETRON 4 MG: 2 INJECTION INTRAMUSCULAR; INTRAVENOUS at 14:39

## 2023-10-30 RX ADMIN — DEXAMETHASONE SODIUM PHOSPHATE 10 MG: 10 INJECTION INTRAMUSCULAR; INTRAVENOUS at 11:48

## 2023-10-30 RX ADMIN — MIDAZOLAM HYDROCHLORIDE 2 MG: 1 INJECTION, SOLUTION INTRAMUSCULAR; INTRAVENOUS at 11:03

## 2023-10-30 RX ADMIN — LIDOCAINE HYDROCHLORIDE 50 MG: 10 INJECTION, SOLUTION EPIDURAL; INFILTRATION; INTRACAUDAL; PERINEURAL at 11:30

## 2023-10-30 RX ADMIN — VANCOMYCIN HYDROCHLORIDE 1250 MG: 1 INJECTION, POWDER, LYOPHILIZED, FOR SOLUTION INTRAVENOUS at 12:09

## 2023-10-30 RX ADMIN — HYDROMORPHONE HYDROCHLORIDE 0.5 MG: 1 INJECTION, SOLUTION INTRAMUSCULAR; INTRAVENOUS; SUBCUTANEOUS at 16:07

## 2023-10-30 RX ADMIN — ROCURONIUM BROMIDE 20 MG: 10 INJECTION, SOLUTION INTRAVENOUS at 12:22

## 2023-10-30 RX ADMIN — ROCURONIUM BROMIDE 50 MG: 10 INJECTION, SOLUTION INTRAVENOUS at 11:30

## 2023-10-30 RX ADMIN — METOPROLOL TARTRATE 1 MG: 5 INJECTION INTRAVENOUS at 14:57

## 2023-10-30 ASSESSMENT — PAIN DESCRIPTION - LOCATION: LOCATION: BACK

## 2023-10-30 ASSESSMENT — PAIN SCALES - GENERAL
PAINLEVEL_OUTOF10: 6
PAINLEVEL_OUTOF10: 8
PAINLEVEL_OUTOF10: 9
PAINLEVEL_OUTOF10: 8
PAINLEVEL_OUTOF10: 5
PAINLEVEL_OUTOF10: 5

## 2023-10-30 ASSESSMENT — PAIN DESCRIPTION - DESCRIPTORS
DESCRIPTORS: ACHING
DESCRIPTORS: BURNING

## 2023-10-30 ASSESSMENT — LIFESTYLE VARIABLES: SMOKING_STATUS: 1

## 2023-10-30 ASSESSMENT — PAIN - FUNCTIONAL ASSESSMENT: PAIN_FUNCTIONAL_ASSESSMENT: 0-10

## 2023-10-30 NOTE — H&P
TRUEplus Lancets 30G MISC use 1 LANCET to TEST BLOOD SUGAR twice a day and if needed 8/7/23     SAVANA Ramey CNP   acetaminophen (TYLENOL) 500 MG tablet Take 2 tablets by mouth every 6 hours as needed for Pain 1/4/23     Mike Marcos DO   ibuprofen (ADVIL;MOTRIN) 600 MG tablet Take 1 tablet by mouth every 6 hours as needed for Pain 12/12/22     Angel Walters MD   Insulin Pen Needle 31G X 5 MM MISC Use pen needle for giving daily insulin injection 5/25/22     SAVANA Ramey CNP   albuterol (PROVENTIL) (2.5 MG/3ML) 0.083% nebulizer solution Take 3 mLs by nebulization daily as needed for Wheezing       Ruth Alcocer MD   blood glucose monitor kit and supplies Dispense sufficient amount for BID testing frequency plus additional to accommodate PRN testing needs. Dispense all needed supplies to include: monitor, strips, lancing device, lancets, control solutions, alcohol swabs. 10/25/21     SAVANA Ramey CNP   nitroGLYCERIN (NITROSTAT) 0.4 MG SL tablet place 1 tablet under the tongue if needed every 5 minutes for chest pain for 3 doses IF NO RELIEF AFTER THIRD DOSE CALL PRESCRIBER .   Patient not taking: Reported on 10/3/2023 10/19/21     Jessi Crews DO   fluticasone [facility]) 50 MCG/ACT nasal spray 1 spray by Each Nostril route daily as needed for Rhinitis       Ruth Alcocer MD   Multiple Vitamins-Minerals (THERAPEUTIC MULTIVITAMIN-MINERALS) tablet Take 1 tablet by mouth daily       Ruth Alcocer MD   ascorbic acid (VITAMIN C) 500 MG tablet Take 1 tablet by mouth daily       Ruth Alcocer MD   Cholecalciferol (VITAMIN D) 50 MCG (2000 UT) TABS tablet Take 1 tablet by mouth daily 1/23/20     Ruth Alcocer MD   aspirin 81 MG chewable tablet Take 1 tablet by mouth daily 5/31/19     Ruth Alcocer MD         Allergies  is allergic to bactrim [sulfamethoxazole-trimethoprim], dust mite extract, grass pollen([id number]-swt ciara),

## 2023-10-30 NOTE — ANESTHESIA PRE PROCEDURE
Department of Anesthesiology  Preprocedure Note       Name:  Brandon Manzano   Age:  50 y.o.  :  1970                                          MRN:  [id number]         Date:  10/30/2023      Surgeon: Kerline Holguin):  Katarina Chatman DO    Procedure: Procedure(s):  POSTERIOR LUMBAR HEMILAMINECTOMY, MEDIAL FACETECTOMY AND FORAMINOTOMY AT L4, POSSIBLE LUMBAR FUSION L4-5  (BENY TABLE, PRONE, C-ARM, POSSIBLE MEDTRONIC, POSSIBLE STEALTH NAVIGATION)    Medications prior to admission:   Prior to Admission medications    Medication Sig Start Date End Date Taking?  Authorizing Provider   dulaglutide (TRULICITY) 1.5 XL/6.3PW SC injection Inject 0.5 mLs into the skin once a week  Patient taking differently: Inject 0.5 mLs into the skin once a week Saturday injection 10/4/23 12/27/23  SAVANA Lyman CNP   empagliflozin (JARDIANCE) 10 MG tablet Take 1 tablet by mouth daily 10/4/23   SAVANA Lyman CNP   ondansetron (ZOFRAN-ODT) 4 MG disintegrating tablet Take 1 tablet by mouth 3 times daily as needed for Nausea or Vomiting 23   Atif Bearden MD   Insulin Glargine, 2 Unit Dial, (TOUJEO SON SOLOSTAR) 300 UNIT/ML SOPN Inject 50 Units into the skin daily (with breakfast) 23   SAVANA Lyman CNP   docusate sodium (COLACE) 100 MG capsule Take 1 capsule by mouth 2 times daily 23  SAVANA Lyman CNP   atorvastatin (LIPITOR) 80 MG tablet Take 1 tablet by mouth daily 23  SAVANA Lyman CNP   levothyroxine (SYNTHROID) 50 MCG tablet take 1 tablet by mouth once daily 23   SAVANA Lyman CNP   metoprolol succinate (TOPROL XL) 100 MG extended release tablet take 1 tablet by mouth twice a day 23   SAVANA Lyman CNP   clopidogrel (PLAVIX) 75 MG tablet take 1 tablet by mouth once daily 23   SAVANA Lyman CNP   ranolazine (RANEXA) 500 MG extended release tablet take 1 tablet by mouth twice a day 23   Vineet Magdaleno, APRN - CNP

## 2023-10-30 NOTE — DISCHARGE INSTRUCTIONS
No alcoholic beverages, no driving or operating machinery, no making important decisions for 24 hours. Children should maintain quiet play ( games, movies, books ) for 24 hours. You may have a normal diet but should eat lightly day of surgery. Drink plenty of fluids. Urinate within 8 hours after surgery, if unable to urinate call your doctor          Lumbar Spine Surgery Discharge Instructions     Thank you for choosing [facility] and [facility] for your surgical needs. The following instructions will help to ensure your comfort and that you are well prepared after your surgery. Post-Operative Visit:   The office is located at:    [facility]    [address]    [facility], [address] [facility], main floor    [facility], [address]    [phone]     Please also call your primary care physician to schedule an appointment for further evaluation and care. Diet:   You may resume your regular diet. Be sure to eat a well-balanced diet. Protein promotes wound healing. Pain medication and decreased activity can cause constipation. Drink 8-10 glasses of water a day, eat fresh fruits and vegetables, and add prunes, raisins and bran cereals to your diet if you do become constipated. A stool softener taken 1-2 times a day is helpful. Dulcolax suppositories or Fleets enemas are also available without a prescription. Call our office if the problem continues. Activity and Exercise:   No driving until you are seen in the office. Avoid riding in a car for the first two weeks until you come to the office for your scheduled follow-up. Start taking short, frequent walks in the beginning. [place], more frequent walks throughout the day are more beneficial than one long walk each day. You may gradually increase the distance; as tolerated. Your brace will help give support to your muscles while you walk.    If your pain increases, you may be walking too much or

## 2023-10-30 NOTE — PROGRESS NOTES
Walked to the br with rn at side , no problems noted, voided without difficulty and walked back to the stretcher with walker

## 2023-10-31 ENCOUNTER — TELEPHONE (OUTPATIENT)
Dept: NEUROSURGERY | Age: 53
End: 2023-10-31

## 2023-10-31 DIAGNOSIS — R11.0 NAUSEA: Primary | ICD-10-CM

## 2023-10-31 RX ORDER — ONDANSETRON 4 MG/1
4 TABLET, ORALLY DISINTEGRATING ORAL 3 TIMES DAILY PRN
Qty: 21 TABLET | Refills: 0 | Status: SHIPPED | OUTPATIENT
Start: 2023-10-31

## 2023-10-31 NOTE — OP NOTE
Operative Note      Patient: Betina Huff  YOB: 1970  MRN: [id number]    Date of Procedure: 10/30/2023    Pre-Op Diagnosis Codes:     * Lumbar radiculopathy [M54.16]    Post-Op Diagnosis: Same       Procedure(s):  POSTERIOR LUMBAR HEMILAMINECTOMY RIGHT L4-L5, MEDIAL FACETECTOMY AND FORAMINOTOMY AT L4,    Surgeon(s):  Bill Pond DO    Assistant:   Physician Assistant: Iorn Forrester PA-C    Anesthesia: General    Estimated Blood Loss (mL): less than 50     Complications: None    Specimens:   * No specimens in log *    Implants:  * No implants in log *      Drains: * No LDAs found *    Findings: LATERAL RECESS STENOSIS     indication for surgery: This is a 62-year-old woman with persistent right-sided radiculopathy corresponding to the L5 distribution. Also has some back pain but most of her complaints are right leg. Is coming from she Had a prior L5-S1 fusion. Imaging L4-L5 lateral recess stenosis as well as foraminal stenosis on the right. She has failed conservative treatment and surgery was indicated. Risk benefits alternatives surgery discussed with patient all questions were answered and I was asked to proceed with surgery. Detailed Description of Procedure:   Operative details:  Patient was brought in the OR by anesthesiologist.  Site Intelligence IVs were installed. Patient was induced and intubated. Harris catheter was placed. Patient was then turned prone to an open Safety harbor table . All points of contact between the body and the table as well padded. A localizing x-ray was done to localize level of L4-5  This area of the back was thoroughly scrubbed with alcohol, prepped with ChloraPrep, and sterilely draped. Patient received preoperative antibiotics. A timeout was called and all members of the operative team agreed to the procedure. 1% lidocaine with epinephrine was infiltrated in the proposed midline incision.   Approximately 6 cm midline incision was carried down with

## 2023-10-31 NOTE — TELEPHONE ENCOUNTER
Zofran sent to pharmacy. Could try taking half a pain pill at a time to see if this helps. Alternative would be to try norco or tramadol instead, but would need patient to bring leftover percocet to office to dispose of before new script can be sent, otherwise can refill with a different medication at 1 week post-op.

## 2023-10-31 NOTE — TELEPHONE ENCOUNTER
Patient states she is still having severe nausea and would like to see if a medication could be sent to her pharmacy. She also states that she feels \"like she's on speed\" with the Percocet and wanted to see if there was another pain medication that could be prescribed. States pain is only along the incision and is not that bad.

## 2023-11-06 ENCOUNTER — APPOINTMENT (OUTPATIENT)
Dept: CT IMAGING | Age: 53
DRG: 048 | End: 2023-11-06
Payer: COMMERCIAL

## 2023-11-06 ENCOUNTER — HOSPITAL ENCOUNTER (INPATIENT)
Age: 53
LOS: 2 days | Discharge: HOME | DRG: 048 | End: 2023-11-09
Attending: EMERGENCY MEDICINE | Admitting: EMERGENCY MEDICINE
Payer: COMMERCIAL

## 2023-11-06 ENCOUNTER — APPOINTMENT (OUTPATIENT)
Dept: GENERAL RADIOLOGY | Age: 53
DRG: 048 | End: 2023-11-06
Payer: COMMERCIAL

## 2023-11-06 DIAGNOSIS — M54.16 LUMBAR RADICULOPATHY, RIGHT: ICD-10-CM

## 2023-11-06 DIAGNOSIS — M54.9 INTRACTABLE BACK PAIN: ICD-10-CM

## 2023-11-06 DIAGNOSIS — R11.0 NAUSEA: ICD-10-CM

## 2023-11-06 DIAGNOSIS — G89.18 POST-OP PAIN: Primary | ICD-10-CM

## 2023-11-06 LAB
ALBUMIN SERPL-MCNC: 4 G/DL (ref 3.5–5.2)
ALBUMIN/GLOB SERPL: 1 {RATIO} (ref 1–2.5)
ALP SERPL-CCNC: 107 U/L (ref 35–104)
ALT SERPL-CCNC: 10 U/L (ref 5–33)
ANION GAP SERPL CALCULATED.3IONS-SCNC: 22 MMOL/L (ref 9–17)
AST SERPL-CCNC: 14 U/L
BACTERIA URNS QL MICRO: NORMAL
BASOPHILS # BLD: 0.05 K/UL (ref 0–0.2)
BASOPHILS NFR BLD: 0 % (ref 0–2)
BILIRUB SERPL-MCNC: 0.6 MG/DL (ref 0.3–1.2)
BILIRUB UR QL STRIP: NEGATIVE
BUN SERPL-MCNC: 15 MG/DL (ref 6–20)
CALCIUM SERPL-MCNC: 9.7 MG/DL (ref 8.6–10.4)
CASTS #/AREA URNS LPF: NORMAL /LPF (ref 0–8)
CHLORIDE SERPL-SCNC: 98 MMOL/L (ref 98–107)
CLARITY UR: CLEAR
CO2 SERPL-SCNC: 19 MMOL/L (ref 20–31)
COLOR UR: YELLOW
CREAT SERPL-MCNC: 0.7 MG/DL (ref 0.5–0.9)
EOSINOPHIL # BLD: 0.05 K/UL (ref 0–0.44)
EOSINOPHILS RELATIVE PERCENT: 0 % (ref 1–4)
EPI CELLS #/AREA URNS HPF: NORMAL /HPF (ref 0–5)
ERYTHROCYTE [DISTWIDTH] IN BLOOD BY AUTOMATED COUNT: 15.3 % (ref 11.8–14.4)
GFR SERPL CREATININE-BSD FRML MDRD: >60 ML/MIN/1.73M2
GLUCOSE BLD-MCNC: 97 MG/DL (ref 65–105)
GLUCOSE SERPL-MCNC: 136 MG/DL (ref 70–99)
GLUCOSE UR STRIP-MCNC: ABNORMAL MG/DL
HCT VFR BLD AUTO: 48.9 % (ref 36.3–47.1)
HGB BLD-MCNC: 16.2 G/DL (ref 11.9–15.1)
HGB UR QL STRIP.AUTO: NEGATIVE
IMM GRANULOCYTES # BLD AUTO: 0.05 K/UL (ref 0–0.3)
IMM GRANULOCYTES NFR BLD: 0 %
KETONES UR STRIP-MCNC: ABNORMAL MG/DL
LEUKOCYTE ESTERASE UR QL STRIP: NEGATIVE
LIPASE SERPL-CCNC: 18 U/L (ref 13–60)
LYMPHOCYTES NFR BLD: 2.6 K/UL (ref 1.1–3.7)
LYMPHOCYTES RELATIVE PERCENT: 18 % (ref 24–43)
MCH RBC QN AUTO: 29.3 PG (ref 25.2–33.5)
MCHC RBC AUTO-ENTMCNC: 33.1 G/DL (ref 28.4–34.8)
MCV RBC AUTO: 88.6 FL (ref 82.6–102.9)
MONOCYTES NFR BLD: 0.52 K/UL (ref 0.1–1.2)
MONOCYTES NFR BLD: 4 % (ref 3–12)
NEUTROPHILS NFR BLD: 78 % (ref 36–65)
NEUTS SEG NFR BLD: 11.04 K/UL (ref 1.5–8.1)
NITRITE UR QL STRIP: NEGATIVE
NRBC BLD-RTO: 0 PER 100 WBC
PH UR STRIP: 6 [PH] (ref 5–8)
PLATELET # BLD AUTO: 544 K/UL (ref 138–453)
PMV BLD AUTO: 9.3 FL (ref 8.1–13.5)
POTASSIUM SERPL-SCNC: 4.2 MMOL/L (ref 3.7–5.3)
PROT SERPL-MCNC: 8.1 G/DL (ref 6.4–8.3)
PROT UR STRIP-MCNC: ABNORMAL MG/DL
RBC # BLD AUTO: 5.52 M/UL (ref 3.95–5.11)
RBC # BLD: ABNORMAL 10*6/UL
RBC #/AREA URNS HPF: NORMAL /HPF (ref 0–4)
SODIUM SERPL-SCNC: 139 MMOL/L (ref 135–144)
SP GR UR STRIP: 1.04 (ref 1–1.03)
TROPONIN I SERPL HS-MCNC: 10 NG/L (ref 0–14)
TROPONIN I SERPL HS-MCNC: 14 NG/L (ref 0–14)
UROBILINOGEN UR STRIP-ACNC: NORMAL EU/DL (ref 0–1)
WBC #/AREA URNS HPF: NORMAL /HPF (ref 0–5)
WBC OTHER # BLD: 14.3 K/UL (ref 3.5–11.3)

## 2023-11-06 PROCEDURE — 83690 ASSAY OF LIPASE: CPT

## 2023-11-06 PROCEDURE — 84484 ASSAY OF TROPONIN QUANT: CPT

## 2023-11-06 PROCEDURE — 81001 URINALYSIS AUTO W/SCOPE: CPT

## 2023-11-06 PROCEDURE — 2580000003 HC RX 258: Performed by: EMERGENCY MEDICINE

## 2023-11-06 PROCEDURE — 99285 EMERGENCY DEPT VISIT HI MDM: CPT

## 2023-11-06 PROCEDURE — 71260 CT THORAX DX C+: CPT

## 2023-11-06 PROCEDURE — 80053 COMPREHEN METABOLIC PANEL: CPT

## 2023-11-06 PROCEDURE — 6360000004 HC RX CONTRAST MEDICATION: Performed by: STUDENT IN AN ORGANIZED HEALTH CARE EDUCATION/TRAINING PROGRAM

## 2023-11-06 PROCEDURE — 6360000002 HC RX W HCPCS: Performed by: STUDENT IN AN ORGANIZED HEALTH CARE EDUCATION/TRAINING PROGRAM

## 2023-11-06 PROCEDURE — 85025 COMPLETE CBC W/AUTO DIFF WBC: CPT

## 2023-11-06 PROCEDURE — 82947 ASSAY GLUCOSE BLOOD QUANT: CPT

## 2023-11-06 PROCEDURE — 6360000002 HC RX W HCPCS: Performed by: EMERGENCY MEDICINE

## 2023-11-06 PROCEDURE — 2580000003 HC RX 258

## 2023-11-06 PROCEDURE — 96376 TX/PRO/DX INJ SAME DRUG ADON: CPT

## 2023-11-06 PROCEDURE — 96374 THER/PROPH/DIAG INJ IV PUSH: CPT

## 2023-11-06 PROCEDURE — 71046 X-RAY EXAM CHEST 2 VIEWS: CPT

## 2023-11-06 PROCEDURE — G0378 HOSPITAL OBSERVATION PER HR: HCPCS

## 2023-11-06 PROCEDURE — 96375 TX/PRO/DX INJ NEW DRUG ADDON: CPT

## 2023-11-06 PROCEDURE — 6360000002 HC RX W HCPCS

## 2023-11-06 PROCEDURE — 93005 ELECTROCARDIOGRAM TRACING: CPT | Performed by: EMERGENCY MEDICINE

## 2023-11-06 PROCEDURE — 74177 CT ABD & PELVIS W/CONTRAST: CPT

## 2023-11-06 RX ORDER — ONDANSETRON 2 MG/ML
4 INJECTION INTRAMUSCULAR; INTRAVENOUS ONCE
Status: COMPLETED | OUTPATIENT
Start: 2023-11-06 | End: 2023-11-06

## 2023-11-06 RX ORDER — ENOXAPARIN SODIUM 100 MG/ML
40 INJECTION SUBCUTANEOUS DAILY
Status: DISCONTINUED | OUTPATIENT
Start: 2023-11-07 | End: 2023-11-09 | Stop reason: HOSPADM

## 2023-11-06 RX ORDER — SODIUM CHLORIDE 9 MG/ML
INJECTION, SOLUTION INTRAVENOUS PRN
Status: DISCONTINUED | OUTPATIENT
Start: 2023-11-06 | End: 2023-11-09 | Stop reason: HOSPADM

## 2023-11-06 RX ORDER — ACETAMINOPHEN 325 MG/1
650 TABLET ORAL EVERY 4 HOURS PRN
Status: DISCONTINUED | OUTPATIENT
Start: 2023-11-06 | End: 2023-11-09 | Stop reason: HOSPADM

## 2023-11-06 RX ORDER — OXYCODONE HYDROCHLORIDE 5 MG/1
5 TABLET ORAL EVERY 4 HOURS PRN
Status: DISCONTINUED | OUTPATIENT
Start: 2023-11-06 | End: 2023-11-09 | Stop reason: HOSPADM

## 2023-11-06 RX ORDER — ONDANSETRON 4 MG/1
4 TABLET, ORALLY DISINTEGRATING ORAL EVERY 8 HOURS PRN
Status: DISCONTINUED | OUTPATIENT
Start: 2023-11-06 | End: 2023-11-09 | Stop reason: HOSPADM

## 2023-11-06 RX ORDER — SODIUM CHLORIDE 0.9 % (FLUSH) 0.9 %
5-40 SYRINGE (ML) INJECTION EVERY 12 HOURS SCHEDULED
Status: DISCONTINUED | OUTPATIENT
Start: 2023-11-06 | End: 2023-11-09 | Stop reason: HOSPADM

## 2023-11-06 RX ORDER — DOCUSATE SODIUM 100 MG/1
100 CAPSULE, LIQUID FILLED ORAL 2 TIMES DAILY
Status: DISCONTINUED | OUTPATIENT
Start: 2023-11-06 | End: 2023-11-09 | Stop reason: HOSPADM

## 2023-11-06 RX ORDER — INSULIN LISPRO 100 [IU]/ML
0-8 INJECTION, SOLUTION INTRAVENOUS; SUBCUTANEOUS
Status: DISCONTINUED | OUTPATIENT
Start: 2023-11-07 | End: 2023-11-09 | Stop reason: HOSPADM

## 2023-11-06 RX ORDER — INSULIN LISPRO 100 [IU]/ML
0-4 INJECTION, SOLUTION INTRAVENOUS; SUBCUTANEOUS NIGHTLY
Status: DISCONTINUED | OUTPATIENT
Start: 2023-11-06 | End: 2023-11-09 | Stop reason: HOSPADM

## 2023-11-06 RX ORDER — LEVOTHYROXINE SODIUM 0.05 MG/1
50 TABLET ORAL DAILY
Status: DISCONTINUED | OUTPATIENT
Start: 2023-11-07 | End: 2023-11-09 | Stop reason: HOSPADM

## 2023-11-06 RX ORDER — MORPHINE SULFATE 4 MG/ML
4 INJECTION, SOLUTION INTRAMUSCULAR; INTRAVENOUS ONCE
Status: COMPLETED | OUTPATIENT
Start: 2023-11-06 | End: 2023-11-06

## 2023-11-06 RX ORDER — METHOCARBAMOL 750 MG/1
750 TABLET, FILM COATED ORAL 3 TIMES DAILY
Status: DISCONTINUED | OUTPATIENT
Start: 2023-11-06 | End: 2023-11-09 | Stop reason: HOSPADM

## 2023-11-06 RX ORDER — SODIUM CHLORIDE 0.9 % (FLUSH) 0.9 %
5-40 SYRINGE (ML) INJECTION PRN
Status: DISCONTINUED | OUTPATIENT
Start: 2023-11-06 | End: 2023-11-09 | Stop reason: HOSPADM

## 2023-11-06 RX ORDER — 0.9 % SODIUM CHLORIDE 0.9 %
1000 INTRAVENOUS SOLUTION INTRAVENOUS ONCE
Status: COMPLETED | OUTPATIENT
Start: 2023-11-06 | End: 2023-11-06

## 2023-11-06 RX ORDER — ASCORBIC ACID 500 MG
500 TABLET ORAL DAILY
Status: DISCONTINUED | OUTPATIENT
Start: 2023-11-07 | End: 2023-11-09 | Stop reason: HOSPADM

## 2023-11-06 RX ORDER — FENTANYL CITRATE 50 UG/ML
50 INJECTION, SOLUTION INTRAMUSCULAR; INTRAVENOUS ONCE
Status: COMPLETED | OUTPATIENT
Start: 2023-11-06 | End: 2023-11-06

## 2023-11-06 RX ORDER — ONDANSETRON 2 MG/ML
4 INJECTION INTRAMUSCULAR; INTRAVENOUS EVERY 6 HOURS PRN
Status: DISCONTINUED | OUTPATIENT
Start: 2023-11-06 | End: 2023-11-09 | Stop reason: HOSPADM

## 2023-11-06 RX ORDER — ATORVASTATIN CALCIUM 80 MG/1
80 TABLET, FILM COATED ORAL DAILY
Status: DISCONTINUED | OUTPATIENT
Start: 2023-11-07 | End: 2023-11-09 | Stop reason: HOSPADM

## 2023-11-06 RX ORDER — DEXTROSE MONOHYDRATE 100 MG/ML
INJECTION, SOLUTION INTRAVENOUS CONTINUOUS PRN
Status: DISCONTINUED | OUTPATIENT
Start: 2023-11-06 | End: 2023-11-09 | Stop reason: HOSPADM

## 2023-11-06 RX ADMIN — ONDANSETRON 4 MG: 2 INJECTION INTRAMUSCULAR; INTRAVENOUS at 16:49

## 2023-11-06 RX ADMIN — IOPAMIDOL 75 ML: 755 INJECTION, SOLUTION INTRAVENOUS at 18:41

## 2023-11-06 RX ADMIN — SODIUM CHLORIDE, PRESERVATIVE FREE 10 ML: 5 INJECTION INTRAVENOUS at 23:40

## 2023-11-06 RX ADMIN — ONDANSETRON 4 MG: 2 INJECTION INTRAMUSCULAR; INTRAVENOUS at 22:21

## 2023-11-06 RX ADMIN — SODIUM CHLORIDE 1000 ML: 9 INJECTION, SOLUTION INTRAVENOUS at 16:48

## 2023-11-06 RX ADMIN — FENTANYL CITRATE 50 MCG: 50 INJECTION, SOLUTION INTRAMUSCULAR; INTRAVENOUS at 19:43

## 2023-11-06 RX ADMIN — MORPHINE SULFATE 4 MG: 4 INJECTION INTRAVENOUS at 16:49

## 2023-11-06 ASSESSMENT — PAIN DESCRIPTION - LOCATION
LOCATION: BACK
LOCATION: BACK;CHEST
LOCATION: BACK
LOCATION: ABDOMEN

## 2023-11-06 ASSESSMENT — PAIN DESCRIPTION - ORIENTATION
ORIENTATION: LOWER
ORIENTATION: LOWER

## 2023-11-06 ASSESSMENT — PAIN SCALES - GENERAL
PAINLEVEL_OUTOF10: 10
PAINLEVEL_OUTOF10: 9

## 2023-11-06 ASSESSMENT — PAIN - FUNCTIONAL ASSESSMENT
PAIN_FUNCTIONAL_ASSESSMENT: 0-10
PAIN_FUNCTIONAL_ASSESSMENT: ACTIVITIES ARE NOT PREVENTED

## 2023-11-06 ASSESSMENT — PAIN DESCRIPTION - FREQUENCY: FREQUENCY: CONTINUOUS

## 2023-11-06 ASSESSMENT — PAIN DESCRIPTION - PAIN TYPE: TYPE: ACUTE PAIN;SURGICAL PAIN

## 2023-11-06 ASSESSMENT — PAIN DESCRIPTION - DESCRIPTORS: DESCRIPTORS: STABBING;CRAMPING

## 2023-11-06 NOTE — ED TRIAGE NOTES
Pt complaint of back pain, n/v, and chest pain following back surgery which she got Monday of last week. Pt states her surgeon ordered her sublingual Zofran but it isn't working.

## 2023-11-06 NOTE — ED PROVIDER NOTES
[facility]  Emergency Department Encounter  Emergency Medicine Resident     Pt Michelle Alok Vazquez  MRN: [id number]  [address] [place] 1970  Date of evaluation: 11/6/23  PCP:  Katheryne Moritz, APRN - CNP  Note Started: 4:33 PM EST      CHIEF COMPLAINT       Chief Complaint   Patient presents with    Back Pain     Recent back surgery      Chest Pain    Nausea    Emesis     Started Monday of last week after back sx, hasn't been able to eat/drink since Saturday. HISTORY OF PRESENT ILLNESS  (Location/Symptom, Timing/Onset, Context/Setting, Quality, Duration, Modifying Factors, Severity.)      Cisco Lindsay is a 50 y.o. female with HTN, DM, CAD who presents with nausea/vomiting, chest pain and abdominal pain. States abdominal pain began last night and this morning chest pain began at 11:00 this morning. Associated with subjective fevers chills. States that she become sweaty right before throwing out. Prior back surgery on Oct 30, lumbar hemilaminectomy at L4-L5 performed by Chapito Tyler. States post op back pain is worsening however her leg weakness and left sensory deficits prior to the surgery have been resolving and she feels the surgery was a success. She has a bandage to her surgical incision to hear low back without drainage, no swelling or pain on palpation.     PAST MEDICAL / SURGICAL / SOCIAL / FAMILY HISTORY      has a past medical history of Abnormal uterine bleeding (AUB), Anxiety, Arthritis, CAD (coronary artery disease), Chronic neck pain, COVID-19, Depression, Diabetes mellitus ([address]), Fatty liver, HTN (hypertension), Hyperlipidemia, Hypothyroid, ILD (interstitial lung disease) ([address]), Kidney stone, Langerhans-cell granulomatosis ([address]), Lower back pain, Myocardial infarct [facility]), Neck pain, Pulmonary nodule, Spinal stenosis, Under care of service provider, Under care of service provider, Under care of service provider, Under care of service provider, Under care of service provider, Under care Making  53-year-old female postop from laminectomy, complaining of persistent abdominal pain, nausea or vomiting. Nausea was present upon discharge from hospital on 10/Thursday. Since then she has been having worsening nausea and vomiting associated with abdominal pain that began last night. She is now having left-sided chest pain that began 11:00 this morning. She is having regular bowel movements, denies any blood in the stool. She is afebrile she is tachycardic. Incision site to back does not appear to be infected dressings are clean dry and intact. Abdominal exam is remarkable for tenderness to left lower quadrant left upper quadrant, epigastric region. She has no sensory loss, she is able to ambulate, neuro exam is unremarkable do not believe that this is as result of the surgery. Low concern for infection from surgery, no signs of cord compression. Prior hysterectomy  Having regular bowel movements, low concern for SBO. Prior history of DVT in her 25s on anticoagulation, there is concern for pulmonary embolism    Abdominal pain could be a new issue, will obtain abdominal labs concerning for hepatitis pancreatitis, urinary tract infection, diverticulitis,, kidney stone, pneumonia, ACS, PE    Amount and/or Complexity of Data Reviewed  Labs: ordered. Radiology: ordered. ECG/medicine tests: ordered. Risk  OTC drugs. Prescription drug management. Decision regarding hospitalization.           EKG Interpretation    Rhythm: normal sinus  Rate: sinus tachycardia  Axis: normal  Ectopy: none  Conduction: normal  ST Segments: normal, no acute infarction evident  T Waves: normal  Q Waves: none    EKG Interpretation:  Sinus Tachycardia     All EKG's are interpreted by the Emergency Department Physician who either signs or Co-signs this chart in the absence of a cardiologist.    EMERGENCY DEPARTMENT COURSE:           PROCEDURES:  None    CONSULTS:  IP CONSULT TO NEUROSURGERY  IP CONSULT TO

## 2023-11-07 ENCOUNTER — APPOINTMENT (OUTPATIENT)
Dept: CT IMAGING | Age: 53
DRG: 048 | End: 2023-11-07
Payer: COMMERCIAL

## 2023-11-07 PROBLEM — R11.2 NAUSEA & VOMITING: Status: ACTIVE | Noted: 2023-11-07

## 2023-11-07 LAB
ANION GAP SERPL CALCULATED.3IONS-SCNC: 15 MMOL/L (ref 9–17)
BASOPHILS # BLD: 0 K/UL (ref 0–0.2)
BASOPHILS NFR BLD: 0 % (ref 0–2)
BUN SERPL-MCNC: 15 MG/DL (ref 6–20)
CALCIUM SERPL-MCNC: 8.9 MG/DL (ref 8.6–10.4)
CHLORIDE SERPL-SCNC: 101 MMOL/L (ref 98–107)
CO2 SERPL-SCNC: 20 MMOL/L (ref 20–31)
CREAT SERPL-MCNC: 0.6 MG/DL (ref 0.5–0.9)
EOSINOPHIL # BLD: 0.25 K/UL (ref 0–0.44)
EOSINOPHILS RELATIVE PERCENT: 2 % (ref 1–4)
ERYTHROCYTE [DISTWIDTH] IN BLOOD BY AUTOMATED COUNT: 15.2 % (ref 11.8–14.4)
EST. AVERAGE GLUCOSE BLD GHB EST-MCNC: 180 MG/DL
GFR SERPL CREATININE-BSD FRML MDRD: >60 ML/MIN/1.73M2
GLUCOSE BLD-MCNC: 81 MG/DL (ref 65–105)
GLUCOSE BLD-MCNC: 86 MG/DL (ref 65–105)
GLUCOSE BLD-MCNC: 89 MG/DL (ref 65–105)
GLUCOSE BLD-MCNC: 91 MG/DL (ref 65–105)
GLUCOSE SERPL-MCNC: 82 MG/DL (ref 70–99)
HBA1C MFR BLD: 7.9 % (ref 4–6)
HCT VFR BLD AUTO: 44 % (ref 36.3–47.1)
HGB BLD-MCNC: 14.2 G/DL (ref 11.9–15.1)
IMM GRANULOCYTES # BLD AUTO: 0 K/UL (ref 0–0.3)
IMM GRANULOCYTES NFR BLD: 0 %
LYMPHOCYTES NFR BLD: 5.04 K/UL (ref 1.1–3.7)
LYMPHOCYTES RELATIVE PERCENT: 40 % (ref 24–43)
MCH RBC QN AUTO: 29.6 PG (ref 25.2–33.5)
MCHC RBC AUTO-ENTMCNC: 32.3 G/DL (ref 28.4–34.8)
MCV RBC AUTO: 91.9 FL (ref 82.6–102.9)
MONOCYTES NFR BLD: 0.76 K/UL (ref 0.1–1.2)
MONOCYTES NFR BLD: 6 % (ref 3–12)
MORPHOLOGY: NORMAL
NEUTROPHILS NFR BLD: 52 % (ref 36–65)
NEUTS SEG NFR BLD: 6.55 K/UL (ref 1.5–8.1)
NRBC BLD-RTO: 0 PER 100 WBC
PLATELET # BLD AUTO: 473 K/UL (ref 138–453)
PMV BLD AUTO: 8.9 FL (ref 8.1–13.5)
POTASSIUM SERPL-SCNC: 3.9 MMOL/L (ref 3.7–5.3)
RBC # BLD AUTO: 4.79 M/UL (ref 3.95–5.11)
SODIUM SERPL-SCNC: 136 MMOL/L (ref 135–144)
WBC OTHER # BLD: 12.6 K/UL (ref 3.5–11.3)

## 2023-11-07 PROCEDURE — 99254 IP/OBS CNSLTJ NEW/EST MOD 60: CPT | Performed by: NURSE PRACTITIONER

## 2023-11-07 PROCEDURE — 6370000000 HC RX 637 (ALT 250 FOR IP)

## 2023-11-07 PROCEDURE — 82947 ASSAY GLUCOSE BLOOD QUANT: CPT

## 2023-11-07 PROCEDURE — 6360000002 HC RX W HCPCS

## 2023-11-07 PROCEDURE — 99223 1ST HOSP IP/OBS HIGH 75: CPT | Performed by: INTERNAL MEDICINE

## 2023-11-07 PROCEDURE — 2580000003 HC RX 258

## 2023-11-07 PROCEDURE — 1200000000 HC SEMI PRIVATE

## 2023-11-07 PROCEDURE — 2580000003 HC RX 258: Performed by: NURSE PRACTITIONER

## 2023-11-07 PROCEDURE — 80048 BASIC METABOLIC PNL TOTAL CA: CPT

## 2023-11-07 PROCEDURE — 83036 HEMOGLOBIN GLYCOSYLATED A1C: CPT

## 2023-11-07 PROCEDURE — C9113 INJ PANTOPRAZOLE SODIUM, VIA: HCPCS | Performed by: NURSE PRACTITIONER

## 2023-11-07 PROCEDURE — 2580000003 HC RX 258: Performed by: STUDENT IN AN ORGANIZED HEALTH CARE EDUCATION/TRAINING PROGRAM

## 2023-11-07 PROCEDURE — 36415 COLL VENOUS BLD VENIPUNCTURE: CPT

## 2023-11-07 PROCEDURE — 85025 COMPLETE CBC W/AUTO DIFF WBC: CPT

## 2023-11-07 PROCEDURE — 96376 TX/PRO/DX INJ SAME DRUG ADON: CPT

## 2023-11-07 PROCEDURE — 6360000002 HC RX W HCPCS: Performed by: NURSE PRACTITIONER

## 2023-11-07 PROCEDURE — A4216 STERILE WATER/SALINE, 10 ML: HCPCS | Performed by: NURSE PRACTITIONER

## 2023-11-07 RX ORDER — PANTOPRAZOLE SODIUM 40 MG/1
40 TABLET, DELAYED RELEASE ORAL
Status: DISCONTINUED | OUTPATIENT
Start: 2023-11-08 | End: 2023-11-07

## 2023-11-07 RX ORDER — SODIUM CHLORIDE 9 MG/ML
INJECTION, SOLUTION INTRAVENOUS CONTINUOUS
Status: DISCONTINUED | OUTPATIENT
Start: 2023-11-07 | End: 2023-11-09 | Stop reason: HOSPADM

## 2023-11-07 RX ORDER — METOCLOPRAMIDE HYDROCHLORIDE 5 MG/ML
10 INJECTION INTRAMUSCULAR; INTRAVENOUS ONCE
Status: COMPLETED | OUTPATIENT
Start: 2023-11-07 | End: 2023-11-07

## 2023-11-07 RX ORDER — METOCLOPRAMIDE HYDROCHLORIDE 5 MG/ML
10 INJECTION INTRAMUSCULAR; INTRAVENOUS EVERY 6 HOURS
Status: DISCONTINUED | OUTPATIENT
Start: 2023-11-07 | End: 2023-11-07

## 2023-11-07 RX ORDER — MORPHINE SULFATE 4 MG/ML
4 INJECTION, SOLUTION INTRAMUSCULAR; INTRAVENOUS
Status: COMPLETED | OUTPATIENT
Start: 2023-11-07 | End: 2023-11-07

## 2023-11-07 RX ADMIN — SODIUM CHLORIDE: 9 INJECTION, SOLUTION INTRAVENOUS at 07:30

## 2023-11-07 RX ADMIN — METHOCARBAMOL TABLETS 750 MG: 750 TABLET, COATED ORAL at 14:00

## 2023-11-07 RX ADMIN — MORPHINE SULFATE 4 MG: 4 INJECTION INTRAVENOUS at 02:05

## 2023-11-07 RX ADMIN — PANTOPRAZOLE SODIUM 80 MG: 40 INJECTION, POWDER, FOR SOLUTION INTRAVENOUS at 14:18

## 2023-11-07 RX ADMIN — ACETAMINOPHEN 650 MG: 325 TABLET ORAL at 21:33

## 2023-11-07 RX ADMIN — SODIUM CHLORIDE, PRESERVATIVE FREE 10 ML: 5 INJECTION INTRAVENOUS at 22:03

## 2023-11-07 RX ADMIN — OXYCODONE HYDROCHLORIDE 5 MG: 5 TABLET ORAL at 07:42

## 2023-11-07 RX ADMIN — OXYCODONE HYDROCHLORIDE 5 MG: 5 TABLET ORAL at 17:33

## 2023-11-07 RX ADMIN — ERYTHROMYCIN LACTOBIONATE: 500 INJECTION, POWDER, LYOPHILIZED, FOR SOLUTION INTRAVENOUS at 17:33

## 2023-11-07 RX ADMIN — SODIUM CHLORIDE, PRESERVATIVE FREE 40 MG: 5 INJECTION INTRAVENOUS at 17:30

## 2023-11-07 RX ADMIN — OXYCODONE HYDROCHLORIDE 5 MG: 5 TABLET ORAL at 21:32

## 2023-11-07 RX ADMIN — METOCLOPRAMIDE 10 MG: 5 INJECTION, SOLUTION INTRAMUSCULAR; INTRAVENOUS at 11:38

## 2023-11-07 RX ADMIN — ONDANSETRON 4 MG: 2 INJECTION INTRAMUSCULAR; INTRAVENOUS at 17:33

## 2023-11-07 RX ADMIN — ONDANSETRON 4 MG: 2 INJECTION INTRAMUSCULAR; INTRAVENOUS at 07:39

## 2023-11-07 RX ADMIN — OXYCODONE HYDROCHLORIDE 5 MG: 5 TABLET ORAL at 11:41

## 2023-11-07 ASSESSMENT — PAIN SCALES - GENERAL
PAINLEVEL_OUTOF10: 8
PAINLEVEL_OUTOF10: 9
PAINLEVEL_OUTOF10: 9
PAINLEVEL_OUTOF10: 8
PAINLEVEL_OUTOF10: 9
PAINLEVEL_OUTOF10: 8

## 2023-11-07 ASSESSMENT — ENCOUNTER SYMPTOMS
ABDOMINAL PAIN: 1
CHEST TIGHTNESS: 0
DIARRHEA: 0
SHORTNESS OF BREATH: 0
NAUSEA: 1
VOMITING: 1

## 2023-11-07 ASSESSMENT — PAIN DESCRIPTION - ONSET: ONSET: ON-GOING

## 2023-11-07 ASSESSMENT — PAIN DESCRIPTION - DESCRIPTORS
DESCRIPTORS: ACHING;CRAMPING;THROBBING
DESCRIPTORS: ACHING;CRAMPING;SHARP;STABBING

## 2023-11-07 ASSESSMENT — PAIN DESCRIPTION - LOCATION
LOCATION: ABDOMEN;BACK;HEAD
LOCATION: BACK;ABDOMEN;HEAD

## 2023-11-07 ASSESSMENT — PAIN DESCRIPTION - FREQUENCY: FREQUENCY: CONTINUOUS

## 2023-11-07 ASSESSMENT — PAIN DESCRIPTION - PAIN TYPE: TYPE: ACUTE PAIN;SURGICAL PAIN

## 2023-11-07 ASSESSMENT — HEART SCORE: ECG: 0

## 2023-11-07 NOTE — ED PROVIDER NOTES
[facility]  Emergency Department  Emergency Medicine Sign-out   Care of Shermon Heimlich was assumed from Dr. Jaydon Gardner and is being seen for Back Pain (Recent back surgery  ), Chest Pain, Nausea, and Emesis (Started Monday of last week after back sx, hasn't been able to eat/drink since Saturday. )  . The patient's initial evaluation and plan have been discussed with the prior provider who initially evaluated the patient.      EMERGENCY DEPARTMENT COURSE / MEDICAL DECISION MAKING:       MEDICATIONS GIVEN:  Orders Placed This Encounter   Medications    sodium chloride 0.9 % bolus 1,000 mL    ondansetron (ZOFRAN) injection 4 mg    morphine injection 4 mg    iopamidol (ISOVUE-370) 76 % injection 75 mL    fentaNYL (SUBLIMAZE) injection 50 mcg    sodium chloride flush 0.9 % injection 5-40 mL    sodium chloride flush 0.9 % injection 5-40 mL    0.9 % sodium chloride infusion    enoxaparin (LOVENOX) injection 40 mg     Order Specific Question:   Indication of Use     Answer:   Prophylaxis-DVT/PE    acetaminophen (TYLENOL) tablet 650 mg    OR Linked Order Group     ondansetron (ZOFRAN-ODT) disintegrating tablet 4 mg     ondansetron (ZOFRAN) injection 4 mg    ascorbic acid (VITAMIN C) tablet 500 mg    atorvastatin (LIPITOR) tablet 80 mg    docusate sodium (COLACE) capsule 100 mg    insulin lispro (HUMALOG) injection vial 0-8 Units    insulin lispro (HUMALOG) injection vial 0-4 Units    glucose chewable tablet 16 g    OR Linked Order Group     dextrose bolus 10% 125 mL     dextrose bolus 10% 250 mL    glucagon (rDNA) injection 1 mg    dextrose 10 % infusion    levothyroxine (SYNTHROID) tablet 50 mcg    methocarbamol (ROBAXIN) tablet 750 mg    oxyCODONE (ROXICODONE) immediate release tablet 5 mg    morphine injection 4 mg    0.9 % sodium chloride infusion       LABS / RADIOLOGY:     Labs Reviewed   CBC WITH AUTO DIFFERENTIAL - Abnormal; Notable for the following components:       Result Value    WBC 14.3 (*)     RBC

## 2023-11-07 NOTE — CARE COORDINATION
Case Management Assessment  Initial Evaluation    Date/Time of Evaluation: 11/7/2023 6:29 PM  Assessment Completed by: Mahesh Berg RN    If patient is discharged prior to next notation, then this note serves as note for discharge by case management. Patient Name: Shermon Heimlich                   YOB: 1970  Diagnosis: Post-op pain [G89.18]  Nausea & vomiting [R11.2]                   Date / Time: 11/6/2023  3:58 PM    Patient Admission Status: Inpatient   Readmission Risk (Low < 19, Mod (19-27), High > 27): Readmission Risk Score: 11.1    Current PCP: SAVANA Shi CNP  PCP verified by CM? (P) Yes    Chart Reviewed: Yes      History Provided by: (P) Patient  Patient Orientation: (P) Alert and Oriented    Patient Cognition: (P) Alert    Hospitalization in the last 30 days (Readmission):  No    If yes, Readmission Assessment in CM Navigator will be completed.     Advance Directives:      Code Status: Full Code   Patient's Primary Decision Maker is:      Primary Decision MakerAdex Blessing - Spouse - [phone]    Secondary Decision Maker: Bree Ag Brother/Sister - [phone]    Discharge Planning:    Patient lives with: Spouse/Significant Other Type of Home: House  Primary Care Giver: (P) Self  Patient Support Systems include: (P) Spouse/Significant Other   Current Financial resources:    Current community resources:    Current services prior to admission: (P) Durable Medical Equipment            Current DME: (P) Bedside Commode, Cane, Walker            Type of Home Care services:  None    ADLS  Prior functional level: (P) Assistance with the following:, Dressing, Cooking, Housework, Shopping, Mobility  Current functional level: (P) Assistance with the following:, Dressing, Cooking, Housework, Shopping, Mobility    PT AM-PAC:   /24  OT AM-PAC:   /24    Family can provide assistance at DC: (P) Yes  Would you like Case Management to discuss the discharge plan with any other family

## 2023-11-07 NOTE — CONSULTS
[facility]   Consult Note         Today's Date: 2023  Patient Name: Afia Rodriguez  Date of admission: 2023  3:58 PM  Patient's age: 50 y.o., 1970  Admission Dx: Post-op pain [G89.18]    Reason for Consult:  Cardiac evaluation    Requesting Physician: Ministerio Steven MD    REASON FOR CONSULT: Chest pain    History Obtained From:  Patient, chart, staff, records    HISTORY OF PRESENT ILLNESS:      The patient is a 50 y.o. female who is admitted to the hospital for nausea/vomiting, as well as chest pain, abdominal pain and back pain following surgery on her back on . She reports the chest pain started this past  night and got worse on Monday which prompted her to go to the ED. She reports pain that worsens upon inspiration but denied all other symptoms. Past Medical History:   has a past medical history of Abnormal uterine bleeding (AUB), Anxiety, Arthritis, CAD (coronary artery disease), Chronic neck pain, COVID-19, Depression, Diabetes mellitus ([address]), Fatty liver, HTN (hypertension), Hyperlipidemia, Hypothyroid, ILD (interstitial lung disease) ([address]), Kidney stone, Langerhans-cell granulomatosis ([address]), Lower back pain, Myocardial infarct [facility]), Neck pain, Pulmonary nodule, Spinal stenosis, Under care of service provider, Under care of service provider, Under care of service provider, Under care of service provider, Under care of service provider, Under care of service provider, and Uterine fibroid. Past Surgical History:   has a past surgical history that includes back surgery ();  section; Mastoid surgery; Tonsillectomy; Cardiac catheterization (); Lung biopsy (Right); hip surgery (Left); Colonoscopy (N/A, 2021); Upper gastrointestinal endoscopy (2021); Cervical spine surgery (); cervical fusion (2016); laminectomy ();  Hysterectomy (N/A, 2022); lymph node biopsy (Left, 09/15/2023); US BIOPSY LYMPH NODE

## 2023-11-07 NOTE — PLAN OF CARE
--  NO ACUTE NEUROSURGICAL INTERVENTION NEEDED    NEUROSURGERY TO SIGN OFF     Please contact Neurosurgery with any questions. PATIENT TO FOLLOW UP IN CLINIC: follow up as scheduled on 11/13  ---  Follow-up with Neurosurgery  [facility] (Medical Office Building 2)  [address]  Call [phone] for an appointment.   --

## 2023-11-07 NOTE — ED NOTES
Pt to ED via EMS, to triage. Pt is complaining of chest pain, lower back pain, and nausea. Pt states its worsened over a week after her back surgery. Pt has incision to lower back. Pt states history of stents as well, pt placed on monitor, IV and labs being obtained, EKG is completed.       Jessica De La Rosa RN  11/06/23 [id number]

## 2023-11-07 NOTE — CONSULTS
[facility] [place] ([facility]) Gastroenterology  Consultation Note     . Chief Complaint:  Nausea, vomiting and LUQ pain    Reason for consult:    Persistent Nausea and Vomiting    History of present illness: This is a 50 y.o. female with PMH including poorly controlled DM type II, CAD, HTN, HLD, hypothyroidism, diverticulosis who presented to the ER with complaints of mid abdominal pain constant in nature nonradiating, persistent nausea and vomiting. Patient states that she frequently has nausea and vomiting at home at times unable to tolerate oral intake. She recently underwent posterior lumbar hemilaminectomy and states that since the surgery her nausea and vomiting has become much worse. Patient states that around August her PCP has been adjusting her diabetic medication due to significantly elevated A1c of 13 and since then she has been having more issues with the nausea and vomiting. Patient states that her blood sugars at home have been ranging from 100-1 15 on average. She denies any hematemesis, rectal bleeding, weight loss. She does admit to smoking half pack of cigarettes daily, daily use of medical marijuana to treat chronic pain. No NSAID use or alcohol consumption  CT abdomen and pelvis was unremarkable for acute abdominal findings  BMP, CBC unremarkable except for WBCs of 12.6  LFTs WNL except elevated alk phos of 107        Previous GI history:   12/2021 EGD per Dr. Cuong Pollack  Findings:     Retropharyngeal area was grossly normal appearing     Esophagus: abnormal: MILD DISTAL ESOPHAGITIS WAS BIOPSIED AT GEJ     Stomach:    Fundus: normal    Body: normal    Antrum: abnormal: MILD GASTRITIS WAS BIOPSIED     Duodenum:     Descending: normal    Bulb: normal     The scope was removed and the patient tolerated the procedure well.       Recommendations/Plan:   F/U Biopsies  F/U In Office in 3-4 weeks  Discussed with the family  Post sedation patient was stable with stable vital signs and stable O2 saturations     Electronically signed by Stefanie Rossi MD  on 12/8/2021 at 8:39 AM     12/2021 colonoscopy per Dr. Manas Leary  Findings:  Terminal ileum: normal     Cecum/Ascending colon: normal     Transverse colon: normal     Descending/Sigmoid colon: abnormal: MILD TO MOD DIVERTICULOSIS  FEW DIMINUTIVE POLYPS DISTAL RECTUM WERE BIOPSIED     Rectum/Anus: examined in normal and retroflexed positions and was abnormal: MOD INT HEMORRHOIDS WITH SKIN TAGS     Withdrawal Time was (minutes): 10     The colon was decompressed and the scope was removed. The patient tolerated the procedure well. Recommendations/Plan:   Lifestyle and dietary modifications as discussed  F/U Biopsies  F/U In Office in 3-4 weeks  Discussed with the family  Colonoscopy Recall :5 year  POST SEDATION PATIENT WAS STABLE WITH STABLE VITAL SIGNS AND OXYGEN SATURATIONS AND WAS DISCHARGED HOME WITH RIDE IN A STABLE CONDITION.      Electronically signed by Stefanie Rossi MD  on 12/8/2021 at 8:58 AM   Past Medical/Social/Family History:  Past Medical History:   Diagnosis Date    Abnormal uterine bleeding (AUB)     Anxiety     Arthritis     CAD (coronary artery disease)     Chronic neck pain     COVID-19 07/2022    fever, slight cough, fatigue,lost taste,  admitted for couple days bc of elevated troponins    Depression     Diabetes mellitus ([address]) 2017    managed by PCP    Fatty liver     HTN (hypertension)     Hyperlipidemia     Hypothyroid     ILD (interstitial lung disease) ([address])     Kidney stone     Langerhans-cell granulomatosis ([address])     Lower back pain     Myocardial infarct [facility]) 2016    Neck pain     Pulmonary nodule     Spinal stenosis 2017    Under care of service provider 10/16/2023    pulmonology-[facility]: [address] [facility],[address] visit april 2023    Under care of service provider 10/16/2023    PCP: Edna FARRIS CNP-[facility]-last visit oct 2023    Under care of service provider 10/16/2023    ob/gyn-[facility]-last visit

## 2023-11-07 NOTE — CARE COORDINATION
DISCHARGE PLANNING EVALUATION: OP/OBSERVATION        11/7/23, 73:09 AM DEISI Hewitt         Location: OBS 21/21-1   Reason for hospitalization: Post-op pain [G89.18]     CM Services requested for transitional needs. PCP: SAVANA Rodriguez CNP    Transportation/Food Security/Housekeeping Addressed:  No issues identified.      Equipment needs: none identified     Transition plan:  Obs: plans to return home independently

## 2023-11-07 NOTE — CONSULTS
Pelvis: The bladder appears normal.  There has been a hysterectomy. There is no free fluid. Peritoneum/Retroperitoneum: No evidence of lymphadenopathy. Aorta is normal in caliber. No fat stranding, free fluid, free air or focal fluid collection is identified. Bones/Soft Tissues: No fracture or destructive bone lesion is identified. There has been a partial laminectomy at the L5-S1 level with discectomy. There has also been a partial laminectomy at L4-L5. No acute findings in the abdomen or pelvis. CT CHEST PULMONARY EMBOLISM W CONTRAST    Result Date: 11/6/2023  EXAMINATION: CTA OF THE CHEST 11/6/2023 4:59 pm TECHNIQUE: CTA of the chest was performed after the administration of intravenous contrast.  Multiplanar reformatted images are provided for review. MIP images are provided for review. Automated exposure control, iterative reconstruction, and/or weight based adjustment of the mA/kV was utilized to reduce the radiation dose to as low as reasonably achievable. COMPARISON: Radiograph 11/06/2023 and CT 07/07/2022. HISTORY: ORDERING SYSTEM PROVIDED HISTORY: left side cp, hx of DVT, immobilization TECHNOLOGIST PROVIDED HISTORY: left side cp, hx of DVT, immobilization Decision Support Exception - unselect if not a suspected or confirmed emergency medical condition->Emergency Medical Condition (MA) Reason for Exam: left side cp, hx of DVT, immobilization FINDINGS: Pulmonary Arteries: Pulmonary arteries are adequately opacified for evaluation. No evidence of intraluminal filling defect to suggest pulmonary embolism. Main pulmonary artery is normal in caliber. Mediastinum: No evidence of mediastinal lymphadenopathy. The heart and pericardium demonstrate no acute abnormality. There is no acute abnormality of the thoracic aorta. Lungs/pleura: Linear opacities laterally in the right upper lobe are unchanged and likely represents scarring. There is no confluent airspace consolidation or pleural effusion. The central airways are normally patent. Sub 4 mm nodules at the lung apices are stable. Upper Abdomen: Limited images of the upper abdomen are unremarkable. Soft Tissues/Bones: No fracture or suspicious bone lesion is identified. 1. No pulmonary embolism or other acute finding in the chest. 2. Stable small nodules at the lung apices consistent with benign nodules. No follow-up imaging is recommended. XR CHEST (2 VW)    Result Date: 11/6/2023  EXAMINATION: TWO XRAY VIEWS OF THE CHEST 11/6/2023 4:27 pm COMPARISON: October 28, 2022 HISTORY: ORDERING SYSTEM PROVIDED HISTORY: Chest pain TECHNOLOGIST PROVIDED HISTORY: Chest pain FINDINGS: Cervical hardware noted. Pleuroparenchymal change right lateral chest wall. Infiltrate right cardiophrenic angle unchanged. Lungs otherwise clear. Heart and mediastinum unremarkable. Bony thorax intact. Stable scarring right lateral chest wall. Stable infiltrate right cardiophrenic angle.           ASSESSMENT AND PLAN:       Patient Active Problem List   Diagnosis    Hypothyroidism    Type 2 diabetes mellitus without complication, with long-term current use of insulin ([address])    Essential hypertension    Hip fracture ([address])    Spinal stenosis of cervical region    Hx of deep venous thrombosis    Situational depression    Anxiety    Pulmonary Langerhans cell granulomatosis ([address])    Mixed hyperlipidemia    Coronary artery disease involving native heart with angina pectoris (HCC)    Migraine without status migrainosus, not intractable    OAB (overactive bladder)    Smoker    Irritable bowel syndrome with constipation    Irritable bowel syndrome with both constipation and diarrhea    Positive colorectal cancer screening using Cologuard test    Gastroesophageal reflux disease with esophagitis without hemorrhage    NSTEMI (non-ST elevated myocardial infarction) ([address])    Hx of CS x2    Hx of laparoscopic tubal ligation (2000)    Fundal uterine fibroid     S/p LAVH, BSO, Cysto

## 2023-11-08 ENCOUNTER — ANESTHESIA (OUTPATIENT)
Dept: OPERATING ROOM | Age: 53
End: 2023-11-08
Payer: COMMERCIAL

## 2023-11-08 ENCOUNTER — ANESTHESIA EVENT (OUTPATIENT)
Dept: OPERATING ROOM | Age: 53
End: 2023-11-08
Payer: COMMERCIAL

## 2023-11-08 LAB
EKG ATRIAL RATE: 108 BPM
EKG P AXIS: 65 DEGREES
EKG P-R INTERVAL: 152 MS
EKG Q-T INTERVAL: 362 MS
EKG QRS DURATION: 96 MS
EKG QTC CALCULATION (BAZETT): 485 MS
EKG R AXIS: 80 DEGREES
EKG T AXIS: 41 DEGREES
EKG VENTRICULAR RATE: 108 BPM
GLUCOSE BLD-MCNC: 104 MG/DL (ref 65–105)
GLUCOSE BLD-MCNC: 76 MG/DL (ref 65–105)
GLUCOSE BLD-MCNC: 81 MG/DL (ref 65–105)
GLUCOSE BLD-MCNC: 86 MG/DL (ref 65–105)

## 2023-11-08 PROCEDURE — 7100000010 HC PHASE II RECOVERY - FIRST 15 MIN: Performed by: INTERNAL MEDICINE

## 2023-11-08 PROCEDURE — 82947 ASSAY GLUCOSE BLOOD QUANT: CPT

## 2023-11-08 PROCEDURE — 0DJ08ZZ INSPECTION OF UPPER INTESTINAL TRACT, VIA NATURAL OR ARTIFICIAL OPENING ENDOSCOPIC: ICD-10-PCS | Performed by: INTERNAL MEDICINE

## 2023-11-08 PROCEDURE — 93010 ELECTROCARDIOGRAM REPORT: CPT | Performed by: INTERNAL MEDICINE

## 2023-11-08 PROCEDURE — 6360000002 HC RX W HCPCS: Performed by: NURSE PRACTITIONER

## 2023-11-08 PROCEDURE — 6370000000 HC RX 637 (ALT 250 FOR IP)

## 2023-11-08 PROCEDURE — 2500000003 HC RX 250 WO HCPCS

## 2023-11-08 PROCEDURE — 6360000002 HC RX W HCPCS

## 2023-11-08 PROCEDURE — C9113 INJ PANTOPRAZOLE SODIUM, VIA: HCPCS | Performed by: NURSE PRACTITIONER

## 2023-11-08 PROCEDURE — 3609017100 HC EGD: Performed by: INTERNAL MEDICINE

## 2023-11-08 PROCEDURE — 6360000002 HC RX W HCPCS: Performed by: EMERGENCY MEDICINE

## 2023-11-08 PROCEDURE — 43235 EGD DIAGNOSTIC BRUSH WASH: CPT | Performed by: INTERNAL MEDICINE

## 2023-11-08 PROCEDURE — 3700000000 HC ANESTHESIA ATTENDED CARE: Performed by: INTERNAL MEDICINE

## 2023-11-08 PROCEDURE — 7100000011 HC PHASE II RECOVERY - ADDTL 15 MIN: Performed by: INTERNAL MEDICINE

## 2023-11-08 PROCEDURE — 2580000003 HC RX 258: Performed by: INTERNAL MEDICINE

## 2023-11-08 PROCEDURE — 2580000003 HC RX 258

## 2023-11-08 PROCEDURE — 2580000003 HC RX 258: Performed by: STUDENT IN AN ORGANIZED HEALTH CARE EDUCATION/TRAINING PROGRAM

## 2023-11-08 PROCEDURE — 1200000000 HC SEMI PRIVATE

## 2023-11-08 PROCEDURE — 2580000003 HC RX 258: Performed by: NURSE PRACTITIONER

## 2023-11-08 PROCEDURE — 2580000003 HC RX 258: Performed by: ANESTHESIOLOGY

## 2023-11-08 PROCEDURE — 6370000000 HC RX 637 (ALT 250 FOR IP): Performed by: INTERNAL MEDICINE

## 2023-11-08 PROCEDURE — 6360000002 HC RX W HCPCS: Performed by: INTERNAL MEDICINE

## 2023-11-08 RX ORDER — ALBUTEROL SULFATE 2.5 MG/3ML
2.5 SOLUTION RESPIRATORY (INHALATION) EVERY 8 HOURS PRN
Status: DISCONTINUED | OUTPATIENT
Start: 2023-11-08 | End: 2023-11-08 | Stop reason: HOSPADM

## 2023-11-08 RX ORDER — FENTANYL CITRATE 50 UG/ML
25 INJECTION, SOLUTION INTRAMUSCULAR; INTRAVENOUS
Status: DISCONTINUED | OUTPATIENT
Start: 2023-11-08 | End: 2023-11-08 | Stop reason: HOSPADM

## 2023-11-08 RX ORDER — MIDAZOLAM HYDROCHLORIDE 2 MG/2ML
1 INJECTION, SOLUTION INTRAMUSCULAR; INTRAVENOUS EVERY 10 MIN PRN
Status: DISCONTINUED | OUTPATIENT
Start: 2023-11-08 | End: 2023-11-08 | Stop reason: HOSPADM

## 2023-11-08 RX ORDER — SODIUM CHLORIDE, SODIUM LACTATE, POTASSIUM CHLORIDE, CALCIUM CHLORIDE 600; 310; 30; 20 MG/100ML; MG/100ML; MG/100ML; MG/100ML
INJECTION, SOLUTION INTRAVENOUS CONTINUOUS
Status: DISCONTINUED | OUTPATIENT
Start: 2023-11-08 | End: 2023-11-08 | Stop reason: HOSPADM

## 2023-11-08 RX ORDER — FENTANYL CITRATE 50 UG/ML
50 INJECTION, SOLUTION INTRAMUSCULAR; INTRAVENOUS EVERY 5 MIN PRN
Status: DISCONTINUED | OUTPATIENT
Start: 2023-11-08 | End: 2023-11-08 | Stop reason: HOSPADM

## 2023-11-08 RX ORDER — FENTANYL CITRATE 50 UG/ML
50 INJECTION, SOLUTION INTRAMUSCULAR; INTRAVENOUS
Status: DISCONTINUED | OUTPATIENT
Start: 2023-11-08 | End: 2023-11-08 | Stop reason: HOSPADM

## 2023-11-08 RX ORDER — PROPOFOL 10 MG/ML
INJECTION, EMULSION INTRAVENOUS PRN
Status: DISCONTINUED | OUTPATIENT
Start: 2023-11-08 | End: 2023-11-08 | Stop reason: SDUPTHER

## 2023-11-08 RX ORDER — FENTANYL CITRATE 50 UG/ML
INJECTION, SOLUTION INTRAMUSCULAR; INTRAVENOUS
Status: COMPLETED
Start: 2023-11-08 | End: 2023-11-08

## 2023-11-08 RX ORDER — ONDANSETRON 2 MG/ML
4 INJECTION INTRAMUSCULAR; INTRAVENOUS
Status: DISCONTINUED | OUTPATIENT
Start: 2023-11-08 | End: 2023-11-08 | Stop reason: HOSPADM

## 2023-11-08 RX ORDER — DIPHENHYDRAMINE HYDROCHLORIDE 50 MG/ML
12.5 INJECTION INTRAMUSCULAR; INTRAVENOUS
Status: DISCONTINUED | OUTPATIENT
Start: 2023-11-08 | End: 2023-11-08 | Stop reason: HOSPADM

## 2023-11-08 RX ORDER — SODIUM CHLORIDE 9 MG/ML
INJECTION, SOLUTION INTRAVENOUS PRN
Status: DISCONTINUED | OUTPATIENT
Start: 2023-11-08 | End: 2023-11-08 | Stop reason: HOSPADM

## 2023-11-08 RX ORDER — LIDOCAINE HYDROCHLORIDE 10 MG/ML
1 INJECTION, SOLUTION EPIDURAL; INFILTRATION; INTRACAUDAL; PERINEURAL
Status: DISCONTINUED | OUTPATIENT
Start: 2023-11-08 | End: 2023-11-08 | Stop reason: HOSPADM

## 2023-11-08 RX ORDER — LIDOCAINE HYDROCHLORIDE 10 MG/ML
INJECTION, SOLUTION EPIDURAL; INFILTRATION; INTRACAUDAL; PERINEURAL PRN
Status: DISCONTINUED | OUTPATIENT
Start: 2023-11-08 | End: 2023-11-08 | Stop reason: SDUPTHER

## 2023-11-08 RX ORDER — DIPHENHYDRAMINE HYDROCHLORIDE 50 MG/ML
50 INJECTION INTRAMUSCULAR; INTRAVENOUS ONCE
Status: COMPLETED | OUTPATIENT
Start: 2023-11-08 | End: 2023-11-08

## 2023-11-08 RX ORDER — SODIUM CHLORIDE 0.9 % (FLUSH) 0.9 %
5-40 SYRINGE (ML) INJECTION PRN
Status: DISCONTINUED | OUTPATIENT
Start: 2023-11-08 | End: 2023-11-08 | Stop reason: HOSPADM

## 2023-11-08 RX ORDER — SODIUM CHLORIDE 0.9 % (FLUSH) 0.9 %
5-40 SYRINGE (ML) INJECTION EVERY 12 HOURS SCHEDULED
Status: DISCONTINUED | OUTPATIENT
Start: 2023-11-08 | End: 2023-11-08 | Stop reason: HOSPADM

## 2023-11-08 RX ORDER — FENTANYL CITRATE 50 UG/ML
25 INJECTION, SOLUTION INTRAMUSCULAR; INTRAVENOUS EVERY 5 MIN PRN
Status: DISCONTINUED | OUTPATIENT
Start: 2023-11-08 | End: 2023-11-08 | Stop reason: HOSPADM

## 2023-11-08 RX ORDER — MECLIZINE HCL 12.5 MG/1
25 TABLET ORAL ONCE
Status: DISCONTINUED | OUTPATIENT
Start: 2023-11-08 | End: 2023-11-09 | Stop reason: HOSPADM

## 2023-11-08 RX ADMIN — ONDANSETRON 4 MG: 2 INJECTION INTRAMUSCULAR; INTRAVENOUS at 13:33

## 2023-11-08 RX ADMIN — OXYCODONE HYDROCHLORIDE 5 MG: 5 TABLET ORAL at 06:18

## 2023-11-08 RX ADMIN — METHOCARBAMOL TABLETS 750 MG: 750 TABLET, COATED ORAL at 21:00

## 2023-11-08 RX ADMIN — DIPHENHYDRAMINE HYDROCHLORIDE 50 MG: 50 INJECTION INTRAMUSCULAR; INTRAVENOUS at 11:09

## 2023-11-08 RX ADMIN — LEVOTHYROXINE SODIUM 50 MCG: 50 TABLET ORAL at 09:05

## 2023-11-08 RX ADMIN — OXYCODONE HYDROCHLORIDE 5 MG: 5 TABLET ORAL at 01:40

## 2023-11-08 RX ADMIN — ONDANSETRON 4 MG: 2 INJECTION INTRAMUSCULAR; INTRAVENOUS at 06:21

## 2023-11-08 RX ADMIN — FENTANYL CITRATE 50 MCG: 50 INJECTION, SOLUTION INTRAMUSCULAR; INTRAVENOUS at 18:30

## 2023-11-08 RX ADMIN — LIDOCAINE HYDROCHLORIDE 50 MG: 10 INJECTION, SOLUTION EPIDURAL; INFILTRATION; INTRACAUDAL; PERINEURAL at 18:01

## 2023-11-08 RX ADMIN — METHOCARBAMOL TABLETS 750 MG: 750 TABLET, COATED ORAL at 14:37

## 2023-11-08 RX ADMIN — ONDANSETRON 4 MG: 2 INJECTION INTRAMUSCULAR; INTRAVENOUS at 21:00

## 2023-11-08 RX ADMIN — OXYCODONE HYDROCHLORIDE AND ACETAMINOPHEN 500 MG: 500 TABLET ORAL at 09:05

## 2023-11-08 RX ADMIN — SODIUM CHLORIDE, PRESERVATIVE FREE 10 ML: 5 INJECTION INTRAVENOUS at 00:18

## 2023-11-08 RX ADMIN — PROPOFOL 50 MG: 10 INJECTION, EMULSION INTRAVENOUS at 18:01

## 2023-11-08 RX ADMIN — DOCUSATE SODIUM 100 MG: 100 CAPSULE, LIQUID FILLED ORAL at 21:00

## 2023-11-08 RX ADMIN — METHOCARBAMOL TABLETS 750 MG: 750 TABLET, COATED ORAL at 00:18

## 2023-11-08 RX ADMIN — OXYCODONE HYDROCHLORIDE 5 MG: 5 TABLET ORAL at 14:36

## 2023-11-08 RX ADMIN — SODIUM CHLORIDE, PRESERVATIVE FREE 10 ML: 5 INJECTION INTRAVENOUS at 21:05

## 2023-11-08 RX ADMIN — OXYCODONE HYDROCHLORIDE 5 MG: 5 TABLET ORAL at 21:00

## 2023-11-08 RX ADMIN — ONDANSETRON 4 MG: 2 INJECTION INTRAMUSCULAR; INTRAVENOUS at 00:18

## 2023-11-08 RX ADMIN — OXYCODONE HYDROCHLORIDE 5 MG: 5 TABLET ORAL at 10:24

## 2023-11-08 RX ADMIN — ATORVASTATIN CALCIUM 80 MG: 80 TABLET, FILM COATED ORAL at 09:05

## 2023-11-08 RX ADMIN — PROPOFOL 50 MG: 10 INJECTION, EMULSION INTRAVENOUS at 18:03

## 2023-11-08 RX ADMIN — SODIUM CHLORIDE, PRESERVATIVE FREE 40 MG: 5 INJECTION INTRAVENOUS at 09:05

## 2023-11-08 RX ADMIN — ENOXAPARIN SODIUM 40 MG: 100 INJECTION SUBCUTANEOUS at 09:05

## 2023-11-08 RX ADMIN — SODIUM CHLORIDE, POTASSIUM CHLORIDE, SODIUM LACTATE AND CALCIUM CHLORIDE: 600; 310; 30; 20 INJECTION, SOLUTION INTRAVENOUS at 14:38

## 2023-11-08 RX ADMIN — SODIUM CHLORIDE: 9 INJECTION, SOLUTION INTRAVENOUS at 06:00

## 2023-11-08 RX ADMIN — DOCUSATE SODIUM 100 MG: 100 CAPSULE, LIQUID FILLED ORAL at 00:19

## 2023-11-08 RX ADMIN — DOCUSATE SODIUM 100 MG: 100 CAPSULE, LIQUID FILLED ORAL at 09:05

## 2023-11-08 RX ADMIN — METHOCARBAMOL TABLETS 750 MG: 750 TABLET, COATED ORAL at 09:05

## 2023-11-08 ASSESSMENT — PAIN - FUNCTIONAL ASSESSMENT
PAIN_FUNCTIONAL_ASSESSMENT: ACTIVITIES ARE NOT PREVENTED
PAIN_FUNCTIONAL_ASSESSMENT: 0-10

## 2023-11-08 ASSESSMENT — PAIN DESCRIPTION - ORIENTATION
ORIENTATION: LOWER
ORIENTATION: MID
ORIENTATION: LOWER
ORIENTATION: MID

## 2023-11-08 ASSESSMENT — PAIN DESCRIPTION - DESCRIPTORS
DESCRIPTORS: DISCOMFORT
DESCRIPTORS: DISCOMFORT
DESCRIPTORS: ACHING
DESCRIPTORS: ACHING
DESCRIPTORS: SHARP;STABBING
DESCRIPTORS: DISCOMFORT
DESCRIPTORS: DISCOMFORT
DESCRIPTORS: ACHING;STABBING;SHARP

## 2023-11-08 ASSESSMENT — PAIN SCALES - GENERAL
PAINLEVEL_OUTOF10: 8
PAINLEVEL_OUTOF10: 7
PAINLEVEL_OUTOF10: 7
PAINLEVEL_OUTOF10: 8
PAINLEVEL_OUTOF10: 8
PAINLEVEL_OUTOF10: 7
PAINLEVEL_OUTOF10: 7

## 2023-11-08 ASSESSMENT — PAIN DESCRIPTION - LOCATION
LOCATION: BACK;ABDOMEN
LOCATION: BACK
LOCATION: BACK
LOCATION: BACK;ABDOMEN
LOCATION: BACK;ABDOMEN
LOCATION: HEAD;BACK;ABDOMEN
LOCATION: BACK;ABDOMEN
LOCATION: ABDOMEN;BACK;HEAD

## 2023-11-08 NOTE — ANESTHESIA PRE PROCEDURE
11/07/2023 11:35 AM    CO2 20 11/07/2023 11:35 AM    BUN 15 11/07/2023 11:35 AM    CREATININE 0.6 11/07/2023 11:35 AM    GFRAA >60 09/06/2022 11:30 PM    LABGLOM >60 11/07/2023 11:35 AM    GLUCOSE 82 11/07/2023 11:35 AM    PROT 8.1 11/06/2023 04:49 PM    CALCIUM 8.9 11/07/2023 11:35 AM    BILITOT 0.6 11/06/2023 04:49 PM    ALKPHOS 107 11/06/2023 04:49 PM    AST 14 11/06/2023 04:49 PM    ALT 10 11/06/2023 04:49 PM       POC Tests:   Recent Labs     11/08/23  1122   POCGLU 104       Coags:   Lab Results   Component Value Date/Time    PROTIME 12.7 10/16/2023 09:43 AM    INR 1.0 10/16/2023 09:43 AM    APTT 29.0 10/16/2023 09:43 AM       HCG (If Applicable):   Lab Results   Component Value Date    PREGTESTUR NEGATIVE 10/29/2022    HCG NEGATIVE 12/12/2022        ABGs: No results found for: \"PHART\", \"PO2ART\", \"WQC3VEJ\", \"FQR0FFT\", \"BEART\", \"W5XNHEZI\"     Type & Screen (If Applicable):  No results found for: \"LABABO\", \"LABRH\"    Drug/Infectious Status (If Applicable):  No results found for: \"HIV\", \"HEPCAB\"    COVID-19 Screening (If Applicable):   Lab Results   Component Value Date/Time    COVID19 Not Detected 04/21/2023 04:07 PM           Anesthesia Evaluation  Patient summary reviewed no history of anesthetic complications:   Airway: Mallampati: II          Dental:          Pulmonary:   (+) decreased breath sounds                            Cardiovascular:Negative CV ROS  Exercise tolerance: good (>4 METS),   (+) hypertension:, angina:, past MI:, CAD:,         Rhythm: regular  Rate: normal                    Neuro/Psych:   (+) neuromuscular disease:, headaches:, psychiatric history:            GI/Hepatic/Renal:   (+) liver disease:,           Endo/Other:    (+) DiabetesType II DM, , hypothyroidism::., .                 Abdominal:             Vascular:   + DVT, . Other Findings:           Anesthesia Plan      MAC     ASA 3       Induction: intravenous. Anesthetic plan and risks discussed with patient.       Plan

## 2023-11-08 NOTE — PROGRESS NOTES
The patient reports a rash this morning that is consistent with her previous drug reaction from Keflex and Bactrim. Benadryl has been ordered. The patient was recently started on Erythromycin IV by GI to help with gut motility without much improvement. I suspect this may be the cause of her rash and we will discontinue it at this time.      Diaz Calles DO, FACEP  Observation Unit Attending

## 2023-11-08 NOTE — PROGRESS NOTES
I attest that I was present and witnessed/verified all assessments and medication administration provided by SN Sylvia Portillo and that all documentation was completed accurately and correctly.

## 2023-11-08 NOTE — PROGRESS NOTES
OBS/CDU   RESIDENT NOTE      Patients PCP is:  SAVANA Benson CNP        SUBJECTIVE      Continues to experience nausea overnight. Has been able to tolerate some clear fluids overnight without vomiting but continues to experience significant nausea. Asking for nicotine patch as going out to smoke has caused increased nausea. IV zofran has helped however SL zofran does not. The patient is urinating on her own and is passing flatus. Denies fever, chills, chest pain, shortness of breath, focal weakness, numbness, tingling, urinary/bowel symptoms, vision changes, visual hallucinations, or headache. PHYSICAL EXAM      General: NAD, AO X 3  Heent: EMOI,   Neck: SUPPLE, NO JVD  Cardiovascular: RRR, S1S2  Pulmonary: CTAB, NO SOB  Abdomen: SOFT, +BS, mild tenderness to palpation throughout. Extremities: +2/4 PULSES DISTAL, NO SWELLING  Neuro / Psych: NO NUMBNESS OR TINGLING, MENTATION AT BASELINE    PERTINENT TEST /EXAMS      I have reviewed all available laboratory results.     MEDICATIONS CURRENT   0.9 % sodium chloride infusion, Continuous  pantoprazole (PROTONIX) 40 mg in sodium chloride (PF) 0.9 % 10 mL injection, Daily  sodium chloride flush 0.9 % injection 5-40 mL, 2 times per day  sodium chloride flush 0.9 % injection 5-40 mL, PRN  0.9 % sodium chloride infusion, PRN  enoxaparin (LOVENOX) injection 40 mg, Daily  acetaminophen (TYLENOL) tablet 650 mg, Q4H PRN  ondansetron (ZOFRAN-ODT) disintegrating tablet 4 mg, Q8H PRN   Or  ondansetron (ZOFRAN) injection 4 mg, Q6H PRN  ascorbic acid (VITAMIN C) tablet 500 mg, Daily  atorvastatin (LIPITOR) tablet 80 mg, Daily  docusate sodium (COLACE) capsule 100 mg, BID  insulin lispro (HUMALOG) injection vial 0-8 Units, TID WC  insulin lispro (HUMALOG) injection vial 0-4 Units, Nightly  glucose chewable tablet 16 g, PRN  dextrose bolus 10% 125 mL, PRN   Or  dextrose bolus 10% 250 mL, PRN  glucagon (rDNA) injection 1 mg, PRN  dextrose 10 % infusion, Continuous PRN  levothyroxine (SYNTHROID) tablet 50 mcg, Daily  methocarbamol (ROBAXIN) tablet 750 mg, TID  oxyCODONE (ROXICODONE) immediate release tablet 5 mg, Q4H PRN        All medication charted and reviewed. CONSULTS      IP CONSULT TO NEUROSURGERY  IP CONSULT TO CARDIOLOGY  IP CONSULT TO GI    ASSESSMENT/PLAN       Lauro Echavarria is a 50 y.o. female who presents with N/V, chest, back, abdominal pain. Chest pain   Assessed by cardiology -noncardiac chest pain in origin  Follow-up with cardiologist at [facility] cardiology  No further work-up required at this time cardiology has signed off  Abdominal pain   GI consulted   Recommend referral to outpatient endocrinologist  Full liquid diet with supplements   Trial capsacin cream to abdomen for nausea and vomiting   Trial of erythoromycin 50mg IV x1 - done yesterday  Follow up with Dr. Barbara Kulkarni 2 weeks post discharge for ongoing care and consideration for outpatient endoscopy   Avoid or greatly reduce use of narcotics, antiemetics, CCB and other agents causing constipation and decreased gut motility  Continuing to follow   Back pain   Neurosurgery consulted - no neurosurgical interventions planned for now  CT lumbar spine - completed yesterday   Follow up in the clinic scheduled for 11/13  Neurosurgery has signed off   Continue home medications and pain control  Monitor vitals, labs, and imaging  DISPO: pending consults and clinical improvement    --  kSyler Huizar MD   Emergency Medicine Resident Physician     This dictation was generated by voice recognition computer software. Although all attempts are made to edit the dictation for accuracy, there may be errors in the transcription that are not intended.

## 2023-11-08 NOTE — CONSULTS
Nutrition Education    Educated on gastroparesis diet. Learners: Patient  Readiness: Eager  Method: Explanation and Handout  Response: Verbalizes Understanding  Contact name and number provided.     Jennifer Delgado RD  Contact Number: [phone]

## 2023-11-08 NOTE — PROGRESS NOTES
[address]  CDU / OBSERVATION ENCOUNTER  ATTENDING NOTE       I performed a history and physical examination of the patient and discussed management with the resident or midlevel provider. I reviewed the resident or midlevel provider's note and agree with the documented findings and plan of care. Any areas of disagreement are noted on the chart. I was personally present for the key portions of any procedures. I have documented in the chart those procedures where I was not present during the key portions. I have reviewed the nurses notes. I agree with the chief complaint, past medical history, past surgical history, allergies, medications, social and family history as documented unless otherwise noted below. The Family history, social history, and ROS are effectively unchanged since admission unless noted elsewhere in the chart. This patient was placed in the observation unit for reevaluation for possible admission to the hospital    The patient is a 37-year-old female who presents for nausea, vomiting, left upper quadrant abdominal pain and left-sided chest pain. Cardiac work-up has been unremarkable including CT chest abdomen pelvis. Cardiology has evaluated the patient and signed off. Neurosurgery is following secondary to recent laminectomy but CT lumbar spine shows no obvious adverse features from her recent surgery. The patient has been started on a PPI and Reglan. GI has been consulted. This morning the patient reports continued nausea. She did tolerate a little bit of her breakfast this morning. She reports vertigo symptoms, especially with standing. No neurologic deficits. Plan to check orthostatics. Antivert for vertigo symptoms.      Lluvia Robbins MD  Attending Emergency  Physician

## 2023-11-08 NOTE — OP NOTE
Operative Note      Patient: Dahiana Hinojosa  YOB: 1970  MRN: [id number]    Date of Procedure: 11/8/2023    Pre-Op Diagnosis Codes:     * Nausea and vomiting, unspecified vomiting type [R11.2]    Post-Op Diagnosis: Same  Trulicity induced nausea and vomiting          Procedure(s):  EGD ESOPHAGOGASTRODUODENOSCOPY    Surgeon(s):  Jaylin Luna MD    Assistant:   * No surgical staff found *    Anesthesia: Monitor Anesthesia Care    Estimated Blood Loss (mL): None    Complications: None    Specimens:   * No specimens in log *    Implants:  * No implants in log *      Drains: * No LDAs found *    Findings:   Mild esophagitis at the GE junction. Otherwise normal esophagus. Small amount of bile in the stomach. Otherwise normal stomach. Normal examined duodenum. Patient's nausea vomiting most likely induced due to Trulicity. Recommendations:  Continue pantoprazole 40 mg daily. Start gastroparesis diet  Continue supportive care  Symptoms are under control, patient is able to tolerate diet she can be discharged home and followed up in the GI clinic. Will need GI follow up after discharge for gastroparesis work up. GI will sign off. Please call for any questions or concerns. Detailed Description of Procedure:   Informed consent was obtained from the patient after explanation of the procedure including indications, description of the procedure,  benefits and possible risks and complications of the procedure, and alternatives. Questions were answered. The patient's history was reviewed and a directed physical examination was performed prior to the procedure. Patient was monitored throughout the procedure with pulse oximetry and periodic assessment of vital signs. Patient was sedated as noted above. With the patient in the left lateral decubitus position, the Olympus videoendoscope was placed in the patient's mouth and under direct visualization passed into the esophagus.   Visualization of the

## 2023-11-09 VITALS
RESPIRATION RATE: 16 BRPM | DIASTOLIC BLOOD PRESSURE: 74 MMHG | SYSTOLIC BLOOD PRESSURE: 108 MMHG | HEART RATE: 90 BPM | TEMPERATURE: 98.3 F | BODY MASS INDEX: 34.38 KG/M2 | OXYGEN SATURATION: 95 % | WEIGHT: 194 LBS | HEIGHT: 63 IN

## 2023-11-09 LAB
GLUCOSE BLD-MCNC: 118 MG/DL (ref 65–105)
GLUCOSE BLD-MCNC: 118 MG/DL (ref 65–105)
GLUCOSE BLD-MCNC: 86 MG/DL (ref 65–105)

## 2023-11-09 PROCEDURE — C9113 INJ PANTOPRAZOLE SODIUM, VIA: HCPCS | Performed by: INTERNAL MEDICINE

## 2023-11-09 PROCEDURE — 6360000002 HC RX W HCPCS: Performed by: INTERNAL MEDICINE

## 2023-11-09 PROCEDURE — 6370000000 HC RX 637 (ALT 250 FOR IP): Performed by: INTERNAL MEDICINE

## 2023-11-09 PROCEDURE — 82947 ASSAY GLUCOSE BLOOD QUANT: CPT

## 2023-11-09 PROCEDURE — 2580000003 HC RX 258: Performed by: INTERNAL MEDICINE

## 2023-11-09 RX ORDER — PANTOPRAZOLE SODIUM 20 MG/1
40 TABLET, DELAYED RELEASE ORAL DAILY
Qty: 30 TABLET | Refills: 0 | Status: SHIPPED | OUTPATIENT
Start: 2023-11-09

## 2023-11-09 RX ORDER — ONDANSETRON 4 MG/1
4 TABLET, ORALLY DISINTEGRATING ORAL EVERY 8 HOURS PRN
Qty: 12 TABLET | Refills: 0 | Status: SHIPPED | OUTPATIENT
Start: 2023-11-09 | End: 2023-11-13

## 2023-11-09 RX ORDER — OXYCODONE HYDROCHLORIDE AND ACETAMINOPHEN 5; 325 MG/1; MG/1
1 TABLET ORAL EVERY 8 HOURS PRN
Qty: 9 TABLET | Refills: 0 | Status: SHIPPED | OUTPATIENT
Start: 2023-11-09 | End: 2023-11-12

## 2023-11-09 RX ADMIN — METHOCARBAMOL TABLETS 750 MG: 750 TABLET, COATED ORAL at 08:31

## 2023-11-09 RX ADMIN — ONDANSETRON 4 MG: 2 INJECTION INTRAMUSCULAR; INTRAVENOUS at 08:32

## 2023-11-09 RX ADMIN — ENOXAPARIN SODIUM 40 MG: 100 INJECTION SUBCUTANEOUS at 08:32

## 2023-11-09 RX ADMIN — OXYCODONE HYDROCHLORIDE 5 MG: 5 TABLET ORAL at 03:29

## 2023-11-09 RX ADMIN — DOCUSATE SODIUM 100 MG: 100 CAPSULE, LIQUID FILLED ORAL at 08:31

## 2023-11-09 RX ADMIN — LEVOTHYROXINE SODIUM 50 MCG: 50 TABLET ORAL at 08:31

## 2023-11-09 RX ADMIN — SODIUM CHLORIDE, PRESERVATIVE FREE 10 ML: 5 INJECTION INTRAVENOUS at 08:32

## 2023-11-09 RX ADMIN — ATORVASTATIN CALCIUM 80 MG: 80 TABLET, FILM COATED ORAL at 08:31

## 2023-11-09 RX ADMIN — SODIUM CHLORIDE, PRESERVATIVE FREE 40 MG: 5 INJECTION INTRAVENOUS at 08:33

## 2023-11-09 RX ADMIN — OXYCODONE HYDROCHLORIDE 5 MG: 5 TABLET ORAL at 13:17

## 2023-11-09 RX ADMIN — ONDANSETRON 4 MG: 2 INJECTION INTRAMUSCULAR; INTRAVENOUS at 03:30

## 2023-11-09 RX ADMIN — OXYCODONE HYDROCHLORIDE AND ACETAMINOPHEN 500 MG: 500 TABLET ORAL at 08:32

## 2023-11-09 RX ADMIN — OXYCODONE HYDROCHLORIDE 5 MG: 5 TABLET ORAL at 08:32

## 2023-11-09 ASSESSMENT — PAIN DESCRIPTION - ORIENTATION
ORIENTATION: LOWER
ORIENTATION: MID
ORIENTATION: LOWER

## 2023-11-09 ASSESSMENT — PAIN SCALES - GENERAL
PAINLEVEL_OUTOF10: 8
PAINLEVEL_OUTOF10: 8
PAINLEVEL_OUTOF10: 7

## 2023-11-09 ASSESSMENT — PAIN DESCRIPTION - LOCATION
LOCATION: BACK;ABDOMEN
LOCATION: BACK
LOCATION: BACK;ABDOMEN

## 2023-11-09 ASSESSMENT — PAIN DESCRIPTION - DESCRIPTORS
DESCRIPTORS: DISCOMFORT

## 2023-11-09 NOTE — DISCHARGE SUMMARY
CDU Discharge Summary        Patient:  Josiah Obrine  YOB: 1970    MRN: [id number]   Acct: [de-identified]    Primary Care Physician: SAVANA Ardon CNP    Admit date:  11/6/2023  3:58 PM  Discharge date: 11/9/2023    Discharge Diagnoses:     Acute incisional pain, N/V due to postop pain, gastroparesis respectively  Improved with rest, medication, neurosurgical and gastroenterology evaluations    Follow-up:  Call today/tomorrow for a follow up appointment with SAVANA Ardon CNP , or return to the Emergency Room with worsening symptoms    Stressed to patient the importance of following up with primary care doctor for further workup/management of symptoms. Pt verbalizes understanding and agrees with plan. Discharge Medications:  Changes to medications            Medication List        CONTINUE taking these medications      blood glucose monitor kit and supplies  Dispense sufficient amount for BID testing frequency plus additional to accommodate PRN testing needs. Dispense all needed supplies to include: monitor, strips, lancing device, lancets, control solutions, alcohol swabs.      Insulin Pen Needle 31G X 5 MM Misc  Use pen needle for giving daily insulin injection     TRUEplus Lancets 30G Misc  use 1 LANCET to TEST BLOOD SUGAR twice a day and if needed            ASK your doctor about these medications      albuterol (2.5 MG/3ML) 0.083% nebulizer solution  Commonly known as: PROVENTIL     ascorbic acid 500 MG tablet  Commonly known as: VITAMIN C     atorvastatin 80 MG tablet  Commonly known as: LIPITOR  Take 1 tablet by mouth daily     docusate sodium 100 MG capsule  Commonly known as: COLACE  Take 1 capsule by mouth 2 times daily     empagliflozin 10 MG tablet  Commonly known as: Jardiance  Take 1 tablet by mouth daily     fluticasone 50 MCG/ACT nasal spray  Commonly known as: FLONASE     levothyroxine 50 MCG tablet  Commonly known as: SYNTHROID  take 1 tablet by mouth once daily

## 2023-11-09 NOTE — PROGRESS NOTES
CLINICAL PHARMACY NOTE: MEDS TO BEDS    Total # of Prescriptions Filled: 2   The following medications were delivered to the patient:  Percocet 5-325mg  Ondansetron ODT 4mg    Additional Documentation: delivered to patient in room 238 11/9 at 2pm. No co-pay.

## 2023-11-09 NOTE — PROGRESS NOTES
Physician Progress Note      Efren Parra  CSN #:                  [id number]  :                       1970  ADMIT DATE:       2023 3:58 PM  [address] [facility] DATE:  Jose L Moment  PROVIDER #:        Diaz Calles DO          QUERY TEXT:    Pt admitted with nausea and vomiting. Noted documentation of ongoing symptoms   of nausea and vomiting that are most likely related to underlying   gastroparesis on  by GI consultant. If possible, please document in   progress notes and discharge summary:    The medical record reflects the following:  Risk Factors: DM Type 2  Clinical Indicators: Per GI67 : 62-year-old female presenting with worsening   nausea and vomiting in the setting of DM type II uncontrolled.  concerning for uncontrolled diabetes that could predispose patient to   gastroparesis  Treatment: IV Reglan,  IV Zofran, Full liquid diet with supplements, Avoid or   greatly reduce the use of narcotics, antiemetics, CCB, and other agents   causing constipation and decreased gut motility,  If vomiting persist, can   consider erythromycin to 50 mg IV x1 to increase gut motility. Thank you, Please call if questions  Miil Perez RN CDS  [phone]  Options provided:  -- gastroparesis confirmed present on admission  -- gastroparesis ruled out  -- Defer to GI consultant documentation regarding gastroparesis  -- Other - I will add my own diagnosis  -- Disagree - Not applicable / Not valid  -- Disagree - Clinically unable to determine / Unknown  -- Refer to Clinical Documentation Reviewer    PROVIDER RESPONSE TEXT:    I defer to GI consultant regarding documentation of gastroparesis.     Query created by: Declan Rodas on 2023 2:13 PM      Electronically signed by:  Diaz Calles DO 2023 2:08 AM

## 2023-11-09 NOTE — PROGRESS NOTES
[address]  CDU / OBSERVATION ENCOUNTER  ATTENDING NOTE       I performed a history and physical examination of the patient and discussed management with the resident or midlevel provider. I reviewed the resident or midlevel provider's note and agree with the documented findings and plan of care. Any areas of disagreement are noted on the chart. I was personally present for the key portions of any procedures. I have documented in the chart those procedures where I was not present during the key portions. I have reviewed the nurses notes. I agree with the chief complaint, past medical history, past surgical history, allergies, medications, social and family history as documented unless otherwise noted below. The Family history, social history, and ROS are effectively unchanged since admission unless noted elsewhere in the chart. This patient was placed in the observation unit for reevaluation for possible admission to the hospital    Patient who had recent laminectomy by neurosurgery admitted to the ETU for left upper quadrant abdominal pain and nausea and vomiting as well as left-sided chest pain. Neurosurgery and GI have since both evaluated patient and have signed off. Patient did have an EGD yesterday which showed mild esophagitis. GI did recommend continuing pantoprazole and starting a gastroparesis diet. On my exam this morning, patient states that she is feeling better today. Patient was able to tolerate lobster biscuit for dinner last night as well as breakfast this morning. She has not had any episodes of vomiting. We will plan to discharge patient today after lunch.     Munir Nicholson MD  Attending Emergency  Physician

## 2023-11-09 NOTE — PROGRESS NOTES
OBS/CDU   RESIDENT NOTE      Patients PCP is:  SAVANA Hare CNP        SUBJECTIVE      No acute events overnight. Has been able to tolerate a full diet dinner and breakfast this morning with minimal nausea and no vomiting. The patient is urinating on his own and is passing flatus. Denies fever, chills, chest pain, shortness of breath, abdominal pain, focal weakness, numbness, tingling, urinary/bowel symptoms, vision changes, visual hallucinations, or headache. PHYSICAL EXAM      General: NAD, AO X 3  Heent: EMOI, PERRL  Neck: SUPPLE, NO JVD  Cardiovascular: RRR, S1S2  Pulmonary: CTAB, NO SOB  Abdomen: SOFT, NTTP, ND, +BS  Extremities: +2/4 PULSES DISTAL, NO SWELLING  Neuro / Psych: NO NUMBNESS OR TINGLING, MENTATION AT BASELINE    PERTINENT TEST /EXAMS      I have reviewed all available laboratory results.     MEDICATIONS CURRENT   meclizine (ANTIVERT) tablet 25 mg, Once  0.9 % sodium chloride infusion, Continuous  pantoprazole (PROTONIX) 40 mg in sodium chloride (PF) 0.9 % 10 mL injection, Daily  sodium chloride flush 0.9 % injection 5-40 mL, 2 times per day  sodium chloride flush 0.9 % injection 5-40 mL, PRN  0.9 % sodium chloride infusion, PRN  enoxaparin (LOVENOX) injection 40 mg, Daily  acetaminophen (TYLENOL) tablet 650 mg, Q4H PRN  ondansetron (ZOFRAN-ODT) disintegrating tablet 4 mg, Q8H PRN   Or  ondansetron (ZOFRAN) injection 4 mg, Q6H PRN  ascorbic acid (VITAMIN C) tablet 500 mg, Daily  atorvastatin (LIPITOR) tablet 80 mg, Daily  docusate sodium (COLACE) capsule 100 mg, BID  insulin lispro (HUMALOG) injection vial 0-8 Units, TID WC  insulin lispro (HUMALOG) injection vial 0-4 Units, Nightly  glucose chewable tablet 16 g, PRN  dextrose bolus 10% 125 mL, PRN   Or  dextrose bolus 10% 250 mL, PRN  glucagon (rDNA) injection 1 mg, PRN  dextrose 10 % infusion, Continuous PRN  levothyroxine (SYNTHROID) tablet 50 mcg, Daily  methocarbamol (ROBAXIN) tablet 750 mg, TID  oxyCODONE (ROXICODONE) immediate

## 2023-11-10 NOTE — ANESTHESIA POSTPROCEDURE EVALUATION
Department of Anesthesiology  Postprocedure Note    Patient: Radha Hauser  MRN: [id number]  YOB: 1970  Date of evaluation: 11/10/2023      Procedure Summary     Date: 11/08/23 Room / Location: [address]    Anesthesia Start: [address] Anesthesia Stop: 7738    Procedure: EGD ESOPHAGOGASTRODUODENOSCOPY Diagnosis:       Nausea and vomiting, unspecified vomiting type      (Nausea and vomiting, unspecified vomiting type [R11.2])    Surgeons: Ana Paula Obrien MD Responsible Provider: Scot Pinzon MD    Anesthesia Type: MAC ASA Status: 3          Anesthesia Type: No value filed.     Joshua Phase I:      Joshua Phase II: Joshua Score: 9      Anesthesia Post Evaluation    Patient location during evaluation: PACU  Patient participation: complete - patient participated  Level of consciousness: awake and alert  Airway patency: patent  Nausea & Vomiting: no nausea and no vomiting  Complications: no  Cardiovascular status: blood pressure returned to baseline  Respiratory status: acceptable  Hydration status: stable  Pain management: adequate

## 2023-11-11 PROBLEM — R11.0 NAUSEA: Status: ACTIVE | Noted: 2023-11-11

## 2023-11-13 ENCOUNTER — OFFICE VISIT (OUTPATIENT)
Dept: NEUROSURGERY | Age: 53
End: 2023-11-13

## 2023-11-13 VITALS
SYSTOLIC BLOOD PRESSURE: 146 MMHG | HEART RATE: 94 BPM | BODY MASS INDEX: 34.38 KG/M2 | HEIGHT: 63 IN | OXYGEN SATURATION: 98 % | WEIGHT: 194 LBS | DIASTOLIC BLOOD PRESSURE: 90 MMHG

## 2023-11-13 DIAGNOSIS — Z98.890 S/P LUMBAR LAMINECTOMY: ICD-10-CM

## 2023-11-13 DIAGNOSIS — M54.16 LUMBAR RADICULOPATHY, RIGHT: Primary | ICD-10-CM

## 2023-11-13 PROCEDURE — 99024 POSTOP FOLLOW-UP VISIT: CPT | Performed by: NURSE PRACTITIONER

## 2023-11-13 RX ORDER — TIZANIDINE 4 MG/1
4 TABLET ORAL 3 TIMES DAILY PRN
Qty: 21 TABLET | Refills: 0 | Status: SHIPPED | OUTPATIENT
Start: 2023-11-13 | End: 2023-11-20

## 2023-11-13 RX ORDER — OXYCODONE HYDROCHLORIDE AND ACETAMINOPHEN 5; 325 MG/1; MG/1
1 TABLET ORAL EVERY 4 HOURS PRN
Qty: 35 TABLET | Refills: 0 | Status: SHIPPED | OUTPATIENT
Start: 2023-11-13 | End: 2023-11-20

## 2023-11-13 NOTE — PROGRESS NOTES
[address] [place] [address] STara Aguirre  [facility] [address] [place] [address], [address] [place] [address]  Dept: [phone]    Patient:  Kathalene Schaumann  YOB: 1970  Date: 11/13/23    The patient is a 50 y.o. female who presents today for consult of the following problems:     Chief Complaint   Patient presents with    Post-Op Check         HPI:     Kathalene Schaumann is a 50 y.o. female who presents to the office for post-op evaluation s/p posterior hemilaminectomy right L4-L5, medial facetectomy and foraminotomy L4. Significant improvement to leg symptoms. Back pain improved, some danielle-incisional discomfort. Has been able to return to a cane for ambulation and no longer requiring walker. Very pleased with surgical outcome thus far. Post-op medications effectively managing symptoms. Was admitted to the hospital last week for flare up of her gastroparesis. Following with GI. Goes to [facility] in [place], plans to call to resume. Sensation intact  Strength RLE 5/5; LLE 4+/5; BUE 5/5  Incision with superficial dehiscence to cephalad aspect, no surrounding erythema, drainage, or fluctuance present    Date of surgery: 10/30/2023    Assessment and Plan:      1. Lumbar radiculopathy, right    2. S/P lumbar laminectomy          Plan: Doing well post-operatively. Significant improvement to lower extremity symptoms. Very pleased. Resume PT as planned. Return in 2 weeks for incision recheck. Next post-op in 6 weeks with Dr Max Hankins as scheduled. Followup: Return in about 2 weeks (around 11/27/2023), or if symptoms worsen or fail to improve. Prescriptions Ordered:  Orders Placed This Encounter   Medications    oxyCODONE-acetaminophen (PERCOCET) 5-325 MG per tablet     Sig: Take 1 tablet by mouth every 4 hours as needed for Pain for up to 7 days.  Max Daily Amount: 6 tablets     Dispense:  35 tablet     Refill:  0     Reduce doses taken as pain

## 2023-11-20 ENCOUNTER — TELEPHONE (OUTPATIENT)
Age: 53
End: 2023-11-20

## 2023-11-20 NOTE — TELEPHONE ENCOUNTER
Patient is requesting  a new patient ov sooner than what is available and can be reached at [phone]. Okay to leave a message.

## 2023-11-22 ENCOUNTER — OFFICE VISIT (OUTPATIENT)
Dept: NEUROSURGERY | Age: 53
End: 2023-11-22

## 2023-11-22 VITALS
BODY MASS INDEX: 34.38 KG/M2 | HEART RATE: 93 BPM | WEIGHT: 194 LBS | TEMPERATURE: 97.8 F | DIASTOLIC BLOOD PRESSURE: 88 MMHG | SYSTOLIC BLOOD PRESSURE: 126 MMHG | HEIGHT: 63 IN | OXYGEN SATURATION: 98 %

## 2023-11-22 DIAGNOSIS — M54.16 LUMBAR RADICULOPATHY, RIGHT: ICD-10-CM

## 2023-11-22 DIAGNOSIS — Z98.890 S/P LUMBAR LAMINECTOMY: ICD-10-CM

## 2023-11-22 PROCEDURE — 99024 POSTOP FOLLOW-UP VISIT: CPT | Performed by: NURSE PRACTITIONER

## 2023-11-22 RX ORDER — TIZANIDINE 4 MG/1
4 TABLET ORAL 3 TIMES DAILY PRN
Qty: 21 TABLET | Refills: 0 | Status: SHIPPED | OUTPATIENT
Start: 2023-11-22 | End: 2023-11-29

## 2023-11-22 RX ORDER — HYDROCODONE BITARTRATE AND ACETAMINOPHEN 5; 325 MG/1; MG/1
1 TABLET ORAL EVERY 4 HOURS PRN
Qty: 32 TABLET | Refills: 0 | Status: SHIPPED | OUTPATIENT
Start: 2023-11-22 | End: 2023-11-29

## 2023-11-22 NOTE — PROGRESS NOTES
Reduce doses taken as pain becomes manageable        Orders Placed:  No orders of the defined types were placed in this encounter. Electronically signed by SAVANA Mayer CNP on 11/22/2023 at 9:19 AM    Please note that this chart was generated using voice recognition Dragon dictation software. Although every effort was made to ensure the accuracy of this automated transcription, some errors in transcription may have occurred.

## 2023-11-24 ENCOUNTER — OFFICE VISIT (OUTPATIENT)
Dept: FAMILY MEDICINE CLINIC | Age: 53
End: 2023-11-24
Payer: COMMERCIAL

## 2023-11-24 VITALS
SYSTOLIC BLOOD PRESSURE: 98 MMHG | HEART RATE: 82 BPM | WEIGHT: 197.6 LBS | TEMPERATURE: 97.9 F | BODY MASS INDEX: 35 KG/M2 | OXYGEN SATURATION: 98 % | DIASTOLIC BLOOD PRESSURE: 72 MMHG

## 2023-11-24 DIAGNOSIS — Z79.4 TYPE 2 DIABETES MELLITUS WITHOUT COMPLICATION, WITH LONG-TERM CURRENT USE OF INSULIN (HCC): ICD-10-CM

## 2023-11-24 DIAGNOSIS — Z98.890 H/O OF HEMILAMINECTOMY: ICD-10-CM

## 2023-11-24 DIAGNOSIS — E11.9 TYPE 2 DIABETES MELLITUS WITHOUT COMPLICATION, WITH LONG-TERM CURRENT USE OF INSULIN (HCC): ICD-10-CM

## 2023-11-24 DIAGNOSIS — Z09 HOSPITAL DISCHARGE FOLLOW-UP: Primary | ICD-10-CM

## 2023-11-24 DIAGNOSIS — R11.0 NAUSEA: ICD-10-CM

## 2023-11-24 DIAGNOSIS — Z12.31 ENCOUNTER FOR SCREENING MAMMOGRAM FOR MALIGNANT NEOPLASM OF BREAST: Primary | ICD-10-CM

## 2023-11-24 DIAGNOSIS — G89.18 POST-OP PAIN: ICD-10-CM

## 2023-11-24 DIAGNOSIS — K31.84 GASTROPARESIS: ICD-10-CM

## 2023-11-24 PROCEDURE — G8417 CALC BMI ABV UP PARAM F/U: HCPCS

## 2023-11-24 PROCEDURE — 3078F DIAST BP <80 MM HG: CPT

## 2023-11-24 PROCEDURE — 3074F SYST BP LT 130 MM HG: CPT

## 2023-11-24 PROCEDURE — G8482 FLU IMMUNIZE ORDER/ADMIN: HCPCS

## 2023-11-24 PROCEDURE — 4004F PT TOBACCO SCREEN RCVD TLK: CPT

## 2023-11-24 PROCEDURE — 1111F DSCHRG MED/CURRENT MED MERGE: CPT

## 2023-11-24 PROCEDURE — G8427 DOCREV CUR MEDS BY ELIG CLIN: HCPCS

## 2023-11-24 PROCEDURE — 2022F DILAT RTA XM EVC RTNOPTHY: CPT

## 2023-11-24 PROCEDURE — 3017F COLORECTAL CA SCREEN DOC REV: CPT

## 2023-11-24 PROCEDURE — 99214 OFFICE O/P EST MOD 30 MIN: CPT

## 2023-11-24 PROCEDURE — 3051F HG A1C>EQUAL 7.0%<8.0%: CPT

## 2023-11-24 RX ORDER — PANTOPRAZOLE SODIUM 20 MG/1
40 TABLET, DELAYED RELEASE ORAL DAILY
Qty: 30 TABLET | Refills: 0 | Status: SHIPPED | OUTPATIENT
Start: 2023-11-24

## 2023-11-24 RX ORDER — ONDANSETRON 4 MG/1
4 TABLET, ORALLY DISINTEGRATING ORAL 3 TIMES DAILY PRN
Qty: 21 TABLET | Refills: 0 | Status: SHIPPED | OUTPATIENT
Start: 2023-11-24

## 2023-11-24 SDOH — ECONOMIC STABILITY: INCOME INSECURITY: HOW HARD IS IT FOR YOU TO PAY FOR THE VERY BASICS LIKE FOOD, HOUSING, MEDICAL CARE, AND HEATING?: NOT HARD AT ALL

## 2023-11-24 SDOH — ECONOMIC STABILITY: FOOD INSECURITY: WITHIN THE PAST 12 MONTHS, YOU WORRIED THAT YOUR FOOD WOULD RUN OUT BEFORE YOU GOT MONEY TO BUY MORE.: NEVER TRUE

## 2023-11-24 SDOH — ECONOMIC STABILITY: FOOD INSECURITY: WITHIN THE PAST 12 MONTHS, THE FOOD YOU BOUGHT JUST DIDN'T LAST AND YOU DIDN'T HAVE MONEY TO GET MORE.: NEVER TRUE

## 2023-11-24 ASSESSMENT — PATIENT HEALTH QUESTIONNAIRE - PHQ9
SUM OF ALL RESPONSES TO PHQ9 QUESTIONS 1 & 2: 0
8. MOVING OR SPEAKING SO SLOWLY THAT OTHER PEOPLE COULD HAVE NOTICED. OR THE OPPOSITE, BEING SO FIGETY OR RESTLESS THAT YOU HAVE BEEN MOVING AROUND A LOT MORE THAN USUAL: 0
10. IF YOU CHECKED OFF ANY PROBLEMS, HOW DIFFICULT HAVE THESE PROBLEMS MADE IT FOR YOU TO DO YOUR WORK, TAKE CARE OF THINGS AT HOME, OR GET ALONG WITH OTHER PEOPLE: 1
1. LITTLE INTEREST OR PLEASURE IN DOING THINGS: 0
SUM OF ALL RESPONSES TO PHQ QUESTIONS 1-9: 7
3. TROUBLE FALLING OR STAYING ASLEEP: 2
SUM OF ALL RESPONSES TO PHQ QUESTIONS 1-9: 7
SUM OF ALL RESPONSES TO PHQ QUESTIONS 1-9: 7
9. THOUGHTS THAT YOU WOULD BE BETTER OFF DEAD, OR OF HURTING YOURSELF: 0
5. POOR APPETITE OR OVEREATING: 3
SUM OF ALL RESPONSES TO PHQ QUESTIONS 1-9: 7
4. FEELING TIRED OR HAVING LITTLE ENERGY: 2
7. TROUBLE CONCENTRATING ON THINGS, SUCH AS READING THE NEWSPAPER OR WATCHING TELEVISION: 0
6. FEELING BAD ABOUT YOURSELF - OR THAT YOU ARE A FAILURE OR HAVE LET YOURSELF OR YOUR FAMILY DOWN: 0
2. FEELING DOWN, DEPRESSED OR HOPELESS: 0

## 2023-11-24 NOTE — PROGRESS NOTES
Post-Discharge Transitional Care  Follow Up      Jefferson Domingo   YOB: 1970    Date of Office Visit:  11/24/2023  Date of Hospital Admission: 11/6/23  Date of Hospital Discharge: 11/9/23  Risk of hospital readmission (high >=14%. Medium >=10%) :Readmission Risk Score: 10.7      Care management risk score Rising risk (score 2-5) and Complex Care (Scores >=6): No Risk Score On File     Non face to face  following discharge, date last encounter closed (first attempt may have been earlier): *No documented post hospital discharge outreach found in the last 14 days    Call initiated 2 business days of discharge: *No response recorded in the last 14 days    ASSESSMENT/PLAN:   Hospital discharge follow-up  -     LA DISCHARGE MEDS RECONCILED W/ CURRENT OUTPATIENT MED LIST    Nausea  Gastroparesis  -improved but continues to be intermittent  -pt requesting Zofran, GI note from hospital recommends reduction of narcotics and anti-emetics  -she has f/u upcoming in 3 weeks w/ GI    -     pantoprazole (PROTONIX) 20 MG tablet; Take 2 tablets by mouth daily, Disp-30 tablet, R-0Normal  -     ondansetron (ZOFRAN-ODT) 4 MG disintegrating tablet; Take 1 tablet by mouth 3 times daily as needed for Nausea or Vomiting, Disp-21 tablet, R-0Normal    Post-op pain  H/O of hemilaminectomy  -following w/ neuro surg     Type 2 diabetes mellitus without complication, with long-term current use of insulin ([address])  -request for endo referral    -     [facility] - Kenzie Hamilton MD, Endocrinology, [facility] Decision Making: moderate complexity  Return if symptoms worsen or fail to improve. On this date 11/24/2023 I have spent 40 minutes reviewing previous notes, test results and face to face with the patient discussing the diagnosis and importance of compliance with the treatment plan as well as documenting on the day of the visit.        Subjective:   HPI:  Follow up of Hospital problems/diagnosis(es):     Nausea  Post-op

## 2023-11-26 DIAGNOSIS — E11.65 TYPE 2 DIABETES MELLITUS WITH HYPERGLYCEMIA, WITH LONG-TERM CURRENT USE OF INSULIN (HCC): ICD-10-CM

## 2023-11-26 DIAGNOSIS — Z79.4 TYPE 2 DIABETES MELLITUS WITH HYPERGLYCEMIA, WITH LONG-TERM CURRENT USE OF INSULIN (HCC): ICD-10-CM

## 2023-11-26 RX ORDER — INSULIN GLARGINE 300 U/ML
50 INJECTION, SOLUTION SUBCUTANEOUS
Qty: 5 ADJUSTABLE DOSE PRE-FILLED PEN SYRINGE | Refills: 0 | Status: SHIPPED | OUTPATIENT
Start: 2023-11-26

## 2023-12-08 ENCOUNTER — OFFICE VISIT (OUTPATIENT)
Dept: NEUROSURGERY | Age: 53
End: 2023-12-08

## 2023-12-08 VITALS
WEIGHT: 198.6 LBS | DIASTOLIC BLOOD PRESSURE: 90 MMHG | BODY MASS INDEX: 35.19 KG/M2 | HEIGHT: 63 IN | HEART RATE: 80 BPM | SYSTOLIC BLOOD PRESSURE: 134 MMHG

## 2023-12-08 DIAGNOSIS — M54.16 LUMBAR RADICULOPATHY, RIGHT: ICD-10-CM

## 2023-12-08 DIAGNOSIS — Z98.890 S/P LUMBAR LAMINECTOMY: ICD-10-CM

## 2023-12-08 PROCEDURE — 99024 POSTOP FOLLOW-UP VISIT: CPT | Performed by: NURSE PRACTITIONER

## 2023-12-08 RX ORDER — HYDROCODONE BITARTRATE AND ACETAMINOPHEN 5; 325 MG/1; MG/1
1 TABLET ORAL EVERY 6 HOURS PRN
Qty: 28 TABLET | Refills: 0 | Status: SHIPPED | OUTPATIENT
Start: 2023-12-08 | End: 2023-12-15

## 2023-12-08 RX ORDER — TIZANIDINE 4 MG/1
4 TABLET ORAL 3 TIMES DAILY PRN
Qty: 21 TABLET | Refills: 0 | Status: SHIPPED | OUTPATIENT
Start: 2023-12-08 | End: 2023-12-15

## 2023-12-08 NOTE — PROGRESS NOTES
[address] [place] [address] STara Aguirre  [facility] [address], [address] [place] [address]  Dept: [phone]    Patient:  Jefferson Domingo  YOB: 1970  Date: 12/8/23    The patient is a 50 y.o. female who presents today for consult of the following problems:     Chief Complaint   Patient presents with    Wound Check     Incision looks good- no redness and some pain. Need order for PT         HPI:     Jefferson Domingo is a 50 y.o. female who presents to the office for post-op evaluation s/p posterior hemilaminectomy right L4-5, medial facetectomy and foraminotomies L4. Patient has been doing very well postoperatively. Reports that she has been able to significantly increase her physical activity. Is able to walk a mile a day, has been able to climb a flight of stairs in her home 4 times daily. No longer using the walker. Now only intermittently utilizing cane. Has been able to continually decrease pain medication. Overall very pleased. Presents today for incision check, did have some superficial slow healing. Patient is diabetic, also continues to smoke daily. Sensation intact  Strength RLE 5/5; LLE 4+/5; BUE 5/5  Incision with superficial dehiscence to central aspect, no surrounding erythema, drainage, or fluctuance present-improved from last check    Date of surgery: 10/30/2023    Assessment and Plan:      1. Lumbar radiculopathy, right    2. S/P lumbar laminectomy          Plan: Referral provided to resume physical therapy. Incision gradually healing. Continue to monitor. Discussed the importance of glycemic control as well as smoking cessation. Patient states she is continuing to work on quitting smoking. Increasing protein intake. Next postop visit in 4 weeks as scheduled. Contact office with any questions or concerns.     Followup: Return in about 4 weeks (around 1/5/2024), or if symptoms worsen or

## 2023-12-15 ENCOUNTER — OFFICE VISIT (OUTPATIENT)
Dept: GASTROENTEROLOGY | Age: 53
End: 2023-12-15
Payer: COMMERCIAL

## 2023-12-15 VITALS
SYSTOLIC BLOOD PRESSURE: 146 MMHG | DIASTOLIC BLOOD PRESSURE: 96 MMHG | WEIGHT: 197 LBS | BODY MASS INDEX: 34.9 KG/M2 | TEMPERATURE: 97.9 F

## 2023-12-15 DIAGNOSIS — F41.9 ANXIETY: ICD-10-CM

## 2023-12-15 DIAGNOSIS — K58.2 IRRITABLE BOWEL SYNDROME WITH BOTH CONSTIPATION AND DIARRHEA: ICD-10-CM

## 2023-12-15 DIAGNOSIS — K31.84 GASTROPARESIS: ICD-10-CM

## 2023-12-15 DIAGNOSIS — F12.90 MARIJUANA USE: ICD-10-CM

## 2023-12-15 DIAGNOSIS — R11.0 NAUSEA: ICD-10-CM

## 2023-12-15 DIAGNOSIS — R11.14 BILIOUS VOMITING WITH NAUSEA: Primary | ICD-10-CM

## 2023-12-15 DIAGNOSIS — K21.00 GASTROESOPHAGEAL REFLUX DISEASE WITH ESOPHAGITIS WITHOUT HEMORRHAGE: ICD-10-CM

## 2023-12-15 DIAGNOSIS — F17.200 SMOKER: ICD-10-CM

## 2023-12-15 PROCEDURE — G8417 CALC BMI ABV UP PARAM F/U: HCPCS | Performed by: INTERNAL MEDICINE

## 2023-12-15 PROCEDURE — 3017F COLORECTAL CA SCREEN DOC REV: CPT | Performed by: INTERNAL MEDICINE

## 2023-12-15 PROCEDURE — 3077F SYST BP >= 140 MM HG: CPT | Performed by: INTERNAL MEDICINE

## 2023-12-15 PROCEDURE — G8482 FLU IMMUNIZE ORDER/ADMIN: HCPCS | Performed by: INTERNAL MEDICINE

## 2023-12-15 PROCEDURE — 3080F DIAST BP >= 90 MM HG: CPT | Performed by: INTERNAL MEDICINE

## 2023-12-15 PROCEDURE — G8427 DOCREV CUR MEDS BY ELIG CLIN: HCPCS | Performed by: INTERNAL MEDICINE

## 2023-12-15 PROCEDURE — 99214 OFFICE O/P EST MOD 30 MIN: CPT | Performed by: INTERNAL MEDICINE

## 2023-12-15 PROCEDURE — 4004F PT TOBACCO SCREEN RCVD TLK: CPT | Performed by: INTERNAL MEDICINE

## 2023-12-15 RX ORDER — PANTOPRAZOLE SODIUM 20 MG/1
40 TABLET, DELAYED RELEASE ORAL DAILY
Qty: 30 TABLET | Refills: 0 | Status: SHIPPED | OUTPATIENT
Start: 2023-12-15

## 2023-12-15 RX ORDER — DOCUSATE SODIUM 100 MG/1
100 CAPSULE, LIQUID FILLED ORAL 2 TIMES DAILY
Qty: 60 CAPSULE | Refills: 0 | Status: SHIPPED | OUTPATIENT
Start: 2023-12-15 | End: 2024-01-14

## 2023-12-15 NOTE — PROGRESS NOTES
neck pain     COVID-19 07/2022    fever, slight cough, fatigue,lost taste,  admitted for couple days bc of elevated troponins    Depression     Diabetes mellitus ([address] ) 2017    managed by PCP    Fatty liver     HTN (hypertension)     Hyperlipidemia     Hypothyroid     ILD (interstitial lung disease) ([address] )     Kidney stone     Langerhans-cell granulomatosis ([address])     Lower back pain     Myocardial infarct [facility]) 2016    Neck pain     Pulmonary nodule     Spinal stenosis 2017    Under care of service provider 10/16/2023    pulmonology-[facility]: [address] [facility],[address] visit april 2023    Under care of service provider 10/16/2023    PCP: Pino FARRIS CNP-[facility]-last visit oct 2023    Under care of service provider 10/16/2023    ob/gyn-iggy-last visit jan 2023    Under care of service provider 10/16/2023    kbijafxzpr-rth-io vincent-last visit 2022    Under care of service provider 10/16/2023    [address]-last visit 2022    Under care of service provider 10/16/2023    hem/onc-alkalili-st kevin-last visit oct 2023    Uterine fibroid        Past Surgical History:   Procedure Laterality Date    BACK SURGERY  2004    L5-S1 fusion, Dr. Joseph Peoples  2016    one stent LAD    CERVICAL FUSION  12/2016    C5-7, [address]  2019    C4, Dr. Jia Espinoza 2019    [facility]      x 2 1993 and 2000    COLONOSCOPY N/A 12/08/2021    COLONOSCOPY WITH BIOPSY performed by Jacob Maher MD at [address]  11/08/2023    HIP SURGERY Left     pin    HYSTERECTOMY (CERVIX STATUS UNKNOWN) N/A 12/12/2022    TOTAL LAPAROSCOPIC HYSTERECTOMY, BSO, CYSTOSCOPY performed by Marge Church DO at [address] [place], lumbar    LUMBAR FUSION N/A 10/30/2023    POSTERIOR LUMBAR HEMILAMINECTOMY RIGHT L4-L5, MEDIAL FACETECTOMY AND FORAMINOTOMY AT L4, performed by Dina Jerome DO at [address]

## 2023-12-22 DIAGNOSIS — Z98.890 S/P LUMBAR LAMINECTOMY: ICD-10-CM

## 2023-12-22 DIAGNOSIS — M54.16 LUMBAR RADICULOPATHY, RIGHT: ICD-10-CM

## 2023-12-22 NOTE — TELEPHONE ENCOUNTER
Patient calling to requesting refill for Hydrocodone-acetaminophen 5/325mg Please review and e-scribe if applicable.      Last Visit Date: 12/08/2023

## 2023-12-26 RX ORDER — HYDROCODONE BITARTRATE AND ACETAMINOPHEN 5; 325 MG/1; MG/1
1 TABLET ORAL EVERY 6 HOURS PRN
Qty: 28 TABLET | Refills: 0 | Status: SHIPPED | OUTPATIENT
Start: 2023-12-26 | End: 2024-01-02

## 2024-01-04 ENCOUNTER — OFFICE VISIT (OUTPATIENT)
Dept: NEUROSURGERY | Age: 54
End: 2024-01-04
Payer: COMMERCIAL

## 2024-01-04 VITALS
DIASTOLIC BLOOD PRESSURE: 88 MMHG | RESPIRATION RATE: 18 BRPM | SYSTOLIC BLOOD PRESSURE: 143 MMHG | WEIGHT: 198 LBS | OXYGEN SATURATION: 95 % | BODY MASS INDEX: 35.07 KG/M2 | HEART RATE: 84 BPM | TEMPERATURE: 98.1 F

## 2024-01-04 DIAGNOSIS — M47.812 CERVICAL SPONDYLOSIS: ICD-10-CM

## 2024-01-04 DIAGNOSIS — Z98.890 S/P LUMBAR LAMINECTOMY: Primary | ICD-10-CM

## 2024-01-04 PROCEDURE — G8417 CALC BMI ABV UP PARAM F/U: HCPCS | Performed by: NEUROLOGICAL SURGERY

## 2024-01-04 PROCEDURE — G8427 DOCREV CUR MEDS BY ELIG CLIN: HCPCS | Performed by: NEUROLOGICAL SURGERY

## 2024-01-04 PROCEDURE — 4004F PT TOBACCO SCREEN RCVD TLK: CPT | Performed by: NEUROLOGICAL SURGERY

## 2024-01-04 PROCEDURE — G8482 FLU IMMUNIZE ORDER/ADMIN: HCPCS | Performed by: NEUROLOGICAL SURGERY

## 2024-01-04 PROCEDURE — 3077F SYST BP >= 140 MM HG: CPT | Performed by: NEUROLOGICAL SURGERY

## 2024-01-04 PROCEDURE — 3079F DIAST BP 80-89 MM HG: CPT | Performed by: NEUROLOGICAL SURGERY

## 2024-01-04 PROCEDURE — 99213 OFFICE O/P EST LOW 20 MIN: CPT | Performed by: NEUROLOGICAL SURGERY

## 2024-01-04 PROCEDURE — 3017F COLORECTAL CA SCREEN DOC REV: CPT | Performed by: NEUROLOGICAL SURGERY

## 2024-01-04 RX ORDER — HYDROCODONE BITARTRATE AND ACETAMINOPHEN 5; 325 MG/1; MG/1
1 TABLET ORAL EVERY 8 HOURS PRN
Qty: 21 TABLET | Refills: 0 | Status: SHIPPED | OUTPATIENT
Start: 2024-01-04 | End: 2024-01-11

## 2024-01-04 NOTE — PROGRESS NOTES
Department of Neurosurgery                                                      Follow up visit      History Obtained From:  patient, spouse    CHIEF COMPLAINT:         Chief Complaint   Patient presents with    Post-Op Check       HISTORY OF PRESENT ILLNESS:       The patient is a 53 y.o. female who presents for follow up for right lumbar radiculopathy and S/P lumbar laminectomy.     Patient reports significant improvement in her condition and currently only notes some incision pain running across the hip area.  She has been able to significantly increase her physical activity.  Is able to walk a mile a day, has been able to climb a flight of stairs in her home 4 times daily.  No longer using the walker.  Now only intermittently utilizing cane. Patient reports that she still has a small scab at the site of incision. She is very satisfied with the procedure. She is taking Norco up to TID for the incisional pain    The patient reports that her arms and hands are getting worse. Particularly the left side is worsening at a faster rate. She reports paresthesia of arms and hands, problems with dropping and grasping things, and difficulty with fine finger movements. She states that she broke two glasses because she can't hold them. She also notes severe pain in both armpits. She complains that she gets headaches at night alongside her neck pain. She has found some relief of her neck pain by propping her head up with pillows when she sleeps.     Patient had a cervical fusion by Dr. Amaya 3 years ago. She noted immediate relief of symptoms however ended up developing neck pain and headaches. She was sent to pain management where she received injections, however she states that any injections she receives causes her sugars to \"skyrocket\". She has also tried nerve blocks 3 years ago, and states she got no relief from the procedure. She has had to stop treatment with pain management in the past due to insurance

## 2024-01-08 ENCOUNTER — OFFICE VISIT (OUTPATIENT)
Dept: FAMILY MEDICINE CLINIC | Age: 54
End: 2024-01-08
Payer: COMMERCIAL

## 2024-01-08 VITALS
OXYGEN SATURATION: 98 % | SYSTOLIC BLOOD PRESSURE: 120 MMHG | DIASTOLIC BLOOD PRESSURE: 80 MMHG | WEIGHT: 200 LBS | HEART RATE: 86 BPM | BODY MASS INDEX: 35.43 KG/M2 | TEMPERATURE: 97.4 F

## 2024-01-08 DIAGNOSIS — K21.00 GASTROESOPHAGEAL REFLUX DISEASE WITH ESOPHAGITIS WITHOUT HEMORRHAGE: ICD-10-CM

## 2024-01-08 DIAGNOSIS — E78.2 MIXED HYPERLIPIDEMIA: ICD-10-CM

## 2024-01-08 DIAGNOSIS — I25.119 CORONARY ARTERY DISEASE INVOLVING NATIVE HEART WITH ANGINA PECTORIS, UNSPECIFIED VESSEL OR LESION TYPE (HCC): ICD-10-CM

## 2024-01-08 DIAGNOSIS — Z79.4 TYPE 2 DIABETES MELLITUS WITHOUT COMPLICATION, WITH LONG-TERM CURRENT USE OF INSULIN (HCC): ICD-10-CM

## 2024-01-08 DIAGNOSIS — I10 ESSENTIAL HYPERTENSION: Primary | ICD-10-CM

## 2024-01-08 DIAGNOSIS — E03.9 HYPOTHYROIDISM, UNSPECIFIED TYPE: ICD-10-CM

## 2024-01-08 DIAGNOSIS — G43.909 MIGRAINE WITHOUT STATUS MIGRAINOSUS, NOT INTRACTABLE, UNSPECIFIED MIGRAINE TYPE: ICD-10-CM

## 2024-01-08 DIAGNOSIS — Z12.31 ENCOUNTER FOR SCREENING MAMMOGRAM FOR BREAST CANCER: ICD-10-CM

## 2024-01-08 DIAGNOSIS — E11.9 TYPE 2 DIABETES MELLITUS WITHOUT COMPLICATION, WITH LONG-TERM CURRENT USE OF INSULIN (HCC): ICD-10-CM

## 2024-01-08 DIAGNOSIS — J84.82: ICD-10-CM

## 2024-01-08 PROBLEM — R11.2 NAUSEA & VOMITING: Status: RESOLVED | Noted: 2023-11-07 | Resolved: 2024-01-08

## 2024-01-08 PROBLEM — K58.1 IRRITABLE BOWEL SYNDROME WITH CONSTIPATION: Status: RESOLVED | Noted: 2021-10-13 | Resolved: 2024-01-08

## 2024-01-08 PROBLEM — R11.0 NAUSEA: Status: RESOLVED | Noted: 2023-11-11 | Resolved: 2024-01-08

## 2024-01-08 PROBLEM — G89.18 POST-OP PAIN: Status: RESOLVED | Noted: 2023-11-06 | Resolved: 2024-01-08

## 2024-01-08 PROBLEM — M54.9 INTRACTABLE BACK PAIN: Status: RESOLVED | Noted: 2023-04-22 | Resolved: 2024-01-08

## 2024-01-08 PROCEDURE — 3046F HEMOGLOBIN A1C LEVEL >9.0%: CPT | Performed by: NURSE PRACTITIONER

## 2024-01-08 PROCEDURE — 99214 OFFICE O/P EST MOD 30 MIN: CPT | Performed by: NURSE PRACTITIONER

## 2024-01-08 PROCEDURE — G8482 FLU IMMUNIZE ORDER/ADMIN: HCPCS | Performed by: NURSE PRACTITIONER

## 2024-01-08 PROCEDURE — G8427 DOCREV CUR MEDS BY ELIG CLIN: HCPCS | Performed by: NURSE PRACTITIONER

## 2024-01-08 PROCEDURE — 3017F COLORECTAL CA SCREEN DOC REV: CPT | Performed by: NURSE PRACTITIONER

## 2024-01-08 PROCEDURE — 3079F DIAST BP 80-89 MM HG: CPT | Performed by: NURSE PRACTITIONER

## 2024-01-08 PROCEDURE — 4004F PT TOBACCO SCREEN RCVD TLK: CPT | Performed by: NURSE PRACTITIONER

## 2024-01-08 PROCEDURE — 3074F SYST BP LT 130 MM HG: CPT | Performed by: NURSE PRACTITIONER

## 2024-01-08 PROCEDURE — G8417 CALC BMI ABV UP PARAM F/U: HCPCS | Performed by: NURSE PRACTITIONER

## 2024-01-08 PROCEDURE — 2022F DILAT RTA XM EVC RTNOPTHY: CPT | Performed by: NURSE PRACTITIONER

## 2024-01-08 RX ORDER — CHOLECALCIFEROL (VITAMIN D3) 1250 MCG
1 CAPSULE ORAL
COMMUNITY
Start: 2023-12-29

## 2024-01-08 RX ORDER — ROSUVASTATIN CALCIUM 20 MG/1
1 TABLET, COATED ORAL
COMMUNITY
Start: 2023-12-29 | End: 2024-03-28

## 2024-01-08 RX ORDER — ACYCLOVIR 400 MG/1
TABLET ORAL
COMMUNITY
Start: 2023-12-26

## 2024-01-08 ASSESSMENT — PATIENT HEALTH QUESTIONNAIRE - PHQ9
SUM OF ALL RESPONSES TO PHQ QUESTIONS 1-9: 0
1. LITTLE INTEREST OR PLEASURE IN DOING THINGS: 0
7. TROUBLE CONCENTRATING ON THINGS, SUCH AS READING THE NEWSPAPER OR WATCHING TELEVISION: 0
9. THOUGHTS THAT YOU WOULD BE BETTER OFF DEAD, OR OF HURTING YOURSELF: 0
SUM OF ALL RESPONSES TO PHQ QUESTIONS 1-9: 0
5. POOR APPETITE OR OVEREATING: 0
4. FEELING TIRED OR HAVING LITTLE ENERGY: 0
SUM OF ALL RESPONSES TO PHQ QUESTIONS 1-9: 0
6. FEELING BAD ABOUT YOURSELF - OR THAT YOU ARE A FAILURE OR HAVE LET YOURSELF OR YOUR FAMILY DOWN: 0
SUM OF ALL RESPONSES TO PHQ QUESTIONS 1-9: 0
3. TROUBLE FALLING OR STAYING ASLEEP: 0
SUM OF ALL RESPONSES TO PHQ9 QUESTIONS 1 & 2: 0
8. MOVING OR SPEAKING SO SLOWLY THAT OTHER PEOPLE COULD HAVE NOTICED. OR THE OPPOSITE, BEING SO FIGETY OR RESTLESS THAT YOU HAVE BEEN MOVING AROUND A LOT MORE THAN USUAL: 0
2. FEELING DOWN, DEPRESSED OR HOPELESS: 0
10. IF YOU CHECKED OFF ANY PROBLEMS, HOW DIFFICULT HAVE THESE PROBLEMS MADE IT FOR YOU TO DO YOUR WORK, TAKE CARE OF THINGS AT HOME, OR GET ALONG WITH OTHER PEOPLE: 0

## 2024-01-08 NOTE — PROGRESS NOTES
Lucy Torres, [facility]  [address]  [place]  [address]  [phone]  [phone]    Charisma Donnelly is a 53 y.o. female who is here with c/o of:    Chief Complaint: Diabetes (Pt has a Dexcom BS at 40 Right now she is feeling shacky and has eaten 5 pieces of candy her alert on dexcom went off  when she came in this am per pt  Also her Endo drTara Started pt on sliding scale of insulin Lymujev)      Patient Accompanied by: n/a    HPI - Charisma Donnelly is here today with c/o:    Patient here for re evaluation of chronic conditions.     HTN-  Compliant with medication. Checks blood pressure at home and averages 120/80's. Denies chest pain, shortness of breath, headaches, chest pain or swelling     HLD-  Compliant with Lipitor. No routine exercise. Reports eating healthy- does not eat fried foods.     Hypothyroid-  Compliant with Levothyroxine.      Anxiety-  Stable with buspar. Anxiety controlled.      Pulmonary Langerhnans-  Follows with Pulmonary. Rarely uses albuterol      CAD-  Has hx cardiac stents. Reports she had  maker. Follows with Cardiology     Migraines-  No medications currently. Averages 1 migraines per 2 months. Follows with Neurology      Spinal Stenosis Cervical-  Follows with Neurosurgery. Recently had surgery and is doing well.     Type 2 diabetes-  Compliant with medications. Follows with Endo. Averaging -150's. Denies having any hypoglycemic events other than today (40 on her Dexcom). She says the lowest she has had was 70.      Health Maintenance Due   Topic Date Due    Hepatitis B vaccine (1 of 3 - 3-dose series) Never done    Diabetic foot exam  06/19/2020    Breast cancer screen  Never done    COVID-19 Vaccine (3 - 2023-24 season) 09/01/2023        Patient Active Problem List:     Hypothyroidism     Type 2 diabetes mellitus without complication, with long-term current use of insulin (HCC)     Essential hypertension     Hip fracture (HCC)

## 2024-01-12 ENCOUNTER — HOSPITAL ENCOUNTER (OUTPATIENT)
Dept: NUCLEAR MEDICINE | Age: 54
Discharge: HOME | End: 2024-01-14
Attending: NEUROLOGICAL SURGERY
Payer: COMMERCIAL

## 2024-01-12 DIAGNOSIS — M47.812 CERVICAL SPONDYLOSIS: ICD-10-CM

## 2024-01-12 PROCEDURE — 3430000000 HC RX DIAGNOSTIC RADIOPHARMACEUTICAL: Performed by: NEUROLOGICAL SURGERY

## 2024-01-12 PROCEDURE — A9503 TC99M MEDRONATE: HCPCS | Performed by: NEUROLOGICAL SURGERY

## 2024-01-12 PROCEDURE — 78803 RP LOCLZJ TUM SPECT 1 AREA: CPT

## 2024-01-12 RX ORDER — TC 99M MEDRONATE 20 MG/10ML
25 INJECTION, POWDER, LYOPHILIZED, FOR SOLUTION INTRAVENOUS
Status: COMPLETED | OUTPATIENT
Start: 2024-01-12 | End: 2024-01-12

## 2024-01-12 RX ADMIN — TC 99M MEDRONATE 25 MILLICURIE: 20 INJECTION, POWDER, LYOPHILIZED, FOR SOLUTION INTRAVENOUS at 11:10

## 2024-01-16 ENCOUNTER — OFFICE VISIT (OUTPATIENT)
Dept: PAIN MANAGEMENT | Age: 54
End: 2024-01-16
Payer: COMMERCIAL

## 2024-01-16 VITALS — HEIGHT: 63 IN | OXYGEN SATURATION: 98 % | WEIGHT: 200 LBS | HEART RATE: 90 BPM | BODY MASS INDEX: 35.44 KG/M2

## 2024-01-16 DIAGNOSIS — Z98.1 HX OF FUSION OF CERVICAL SPINE: ICD-10-CM

## 2024-01-16 DIAGNOSIS — Z98.890 HX OF DECOMPRESSIVE LUMBAR LAMINECTOMY: ICD-10-CM

## 2024-01-16 DIAGNOSIS — E10.9 DIABETES MELLITUS, LABILE (HCC): ICD-10-CM

## 2024-01-16 DIAGNOSIS — M48.02 CERVICAL SPINAL STENOSIS: ICD-10-CM

## 2024-01-16 DIAGNOSIS — M47.812 CERVICAL SPONDYLOSIS: Primary | ICD-10-CM

## 2024-01-16 DIAGNOSIS — Z79.02 LONG TERM (CURRENT) USE OF ANTITHROMBOTICS/ANTIPLATELETS: ICD-10-CM

## 2024-01-16 PROCEDURE — 3046F HEMOGLOBIN A1C LEVEL >9.0%: CPT | Performed by: ANESTHESIOLOGY

## 2024-01-16 PROCEDURE — G8417 CALC BMI ABV UP PARAM F/U: HCPCS | Performed by: ANESTHESIOLOGY

## 2024-01-16 PROCEDURE — G8427 DOCREV CUR MEDS BY ELIG CLIN: HCPCS | Performed by: ANESTHESIOLOGY

## 2024-01-16 PROCEDURE — 4004F PT TOBACCO SCREEN RCVD TLK: CPT | Performed by: ANESTHESIOLOGY

## 2024-01-16 PROCEDURE — 3017F COLORECTAL CA SCREEN DOC REV: CPT | Performed by: ANESTHESIOLOGY

## 2024-01-16 PROCEDURE — G8482 FLU IMMUNIZE ORDER/ADMIN: HCPCS | Performed by: ANESTHESIOLOGY

## 2024-01-16 PROCEDURE — 99215 OFFICE O/P EST HI 40 MIN: CPT | Performed by: ANESTHESIOLOGY

## 2024-01-16 PROCEDURE — 2022F DILAT RTA XM EVC RTNOPTHY: CPT | Performed by: ANESTHESIOLOGY

## 2024-01-16 ASSESSMENT — ENCOUNTER SYMPTOMS
BACK PAIN: 1
GASTROINTESTINAL NEGATIVE: 1
RESPIRATORY NEGATIVE: 1

## 2024-01-16 NOTE — H&P (VIEW-ONLY)
The patient is a 53 y.o.Non- / non  female.    Chief Complaint   Patient presents with    Back Pain     Follow up from Neuro    Neck Pain      -This is a 53-year-old female with complicated spine pain history    History of chronic low back and right lower extremity pain  Had 3 previous back surgeries including a lumbar fusion  Had lumbar laminectomy and decompression surgery last year in October reports significant improvement in back and leg pain    Main pain issue is neck pain  Pain is chronic going on for several years  She had 2 cervical spine surgeries, last surgery was a fusion by Dr. Lyons about 4 years ago,  She is complaining of pain located over the axial cervical spine and the upper part of the neck associated with severe occipital headache  Right side is more affected than left  Pain is constant throbbing sensation that aggravates with neck movement  She has done multiple courses of physical therapy  She recalls back injection about 4 years ago before her last neck surgery at an outside facility    She is labile diabetic and report severe hypoglycemic episodes after cortisone injections in past    Patient had recent neurosurgical evaluation at [facility] and is advised for diagnostic cervical medial branch nerve block above the previous fusion level to rule out cervicogenic etiology and cervicogenic headaches  She had recent diagnostic workup with MRI cervical spine  She has tried NSAIDs  She is currently prescribed Norco 5 mg 3 times daily    Pain is severe markedly affecting going on for more than 1 year affecting quality of life and daily activities        Patient is here today for: back pain/neck follow up on neuro  Pain level: 7/10  Character: stabbing  Radiating: yes both arms  Weakness or numbness: both in the arms  Aggravating Factors: holding stuff neck  Alleviating Factors: nothing  Constant or intermitting: constant  Bladder/bowel loss: yes      Past Medical

## 2024-01-16 NOTE — PROGRESS NOTES
The patient is a 53 y.o.Non- / non  female.    Chief Complaint   Patient presents with    Back Pain     Follow up from Neuro    Neck Pain      -This is a 53-year-old female with complicated spine pain history    History of chronic low back and right lower extremity pain  Had 3 previous back surgeries including a lumbar fusion  Had lumbar laminectomy and decompression surgery last year in October reports significant improvement in back and leg pain    Main pain issue is neck pain  Pain is chronic going on for several years  She had 2 cervical spine surgeries, last surgery was a fusion by Dr. Lyons about 4 years ago,  She is complaining of pain located over the axial cervical spine and the upper part of the neck associated with severe occipital headache  Right side is more affected than left  Pain is constant throbbing sensation that aggravates with neck movement  She has done multiple courses of physical therapy  She recalls back injection about 4 years ago before her last neck surgery at an outside facility    She is labile diabetic and report severe hypoglycemic episodes after cortisone injections in past    Patient had recent neurosurgical evaluation at [facility] and is advised for diagnostic cervical medial branch nerve block above the previous fusion level to rule out cervicogenic etiology and cervicogenic headaches  She had recent diagnostic workup with MRI cervical spine  She has tried NSAIDs  She is currently prescribed Norco 5 mg 3 times daily    Pain is severe markedly affecting going on for more than 1 year affecting quality of life and daily activities        Patient is here today for: back pain/neck follow up on neuro  Pain level: 7/10  Character: stabbing  Radiating: yes both arms  Weakness or numbness: both in the arms  Aggravating Factors: holding stuff neck  Alleviating Factors: nothing  Constant or intermitting: constant  Bladder/bowel loss: yes      Past Medical

## 2024-01-17 DIAGNOSIS — E03.9 HYPOTHYROIDISM, UNSPECIFIED TYPE: ICD-10-CM

## 2024-01-17 DIAGNOSIS — I10 ESSENTIAL HYPERTENSION: ICD-10-CM

## 2024-01-17 RX ORDER — RANOLAZINE 500 MG/1
TABLET, EXTENDED RELEASE ORAL
Qty: 180 TABLET | Refills: 1 | Status: SHIPPED | OUTPATIENT
Start: 2024-01-17

## 2024-01-17 RX ORDER — LEVOTHYROXINE SODIUM 0.05 MG/1
TABLET ORAL
Qty: 90 TABLET | Refills: 1 | Status: SHIPPED | OUTPATIENT
Start: 2024-01-17

## 2024-01-17 RX ORDER — METOPROLOL SUCCINATE 100 MG/1
TABLET, EXTENDED RELEASE ORAL
Qty: 180 TABLET | Refills: 1 | Status: SHIPPED | OUTPATIENT
Start: 2024-01-17

## 2024-01-17 NOTE — TELEPHONE ENCOUNTER
Charisma Donnelly is calling to request a refill on the following medication(s):    Medication Request:  Requested Prescriptions     Pending Prescriptions Disp Refills    levothyroxine (SYNTHROID) 50 MCG tablet [Pharmacy Med Name: LEVOTHYROXINE 50 MCG TABLET] 90 tablet 1     Sig: take 1 tablet by mouth once daily    metoprolol succinate (TOPROL XL) 100 MG extended release tablet [Pharmacy Med Name: METOPROLOL SUCC  MG TAB] 180 tablet 1     Sig: take 1 tablet by mouth twice a day    ranolazine (RANEXA) 500 MG extended release tablet [Pharmacy Med Name: RANOLAZINE  MG TABLET] 180 tablet 1     Sig: take 1 tablet by mouth twice a day       Last Visit Date (If Applicable):  1/8/2024    Next Visit Date:    4/8/2024

## 2024-01-19 DIAGNOSIS — E78.2 MIXED HYPERLIPIDEMIA: ICD-10-CM

## 2024-01-19 RX ORDER — ATORVASTATIN CALCIUM 80 MG/1
80 TABLET, FILM COATED ORAL DAILY
Qty: 90 TABLET | Refills: 1 | Status: SHIPPED | OUTPATIENT
Start: 2024-01-19

## 2024-01-19 NOTE — TELEPHONE ENCOUNTER
Charisma Donnelly is calling to request a refill on the following medication(s):    Medication Request:  Requested Prescriptions     Pending Prescriptions Disp Refills    atorvastatin (LIPITOR) 80 MG tablet [Pharmacy Med Name: ATORVASTATIN 80 MG TABLET] 90 tablet 1     Sig: take 1 tablet by mouth once daily       Last Visit Date (If Applicable):  1/8/2024    Next Visit Date:    4/8/2024

## 2024-01-31 ENCOUNTER — TELEPHONE (OUTPATIENT)
Dept: GASTROENTEROLOGY | Age: 54
End: 2024-01-31

## 2024-01-31 NOTE — TELEPHONE ENCOUNTER
Patient has been cleared for procedure by cardiologist has to aspirin and plavix for 5 days prior to procedure then resume after. I also spoke to patient and informed her of instructions from the cardiologist.

## 2024-02-07 ENCOUNTER — HOSPITAL ENCOUNTER (OUTPATIENT)
Dept: MAMMOGRAPHY | Age: 54
Discharge: HOME | End: 2024-02-09
Payer: COMMERCIAL

## 2024-02-07 VITALS — BODY MASS INDEX: 34.91 KG/M2 | HEIGHT: 63 IN | WEIGHT: 197 LBS

## 2024-02-07 DIAGNOSIS — Z12.31 ENCOUNTER FOR SCREENING MAMMOGRAM FOR BREAST CANCER: ICD-10-CM

## 2024-02-07 PROCEDURE — 77063 BREAST TOMOSYNTHESIS BI: CPT

## 2024-02-14 ENCOUNTER — HOSPITAL ENCOUNTER (OUTPATIENT)
Dept: PAIN MANAGEMENT | Facility: CLINIC | Age: 54
Discharge: HOME | End: 2024-02-14
Payer: COMMERCIAL

## 2024-02-14 VITALS
OXYGEN SATURATION: 96 % | RESPIRATION RATE: 10 BRPM | HEART RATE: 82 BPM | WEIGHT: 197 LBS | BODY MASS INDEX: 34.91 KG/M2 | TEMPERATURE: 97.8 F | SYSTOLIC BLOOD PRESSURE: 132 MMHG | HEIGHT: 63 IN | DIASTOLIC BLOOD PRESSURE: 79 MMHG

## 2024-02-14 DIAGNOSIS — M47.812 CERVICAL SPONDYLOSIS: Primary | ICD-10-CM

## 2024-02-14 DIAGNOSIS — R52 PAIN MANAGEMENT: ICD-10-CM

## 2024-02-14 LAB — GLUCOSE BLD-MCNC: 130 MG/DL (ref 65–105)

## 2024-02-14 PROCEDURE — 64490 INJ PARAVERT F JNT C/T 1 LEV: CPT

## 2024-02-14 PROCEDURE — 2580000003 HC RX 258: Performed by: ANESTHESIOLOGY

## 2024-02-14 PROCEDURE — 64491 INJ PARAVERT F JNT C/T 2 LEV: CPT | Performed by: ANESTHESIOLOGY

## 2024-02-14 PROCEDURE — 99152 MOD SED SAME PHYS/QHP 5/>YRS: CPT | Performed by: ANESTHESIOLOGY

## 2024-02-14 PROCEDURE — 82947 ASSAY GLUCOSE BLOOD QUANT: CPT

## 2024-02-14 PROCEDURE — 2500000003 HC RX 250 WO HCPCS: Performed by: ANESTHESIOLOGY

## 2024-02-14 PROCEDURE — 64491 INJ PARAVERT F JNT C/T 2 LEV: CPT

## 2024-02-14 PROCEDURE — 6360000002 HC RX W HCPCS: Performed by: ANESTHESIOLOGY

## 2024-02-14 PROCEDURE — 6360000004 HC RX CONTRAST MEDICATION: Performed by: ANESTHESIOLOGY

## 2024-02-14 PROCEDURE — 64490 INJ PARAVERT F JNT C/T 1 LEV: CPT | Performed by: ANESTHESIOLOGY

## 2024-02-14 RX ORDER — BUPIVACAINE HYDROCHLORIDE 5 MG/ML
INJECTION, SOLUTION EPIDURAL; INTRACAUDAL
Status: COMPLETED | OUTPATIENT
Start: 2024-02-14 | End: 2024-02-14

## 2024-02-14 RX ORDER — FENTANYL CITRATE 50 UG/ML
INJECTION, SOLUTION INTRAMUSCULAR; INTRAVENOUS
Status: COMPLETED | OUTPATIENT
Start: 2024-02-14 | End: 2024-02-14

## 2024-02-14 RX ORDER — 0.9 % SODIUM CHLORIDE 0.9 %
INTRAVENOUS SOLUTION INTRAVENOUS CONTINUOUS PRN
Status: COMPLETED | OUTPATIENT
Start: 2024-02-14 | End: 2024-02-14

## 2024-02-14 RX ORDER — LIDOCAINE HYDROCHLORIDE 10 MG/ML
INJECTION, SOLUTION EPIDURAL; INFILTRATION; INTRACAUDAL; PERINEURAL
Status: COMPLETED | OUTPATIENT
Start: 2024-02-14 | End: 2024-02-14

## 2024-02-14 RX ORDER — MIDAZOLAM HYDROCHLORIDE 2 MG/2ML
INJECTION, SOLUTION INTRAMUSCULAR; INTRAVENOUS
Status: COMPLETED | OUTPATIENT
Start: 2024-02-14 | End: 2024-02-14

## 2024-02-14 RX ADMIN — IOHEXOL 3 ML: 180 INJECTION INTRAVENOUS at 08:48

## 2024-02-14 RX ADMIN — LIDOCAINE HYDROCHLORIDE 5 ML: 10 INJECTION, SOLUTION EPIDURAL; INFILTRATION; INTRACAUDAL at 08:47

## 2024-02-14 RX ADMIN — BUPIVACAINE HYDROCHLORIDE 4 ML: 5 INJECTION, SOLUTION EPIDURAL; INTRACAUDAL; PERINEURAL at 08:50

## 2024-02-14 RX ADMIN — SODIUM CHLORIDE 500 ML: 0.9 INJECTION, SOLUTION INTRAVENOUS at 08:47

## 2024-02-14 RX ADMIN — MIDAZOLAM HYDROCHLORIDE 1 MG: 1 INJECTION, SOLUTION INTRAMUSCULAR; INTRAVENOUS at 08:47

## 2024-02-14 RX ADMIN — FENTANYL CITRATE 50 MCG: 50 INJECTION, SOLUTION INTRAMUSCULAR; INTRAVENOUS at 08:47

## 2024-02-14 NOTE — OP NOTE
Preoperative Diagnosis: Cervical spondylosis and chronic cervicalgia  Postoperative Diagnosis: Cervical spondylosis and chronic cervicalgia    Procedure Performed:  Right Cervical Medical branch nerve block at C2 /3/4 nerves under fluoroscopy guidance    Procedure:      The Patient was seen in the preop area, chart was reviewed, informed consent was obtained. Patient was taken to procedure room and was placed in lateral position with procedure side facing upward.. Vital signs were monitored through out the  Procedure. A time out was completed. The site was prepped and draped in sterile manner.     The target point was identified with fluoro guidance. Skin and deep tissues were anesthetized with 1 % lidocaine. A  25-gauge needlele was advanced under fluoroscopy guidance to the targets until a bony contact was made. Position was conformed in AP and lateral views. Then after negative aspiration contrast dye was injected that showed  spread of the contrast over the target area  With no epidural, vascular runoff or intrathecal spread.   Finally 0. 5 ml of treatment solution containing 0.5% BUPIVACAINE was injected at each spot.    The needle was removed and a Band-Aid was placed over the needle  insertion site.  The patient's vital signs remained stable and the patient tolerated the procedure well.        SEDATION NOTE:    ASA CLASSIFICATION  3  MP   CLASSIFICATION  3    Moderate intravenous conscious sedation was supervised by Dr. Dixon  The patient was independently monitored by a Registered Nurse assigned to the Procedure Room  Monitoring included automated blood pressure, continuous EKG, Capnography and continuous pulse oximetry.   The detailed Conscious Record is permanently stored in the Hospital Information System.     The following is the conscious sedation record;  Start Time:  0843  End times:  0856  Duration:  13 MINUTES  MEDS GIVEN 1 MG VERSED AND 50 MCG FENTANYL

## 2024-02-14 NOTE — INTERVAL H&P NOTE
Update History & Physical    The patient's History and Physical of January 16, 2024 was reviewed with the patient and I examined the patient. There was no change. The surgical site was confirmed by the patient and me.     Plan: The risks, benefits, expected outcome, and alternative to the recommended procedure have been discussed with the patient. Patient understands and wants to proceed with the procedure.     ASA 3  MP 3    Electronically signed by Luis Angel Dixon MD on 2/14/2024 at 8:35 AM

## 2024-02-14 NOTE — DISCHARGE INSTRUCTIONS
Discharge Instructions following Sedation or Anesthesia:  You have  received  a sedative/anesthetic therefore, you should not consume any alcoholic beverages for minimum of 12 hours.  Do not drive or operate machinery for 24 hours.  Do not sign legal documents for 24 hours.  Dizziness, drowsiness, and unsteadiness may occur.  Rest when need to.  Increase diet as tolerated.  Keep up on fluids if diet allows.      General Instructions:  Do not take a tub bath for 72 hours after procedure (this includes hot tubs and swimming pools).  You may shower, but avoid hot water to injection site.   Avoid strenuous activity TODAY especially if you experience dizziness.   Remove band-aid the next day.  Wash off any residual iodine   Do not use heat, heating pad, or any other heating device over the injection site for 3 days after the procedure.  If you experience pain after your procedure, you may continue with your current pain medication as prescribed.  (DO NOT INCREASE YOUR PAIN MEDICATION WITHOUT TALKING TO DOCTOR)  Soreness and pain at injection site is common, may use ice to reduce soreness.    Please complete pain diary as instructed.     Call [facility] at [phone] if you experience:   Fever, chills or temperature over 100    Vomiting, Headache, persistent stiff neck, nausea, blurred vision   Difficulty in urinating or unable to urinate with 8 hours   Increase in weakness, numbness or loss of function   Increased redness, swelling or drainage at the injection site

## 2024-02-15 ENCOUNTER — TELEPHONE (OUTPATIENT)
Dept: PAIN MANAGEMENT | Age: 54
End: 2024-02-15

## 2024-02-15 DIAGNOSIS — M47.812 CERVICAL SPONDYLOSIS: Primary | ICD-10-CM

## 2024-02-19 NOTE — TELEPHONE ENCOUNTER
Left vm to give office a call for procedure fup questions, please call back asap as this is the 2nd phone call.

## 2024-02-19 NOTE — TELEPHONE ENCOUNTER
S/P:Right Cervical Medical branch nerve block at C2 /3/4 nerves under fluoroscopy guidance       DOS: 2/14/2024    Pain  before procedure with activity : 6     Pain after procedure with activity: 1    Activities following procedure include: rest then housework    % of pain relief: 95%    Procedure successful: Yes    OR scheduled: 3/6/2024

## 2024-03-04 ENCOUNTER — TELEPHONE (OUTPATIENT)
Dept: PAIN MANAGEMENT | Age: 54
End: 2024-03-04

## 2024-03-04 NOTE — TELEPHONE ENCOUNTER
Pt called last time she had a procedure she needed to do blood work before her procedure for her blood thinner pt stated she stopped he blood thinner for her procedure on wed and wanted to be sure she did not need labs the day before like you wanted last time

## 2024-03-06 ENCOUNTER — HOSPITAL ENCOUNTER (OUTPATIENT)
Dept: PAIN MANAGEMENT | Facility: CLINIC | Age: 54
Discharge: HOME | End: 2024-03-06
Payer: COMMERCIAL

## 2024-03-06 VITALS
BODY MASS INDEX: 34.91 KG/M2 | HEIGHT: 63 IN | HEART RATE: 80 BPM | OXYGEN SATURATION: 96 % | SYSTOLIC BLOOD PRESSURE: 111 MMHG | WEIGHT: 197 LBS | RESPIRATION RATE: 12 BRPM | TEMPERATURE: 98.8 F | DIASTOLIC BLOOD PRESSURE: 65 MMHG

## 2024-03-06 DIAGNOSIS — R52 PAIN MANAGEMENT: ICD-10-CM

## 2024-03-06 PROCEDURE — 64491 INJ PARAVERT F JNT C/T 2 LEV: CPT

## 2024-03-06 PROCEDURE — 99152 MOD SED SAME PHYS/QHP 5/>YRS: CPT | Performed by: ANESTHESIOLOGY

## 2024-03-06 PROCEDURE — 6360000004 HC RX CONTRAST MEDICATION: Performed by: ANESTHESIOLOGY

## 2024-03-06 PROCEDURE — 2500000003 HC RX 250 WO HCPCS: Performed by: ANESTHESIOLOGY

## 2024-03-06 PROCEDURE — 6360000002 HC RX W HCPCS: Performed by: ANESTHESIOLOGY

## 2024-03-06 PROCEDURE — 64490 INJ PARAVERT F JNT C/T 1 LEV: CPT

## 2024-03-06 PROCEDURE — 2580000003 HC RX 258: Performed by: ANESTHESIOLOGY

## 2024-03-06 PROCEDURE — 64491 INJ PARAVERT F JNT C/T 2 LEV: CPT | Performed by: ANESTHESIOLOGY

## 2024-03-06 PROCEDURE — 64490 INJ PARAVERT F JNT C/T 1 LEV: CPT | Performed by: ANESTHESIOLOGY

## 2024-03-06 RX ORDER — 0.9 % SODIUM CHLORIDE 0.9 %
INTRAVENOUS SOLUTION INTRAVENOUS CONTINUOUS PRN
Status: COMPLETED | OUTPATIENT
Start: 2024-03-06 | End: 2024-03-06

## 2024-03-06 RX ORDER — FENTANYL CITRATE 50 UG/ML
INJECTION, SOLUTION INTRAMUSCULAR; INTRAVENOUS
Status: COMPLETED | OUTPATIENT
Start: 2024-03-06 | End: 2024-03-06

## 2024-03-06 RX ORDER — BUPIVACAINE HYDROCHLORIDE 5 MG/ML
INJECTION, SOLUTION EPIDURAL; INTRACAUDAL
Status: COMPLETED | OUTPATIENT
Start: 2024-03-06 | End: 2024-03-06

## 2024-03-06 RX ORDER — MIDAZOLAM HYDROCHLORIDE 2 MG/2ML
INJECTION, SOLUTION INTRAMUSCULAR; INTRAVENOUS
Status: COMPLETED | OUTPATIENT
Start: 2024-03-06 | End: 2024-03-06

## 2024-03-06 RX ORDER — LIDOCAINE HYDROCHLORIDE 10 MG/ML
INJECTION, SOLUTION EPIDURAL; INFILTRATION; INTRACAUDAL; PERINEURAL
Status: COMPLETED | OUTPATIENT
Start: 2024-03-06 | End: 2024-03-06

## 2024-03-06 RX ADMIN — BUPIVACAINE HYDROCHLORIDE 3 ML: 5 INJECTION, SOLUTION EPIDURAL; INTRACAUDAL; PERINEURAL at 13:53

## 2024-03-06 RX ADMIN — IOHEXOL 3 ML: 180 INJECTION INTRAVENOUS at 13:51

## 2024-03-06 RX ADMIN — MIDAZOLAM HYDROCHLORIDE 2 MG: 1 INJECTION, SOLUTION INTRAMUSCULAR; INTRAVENOUS at 13:49

## 2024-03-06 RX ADMIN — FENTANYL CITRATE 50 MCG: 50 INJECTION, SOLUTION INTRAMUSCULAR; INTRAVENOUS at 13:49

## 2024-03-06 RX ADMIN — SODIUM CHLORIDE 500 ML: 0.9 INJECTION, SOLUTION INTRAVENOUS at 13:49

## 2024-03-06 RX ADMIN — LIDOCAINE HYDROCHLORIDE 5 ML: 10 INJECTION, SOLUTION EPIDURAL; INFILTRATION; INTRACAUDAL at 13:49

## 2024-03-06 ASSESSMENT — PAIN - FUNCTIONAL ASSESSMENT
PAIN_FUNCTIONAL_ASSESSMENT: 0-10
PAIN_FUNCTIONAL_ASSESSMENT: PREVENTS OR INTERFERES WITH MANY ACTIVE NOT PASSIVE ACTIVITIES

## 2024-03-06 ASSESSMENT — PAIN DESCRIPTION - DESCRIPTORS: DESCRIPTORS: BURNING;STABBING

## 2024-03-06 NOTE — DISCHARGE INSTRUCTIONS
Discharge Instructions following Sedation or Anesthesia:  You have  received  a sedative/anesthetic therefore, you should not consume any alcoholic beverages for minimum of 12 hours.  Do not drive or operate machinery for 24 hours.  Do not sign legal documents for 24 hours.  Dizziness, drowsiness, and unsteadiness may occur.  Rest when need to.  Increase diet as tolerated.  Keep up on fluids if diet allows.      General Instructions:  Do not take a tub bath for 72 hours after procedure (this includes hot tubs and swimming pools).  You may shower, but avoid hot water to injection site.   Avoid strenuous activity TODAY especially if you experience dizziness.   Remove band-aid the next day.  Wash off any residual iodine   Do not use heat, heating pad, or any other heating device over the injection site for 3 days after the procedure.  If you experience pain after your procedure, you may continue with your current pain medication as prescribed.  (DO NOT INCREASE YOUR PAIN MEDICATION WITHOUT TALKING TO DOCTOR)  Soreness and pain at injection site is common, may use ice to reduce soreness.    Please complete pain diary as instructed.     Call [facility] at [phone] if you experience:   Fever, chills or temperature over 100    Vomiting, Headache, persistent stiff neck, nausea, blurred vision   Difficulty in urinating or unable to urinate with 8 hours   Increase in weakness, numbness or loss of function   Increased redness, swelling or drainage at the injection site

## 2024-03-06 NOTE — OP NOTE
Preoperative Diagnosis: Cervical spondylosis and chronic cervicalgia  Postoperative Diagnosis: Cervical spondylosis and chronic cervicalgia    Procedure Performed:  Right Cervical Medical branch nerve block at C2/3/4 for C2/3 and C3/4 facets nerves under fluoroscopy guidance    Procedure:      The Patient was seen in the preop area, chart was reviewed, informed consent was obtained. Patient was taken to procedure room and was placed in lateral position with procedure side facing upward.. Vital signs were monitored through out the  Procedure. A time out was completed. The site was prepped and draped in sterile manner.     The target point was identified with fluoro guidance. Skin and deep tissues were anesthetized with 1 % lidocaine. A  25-gauge needlele was advanced under fluoroscopy guidance to the targets until a bony contact was made. Position was conformed in AP and lateral views. Then after negative aspiration contrast dye was injected that showed  spread of the contrast over the target area  With no epidural, vascular runoff or intrathecal spread.   Finally 0. 5 ml of treatment solution containing 0.5% bupivacaine was injected at each spot.    The needle was removed and a Band-Aid was placed over the needle  insertion site.  The patient's vital signs remained stable and the patient tolerated the procedure well.    Post Procedure pain score in recovery 15 minutes later was 0-1/10      SEDATION NOTE:    ASA CLASSIFICATION  3  MP   CLASSIFICATION  3    Moderate intravenous conscious sedation was supervised by Dr. Dixon  The patient was independently monitored by a Registered Nurse assigned to the Procedure Room  Monitoring included automated blood pressure, continuous EKG, Capnography and continuous pulse oximetry.   The detailed Conscious Record is permanently stored in the Hospital Information System.     The following is the conscious sedation record;  Start Time:  1346  End times:  1356  Duration:  10

## 2024-03-06 NOTE — H&P
Vomiting    Seasonal Other (See Comments)     Sneezing, watery eyes, wheezing         Current Outpatient Medications:     atorvastatin (LIPITOR) 80 MG tablet, take 1 tablet by mouth once daily (Patient not taking: Reported on 2/14/2024), Disp: 90 tablet, Rfl: 1    levothyroxine (SYNTHROID) 50 MCG tablet, take 1 tablet by mouth once daily, Disp: 90 tablet, Rfl: 1    metoprolol succinate (TOPROL XL) 100 MG extended release tablet, take 1 tablet by mouth twice a day, Disp: 180 tablet, Rfl: 1    ranolazine (RANEXA) 500 MG extended release tablet, take 1 tablet by mouth twice a day, Disp: 180 tablet, Rfl: 1    Cholecalciferol (VITAMIN D3) 1.25 MG (76734 UT) CAPS, 1 capsule (Patient not taking: Reported on 2/14/2024), Disp: , Rfl:     rosuvastatin (CRESTOR) 20 MG tablet, Take 1 tablet by mouth Every Day, Disp: , Rfl:     Continuous Blood Gluc Sensor (DEXCOM G7 SENSOR) MISC, USE AS DIRECTED every 10 days, Disp: , Rfl:     clopidogrel (PLAVIX) 75 MG tablet, Take 1 tablet by mouth daily, Disp: 30 tablet, Rfl: 5    aspirin 81 MG EC tablet, Take 1 tablet by mouth daily, Disp: 90 tablet, Rfl: 1    pantoprazole (PROTONIX) 20 MG tablet, Take 2 tablets by mouth daily, Disp: 30 tablet, Rfl: 0    Insulin Glargine, 2 Unit Dial, (TOUJEO MAX SOLOSTAR) 300 UNIT/ML SOPN, Inject 50 Units into the skin daily (with breakfast), Disp: 5 Adjustable Dose Pre-filled Pen Syringe, Rfl: 0    ondansetron (ZOFRAN-ODT) 4 MG disintegrating tablet, Take 1 tablet by mouth 3 times daily as needed for Nausea or Vomiting, Disp: 21 tablet, Rfl: 0    empagliflozin (JARDIANCE) 10 MG tablet, Take 1 tablet by mouth daily, Disp: 90 tablet, Rfl: 1    Insulin Pen Needle 31G X 5 MM MISC, Use pen needle for giving daily insulin injection, Disp: 100 each, Rfl: 3    albuterol (PROVENTIL) (2.5 MG/3ML) 0.083% nebulizer solution, Take 3 mLs by nebulization daily as needed for Wheezing, Disp: , Rfl:     nitroGLYCERIN (NITROSTAT) 0.4 MG SL tablet, place 1 tablet under the

## 2024-03-06 NOTE — H&P (VIEW-ONLY)
Vomiting    Seasonal Other (See Comments)     Sneezing, watery eyes, wheezing         Current Outpatient Medications:     atorvastatin (LIPITOR) 80 MG tablet, take 1 tablet by mouth once daily (Patient not taking: Reported on 2/14/2024), Disp: 90 tablet, Rfl: 1    levothyroxine (SYNTHROID) 50 MCG tablet, take 1 tablet by mouth once daily, Disp: 90 tablet, Rfl: 1    metoprolol succinate (TOPROL XL) 100 MG extended release tablet, take 1 tablet by mouth twice a day, Disp: 180 tablet, Rfl: 1    ranolazine (RANEXA) 500 MG extended release tablet, take 1 tablet by mouth twice a day, Disp: 180 tablet, Rfl: 1    Cholecalciferol (VITAMIN D3) 1.25 MG (62284 UT) CAPS, 1 capsule (Patient not taking: Reported on 2/14/2024), Disp: , Rfl:     rosuvastatin (CRESTOR) 20 MG tablet, Take 1 tablet by mouth Every Day, Disp: , Rfl:     Continuous Blood Gluc Sensor (DEXCOM G7 SENSOR) MISC, USE AS DIRECTED every 10 days, Disp: , Rfl:     clopidogrel (PLAVIX) 75 MG tablet, Take 1 tablet by mouth daily, Disp: 30 tablet, Rfl: 5    aspirin 81 MG EC tablet, Take 1 tablet by mouth daily, Disp: 90 tablet, Rfl: 1    pantoprazole (PROTONIX) 20 MG tablet, Take 2 tablets by mouth daily, Disp: 30 tablet, Rfl: 0    Insulin Glargine, 2 Unit Dial, (TOUJEO MAX SOLOSTAR) 300 UNIT/ML SOPN, Inject 50 Units into the skin daily (with breakfast), Disp: 5 Adjustable Dose Pre-filled Pen Syringe, Rfl: 0    ondansetron (ZOFRAN-ODT) 4 MG disintegrating tablet, Take 1 tablet by mouth 3 times daily as needed for Nausea or Vomiting, Disp: 21 tablet, Rfl: 0    empagliflozin (JARDIANCE) 10 MG tablet, Take 1 tablet by mouth daily, Disp: 90 tablet, Rfl: 1    Insulin Pen Needle 31G X 5 MM MISC, Use pen needle for giving daily insulin injection, Disp: 100 each, Rfl: 3    albuterol (PROVENTIL) (2.5 MG/3ML) 0.083% nebulizer solution, Take 3 mLs by nebulization daily as needed for Wheezing, Disp: , Rfl:     nitroGLYCERIN (NITROSTAT) 0.4 MG SL tablet, place 1 tablet under the

## 2024-03-11 ENCOUNTER — TELEPHONE (OUTPATIENT)
Dept: PAIN MANAGEMENT | Age: 54
End: 2024-03-11

## 2024-03-11 DIAGNOSIS — M47.812 CERVICAL SPONDYLOSIS: Primary | ICD-10-CM

## 2024-03-11 NOTE — TELEPHONE ENCOUNTER
Procedure: Right Cervical Medical branch nerve block at C2/3/4 for C2/3 and C3/4 facets nerves under fluoroscopy guidance   DOS [id number]  Pain level before procedure with activity 8.  Pain with activity after procedure 2.  What activities done the day of procedure shopping  What percentage of  pain relief from procedure did you receive 98  Success Y  OR Scheduled  3/27

## 2024-03-20 DIAGNOSIS — Z79.4 TYPE 2 DIABETES MELLITUS WITH HYPERGLYCEMIA, WITH LONG-TERM CURRENT USE OF INSULIN (HCC): ICD-10-CM

## 2024-03-20 DIAGNOSIS — E11.65 TYPE 2 DIABETES MELLITUS WITH HYPERGLYCEMIA, WITH LONG-TERM CURRENT USE OF INSULIN (HCC): ICD-10-CM

## 2024-03-20 RX ORDER — EMPAGLIFLOZIN 10 MG/1
10 TABLET, FILM COATED ORAL DAILY
Qty: 90 TABLET | Refills: 1 | Status: SHIPPED | OUTPATIENT
Start: 2024-03-20

## 2024-03-20 NOTE — TELEPHONE ENCOUNTER
Charisma Donnelly is calling to request a refill on the following medication(s):    Medication Request:  Requested Prescriptions     Pending Prescriptions Disp Refills    JARDIANCE 10 MG tablet [Pharmacy Med Name: JARDIANCE 10 MG TABLET] 90 tablet 1     Sig: take 1 tablet by mouth once daily       Last Visit Date (If Applicable):  1/8/2024    Next Visit Date:    4/8/2024

## 2024-03-21 ENCOUNTER — ANESTHESIA EVENT (OUTPATIENT)
Dept: OPERATING ROOM | Age: 54
End: 2024-03-21
Payer: COMMERCIAL

## 2024-03-21 ENCOUNTER — ANESTHESIA (OUTPATIENT)
Dept: OPERATING ROOM | Age: 54
End: 2024-03-21
Payer: COMMERCIAL

## 2024-03-21 ENCOUNTER — HOSPITAL ENCOUNTER (OUTPATIENT)
Age: 54
Setting detail: OUTPATIENT SURGERY
Discharge: HOME | End: 2024-03-21
Attending: INTERNAL MEDICINE | Admitting: INTERNAL MEDICINE
Payer: COMMERCIAL

## 2024-03-21 VITALS
SYSTOLIC BLOOD PRESSURE: 114 MMHG | TEMPERATURE: 97.2 F | HEIGHT: 63 IN | WEIGHT: 200.9 LBS | DIASTOLIC BLOOD PRESSURE: 75 MMHG | RESPIRATION RATE: 14 BRPM | HEART RATE: 81 BPM | BODY MASS INDEX: 35.6 KG/M2 | OXYGEN SATURATION: 96 %

## 2024-03-21 DIAGNOSIS — K21.00 GASTROESOPHAGEAL REFLUX DISEASE WITH ESOPHAGITIS WITHOUT HEMORRHAGE: ICD-10-CM

## 2024-03-21 DIAGNOSIS — R11.14 BILIOUS VOMITING WITH NAUSEA: ICD-10-CM

## 2024-03-21 DIAGNOSIS — K58.2 IRRITABLE BOWEL SYNDROME WITH BOTH CONSTIPATION AND DIARRHEA: ICD-10-CM

## 2024-03-21 LAB — GLUCOSE BLD-MCNC: 151 MG/DL (ref 65–105)

## 2024-03-21 PROCEDURE — 43239 EGD BIOPSY SINGLE/MULTIPLE: CPT | Performed by: INTERNAL MEDICINE

## 2024-03-21 PROCEDURE — 7100000010 HC PHASE II RECOVERY - FIRST 15 MIN: Performed by: INTERNAL MEDICINE

## 2024-03-21 PROCEDURE — 2580000003 HC RX 258: Performed by: ANESTHESIOLOGY

## 2024-03-21 PROCEDURE — 3700000001 HC ADD 15 MINUTES (ANESTHESIA): Performed by: INTERNAL MEDICINE

## 2024-03-21 PROCEDURE — 6360000002 HC RX W HCPCS: Performed by: NURSE ANESTHETIST, CERTIFIED REGISTERED

## 2024-03-21 PROCEDURE — 82947 ASSAY GLUCOSE BLOOD QUANT: CPT

## 2024-03-21 PROCEDURE — 7100000011 HC PHASE II RECOVERY - ADDTL 15 MIN: Performed by: INTERNAL MEDICINE

## 2024-03-21 PROCEDURE — 2709999900 HC NON-CHARGEABLE SUPPLY: Performed by: INTERNAL MEDICINE

## 2024-03-21 PROCEDURE — 3609012400 HC EGD TRANSORAL BIOPSY SINGLE/MULTIPLE: Performed by: INTERNAL MEDICINE

## 2024-03-21 PROCEDURE — 88305 TISSUE EXAM BY PATHOLOGIST: CPT

## 2024-03-21 PROCEDURE — 2500000003 HC RX 250 WO HCPCS: Performed by: NURSE ANESTHETIST, CERTIFIED REGISTERED

## 2024-03-21 PROCEDURE — 3700000000 HC ANESTHESIA ATTENDED CARE: Performed by: INTERNAL MEDICINE

## 2024-03-21 RX ORDER — SODIUM CHLORIDE 0.9 % (FLUSH) 0.9 %
5-40 SYRINGE (ML) INJECTION EVERY 12 HOURS SCHEDULED
Status: DISCONTINUED | OUTPATIENT
Start: 2024-03-21 | End: 2024-03-21 | Stop reason: HOSPADM

## 2024-03-21 RX ORDER — PROPOFOL 10 MG/ML
INJECTION, EMULSION INTRAVENOUS PRN
Status: DISCONTINUED | OUTPATIENT
Start: 2024-03-21 | End: 2024-03-21 | Stop reason: SDUPTHER

## 2024-03-21 RX ORDER — SODIUM CHLORIDE 9 MG/ML
INJECTION, SOLUTION INTRAVENOUS CONTINUOUS
Status: DISCONTINUED | OUTPATIENT
Start: 2024-03-21 | End: 2024-03-21 | Stop reason: HOSPADM

## 2024-03-21 RX ORDER — SODIUM CHLORIDE 0.9 % (FLUSH) 0.9 %
5-40 SYRINGE (ML) INJECTION PRN
Status: DISCONTINUED | OUTPATIENT
Start: 2024-03-21 | End: 2024-03-21 | Stop reason: HOSPADM

## 2024-03-21 RX ORDER — LIDOCAINE HYDROCHLORIDE 10 MG/ML
1 INJECTION, SOLUTION EPIDURAL; INFILTRATION; INTRACAUDAL; PERINEURAL
Status: DISCONTINUED | OUTPATIENT
Start: 2024-03-22 | End: 2024-03-21 | Stop reason: HOSPADM

## 2024-03-21 RX ORDER — SODIUM CHLORIDE 9 MG/ML
INJECTION, SOLUTION INTRAVENOUS PRN
Status: DISCONTINUED | OUTPATIENT
Start: 2024-03-21 | End: 2024-03-21 | Stop reason: HOSPADM

## 2024-03-21 RX ORDER — LIDOCAINE HYDROCHLORIDE 20 MG/ML
INJECTION, SOLUTION EPIDURAL; INFILTRATION; INTRACAUDAL; PERINEURAL PRN
Status: DISCONTINUED | OUTPATIENT
Start: 2024-03-21 | End: 2024-03-21 | Stop reason: SDUPTHER

## 2024-03-21 RX ORDER — SODIUM CHLORIDE, SODIUM LACTATE, POTASSIUM CHLORIDE, CALCIUM CHLORIDE 600; 310; 30; 20 MG/100ML; MG/100ML; MG/100ML; MG/100ML
INJECTION, SOLUTION INTRAVENOUS CONTINUOUS
Status: DISCONTINUED | OUTPATIENT
Start: 2024-03-21 | End: 2024-03-21 | Stop reason: HOSPADM

## 2024-03-21 RX ADMIN — SODIUM CHLORIDE, POTASSIUM CHLORIDE, SODIUM LACTATE AND CALCIUM CHLORIDE: 600; 310; 30; 20 INJECTION, SOLUTION INTRAVENOUS at 10:10

## 2024-03-21 RX ADMIN — LIDOCAINE HYDROCHLORIDE 100 MG: 20 INJECTION, SOLUTION EPIDURAL; INFILTRATION; INTRACAUDAL; PERINEURAL at 11:21

## 2024-03-21 RX ADMIN — PROPOFOL 125 MG: 10 INJECTION, EMULSION INTRAVENOUS at 11:21

## 2024-03-21 ASSESSMENT — PAIN DESCRIPTION - DESCRIPTORS: DESCRIPTORS: ACHING;BURNING;SHARP

## 2024-03-21 ASSESSMENT — LIFESTYLE VARIABLES: SMOKING_STATUS: 1

## 2024-03-21 ASSESSMENT — PAIN - FUNCTIONAL ASSESSMENT: PAIN_FUNCTIONAL_ASSESSMENT: 0-10

## 2024-03-21 ASSESSMENT — ENCOUNTER SYMPTOMS: SHORTNESS OF BREATH: 0

## 2024-03-21 NOTE — ANESTHESIA PRE PROCEDURE
Department of Anesthesiology  Preprocedure Note       Name:  Charisma Donnelly   Age:  53 y.o.  :  1970                                          MRN:  [id number]         Date:  3/21/2024      Surgeon: Surgeon(s):  Arin Jorgensen MD    Procedure: Procedure(s):  ESOPHAGOGASTRODUODENOSCOPY    Medications prior to admission:   Prior to Admission medications    Medication Sig Start Date End Date Taking? Authorizing Provider   JARDIANCE 10 MG tablet take 1 tablet by mouth once daily 3/20/24   Lucy Torres APRN - CNP   levothyroxine (SYNTHROID) 50 MCG tablet take 1 tablet by mouth once daily 24   Lucy Torres APRN - CNP   metoprolol succinate (TOPROL XL) 100 MG extended release tablet take 1 tablet by mouth twice a day 24   Lucy Torres APRN - CNP   ranolazine (RANEXA) 500 MG extended release tablet take 1 tablet by mouth twice a day 24   Lucy Torres APRN - CNP   Cholecalciferol (VITAMIN D3) 1.25 MG (56739 UT) CAPS 1 capsule 23   Ruth Alcocer MD   rosuvastatin (CRESTOR) 20 MG tablet Take 1 tablet by mouth Every Day 12/29/23 3/28/24  Ruth Alcocer MD   Continuous Blood Gluc Sensor (DEXCOM G7 SENSOR) MISC USE AS DIRECTED every 10 days 23   Ruth Alcocer MD   clopidogrel (PLAVIX) 75 MG tablet Take 1 tablet by mouth daily 23   Marci Martinez MD   aspirin 81 MG EC tablet Take 1 tablet by mouth daily 23   Marci Martinez MD   pantoprazole (PROTONIX) 20 MG tablet Take 2 tablets by mouth daily 12/15/23   Arin Jorgensen MD   Insulin Glargine, 2 Unit Dial, (TOUJEO MAX SOLOSTAR) 300 UNIT/ML SOPN Inject 50 Units into the skin daily (with breakfast) 23   Bhakti Rivera APRN - CNP   ondansetron (ZOFRAN-ODT) 4 MG disintegrating tablet Take 1 tablet by mouth 3 times daily as needed for Nausea or Vomiting 23   Omey, Linda, APRN - CNP   Insulin Pen Needle 31G X 5 MM MISC Use pen needle for giving daily insulin injection 22   Seal,

## 2024-03-21 NOTE — OP NOTE
PROCEDURE NOTE    DATE OF PROCEDURE: 3/21/2024     SURGEON: Arin Jorgensen MD    ASSISTANT: None    PREOPERATIVE DIAGNOSIS: NAUSEA AND VOMITING  ABDOMINAL PAINS  IBS    POSTOPERATIVE DIAGNOSIS: As described below    OPERATION: Upper GI endoscopy with Biopsy    ANESTHESIA: MAC PER ANESTHESIA     ESTIMATED BLOOD LOSS: Less than 50 ml    COMPLICATIONS: None.     SPECIMENS:  Was Obtained:     HISTORY: The patient is a 53 y.o. year old female with history of above preop diagnosis.  I recommended esophagogastroduodenoscopy with possible biopsy and I explained the risk, benefits, expected outcome, and alternatives to the procedure.  Risks included but are not limited to bleeding, infection, respiratory distress, hypotension, and perforation of the esophagus, stomach, or duodenum.  Patient understands and is in agreement.    PROCEDURE: The patient was given IV conscious sedation.  The patient's SPO2 remained above 90% throughout the procedure. The gastroscope was inserted orally and advanced under direct vision through the esophagus, through the stomach, through the pylorus, and into the descending duodenum.      Findings:    Retropharyngeal area was grossly normal appearing    Esophagus: normal    Stomach:    Fundus: normal    Body: normal    Antrum: abnormal: MILD GASTRITIS WAS NOTED AND BIOPSIES WERE TAKEN     Duodenum:     Descending: normal. RANDOM BIOPSIES TAKEN     Bulb: normal    The scope was removed and the patient tolerated the procedure well.     Recommendations/Plan:   F/U Biopsies  F/U In Office in 3-4 weeks  Discussed with the family  Post sedation patient was stable with stable vital signs and stable O2 saturations    Electronically signed by Arin Jorgensen MD  on 3/21/2024 at 11:27 AM

## 2024-03-21 NOTE — ANESTHESIA POSTPROCEDURE EVALUATION
Department of Anesthesiology  Postprocedure Note    Patient: Charisma Donnelly  MRN: [id number]  YOB: 1970  Date of evaluation: 3/21/2024    Procedure Summary       Date: 03/21/24 Room / Location: [address]    Anesthesia Start: 1101 Anesthesia Stop: 1133    Procedure: ESOPHAGOGASTRODUODENOSCOPY BIOPSY Diagnosis:       Bilious vomiting with nausea      Gastroesophageal reflux disease with esophagitis without hemorrhage      Irritable bowel syndrome with both constipation and diarrhea      (Bilious vomiting with nausea [R11.14])      (Gastroesophageal reflux disease with esophagitis without hemorrhage [K21.00])      (Irritable bowel syndrome with both constipation and diarrhea [K58.2])    Surgeons: Arin Jorgensen MD Responsible Provider: Dori Boone MD    Anesthesia Type: MAC ASA Status: 4            Anesthesia Type: No value filed.    Joshua Phase I:      Joshua Phase II: Joshua Score: 10    Anesthesia Post Evaluation    Cardiovascular status: hemodynamically stable    No notable events documented.

## 2024-03-21 NOTE — H&P
History and Physical Service   [facility]    HISTORY AND PHYSICAL EXAMINATION            Date of Evaluation: 3/21/2024  Patient name:  Charisma Donnelly  MRN:   [id number]  YOB: 1970  PCP:    Lucy Torres APRN - CNP    History Obtained From:     Patient, medical records    History of Present Illness:     This is Charisma Donnelly a 53 y.o. female with multiple known comorbidities managed by PCP, Lucy Torres and multiple specialists who presents for an  ESOPHAGOGASTRODUODENOSCOPY by Arin Jorgensen MD for Bilious vomiting with nausea; Gastroesophageal reflux disease with esophag*. Patient follows with Gastroenterologist, Dr Arin Jorgensen and was seen post hospitalization from [facility] on 12/15/24 Per his note patient had intractable bilious vomiting with nausea and GERD at the time .An EGD was done by Dr Durand 11/6/23 (report below) Patient states they thought it was my Trulicity. After I was taken off I dramatically  improved\" Today eats 4-6 small meals a day and tolerates that well. Denies fever, chills, shortness of breath, cough, congestion, wheezing, chest pain, open sores or wounds. +DM POC  Wears a Dexcom 7 (not on at present)    NOTE:  Known CAD with MI 2016  S/p angioplasty with single stent LAD Follows with Cardiologist, Dr Martinez Patient on long term anticoagulant therapy  Given Clearance by Dr Martinez 12/28/23 \" patient has moderate cardiac risk, but acceptable, for the planned surgical procedure.  The patient may proceed accordingly.  Patient is on Plavix and ASA 81mg, they will need to stop this medication, 5 days prior to surgery and resume after. \"    Operative Note  Patient: Charisma Donnelly  YOB: 1970  MRN: [id number]     Date of Procedure: 11/8/2023     Pre-Op Diagnosis Codes:     * Nausea and vomiting, unspecified vomiting type [R11.2]     Post-Op Diagnosis: Same  Trulicity induced nausea and vomiting    \     Procedure(s):  EGD

## 2024-03-26 LAB — SURGICAL PATHOLOGY REPORT: NORMAL

## 2024-03-27 ENCOUNTER — HOSPITAL ENCOUNTER (OUTPATIENT)
Dept: PAIN MANAGEMENT | Facility: CLINIC | Age: 54
Discharge: HOME | End: 2024-03-27
Payer: COMMERCIAL

## 2024-03-27 VITALS
DIASTOLIC BLOOD PRESSURE: 75 MMHG | TEMPERATURE: 97.9 F | SYSTOLIC BLOOD PRESSURE: 126 MMHG | BODY MASS INDEX: 35.26 KG/M2 | OXYGEN SATURATION: 97 % | WEIGHT: 199 LBS | RESPIRATION RATE: 12 BRPM | HEART RATE: 90 BPM | HEIGHT: 63 IN

## 2024-03-27 DIAGNOSIS — R52 PAIN MANAGEMENT: ICD-10-CM

## 2024-03-27 DIAGNOSIS — M47.812 CERVICAL SPONDYLOSIS WITHOUT MYELOPATHY: Primary | ICD-10-CM

## 2024-03-27 LAB — GLUCOSE BLD-MCNC: 128 MG/DL (ref 65–105)

## 2024-03-27 PROCEDURE — 2500000003 HC RX 250 WO HCPCS: Performed by: ANESTHESIOLOGY

## 2024-03-27 PROCEDURE — 99152 MOD SED SAME PHYS/QHP 5/>YRS: CPT | Performed by: ANESTHESIOLOGY

## 2024-03-27 PROCEDURE — 2580000003 HC RX 258: Performed by: ANESTHESIOLOGY

## 2024-03-27 PROCEDURE — 64634 DESTROY C/TH FACET JNT ADDL: CPT

## 2024-03-27 PROCEDURE — 64633 DESTROY CERV/THOR FACET JNT: CPT | Performed by: ANESTHESIOLOGY

## 2024-03-27 PROCEDURE — 82947 ASSAY GLUCOSE BLOOD QUANT: CPT

## 2024-03-27 PROCEDURE — 64633 DESTROY CERV/THOR FACET JNT: CPT

## 2024-03-27 PROCEDURE — 6360000002 HC RX W HCPCS: Performed by: ANESTHESIOLOGY

## 2024-03-27 PROCEDURE — 64634 DESTROY C/TH FACET JNT ADDL: CPT | Performed by: ANESTHESIOLOGY

## 2024-03-27 RX ORDER — FENTANYL CITRATE 50 UG/ML
INJECTION, SOLUTION INTRAMUSCULAR; INTRAVENOUS
Status: COMPLETED | OUTPATIENT
Start: 2024-03-27 | End: 2024-03-27

## 2024-03-27 RX ORDER — MIDAZOLAM HYDROCHLORIDE 2 MG/2ML
INJECTION, SOLUTION INTRAMUSCULAR; INTRAVENOUS
Status: COMPLETED | OUTPATIENT
Start: 2024-03-27 | End: 2024-03-27

## 2024-03-27 RX ORDER — LIDOCAINE HYDROCHLORIDE 40 MG/ML
INJECTION, SOLUTION RETROBULBAR
Status: COMPLETED | OUTPATIENT
Start: 2024-03-27 | End: 2024-03-27

## 2024-03-27 RX ORDER — 0.9 % SODIUM CHLORIDE 0.9 %
INTRAVENOUS SOLUTION INTRAVENOUS CONTINUOUS PRN
Status: COMPLETED | OUTPATIENT
Start: 2024-03-27 | End: 2024-03-27

## 2024-03-27 RX ORDER — LIDOCAINE HYDROCHLORIDE 10 MG/ML
INJECTION, SOLUTION EPIDURAL; INFILTRATION; INTRACAUDAL; PERINEURAL
Status: COMPLETED | OUTPATIENT
Start: 2024-03-27 | End: 2024-03-27

## 2024-03-27 RX ADMIN — MIDAZOLAM HYDROCHLORIDE 2 MG: 1 INJECTION, SOLUTION INTRAMUSCULAR; INTRAVENOUS at 13:15

## 2024-03-27 RX ADMIN — SODIUM CHLORIDE 500 ML: 0.9 INJECTION, SOLUTION INTRAVENOUS at 13:15

## 2024-03-27 RX ADMIN — LIDOCAINE HYDROCHLORIDE 5 ML: 40 SOLUTION RETROBULBAR; TOPICAL at 13:23

## 2024-03-27 RX ADMIN — FENTANYL CITRATE 50 MCG: 50 INJECTION, SOLUTION INTRAMUSCULAR; INTRAVENOUS at 13:15

## 2024-03-27 RX ADMIN — FENTANYL CITRATE 50 MCG: 50 INJECTION, SOLUTION INTRAMUSCULAR; INTRAVENOUS at 13:20

## 2024-03-27 RX ADMIN — LIDOCAINE HYDROCHLORIDE 5 ML: 10 INJECTION, SOLUTION EPIDURAL; INFILTRATION; INTRACAUDAL at 13:15

## 2024-03-27 ASSESSMENT — PAIN DESCRIPTION - LOCATION: LOCATION: NECK

## 2024-03-27 ASSESSMENT — PAIN DESCRIPTION - PAIN TYPE: TYPE: CHRONIC PAIN

## 2024-03-27 ASSESSMENT — PAIN DESCRIPTION - DESCRIPTORS: DESCRIPTORS: STABBING;SHARP

## 2024-03-27 ASSESSMENT — PAIN DESCRIPTION - FREQUENCY: FREQUENCY: CONTINUOUS

## 2024-03-27 ASSESSMENT — PAIN DESCRIPTION - ORIENTATION: ORIENTATION: RIGHT;LEFT

## 2024-03-27 ASSESSMENT — PAIN SCALES - GENERAL: PAINLEVEL_OUTOF10: 9

## 2024-03-27 NOTE — DISCHARGE INSTRUCTIONS
Discharge Instructions following Sedation or Anesthesia:  You have  received  a sedative/anesthetic therefore, you should not consume any alcoholic beverages for minimum of 12 hours.  Do not drive or operate machinery for 24 hours.  Do not sign legal documents for 24 hours.  Dizziness, drowsiness, and unsteadiness may occur.  Rest when need to.  Increase diet as tolerated.  Keep up on fluids if diet allows.      General Instructions:  Do not take a tub bath for 72 hours after procedure (this includes hot tubs and swimming pools).  You may shower, but avoid hot water to injection site.   Avoid strenuous activity TODAY especially if you experience dizziness.   Remove band-aid the next day.  Wash off any residual iodine   Do not use heat, heating pad, or any other heating device over the injection site for 3 days after the procedure.  If you experience pain after your procedure, you may continue with your current pain medication as prescribed.  (DO NOT INCREASE YOUR PAIN MEDICATION WITHOUT TALKING TO DOCTOR)  Soreness and pain at injection site is common, may use ice to reduce soreness.    Call [facility] at [phone] if you experience:   Fever, chills or temperature over 100    Vomiting, Headache, persistent stiff neck, nausea, blurred vision   Difficulty in urinating or unable to urinate with 8 hours   Increase in weakness, numbness or loss of function   Increased redness, swelling or drainage at the injection site

## 2024-03-27 NOTE — OP NOTE
Preoperative Diagnosis: Cervical spondylosis and chronic cervicalgia  Postoperative Diagnosis: Cervical spondylosis and chronic cervicalgia    Procedure Performed:  Right Cervical Medical branch nerve Radiofrequency ablation at C2/ C/3 C/4  nerves under fluoroscopy guidance    BLOOD LOSS: NONE    Procedure:      The Patient was seen in the preop area, chart was reviewed, informed consent was obtained. Patient was taken to procedure room and was placed in prone position. Vital signs were monitored through out the  Procedure. A time out was completed. The site was prepped and draped in sterile manner.     The target point was identified with fluoro guidance using ipsilateral views. Skin and deep tissues were anesthetized with 1 % lidocaine. A  2o-gauge RF needle was advanced under fluoroscopy guidance to the targets until a bony contact was made. Position was conformed and adjusted in AP and lateral views.     The Nerve testing was performed at each level using sensory stimulation at 50 HZ and motor stimulation at 2 HZ. No arm contraction was noticed, some multifidus contraction was noticed.  Impedance was wnl at each level.  0. 5 ml of 4% lidocaine was injected to anesthetize the nerves at each level prior to lesioning.  Finally Radiofrequency lesions were made at 85 degrees C for 90 sec.    The needle was removed and a Band-Aid was placed over the needle  insertion site.  The patient's vital signs remained stable and the patient tolerated the procedure well.          SEDATION NOTE:    ASA CLASSIFICATION  3  MP   CLASSIFICATION  3    Moderate intravenous conscious sedation was supervised by Dr. Dixon  The patient was independently monitored by a Registered Nurse assigned to the Procedure Room  Monitoring included automated blood pressure, continuous EKG, Capnography and continuous pulse oximetry.   The detailed Conscious Record is permanently stored in the Hospital Information System.     The following is the  3

## 2024-03-27 NOTE — INTERVAL H&P NOTE
Update History & Physical    The patient's History and Physical of March 6, 2024 was reviewed with the patient and I examined the patient. There was no change. The surgical site was confirmed by the patient and me.     Plan: The risks, benefits, expected outcome, and alternative to the recommended procedure have been discussed with the patient. Patient understands and wants to proceed with the procedure.     ASA 3  MP 3    Electronically signed by Luis Angel Dixon MD on 3/27/2024 at 1:02 PM

## 2024-03-28 ENCOUNTER — OFFICE VISIT (OUTPATIENT)
Dept: NEUROSURGERY | Age: 54
End: 2024-03-28
Payer: COMMERCIAL

## 2024-03-28 VITALS
OXYGEN SATURATION: 95 % | SYSTOLIC BLOOD PRESSURE: 127 MMHG | DIASTOLIC BLOOD PRESSURE: 84 MMHG | WEIGHT: 201 LBS | BODY MASS INDEX: 35.61 KG/M2 | RESPIRATION RATE: 20 BRPM | TEMPERATURE: 98.4 F | HEART RATE: 82 BPM

## 2024-03-28 DIAGNOSIS — M54.12 CERVICAL RADICULITIS: ICD-10-CM

## 2024-03-28 DIAGNOSIS — Z98.1 HISTORY OF FUSION OF CERVICAL SPINE: ICD-10-CM

## 2024-03-28 DIAGNOSIS — M47.812 CERVICAL SPONDYLOSIS: ICD-10-CM

## 2024-03-28 DIAGNOSIS — Z98.890 S/P LUMBAR LAMINECTOMY: Primary | ICD-10-CM

## 2024-03-28 PROCEDURE — 3079F DIAST BP 80-89 MM HG: CPT | Performed by: NEUROLOGICAL SURGERY

## 2024-03-28 PROCEDURE — 99214 OFFICE O/P EST MOD 30 MIN: CPT | Performed by: NEUROLOGICAL SURGERY

## 2024-03-28 PROCEDURE — G8417 CALC BMI ABV UP PARAM F/U: HCPCS | Performed by: NEUROLOGICAL SURGERY

## 2024-03-28 PROCEDURE — G8427 DOCREV CUR MEDS BY ELIG CLIN: HCPCS | Performed by: NEUROLOGICAL SURGERY

## 2024-03-28 PROCEDURE — 3074F SYST BP LT 130 MM HG: CPT | Performed by: NEUROLOGICAL SURGERY

## 2024-03-28 PROCEDURE — 3017F COLORECTAL CA SCREEN DOC REV: CPT | Performed by: NEUROLOGICAL SURGERY

## 2024-03-28 PROCEDURE — 4004F PT TOBACCO SCREEN RCVD TLK: CPT | Performed by: NEUROLOGICAL SURGERY

## 2024-03-28 PROCEDURE — G8482 FLU IMMUNIZE ORDER/ADMIN: HCPCS | Performed by: NEUROLOGICAL SURGERY

## 2024-03-28 NOTE — PROGRESS NOTES
Department of Neurosurgery                                                      Follow up visit      History Obtained From:  patient, spouse    CHIEF COMPLAINT:         Chief Complaint   Patient presents with    Follow-up       HISTORY OF PRESENT ILLNESS:       The patient is a 53 y.o. female who presents for follow up for S/P lumbar laminectomy (10/30/2023) and cervical spondylosis.    Patient reports that she had seen pain management and had an ablation done yesterday. She says that the ablation has markedly helped her headache and was otherwise told that for further treatment of other symptoms she would require surgical intervention.     She continues to have neck pain that goes down her neck, into both shoulders, into her armpits, and down her hands. She says that the first 3 fingers of each hand are the worst. She generally says that her symptoms are worse on her right side. She also endorses continued worsening of her pain. She says that she has tried to be more active since recovering from her laminectomy however says that her neck pain and upper extremity symptoms have caused some hindrance of her activities of daily living. Currently she says that the worst symptom for her is the pain that radiates from her neck into her shoulders. She states that this pain is worst at night. Patient has had prior cervical operations and states that her symptoms of radiating pain are similar from prior to her other surgeries.    Patient reports that she has been increasingly dropping things with both hands and says that her right hand is worse than the left.    Patient makes note that she has been having some new symptoms, notes constant paresthesia in the right hand and forearm. She notices it in the left hand as well but says that she notices it more predominantly in the left. She says that the paresthesia that she experiences is different and relates it to the feeling of \"having a battery in your

## 2024-04-08 ENCOUNTER — OFFICE VISIT (OUTPATIENT)
Dept: FAMILY MEDICINE CLINIC | Age: 54
End: 2024-04-08
Payer: COMMERCIAL

## 2024-04-08 VITALS
TEMPERATURE: 97.4 F | OXYGEN SATURATION: 98 % | HEART RATE: 95 BPM | SYSTOLIC BLOOD PRESSURE: 122 MMHG | DIASTOLIC BLOOD PRESSURE: 84 MMHG | BODY MASS INDEX: 36.54 KG/M2 | WEIGHT: 206.3 LBS

## 2024-04-08 DIAGNOSIS — J30.2 SEASONAL ALLERGIC RHINITIS, UNSPECIFIED TRIGGER: ICD-10-CM

## 2024-04-08 DIAGNOSIS — J84.82: ICD-10-CM

## 2024-04-08 DIAGNOSIS — E03.9 HYPOTHYROIDISM, UNSPECIFIED TYPE: ICD-10-CM

## 2024-04-08 DIAGNOSIS — Z79.4 TYPE 2 DIABETES MELLITUS WITH HYPERGLYCEMIA, WITH LONG-TERM CURRENT USE OF INSULIN (HCC): ICD-10-CM

## 2024-04-08 DIAGNOSIS — I10 ESSENTIAL HYPERTENSION: Primary | ICD-10-CM

## 2024-04-08 DIAGNOSIS — I25.119 CORONARY ARTERY DISEASE INVOLVING NATIVE HEART WITH ANGINA PECTORIS, UNSPECIFIED VESSEL OR LESION TYPE (HCC): ICD-10-CM

## 2024-04-08 DIAGNOSIS — E11.65 TYPE 2 DIABETES MELLITUS WITH HYPERGLYCEMIA, WITH LONG-TERM CURRENT USE OF INSULIN (HCC): ICD-10-CM

## 2024-04-08 DIAGNOSIS — N95.1 HOT FLASHES DUE TO MENOPAUSE: ICD-10-CM

## 2024-04-08 DIAGNOSIS — E78.2 MIXED HYPERLIPIDEMIA: ICD-10-CM

## 2024-04-08 PROCEDURE — 2022F DILAT RTA XM EVC RTNOPTHY: CPT | Performed by: NURSE PRACTITIONER

## 2024-04-08 PROCEDURE — 3017F COLORECTAL CA SCREEN DOC REV: CPT | Performed by: NURSE PRACTITIONER

## 2024-04-08 PROCEDURE — 3079F DIAST BP 80-89 MM HG: CPT | Performed by: NURSE PRACTITIONER

## 2024-04-08 PROCEDURE — 99214 OFFICE O/P EST MOD 30 MIN: CPT | Performed by: NURSE PRACTITIONER

## 2024-04-08 PROCEDURE — 3046F HEMOGLOBIN A1C LEVEL >9.0%: CPT | Performed by: NURSE PRACTITIONER

## 2024-04-08 PROCEDURE — G8417 CALC BMI ABV UP PARAM F/U: HCPCS | Performed by: NURSE PRACTITIONER

## 2024-04-08 PROCEDURE — G8427 DOCREV CUR MEDS BY ELIG CLIN: HCPCS | Performed by: NURSE PRACTITIONER

## 2024-04-08 PROCEDURE — 3074F SYST BP LT 130 MM HG: CPT | Performed by: NURSE PRACTITIONER

## 2024-04-08 PROCEDURE — 4004F PT TOBACCO SCREEN RCVD TLK: CPT | Performed by: NURSE PRACTITIONER

## 2024-04-08 RX ORDER — FEZOLINETANT 45 MG/1
1 TABLET, FILM COATED ORAL DAILY
Qty: 30 TABLET | Refills: 2 | Status: SHIPPED | OUTPATIENT
Start: 2024-04-08

## 2024-04-08 RX ORDER — ALBUTEROL SULFATE 90 UG/1
2 AEROSOL, METERED RESPIRATORY (INHALATION) 4 TIMES DAILY PRN
Qty: 54 G | Refills: 1 | Status: SHIPPED | OUTPATIENT
Start: 2024-04-08

## 2024-04-08 RX ORDER — FLUTICASONE PROPIONATE 50 MCG
1 SPRAY, SUSPENSION (ML) NASAL DAILY PRN
Qty: 16 G | Refills: 2 | Status: SHIPPED | OUTPATIENT
Start: 2024-04-08

## 2024-04-08 RX ORDER — ALBUTEROL SULFATE 2.5 MG/3ML
2.5 SOLUTION RESPIRATORY (INHALATION) DAILY PRN
Qty: 60 EACH | Refills: 0 | Status: SHIPPED | OUTPATIENT
Start: 2024-04-08

## 2024-04-08 ASSESSMENT — PATIENT HEALTH QUESTIONNAIRE - PHQ9
3. TROUBLE FALLING OR STAYING ASLEEP: NOT AT ALL
SUM OF ALL RESPONSES TO PHQ QUESTIONS 1-9: 0
7. TROUBLE CONCENTRATING ON THINGS, SUCH AS READING THE NEWSPAPER OR WATCHING TELEVISION: NOT AT ALL
1. LITTLE INTEREST OR PLEASURE IN DOING THINGS: NOT AT ALL
SUM OF ALL RESPONSES TO PHQ9 QUESTIONS 1 & 2: 0
10. IF YOU CHECKED OFF ANY PROBLEMS, HOW DIFFICULT HAVE THESE PROBLEMS MADE IT FOR YOU TO DO YOUR WORK, TAKE CARE OF THINGS AT HOME, OR GET ALONG WITH OTHER PEOPLE: NOT DIFFICULT AT ALL
8. MOVING OR SPEAKING SO SLOWLY THAT OTHER PEOPLE COULD HAVE NOTICED. OR THE OPPOSITE, BEING SO FIGETY OR RESTLESS THAT YOU HAVE BEEN MOVING AROUND A LOT MORE THAN USUAL: NOT AT ALL
5. POOR APPETITE OR OVEREATING: NOT AT ALL
2. FEELING DOWN, DEPRESSED OR HOPELESS: NOT AT ALL
9. THOUGHTS THAT YOU WOULD BE BETTER OFF DEAD, OR OF HURTING YOURSELF: NOT AT ALL
6. FEELING BAD ABOUT YOURSELF - OR THAT YOU ARE A FAILURE OR HAVE LET YOURSELF OR YOUR FAMILY DOWN: NOT AT ALL
SUM OF ALL RESPONSES TO PHQ QUESTIONS 1-9: 0
4. FEELING TIRED OR HAVING LITTLE ENERGY: NOT AT ALL

## 2024-04-08 NOTE — PROGRESS NOTES
30 tablet     Refill:  2               AssessmentPlan/Medical Decision Making     1. Pulmonary Langerhans cell granulomatosis (HCC)    - albuterol (PROVENTIL) (2.5 MG/3ML) 0.083% nebulizer solution; Take 3 mLs by nebulization daily as needed for Wheezing  Dispense: 60 each; Refill: 0  - albuterol sulfate HFA (VENTOLIN HFA) 108 (90 Base) MCG/ACT inhaler; Inhale 2 puffs into the lungs 4 times daily as needed for Wheezing  Dispense: 54 g; Refill: 1  - CBC with Auto Differential; Future  - Comprehensive Metabolic Panel; Future  - TSH with Reflex; Future    2. Seasonal allergic rhinitis, unspecified trigger    - fluticasone (FLONASE) 50 MCG/ACT nasal spray; 1 spray by Each Nostril route daily as needed for Rhinitis  Dispense: 16 g; Refill: 2  - CBC with Auto Differential; Future  - Comprehensive Metabolic Panel; Future  - TSH with Reflex; Future    3. Hot flashes due to menopause    - fezolinetant (VEOZAH) 45 MG TABS; Take 1 tablet by mouth daily  Dispense: 30 tablet; Refill: 2  - CBC with Auto Differential; Future  - Comprehensive Metabolic Panel; Future  - TSH with Reflex; Future    4. Mixed hyperlipidemia    - CBC with Auto Differential; Future  - Comprehensive Metabolic Panel; Future  - TSH with Reflex; Future  - Lipid, Fasting; Future    5. Type 2 diabetes mellitus with hyperglycemia, with long-term current use of insulin (HCC)    - CBC with Auto Differential; Future  - Comprehensive Metabolic Panel; Future  - TSH with Reflex; Future  - Lipid, Fasting; Future    6. Hypothyroidism, unspecified type    - TSH with Reflex; Future    7. Essential hypertension    - CBC with Auto Differential; Future  - Comprehensive Metabolic Panel; Future  - TSH with Reflex; Future  - Lipid, Fasting; Future    8. Coronary artery disease involving native heart with angina pectoris, unspecified vessel or lesion type (HCC)    - CBC with Auto Differential; Future  - Comprehensive Metabolic Panel; Future  - TSH with Reflex; Future  - Lipid,

## 2024-04-09 ENCOUNTER — HOSPITAL ENCOUNTER (OUTPATIENT)
Dept: NUCLEAR MEDICINE | Age: 54
Discharge: HOME | End: 2024-04-11
Attending: INTERNAL MEDICINE
Payer: COMMERCIAL

## 2024-04-09 DIAGNOSIS — R11.14 BILIOUS VOMITING WITH NAUSEA: ICD-10-CM

## 2024-04-09 DIAGNOSIS — K58.2 IRRITABLE BOWEL SYNDROME WITH BOTH CONSTIPATION AND DIARRHEA: ICD-10-CM

## 2024-04-09 DIAGNOSIS — F12.90 MARIJUANA USE: ICD-10-CM

## 2024-04-09 DIAGNOSIS — F17.200 SMOKER: ICD-10-CM

## 2024-04-09 DIAGNOSIS — R11.0 NAUSEA: ICD-10-CM

## 2024-04-09 DIAGNOSIS — K21.00 GASTROESOPHAGEAL REFLUX DISEASE WITH ESOPHAGITIS WITHOUT HEMORRHAGE: ICD-10-CM

## 2024-04-09 DIAGNOSIS — F41.9 ANXIETY: ICD-10-CM

## 2024-04-09 PROCEDURE — 78264 GASTRIC EMPTYING IMG STUDY: CPT

## 2024-04-09 PROCEDURE — A9541 TC99M SULFUR COLLOID: HCPCS | Performed by: INTERNAL MEDICINE

## 2024-04-09 PROCEDURE — 3430000000 HC RX DIAGNOSTIC RADIOPHARMACEUTICAL: Performed by: INTERNAL MEDICINE

## 2024-04-09 RX ADMIN — Medication 2.4 MILLICURIE: at 07:40

## 2024-04-12 ENCOUNTER — OFFICE VISIT (OUTPATIENT)
Dept: GASTROENTEROLOGY | Age: 54
End: 2024-04-12
Payer: COMMERCIAL

## 2024-04-12 VITALS
SYSTOLIC BLOOD PRESSURE: 138 MMHG | DIASTOLIC BLOOD PRESSURE: 88 MMHG | HEART RATE: 80 BPM | BODY MASS INDEX: 35.82 KG/M2 | WEIGHT: 202.2 LBS

## 2024-04-12 DIAGNOSIS — F12.90 MARIJUANA USE: ICD-10-CM

## 2024-04-12 DIAGNOSIS — K31.84 GASTROPARESIS: Primary | ICD-10-CM

## 2024-04-12 DIAGNOSIS — E66.8 MODERATE OBESITY: ICD-10-CM

## 2024-04-12 DIAGNOSIS — F17.200 SMOKER: ICD-10-CM

## 2024-04-12 DIAGNOSIS — R11.14 BILIOUS VOMITING WITH NAUSEA: ICD-10-CM

## 2024-04-12 DIAGNOSIS — F41.9 ANXIETY: ICD-10-CM

## 2024-04-12 PROCEDURE — 3017F COLORECTAL CA SCREEN DOC REV: CPT | Performed by: INTERNAL MEDICINE

## 2024-04-12 PROCEDURE — 3079F DIAST BP 80-89 MM HG: CPT | Performed by: INTERNAL MEDICINE

## 2024-04-12 PROCEDURE — G8417 CALC BMI ABV UP PARAM F/U: HCPCS | Performed by: INTERNAL MEDICINE

## 2024-04-12 PROCEDURE — 3075F SYST BP GE 130 - 139MM HG: CPT | Performed by: INTERNAL MEDICINE

## 2024-04-12 PROCEDURE — 4004F PT TOBACCO SCREEN RCVD TLK: CPT | Performed by: INTERNAL MEDICINE

## 2024-04-12 PROCEDURE — 99214 OFFICE O/P EST MOD 30 MIN: CPT | Performed by: INTERNAL MEDICINE

## 2024-04-12 PROCEDURE — G8427 DOCREV CUR MEDS BY ELIG CLIN: HCPCS | Performed by: INTERNAL MEDICINE

## 2024-04-12 RX ORDER — ONDANSETRON 4 MG/1
4 TABLET, FILM COATED ORAL DAILY PRN
Qty: 30 TABLET | Refills: 0 | Status: SHIPPED | OUTPATIENT
Start: 2024-04-12

## 2024-04-12 ASSESSMENT — ENCOUNTER SYMPTOMS
ABDOMINAL PAIN: 1
NAUSEA: 1
CONSTIPATION: 1
CHOKING: 0
VOICE CHANGE: 0
VOMITING: 1
RECTAL PAIN: 1
BLOOD IN STOOL: 1
TROUBLE SWALLOWING: 0
SHORTNESS OF BREATH: 0
DIARRHEA: 0
WHEEZING: 0
COUGH: 0
ABDOMINAL DISTENTION: 0
ANAL BLEEDING: 0

## 2024-04-12 NOTE — PROGRESS NOTES
well-developed.      Comments: Anxious   Obesity    HENT:      Head: Normocephalic and atraumatic.   Eyes:      Conjunctiva/sclera: Conjunctivae normal.      Pupils: Pupils are equal, round, and reactive to light.   Cardiovascular:      Heart sounds: Normal heart sounds.   Pulmonary:      Effort: Pulmonary effort is normal.      Breath sounds: Normal breath sounds.   Abdominal:      General: Bowel sounds are normal.      Palpations: Abdomen is soft.      Comments: NON TENDER, NON DISTENTED  LIVER SPLEEN AND HERNIAS ARE NOT  PALPABLE  BOWEL SOUNDS ARE POSITIVE   Obese abdomen and mild tender   Musculoskeletal:         General: Normal range of motion.      Cervical back: Normal range of motion and neck supple.   Skin:     General: Skin is warm.   Neurological:      Mental Status: She is alert and oriented to person, place, and time.   Psychiatric:         Behavior: Behavior normal.           LABORATORY DATA: Reviewed  Lab Results   Component Value Date    WBC 12.6 (H) 11/07/2023    HGB 14.2 11/07/2023    HCT 44.0 11/07/2023    MCV 91.9 11/07/2023     (H) 11/07/2023     11/07/2023    K 3.9 11/07/2023     11/07/2023    CO2 20 11/07/2023    BUN 15 11/07/2023    CREATININE 0.6 11/07/2023    LABALBU 4.0 11/06/2023    BILITOT 0.6 11/06/2023    ALKPHOS 107 (H) 11/06/2023    AST 14 11/06/2023    ALT 10 11/06/2023    INR 1.0 10/16/2023         Lab Results   Component Value Date    RBC 4.79 11/07/2023    HGB 14.2 11/07/2023    MCV 91.9 11/07/2023    MCH 29.6 11/07/2023    MCHC 32.3 11/07/2023    RDW 15.2 (H) 11/07/2023    MPV 8.9 11/07/2023    BASOPCT 0 11/07/2023    LYMPHSABS 5.04 (H) 11/07/2023    MONOSABS 0.76 11/07/2023    NEUTROABS 6.55 11/07/2023    EOSABS 0.25 11/07/2023    BASOSABS 0.00 11/07/2023         DIAGNOSTIC TESTING:     NM GASTRIC EMPTYING    Result Date: 4/9/2024  EXAMINATION: NUCLEAR MEDICINE GASTRIC EMPTYING STUDY 4/9/2024 7:55 am TECHNIQUE: Following ingestion of a standard liquid meal

## 2024-04-16 ENCOUNTER — TELEPHONE (OUTPATIENT)
Dept: NEUROSURGERY | Age: 54
End: 2024-04-16

## 2024-04-16 NOTE — TELEPHONE ENCOUNTER
We will manage post-operative pain medications, outside of post-op period, typically defer to pain specialist or PCP. They are working on scheduling surgery and will reach out to patient soon with more information.

## 2024-04-16 NOTE — TELEPHONE ENCOUNTER
Pt called and is stating she would like to get scheduled for surgery but is wondering why we have not reached out yet with dates. Per Mini (surgery scheduler), she cannot schedule yet due to not having all of the surgery information. Please advise on information so we can get pt scheduled. Pt is also asking if she needed to get a more recent CT imaging done as there is no order placed for one, and states she is in extreme pain and tylenol and ibuprofen is not touching it. Discussed how ususally PCP or pain management will take over pain meds until we do surgery, pt reports neither PCP or pain management is sending any medication for her and her pain is almost always a 9/10. Please advise

## 2024-04-23 RX ORDER — SODIUM CHLORIDE, SODIUM LACTATE, POTASSIUM CHLORIDE, CALCIUM CHLORIDE 600; 310; 30; 20 MG/100ML; MG/100ML; MG/100ML; MG/100ML
INJECTION, SOLUTION INTRAVENOUS CONTINUOUS
OUTPATIENT
Start: 2024-04-23

## 2024-04-23 NOTE — DISCHARGE INSTRUCTIONS
Preoperative Instructions:    Stop eating solid foods at midnight the night prior to surgery.    Stop drinking clear liquids at midnight the night prior to surgery.    Arrive at the surgery center (Entrance B) by 6:00 on 5/17/2024  (or as directed by your surgeon's office).    If you have been given a blood band, you must bring it with you the day of surgery.    Please stop any blood thinning medications as directed by your surgeon or prescribing physician. Failure to stop certain medications may interfere with your scheduled surgery.    These may include:  Aspirin, Warfarin (Coumadin), Clopidogrel (Plavix), Ibuprofen (Motrin, Advil), Naproxen (Aleve), Meloxicam (Mobic), Celecoxib (Celebrex), Eliquis, Pradaxa, Xarelto, Effient, Fish Oil, Herbal supplements.    You may continue the rest of your medications through the night before surgery unless instructed otherwise.    Please take only the following medication(s) the day of surgery with a small sip of water:  Ranexa, toprol/metoprolol, protonix, synthroid/levothyroxine    PLEASE NOTE:  THE ABOVE (IF ANY) DISCONTINUED MEDS MAY ONLY BE FROM   \"CLEANING UP\" THE MED LIST AND WERE NOT ACTUALLY CANCELLED; SEE CHART FOR DETAILS AND ALWAYS CHECK WITH PRESCRIBING PROVIDER BEFORE DISCONTINUING ANY MEDICATIONS        REMINDERS:  ** If you are going home the day of your procedure, you will need a friend or family member to drive you home after your procedure.  Your  must be 18 years of age or older and able to sign off on your discharge instructions.  Taxi cabs or any form of public transportation is not acceptable.    ** It is preferable that the friend or family member stay at the hospital throughout your procedure.  ** If you are going home the same day as your procedure, someone must remain with you for the first 24 hours after your surgery if you receive anesthesia or sedation.  If you do not have someone to stay with you, your procedure may be cancelled.  **  Please

## 2024-04-30 ENCOUNTER — HOSPITAL ENCOUNTER (OUTPATIENT)
Dept: PREADMISSION TESTING | Age: 54
Discharge: HOME | End: 2024-05-04
Payer: COMMERCIAL

## 2024-04-30 VITALS
BODY MASS INDEX: 36.33 KG/M2 | HEIGHT: 63 IN | OXYGEN SATURATION: 95 % | SYSTOLIC BLOOD PRESSURE: 129 MMHG | WEIGHT: 205.04 LBS | HEART RATE: 90 BPM | DIASTOLIC BLOOD PRESSURE: 88 MMHG | RESPIRATION RATE: 20 BRPM | TEMPERATURE: 97.6 F

## 2024-04-30 LAB
ABO + RH BLD: NORMAL
ANION GAP SERPL CALCULATED.3IONS-SCNC: 12 MMOL/L (ref 9–16)
ARM BAND NUMBER: NORMAL
BILIRUB UR QL STRIP: NEGATIVE
BLOOD BANK SAMPLE EXPIRATION: NORMAL
BLOOD GROUP ANTIBODIES SERPL: NEGATIVE
BUN SERPL-MCNC: 14 MG/DL (ref 6–20)
CHLORIDE SERPL-SCNC: 103 MMOL/L (ref 98–107)
CLARITY UR: CLEAR
CO2 SERPL-SCNC: 22 MMOL/L (ref 20–31)
COLOR UR: YELLOW
COMMENT: ABNORMAL
CREAT SERPL-MCNC: 0.8 MG/DL (ref 0.5–0.9)
ERYTHROCYTE [DISTWIDTH] IN BLOOD BY AUTOMATED COUNT: 13.9 % (ref 11.8–14.4)
GFR SERPL CREATININE-BSD FRML MDRD: >90 ML/MIN/1.73M2
GLUCOSE SERPL-MCNC: 136 MG/DL (ref 74–99)
GLUCOSE UR STRIP-MCNC: ABNORMAL MG/DL
HCT VFR BLD AUTO: 47.8 % (ref 36.3–47.1)
HGB BLD-MCNC: 15.8 G/DL (ref 11.9–15.1)
HGB UR QL STRIP.AUTO: NEGATIVE
KETONES UR STRIP-MCNC: NEGATIVE MG/DL
LEUKOCYTE ESTERASE UR QL STRIP: NEGATIVE
MCH RBC QN AUTO: 30.9 PG (ref 25.2–33.5)
MCHC RBC AUTO-ENTMCNC: 33.1 G/DL (ref 28.4–34.8)
MCV RBC AUTO: 93.5 FL (ref 82.6–102.9)
NITRITE UR QL STRIP: NEGATIVE
NRBC BLD-RTO: 0 PER 100 WBC
PH UR STRIP: 5.5 [PH] (ref 5–8)
PLATELET # BLD AUTO: 320 K/UL (ref 138–453)
PMV BLD AUTO: 9.5 FL (ref 8.1–13.5)
POTASSIUM SERPL-SCNC: 4.3 MMOL/L (ref 3.7–5.3)
PROT UR STRIP-MCNC: NEGATIVE MG/DL
RBC # BLD AUTO: 5.11 M/UL (ref 3.95–5.11)
SODIUM SERPL-SCNC: 137 MMOL/L (ref 136–145)
SP GR UR STRIP: 1.04 (ref 1–1.03)
UROBILINOGEN UR STRIP-ACNC: NORMAL EU/DL (ref 0–1)
WBC OTHER # BLD: 10.9 K/UL (ref 3.5–11.3)

## 2024-04-30 PROCEDURE — 82947 ASSAY GLUCOSE BLOOD QUANT: CPT

## 2024-04-30 PROCEDURE — 80051 ELECTROLYTE PANEL: CPT

## 2024-04-30 PROCEDURE — 93005 ELECTROCARDIOGRAM TRACING: CPT | Performed by: ANESTHESIOLOGY

## 2024-04-30 PROCEDURE — 86901 BLOOD TYPING SEROLOGIC RH(D): CPT

## 2024-04-30 PROCEDURE — 36415 COLL VENOUS BLD VENIPUNCTURE: CPT

## 2024-04-30 PROCEDURE — 82565 ASSAY OF CREATININE: CPT

## 2024-04-30 PROCEDURE — 81003 URINALYSIS AUTO W/O SCOPE: CPT

## 2024-04-30 PROCEDURE — 86900 BLOOD TYPING SEROLOGIC ABO: CPT

## 2024-04-30 PROCEDURE — 86850 RBC ANTIBODY SCREEN: CPT

## 2024-04-30 PROCEDURE — 84520 ASSAY OF UREA NITROGEN: CPT

## 2024-04-30 PROCEDURE — 85027 COMPLETE CBC AUTOMATED: CPT

## 2024-04-30 RX ORDER — PEN NEEDLE, DIABETIC 32GX 5/32"
NEEDLE, DISPOSABLE MISCELLANEOUS
COMMUNITY
Start: 2024-03-31

## 2024-04-30 RX ORDER — CIPROFLOXACIN 500 MG/1
500 TABLET, FILM COATED ORAL 2 TIMES DAILY
COMMUNITY
Start: 2024-04-26 | End: 2024-05-03

## 2024-04-30 ASSESSMENT — PAIN DESCRIPTION - DESCRIPTORS: DESCRIPTORS: BURNING

## 2024-04-30 ASSESSMENT — PAIN DESCRIPTION - LOCATION: LOCATION: NECK;SHOULDER;ARM

## 2024-04-30 ASSESSMENT — PAIN - FUNCTIONAL ASSESSMENT: PAIN_FUNCTIONAL_ASSESSMENT: PREVENTS OR INTERFERES SOME ACTIVE ACTIVITIES AND ADLS

## 2024-04-30 ASSESSMENT — PAIN DESCRIPTION - ONSET: ONSET: AWAKENED FROM SLEEP

## 2024-04-30 ASSESSMENT — PAIN DESCRIPTION - PAIN TYPE: TYPE: CHRONIC PAIN

## 2024-04-30 ASSESSMENT — PAIN DESCRIPTION - FREQUENCY: FREQUENCY: CONTINUOUS

## 2024-04-30 ASSESSMENT — PAIN DESCRIPTION - ORIENTATION: ORIENTATION: POSTERIOR

## 2024-04-30 NOTE — H&P
History and Physical    Pt Name: Charisma Donnelly  MRN: [id number]  YOB: 1970  Date of evaluation: 2024    SUBJECTIVE:   History of Chief Complaint:    Patient presents for PAT appointment.  She reports both cervical and lumbar disc disease.  She has underwent several cervical/lumbar spine procedures in the past, most recent lumbar spine surgery was in , Dr. Christina.  She has failed conservative treatment, has been scheduled for C6-T1 POSTERIOR CERVICAL FUSION, POSSIBLE T2.   Past Medical History    has a past medical history of Abnormal uterine bleeding (AUB), Anxiety, Arthritis, CAD (coronary artery disease), Chronic neck pain, COVID-19, Depression, Diabetes mellitus (HCC), Fatty liver, [place] (hard of hearing), HTN (hypertension), Hx of blood clots, Hyperlipidemia, Hypothyroid, ILD (interstitial lung disease) (HCC), Kidney stone, Langerhans-cell granulomatosis (HCC), LCH (Langerhan's cell histiocytosis) (HCC), Lower back pain, Myocardial infarct (HCC), Neck pain, Pulmonary nodule, Snores, Spinal stenosis, Under care of service provider, Under care of service provider, Under care of service provider, Under care of service provider, Under care of service provider, Under care of service provider, Uterine fibroid, Wears eyeglasses, and Wears partial dentures.  Past Surgical History   has a past surgical history that includes back surgery ();  section; Mastoid surgery; Tonsillectomy; Cardiac catheterization (); Lung biopsy (Right); hip surgery (Left); Colonoscopy (N/A, 2021); Upper gastrointestinal endoscopy (2021); Cervical spine surgery (); cervical fusion (2016); laminectomy (); Hysterectomy (N/A, 2022); lymph node biopsy (Left, 09/15/2023); US BIOPSY LYMPH NODE (09/15/2023); lumbar laminectomy (10/30/2023); lumbar fusion (N/A, 10/30/2023); Esophagogastroduodenoscopy (2023); Upper gastrointestinal endoscopy (N/A, 2023); Ovary removal; cervical

## 2024-04-30 NOTE — PROGRESS NOTES
Anesthesia Focused Assessment      STOP-BANG Sleep Apnea Questionnaire    SNORE loudly (heard through closed doors)?   No  TIRED, fatigued, sleepy during daytime?    No  OBSERVED stopping breathing during sleep?   No  High blood PRESSURE being treated?    Yes    BMI over 35?        Yes  AGE over 50?        Yes  NECK circumference over 16\"?     No  GENDER (male)?       No             Total 3  High risk 5-8  Intermediate risk 3-4  Low risk 0-2    Obstructive Sleep Apnea: denies  If YES, machine used: no     Type 1 DM:   no  T2DM:  yes    Coronary Artery Disease:  yes, stent x 1, follows with [facility].  Hypertension:  yes    Active smoker:  1/2 ppd for 25 years.  Drinks alcohol:  rarely  Recreational drugs: no    Dentition: benign    Defib / AICD / Pacemaker: no      Renal Failure/dialysis:  no    Patient was evaluated in PAT & anesthesia guidelines were applied.   NPO guidelines, medication instructions and scheduled arrival time were reviewed with patient.  I advised patient to please contact the surgeon's office, ahead of time if possible, if any new signs or symptoms of illness, infection, rash, etc    Hx of anesthesia complications:  prolonged emergence x 1 after cervical spine surgery (was under longer than anticipated patient states), but has not had any other difficulty with anesthesia.   Family hx of anesthesia complications:  no                                                                                                                     PCP clearance from Lucy Torres CNP is in media in Epic.  Office notes in [facility] from [facility] 11/2023.  Patient was cleared with blood thinner instructions for pain management procedures, will request updated clearance for surgery from Dr. Culp.  Patient had been seen through [facility] in October and Dr. Winkler had requested cardiac clearance at that time as well.    JOSY ARANGO PA-C  4/30/24  8:27 AM

## 2024-05-01 LAB
EKG ATRIAL RATE: 75 BPM
EKG P AXIS: 57 DEGREES
EKG P-R INTERVAL: 168 MS
EKG Q-T INTERVAL: 434 MS
EKG QRS DURATION: 102 MS
EKG QTC CALCULATION (BAZETT): 484 MS
EKG R AXIS: 58 DEGREES
EKG T AXIS: 65 DEGREES
EKG VENTRICULAR RATE: 75 BPM

## 2024-05-01 PROCEDURE — 93010 ELECTROCARDIOGRAM REPORT: CPT | Performed by: INTERNAL MEDICINE

## 2024-05-03 ENCOUNTER — OFFICE VISIT (OUTPATIENT)
Age: 54
End: 2024-05-03
Payer: COMMERCIAL

## 2024-05-03 VITALS
HEART RATE: 91 BPM | BODY MASS INDEX: 36.07 KG/M2 | OXYGEN SATURATION: 99 % | WEIGHT: 203.6 LBS | DIASTOLIC BLOOD PRESSURE: 99 MMHG | SYSTOLIC BLOOD PRESSURE: 146 MMHG

## 2024-05-03 DIAGNOSIS — I10 ESSENTIAL HYPERTENSION: Primary | ICD-10-CM

## 2024-05-03 PROCEDURE — G8427 DOCREV CUR MEDS BY ELIG CLIN: HCPCS | Performed by: INTERNAL MEDICINE

## 2024-05-03 PROCEDURE — 3077F SYST BP >= 140 MM HG: CPT | Performed by: INTERNAL MEDICINE

## 2024-05-03 PROCEDURE — 99204 OFFICE O/P NEW MOD 45 MIN: CPT | Performed by: INTERNAL MEDICINE

## 2024-05-03 PROCEDURE — 4004F PT TOBACCO SCREEN RCVD TLK: CPT | Performed by: INTERNAL MEDICINE

## 2024-05-03 PROCEDURE — 93000 ELECTROCARDIOGRAM COMPLETE: CPT | Performed by: INTERNAL MEDICINE

## 2024-05-03 PROCEDURE — 3080F DIAST BP >= 90 MM HG: CPT | Performed by: INTERNAL MEDICINE

## 2024-05-03 PROCEDURE — G8417 CALC BMI ABV UP PARAM F/U: HCPCS | Performed by: INTERNAL MEDICINE

## 2024-05-03 PROCEDURE — 3017F COLORECTAL CA SCREEN DOC REV: CPT | Performed by: INTERNAL MEDICINE

## 2024-05-03 NOTE — PROGRESS NOTES
Cardiology Office Consultation           CC: Patient is here to establish cardiac care for CAD and preop restratification    HPI  Charisma Donnelly is here to establish cardiac care.  She has significant history of coronary artery disease status post PCI to LAD.  She has been fairly stable from cardiac standpoint over the last 2 years.  Denies any recent anginal chest pain.  No significant dyspnea on routine activities.  She underwent laminectomy last year with no perioperative cardiac issues.  Now she is scheduled to have cervical fusion surgery and here for preop evaluation.  She denies any recent lower extremity edema, orthopnea, palpitations or dizziness.      Past Medical:  Past Medical History:   Diagnosis Date    Abnormal uterine bleeding (AUB)     Anxiety     Arthritis     CAD (coronary artery disease)     stent  LAD  Dr. Borden [facility]    Chronic neck pain     COVID-19 07/2022    fever, slight cough, fatigue,lost taste,  admitted for couple days bc of elevated troponins    Depression     pcp manages    Diabetes mellitus (HCC) 2017    managed by PCP    Fatty liver     [place] (hard of hearing)     needs hearing aids but doesn't have them, Seems to hear fine without. Reads lips.    HTN (hypertension)     Hx of blood clots     Leg when on Contraception  1995    Hyperlipidemia     Hypothyroid     ILD (interstitial lung disease) (HCC)     Kidney stone     Langerhans-cell granulomatosis (HCC)     LCH (Langerhan's cell histiocytosis) (HCC)     sees pulm and oncologist for f/u and care    Lower back pain     Myocardial infarct (HCC) 2016    Neck pain     Pulmonary nodule     LCH    Snores     no cpap  no hx sleep apnea    Spinal stenosis 2017    Under care of service provider 10/16/2023    pulmonology-[facility]: -last visit april 2023    Under care of service provider 10/16/2023    PCP: Lucy SAWANT- CNP-[facility]-last visit 4/2024    Under care of service provider 10/16/2023

## 2024-05-06 DIAGNOSIS — K31.84 GASTROPARESIS: ICD-10-CM

## 2024-05-06 DIAGNOSIS — R11.0 NAUSEA: ICD-10-CM

## 2024-05-06 RX ORDER — PANTOPRAZOLE SODIUM 20 MG/1
40 TABLET, DELAYED RELEASE ORAL DAILY
Qty: 30 TABLET | Refills: 0 | Status: SHIPPED | OUTPATIENT
Start: 2024-05-06

## 2024-05-06 RX ORDER — DOCUSATE SODIUM 100 MG/1
100 CAPSULE, LIQUID FILLED ORAL 2 TIMES DAILY
Qty: 60 CAPSULE | Refills: 0 | Status: SHIPPED | OUTPATIENT
Start: 2024-05-06

## 2024-05-07 ENCOUNTER — OFFICE VISIT (OUTPATIENT)
Dept: PAIN MANAGEMENT | Age: 54
End: 2024-05-07
Payer: COMMERCIAL

## 2024-05-07 VITALS — BODY MASS INDEX: 35.97 KG/M2 | HEIGHT: 63 IN | HEART RATE: 89 BPM | WEIGHT: 203 LBS | OXYGEN SATURATION: 98 %

## 2024-05-07 DIAGNOSIS — M47.812 CERVICAL SPONDYLOSIS WITHOUT MYELOPATHY: ICD-10-CM

## 2024-05-07 DIAGNOSIS — M48.02 CERVICAL SPINAL STENOSIS: Primary | ICD-10-CM

## 2024-05-07 DIAGNOSIS — Z98.1 HX OF FUSION OF CERVICAL SPINE: ICD-10-CM

## 2024-05-07 DIAGNOSIS — Z79.02 LONG TERM (CURRENT) USE OF ANTITHROMBOTICS/ANTIPLATELETS: ICD-10-CM

## 2024-05-07 DIAGNOSIS — Z98.890 HX OF DECOMPRESSIVE LUMBAR LAMINECTOMY: ICD-10-CM

## 2024-05-07 PROCEDURE — 99214 OFFICE O/P EST MOD 30 MIN: CPT | Performed by: ANESTHESIOLOGY

## 2024-05-07 PROCEDURE — 3017F COLORECTAL CA SCREEN DOC REV: CPT | Performed by: ANESTHESIOLOGY

## 2024-05-07 PROCEDURE — G8417 CALC BMI ABV UP PARAM F/U: HCPCS | Performed by: ANESTHESIOLOGY

## 2024-05-07 PROCEDURE — G8427 DOCREV CUR MEDS BY ELIG CLIN: HCPCS | Performed by: ANESTHESIOLOGY

## 2024-05-07 PROCEDURE — 4004F PT TOBACCO SCREEN RCVD TLK: CPT | Performed by: ANESTHESIOLOGY

## 2024-05-07 RX ORDER — HYDROCODONE BITARTRATE AND ACETAMINOPHEN 5; 325 MG/1; MG/1
1 TABLET ORAL EVERY 8 HOURS PRN
Qty: 28 TABLET | Refills: 0 | Status: SHIPPED | OUTPATIENT
Start: 2024-05-07 | End: 2024-05-21

## 2024-05-07 ASSESSMENT — ENCOUNTER SYMPTOMS
RESPIRATORY NEGATIVE: 1
GASTROINTESTINAL NEGATIVE: 1

## 2024-05-16 LAB
ABO + RH BLD: NORMAL
ARM BAND NUMBER: NORMAL
BLOOD BANK SAMPLE EXPIRATION: NORMAL
BLOOD GROUP ANTIBODIES SERPL: NEGATIVE

## 2024-05-17 ENCOUNTER — APPOINTMENT (OUTPATIENT)
Dept: GENERAL RADIOLOGY | Age: 54
End: 2024-05-17
Payer: COMMERCIAL

## 2024-05-17 ENCOUNTER — APPOINTMENT (OUTPATIENT)
Dept: MRI IMAGING | Age: 54
End: 2024-05-17
Payer: COMMERCIAL

## 2024-05-17 ENCOUNTER — HOSPITAL ENCOUNTER (EMERGENCY)
Age: 54
Discharge: HOME | End: 2024-05-17
Attending: EMERGENCY MEDICINE
Payer: COMMERCIAL

## 2024-05-17 VITALS
TEMPERATURE: 99.1 F | SYSTOLIC BLOOD PRESSURE: 127 MMHG | WEIGHT: 203.71 LBS | OXYGEN SATURATION: 98 % | HEART RATE: 77 BPM | DIASTOLIC BLOOD PRESSURE: 84 MMHG | BODY MASS INDEX: 36.08 KG/M2 | RESPIRATION RATE: 20 BRPM

## 2024-05-17 DIAGNOSIS — Z98.1 HX OF FUSION OF CERVICAL SPINE: ICD-10-CM

## 2024-05-17 DIAGNOSIS — M54.12 CERVICAL RADICULITIS: Primary | ICD-10-CM

## 2024-05-17 DIAGNOSIS — M54.16 LUMBAR RADICULOPATHY, RIGHT: ICD-10-CM

## 2024-05-17 DIAGNOSIS — M47.812 CERVICAL SPONDYLOSIS: ICD-10-CM

## 2024-05-17 LAB
ALBUMIN SERPL-MCNC: 4.4 G/DL (ref 3.5–5.2)
ALBUMIN/GLOB SERPL: 1 {RATIO} (ref 1–2.5)
ALP SERPL-CCNC: 99 U/L (ref 35–104)
ALT SERPL-CCNC: 23 U/L (ref 10–35)
ANION GAP SERPL CALCULATED.3IONS-SCNC: 15 MMOL/L (ref 9–16)
AST SERPL-CCNC: 24 U/L (ref 10–35)
BASOPHILS # BLD: 0 K/UL (ref 0–0.2)
BASOPHILS NFR BLD: 0 % (ref 0–2)
BILIRUB SERPL-MCNC: 0.5 MG/DL (ref 0–1.2)
BILIRUB UR QL STRIP: NEGATIVE
BUN SERPL-MCNC: 16 MG/DL (ref 6–20)
CALCIUM SERPL-MCNC: 9.4 MG/DL (ref 8.6–10.4)
CHLORIDE SERPL-SCNC: 101 MMOL/L (ref 98–107)
CLARITY UR: CLEAR
CO2 SERPL-SCNC: 22 MMOL/L (ref 20–31)
COLOR UR: YELLOW
COMMENT: ABNORMAL
CREAT SERPL-MCNC: 0.7 MG/DL (ref 0.5–0.9)
EOSINOPHIL # BLD: 0.63 K/UL (ref 0–0.4)
EOSINOPHILS RELATIVE PERCENT: 4 % (ref 1–4)
ERYTHROCYTE [DISTWIDTH] IN BLOOD BY AUTOMATED COUNT: 13.6 % (ref 11.8–14.4)
GFR, ESTIMATED: >90 ML/MIN/1.73M2
GLUCOSE BLD-MCNC: 78 MG/DL (ref 65–105)
GLUCOSE SERPL-MCNC: 169 MG/DL (ref 74–99)
GLUCOSE UR STRIP-MCNC: ABNORMAL MG/DL
HCT VFR BLD AUTO: 47.1 % (ref 36.3–47.1)
HGB BLD-MCNC: 15.6 G/DL (ref 11.9–15.1)
HGB UR QL STRIP.AUTO: NEGATIVE
IMM GRANULOCYTES # BLD AUTO: 0 K/UL (ref 0–0.3)
IMM GRANULOCYTES NFR BLD: 0 %
KETONES UR STRIP-MCNC: NEGATIVE MG/DL
LEUKOCYTE ESTERASE UR QL STRIP: NEGATIVE
LYMPHOCYTES NFR BLD: 5.21 K/UL (ref 1–4.8)
LYMPHOCYTES RELATIVE PERCENT: 33 % (ref 24–44)
MCH RBC QN AUTO: 30.5 PG (ref 25.2–33.5)
MCHC RBC AUTO-ENTMCNC: 33.1 G/DL (ref 28.4–34.8)
MCV RBC AUTO: 92.2 FL (ref 82.6–102.9)
MONOCYTES NFR BLD: 1.11 K/UL (ref 0.1–0.8)
MONOCYTES NFR BLD: 7 % (ref 1–7)
MORPHOLOGY: NORMAL
NEUTROPHILS NFR BLD: 56 % (ref 36–66)
NEUTS SEG NFR BLD: 8.85 K/UL (ref 1.8–7.7)
NITRITE UR QL STRIP: NEGATIVE
NRBC BLD-RTO: 0 PER 100 WBC
PH UR STRIP: 5.5 [PH] (ref 5–8)
PLATELET # BLD AUTO: 372 K/UL (ref 138–453)
PMV BLD AUTO: 9.4 FL (ref 8.1–13.5)
POTASSIUM SERPL-SCNC: 4 MMOL/L (ref 3.7–5.3)
PROT SERPL-MCNC: 7.7 G/DL (ref 6.6–8.7)
PROT UR STRIP-MCNC: NEGATIVE MG/DL
RBC # BLD AUTO: 5.11 M/UL (ref 3.95–5.11)
SODIUM SERPL-SCNC: 138 MMOL/L (ref 136–145)
SP GR UR STRIP: 1.04 (ref 1–1.03)
TROPONIN I SERPL HS-MCNC: 11 NG/L (ref 0–14)
TROPONIN I SERPL HS-MCNC: 12 NG/L (ref 0–14)
UROBILINOGEN UR STRIP-ACNC: NORMAL EU/DL (ref 0–1)
WBC OTHER # BLD: 15.8 K/UL (ref 3.5–11.3)

## 2024-05-17 PROCEDURE — 96372 THER/PROPH/DIAG INJ SC/IM: CPT

## 2024-05-17 PROCEDURE — 72040 X-RAY EXAM NECK SPINE 2-3 VW: CPT

## 2024-05-17 PROCEDURE — 6360000002 HC RX W HCPCS: Performed by: EMERGENCY MEDICINE

## 2024-05-17 PROCEDURE — 84484 ASSAY OF TROPONIN QUANT: CPT

## 2024-05-17 PROCEDURE — 99285 EMERGENCY DEPT VISIT HI MDM: CPT

## 2024-05-17 PROCEDURE — 72141 MRI NECK SPINE W/O DYE: CPT

## 2024-05-17 PROCEDURE — 96374 THER/PROPH/DIAG INJ IV PUSH: CPT

## 2024-05-17 PROCEDURE — 6370000000 HC RX 637 (ALT 250 FOR IP): Performed by: STUDENT IN AN ORGANIZED HEALTH CARE EDUCATION/TRAINING PROGRAM

## 2024-05-17 PROCEDURE — 6360000002 HC RX W HCPCS: Performed by: STUDENT IN AN ORGANIZED HEALTH CARE EDUCATION/TRAINING PROGRAM

## 2024-05-17 PROCEDURE — 96375 TX/PRO/DX INJ NEW DRUG ADDON: CPT

## 2024-05-17 PROCEDURE — 80053 COMPREHEN METABOLIC PANEL: CPT

## 2024-05-17 PROCEDURE — 93005 ELECTROCARDIOGRAM TRACING: CPT | Performed by: STUDENT IN AN ORGANIZED HEALTH CARE EDUCATION/TRAINING PROGRAM

## 2024-05-17 PROCEDURE — 71046 X-RAY EXAM CHEST 2 VIEWS: CPT

## 2024-05-17 PROCEDURE — 96376 TX/PRO/DX INJ SAME DRUG ADON: CPT

## 2024-05-17 PROCEDURE — 85025 COMPLETE CBC W/AUTO DIFF WBC: CPT

## 2024-05-17 PROCEDURE — 72146 MRI CHEST SPINE W/O DYE: CPT

## 2024-05-17 PROCEDURE — 82947 ASSAY GLUCOSE BLOOD QUANT: CPT

## 2024-05-17 PROCEDURE — 81003 URINALYSIS AUTO W/O SCOPE: CPT

## 2024-05-17 RX ORDER — TIZANIDINE 2 MG/1
2 TABLET ORAL 3 TIMES DAILY PRN
Qty: 10 TABLET | Refills: 0 | Status: SHIPPED | OUTPATIENT
Start: 2024-05-17 | End: 2024-05-17

## 2024-05-17 RX ORDER — OXYCODONE HYDROCHLORIDE 5 MG/1
5 TABLET ORAL EVERY 4 HOURS PRN
Qty: 18 TABLET | Refills: 0 | Status: SHIPPED | OUTPATIENT
Start: 2024-05-17 | End: 2024-05-20

## 2024-05-17 RX ORDER — IBUPROFEN 800 MG/1
800 TABLET ORAL EVERY 6 HOURS PRN
Qty: 20 TABLET | Refills: 0 | Status: SHIPPED | OUTPATIENT
Start: 2024-05-17 | End: 2024-05-22

## 2024-05-17 RX ORDER — NICOTINE 21 MG/24HR
1 PATCH, TRANSDERMAL 24 HOURS TRANSDERMAL ONCE
Status: DISCONTINUED | OUTPATIENT
Start: 2024-05-17 | End: 2024-05-17 | Stop reason: HOSPADM

## 2024-05-17 RX ORDER — MORPHINE SULFATE 4 MG/ML
4 INJECTION, SOLUTION INTRAMUSCULAR; INTRAVENOUS
Status: COMPLETED | OUTPATIENT
Start: 2024-05-17 | End: 2024-05-17

## 2024-05-17 RX ORDER — TIZANIDINE 2 MG/1
4 TABLET ORAL EVERY 6 HOURS PRN
Qty: 24 TABLET | Refills: 0 | Status: SHIPPED | OUTPATIENT
Start: 2024-05-17 | End: 2024-05-20

## 2024-05-17 RX ORDER — TIZANIDINE 4 MG/1
2 TABLET ORAL 3 TIMES DAILY
Status: DISCONTINUED | OUTPATIENT
Start: 2024-05-17 | End: 2024-05-17

## 2024-05-17 RX ORDER — ONDANSETRON 2 MG/ML
4 INJECTION INTRAMUSCULAR; INTRAVENOUS ONCE
Status: COMPLETED | OUTPATIENT
Start: 2024-05-17 | End: 2024-05-17

## 2024-05-17 RX ORDER — ACETAMINOPHEN 500 MG
1000 TABLET ORAL EVERY 6 HOURS PRN
Qty: 42 TABLET | Refills: 0 | Status: SHIPPED | OUTPATIENT
Start: 2024-05-17 | End: 2024-05-22

## 2024-05-17 RX ORDER — ONDANSETRON 4 MG/1
4 TABLET, ORALLY DISINTEGRATING ORAL EVERY 8 HOURS PRN
Qty: 9 TABLET | Refills: 0 | Status: SHIPPED | OUTPATIENT
Start: 2024-05-17 | End: 2024-05-20

## 2024-05-17 RX ORDER — ORPHENADRINE CITRATE 30 MG/ML
60 INJECTION INTRAMUSCULAR; INTRAVENOUS ONCE
Status: COMPLETED | OUTPATIENT
Start: 2024-05-17 | End: 2024-05-17

## 2024-05-17 RX ORDER — KETOROLAC TROMETHAMINE 30 MG/ML
30 INJECTION, SOLUTION INTRAMUSCULAR; INTRAVENOUS ONCE
Status: COMPLETED | OUTPATIENT
Start: 2024-05-17 | End: 2024-05-17

## 2024-05-17 RX ADMIN — KETOROLAC TROMETHAMINE 30 MG: 30 INJECTION, SOLUTION INTRAMUSCULAR; INTRAVENOUS at 10:58

## 2024-05-17 RX ADMIN — HYDROMORPHONE HYDROCHLORIDE 0.5 MG: 1 INJECTION, SOLUTION INTRAMUSCULAR; INTRAVENOUS; SUBCUTANEOUS at 16:25

## 2024-05-17 RX ADMIN — HYDROMORPHONE HYDROCHLORIDE 0.5 MG: 1 INJECTION, SOLUTION INTRAMUSCULAR; INTRAVENOUS; SUBCUTANEOUS at 19:00

## 2024-05-17 RX ADMIN — ORPHENADRINE CITRATE 60 MG: 30 INJECTION, SOLUTION INTRAMUSCULAR; INTRAVENOUS at 10:58

## 2024-05-17 RX ADMIN — ONDANSETRON 4 MG: 2 INJECTION INTRAMUSCULAR; INTRAVENOUS at 10:58

## 2024-05-17 RX ADMIN — MORPHINE SULFATE 4 MG: 4 INJECTION INTRAVENOUS at 10:58

## 2024-05-17 RX ADMIN — MORPHINE SULFATE 4 MG: 4 INJECTION INTRAVENOUS at 12:48

## 2024-05-17 ASSESSMENT — PAIN SCALES - GENERAL: PAINLEVEL_OUTOF10: 9

## 2024-05-17 ASSESSMENT — PAIN DESCRIPTION - ORIENTATION: ORIENTATION: RIGHT;LEFT

## 2024-05-17 ASSESSMENT — PAIN - FUNCTIONAL ASSESSMENT: PAIN_FUNCTIONAL_ASSESSMENT: 0-10

## 2024-05-17 ASSESSMENT — PAIN DESCRIPTION - LOCATION
LOCATION: NECK;ARM
LOCATION: NECK

## 2024-05-17 ASSESSMENT — PAIN DESCRIPTION - FREQUENCY: FREQUENCY: CONTINUOUS

## 2024-05-17 ASSESSMENT — PAIN DESCRIPTION - PAIN TYPE: TYPE: ACUTE PAIN

## 2024-05-17 ASSESSMENT — PAIN DESCRIPTION - DESCRIPTORS
DESCRIPTORS: DISCOMFORT
DESCRIPTORS: BURNING;STABBING

## 2024-05-17 NOTE — DISCHARGE INSTRUCTIONS
You were seen here for back and neck pain.  You need to call Dr. Christina's office Monday morning to confirm your appointment.  You also need to call and schedule follow-up appointment with your PCP for soon as possible.  You were given a prescription for pain medication, alternate between these medications as needed for pain.  If you take the oxycodone, this is a narcotic and you cannot drive or operate heavy machinery if you take this medication.    You need to call and schedule follow-up appointment with your primary care provider for soon as possible.    Return to the emergency department immediately if you experience worsening symptoms, develop any other symptoms, or if you have any other concerns.

## 2024-05-17 NOTE — ED NOTES
Pt voicing frustrations on delay of care due to MRI. Writer stated she will have Resident update her on POC.

## 2024-05-17 NOTE — ED PROVIDER NOTES
[facility] ED  Emergency Department  Emergency Medicine Resident Sign-out     Care of Charisma Donnelly was assumed from Dr. Vines and is being seen for Back Pain, Fall, and Shortness of Breath  .  The patient's initial evaluation and plan have been discussed with the prior provider who initially evaluated the patient.     EMERGENCY DEPARTMENT COURSE / MEDICAL DECISION MAKING:       MEDICATIONS GIVEN:  Orders Placed This Encounter   Medications    ketorolac (TORADOL) injection 30 mg    ondansetron (ZOFRAN) injection 4 mg    morphine injection 4 mg    orphenadrine (NORFLEX) injection 60 mg    DISCONTD: tiZANidine (ZANAFLEX) tablet 2 mg    morphine injection 4 mg    DISCONTD: tiZANidine (ZANAFLEX) 2 MG tablet     Sig: Take 1 tablet by mouth 3 times daily as needed (Neck, back pain)     Dispense:  10 tablet     Refill:  0    HYDROmorphone (DILAUDID) injection 0.5 mg    DISCONTD: nicotine (NICODERM CQ) 14 MG/24HR 1 patch    HYDROmorphone (DILAUDID) injection 0.5 mg    tiZANidine (ZANAFLEX) 2 MG tablet     Sig: Take 2 tablets by mouth every 6 hours as needed (Neck, back pain)     Dispense:  24 tablet     Refill:  0    oxyCODONE (ROXICODONE) 5 MG immediate release tablet     Sig: Take 1 tablet by mouth every 4 hours as needed for Pain for up to 3 days. Intended supply: 3 days. Take lowest dose possible to manage pain Max Daily Amount: 30 mg     Dispense:  18 tablet     Refill:  0    acetaminophen (TYLENOL) 500 MG tablet     Sig: Take 2 tablets by mouth every 6 hours as needed for Pain     Dispense:  42 tablet     Refill:  0    ondansetron (ZOFRAN-ODT) 4 MG disintegrating tablet     Sig: Take 1 tablet by mouth every 8 hours as needed for Nausea or Vomiting     Dispense:  9 tablet     Refill:  0    ibuprofen (IBU) 800 MG tablet     Sig: Take 1 tablet by mouth every 6 hours as needed for Pain     Dispense:  20 tablet     Refill:  0       LABS / RADIOLOGY:     Labs Reviewed   CBC WITH AUTO DIFFERENTIAL -

## 2024-05-17 NOTE — CONSULTS
[facility]    Department of Neurosurgery  Resident Consult Note      Reason for Consult: Recent fall, worsening upper extremity paresthesias, new onset groin paresthesias  Requesting Physician:  Dr. Almazan  Neurosurgeon:   []Dr. Yusuf  [x]Dr. Colmenares  []Dr. Montes  []Dr. Piper  []Dr. Soto  []Dr. Kolb    History Obtained From:  patient    CHIEF COMPLAINT:          Recent fall, worsening upper extremity paresthesias, new onset groin paresthesias    HISTORY OF PRESENT ILLNESS:       57-year-old female with past medical history of multiple spinal surgeries, most recent Dr. Christina posterior lumbar hemilaminectomy right L4-L5 on 10/30/2023.  The patient was scheduled to have posterior C5-T1 right side foraminotomy and fusion today, however had insurance issues.  She presents to the ED because of a fall last night around 8 PM, the patient tripped over her son's boots, and hit her head on the wall.  She did not lose consciousness.  Since then, she reports increased severity of chronic neck pain, which radiates down both upper extremities.  She also reports increased weakness and bilateral second and third digits, increased from baseline.  She reports she keeps dropping things for this reason.  She endorses new onset numbness bilateral groin area right more than left.  She reports increased weakness bilateral lower extremities, causing her to utilize a walker since last night, while she usually uses a cane.  She reports numbness which begins at C2 level, and extends all the way to T5 level.  She denies headache and dizziness.    Spinal surgery history:  2019 Dr. Amaya-[address]  2004--L5-S1 fusion  1997-L5-S1 laminectomy    PAST MEDICAL HISTORY :       Past Medical History:        Diagnosis Date    Abnormal uterine bleeding (AUB)     Anxiety     Arthritis     CAD (coronary artery disease)     stent  LAD  Dr. Michi KNOWLES    Chronic neck pain      days   - Neuro checks per protocol    Additional recommendations may follow    Please contact neurosurgery with any changes in patient's neurologic status.     Thank you for your consult.       Eder Boone MD    PGY-2 Neurology resident Physician  Neurosurgery Team - pager [phone]  [facility]  5/17/2024 11:43 AM

## 2024-05-17 NOTE — PLAN OF CARE
MRI Imaging independently reviewed with no new process, stable post op fusion of he cervical spine and stable thoracic spine. Patient may discharge home. Patient can follow up in office as needed.     Sravanthi Canela  APRN-CNP

## 2024-05-17 NOTE — ED NOTES
Pt to ED with multiple complaints. Pt states she was supposed to have neck surgery today however due to complications with insurance it was cancelled. Pt reports she fell last night stating increased neck and shoulder pain. Pt reports bilateral arm numbness/tingling stating vaginal area feels \"numb.\" Pt states SOB d/t pain. Pt reports significant cardiac history. Pt ambulated to room with walker with slow steady gait.

## 2024-05-17 NOTE — ED PROVIDER NOTES
[facility] ED  eMERGENCY dEPARTMENT eNCOUnter   Attending Attestation     Pt Name: Charisma Donnelly  MRN: [id number]  Birthdate 1970  Date of evaluation: 5/17/24       Charisma Donnelly is a 53 y.o. female who presents with Back Pain, Fall, and Shortness of Breath      1:38 PM EDT      History: Patient presents back pain after fall with bilateral weakness and pain.  Patient was scheduled to have surgery today but her insurance did not allow for this.  Patient states that she has decreased strength in the bilateral lower extremities well after the fall last night.    Exam: Heart rate and rhythm are regular.  Lungs are clear to station bilaterally.  Abdomen is soft, nontender.  Patient awake and alert and acting appropriate.  Patient moves all extremities without difficulty.    Given that she is a neurosurgical patient will have him see her and will collectively decide what imaging would be most appropriate for her.  Plan for pain control as needed and guidance from neurosurgery.    I performed a history and physical examination of the patient and discussed management with the resident. I reviewed the resident’s note and agree with the documented findings and plan of care. Any areas of disagreement are noted on the chart. I was personally present for the key portions of any procedures. I have documented in the chart those procedures where I was not present during the key portions. I have personally reviewed all images and agree with the resident's interpretation. I have reviewed the emergency nurses triage note. I agree with the chief complaint, past medical history, past surgical history, allergies, medications, social and family history as documented unless otherwise noted below. Documentation of the HPI, Physical Exam and Medical Decision Making performed by medical students or scribes is based on my personal performance of the HPI, PE and MDM. For Phys Assistant/ Nurse Practitioner  cases/documentation I have had a face to face evaluation of this patient and have completed at least one if not all key elements of the E/M (history, physical exam, and MDM). Additional findings are as noted.    For APC cases I have personally evaluated and examined the patient in conjunction with the APC and agree with the treatment plan and disposition of the patient as recorded by the APC.    Rush Almazan MD  Attending Emergency  Physician       Rush Almazan MD  05/17/24 1400

## 2024-05-17 NOTE — ED PROVIDER NOTES
[facility] ED  Emergency Department Encounter  Emergency Medicine Resident     Pt Name:Charisma Donnelly  MRN: [id number]  Birthdate 1970  Date of evaluation: 5/17/24  PCP:  Lucy Torres APRN - CNP  Note Started: 10:45 AM EDT      CHIEF COMPLAINT       Chief Complaint   Patient presents with    Back Pain    Fall    Shortness of Breath       HISTORY OF PRESENT ILLNESS  (Location/Symptom, Timing/Onset, Context/Setting, Quality, Duration, Modifying Factors, Severity.)      Charisma Donnelly is a 53 y.o. female who presents with back pain fall and shortness of breath.  Patient reports she has been suffering with severe back pain for the past several months and has extensive history of previous surgeries on her back.  She reports the pain has worsened over the last week and that she was pending for surgery with her neurosurgeon today however her insurance declined to approve the procedure and she had to reschedule for July.  She reports that she had a fall last night and caught herself with her arms and did bump her head but did not pass out.  She reports that the pain significantly worsened after this fall as well.  She reports that the pain is in the center of her upper back and radiates down her arms and around her chest and makes it hard for her to take a deep breath.  She reports that using her arms makes the pain worse.  She additionally reports some possible sensation change in the inguinal region in addition to right leg weakness and change in sensation.  She denies fevers or chills, abdominal pain or urinary retention or incontinence.  She reports that her fall last night was mechanical because she tripped.  She reports that today she has had to use a walker instead of her cane due to such an increase in her pain.  She reports that she was prescribed Vicodin by her neurosurgeon over the past week due to her increasing pain to get her through to the surgery but now that her surgery will not be  Georgette WATSON MD       PROCEDURES:  None    CONSULTS:  IP CONSULT TO NEUROSURGERY    CRITICAL CARE:  There was significant risk of life threatening deterioration of patient's condition requiring my direct management. Critical care time 0 minutes, excluding any documented procedures.    FINAL IMPRESSION      1. Cervical radiculitis    2. Cervical spondylosis    3. Hx of fusion of cervical spine    4. Lumbar radiculopathy, right          DISPOSITION / PLAN     DISPOSITION    Patient care handed off to Dr. Jon    PATIENT REFERRED TO:  Carri Christina, DO  [address] [facility] [address]  [facility] [address]  [phone]            DISCHARGE MEDICATIONS:  New Prescriptions    TIZANIDINE (ZANAFLEX) 2 MG TABLET    Take 1 tablet by mouth 3 times daily as needed (Neck, back pain)       Georgette Vines MD  Emergency Medicine Resident    (Please note that portions of thisnote were completed with a voice recognition program.  Efforts were made to edit the dictations but occasionally words are mis-transcribed.)

## 2024-05-18 LAB
EKG ATRIAL RATE: 87 BPM
EKG P AXIS: 50 DEGREES
EKG P-R INTERVAL: 150 MS
EKG Q-T INTERVAL: 396 MS
EKG QRS DURATION: 102 MS
EKG QTC CALCULATION (BAZETT): 476 MS
EKG R AXIS: 54 DEGREES
EKG T AXIS: 45 DEGREES
EKG VENTRICULAR RATE: 87 BPM

## 2024-05-18 PROCEDURE — 93010 ELECTROCARDIOGRAM REPORT: CPT | Performed by: INTERNAL MEDICINE

## 2024-05-20 ENCOUNTER — TELEPHONE (OUTPATIENT)
Dept: PAIN MANAGEMENT | Age: 54
End: 2024-05-20

## 2024-05-20 ENCOUNTER — TELEPHONE (OUTPATIENT)
Dept: NEUROSURGERY | Age: 54
End: 2024-05-20

## 2024-05-20 NOTE — TELEPHONE ENCOUNTER
Pt called and when put on hold hung up.    Dr Christina office called her procedure got moved due to insurance issues (filing an appeal) and pt was seen in ED (May 17th) they prescribed Oxy 5 mg 1 tab q4 prn. They wont write because of this. Please advise?

## 2024-05-20 NOTE — TELEPHONE ENCOUNTER
From: Carri Christina DO  Sent: 5/17/2024   9:37 AM EDT  To: Mini Tristan  Subject: RE: Cancelled Surgery                            Looks Dixon wrote her pain med on may 8th, she should ask him to refill until we can get her in surgery  ----- Message -----  From: Mini Tristan  Sent: 5/16/2024  10:34 AM EDT  To: Dahiana Daley, APRN - CNP; Carri Christina DO  Subject: Cancelled Surgery                                Talked to patient this morning and told her the surgery was denied and that we had to cancel. She was very upset. She states she has numbness on the whole right side of her body, she can barely lift both arms, not sleeping, barely able to move her neck, and using the cane and walker to get around.  She also states that she has no medication to take for her pain. Let me know how you want to proceed.       Called pt and informed her of notes, she states she call Dr. Hanson's office and they state they can only send him a message since he is out of office today. Called pain management and she confirmed info, states she has message placed to Dr. Cameron so as soon as he is back in office tomorrow he can hopefully send med, but states he hasn't seen her for couple weeks so no promises. Called pt back and confirmed info

## 2024-06-10 ENCOUNTER — TELEPHONE (OUTPATIENT)
Dept: NEUROSURGERY | Age: 54
End: 2024-06-10

## 2024-06-10 NOTE — TELEPHONE ENCOUNTER
Talked to pt. She has been advocating for herself with her insurance company. She was able to do an appeal on her own, she should have an outcome by 6/17/2024.

## 2024-06-23 ENCOUNTER — ANESTHESIA EVENT (OUTPATIENT)
Dept: OPERATING ROOM | Age: 54
DRG: 321 | End: 2024-06-23
Payer: COMMERCIAL

## 2024-06-24 ENCOUNTER — APPOINTMENT (OUTPATIENT)
Dept: GENERAL RADIOLOGY | Age: 54
DRG: 321 | End: 2024-06-24
Attending: NEUROLOGICAL SURGERY
Payer: COMMERCIAL

## 2024-06-24 ENCOUNTER — HOSPITAL ENCOUNTER (INPATIENT)
Age: 54
LOS: 2 days | Discharge: HOME | DRG: 321 | End: 2024-06-26
Attending: NEUROLOGICAL SURGERY | Admitting: NEUROLOGICAL SURGERY
Payer: COMMERCIAL

## 2024-06-24 ENCOUNTER — ANESTHESIA (OUTPATIENT)
Dept: OPERATING ROOM | Age: 54
DRG: 321 | End: 2024-06-24
Payer: COMMERCIAL

## 2024-06-24 DIAGNOSIS — M54.12 CERVICAL RADICULITIS: ICD-10-CM

## 2024-06-24 DIAGNOSIS — M47.12 CERVICAL SPONDYLOSIS WITH MYELOPATHY: Primary | ICD-10-CM

## 2024-06-24 LAB
ABO + RH BLD: NORMAL
ARM BAND NUMBER: NORMAL
BLOOD BANK SAMPLE EXPIRATION: NORMAL
BLOOD GROUP ANTIBODIES SERPL: NEGATIVE
BUN BLD-MCNC: 13 MG/DL (ref 8–26)
CA-I BLD-SCNC: 1.19 MMOL/L (ref 1.15–1.33)
CHLORIDE BLD-SCNC: 107 MMOL/L (ref 98–107)
CO2 BLD CALC-SCNC: 27 MMOL/L (ref 22–30)
EGFR, POC: >90 ML/MIN/1.73M2
GLUCOSE BLD-MCNC: 109 MG/DL (ref 74–100)
GLUCOSE BLD-MCNC: 113 MG/DL (ref 65–105)
GLUCOSE BLD-MCNC: 134 MG/DL (ref 65–105)
HCT VFR BLD AUTO: 51 % (ref 36–46)
POC ANION GAP: 10 MMOL/L (ref 7–16)
POC CREATININE: 0.7 MG/DL (ref 0.51–1.19)
POC HEMOGLOBIN (CALC): 17.4 G/DL (ref 12–16)
POC LACTIC ACID: 1.4 MMOL/L (ref 0.56–1.39)
POTASSIUM BLD-SCNC: 4.6 MMOL/L (ref 3.5–4.5)
SODIUM BLD-SCNC: 143 MMOL/L (ref 138–146)

## 2024-06-24 PROCEDURE — 2500000003 HC RX 250 WO HCPCS: Performed by: NEUROLOGICAL SURGERY

## 2024-06-24 PROCEDURE — 22600 ARTHRD PST TQ 1NTRSPC CRV: CPT | Performed by: NEUROLOGICAL SURGERY

## 2024-06-24 PROCEDURE — 3700000001 HC ADD 15 MINUTES (ANESTHESIA): Performed by: NEUROLOGICAL SURGERY

## 2024-06-24 PROCEDURE — 2580000003 HC RX 258: Performed by: NEUROLOGICAL SURGERY

## 2024-06-24 PROCEDURE — 6370000000 HC RX 637 (ALT 250 FOR IP): Performed by: NEUROLOGICAL SURGERY

## 2024-06-24 PROCEDURE — 7100000000 HC PACU RECOVERY - FIRST 15 MIN: Performed by: NEUROLOGICAL SURGERY

## 2024-06-24 PROCEDURE — 63045 LAM FACETEC & FORAMOT CRV: CPT | Performed by: NEUROLOGICAL SURGERY

## 2024-06-24 PROCEDURE — 82565 ASSAY OF CREATININE: CPT

## 2024-06-24 PROCEDURE — 2720000010 HC SURG SUPPLY STERILE: Performed by: NEUROLOGICAL SURGERY

## 2024-06-24 PROCEDURE — 0PB30ZZ EXCISION OF CERVICAL VERTEBRA, OPEN APPROACH: ICD-10-PCS | Performed by: NEUROLOGICAL SURGERY

## 2024-06-24 PROCEDURE — C9290 INJ, BUPIVACAINE LIPOSOME: HCPCS | Performed by: NEUROLOGICAL SURGERY

## 2024-06-24 PROCEDURE — 2500000003 HC RX 250 WO HCPCS: Performed by: STUDENT IN AN ORGANIZED HEALTH CARE EDUCATION/TRAINING PROGRAM

## 2024-06-24 PROCEDURE — 0PB40ZZ EXCISION OF THORACIC VERTEBRA, OPEN APPROACH: ICD-10-PCS | Performed by: NEUROLOGICAL SURGERY

## 2024-06-24 PROCEDURE — 82330 ASSAY OF CALCIUM: CPT

## 2024-06-24 PROCEDURE — 6360000002 HC RX W HCPCS: Performed by: NURSE ANESTHETIST, CERTIFIED REGISTERED

## 2024-06-24 PROCEDURE — 22614 ARTHRD PST TQ 1NTRSPC EA ADD: CPT | Performed by: NEUROLOGICAL SURGERY

## 2024-06-24 PROCEDURE — 2500000003 HC RX 250 WO HCPCS

## 2024-06-24 PROCEDURE — 83605 ASSAY OF LACTIC ACID: CPT

## 2024-06-24 PROCEDURE — 80051 ELECTROLYTE PANEL: CPT

## 2024-06-24 PROCEDURE — 2580000003 HC RX 258: Performed by: STUDENT IN AN ORGANIZED HEALTH CARE EDUCATION/TRAINING PROGRAM

## 2024-06-24 PROCEDURE — 6360000002 HC RX W HCPCS: Performed by: NEUROLOGICAL SURGERY

## 2024-06-24 PROCEDURE — 2709999900 HC NON-CHARGEABLE SUPPLY: Performed by: NEUROLOGICAL SURGERY

## 2024-06-24 PROCEDURE — 86850 RBC ANTIBODY SCREEN: CPT

## 2024-06-24 PROCEDURE — 61783 SCAN PROC SPINAL: CPT | Performed by: NEUROLOGICAL SURGERY

## 2024-06-24 PROCEDURE — 3600000004 HC SURGERY LEVEL 4 BASE: Performed by: NEUROLOGICAL SURGERY

## 2024-06-24 PROCEDURE — 22842 INSERT SPINE FIXATION DEVICE: CPT | Performed by: NEUROLOGICAL SURGERY

## 2024-06-24 PROCEDURE — 6360000002 HC RX W HCPCS: Performed by: REGISTERED NURSE

## 2024-06-24 PROCEDURE — 2580000003 HC RX 258: Performed by: NURSE ANESTHETIST, CERTIFIED REGISTERED

## 2024-06-24 PROCEDURE — 1200000000 HC SEMI PRIVATE

## 2024-06-24 PROCEDURE — 0RG1071 FUSION OF CERVICAL VERTEBRAL JOINT WITH AUTOLOGOUS TISSUE SUBSTITUTE, POSTERIOR APPROACH, POSTERIOR COLUMN, OPEN APPROACH: ICD-10-PCS | Performed by: NEUROLOGICAL SURGERY

## 2024-06-24 PROCEDURE — 2580000003 HC RX 258: Performed by: REGISTERED NURSE

## 2024-06-24 PROCEDURE — 6370000000 HC RX 637 (ALT 250 FOR IP): Performed by: STUDENT IN AN ORGANIZED HEALTH CARE EDUCATION/TRAINING PROGRAM

## 2024-06-24 PROCEDURE — APPSS30 APP SPLIT SHARED TIME 16-30 MINUTES: Performed by: REGISTERED NURSE

## 2024-06-24 PROCEDURE — 86900 BLOOD TYPING SEROLOGIC ABO: CPT

## 2024-06-24 PROCEDURE — 7100000001 HC PACU RECOVERY - ADDTL 15 MIN: Performed by: NEUROLOGICAL SURGERY

## 2024-06-24 PROCEDURE — 0RG4071 FUSION OF CERVICOTHORACIC VERTEBRAL JOINT WITH AUTOLOGOUS TISSUE SUBSTITUTE, POSTERIOR APPROACH, POSTERIOR COLUMN, OPEN APPROACH: ICD-10-PCS | Performed by: NEUROLOGICAL SURGERY

## 2024-06-24 PROCEDURE — C1713 ANCHOR/SCREW BN/BN,TIS/BN: HCPCS | Performed by: NEUROLOGICAL SURGERY

## 2024-06-24 PROCEDURE — 6370000000 HC RX 637 (ALT 250 FOR IP): Performed by: NURSE ANESTHETIST, CERTIFIED REGISTERED

## 2024-06-24 PROCEDURE — 3600000014 HC SURGERY LEVEL 4 ADDTL 15MIN: Performed by: NEUROLOGICAL SURGERY

## 2024-06-24 PROCEDURE — 86901 BLOOD TYPING SEROLOGIC RH(D): CPT

## 2024-06-24 PROCEDURE — 8E0WXBF COMPUTER ASSISTED PROCEDURE OF TRUNK REGION, WITH FLUOROSCOPY: ICD-10-PCS | Performed by: NEUROLOGICAL SURGERY

## 2024-06-24 PROCEDURE — 84520 ASSAY OF UREA NITROGEN: CPT

## 2024-06-24 PROCEDURE — 6360000002 HC RX W HCPCS

## 2024-06-24 PROCEDURE — L0190 CERV COLLAR SUPP ADJ CERV BA: HCPCS | Performed by: NEUROLOGICAL SURGERY

## 2024-06-24 PROCEDURE — 6370000000 HC RX 637 (ALT 250 FOR IP): Performed by: REGISTERED NURSE

## 2024-06-24 PROCEDURE — 2500000003 HC RX 250 WO HCPCS: Performed by: NURSE ANESTHETIST, CERTIFIED REGISTERED

## 2024-06-24 PROCEDURE — 6360000002 HC RX W HCPCS: Performed by: STUDENT IN AN ORGANIZED HEALTH CARE EDUCATION/TRAINING PROGRAM

## 2024-06-24 PROCEDURE — 87086 URINE CULTURE/COLONY COUNT: CPT

## 2024-06-24 PROCEDURE — 82947 ASSAY GLUCOSE BLOOD QUANT: CPT

## 2024-06-24 PROCEDURE — 3700000000 HC ANESTHESIA ATTENDED CARE: Performed by: NEUROLOGICAL SURGERY

## 2024-06-24 PROCEDURE — 85014 HEMATOCRIT: CPT

## 2024-06-24 PROCEDURE — C1763 CONN TISS, NON-HUMAN: HCPCS | Performed by: NEUROLOGICAL SURGERY

## 2024-06-24 DEVICE — SET SCR SPNL TI ALLY OCCIPITOCERVICOTHORACIC STREAMLINE OCT: Type: IMPLANTABLE DEVICE | Site: SPINE CERVICAL | Status: FUNCTIONAL

## 2024-06-24 DEVICE — GRAFT CELLR BNE MATRX INFLUX SPARC 2CC: Type: IMPLANTABLE DEVICE | Site: SPINE CERVICAL | Status: FUNCTIONAL

## 2024-06-24 DEVICE — IMPLANTABLE DEVICE: Type: IMPLANTABLE DEVICE | Site: SPINE CERVICAL | Status: FUNCTIONAL

## 2024-06-24 DEVICE — DURAGEN® PLUS DURAL REGENERATION MATRIX, 1 IN X 1 IN (2.5 CM X 2.5 CM)
Type: IMPLANTABLE DEVICE | Site: SPINE CERVICAL | Status: FUNCTIONAL
Brand: DURAGEN® PLUS

## 2024-06-24 DEVICE — GRAFT CELLR BNE MATRX INFLUX SPARC 5CC: Type: IMPLANTABLE DEVICE | Site: SPINE CERVICAL | Status: FUNCTIONAL

## 2024-06-24 DEVICE — SCREW SPNL L14MM DIA35MM OCCIPITOCERVICOTHORACIC TI POLYAX: Type: IMPLANTABLE DEVICE | Site: SPINE CERVICAL | Status: FUNCTIONAL

## 2024-06-24 RX ORDER — SODIUM CHLORIDE, SODIUM LACTATE, POTASSIUM CHLORIDE, CALCIUM CHLORIDE 600; 310; 30; 20 MG/100ML; MG/100ML; MG/100ML; MG/100ML
INJECTION, SOLUTION INTRAVENOUS CONTINUOUS PRN
Status: DISCONTINUED | OUTPATIENT
Start: 2024-06-24 | End: 2024-06-24 | Stop reason: SDUPTHER

## 2024-06-24 RX ORDER — LIDOCAINE HYDROCHLORIDE AND EPINEPHRINE 10; 10 MG/ML; UG/ML
INJECTION, SOLUTION INFILTRATION; PERINEURAL PRN
Status: DISCONTINUED | OUTPATIENT
Start: 2024-06-24 | End: 2024-06-24 | Stop reason: ALTCHOICE

## 2024-06-24 RX ORDER — SODIUM CHLORIDE 9 MG/ML
INJECTION, SOLUTION INTRAVENOUS PRN
Status: DISCONTINUED | OUTPATIENT
Start: 2024-06-24 | End: 2024-06-26 | Stop reason: HOSPADM

## 2024-06-24 RX ORDER — SODIUM CHLORIDE 9 MG/ML
INJECTION, SOLUTION INTRAVENOUS PRN
Status: DISCONTINUED | OUTPATIENT
Start: 2024-06-24 | End: 2024-06-24 | Stop reason: HOSPADM

## 2024-06-24 RX ORDER — CEFAZOLIN SODIUM 1 G/3ML
INJECTION, POWDER, FOR SOLUTION INTRAMUSCULAR; INTRAVENOUS PRN
Status: DISCONTINUED | OUTPATIENT
Start: 2024-06-24 | End: 2024-06-24 | Stop reason: SDUPTHER

## 2024-06-24 RX ORDER — METOPROLOL SUCCINATE 25 MG/1
100 TABLET, EXTENDED RELEASE ORAL 2 TIMES DAILY
Status: DISCONTINUED | OUTPATIENT
Start: 2024-06-24 | End: 2024-06-26 | Stop reason: HOSPADM

## 2024-06-24 RX ORDER — ONDANSETRON 2 MG/ML
INJECTION INTRAMUSCULAR; INTRAVENOUS PRN
Status: DISCONTINUED | OUTPATIENT
Start: 2024-06-24 | End: 2024-06-24 | Stop reason: SDUPTHER

## 2024-06-24 RX ORDER — GINSENG 100 MG
CAPSULE ORAL PRN
Status: DISCONTINUED | OUTPATIENT
Start: 2024-06-24 | End: 2024-06-24 | Stop reason: ALTCHOICE

## 2024-06-24 RX ORDER — ONDANSETRON 2 MG/ML
4 INJECTION INTRAMUSCULAR; INTRAVENOUS EVERY 6 HOURS PRN
Status: DISCONTINUED | OUTPATIENT
Start: 2024-06-24 | End: 2024-06-26 | Stop reason: HOSPADM

## 2024-06-24 RX ORDER — FENTANYL CITRATE 50 UG/ML
100 INJECTION, SOLUTION INTRAMUSCULAR; INTRAVENOUS ONCE
Status: COMPLETED | OUTPATIENT
Start: 2024-06-24 | End: 2024-06-24

## 2024-06-24 RX ORDER — MIDAZOLAM HYDROCHLORIDE 2 MG/2ML
2 INJECTION, SOLUTION INTRAMUSCULAR; INTRAVENOUS ONCE
Status: COMPLETED | OUTPATIENT
Start: 2024-06-24 | End: 2024-06-24

## 2024-06-24 RX ORDER — SCOLOPAMINE TRANSDERMAL SYSTEM 1 MG/1
1 PATCH, EXTENDED RELEASE TRANSDERMAL
Status: DISCONTINUED | OUTPATIENT
Start: 2024-06-24 | End: 2024-06-24 | Stop reason: HOSPADM

## 2024-06-24 RX ORDER — OXYCODONE HYDROCHLORIDE 5 MG/1
5 TABLET ORAL EVERY 4 HOURS PRN
Status: DISCONTINUED | OUTPATIENT
Start: 2024-06-24 | End: 2024-06-25

## 2024-06-24 RX ORDER — SODIUM CHLORIDE 0.9 % (FLUSH) 0.9 %
5-40 SYRINGE (ML) INJECTION EVERY 12 HOURS SCHEDULED
Status: DISCONTINUED | OUTPATIENT
Start: 2024-06-24 | End: 2024-06-26 | Stop reason: HOSPADM

## 2024-06-24 RX ORDER — SODIUM CHLORIDE 0.9 % (FLUSH) 0.9 %
5-40 SYRINGE (ML) INJECTION EVERY 12 HOURS SCHEDULED
Status: DISCONTINUED | OUTPATIENT
Start: 2024-06-24 | End: 2024-06-24 | Stop reason: HOSPADM

## 2024-06-24 RX ORDER — PROCHLORPERAZINE EDISYLATE 5 MG/ML
5 INJECTION INTRAMUSCULAR; INTRAVENOUS
Status: DISCONTINUED | OUTPATIENT
Start: 2024-06-24 | End: 2024-06-24 | Stop reason: HOSPADM

## 2024-06-24 RX ORDER — PANTOPRAZOLE SODIUM 40 MG/1
40 TABLET, DELAYED RELEASE ORAL DAILY
Status: DISCONTINUED | OUTPATIENT
Start: 2024-06-24 | End: 2024-06-26 | Stop reason: HOSPADM

## 2024-06-24 RX ORDER — FENTANYL CITRATE 50 UG/ML
INJECTION, SOLUTION INTRAMUSCULAR; INTRAVENOUS PRN
Status: DISCONTINUED | OUTPATIENT
Start: 2024-06-24 | End: 2024-06-24 | Stop reason: SDUPTHER

## 2024-06-24 RX ORDER — ROSUVASTATIN CALCIUM 20 MG/1
20 TABLET, COATED ORAL NIGHTLY
Status: DISCONTINUED | OUTPATIENT
Start: 2024-06-24 | End: 2024-06-26 | Stop reason: HOSPADM

## 2024-06-24 RX ORDER — FLUTICASONE PROPIONATE 50 MCG
1 SPRAY, SUSPENSION (ML) NASAL DAILY PRN
Status: DISCONTINUED | OUTPATIENT
Start: 2024-06-24 | End: 2024-06-26 | Stop reason: HOSPADM

## 2024-06-24 RX ORDER — NALOXONE HYDROCHLORIDE 0.4 MG/ML
INJECTION, SOLUTION INTRAMUSCULAR; INTRAVENOUS; SUBCUTANEOUS PRN
Status: DISCONTINUED | OUTPATIENT
Start: 2024-06-24 | End: 2024-06-24 | Stop reason: HOSPADM

## 2024-06-24 RX ORDER — SODIUM CHLORIDE 0.9 % (FLUSH) 0.9 %
5-40 SYRINGE (ML) INJECTION PRN
Status: DISCONTINUED | OUTPATIENT
Start: 2024-06-24 | End: 2024-06-24 | Stop reason: HOSPADM

## 2024-06-24 RX ORDER — ALBUTEROL SULFATE 90 UG/1
AEROSOL, METERED RESPIRATORY (INHALATION) PRN
Status: DISCONTINUED | OUTPATIENT
Start: 2024-06-24 | End: 2024-06-24 | Stop reason: SDUPTHER

## 2024-06-24 RX ORDER — SODIUM CHLORIDE 9 MG/ML
INJECTION, SOLUTION INTRAVENOUS CONTINUOUS
Status: DISCONTINUED | OUTPATIENT
Start: 2024-06-24 | End: 2024-06-25

## 2024-06-24 RX ORDER — SENNA AND DOCUSATE SODIUM 50; 8.6 MG/1; MG/1
1 TABLET, FILM COATED ORAL 2 TIMES DAILY
Status: DISCONTINUED | OUTPATIENT
Start: 2024-06-24 | End: 2024-06-26 | Stop reason: HOSPADM

## 2024-06-24 RX ORDER — FENTANYL CITRATE 50 UG/ML
25 INJECTION, SOLUTION INTRAMUSCULAR; INTRAVENOUS EVERY 5 MIN PRN
Status: DISCONTINUED | OUTPATIENT
Start: 2024-06-24 | End: 2024-06-24 | Stop reason: HOSPADM

## 2024-06-24 RX ORDER — LIDOCAINE HYDROCHLORIDE 10 MG/ML
INJECTION, SOLUTION EPIDURAL; INFILTRATION; INTRACAUDAL; PERINEURAL PRN
Status: DISCONTINUED | OUTPATIENT
Start: 2024-06-24 | End: 2024-06-24 | Stop reason: SDUPTHER

## 2024-06-24 RX ORDER — ONDANSETRON 4 MG/1
4 TABLET, ORALLY DISINTEGRATING ORAL EVERY 8 HOURS PRN
Status: DISCONTINUED | OUTPATIENT
Start: 2024-06-24 | End: 2024-06-26 | Stop reason: HOSPADM

## 2024-06-24 RX ORDER — SODIUM CHLORIDE 0.9 % (FLUSH) 0.9 %
5-40 SYRINGE (ML) INJECTION PRN
Status: DISCONTINUED | OUTPATIENT
Start: 2024-06-24 | End: 2024-06-26 | Stop reason: HOSPADM

## 2024-06-24 RX ORDER — GLUCAGON 1 MG/ML
1 KIT INJECTION PRN
Status: DISCONTINUED | OUTPATIENT
Start: 2024-06-24 | End: 2024-06-26 | Stop reason: HOSPADM

## 2024-06-24 RX ORDER — ALBUTEROL SULFATE 90 UG/1
2 AEROSOL, METERED RESPIRATORY (INHALATION) 4 TIMES DAILY PRN
Status: DISCONTINUED | OUTPATIENT
Start: 2024-06-24 | End: 2024-06-26 | Stop reason: HOSPADM

## 2024-06-24 RX ORDER — METHOCARBAMOL 750 MG/1
750 TABLET, FILM COATED ORAL EVERY 8 HOURS PRN
Status: DISCONTINUED | OUTPATIENT
Start: 2024-06-24 | End: 2024-06-25

## 2024-06-24 RX ORDER — LEVOTHYROXINE SODIUM 0.05 MG/1
50 TABLET ORAL DAILY
Status: DISCONTINUED | OUTPATIENT
Start: 2024-06-24 | End: 2024-06-26 | Stop reason: HOSPADM

## 2024-06-24 RX ORDER — RANOLAZINE 500 MG/1
500 TABLET, EXTENDED RELEASE ORAL 2 TIMES DAILY
Status: DISCONTINUED | OUTPATIENT
Start: 2024-06-24 | End: 2024-06-26 | Stop reason: HOSPADM

## 2024-06-24 RX ORDER — ROCURONIUM BROMIDE 10 MG/ML
INJECTION, SOLUTION INTRAVENOUS PRN
Status: DISCONTINUED | OUTPATIENT
Start: 2024-06-24 | End: 2024-06-24 | Stop reason: SDUPTHER

## 2024-06-24 RX ORDER — OXYCODONE HYDROCHLORIDE 5 MG/1
10 TABLET ORAL EVERY 4 HOURS PRN
Status: DISCONTINUED | OUTPATIENT
Start: 2024-06-24 | End: 2024-06-25

## 2024-06-24 RX ORDER — MAGNESIUM HYDROXIDE 1200 MG/15ML
LIQUID ORAL CONTINUOUS PRN
Status: COMPLETED | OUTPATIENT
Start: 2024-06-24 | End: 2024-06-24

## 2024-06-24 RX ORDER — PROPOFOL 10 MG/ML
INJECTION, EMULSION INTRAVENOUS PRN
Status: DISCONTINUED | OUTPATIENT
Start: 2024-06-24 | End: 2024-06-24 | Stop reason: SDUPTHER

## 2024-06-24 RX ORDER — ACETAMINOPHEN 325 MG/1
650 TABLET ORAL EVERY 6 HOURS
Status: DISCONTINUED | OUTPATIENT
Start: 2024-06-24 | End: 2024-06-25

## 2024-06-24 RX ORDER — DEXTROSE MONOHYDRATE 100 MG/ML
INJECTION, SOLUTION INTRAVENOUS CONTINUOUS PRN
Status: DISCONTINUED | OUTPATIENT
Start: 2024-06-24 | End: 2024-06-26 | Stop reason: HOSPADM

## 2024-06-24 RX ORDER — INSULIN GLARGINE 100 [IU]/ML
50 INJECTION, SOLUTION SUBCUTANEOUS
Status: DISCONTINUED | OUTPATIENT
Start: 2024-06-25 | End: 2024-06-26 | Stop reason: HOSPADM

## 2024-06-24 RX ORDER — BISACODYL 10 MG
10 SUPPOSITORY, RECTAL RECTAL DAILY PRN
Status: DISCONTINUED | OUTPATIENT
Start: 2024-06-24 | End: 2024-06-26 | Stop reason: HOSPADM

## 2024-06-24 RX ADMIN — FENTANYL CITRATE 50 MCG: 0.05 INJECTION, SOLUTION INTRAMUSCULAR; INTRAVENOUS at 12:58

## 2024-06-24 RX ADMIN — SODIUM CHLORIDE, SODIUM LACTATE, POTASSIUM CHLORIDE, CALCIUM CHLORIDE: 600; 310; 30; 20 INJECTION, SOLUTION INTRAVENOUS at 08:31

## 2024-06-24 RX ADMIN — SODIUM CHLORIDE, POTASSIUM CHLORIDE, SODIUM LACTATE AND CALCIUM CHLORIDE: 600; 310; 30; 20 INJECTION, SOLUTION INTRAVENOUS at 08:13

## 2024-06-24 RX ADMIN — FENTANYL CITRATE 50 MCG: 0.05 INJECTION, SOLUTION INTRAMUSCULAR; INTRAVENOUS at 10:51

## 2024-06-24 RX ADMIN — ROCURONIUM BROMIDE 10 MG: 10 INJECTION, SOLUTION INTRAVENOUS at 11:10

## 2024-06-24 RX ADMIN — ROCURONIUM BROMIDE 50 MG: 10 INJECTION, SOLUTION INTRAVENOUS at 09:39

## 2024-06-24 RX ADMIN — PHENYLEPHRINE HYDROCHLORIDE 100 MCG: 10 INJECTION INTRAVENOUS at 09:08

## 2024-06-24 RX ADMIN — PHENYLEPHRINE HYDROCHLORIDE 100 MCG: 10 INJECTION INTRAVENOUS at 10:07

## 2024-06-24 RX ADMIN — ROCURONIUM BROMIDE 10 MG: 10 INJECTION, SOLUTION INTRAVENOUS at 11:43

## 2024-06-24 RX ADMIN — SODIUM CHLORIDE, PRESERVATIVE FREE 10 ML: 5 INJECTION INTRAVENOUS at 20:28

## 2024-06-24 RX ADMIN — HYDROMORPHONE HYDROCHLORIDE 0.25 MG: 1 INJECTION, SOLUTION INTRAMUSCULAR; INTRAVENOUS; SUBCUTANEOUS at 14:10

## 2024-06-24 RX ADMIN — SODIUM CHLORIDE 1500 MG: 9 INJECTION, SOLUTION INTRAVENOUS at 20:39

## 2024-06-24 RX ADMIN — PROPOFOL 200 MG: 10 INJECTION, EMULSION INTRAVENOUS at 08:17

## 2024-06-24 RX ADMIN — ROCURONIUM BROMIDE 10 MG: 10 INJECTION, SOLUTION INTRAVENOUS at 10:39

## 2024-06-24 RX ADMIN — ACETAMINOPHEN 650 MG: 325 TABLET ORAL at 22:50

## 2024-06-24 RX ADMIN — ONDANSETRON 4 MG: 2 INJECTION INTRAMUSCULAR; INTRAVENOUS at 18:40

## 2024-06-24 RX ADMIN — PROPOFOL 100 MG: 10 INJECTION, EMULSION INTRAVENOUS at 08:40

## 2024-06-24 RX ADMIN — ALBUTEROL SULFATE 2 PUFF: 90 AEROSOL, METERED RESPIRATORY (INHALATION) at 08:25

## 2024-06-24 RX ADMIN — HYDROMORPHONE HYDROCHLORIDE 0.25 MG: 1 INJECTION, SOLUTION INTRAMUSCULAR; INTRAVENOUS; SUBCUTANEOUS at 14:23

## 2024-06-24 RX ADMIN — CEFAZOLIN 2 G: 1 INJECTION, POWDER, FOR SOLUTION INTRAMUSCULAR; INTRAVENOUS at 09:10

## 2024-06-24 RX ADMIN — PHENYLEPHRINE HYDROCHLORIDE 100 MCG: 10 INJECTION INTRAVENOUS at 09:23

## 2024-06-24 RX ADMIN — LIDOCAINE HYDROCHLORIDE 50 MG: 10 INJECTION, SOLUTION EPIDURAL; INFILTRATION; INTRACAUDAL; PERINEURAL at 08:17

## 2024-06-24 RX ADMIN — HYDROMORPHONE HYDROCHLORIDE 0.25 MG: 1 INJECTION, SOLUTION INTRAMUSCULAR; INTRAVENOUS; SUBCUTANEOUS at 14:03

## 2024-06-24 RX ADMIN — FENTANYL CITRATE 50 MCG: 0.05 INJECTION, SOLUTION INTRAMUSCULAR; INTRAVENOUS at 08:17

## 2024-06-24 RX ADMIN — SODIUM CHLORIDE: 9 INJECTION, SOLUTION INTRAVENOUS at 18:58

## 2024-06-24 RX ADMIN — METOPROLOL SUCCINATE 100 MG: 25 TABLET, EXTENDED RELEASE ORAL at 20:31

## 2024-06-24 RX ADMIN — SUGAMMADEX 200 MG: 100 INJECTION, SOLUTION INTRAVENOUS at 13:20

## 2024-06-24 RX ADMIN — HYDROMORPHONE HYDROCHLORIDE 0.25 MG: 1 INJECTION, SOLUTION INTRAMUSCULAR; INTRAVENOUS; SUBCUTANEOUS at 14:28

## 2024-06-24 RX ADMIN — CEFAZOLIN 2 G: 1 INJECTION, POWDER, FOR SOLUTION INTRAMUSCULAR; INTRAVENOUS at 13:10

## 2024-06-24 RX ADMIN — PHENYLEPHRINE HYDROCHLORIDE 100 MCG: 10 INJECTION INTRAVENOUS at 09:27

## 2024-06-24 RX ADMIN — FENTANYL CITRATE 100 MCG: 50 INJECTION, SOLUTION INTRAMUSCULAR; INTRAVENOUS at 08:04

## 2024-06-24 RX ADMIN — ROCURONIUM BROMIDE 5 MG: 10 INJECTION, SOLUTION INTRAVENOUS at 12:57

## 2024-06-24 RX ADMIN — SENNOSIDES AND DOCUSATE SODIUM 1 TABLET: 50; 8.6 TABLET ORAL at 20:29

## 2024-06-24 RX ADMIN — ONDANSETRON 4 MG: 2 INJECTION INTRAMUSCULAR; INTRAVENOUS at 13:06

## 2024-06-24 RX ADMIN — ROSUVASTATIN CALCIUM 20 MG: 20 TABLET, FILM COATED ORAL at 20:30

## 2024-06-24 RX ADMIN — FENTANYL CITRATE 100 MCG: 0.05 INJECTION, SOLUTION INTRAMUSCULAR; INTRAVENOUS at 09:13

## 2024-06-24 RX ADMIN — HYDROMORPHONE HYDROCHLORIDE 0.25 MG: 1 INJECTION, SOLUTION INTRAMUSCULAR; INTRAVENOUS; SUBCUTANEOUS at 14:54

## 2024-06-24 RX ADMIN — HYDROMORPHONE HYDROCHLORIDE 0.25 MG: 1 INJECTION, SOLUTION INTRAMUSCULAR; INTRAVENOUS; SUBCUTANEOUS at 14:45

## 2024-06-24 RX ADMIN — FAMOTIDINE 20 MG: 10 INJECTION, SOLUTION INTRAVENOUS at 08:04

## 2024-06-24 RX ADMIN — FENTANYL CITRATE 50 MCG: 0.05 INJECTION, SOLUTION INTRAMUSCULAR; INTRAVENOUS at 11:45

## 2024-06-24 RX ADMIN — HYDROMORPHONE HYDROCHLORIDE 0.5 MG: 1 INJECTION, SOLUTION INTRAMUSCULAR; INTRAVENOUS; SUBCUTANEOUS at 18:33

## 2024-06-24 RX ADMIN — ACETAMINOPHEN 650 MG: 325 TABLET ORAL at 20:30

## 2024-06-24 RX ADMIN — RANOLAZINE 500 MG: 500 TABLET, FILM COATED, EXTENDED RELEASE ORAL at 20:28

## 2024-06-24 RX ADMIN — HYDROMORPHONE HYDROCHLORIDE 0.5 MG: 1 INJECTION, SOLUTION INTRAMUSCULAR; INTRAVENOUS; SUBCUTANEOUS at 22:50

## 2024-06-24 RX ADMIN — ROCURONIUM BROMIDE 50 MG: 10 INJECTION, SOLUTION INTRAVENOUS at 08:17

## 2024-06-24 RX ADMIN — ROCURONIUM BROMIDE 10 MG: 10 INJECTION, SOLUTION INTRAVENOUS at 12:19

## 2024-06-24 RX ADMIN — MIDAZOLAM HYDROCHLORIDE 2 MG: 1 INJECTION, SOLUTION INTRAMUSCULAR; INTRAVENOUS at 08:04

## 2024-06-24 RX ADMIN — OXYCODONE 10 MG: 5 TABLET ORAL at 20:28

## 2024-06-24 ASSESSMENT — PAIN SCALES - GENERAL
PAINLEVEL_OUTOF10: 9
PAINLEVEL_OUTOF10: 6
PAINLEVEL_OUTOF10: 5
PAINLEVEL_OUTOF10: 5
PAINLEVEL_OUTOF10: 8
PAINLEVEL_OUTOF10: 8
PAINLEVEL_OUTOF10: 9
PAINLEVEL_OUTOF10: 7
PAINLEVEL_OUTOF10: 10
PAINLEVEL_OUTOF10: 10
PAINLEVEL_OUTOF10: 7
PAINLEVEL_OUTOF10: 7

## 2024-06-24 ASSESSMENT — PAIN DESCRIPTION - DESCRIPTORS
DESCRIPTORS: BURNING;ACHING;THROBBING
DESCRIPTORS: ACHING;DISCOMFORT
DESCRIPTORS: ACHING
DESCRIPTORS: ACHING;STABBING
DESCRIPTORS: ACHING;STABBING

## 2024-06-24 ASSESSMENT — PAIN DESCRIPTION - LOCATION
LOCATION: ARM;NECK;SHOULDER
LOCATION: NECK;SHOULDER
LOCATION: NECK
LOCATION: NECK
LOCATION: BACK;SHOULDER
LOCATION: NECK

## 2024-06-24 ASSESSMENT — PAIN DESCRIPTION - ORIENTATION
ORIENTATION: RIGHT;LEFT

## 2024-06-24 ASSESSMENT — LIFESTYLE VARIABLES: SMOKING_STATUS: 1

## 2024-06-24 ASSESSMENT — PAIN - FUNCTIONAL ASSESSMENT
PAIN_FUNCTIONAL_ASSESSMENT: 0-10
PAIN_FUNCTIONAL_ASSESSMENT: PREVENTS OR INTERFERES SOME ACTIVE ACTIVITIES AND ADLS
PAIN_FUNCTIONAL_ASSESSMENT: PREVENTS OR INTERFERES SOME ACTIVE ACTIVITIES AND ADLS

## 2024-06-24 NOTE — ANESTHESIA PRE PROCEDURE
06/23/24    BMI:   Wt Readings from Last 3 Encounters:   05/17/24 92.4 kg (203 lb 11.3 oz)   05/07/24 92.1 kg (203 lb)   04/30/24 93 kg (205 lb 0.6 oz)     There is no height or weight on file to calculate BMI.    CBC:   Lab Results   Component Value Date/Time    WBC 15.8 05/17/2024 10:51 AM    RBC 5.11 05/17/2024 10:51 AM    HGB 15.6 05/17/2024 10:51 AM    HCT 47.1 05/17/2024 10:51 AM    MCV 92.2 05/17/2024 10:51 AM    RDW 13.6 05/17/2024 10:51 AM     05/17/2024 10:51 AM       CMP:   Lab Results   Component Value Date/Time     05/17/2024 10:51 AM    K 4.0 05/17/2024 10:51 AM     05/17/2024 10:51 AM    CO2 22 05/17/2024 10:51 AM    BUN 16 05/17/2024 10:51 AM    CREATININE 0.7 06/24/2024 07:28 AM    CREATININE 0.7 05/17/2024 10:51 AM    GFRAA >60 09/06/2022 11:30 PM    LABGLOM >90 05/17/2024 10:51 AM    LABGLOM >90 04/30/2024 09:32 AM    GLUCOSE 169 05/17/2024 10:51 AM    CALCIUM 9.4 05/17/2024 10:51 AM    BILITOT 0.5 05/17/2024 10:51 AM    ALKPHOS 99 05/17/2024 10:51 AM    AST 24 05/17/2024 10:51 AM    ALT 23 05/17/2024 10:51 AM       POC Tests:   Recent Labs     06/24/24  0728   POCGLU 109*   POCNA 143   POCK 4.6*   POCCL 107   POCBUN 13   POCHCT 51*       Coags:   Lab Results   Component Value Date/Time    PROTIME 12.7 10/16/2023 09:43 AM    INR 1.0 10/16/2023 09:43 AM    APTT 29.0 10/16/2023 09:43 AM       HCG (If Applicable):   Lab Results   Component Value Date    PREGTESTUR NEGATIVE 10/29/2022    PREGSERUM NEGATIVE 10/25/2022        ABGs: No results found for: \"PHART\", \"PO2ART\", \"WFN1CCN\", \"WSY1FXU\", \"BEART\", \"U1IPOQQA\"     Type & Screen (If Applicable):  No results found for: \"LABABO\"    Drug/Infectious Status (If Applicable):  No results found for: \"HIV\", \"HEPCAB\"    COVID-19 Screening (If Applicable):   Lab Results   Component Value Date/Time    COVID19 Not Detected 04/21/2023 04:07 PM           Anesthesia Evaluation  Patient summary reviewed   no history of anesthetic complications:

## 2024-06-24 NOTE — ANESTHESIA PROCEDURE NOTES
Arterial Line:    An arterial line was placed using ultrasound guidance, in the OR for the following indication(s): blood sampling needed.    A  (size) (length), Arrow (type) catheter was placed, Seldinger technique used, into the left radial artery, secured by Tegaderm.  Anesthesia type: General    Events:  patient tolerated procedure well with no complications.6/24/2024 8:30 AM6/24/2024 8:35 AM  Anesthesiologist: Chuck Roman MD  Performed: Anesthesiologist   Preanesthetic Checklist  Completed: patient identified, IV checked, site marked, risks and benefits discussed, surgical/procedural consents, equipment checked, pre-op evaluation, timeout performed, anesthesia consent given, oxygen available, monitors applied/VS acknowledged, fire risk safety assessment completed and verbalized and blood product R/B/A discussed and consented

## 2024-06-24 NOTE — H&P
History and Physical    Pt Name: Charisma Donnelly  MRN: [id number]  YOB: 1970  Date of evaluation: 2024    SUBJECTIVE:   History of Chief Complaint:    Patient presents for scheduled C6-T1 POSTERIOR CERVICAL FUSION, POSSIBLE T2.  She reports both cervical and lumbar disc disease with chronic pain to neck shoulders and extremities. She rates her pain a 9/10 today. She has underwent several cervical/lumbar spine procedures in the past, most recent lumbar spine surgery was in , Dr. Christina.  She has failed conservative treatment. She went through PAT in 2024 and she reports surgery was delayed due to insurance reasons.     Past Medical History    has a past medical history of Abnormal uterine bleeding (AUB), Anxiety, Arthritis, CAD (coronary artery disease), Chronic neck pain, COVID-19, Depression, Diabetes mellitus (HCC), Fatty liver, Gastroparesis, Histiocytosis (HCC), [place] (hard of hearing), HTN (hypertension), Hx of blood clots, Hyperlipidemia, Hypothyroid, Kidney stone, Lower back pain, Mixed incontinence, Myocardial infarct (HCC), PCOS (polycystic ovarian syndrome), Pulmonary Langerhans cell granulomatosis (HCC), Snores, Spinal stenosis, Under care of service provider, Under care of service provider, Under care of service provider, Under care of team, Under care of team, Under care of team, Vasomotor symptoms due to menopause, Wears eyeglasses, Wears partial dentures, and Wellness examination.  Past Surgical History   has a past surgical history that includes back surgery ();  section (); Mastoid surgery; Cardiac catheterization (); Lung biopsy (Right); hip surgery (Left); Colonoscopy (N/A, 2021); Upper gastrointestinal endoscopy (2021); Cervical spine surgery (); cervical fusion (2016); laminectomy (); Hysterectomy (N/A, 2022); US BIOPSY LYMPH NODE (09/15/2023); lumbar laminectomy (10/30/2023); lumbar fusion (N/A, 10/30/2023); Upper

## 2024-06-25 ENCOUNTER — APPOINTMENT (OUTPATIENT)
Dept: CT IMAGING | Age: 54
DRG: 321 | End: 2024-06-25
Attending: NEUROLOGICAL SURGERY
Payer: COMMERCIAL

## 2024-06-25 LAB
ERYTHROCYTE [DISTWIDTH] IN BLOOD BY AUTOMATED COUNT: 13.6 % (ref 11.8–14.4)
GLUCOSE BLD-MCNC: 119 MG/DL (ref 65–105)
GLUCOSE BLD-MCNC: 194 MG/DL (ref 65–105)
GLUCOSE BLD-MCNC: 195 MG/DL (ref 65–105)
HCT VFR BLD AUTO: 42.3 % (ref 36.3–47.1)
HGB BLD-MCNC: 13.9 G/DL (ref 11.9–15.1)
MCH RBC QN AUTO: 30.8 PG (ref 25.2–33.5)
MCHC RBC AUTO-ENTMCNC: 32.9 G/DL (ref 28.4–34.8)
MCV RBC AUTO: 93.8 FL (ref 82.6–102.9)
MICROORGANISM SPEC CULT: NO GROWTH
NRBC BLD-RTO: 0 PER 100 WBC
PLATELET # BLD AUTO: 295 K/UL (ref 138–453)
PMV BLD AUTO: 9.4 FL (ref 8.1–13.5)
RBC # BLD AUTO: 4.51 M/UL (ref 3.95–5.11)
SERVICE CMNT-IMP: NORMAL
SPECIMEN DESCRIPTION: NORMAL
WBC OTHER # BLD: 17.1 K/UL (ref 3.5–11.3)

## 2024-06-25 PROCEDURE — 72125 CT NECK SPINE W/O DYE: CPT

## 2024-06-25 PROCEDURE — 2580000003 HC RX 258: Performed by: REGISTERED NURSE

## 2024-06-25 PROCEDURE — 85027 COMPLETE CBC AUTOMATED: CPT

## 2024-06-25 PROCEDURE — 82947 ASSAY GLUCOSE BLOOD QUANT: CPT

## 2024-06-25 PROCEDURE — 6370000000 HC RX 637 (ALT 250 FOR IP): Performed by: REGISTERED NURSE

## 2024-06-25 PROCEDURE — 6360000002 HC RX W HCPCS: Performed by: REGISTERED NURSE

## 2024-06-25 PROCEDURE — 6370000000 HC RX 637 (ALT 250 FOR IP)

## 2024-06-25 PROCEDURE — 97162 PT EVAL MOD COMPLEX 30 MIN: CPT

## 2024-06-25 PROCEDURE — 1200000000 HC SEMI PRIVATE

## 2024-06-25 PROCEDURE — 97166 OT EVAL MOD COMPLEX 45 MIN: CPT

## 2024-06-25 PROCEDURE — 36415 COLL VENOUS BLD VENIPUNCTURE: CPT

## 2024-06-25 PROCEDURE — 97535 SELF CARE MNGMENT TRAINING: CPT

## 2024-06-25 PROCEDURE — 97530 THERAPEUTIC ACTIVITIES: CPT

## 2024-06-25 RX ORDER — OXYCODONE HYDROCHLORIDE AND ACETAMINOPHEN 5; 325 MG/1; MG/1
1 TABLET ORAL EVERY 4 HOURS PRN
Status: DISCONTINUED | OUTPATIENT
Start: 2024-06-25 | End: 2024-06-26 | Stop reason: HOSPADM

## 2024-06-25 RX ORDER — METHOCARBAMOL 750 MG/1
750 TABLET, FILM COATED ORAL EVERY 8 HOURS
Status: DISCONTINUED | OUTPATIENT
Start: 2024-06-25 | End: 2024-06-25

## 2024-06-25 RX ORDER — NICOTINE 21 MG/24HR
1 PATCH, TRANSDERMAL 24 HOURS TRANSDERMAL DAILY
Status: DISCONTINUED | OUTPATIENT
Start: 2024-06-25 | End: 2024-06-26 | Stop reason: HOSPADM

## 2024-06-25 RX ORDER — OXYCODONE HYDROCHLORIDE AND ACETAMINOPHEN 5; 325 MG/1; MG/1
2 TABLET ORAL EVERY 4 HOURS PRN
Status: DISCONTINUED | OUTPATIENT
Start: 2024-06-25 | End: 2024-06-26 | Stop reason: HOSPADM

## 2024-06-25 RX ORDER — TIZANIDINE 4 MG/1
4 TABLET ORAL EVERY 6 HOURS PRN
Status: DISCONTINUED | OUTPATIENT
Start: 2024-06-25 | End: 2024-06-26 | Stop reason: HOSPADM

## 2024-06-25 RX ADMIN — TIZANIDINE 4 MG: 4 TABLET ORAL at 16:04

## 2024-06-25 RX ADMIN — LEVOTHYROXINE SODIUM 50 MCG: 50 TABLET ORAL at 08:57

## 2024-06-25 RX ADMIN — HYDROMORPHONE HYDROCHLORIDE 0.5 MG: 1 INJECTION, SOLUTION INTRAMUSCULAR; INTRAVENOUS; SUBCUTANEOUS at 10:11

## 2024-06-25 RX ADMIN — OXYCODONE HYDROCHLORIDE AND ACETAMINOPHEN 2 TABLET: 5; 325 TABLET ORAL at 23:01

## 2024-06-25 RX ADMIN — SENNOSIDES AND DOCUSATE SODIUM 1 TABLET: 50; 8.6 TABLET ORAL at 20:34

## 2024-06-25 RX ADMIN — PHENOL 1 SPRAY: 1.5 LIQUID ORAL at 08:56

## 2024-06-25 RX ADMIN — SENNOSIDES AND DOCUSATE SODIUM 1 TABLET: 50; 8.6 TABLET ORAL at 08:57

## 2024-06-25 RX ADMIN — HYDROMORPHONE HYDROCHLORIDE 0.5 MG: 1 INJECTION, SOLUTION INTRAMUSCULAR; INTRAVENOUS; SUBCUTANEOUS at 13:48

## 2024-06-25 RX ADMIN — OXYCODONE HYDROCHLORIDE AND ACETAMINOPHEN 2 TABLET: 5; 325 TABLET ORAL at 19:02

## 2024-06-25 RX ADMIN — METHOCARBAMOL 750 MG: 750 TABLET ORAL at 02:48

## 2024-06-25 RX ADMIN — OXYCODONE 10 MG: 5 TABLET ORAL at 05:58

## 2024-06-25 RX ADMIN — PANTOPRAZOLE SODIUM 40 MG: 40 TABLET, DELAYED RELEASE ORAL at 08:58

## 2024-06-25 RX ADMIN — ROSUVASTATIN CALCIUM 20 MG: 20 TABLET, FILM COATED ORAL at 20:34

## 2024-06-25 RX ADMIN — METHOCARBAMOL 750 MG: 750 TABLET ORAL at 10:55

## 2024-06-25 RX ADMIN — OXYCODONE HYDROCHLORIDE AND ACETAMINOPHEN 2 TABLET: 5; 325 TABLET ORAL at 12:54

## 2024-06-25 RX ADMIN — HYDROMORPHONE HYDROCHLORIDE 0.5 MG: 1 INJECTION, SOLUTION INTRAMUSCULAR; INTRAVENOUS; SUBCUTANEOUS at 07:12

## 2024-06-25 RX ADMIN — ONDANSETRON 4 MG: 4 TABLET, ORALLY DISINTEGRATING ORAL at 01:10

## 2024-06-25 RX ADMIN — METOPROLOL SUCCINATE 100 MG: 25 TABLET, EXTENDED RELEASE ORAL at 08:57

## 2024-06-25 RX ADMIN — SODIUM CHLORIDE 1500 MG: 9 INJECTION, SOLUTION INTRAVENOUS at 10:54

## 2024-06-25 RX ADMIN — RANOLAZINE 500 MG: 500 TABLET, FILM COATED, EXTENDED RELEASE ORAL at 08:58

## 2024-06-25 RX ADMIN — HYDROMORPHONE HYDROCHLORIDE 0.5 MG: 1 INJECTION, SOLUTION INTRAMUSCULAR; INTRAVENOUS; SUBCUTANEOUS at 17:11

## 2024-06-25 RX ADMIN — ONDANSETRON 4 MG: 2 INJECTION INTRAMUSCULAR; INTRAVENOUS at 10:05

## 2024-06-25 RX ADMIN — TIZANIDINE 4 MG: 4 TABLET ORAL at 23:00

## 2024-06-25 RX ADMIN — HYDROMORPHONE HYDROCHLORIDE 0.5 MG: 1 INJECTION, SOLUTION INTRAMUSCULAR; INTRAVENOUS; SUBCUTANEOUS at 20:34

## 2024-06-25 RX ADMIN — OXYCODONE HYDROCHLORIDE AND ACETAMINOPHEN 2 TABLET: 5; 325 TABLET ORAL at 08:56

## 2024-06-25 RX ADMIN — ACETAMINOPHEN 650 MG: 325 TABLET ORAL at 05:58

## 2024-06-25 RX ADMIN — RANOLAZINE 500 MG: 500 TABLET, FILM COATED, EXTENDED RELEASE ORAL at 23:00

## 2024-06-25 RX ADMIN — HYDROMORPHONE HYDROCHLORIDE 0.5 MG: 1 INJECTION, SOLUTION INTRAMUSCULAR; INTRAVENOUS; SUBCUTANEOUS at 02:48

## 2024-06-25 RX ADMIN — OXYCODONE 10 MG: 5 TABLET ORAL at 01:09

## 2024-06-25 RX ADMIN — SODIUM CHLORIDE, PRESERVATIVE FREE 10 ML: 5 INJECTION INTRAVENOUS at 20:34

## 2024-06-25 RX ADMIN — METOPROLOL SUCCINATE 100 MG: 25 TABLET, EXTENDED RELEASE ORAL at 20:33

## 2024-06-25 ASSESSMENT — PAIN DESCRIPTION - ORIENTATION
ORIENTATION: POSTERIOR
ORIENTATION: RIGHT;LEFT
ORIENTATION: RIGHT;LEFT;POSTERIOR
ORIENTATION: RIGHT;LEFT
ORIENTATION: POSTERIOR

## 2024-06-25 ASSESSMENT — PAIN DESCRIPTION - LOCATION
LOCATION: NECK;SHOULDER
LOCATION: SHOULDER;NECK
LOCATION: BACK;SHOULDER;NECK
LOCATION: SHOULDER;NECK
LOCATION: SHOULDER;NECK
LOCATION: NECK;SHOULDER
LOCATION: NECK;SHOULDER
LOCATION: NECK
LOCATION: SHOULDER;NECK
LOCATION: NECK;SHOULDER

## 2024-06-25 ASSESSMENT — PAIN SCALES - GENERAL
PAINLEVEL_OUTOF10: 7
PAINLEVEL_OUTOF10: 9
PAINLEVEL_OUTOF10: 7
PAINLEVEL_OUTOF10: 0
PAINLEVEL_OUTOF10: 7
PAINLEVEL_OUTOF10: 8
PAINLEVEL_OUTOF10: 10
PAINLEVEL_OUTOF10: 9
PAINLEVEL_OUTOF10: 9
PAINLEVEL_OUTOF10: 7
PAINLEVEL_OUTOF10: 8
PAINLEVEL_OUTOF10: 9
PAINLEVEL_OUTOF10: 7
PAINLEVEL_OUTOF10: 8
PAINLEVEL_OUTOF10: 8
PAINLEVEL_OUTOF10: 6
PAINLEVEL_OUTOF10: 9
PAINLEVEL_OUTOF10: 7
PAINLEVEL_OUTOF10: 10
PAINLEVEL_OUTOF10: 9
PAINLEVEL_OUTOF10: 8
PAINLEVEL_OUTOF10: 9
PAINLEVEL_OUTOF10: 9
PAINLEVEL_OUTOF10: 10
PAINLEVEL_OUTOF10: 8
PAINLEVEL_OUTOF10: 8

## 2024-06-25 ASSESSMENT — PAIN DESCRIPTION - DESCRIPTORS
DESCRIPTORS: ACHING;THROBBING
DESCRIPTORS: ACHING;BURNING;STABBING
DESCRIPTORS: ACHING;SHARP
DESCRIPTORS: ACHING
DESCRIPTORS: ACHING;SHARP
DESCRIPTORS: ACHING
DESCRIPTORS: THROBBING;TIGHTNESS
DESCRIPTORS: ACHING;STABBING

## 2024-06-25 ASSESSMENT — PAIN - FUNCTIONAL ASSESSMENT
PAIN_FUNCTIONAL_ASSESSMENT: ACTIVITIES ARE NOT PREVENTED

## 2024-06-25 NOTE — CARE COORDINATION
[facility] denials, referral sent to [facility], states they accept Medicaid [employer] and have PT/OT on staff.     [facility] declined as they cannot take PT/OT without a skilled nursing need.     1630 Spoke with pt, agreeable to OP PT/OT.

## 2024-06-25 NOTE — CARE COORDINATION
Case Management Assessment  Initial Evaluation    Date/Time of Evaluation: 6/25/2024 10:48 AM  Assessment Completed by: CHRISTI HAWKINS RN    If patient is discharged prior to next notation, then this note serves as note for discharge by case management.    Patient Name: Charisma Enamorado                   YOB: 1970  Diagnosis: Cervical radiculitis [M54.12]  Cervical spondylosis with myelopathy [M47.12]                   Date / Time: 6/24/2024  5:46 AM    Patient Admission Status: Inpatient   Readmission Risk (Low < 19, Mod (19-27), High > 27): Readmission Risk Score: 13.3    Current PCP: Lucy Torres APRN - CNP  PCP verified by CM? (P) Yes    Chart Reviewed: Yes      History Provided by: (P) Patient  Patient Orientation: (P) Alert and Oriented    Patient Cognition: (P) Alert    Hospitalization in the last 30 days (Readmission):  No    If yes, Readmission Assessment in  Navigator will be completed.    Advance Directives:      Code Status: Full Code   Patient's Primary Decision Maker is: (P) Legal Next of Kin    Primary Decision Maker: AFSANEH ENAMORADO - Spouse - [phone]    Secondary Decision Maker: HELENA ENAMORADO - Brother/Sister - [phone]    Discharge Planning:    Patient lives with: (P) Family Members, Spouse/Significant Other, Friends Type of Home: (P) House  Primary Care Giver: (P) Self  Patient Support Systems include: (P) Family Members, Friends/Neighbors   Current Financial resources: (P) Medicaid  Current community resources:    Current services prior to admission: (P) Durable Medical Equipment            Current DME: (P) Bedside Commode, Cane, Shower Chair, Walker            Type of Home Care services:  (P) None    ADLS  Prior functional level: (P) Independent in ADLs/IADLs  Current functional level: (P) Assistance with the following:    PT AM-PAC:   /24  OT AM-PAC: 19 /24    Family can provide assistance at DC: (P) Yes  Would you like Case Management to discuss the discharge plan

## 2024-06-25 NOTE — CARE COORDINATION
Post Acute Facility/Agency List     Provided patient with the following list, the list includes the overall star ratings obtained from CMS per the Medicare Web site (www.Medicare.gov):     [] Long Term Acute Care Facilities  [] Acute Inpatient Rehabilitation Facilities  [] Skilled Nursing Facilities  [x] Home Care    Provided verbal instructions on how to utilize the QR Code to obtain additional detailed star ratings from www.Medicare.gov     offered to print and provide the detailed list:    [x]Accepted   []Declined    The following printed detailed lists were provided:     [facility] [place] [facility] [phone]&range=25&[email or web address]&sort=Relevance&direction=ASC

## 2024-06-25 NOTE — OP NOTE
Operative Note      Patient: Charisma Donnelly  YOB: 1970  MRN: [id number]    Date of Procedure: 6/24/2024    Pre-Op Diagnosis Codes:     * Cervical radiculitis [M54.12]    Post-Op Diagnosis: Same       Procedure(s):  RIGHT C7-T1 HEMILAMINECTOMY AND FORAMINOTOMY  C5-T1 FUSION  C5-6, and T1 segmental fixation  Computer navigation  Use of morselized autograft and allograft    Surgeon(s):  Carri Christina DO    Assistant:   * No surgical staff found *    Anesthesia: General    Estimated Blood Loss (mL): less than 50     Complications: None    Specimens:   ID Type Source Tests Collected by Time Destination   1 : URINE FROM BENITES INSERTION Urine Urine, indwelling catheter CULTURE, URINE Carri Christina DO 6/24/2024 1304        Implants:  Implant Name Type Inv. Item Serial No.  Lot No. LRB No. Used Action   GRAFT CELLR BNE [employer]RX INFLUX SPARC 5CC - [id number]  GRAFT CELLR BNE MATRX INFLUX SPARC 5CC [id number] [employer] [id number] N/A 1 Implanted   GRAFT CELLR BNE MATRX INFLUX SPARC 5CC - [id number]  GRAFT CELLR BNE MATRX INFLUX SPARC 5CC [id number] [employer] [id number] N/A 1 Implanted   GRAFT CELLR BNE MATRX INFLUX SPARC 5CC - [id number]  GRAFT CELLR BNE MATRX INFLUX SPARC 5CC [id number] [employer] [id number] N/A 1 Implanted   GRAFT CELLR BNE MATRX INFLUX SPARC 5CC - [id number]  GRAFT CELLR BNE MATRX INFLUX SPARC 5CC [id number] [employer] [id number] N/A 1 Implanted   GRAFT CELLR BNE MATRX INFLUX SPARC 2CC - [id number]  GRAFT CELLR BNE MATRX INFLUX SPARC 2CC [id number] [employer] [id number] N/A 1 Implanted   GRAFT CELLR BNE MATRX INFLUX SPARC 2CC - [id number]  GRAFT CELLR BNE MATRX INFLUX SPARC 2CC [id number] [employer] [id number] N/A 1 Implanted   GRAFT CELLR BNE MATRX INFLUX SPARC 2CC - [id number]  GRAFT CELLR BNE MATRX INFLUX SPARC 2CC [id number] [employer] [facility] [id number] N/A

## 2024-06-25 NOTE — PLAN OF CARE
Problem: Discharge Planning  Goal: Discharge to home or other facility with appropriate resources  Outcome: Progressing  Flowsheets (Taken 6/24/2024 2000)  Discharge to home or other facility with appropriate resources: Identify barriers to discharge with patient and caregiver     Problem: Pain  Goal: Verbalizes/displays adequate comfort level or baseline comfort level  Outcome: Progressing  Flowsheets (Taken 6/25/2024 0006)  Verbalizes/displays adequate comfort level or baseline comfort level: Encourage patient to monitor pain and request assistance     Problem: Safety - Adult  Goal: Free from fall injury  Outcome: Progressing  Flowsheets (Taken 6/24/2024 1948)  Free From Fall Injury: Instruct family/caregiver on patient safety     Problem: ABCDS Injury Assessment  Goal: Absence of physical injury  Outcome: Progressing  Flowsheets (Taken 6/24/2024 1948)  Absence of Physical Injury: Implement safety measures based on patient assessment     Problem: Skin/Tissue Integrity  Goal: Absence of new skin breakdown  Description: 1.  Monitor for areas of redness and/or skin breakdown  2.  Assess vascular access sites hourly  3.  Every 4-6 hours minimum:  Change oxygen saturation probe site  4.  Every 4-6 hours:  If on nasal continuous positive airway pressure, respiratory therapy assess nares and determine need for appliance change or resting period.  Outcome: Progressing

## 2024-06-25 NOTE — BRIEF OP NOTE
dependent loop in tubing;Drainage bag less than half full 06/24/24 1730   Status Draining 06/24/24 1730       Findings:  Infection Present At Time Of Surgery (PATOS) (choose all levels that have infection present):  No infection present  Other Findings:     Electronically signed by Carri Christina DO on 6/24/2024 at 10:07 PM

## 2024-06-25 NOTE — PLAN OF CARE
Problem: Pain  Goal: Verbalizes/displays adequate comfort level or baseline comfort level  6/25/2024 1151 by Payton Ford, RN  Outcome: Progressing     Problem: Safety - Adult  Goal: Free from fall injury  6/25/2024 1151 by Payton Ford, RN  Outcome: Progressing     Problem: Skin/Tissue Integrity  Goal: Absence of new skin breakdown  Description: 1.  Monitor for areas of redness and/or skin breakdown  2.  Assess vascular access sites hourly    6/25/2024 1151 by Payton Ford, RN  Outcome: Progressing

## 2024-06-26 ENCOUNTER — TELEPHONE (OUTPATIENT)
Dept: NEUROSURGERY | Age: 54
End: 2024-06-26

## 2024-06-26 VITALS
HEART RATE: 84 BPM | TEMPERATURE: 100 F | SYSTOLIC BLOOD PRESSURE: 154 MMHG | OXYGEN SATURATION: 98 % | DIASTOLIC BLOOD PRESSURE: 87 MMHG | RESPIRATION RATE: 15 BRPM

## 2024-06-26 LAB — GLUCOSE BLD-MCNC: 106 MG/DL (ref 65–105)

## 2024-06-26 PROCEDURE — 6360000002 HC RX W HCPCS: Performed by: REGISTERED NURSE

## 2024-06-26 PROCEDURE — 6370000000 HC RX 637 (ALT 250 FOR IP)

## 2024-06-26 PROCEDURE — 82947 ASSAY GLUCOSE BLOOD QUANT: CPT

## 2024-06-26 PROCEDURE — 6370000000 HC RX 637 (ALT 250 FOR IP): Performed by: REGISTERED NURSE

## 2024-06-26 RX ORDER — CLOPIDOGREL BISULFATE 75 MG/1
75 TABLET ORAL DAILY
Qty: 30 TABLET | Refills: 5
Start: 2024-07-02

## 2024-06-26 RX ORDER — TIZANIDINE 4 MG/1
4 TABLET ORAL EVERY 6 HOURS PRN
Qty: 28 TABLET | Refills: 0 | Status: SHIPPED | OUTPATIENT
Start: 2024-06-26 | End: 2024-07-03

## 2024-06-26 RX ORDER — ASPIRIN 81 MG/1
81 TABLET ORAL DAILY
Qty: 90 TABLET | Refills: 1
Start: 2024-07-02

## 2024-06-26 RX ORDER — NICOTINE 21 MG/24HR
1 PATCH, TRANSDERMAL 24 HOURS TRANSDERMAL DAILY
Qty: 30 PATCH | Refills: 3 | Status: SHIPPED | OUTPATIENT
Start: 2024-06-27

## 2024-06-26 RX ORDER — OXYCODONE HYDROCHLORIDE 5 MG/1
TABLET ORAL
Qty: 56 TABLET | Refills: 0 | Status: SHIPPED | OUTPATIENT
Start: 2024-06-26 | End: 2024-07-03

## 2024-06-26 RX ORDER — ONDANSETRON 4 MG/1
4 TABLET, ORALLY DISINTEGRATING ORAL EVERY 8 HOURS PRN
Qty: 30 TABLET | Refills: 0 | Status: SHIPPED | OUTPATIENT
Start: 2024-06-26 | End: 2024-07-06

## 2024-06-26 RX ADMIN — PANTOPRAZOLE SODIUM 40 MG: 40 TABLET, DELAYED RELEASE ORAL at 07:56

## 2024-06-26 RX ADMIN — OXYCODONE HYDROCHLORIDE AND ACETAMINOPHEN 2 TABLET: 5; 325 TABLET ORAL at 12:03

## 2024-06-26 RX ADMIN — OXYCODONE HYDROCHLORIDE AND ACETAMINOPHEN 2 TABLET: 5; 325 TABLET ORAL at 07:55

## 2024-06-26 RX ADMIN — TIZANIDINE 4 MG: 4 TABLET ORAL at 12:09

## 2024-06-26 RX ADMIN — TIZANIDINE 4 MG: 4 TABLET ORAL at 06:00

## 2024-06-26 RX ADMIN — HYDROMORPHONE HYDROCHLORIDE 0.5 MG: 1 INJECTION, SOLUTION INTRAMUSCULAR; INTRAVENOUS; SUBCUTANEOUS at 04:10

## 2024-06-26 RX ADMIN — ONDANSETRON 4 MG: 2 INJECTION INTRAMUSCULAR; INTRAVENOUS at 03:18

## 2024-06-26 RX ADMIN — OXYCODONE HYDROCHLORIDE AND ACETAMINOPHEN 2 TABLET: 5; 325 TABLET ORAL at 03:18

## 2024-06-26 RX ADMIN — ONDANSETRON 4 MG: 2 INJECTION INTRAMUSCULAR; INTRAVENOUS at 12:09

## 2024-06-26 RX ADMIN — RANOLAZINE 500 MG: 500 TABLET, FILM COATED, EXTENDED RELEASE ORAL at 07:58

## 2024-06-26 RX ADMIN — HYDROMORPHONE HYDROCHLORIDE 0.5 MG: 1 INJECTION, SOLUTION INTRAMUSCULAR; INTRAVENOUS; SUBCUTANEOUS at 00:41

## 2024-06-26 RX ADMIN — LEVOTHYROXINE SODIUM 50 MCG: 50 TABLET ORAL at 07:56

## 2024-06-26 RX ADMIN — INSULIN GLARGINE 50 UNITS: 100 INJECTION, SOLUTION SUBCUTANEOUS at 07:56

## 2024-06-26 RX ADMIN — SENNOSIDES AND DOCUSATE SODIUM 1 TABLET: 50; 8.6 TABLET ORAL at 07:56

## 2024-06-26 RX ADMIN — METOPROLOL SUCCINATE 100 MG: 25 TABLET, EXTENDED RELEASE ORAL at 07:55

## 2024-06-26 ASSESSMENT — PAIN DESCRIPTION - LOCATION
LOCATION: NECK;SHOULDER
LOCATION: NECK

## 2024-06-26 ASSESSMENT — PAIN SCALES - GENERAL
PAINLEVEL_OUTOF10: 8
PAINLEVEL_OUTOF10: 6
PAINLEVEL_OUTOF10: 8
PAINLEVEL_OUTOF10: 6
PAINLEVEL_OUTOF10: 8
PAINLEVEL_OUTOF10: 0
PAINLEVEL_OUTOF10: 9

## 2024-06-26 ASSESSMENT — PAIN DESCRIPTION - ORIENTATION: ORIENTATION: MID

## 2024-06-26 NOTE — PLAN OF CARE
Drain Removal Note    [x] Hemovac Drain   []Subgaleal Drain  []Ventriculostomy Drain    Drain removed from suction, prepped with betadine and sterile towels placed to create sterile field. Drain suture cut and drain removed. 2x2 guaze and tegaderm placed over insertion site with hemostasis.     No complications, patient tolerated procedure well.    Electronically signed by SAVANA Cervantes CNP on 6/26/2024 at 12:13 PM

## 2024-06-26 NOTE — DISCHARGE INSTRUCTIONS
Posterior Cervical Surgery Discharge Instructions     Thank you for choosing [facility] for your surgical needs. The following instructions will help to ensure your comfort and that you are well prepared after your surgery.     Post-Operative Visit:   The office is located at:    [facility]    [facility] [address], [address], [place] [address]    [phone]     Please also call your primary care physician to schedule an appointment for further evaluation and care.     Diet:   You may resume your regular diet.   Be sure to eat a well-balanced diet. Protein promotes wound healing.   Pain medication and decreased activity can cause constipation. Drink 8-10 glasses of water a day, eat fresh fruits and vegetables, and add prunes, raisins and bran cereals to your diet if you do become constipated. A stool softener taken 1-2 times a day is helpful. Dulcolax suppositories or Fleets enemas are also available without a prescription. Call our office if the problem continues.     Activity and Exercise:   No driving until you are seen in the office.   Avoid riding in a car for the first two weeks until you come to the office to have your staples removed.   Start taking short, frequent walks in the beginning. [place], more frequent walks throughout the day are more beneficial than one long walk each day. You may gradually increase the distance; as tolerated. Your brace will help give support to your muscles while you walk.   If your pain increases, you may be walking too much or too far. Try backing off for a day or two and then resume slowly.   If physical therapy has been prescribed, you are not to perform range of motion, flexion, extension or lateral bending.   No lifting greater than 5 lbs (gallon of milk) for first few weeks after surgery.  No pushing, pulling, or overhead work.   No baths, swimming or hot

## 2024-06-26 NOTE — PROGRESS NOTES
Neurosurgery LOTUS/Resident    Daily Progress Note   No chief complaint on file.    6/25/2024  8:22 AM    Chart reviewed.  Patient complaining of intense pain. Stating that the roxicodone does not help her at all, that only the dilaudid has helped her. Patient stating frustration over night shift care due to not being able to move, eat, or have adequate pain control. Drain in place with 100mL/12 hours. Patient states that she does feel her strength is improved to BUE however she is unable to really tell with the weakness and overall improvement as she has not been up and moving around yet. Patient also complaining of soreness in her throat.     Vitals:    06/25/24 0436 06/25/24 0558 06/25/24 0628 06/25/24 0759   BP:    (!) 167/99   Pulse:    88   Resp:  18 17 13   Temp: 98.6 °F (37 °C)   99.4 °F (37.4 °C)   TempSrc: Oral   Oral   SpO2:    98%         PE:   AOx3   CNII-XII intact   PERRL, EOMI   Motor   BUE and BLE 5/5  Patient feels her strength is improved    Sensation: intact    Drain output: 100mL/12 hours  Incision: CDI      Lab Results   Component Value Date    WBC 17.1 (H) 06/25/2024    HGB 13.9 06/25/2024    HCT 42.3 06/25/2024     06/25/2024    CHOL 182 01/01/2022    TRIG 121 01/01/2022    HDL 44 08/02/2023    ALT 23 05/17/2024    AST 24 05/17/2024     05/17/2024    K 4.0 05/17/2024     05/17/2024    CREATININE 0.7 06/24/2024    BUN 16 05/17/2024    CO2 22 05/17/2024    TSH 1.01 08/02/2023    INR 1.0 10/16/2023    LABA1C 7.9 (H) 11/07/2023    CRP 11.6 (H) 04/21/2023    SEDRATE 61 (H) 04/21/2023     A/P  53 y.o. female who presents with cervical spondylosis with myelopathy.  POD #1 s/p: right C7-T1 hemilaminectomy and foraminotomy, C5-T1 fusion     CT cervical pending  Maintain drain and document output  Remove velazquez  Scheduled robaxin, swapped roxicodone for percocet  Continue with PRN dilaudid  PT/OT  SCDs for DVT prophylaxis  Vancomycin for surgical prophylaxis  Remove velazquez and continue 
Neurosurgery LOTUS/Resident    Daily Progress Note   No chief complaint on file.    6/26/2024  8:45 AM    Chart reviewed.  No new complaints. Patient had an episode overnight where she felt she was having difficulty swallowing. Has improved since. Pain is better managed. Ambulated 200 ft with PT yesterday. Drain remains in place with 60mL/12 hours.     Vitals:    06/26/24 0310 06/26/24 0348 06/26/24 0440 06/26/24 0742   BP: (!) 146/84   106/69   Pulse: 94   87   Resp: 20 20 18 20   Temp: 98.7 °F (37.1 °C)   99.3 °F (37.4 °C)   TempSrc: Oral   Oral   SpO2: 98%            PE:   AOx3   CNII-XII intact   PERRL, EOMI   Motor   BUE and BLE 5/5  Patient feels her strength is improved     Sensation: intact     Drain output: 60mL/12 hours  Incision: CDI         Lab Results   Component Value Date    WBC 17.1 (H) 06/25/2024    HGB 13.9 06/25/2024    HCT 42.3 06/25/2024     06/25/2024    CHOL 182 01/01/2022    TRIG 121 01/01/2022    HDL 44 08/02/2023    ALT 23 05/17/2024    AST 24 05/17/2024     05/17/2024    K 4.0 05/17/2024     05/17/2024    CREATININE 0.7 06/24/2024    BUN 16 05/17/2024    CO2 22 05/17/2024    TSH 1.01 08/02/2023    INR 1.0 10/16/2023    LABA1C 7.9 (H) 11/07/2023    CRP 11.6 (H) 04/21/2023    SEDRATE 61 (H) 04/21/2023       Radiology   CT CERVICAL SPINE WO CONTRAST    Result Date: 6/25/2024  EXAMINATION: CT OF THE CERVICAL SPINE WITHOUT CONTRAST 6/25/2024 9:08 am TECHNIQUE: CT of the cervical spine was performed without the administration of intravenous contrast. Multiplanar reformatted images are provided for review. Automated exposure control, iterative reconstruction, and/or weight based adjustment of the mA/kV was utilized to reduce the radiation dose to as low as reasonably achievable. COMPARISON: MRI and radiographs 05/17/2024 HISTORY: ORDERING SYSTEM PROVIDED HISTORY: f/u post op fusion TECHNOLOGIST PROVIDED HISTORY: f/u post op fusion Is the patient pregnant?->No FINDINGS: 
Neurosurgery Post op Progress Note      POD# 0    s/p right C7-T1 hemilaminectomy and foraminotomy, C5-T1 fusion    SUBJECTIVE:      Patient presents with cervical spondylosis. Patient evaluated while in recovery. Denies new paresthesias. Good strength. Pain well controlled. Advancing diet.       OBJECTIVE      Physical exam   VITALS:    Vitals:    06/24/24 1615   BP: 137/85   Pulse: 83   Resp: 17   Temp:    SpO2: 94%     INTAKE:    Intake/Output Summary (Last 24 hours) at 6/24/2024 1619  Last data filed at 6/24/2024 1445  Gross per 24 hour   Intake 2000 ml   Output 875 ml   Net 1125 ml            Neurological exam   alert, oriented x3, affect appropriate, no focal neurological deficits, and moves all extremities well    Wound   Post op wound:    Dressing is clean/dry/intact with no signs of drainage     ASSESSMENT AND PLAN    53 y.o. female s/p right C7-T1 hemilaminectomy and foraminotomy, C5-T1 fusion post op day # 0    - Analgesia:  Roxicodone PRN, Dilaudid PRN, Tylenol     - Periop Antibiotics: Vancomycin x24 h  - Activity: As tolerated with aspen cervical collar, PT and OT in AM  - DVT prophylaxis: SCDs  - Diet: Advance as tolerated  - encourage IS  - f/u CT cervical in AM   - HV drain, monitor output     Electronically signed by SAVANA GARCIA - CNP on 6/24/2024 at 4:19 PM      
Occupational Therapy  Facility/Department: [address] NEURO  Occupational Therapy Initial Assessment    Name: Charisma Donnelly  : 1970  MRN: [id number]  Date of Service: 2024    Discharge Recommendations:  Patient would benefit from continued therapy after discharge  OT Equipment Recommendations  Equipment Needed:  (CTA as pt progresses)     Patient Diagnosis(es): The encounter diagnosis was Cervical radiculitis.  Past Medical History:  has a past medical history of Abnormal uterine bleeding (AUB), Anxiety, Arthritis, CAD (coronary artery disease), Chronic neck pain, COVID-19, Depression, Diabetes mellitus (HCC), Fatty liver, Gastroparesis, Histiocytosis (HCC), [place] (hard of hearing), HTN (hypertension), Hx of blood clots, Hyperlipidemia, Hypothyroid, Kidney stone, Lower back pain, Mixed incontinence, Myocardial infarct (HCC), PCOS (polycystic ovarian syndrome), Pulmonary Langerhans cell granulomatosis (HCC), Snores, Spinal stenosis, Under care of service provider, Under care of service provider, Under care of service provider, Under care of team, Under care of team, Under care of team, Vasomotor symptoms due to menopause, Wears eyeglasses, Wears partial dentures, and Wellness examination.  Past Surgical History:  has a past surgical history that includes back surgery ();  section (); Mastoid surgery; Cardiac catheterization (); Lung biopsy (Right); hip surgery (Left); Colonoscopy (N/A, 2021); Upper gastrointestinal endoscopy (2021); Cervical spine surgery (); cervical fusion (2016); laminectomy (); Hysterectomy (N/A, 2022); US BIOPSY LYMPH NODE (09/15/2023); lumbar laminectomy (10/30/2023); lumbar fusion (N/A, 10/30/2023); Upper gastrointestinal endoscopy (N/A, 2023); Upper gastrointestinal endoscopy (N/A, 2024); Tonsillectomy and adenoidectomy; Cystocopy (2024); and other surgical history (Right, 2024).    Assessment   Performance 
Patient complaining that she feels something stuck in her throat. Patient able to swallow pills and breathing normal. SHANICE Canela notified via [employer]. Response, will come to bedside.   
Writer went through discharge paperwork with pt at bedside, IV taken out without complications and dressing applied, medications picked up for pt at outpatient pharmacy. Pt taken down to main entrance and taken home by her friend.   
side while in a flat bed without using bedrails?: None  How much help is needed moving from lying on your back to sitting on the side of a flat bed without using bedrails?: None  How much help is needed moving to and from a bed to a chair?: None  How much help is needed standing up from a chair using your arms?: None  How much help is needed walking in hospital room?: None  How much help is needed climbing 3-5 steps with a railing?: A Little  AM-[facility] Inpatient Mobility Raw Score : 23  AM-PAC Inpatient T-Scale Score : 56.93  Mobility Inpatient CMS 0-100% Score: 11.2  Mobility Inpatient CMS G-Code Modifier : CI       Goals  Short Term Goals  Time Frame for Short Term Goals: 8 visits  Short Term Goal 1: Pt will be [place] in all bed mobility tasks  Short Term Goal 2: Pt will be [place] in transfers  Short Term Goal 3: Pt will amb 300' [place] w/ SPC  Short Term Goal 4: Pt will negotiate 3 steps [place] w/ BUE assist  Additional Goals?: No       Education  Patient Education  Education Given To: Patient  Education Provided: Role of Therapy;Plan of Care;Equipment;Fall Prevention Strategies  Education Method: Demonstration;Verbal  Barriers to Learning: None  Education Outcome: Verbalized understanding;Demonstrated understanding      Therapy Time   Individual Concurrent Group Co-treatment   Time In 1448         Time Out 1512         Minutes 24         Timed Code Treatment Minutes: 18 Minutes       Vernon Anthony, PT

## 2024-06-26 NOTE — DISCHARGE INSTR - COC
Continuity of Care Form    Patient Name: Charisma Donnelly   :  1970  MRN:  [id number]    Admit date:  2024  Discharge date:  ***    Code Status Order: Full Code   Advance Directives:   Advance Care Flowsheet Documentation       Date/Time Healthcare Directive Type of Healthcare Directive Copy in Chart Healthcare Agent Appointed Healthcare Agent's Name Healthcare Agent's Phone Number    [phone] No, patient does not have an advance directive for healthcare treatment -- -- -- -- --            Admitting Physician:  Carri Christina DO  PCP: Lucy Torres APRN - CNP    Discharging Nurse: ***  Discharging Hospital Unit/Room#: 0105/0105-01  Discharging Unit Phone Number: ***    Emergency Contact:   Extended Emergency Contact Information  Primary Emergency Contact: AFSANEH DONNELLY  Home Phone: [phone]  Mobile Phone: [phone]  Relation: Spouse   needed? No  Secondary Emergency Contact: HELENA DONNELLY  Home Phone: [phone]  Mobile Phone: [phone]  Relation: Brother/Sister   needed? No    Past Surgical History:  Past Surgical History:   Procedure Laterality Date    BACK SURGERY  2004    L5-S1 fusion, Dr. Lyons    CARDIAC CATHETERIZATION  2016    one stent LAD    CERVICAL FUSION  2016    C5-7, [place]    CERVICAL FUSION N/A 2024    RIGHT C7-T1 HEMILAMINECTOMY AND FORAMINOTOMY, C5-T1 FUSION performed by Carri Christina DO at [facility] OR    CERVICAL SPINE SURGERY  2019    C4, Dr. Kolb 2019     SECTION      and     COLONOSCOPY N/A 2021    COLONOSCOPY WITH BIOPSY performed by Arin Jorgensen MD at [facility] OR    CYSTOSCOPY  2024    with [facility] solution  [facility] [address] Dr. Rashid    HIP SURGERY Left     pin    HYSTERECTOMY (CERVIX STATUS UNKNOWN) N/A 2022    TOTAL LAPAROSCOPIC HYSTERECTOMY, BSO, CYSTOSCOPY performed by Charisma Mo DO at [facility] OR    LAMINECTOMY  Nimco Lyons, lumbar    LUMBAR FUSION N/A 10/30/2023    POSTERIOR LUMBAR

## 2024-06-26 NOTE — PLAN OF CARE
Problem: Discharge Planning  Goal: Discharge to home or other facility with appropriate resources  Outcome: Progressing     Problem: Pain  Goal: Verbalizes/displays adequate comfort level or baseline comfort level  6/25/2024 2117 by Michelle Wise RN  Outcome: Progressing  6/25/2024 1151 by Payton Ford RN  Outcome: Progressing     Problem: Safety - Adult  Goal: Free from fall injury  6/25/2024 2117 by Michelle Wise RN  Outcome: Progressing  6/25/2024 1151 by Payton Ford RN  Outcome: Progressing     Problem: ABCDS Injury Assessment  Goal: Absence of physical injury  Outcome: Progressing     Problem: Skin/Tissue Integrity  Goal: Absence of new skin breakdown  Description: 1.  Monitor for areas of redness and/or skin breakdown  2.  Assess vascular access sites hourly  3.  Every 4-6 hours minimum:  Change oxygen saturation probe site  4.  Every 4-6 hours:  If on nasal continuous positive airway pressure, respiratory therapy assess nares and determine need for appliance change or resting period.  6/25/2024 2117 by Michelle Wise RN  Outcome: Progressing  6/25/2024 1151 by Payton Ford RN  Outcome: Progressing     Problem: Chronic Conditions and Co-morbidities  Goal: Patient's chronic conditions and co-morbidity symptoms are monitored and maintained or improved  Outcome: Progressing

## 2024-06-26 NOTE — PLAN OF CARE
Problem: Discharge Planning  Goal: Discharge to home or other facility with appropriate resources  Outcome: Adequate for Discharge     Problem: Pain  Goal: Verbalizes/displays adequate comfort level or baseline comfort level  Outcome: Adequate for Discharge     Problem: Safety - Adult  Goal: Free from fall injury  Outcome: Adequate for Discharge  Flowsheets (Taken 6/26/2024 0800)  Free From Fall Injury: Instruct family/caregiver on patient safety     Problem: ABCDS Injury Assessment  Goal: Absence of physical injury  Outcome: Adequate for Discharge  Flowsheets (Taken 6/26/2024 0800)  Absence of Physical Injury: Implement safety measures based on patient assessment     Problem: Skin/Tissue Integrity  Goal: Absence of new skin breakdown  Description: 1.  Monitor for areas of redness and/or skin breakdown  2.  Assess vascular access sites hourly  3.  Every 4-6 hours minimum:  Change oxygen saturation probe site  4.  Every 4-6 hours:  If on nasal continuous positive airway pressure, respiratory therapy assess nares and determine need for appliance change or resting period.  Outcome: Adequate for Discharge     Problem: Chronic Conditions and Co-morbidities  Goal: Patient's chronic conditions and co-morbidity symptoms are monitored and maintained or improved  Outcome: Adequate for Discharge

## 2024-06-26 NOTE — DISCHARGE SUMMARY
Department of Neurosurgery                                                       Discharge Summary         PATIENT NAME: Charisma Donnelly  YOB: 1970  MEDICAL RECORD NO. [id number]  DATE: 6/26/2024  PRIMARY CARE PHYSICIAN: Lucy Torres APRN - CNP  DISCHARGE DATE:  06/26/2024  DISCHARGE DIAGNOSIS: cervical spondylosis with myelopathy       Patient Active Problem List   Diagnosis Code    Hypothyroidism E03.9    Type 2 diabetes mellitus without complication, with long-term current use of insulin ([facility]) E11.9, Z79.4    Essential hypertension I10    Hip fracture ([facility]) S72.009A    Cervical spinal stenosis M48.02    Hx of deep venous thrombosis Z86.718    Situational depression F43.21    Anxiety F41.9    Pulmonary Langerhans cell granulomatosis ([facility]) J84.82    Mixed hyperlipidemia E78.2    Coronary artery disease involving native heart with angina pectoris ([facility]) I25.119    Migraine without status migrainosus, not intractable G43.909    OAB (overactive bladder) N32.81    Smoker F17.200    Irritable bowel syndrome with both constipation and diarrhea K58.2    Positive colorectal cancer screening using Cologuard test R19.5    Gastroesophageal reflux disease with esophagitis without hemorrhage K21.00    NSTEMI (non-ST elevated myocardial infarction) ([facility]) I21.4    Hx of CS x2 Z98.891    Hx of laparoscopic tubal ligation (2000) Z98.51    Fundal uterine fibroid  D21.9    Hx of decompressive lumbar laminectomy Z98.890    Histiocytosis ([facility]) D76.3    Bone mass M89.8X9    Lumbar radiculopathy, right M54.16    Marijuana use F12.90    Cervical spondylosis without myelopathy M47.812    Hx of fusion of cervical spine Z98.1    Diabetes mellitus, labile ([facility]) E10.9    Long term (current) use of antithrombotics/antiplatelets Z79.02    S/P lumbar laminectomy Z98.890    Cervical spondylosis M47.812    History of fusion of cervical spine Z98.1    Cervical radiculitis M54.12    Cervical spondylosis with myelopathy M47.12

## 2024-06-26 NOTE — CARE COORDINATION
Transitional Planning   Spoke with patient, plans on returning home with spouse, has transportation, no further needs.     Discharge Report    [facility]  Clinical Case Management Department  Written by: Josephine Rainey RN    Patient Name: Charisma Donnelly  Attending Provider: Carri Christina DO  Admit Date: 2024  5:46 AM  MRN: [id number]  Account: [id number]                     : 1970  Discharge Date: 24      Disposition: home    Josephine Rainey RN

## 2024-06-27 ENCOUNTER — TELEPHONE (OUTPATIENT)
Dept: FAMILY MEDICINE CLINIC | Age: 54
End: 2024-06-27

## 2024-06-27 NOTE — TELEPHONE ENCOUNTER
Care Transitions Initial Follow Up Call    Outreach made within 2 business days of discharge: Yes    Patient: Charisma Donnelly Patient : 1970   MRN: [id number]  Reason for Admission: There are no discharge diagnoses documented for the most recent discharge.  Discharge Date: 24       Spoke with: Charisma    Discharge department/facility: [facility] Interactive Patient Contact:  Was patient able to fill all prescriptions: Yes  Was patient instructed to bring all medications to the follow-up visit: Yes  Is patient taking all medications as directed in the discharge summary? Yes  Does patient understand their discharge instructions: Yes  Does patient have questions or concerns that need addressed prior to 7-14 day follow up office visit: no    Scheduled appointment with PCP within 7-14 days    Follow Up  Future Appointments   Date Time Provider Department Center   2024  2:00 PM Tash Ortiz APRN - CNP [facility]   2024  8:20 AM Linda Schuler APRN - CNP [facility]   2024  3:15 PM Arin Jorgensen MD [facility]   2024 10:30 AM Aminata Washington APRN - CNP [facility]   2024 10:30 AM Marci Martinez MD [facility]   2024 10:00 AM Carri Christina DO [facility]   10/8/2024  8:20 AM Seal, Lucy, APRN - CNP [facility]  [facility]       [facility]

## 2024-06-28 ENCOUNTER — TELEMEDICINE (OUTPATIENT)
Age: 54
End: 2024-06-28

## 2024-06-28 DIAGNOSIS — E11.65 TYPE 2 DIABETES MELLITUS WITH HYPERGLYCEMIA, WITH LONG-TERM CURRENT USE OF INSULIN (HCC): ICD-10-CM

## 2024-06-28 DIAGNOSIS — Z79.4 TYPE 2 DIABETES MELLITUS WITH HYPERGLYCEMIA, WITH LONG-TERM CURRENT USE OF INSULIN (HCC): ICD-10-CM

## 2024-06-28 DIAGNOSIS — Z09 HOSPITAL DISCHARGE FOLLOW-UP: Primary | ICD-10-CM

## 2024-06-28 RX ORDER — ACYCLOVIR 400 MG/1
TABLET ORAL
Qty: 2 EACH | Refills: 2 | Status: SHIPPED | OUTPATIENT
Start: 2024-06-28

## 2024-06-28 RX ORDER — INSULIN GLARGINE 300 U/ML
50 INJECTION, SOLUTION SUBCUTANEOUS
Qty: 5 ADJUSTABLE DOSE PRE-FILLED PEN SYRINGE | Refills: 0 | Status: SHIPPED | OUTPATIENT
Start: 2024-06-28

## 2024-06-28 NOTE — TELEPHONE ENCOUNTER
Charisma Donnelly is calling to request a refill on the following medication(s):    Medication Request:  Requested Prescriptions     Pending Prescriptions Disp Refills    Continuous Glucose Sensor (DEXCOM G7 SENSOR) MISC       Sig: USE AS DIRECTED every 10 days       Last Visit Date (If Applicable):  4/8/2024    Next Visit Date:    7/29/2024

## 2024-06-28 NOTE — PROGRESS NOTES
(HCC)    Long term (current) use of antithrombotics/antiplatelets    S/P lumbar laminectomy    Cervical spondylosis    History of fusion of cervical spine    Cervical radiculitis    Cervical spondylosis with myelopathy       Medications listed as ordered at the time of discharge from hospital     Medication List            Accurate as of June 28, 2024  2:21 PM. If you have any questions, ask your nurse or doctor.                CONTINUE taking these medications      acetaminophen 500 MG tablet  Commonly known as: TYLENOL  Take 2 tablets by mouth every 6 hours as needed for Pain     * albuterol (2.5 MG/3ML) 0.083% nebulizer solution  Commonly known as: PROVENTIL  Take 3 mLs by nebulization daily as needed for Wheezing     * albuterol sulfate  (90 Base) MCG/ACT inhaler  Commonly known as: Ventolin HFA  Inhale 2 puffs into the lungs 4 times daily as needed for Wheezing     ascorbic acid 500 MG tablet  Commonly known as: VITAMIN C     aspirin 81 MG EC tablet  Take 1 tablet by mouth daily  Start taking on: July 2, 2024     clopidogrel 75 MG tablet  Commonly known as: PLAVIX  Take 1 tablet by mouth daily  Start taking on: July 2, 2024     docusate sodium 100 MG capsule  Commonly known as: COLACE  take 1 capsule by mouth twice a day     fluticasone 50 MCG/ACT nasal spray  Commonly known as: FLONASE  1 spray by Each Nostril route daily as needed for Rhinitis     * Insulin Pen Needle 31G X 5 MM Misc  Use pen needle for giving daily insulin injection     * Droplet Pen Needles 32G X 4 MM Misc  Generic drug: Insulin Pen Needle     Jardiance 10 MG tablet  Generic drug: empagliflozin  take 1 tablet by mouth once daily     levothyroxine 50 MCG tablet  Commonly known as: SYNTHROID  take 1 tablet by mouth once daily     metoprolol succinate 100 MG extended release tablet  Commonly known as: TOPROL XL  take 1 tablet by mouth twice a day     nicotine 14 MG/24HR  Commonly known as: NICODERM CQ  Place 1 patch onto the skin

## 2024-07-01 ENCOUNTER — TELEPHONE (OUTPATIENT)
Dept: NEUROSURGERY | Age: 54
End: 2024-07-01

## 2024-07-01 ENCOUNTER — OFFICE VISIT (OUTPATIENT)
Dept: PAIN MANAGEMENT | Age: 54
End: 2024-07-01
Payer: COMMERCIAL

## 2024-07-01 VITALS — HEART RATE: 84 BPM | WEIGHT: 203 LBS | HEIGHT: 63 IN | BODY MASS INDEX: 35.97 KG/M2 | OXYGEN SATURATION: 98 %

## 2024-07-01 DIAGNOSIS — M47.12 CERVICAL SPONDYLOSIS WITH MYELOPATHY: ICD-10-CM

## 2024-07-01 DIAGNOSIS — Z98.1 S/P CERVICAL SPINAL FUSION: Primary | ICD-10-CM

## 2024-07-01 DIAGNOSIS — M54.2 CERVICAL PAIN (NECK): ICD-10-CM

## 2024-07-01 DIAGNOSIS — M54.12 CERVICAL RADICULITIS: Primary | ICD-10-CM

## 2024-07-01 DIAGNOSIS — Z98.1 HX OF FUSION OF CERVICAL SPINE: ICD-10-CM

## 2024-07-01 DIAGNOSIS — M48.02 CERVICAL SPINAL STENOSIS: ICD-10-CM

## 2024-07-01 PROCEDURE — 4004F PT TOBACCO SCREEN RCVD TLK: CPT | Performed by: NURSE PRACTITIONER

## 2024-07-01 PROCEDURE — 99213 OFFICE O/P EST LOW 20 MIN: CPT | Performed by: NURSE PRACTITIONER

## 2024-07-01 PROCEDURE — G8417 CALC BMI ABV UP PARAM F/U: HCPCS | Performed by: NURSE PRACTITIONER

## 2024-07-01 PROCEDURE — G8427 DOCREV CUR MEDS BY ELIG CLIN: HCPCS | Performed by: NURSE PRACTITIONER

## 2024-07-01 PROCEDURE — 1111F DSCHRG MED/CURRENT MED MERGE: CPT | Performed by: NURSE PRACTITIONER

## 2024-07-01 PROCEDURE — 3017F COLORECTAL CA SCREEN DOC REV: CPT | Performed by: NURSE PRACTITIONER

## 2024-07-01 RX ORDER — TIZANIDINE 4 MG/1
4 TABLET ORAL EVERY 6 HOURS PRN
Qty: 28 TABLET | Refills: 0 | Status: SHIPPED | OUTPATIENT
Start: 2024-07-01 | End: 2024-07-08

## 2024-07-01 RX ORDER — OXYCODONE HYDROCHLORIDE 5 MG/1
5-10 TABLET ORAL EVERY 6 HOURS PRN
Qty: 52 TABLET | Refills: 0 | Status: SHIPPED | OUTPATIENT
Start: 2024-07-03 | End: 2024-07-10

## 2024-07-01 ASSESSMENT — ENCOUNTER SYMPTOMS
CONSTIPATION: 0
TROUBLE SWALLOWING: 1
BACK PAIN: 1
COUGH: 0
SHORTNESS OF BREATH: 0

## 2024-07-01 NOTE — TELEPHONE ENCOUNTER
I spoke with pt and let her know refill have been sent and it will be available for  on Wednesday.

## 2024-07-01 NOTE — PROGRESS NOTES
Chief Complaint   Patient presents with    Neck Pain         [facility]     History of chronic low back and right lower extremity pain  Had 3 previous back surgeries including a lumbar fusion  Had lumbar laminectomy and decompression surgery last year in October reports significant improvement in back and leg pain     Main pain issue is neck pain  Pain is chronic going on for several years  PT has tried PT medication and RFA with no change in pain in neck pain but had significant relief of her HAs  She had 3 cervical spine surgeries last one done 6/26/24 with Dr Christina C5-T1 fusion. Wearing hard collar and using walker to help with ambulation. Still having significant neck pain and plans to call NS office     HPI    Neck Pain   This is a chronic problem. The current episode started more than 1 year ago. The problem occurs constantly. The problem has been unchanged. The pain is associated with a sleep position. The pain is present in the left side, midline and right side. The quality of the pain is described as aching, burning, cramping, shooting and stabbing. The pain is at a severity of 10/10. The pain is severe. The symptoms are aggravated by position, twisting and swallowing. The pain is Same all the time. Stiffness is present All day. Associated symptoms include pain with swallowing, tingling and trouble swallowing. Pertinent negatives include no chest pain, fever, headaches or leg pain. She has tried heat, ice, home exercises, bed rest, neck support, NSAIDs, acetaminophen and muscle relaxants for the symptoms. The treatment provided no relief.           Past Medical History:   Diagnosis Date    Abnormal uterine bleeding (AUB)     fibroid    Anxiety     Arthritis     CAD (coronary artery disease)     stent  LAD    Chronic neck pain     COVID-19 07/2022    fever, slight cough, fatigue,lost taste,  admitted for couple days bc of elevated troponins    Depression     pcp manages    Diabetes mellitus (HCC) 2017

## 2024-07-01 NOTE — TELEPHONE ENCOUNTER
Pt called and stated she needs a refill of oxycodone and zanaflex refill to be sent to her pharmacy please

## 2024-07-02 ENCOUNTER — OFFICE VISIT (OUTPATIENT)
Dept: GASTROENTEROLOGY | Age: 54
End: 2024-07-02
Payer: COMMERCIAL

## 2024-07-02 VITALS
TEMPERATURE: 97.8 F | BODY MASS INDEX: 35.43 KG/M2 | DIASTOLIC BLOOD PRESSURE: 87 MMHG | WEIGHT: 200 LBS | SYSTOLIC BLOOD PRESSURE: 154 MMHG

## 2024-07-02 DIAGNOSIS — K31.84 GASTROPARESIS: Primary | ICD-10-CM

## 2024-07-02 DIAGNOSIS — R11.0 NAUSEA: ICD-10-CM

## 2024-07-02 DIAGNOSIS — F17.200 SMOKER: ICD-10-CM

## 2024-07-02 DIAGNOSIS — F12.90 MARIJUANA USE: ICD-10-CM

## 2024-07-02 DIAGNOSIS — F41.9 ANXIETY: ICD-10-CM

## 2024-07-02 PROCEDURE — 1111F DSCHRG MED/CURRENT MED MERGE: CPT | Performed by: INTERNAL MEDICINE

## 2024-07-02 PROCEDURE — G8427 DOCREV CUR MEDS BY ELIG CLIN: HCPCS | Performed by: INTERNAL MEDICINE

## 2024-07-02 PROCEDURE — 99214 OFFICE O/P EST MOD 30 MIN: CPT | Performed by: INTERNAL MEDICINE

## 2024-07-02 PROCEDURE — 3079F DIAST BP 80-89 MM HG: CPT | Performed by: INTERNAL MEDICINE

## 2024-07-02 PROCEDURE — 4004F PT TOBACCO SCREEN RCVD TLK: CPT | Performed by: INTERNAL MEDICINE

## 2024-07-02 PROCEDURE — 3077F SYST BP >= 140 MM HG: CPT | Performed by: INTERNAL MEDICINE

## 2024-07-02 PROCEDURE — 3017F COLORECTAL CA SCREEN DOC REV: CPT | Performed by: INTERNAL MEDICINE

## 2024-07-02 PROCEDURE — G8417 CALC BMI ABV UP PARAM F/U: HCPCS | Performed by: INTERNAL MEDICINE

## 2024-07-02 RX ORDER — DOCUSATE SODIUM 100 MG/1
100 CAPSULE, LIQUID FILLED ORAL 2 TIMES DAILY
Qty: 60 CAPSULE | Refills: 0 | Status: SHIPPED | OUTPATIENT
Start: 2024-07-02

## 2024-07-02 RX ORDER — PANTOPRAZOLE SODIUM 20 MG/1
40 TABLET, DELAYED RELEASE ORAL DAILY
Qty: 30 TABLET | Refills: 0 | Status: SHIPPED | OUTPATIENT
Start: 2024-07-02

## 2024-07-02 ASSESSMENT — ENCOUNTER SYMPTOMS
DIARRHEA: 0
ABDOMINAL DISTENTION: 0
SORE THROAT: 0
NAUSEA: 1
CONSTIPATION: 1
VOMITING: 0
COUGH: 0
SHORTNESS OF BREATH: 0
ANAL BLEEDING: 0
WHEEZING: 0
CHOKING: 0
RECTAL PAIN: 0
ABDOMINAL PAIN: 0
TROUBLE SWALLOWING: 0
BLOOD IN STOOL: 0
VOICE CHANGE: 0

## 2024-07-02 NOTE — PROGRESS NOTES
GI CLINIC FOLLOW UP    NTERVAL HISTORY:   No referring provider defined for this encounter.    Chief Complaint   Patient presents with    Gastroparesis     Pt is here today for a f/u last seen on 4/12/24 for Gastroparesis, & bilious vomiting with nausea.       1. Gastroparesis    2. Marijuana use    3. Smoker    4. Anxiety    5. Nausea      Seen in my office as a f/u   She had recent cervical surgery  Has been doing OK overall  Has been on some nonspecific dysphagia but it is occasional and getting better  She had a history for gastroparesis  Had gastric emptying scan done in the past  Overall seems to be doing better in that regard no nausea vomiting hematemesis  Has some irritable bowel syndrome like symptoms no rectal bleeding melanotic stools  History of smoking anxiety issues  Previous records were reviewed with her    HISTORY OF PRESENT ILLNESS: Ms.Alicia IBRAHIMA Donnelly is a 53 y.o. female with a past history remarkable for , referred for evaluation of   Chief Complaint   Patient presents with    Gastroparesis     Pt is here today for a f/u last seen on 4/12/24 for Gastroparesis, & bilious vomiting with nausea.   .    Past Medical,Family, and Social History reviewed and does contribute to the patient presenting condition.    Patient's PMH/PSH,SH,PSYCH Hx, MEDs, ALLERGIES, and ROS were all reviewed and updated in the appropriate sections.    PAST MEDICAL HISTORY:  Past Medical History:   Diagnosis Date    Abnormal uterine bleeding (AUB)     fibroid    Anxiety     Arthritis     CAD (coronary artery disease)     stent  LAD    Chronic neck pain     COVID-19 07/2022    fever, slight cough, fatigue,lost taste,  admitted for couple days bc of elevated troponins    Depression     pcp manages    Diabetes mellitus (HCC) 2017    managed by PCP    Fatty liver     Gastroparesis     Histiocytosis (HCC)     [place] (hard of hearing)     needs hearing aids but doesn't have them, Seems to hear fine without. Reads lips.    HTN

## 2024-07-08 DIAGNOSIS — Z79.4 TYPE 2 DIABETES MELLITUS WITH HYPERGLYCEMIA, WITH LONG-TERM CURRENT USE OF INSULIN (HCC): ICD-10-CM

## 2024-07-08 DIAGNOSIS — E03.9 HYPOTHYROIDISM, UNSPECIFIED TYPE: ICD-10-CM

## 2024-07-08 DIAGNOSIS — E11.65 TYPE 2 DIABETES MELLITUS WITH HYPERGLYCEMIA, WITH LONG-TERM CURRENT USE OF INSULIN (HCC): ICD-10-CM

## 2024-07-08 DIAGNOSIS — N95.1 HOT FLASHES DUE TO MENOPAUSE: ICD-10-CM

## 2024-07-08 RX ORDER — FEZOLINETANT 45 MG/1
1 TABLET, FILM COATED ORAL DAILY
Qty: 90 TABLET | Refills: 1 | Status: SHIPPED | OUTPATIENT
Start: 2024-07-08

## 2024-07-08 RX ORDER — LEVOTHYROXINE SODIUM 0.05 MG/1
50 TABLET ORAL DAILY
Qty: 90 TABLET | Refills: 1 | Status: SHIPPED | OUTPATIENT
Start: 2024-07-08

## 2024-07-08 RX ORDER — CHOLECALCIFEROL (VITAMIN D3) 1250 MCG
1 CAPSULE ORAL WEEKLY
Qty: 4 CAPSULE | Refills: 3 | Status: SHIPPED | OUTPATIENT
Start: 2024-07-08

## 2024-07-08 RX ORDER — ROSUVASTATIN CALCIUM 20 MG/1
20 TABLET, COATED ORAL DAILY
Qty: 90 TABLET | Refills: 1 | Status: SHIPPED | OUTPATIENT
Start: 2024-07-08 | End: 2025-01-06

## 2024-07-08 NOTE — TELEPHONE ENCOUNTER
Charisma Donnelly is calling to request a refill on the following medication(s):    Last Visit Date (If Applicable):  4/8/2024    Next Visit Date:    7/29/2024    Medication Request:  Requested Prescriptions     Pending Prescriptions Disp Refills    Cholecalciferol (VITAMIN D3) 1.25 MG (32087 UT) CAPS 4 capsule 3     Sig: Take 1 capsule by mouth once a week Weekly on Tuesday    rosuvastatin (CRESTOR) 20 MG tablet 90 tablet 1     Sig: Take 1 tablet by mouth daily    levothyroxine (SYNTHROID) 50 MCG tablet 90 tablet 1     Sig: Take 1 tablet by mouth daily    empagliflozin (JARDIANCE) 10 MG tablet 90 tablet 1     Sig: Take 1 tablet by mouth daily    fezolinetant (VEOZAH) 45 MG TABS 90 tablet 1     Sig: Take 1 tablet by mouth daily

## 2024-07-10 DIAGNOSIS — M47.12 CERVICAL SPONDYLOSIS WITH MYELOPATHY: ICD-10-CM

## 2024-07-10 DIAGNOSIS — Z98.1 S/P CERVICAL SPINAL FUSION: ICD-10-CM

## 2024-07-10 RX ORDER — OXYCODONE HYDROCHLORIDE 5 MG/1
5-10 TABLET ORAL EVERY 6 HOURS PRN
Qty: 46 TABLET | Refills: 0 | Status: SHIPPED | OUTPATIENT
Start: 2024-07-10 | End: 2024-07-17

## 2024-07-11 ENCOUNTER — OFFICE VISIT (OUTPATIENT)
Dept: NEUROSURGERY | Age: 54
End: 2024-07-11

## 2024-07-11 VITALS
DIASTOLIC BLOOD PRESSURE: 80 MMHG | BODY MASS INDEX: 34.34 KG/M2 | HEART RATE: 78 BPM | SYSTOLIC BLOOD PRESSURE: 112 MMHG | WEIGHT: 193.8 LBS | HEIGHT: 63 IN | TEMPERATURE: 98.7 F

## 2024-07-11 DIAGNOSIS — Z98.1 S/P LAMINECTOMY WITH SPINAL FUSION: ICD-10-CM

## 2024-07-11 DIAGNOSIS — R11.0 NAUSEA: ICD-10-CM

## 2024-07-11 DIAGNOSIS — Z98.1 S/P FUSION OF THORACIC SPINE: ICD-10-CM

## 2024-07-11 DIAGNOSIS — Z98.1 S/P CERVICAL SPINAL FUSION: Primary | ICD-10-CM

## 2024-07-11 PROCEDURE — 99024 POSTOP FOLLOW-UP VISIT: CPT

## 2024-07-11 RX ORDER — ONDANSETRON 4 MG/1
4 TABLET, FILM COATED ORAL EVERY 6 HOURS PRN
COMMUNITY

## 2024-07-11 RX ORDER — TIZANIDINE 4 MG/1
4 TABLET ORAL EVERY 6 HOURS PRN
COMMUNITY

## 2024-07-11 RX ORDER — ONDANSETRON 4 MG/1
4 TABLET, ORALLY DISINTEGRATING ORAL 3 TIMES DAILY PRN
Qty: 90 TABLET | Refills: 0 | Status: SHIPPED | OUTPATIENT
Start: 2024-07-11 | End: 2024-08-10

## 2024-07-11 NOTE — PROGRESS NOTES
[facility] NEUROSURGERY CENTER [place]  [address]  [address]  [address], [address]  [facility] [address]  Dept: [phone]    Patient:  Charisma Donnelly  YOB: 1970  Date: 7/11/24    The patient is a 53 y.o. female who presents today for consult of the following problems:     Chief Complaint   Patient presents with    Post-Op Check         HPI:     Charisma Donnelly is a 53 y.o. female who presents to the office for post-op evaluation s/p right C7-T1 hemilaminectomy and foraminotomy, C5-T1 fusion.     Patient reports she is doing very well. Pain in the shoulders and arms continue with some weakness, but both are improving. Pain in the legs has resolved. Patient is compliant with the cervical collar and is using a walker with ambulation. No fevers or chills. No redness or drainage from the incision. Swallowing is getting better. Patient denies any new weakness, numbness, tingling, or pain. No bladder or bowel incontinence. No saddle anesthesia.      Patient has the bone growth stimulator and she is working her way up to the 4 hours a day due to the weight of the device.    Sensation intact  Incision clean, dry, and intact    Date of surgery: 6/24/24     Assessment and Plan:     1. S/P cervical spinal fusion    2. S/P fusion of thoracic spine    3. S/P laminectomy with spinal fusion    4. Nausea         Plan: Patient is doing well. Referral to physical therapy for upper extremity strength, dexterity, ROM, and gait training, no neck work. Follow up with Dr. Christina as planned, cervical Xray prior.     Followup: Return in 6 weeks (on 8/20/2024), or if symptoms worsen or fail to improve.    Prescriptions Ordered:  Orders Placed This Encounter   Medications    ondansetron (ZOFRAN-ODT) 4 MG disintegrating tablet     Sig: Take 1 tablet by mouth 3 times daily as needed for Nausea or Vomiting     Dispense:  90 tablet     Refill:  0        Orders

## 2024-07-17 DIAGNOSIS — M47.12 CERVICAL SPONDYLOSIS WITH MYELOPATHY: ICD-10-CM

## 2024-07-17 DIAGNOSIS — Z98.1 S/P CERVICAL SPINAL FUSION: ICD-10-CM

## 2024-07-17 RX ORDER — TIZANIDINE 4 MG/1
4 TABLET ORAL EVERY 6 HOURS PRN
Qty: 28 TABLET | Refills: 0 | Status: SHIPPED | OUTPATIENT
Start: 2024-07-17 | End: 2024-07-24

## 2024-07-17 RX ORDER — OXYCODONE HYDROCHLORIDE 5 MG/1
5-10 TABLET ORAL EVERY 6 HOURS PRN
Qty: 42 TABLET | Refills: 0 | Status: SHIPPED | OUTPATIENT
Start: 2024-07-17 | End: 2024-07-24

## 2024-07-17 NOTE — TELEPHONE ENCOUNTER
Patient calling to requesting refill for Oxycodone &  Tizanidine Please review and e-scribe if applicable.      Last Visit Date: Surgery was 06/24/24     Next Visit Date: 07/29/2024    Please send script to [facility] as [facility] is closing.

## 2024-07-24 ENCOUNTER — OFFICE VISIT (OUTPATIENT)
Dept: NEUROSURGERY | Age: 54
End: 2024-07-24

## 2024-07-24 VITALS
DIASTOLIC BLOOD PRESSURE: 104 MMHG | OXYGEN SATURATION: 94 % | WEIGHT: 193 LBS | BODY MASS INDEX: 34.2 KG/M2 | SYSTOLIC BLOOD PRESSURE: 152 MMHG | HEART RATE: 102 BPM | HEIGHT: 63 IN

## 2024-07-24 DIAGNOSIS — M47.12 CERVICAL SPONDYLOSIS WITH MYELOPATHY: ICD-10-CM

## 2024-07-24 DIAGNOSIS — Z98.1 S/P CERVICAL SPINAL FUSION: ICD-10-CM

## 2024-07-24 DIAGNOSIS — Z98.1 S/P CERVICAL SPINAL FUSION: Primary | ICD-10-CM

## 2024-07-24 DIAGNOSIS — Z98.1 S/P LAMINECTOMY WITH SPINAL FUSION: ICD-10-CM

## 2024-07-24 PROCEDURE — 99024 POSTOP FOLLOW-UP VISIT: CPT

## 2024-07-24 RX ORDER — OXYCODONE HYDROCHLORIDE 5 MG/1
5-10 TABLET ORAL EVERY 8 HOURS PRN
Qty: 42 TABLET | Refills: 0 | Status: SHIPPED | OUTPATIENT
Start: 2024-07-24 | End: 2024-07-31

## 2024-07-24 RX ORDER — TIZANIDINE 4 MG/1
4 TABLET ORAL EVERY 6 HOURS PRN
Qty: 28 TABLET | Refills: 0 | Status: SHIPPED | OUTPATIENT
Start: 2024-07-24 | End: 2024-07-31

## 2024-07-24 NOTE — PROGRESS NOTES
[facility] NEUROSURGERY [facility]  [address]  [address]  [address], [address]  [facility] [address]  Dept: [phone]    Patient:  Charisma Donnelly  YOB: 1970  Date: 7/24/24    The patient is a 53 y.o. female who presents today for consult of the following problems:     Chief Complaint   Patient presents with    Other     Follow Up. Patient is more mobile. Tenderness where the stitches are location. Patient is start PT next week.          HPI:     Charisma Donnelly is a 53 y.o. female who presents to the office for post-op evaluation s/p right C7-T1 hemilaminectomy and foraminotomy, C5-T1 fusion.     Patient called in reporting 2 sutures were poking through and irritating her under her collar, an appointment was made to remove the exposed sutures.     Patient reports she is recovering well, she is using a cane with ambulation, no longer the walker. She is compliant with the cervical collar and pain is controlled with medication. No new numbness, tingling, or weakness. No issues with balance, dexterity, or dropping things.     Incision approximated, clean, dry, and intact  Sensation intact    Date of surgery: 6/24/24      Assessment and Plan:     1. S/P cervical spinal fusion    2. Cervical spondylosis with myelopathy    3. S/P laminectomy with spinal fusion         Plan: Patient is recovering well. Exposed sutures removed. up with Dr. Christina as planned on 8/20/24.    Followup: Return in 27 days (on 8/20/2024), or if symptoms worsen or fail to improve.    Prescriptions Ordered:  No orders of the defined types were placed in this encounter.       Orders Placed:  No orders of the defined types were placed in this encounter.       Electronically signed by SAVANA Farris CNP on 7/24/2024 at 12:09 PM    Please note that this chart was generated using voice recognition Dragon dictation software.  Although every effort was

## 2024-07-29 ENCOUNTER — OFFICE VISIT (OUTPATIENT)
Dept: FAMILY MEDICINE CLINIC | Age: 54
End: 2024-07-29
Payer: COMMERCIAL

## 2024-07-29 ENCOUNTER — HOSPITAL ENCOUNTER (OUTPATIENT)
Age: 54
Setting detail: SPECIMEN
Discharge: HOME | End: 2024-07-29

## 2024-07-29 VITALS
BODY MASS INDEX: 34.83 KG/M2 | HEART RATE: 82 BPM | WEIGHT: 196.6 LBS | SYSTOLIC BLOOD PRESSURE: 112 MMHG | DIASTOLIC BLOOD PRESSURE: 88 MMHG | OXYGEN SATURATION: 97 % | TEMPERATURE: 97.6 F

## 2024-07-29 DIAGNOSIS — E03.9 HYPOTHYROIDISM, UNSPECIFIED TYPE: ICD-10-CM

## 2024-07-29 DIAGNOSIS — E11.65 TYPE 2 DIABETES MELLITUS WITH HYPERGLYCEMIA, WITH LONG-TERM CURRENT USE OF INSULIN (HCC): ICD-10-CM

## 2024-07-29 DIAGNOSIS — Z79.4 TYPE 2 DIABETES MELLITUS WITH HYPERGLYCEMIA, WITH LONG-TERM CURRENT USE OF INSULIN (HCC): ICD-10-CM

## 2024-07-29 DIAGNOSIS — E11.65 TYPE 2 DIABETES MELLITUS WITH HYPERGLYCEMIA, WITH LONG-TERM CURRENT USE OF INSULIN (HCC): Primary | ICD-10-CM

## 2024-07-29 DIAGNOSIS — K21.00 GASTROESOPHAGEAL REFLUX DISEASE WITH ESOPHAGITIS WITHOUT HEMORRHAGE: ICD-10-CM

## 2024-07-29 DIAGNOSIS — B37.9 YEAST INFECTION: ICD-10-CM

## 2024-07-29 DIAGNOSIS — Z79.4 TYPE 2 DIABETES MELLITUS WITH HYPERGLYCEMIA, WITH LONG-TERM CURRENT USE OF INSULIN (HCC): Primary | ICD-10-CM

## 2024-07-29 DIAGNOSIS — K31.84 GASTROPARESIS: ICD-10-CM

## 2024-07-29 DIAGNOSIS — I25.119 CORONARY ARTERY DISEASE INVOLVING NATIVE HEART WITH ANGINA PECTORIS, UNSPECIFIED VESSEL OR LESION TYPE (HCC): ICD-10-CM

## 2024-07-29 DIAGNOSIS — J84.82: ICD-10-CM

## 2024-07-29 DIAGNOSIS — I10 ESSENTIAL HYPERTENSION: ICD-10-CM

## 2024-07-29 PROCEDURE — G8427 DOCREV CUR MEDS BY ELIG CLIN: HCPCS | Performed by: NURSE PRACTITIONER

## 2024-07-29 PROCEDURE — G8417 CALC BMI ABV UP PARAM F/U: HCPCS | Performed by: NURSE PRACTITIONER

## 2024-07-29 PROCEDURE — 3079F DIAST BP 80-89 MM HG: CPT | Performed by: NURSE PRACTITIONER

## 2024-07-29 PROCEDURE — 2022F DILAT RTA XM EVC RTNOPTHY: CPT | Performed by: NURSE PRACTITIONER

## 2024-07-29 PROCEDURE — 99214 OFFICE O/P EST MOD 30 MIN: CPT | Performed by: NURSE PRACTITIONER

## 2024-07-29 PROCEDURE — 3046F HEMOGLOBIN A1C LEVEL >9.0%: CPT | Performed by: NURSE PRACTITIONER

## 2024-07-29 PROCEDURE — 3074F SYST BP LT 130 MM HG: CPT | Performed by: NURSE PRACTITIONER

## 2024-07-29 PROCEDURE — 3017F COLORECTAL CA SCREEN DOC REV: CPT | Performed by: NURSE PRACTITIONER

## 2024-07-29 PROCEDURE — 4004F PT TOBACCO SCREEN RCVD TLK: CPT | Performed by: NURSE PRACTITIONER

## 2024-07-29 RX ORDER — FLUCONAZOLE 150 MG/1
150 TABLET ORAL
Qty: 2 TABLET | Refills: 0 | Status: SHIPPED | OUTPATIENT
Start: 2024-07-29 | End: 2024-08-04

## 2024-07-29 RX ORDER — PANTOPRAZOLE SODIUM 40 MG/1
40 TABLET, DELAYED RELEASE ORAL DAILY
Qty: 90 TABLET | Refills: 2 | Status: SHIPPED | OUTPATIENT
Start: 2024-07-29 | End: 2024-07-29 | Stop reason: SDUPTHER

## 2024-07-29 RX ORDER — PANTOPRAZOLE SODIUM 40 MG/1
40 TABLET, DELAYED RELEASE ORAL DAILY
Qty: 90 TABLET | Refills: 2 | Status: SHIPPED | OUTPATIENT
Start: 2024-07-29 | End: 2025-04-25

## 2024-07-29 RX ORDER — INSULIN GLARGINE 300 U/ML
30 INJECTION, SOLUTION SUBCUTANEOUS
Qty: 5 ADJUSTABLE DOSE PRE-FILLED PEN SYRINGE | Refills: 0 | Status: SHIPPED
Start: 2024-07-29

## 2024-07-29 ASSESSMENT — PATIENT HEALTH QUESTIONNAIRE - PHQ9
2. FEELING DOWN, DEPRESSED OR HOPELESS: NOT AT ALL
SUM OF ALL RESPONSES TO PHQ QUESTIONS 1-9: 0
5. POOR APPETITE OR OVEREATING: NOT AT ALL
SUM OF ALL RESPONSES TO PHQ9 QUESTIONS 1 & 2: 0
4. FEELING TIRED OR HAVING LITTLE ENERGY: NOT AT ALL
8. MOVING OR SPEAKING SO SLOWLY THAT OTHER PEOPLE COULD HAVE NOTICED. OR THE OPPOSITE, BEING SO FIGETY OR RESTLESS THAT YOU HAVE BEEN MOVING AROUND A LOT MORE THAN USUAL: NOT AT ALL
9. THOUGHTS THAT YOU WOULD BE BETTER OFF DEAD, OR OF HURTING YOURSELF: NOT AT ALL
SUM OF ALL RESPONSES TO PHQ QUESTIONS 1-9: 0
SUM OF ALL RESPONSES TO PHQ QUESTIONS 1-9: 0
10. IF YOU CHECKED OFF ANY PROBLEMS, HOW DIFFICULT HAVE THESE PROBLEMS MADE IT FOR YOU TO DO YOUR WORK, TAKE CARE OF THINGS AT HOME, OR GET ALONG WITH OTHER PEOPLE: NOT DIFFICULT AT ALL
6. FEELING BAD ABOUT YOURSELF - OR THAT YOU ARE A FAILURE OR HAVE LET YOURSELF OR YOUR FAMILY DOWN: NOT AT ALL
7. TROUBLE CONCENTRATING ON THINGS, SUCH AS READING THE NEWSPAPER OR WATCHING TELEVISION: NOT AT ALL
1. LITTLE INTEREST OR PLEASURE IN DOING THINGS: NOT AT ALL
SUM OF ALL RESPONSES TO PHQ QUESTIONS 1-9: 0
3. TROUBLE FALLING OR STAYING ASLEEP: NOT AT ALL

## 2024-07-29 NOTE — PROGRESS NOTES
MCHC 06/25/2024 32.9  28.4 - 34.8 g/dL Final    RDW 06/25/2024 13.6  11.8 - 14.4 % Final    Platelets 06/25/2024 295  138 - 453 k/uL Final    MPV 06/25/2024 9.4  8.1 - 13.5 fL Final    NRBC Automated 06/25/2024 0.0  0.0 per 100 WBC Final    POC Glucose 06/24/2024 134 (H)  65 - 105 mg/dL Final    POC Glucose 06/24/2024 113 (H)  65 - 105 mg/dL Final    POC Glucose 06/25/2024 119 (H)  65 - 105 mg/dL Final    POC Glucose 06/25/2024 194 (H)  65 - 105 mg/dL Final    POC Glucose 06/25/2024 195 (H)  65 - 105 mg/dL Final    POC Glucose 06/26/2024 106 (H)  65 - 105 mg/dL Final     No results found for this visit on 07/29/24.    Completed Orders/Prescriptions   Orders Placed This Encounter   Medications    Insulin Glargine, 2 Unit Dial, (TOUJEO MAX SOLOSTAR) 300 UNIT/ML SOPN     Sig: Inject 30 Units into the skin daily (with breakfast)     Dispense:  5 Adjustable Dose Pre-filled Pen Syringe     Refill:  0    fluconazole (DIFLUCAN) 150 MG tablet     Sig: Take 1 tablet by mouth every 72 hours for 6 days     Dispense:  2 tablet     Refill:  0    DISCONTD: pantoprazole (PROTONIX) 40 MG tablet     Sig: Take 1 tablet by mouth daily     Dispense:  90 tablet     Refill:  2    pantoprazole (PROTONIX) 40 MG tablet     Sig: Take 1 tablet by mouth daily     Dispense:  90 tablet     Refill:  2               AssessmentPlan/Medical Decision Making     1. Type 2 diabetes mellitus with hyperglycemia, with long-term current use of insulin ([facility])    - Discontinue Jardiance due to yeast infections  - Insulin Glargine, 2 Unit Dial, (TOUJEO MAX SOLOSTAR) 300 UNIT/ML SOPN; Inject 30 Units into the skin daily (with breakfast)  Dispense: 5 Adjustable Dose Pre-filled Pen Syringe; Refill: 0  - Hemoglobin A1C; Future    2. Essential hypertension    - stable    3. Coronary artery disease involving native heart with angina pectoris, unspecified vessel or lesion type ([facility])    - stable  - Follows with Cardiology    4. Gastroesophageal reflux disease with

## 2024-07-30 ENCOUNTER — HOSPITAL ENCOUNTER (OUTPATIENT)
Dept: GENERAL RADIOLOGY | Age: 54
Discharge: HOME | End: 2024-08-01
Payer: COMMERCIAL

## 2024-07-30 ENCOUNTER — HOSPITAL ENCOUNTER (OUTPATIENT)
Age: 54
Discharge: HOME | End: 2024-08-01
Payer: COMMERCIAL

## 2024-07-30 ENCOUNTER — HOSPITAL ENCOUNTER (OUTPATIENT)
Dept: PHYSICAL THERAPY | Facility: CLINIC | Age: 54
Setting detail: THERAPIES SERIES
Discharge: HOME | End: 2024-07-30
Payer: COMMERCIAL

## 2024-07-30 DIAGNOSIS — Z98.1 S/P LAMINECTOMY WITH SPINAL FUSION: ICD-10-CM

## 2024-07-30 DIAGNOSIS — Z98.1 S/P CERVICAL SPINAL FUSION: ICD-10-CM

## 2024-07-30 DIAGNOSIS — Z98.1 S/P FUSION OF THORACIC SPINE: ICD-10-CM

## 2024-07-30 LAB
EST. AVERAGE GLUCOSE BLD GHB EST-MCNC: 171 MG/DL
HBA1C MFR BLD: 7.6 % (ref 4–6)

## 2024-07-30 PROCEDURE — 72040 X-RAY EXAM NECK SPINE 2-3 VW: CPT

## 2024-07-30 PROCEDURE — 97161 PT EVAL LOW COMPLEX 20 MIN: CPT

## 2024-07-30 PROCEDURE — 97110 THERAPEUTIC EXERCISES: CPT

## 2024-07-30 NOTE — CONSULTS
is not allergic.    Frequency:  2 x/week for 12 visits    Today’s Treatment:  Modalities:   Precautions: Cervical spine fusion (Collar remain donned, limit shld flexion to ~ shld height, light weight lifting less than 5#) Hx of lumbar spinal fusion  Exercises:  Exercises completed bilaterally unless specified on 07/30/24  Exercise Reps/ Time Weight/ Level Comments          Wall slides 10x      Scap squeezes 10x5\"      Towel Squeezes 10x5\"      B ER  10x A    Bicep curls 10x     Other:    Specific Instructions for next treatment:   Have Pt complete NDI - not completed at initial evaluation    strengthening   Wrist strengthening   UE dexterity activity   Gentle Shld strengthening and ROM   Gait training, sit to stands      Evaluation Complexity:  History (Personal factors, comorbidities) [] 0 [x] 1-2 [] 3+   Exam (limitations, restrictions) [] 1-2 [] 3 [x] 4+   Clinical presentation (progression) [x] Stable [] Evolving  [] Unstable   Decision Making [x] Low [] Moderate [] High    [x] Low Complexity [] Moderate Complexity [] High Complexity       Treatment Charges: Mins Units   [x] Evaluation       [x]  Low       []  Moderate       []  High 40 1   []  Modalities     [x]  Ther Exercise 10 1   []  Manual Therapy     []  Ther Activities     []  Aquatics     []  Vasocompression     []  Other       TOTAL BILLABLE TIME: 50 minutes    Time in: 0745      Time out: 0842    Electronically signed by: Tony Ragsdale PT        Physician Signature:________________________________Date:__________________  By signing above or cosigning this note, I have reviewed this plan of care and certify a need for medically necessary rehabilitation services.     *PLEASE SIGN ABOVE AND FAX BACK ALL PAGES*

## 2024-07-31 DIAGNOSIS — Z98.1 S/P CERVICAL SPINAL FUSION: ICD-10-CM

## 2024-07-31 DIAGNOSIS — M47.12 CERVICAL SPONDYLOSIS WITH MYELOPATHY: ICD-10-CM

## 2024-07-31 RX ORDER — OXYCODONE HYDROCHLORIDE 5 MG/1
5-10 TABLET ORAL EVERY 8 HOURS PRN
Qty: 40 TABLET | Refills: 0 | Status: SHIPPED | OUTPATIENT
Start: 2024-07-31 | End: 2024-08-07

## 2024-07-31 NOTE — TELEPHONE ENCOUNTER
Patient calling to requesting refill for Oxycodone. Please review and e-scribe if applicable.     Last Visit Date: 07/24/2024    Next Visit Date: 08/20/2024

## 2024-08-04 DIAGNOSIS — B37.9 YEAST INFECTION: ICD-10-CM

## 2024-08-05 RX ORDER — FLUCONAZOLE 150 MG/1
150 TABLET ORAL
Qty: 2 TABLET | Refills: 0 | OUTPATIENT
Start: 2024-08-05 | End: 2024-08-11

## 2024-08-06 ENCOUNTER — APPOINTMENT (OUTPATIENT)
Dept: GENERAL RADIOLOGY | Age: 54
End: 2024-08-06
Payer: COMMERCIAL

## 2024-08-06 ENCOUNTER — HOSPITAL ENCOUNTER (EMERGENCY)
Age: 54
Discharge: HOME | End: 2024-08-06
Attending: EMERGENCY MEDICINE
Payer: COMMERCIAL

## 2024-08-06 VITALS
HEIGHT: 63 IN | WEIGHT: 196 LBS | BODY MASS INDEX: 34.73 KG/M2 | OXYGEN SATURATION: 97 % | DIASTOLIC BLOOD PRESSURE: 93 MMHG | RESPIRATION RATE: 14 BRPM | TEMPERATURE: 97 F | HEART RATE: 92 BPM | SYSTOLIC BLOOD PRESSURE: 135 MMHG

## 2024-08-06 DIAGNOSIS — S92.502A CLOSED FRACTURE OF PHALANX OF LEFT FIFTH TOE, INITIAL ENCOUNTER: Primary | ICD-10-CM

## 2024-08-06 PROCEDURE — 99284 EMERGENCY DEPT VISIT MOD MDM: CPT

## 2024-08-06 PROCEDURE — 73630 X-RAY EXAM OF FOOT: CPT

## 2024-08-06 PROCEDURE — 6360000002 HC RX W HCPCS: Performed by: EMERGENCY MEDICINE

## 2024-08-06 PROCEDURE — 6370000000 HC RX 637 (ALT 250 FOR IP): Performed by: EMERGENCY MEDICINE

## 2024-08-06 PROCEDURE — 96372 THER/PROPH/DIAG INJ SC/IM: CPT

## 2024-08-06 RX ORDER — HYDROCODONE BITARTRATE AND ACETAMINOPHEN 5; 325 MG/1; MG/1
1 TABLET ORAL ONCE
Status: COMPLETED | OUTPATIENT
Start: 2024-08-06 | End: 2024-08-06

## 2024-08-06 RX ORDER — KETOROLAC TROMETHAMINE 30 MG/ML
60 INJECTION, SOLUTION INTRAMUSCULAR; INTRAVENOUS ONCE
Status: COMPLETED | OUTPATIENT
Start: 2024-08-06 | End: 2024-08-06

## 2024-08-06 RX ORDER — IBUPROFEN 800 MG/1
800 TABLET ORAL 2 TIMES DAILY PRN
Qty: 30 TABLET | Refills: 1 | Status: SHIPPED | OUTPATIENT
Start: 2024-08-06

## 2024-08-06 RX ORDER — IBUPROFEN 800 MG/1
800 TABLET ORAL 2 TIMES DAILY PRN
Qty: 30 TABLET | Refills: 1 | Status: SHIPPED | OUTPATIENT
Start: 2024-08-06 | End: 2024-08-06

## 2024-08-06 RX ADMIN — HYDROCODONE BITARTRATE AND ACETAMINOPHEN 1 TABLET: 5; 325 TABLET ORAL at 09:02

## 2024-08-06 RX ADMIN — KETOROLAC TROMETHAMINE 60 MG: 60 INJECTION, SOLUTION INTRAMUSCULAR at 09:02

## 2024-08-06 ASSESSMENT — PAIN - FUNCTIONAL ASSESSMENT: PAIN_FUNCTIONAL_ASSESSMENT: 0-10

## 2024-08-06 ASSESSMENT — LIFESTYLE VARIABLES
HOW OFTEN DO YOU HAVE A DRINK CONTAINING ALCOHOL: MONTHLY OR LESS
HOW MANY STANDARD DRINKS CONTAINING ALCOHOL DO YOU HAVE ON A TYPICAL DAY: 3 OR 4

## 2024-08-06 ASSESSMENT — ENCOUNTER SYMPTOMS
EYE PAIN: 0
ABDOMINAL DISTENTION: 0
ABDOMINAL PAIN: 0
BACK PAIN: 0
FACIAL SWELLING: 0
EYE DISCHARGE: 0
CHEST TIGHTNESS: 0
SHORTNESS OF BREATH: 0

## 2024-08-06 ASSESSMENT — PAIN DESCRIPTION - LOCATION: LOCATION: FOOT

## 2024-08-06 ASSESSMENT — PAIN DESCRIPTION - ORIENTATION: ORIENTATION: LEFT

## 2024-08-06 ASSESSMENT — PAIN SCALES - GENERAL: PAINLEVEL_OUTOF10: 8

## 2024-08-06 ASSESSMENT — PAIN DESCRIPTION - PAIN TYPE: TYPE: ACUTE PAIN

## 2024-08-06 NOTE — ED PROVIDER NOTES
EMERGENCY DEPARTMENT ENCOUNTER    Pt Name: Charisma Donnelly  MRN: [id number]  Birthdate 1970  Date of evaluation: 8/6/24  CHIEF COMPLAINT       Chief Complaint   Patient presents with    Foot Injury    Toe Injury     Stubbed toe Sunday morning on shower chair, complains of increased pain and swelling across foot     HISTORY OF PRESENT ILLNESS   HPI   The patient is a 53-year-old female presented to the emergency department secondary to foot pain.  Patient stated on Sunday she stubbed her tall attempting to get out of her shower chair.  She did she did not have pain but noted increased pain and swelling.  No previous history of injury to her left foot.  She did not fall or hit her head.  She rates the pain 8 out of 10 on pain scale.  Of note, patient recently had neck surgery's been taking oxycodone for her neck pain but has not provided relief for her toe pain.  Patient denies chest pain, shortness of breath, nausea, vomiting, fevers or chills      REVIEW OF SYSTEMS     Review of Systems   Constitutional:  Negative for chills, diaphoresis and fever.   HENT:  Negative for congestion, ear pain and facial swelling.    Eyes:  Negative for pain, discharge and visual disturbance.   Respiratory:  Negative for chest tightness and shortness of breath.    Cardiovascular:  Negative for chest pain and palpitations.   Gastrointestinal:  Negative for abdominal distention and abdominal pain.   Genitourinary:  Negative for difficulty urinating and flank pain.   Musculoskeletal:  Negative for back pain.        Left 5th toe pain   Skin:  Negative for wound.   Neurological:  Negative for dizziness, light-headedness and headaches.     PASTMEDICAL HISTORY     Past Medical History:   Diagnosis Date    Abnormal uterine bleeding (AUB)     fibroid    Anxiety     Arthritis     CAD (coronary artery disease)     stent  LAD    Chronic neck pain     COVID-19 07/2022    fever, slight cough, fatigue,lost taste,  admitted for couple days bc of

## 2024-08-06 NOTE — DISCHARGE INSTRUCTIONS
5th digit/ metatarsal fracture care:  - Keep elevated above the level of your heart as much as you can for the first 72 hours. This will help with swelling which will help with throbbing type pain  - Do not apply weight to the injured foot. Use crutches, walker, etc. This is critical in maintaining adequate reduction of fracture. The more motion the more chance of problems with healing  - Pain control with over the counter medications (alternating Tylenol and Ibuprofen q6hrs)  - Ice behind the knee (not direct contact to prevent skin irritation) when splint/boot is applied.  - Please follow up with Dr. Manriquez in private office office. Info above

## 2024-08-07 DIAGNOSIS — Z98.1 S/P CERVICAL SPINAL FUSION: ICD-10-CM

## 2024-08-07 DIAGNOSIS — M47.12 CERVICAL SPONDYLOSIS WITH MYELOPATHY: ICD-10-CM

## 2024-08-07 RX ORDER — OXYCODONE HYDROCHLORIDE 5 MG/1
5-10 TABLET ORAL EVERY 8 HOURS PRN
Qty: 38 TABLET | Refills: 0 | Status: SHIPPED | OUTPATIENT
Start: 2024-08-07 | End: 2024-08-14

## 2024-08-07 RX ORDER — TIZANIDINE 4 MG/1
4 TABLET ORAL EVERY 6 HOURS PRN
Qty: 28 TABLET | Refills: 0 | Status: SHIPPED | OUTPATIENT
Start: 2024-08-07 | End: 2024-08-14

## 2024-08-07 NOTE — TELEPHONE ENCOUNTER
Pharmacy requesting refill of Oxycodone 5 mg and Tizanidine 4 mg.      Medication active on med list yes      Date of last Rx: 7/31/24 with 0 refills            Date of last appointment 7/24/24    Next Visit Date:  8/20/2024

## 2024-08-09 ENCOUNTER — OFFICE VISIT (OUTPATIENT)
Age: 54
End: 2024-08-09
Payer: COMMERCIAL

## 2024-08-09 VITALS
DIASTOLIC BLOOD PRESSURE: 98 MMHG | WEIGHT: 196 LBS | OXYGEN SATURATION: 98 % | HEART RATE: 78 BPM | SYSTOLIC BLOOD PRESSURE: 165 MMHG | BODY MASS INDEX: 34.72 KG/M2

## 2024-08-09 DIAGNOSIS — I10 ESSENTIAL HYPERTENSION: Primary | ICD-10-CM

## 2024-08-09 PROCEDURE — 4004F PT TOBACCO SCREEN RCVD TLK: CPT | Performed by: INTERNAL MEDICINE

## 2024-08-09 PROCEDURE — 3077F SYST BP >= 140 MM HG: CPT | Performed by: INTERNAL MEDICINE

## 2024-08-09 PROCEDURE — G8427 DOCREV CUR MEDS BY ELIG CLIN: HCPCS | Performed by: INTERNAL MEDICINE

## 2024-08-09 PROCEDURE — 3080F DIAST BP >= 90 MM HG: CPT | Performed by: INTERNAL MEDICINE

## 2024-08-09 PROCEDURE — 3017F COLORECTAL CA SCREEN DOC REV: CPT | Performed by: INTERNAL MEDICINE

## 2024-08-09 PROCEDURE — 93000 ELECTROCARDIOGRAM COMPLETE: CPT | Performed by: INTERNAL MEDICINE

## 2024-08-09 PROCEDURE — G8417 CALC BMI ABV UP PARAM F/U: HCPCS | Performed by: INTERNAL MEDICINE

## 2024-08-09 PROCEDURE — 99214 OFFICE O/P EST MOD 30 MIN: CPT | Performed by: INTERNAL MEDICINE

## 2024-08-09 NOTE — PROGRESS NOTES
Cardiology Office Consultation           CC: Patient is here for follow up for CAD/hx of MI.     HPI  Charisma Donnelly underwent cervical fusion and hardware removal surgery in june 2024, no major perioperative cardiac complications. Denies any chest pain or dyspnea, still has neck pain. Av elevated secondary to that but running normal at home. No dyspnea, orthopnea or leg edema.       Past Medical:  Past Medical History:   Diagnosis Date    Abnormal uterine bleeding (AUB)     fibroid    Anxiety     Arthritis     CAD (coronary artery disease)     stent  LAD    Chronic neck pain     COVID-19 07/2022    fever, slight cough, fatigue,lost taste,  admitted for couple days bc of elevated troponins    Depression     pcp manages    Diabetes mellitus (HCC) 2017    managed by PCP    Fatty liver     Gastroparesis     Histiocytosis (HCC)     [place] (hard of hearing)     needs hearing aids but doesn't have them, Seems to hear fine without. Reads lips.    HTN (hypertension)     Hx of blood clots     Leg when on Contraception  1995    Hyperlipidemia     Hypothyroid     Kidney stone     Lower back pain     Mixed incontinence     Myocardial infarct (HCC) 2016    PCOS (polycystic ovarian syndrome)     Pulmonary Langerhans cell granulomatosis (HCC)     with lung nodule    Snores     no cpap  no hx sleep apnea    Spinal stenosis 2017    Under care of service provider     Pulmonology, [facility] , last seen 1/5/2023 , supposed to follow up in 6 months    Under care of service provider     Cardiology, [facility] , last seen 5/3/2024    Under care of service provider     GI, Dr. Jorgensen , last seen 4/12/2024    Under care of team     Urology, Dr. Plasencia , alst seen 6/7/2024    Under care of team     Pain Management , last seen 5/7/2024    Under care of team     Neuro Surg , Dr. Christina , last seen 3/28/2024    Vasomotor symptoms due to menopause     Wears eyeglasses     Wears partial dentures     upper snap in veneer    Wellness examination     PCP ,

## 2024-08-12 ENCOUNTER — HOSPITAL ENCOUNTER (OUTPATIENT)
Dept: PHYSICAL THERAPY | Facility: CLINIC | Age: 54
Setting detail: THERAPIES SERIES
Discharge: HOME | End: 2024-08-12
Payer: COMMERCIAL

## 2024-08-12 PROCEDURE — 97110 THERAPEUTIC EXERCISES: CPT

## 2024-08-12 NOTE — FLOWSHEET NOTE
Pt will increase her distance on the 6 minute walk test from 807' to 975' for ease of community navigation                             Patient goals: Complete normal activities without pain    Pt. Education:  [] Yes  [] No  [x] Reviewed Prior HEP/Ed  Method of Education: [] Verbal  [] Demo  [] Written  Access Code: [email or web address]  URL: [email or web address]  Date: 07/30/2024  Prepared by: Tony Ragsdale     Exercises  - Seated Scapular Retraction  - 1 x daily - 7 x weekly - 1-3 sets - 10 reps - 5 second hold  - Shoulder External Rotation and Scapular Retraction  - 1 x daily - 7 x weekly - 1-3 sets - 10 reps  - Bilateral Shoulder Flexion Wall Slide with Towel  - 1 x daily - 7 x weekly - 1-3 sets - 10 reps  - Towel Roll Squeeze  - 1 x daily - 7 x weekly - 1-3 sets - 10 reps  - Standing Bicep Curls Supinated with Dumbbells  - 1 x daily - 7 x weekly - 1-3 sets - 10 reps  Comprehension of Education:  [] Verbalizes understanding.  [] Demonstrates understanding.  [] Needs review.  [x] Demonstrates/verbalizes HEP/Ed previously given.     Plan: [x] Continue current frequency toward long and short term goals.    [x] Specific Instructions for subsequent treatments: Progress as able. Update HEP      Time In: 0802            Time Out: 0853    Electronically signed by:  Tony Ragsdale, PT

## 2024-08-14 ENCOUNTER — HOSPITAL ENCOUNTER (OUTPATIENT)
Dept: PHYSICAL THERAPY | Facility: CLINIC | Age: 54
Setting detail: THERAPIES SERIES
Discharge: HOME | End: 2024-08-14
Payer: COMMERCIAL

## 2024-08-14 DIAGNOSIS — R11.0 NAUSEA: Primary | ICD-10-CM

## 2024-08-14 DIAGNOSIS — Z98.1 S/P CERVICAL SPINAL FUSION: ICD-10-CM

## 2024-08-14 DIAGNOSIS — M47.12 CERVICAL SPONDYLOSIS WITH MYELOPATHY: ICD-10-CM

## 2024-08-14 PROCEDURE — 97110 THERAPEUTIC EXERCISES: CPT

## 2024-08-14 RX ORDER — ONDANSETRON 4 MG/1
4 TABLET, FILM COATED ORAL EVERY 6 HOURS PRN
Qty: 30 TABLET | Refills: 1 | Status: SHIPPED | OUTPATIENT
Start: 2024-08-14

## 2024-08-14 RX ORDER — OXYCODONE HYDROCHLORIDE 5 MG/1
5-10 TABLET ORAL EVERY 8 HOURS PRN
Qty: 34 TABLET | Refills: 0 | Status: SHIPPED | OUTPATIENT
Start: 2024-08-14 | End: 2024-08-16 | Stop reason: SDUPTHER

## 2024-08-14 NOTE — FLOWSHEET NOTE
strengthening   UE dexterity activities   Gentle Shld strengthening and ROM      Treatment Charges: Mins Units   []  Modalities     [x]  Ther Exercise 45 3   []  Manual Therapy     []  Ther Activities     []  Neuro Re-ed     []  Vasocompression     [] Gait     []  Other     Total Billable time 45 3       Assessment: [x] Progressing toward goals: Reviewed previously established ex interventions given by the PT this session while continuing to maintain any MD precautions and weight restrictions. Pt. With good recall of ex program and voiced better tolerance to performances compared to her last therapy session. Added in digi web squeezes this date for increased  strengthening per PT POC. Provided Pt. With some gentle progressions in repetitions this date while also providing brief rest periods between groups of ex's to prevent large muscular fatigue noted last session.      [] No change.     [] Other:  [x] Patient would continue to benefit from skilled physical therapy services in order to: Reduce pain, address ROM, and strength deficits detailed above for return to previous level of function with daily and recreational activities        Goals  MET NOT MET ON-  GOING  Details   Date Addressed:            STG: To be met in 8 treatments            1. ? Pain: Decrease pain levels to 4/10 to ease ADL progression. []  []  []      2. ? ROM: Pain free B shoulder flexion and abduction to 120 degrees for ease of reaching into cupboards []  []  []      3. ? Strength: Increase B UE to 4+/5 throughout to ease functional limitations and mobility  []  []  []      4. Independent with Home Exercise Programs with ability to demonstrate exercises without cueing for technique.  []  []  []      5. Improve L hand  strength to 40# and R hand  strength to 60# for ease of reaching and carrying []  []  []        []  []  []      Date Addressed:            LTG: To be met in 12 treatments           1. Improve score on NDI 53%

## 2024-08-14 NOTE — TELEPHONE ENCOUNTER
The patient wanted to inform the clinic that she has a broken toe due to hitting it against the shower wall. She is currently using crutches until she gets approved for scooter.

## 2024-08-15 DIAGNOSIS — M47.12 CERVICAL SPONDYLOSIS WITH MYELOPATHY: ICD-10-CM

## 2024-08-15 DIAGNOSIS — Z98.1 S/P CERVICAL SPINAL FUSION: ICD-10-CM

## 2024-08-15 RX ORDER — OXYCODONE HYDROCHLORIDE 5 MG/1
5-10 TABLET ORAL EVERY 8 HOURS PRN
Qty: 34 TABLET | Refills: 0 | OUTPATIENT
Start: 2024-08-15 | End: 2024-08-22

## 2024-08-16 DIAGNOSIS — Z98.1 S/P CERVICAL SPINAL FUSION: ICD-10-CM

## 2024-08-16 DIAGNOSIS — M47.12 CERVICAL SPONDYLOSIS WITH MYELOPATHY: ICD-10-CM

## 2024-08-16 RX ORDER — OXYCODONE HYDROCHLORIDE 5 MG/1
5-10 TABLET ORAL EVERY 8 HOURS PRN
Qty: 34 TABLET | Refills: 0 | Status: SHIPPED | OUTPATIENT
Start: 2024-08-16 | End: 2024-08-23

## 2024-08-16 RX ORDER — OXYCODONE HYDROCHLORIDE 5 MG/1
5-10 TABLET ORAL EVERY 8 HOURS PRN
Qty: 34 TABLET | Refills: 0 | Status: CANCELLED | OUTPATIENT
Start: 2024-08-16 | End: 2024-08-23

## 2024-08-16 NOTE — TELEPHONE ENCOUNTER
Patient calling to requesting refill for Oxycodone. Please review and e-scribe if applicable.     *Rx was sent to [facility], they donot have this med in stock please send to [facility]. Completely out of meds*    Last Visit Date: 07/24/2024    Next Visit Date: 08/20/2024

## 2024-08-19 ENCOUNTER — APPOINTMENT (OUTPATIENT)
Dept: PHYSICAL THERAPY | Facility: CLINIC | Age: 54
End: 2024-08-19
Payer: COMMERCIAL

## 2024-08-20 ENCOUNTER — OFFICE VISIT (OUTPATIENT)
Dept: NEUROSURGERY | Age: 54
End: 2024-08-20

## 2024-08-20 ENCOUNTER — TELEPHONE (OUTPATIENT)
Dept: NEUROSURGERY | Age: 54
End: 2024-08-20

## 2024-08-20 VITALS
HEIGHT: 63 IN | SYSTOLIC BLOOD PRESSURE: 156 MMHG | DIASTOLIC BLOOD PRESSURE: 94 MMHG | WEIGHT: 198 LBS | HEART RATE: 87 BPM | TEMPERATURE: 98.6 F | BODY MASS INDEX: 35.08 KG/M2

## 2024-08-20 DIAGNOSIS — M54.89 INTERSCAPULAR PAIN: Primary | ICD-10-CM

## 2024-08-20 DIAGNOSIS — Z98.1 S/P CERVICAL SPINAL FUSION: ICD-10-CM

## 2024-08-20 DIAGNOSIS — M54.89 INTERSCAPULAR PAIN: ICD-10-CM

## 2024-08-20 PROCEDURE — 99024 POSTOP FOLLOW-UP VISIT: CPT | Performed by: NEUROLOGICAL SURGERY

## 2024-08-20 RX ORDER — AMITRIPTYLINE HYDROCHLORIDE 50 MG/1
50 TABLET ORAL NIGHTLY
Qty: 90 TABLET | Refills: 1 | Status: SHIPPED | OUTPATIENT
Start: 2024-08-20

## 2024-08-20 RX ORDER — HYDROCODONE BITARTRATE AND ACETAMINOPHEN 5; 325 MG/1; MG/1
1-2 TABLET ORAL EVERY 8 HOURS PRN
Qty: 42 TABLET | Refills: 0 | Status: SHIPPED | OUTPATIENT
Start: 2024-08-20 | End: 2024-08-20

## 2024-08-20 RX ORDER — HYDROCODONE BITARTRATE AND ACETAMINOPHEN 5; 325 MG/1; MG/1
1-2 TABLET ORAL EVERY 8 HOURS PRN
Qty: 32 TABLET | Refills: 0 | OUTPATIENT
Start: 2024-08-23 | End: 2024-08-30

## 2024-08-20 RX ORDER — HYDROCODONE BITARTRATE AND ACETAMINOPHEN 5; 325 MG/1; MG/1
1-2 TABLET ORAL EVERY 8 HOURS PRN
Qty: 42 TABLET | Refills: 0 | Status: SHIPPED | OUTPATIENT
Start: 2024-08-20 | End: 2024-08-27

## 2024-08-20 RX ORDER — METHOCARBAMOL 750 MG/1
750 TABLET, FILM COATED ORAL 3 TIMES DAILY PRN
Qty: 90 TABLET | Refills: 2 | Status: SHIPPED | OUTPATIENT
Start: 2024-08-20 | End: 2024-11-18

## 2024-08-20 NOTE — TELEPHONE ENCOUNTER
Pt called and left voicemail saying the pt pharmacy does not have Norco and they do not know when they will received the Norco. Pt wanting to know if the script can be sent to [facility] pharmacy. Please advise.

## 2024-08-20 NOTE — TELEPHONE ENCOUNTER
I spoke with pt and let her know the Norco script has been sent to the [facility] pharmacy.   Cartilage Graft Text: The defect edges were debeveled with a #15 scalpel blade.  Given the location of the defect, shape of the defect, the fact the defect involved a full thickness cartilage defect a cartilage graft was deemed most appropriate.  An appropriate donor site was identified, cleansed, and anesthetized. The cartilage graft was then harvested and transferred to the recipient site, oriented appropriately and then sutured into place.  The secondary defect was then repaired using a primary closure.

## 2024-08-20 NOTE — PROGRESS NOTES
Male   Other Topics Concern    Not on file   Social History Narrative    Not on file     Social Determinants of Health     Financial Resource Strain: Low Risk  (11/24/2023)    Overall Financial Resource Strain (CARDIA)     Difficulty of Paying Living Expenses: Not hard at all   Food Insecurity: No Food Insecurity (4/26/2024)    [facility]    Hunger Screening     Within the past 12 months we worried whether our food would run out before we got money to buy more.: Never True     Within the past 12 months the food we bought just didn't last and we didn't have money to get more.: Never True   Transportation Needs: Unknown (11/24/2023)    PRAPARE - Transportation     Lack of Transportation (Medical): Not on file     Lack of Transportation (Non-Medical): No   Physical Activity: Unknown (2/14/2022)    Exercise Vital Sign     Days of Exercise per Week: 0 days     Minutes of Exercise per Session: Not on file   Stress: Stress Concern Present (2/15/2020)    [facility]    [place] [employer] - Occupational Stress Questionnaire     Feeling of Stress : Very much   Social Connections: Socially Integrated (2/15/2020)    [facility]    Social Connection and Isolation Panel [NHANES]     Frequency of Communication with Friends and Family: More than three times a week     Frequency of Social Gatherings with Friends and Family: Once a week     Attends [employer] Services: 1 to 4 times per year     Active Member of Clubs or Organizations: Yes     Attends Club or Organization Meetings: 1 to 4 times per year     Marital Status:    Intimate Partner Violence: Not At Risk (2/14/2022)    Humiliation, Afraid, Rape, and Kick questionnaire     Fear of Current or Ex-Partner: No     Emotionally Abused: No     Physically Abused: No     Sexually Abused: No   Housing

## 2024-08-21 ENCOUNTER — HOSPITAL ENCOUNTER (OUTPATIENT)
Dept: PHYSICAL THERAPY | Facility: CLINIC | Age: 54
Setting detail: THERAPIES SERIES
Discharge: HOME | End: 2024-08-21
Payer: COMMERCIAL

## 2024-08-21 PROCEDURE — 97110 THERAPEUTIC EXERCISES: CPT

## 2024-08-21 NOTE — FLOWSHEET NOTE
[] [facility]  Outpatient Rehabilitation &  Therapy  [address]  [phone]  [phone] [] [facility]  Outpatient Rehabilitation &  Therapy  [address]  P: [phone]  F: [phone] [] [facility]  Outpatient Rehabilitation &  Therapy  [address]  P: [phone]  F: [phone] [] [facility]  Outpatient Rehabilitation &  Therapy  [address]  [phone]  [phone] [] [facility]  Outpatient Rehabilitation &  Therapy  [address]   P: [phone]  F: [phone]  [] [facility]  Outpatient Rehabilitation &  Therapy  [address]  P: [phone]  F: [phone] [] [facility]  Outpatient Rehabilitation &  Therapy  [address]  P: [phone]  F: [phone] [] [facility]  Outpatient Rehabilitation &  Therapy  [address]  P: [phone]  F: [phone] [] [facility]  Outpatient Rehabilitation &  Therapy  [address]  P: [phone]  F: [phone]  [] [facility] Outpatient Rehabilitation &  Therapy  [address]  P: [phone]  F: [phone]     Physical Therapy Daily Treatment Note    Date:  2024  Patient Name:  Charisma Donnelly    :  1970  MRN: [id number]  Physician: Aminata Washington APRN - TONYA                                   Insurance: [employer] [place] (AUTH After Eval)  Medical Diagnosis:   Z98.1 (ICD-10-CM) - S/P cervical spinal fusion   Z98.1 (ICD-10-CM) - S/P fusion of thoracic spine   Z98.1 (ICD-10-CM) - S/P laminectomy with spinal fusion                           Rehab Codes: Z98.1;M54.2;M79.621;M79.622;R26.2 NEC;R29.3  Onset Date: 24               Next 's appt.: 24  Visit# / total visits: ; Progress note for Medicare patient due

## 2024-08-26 ENCOUNTER — HOSPITAL ENCOUNTER (OUTPATIENT)
Dept: PHYSICAL THERAPY | Facility: CLINIC | Age: 54
Setting detail: THERAPIES SERIES
Discharge: HOME | End: 2024-08-26
Payer: COMMERCIAL

## 2024-08-26 PROCEDURE — 97110 THERAPEUTIC EXERCISES: CPT

## 2024-08-26 NOTE — FLOWSHEET NOTE
[] [place]  Outpatient Rehabilitation &  Therapy  [address]  [phone]  [phone] [x] [facility]  Outpatient Rehabilitation &  Therapy  [address]  P: [phone]  F: [phone] [] [facility]  Outpatient Rehabilitation &  Therapy  [address]  P: [phone]  F: [phone] [] [facility]  Outpatient Rehabilitation &  Therapy  [address]  [phone]  [phone] [] [facility]  Outpatient Rehabilitation &  Therapy  [address]   P: [phone]  F: [phone]  [] [facility]  Outpatient Rehabilitation &  Therapy  [address]  P: [phone]  F: [phone] [] [facility]  Outpatient Rehabilitation &  Therapy  [address]  P: [phone]  F: [phone] [] [facility]  Outpatient Rehabilitation &  Therapy  [address]  P: [phone]  F: [phone] [] [facility]  Outpatient Rehabilitation &  Therapy  [address]  P: [phone]  F: [phone]  [] [facility] Outpatient Rehabilitation &  Therapy  [address]  P: [phone]  F: [phone]     Physical Therapy Daily Treatment Note    Date:  2024  Patient Name:  Charisma Donnelly    :  1970  MRN: [id number]  Physician: Aminata Washington APRN - TONYA                                   Insurance: [employer] [place] (AUTH After Eval)  Medical Diagnosis:   Z98.1 (ICD-10-CM) - S/P cervical spinal fusion   Z98.1 (ICD-10-CM) - S/P fusion of thoracic spine   Z98.1 (ICD-10-CM) - S/P laminectomy with spinal fusion                           Rehab Codes: Z98.1;M54.2;M79.621;M79.622;R26.2 NEC;R29.3  Onset Date: 24               Next 's appt.: 24  Visit# / total visits: ; Progress note for Medicare patient due

## 2024-08-28 ENCOUNTER — HOSPITAL ENCOUNTER (OUTPATIENT)
Dept: PHYSICAL THERAPY | Facility: CLINIC | Age: 54
Setting detail: THERAPIES SERIES
Discharge: HOME | End: 2024-08-28
Payer: COMMERCIAL

## 2024-08-28 ENCOUNTER — TELEPHONE (OUTPATIENT)
Dept: NEUROSURGERY | Age: 54
End: 2024-08-28

## 2024-08-28 DIAGNOSIS — Z98.1 S/P LAMINECTOMY WITH SPINAL FUSION: ICD-10-CM

## 2024-08-28 DIAGNOSIS — Z98.1 S/P CERVICAL SPINAL FUSION: Primary | ICD-10-CM

## 2024-08-28 PROCEDURE — 97110 THERAPEUTIC EXERCISES: CPT

## 2024-08-28 RX ORDER — HYDROCODONE BITARTRATE AND ACETAMINOPHEN 5; 325 MG/1; MG/1
1 TABLET ORAL EVERY 4 HOURS PRN
Qty: 38 TABLET | Refills: 0 | Status: SHIPPED | OUTPATIENT
Start: 2024-08-28 | End: 2024-09-04

## 2024-08-28 NOTE — FLOWSHEET NOTE
[] [facility]  Outpatient Rehabilitation &  Therapy  [address]  [phone]  [phone] [x] [facility]  Outpatient Rehabilitation &  Therapy  [address]  P: [phone]  F: [phone] [] [facility]  Outpatient Rehabilitation &  Therapy  [address]  P: [phone]  F: [phone] [] [facility]  Outpatient Rehabilitation &  Therapy  [address]  [phone]  [phone] [] [facility]  Outpatient Rehabilitation &  Therapy  [address]   P: [phone]  F: [phone]  [] [facility]  Outpatient Rehabilitation &  Therapy  [address]  P: [phone]  F: [phone] [] [facility]  Outpatient Rehabilitation &  Therapy  [address]  P: [phone]  F: [phone] [] [facility]  Outpatient Rehabilitation &  Therapy  [address]  P: [phone]  F: [phone] [] [facility]  Outpatient Rehabilitation &  Therapy  [address]  P: [phone]  F: [phone]  [] [facility] Outpatient Rehabilitation &  Therapy  [address]  P: [phone]  F: [phone]     Physical Therapy Daily Treatment Note    Date:  2024  Patient Name:  Charisma Donnelly    :  1970  MRN: [id number]  Physician: Aminata Washington APRN - TONYA                                   Insurance: [employer] [place] (AUTH After Eval)  Medical Diagnosis:   Z98.1 (ICD-10-CM) - S/P cervical spinal fusion   Z98.1 (ICD-10-CM) - S/P fusion of thoracic spine   Z98.1 (ICD-10-CM) - S/P laminectomy with spinal fusion                           Rehab Codes: Z98.1;M54.2;M79.621;M79.622;R26.2 NEC;R29.3  Onset Date: 24               Next 's appt.: 24  Visit# / total visits:  (approved 12 vs from -10/31); ;  - 1 x daily - 7 x weekly - 1-3 sets - 10 reps  - Towel Roll Squeeze  - 1 x daily - 7 x weekly - 1-3 sets - 10 reps  - Standing Bicep Curls Supinated with Dumbbells  - 1 x daily - 7 x weekly - 1-3 sets - 10 reps     Plan: [x] Continue current frequency toward long and short term goals.    [x] Specific Instructions for subsequent treatments: Progress as able. Update HEP      Time In: 7:58 am            Time Out: 8:40 am    Electronically signed by:  BRIELLE BOOTH PTA

## 2024-09-03 ENCOUNTER — HOSPITAL ENCOUNTER (OUTPATIENT)
Dept: PHYSICAL THERAPY | Facility: CLINIC | Age: 54
Setting detail: THERAPIES SERIES
Discharge: HOME | End: 2024-09-03
Payer: COMMERCIAL

## 2024-09-03 NOTE — FLOWSHEET NOTE
[] [facility]  Outpatient Rehabilitation &  Therapy  [address]  [phone]  [phone] [x] [facility]  Outpatient Rehabilitation &  Therapy  [address]  P: [phone]  F: [phone] [] [facility]  Outpatient Rehabilitation &  Therapy  [address]  P: [phone]  F: [phone] [] [facility]  Outpatient Rehabilitation &  Therapy  [address]  [phone]  [phone] [] [facility]  Outpatient Rehabilitation &  Therapy  [address]   P: [phone]  F: [phone]  [] [facility]  Outpatient Rehabilitation &  Therapy  [address]  P: [phone]  F: [phone] [] [facility]  Outpatient Rehabilitation &  Therapy  [address]  P: [phone]  F: [phone] [] [facility]  Outpatient Rehabilitation &  Therapy  [address]  P: [phone]  F: [phone] [] [facility]  Outpatient Rehabilitation &  Therapy  [address]  P: [phone]  F: [phone]  [] [facility] Outpatient Rehabilitation &  Therapy  [address]  P: [phone]  F: [phone]     Therapy Cancel/No Show note    Date: 9/3/2024  Patient: Charisma Donnelly  : 1970  MRN: [id number]    Cancels/No Shows to date:  - Not counted against patient >24 hrs notice    For today's appointment patient:    [x]  Cancelled    [] Rescheduled appointment    [] No-show     Reason given by patient:    []  Patient ill    []  Conflicting appointment    [] No transportation      [] Conflict with work    [x] No reason given    [] Weather related    [] COVID-19    [] Other:      Comments:        [x] Next appointment was confirmed    Electronically signed by: Tony Ragsdale PT

## 2024-09-04 DIAGNOSIS — Z98.1 S/P CERVICAL SPINAL FUSION: ICD-10-CM

## 2024-09-04 DIAGNOSIS — Z98.1 S/P LAMINECTOMY WITH SPINAL FUSION: ICD-10-CM

## 2024-09-04 RX ORDER — HYDROCODONE BITARTRATE AND ACETAMINOPHEN 5; 325 MG/1; MG/1
1 TABLET ORAL EVERY 6 HOURS PRN
Qty: 28 TABLET | Refills: 0 | Status: SHIPPED | OUTPATIENT
Start: 2024-09-04 | End: 2024-09-11 | Stop reason: SDUPTHER

## 2024-09-04 NOTE — TELEPHONE ENCOUNTER
PT is requesting the following medications to be refilled:   Medication: Norco     Pharmacy: [facility]    Next Office Visit: 12/02/24    Surgery Date: 06/24/24    Thank You

## 2024-09-05 ENCOUNTER — HOSPITAL ENCOUNTER (OUTPATIENT)
Dept: PHYSICAL THERAPY | Facility: CLINIC | Age: 54
Setting detail: THERAPIES SERIES
Discharge: HOME | End: 2024-09-05
Payer: COMMERCIAL

## 2024-09-05 PROCEDURE — 97110 THERAPEUTIC EXERCISES: CPT

## 2024-09-05 NOTE — FLOWSHEET NOTE
[facility]     Exercises  - Seated Scapular Retraction  - 1 x daily - 7 x weekly - 1-3 sets - 10 reps - 5 second hold  - Shoulder External Rotation and Scapular Retraction  - 1 x daily - 7 x weekly - 1-3 sets - 10 reps  - Bilateral Shoulder Flexion Wall Slide with Towel  - 1 x daily - 7 x weekly - 1-3 sets - 10 reps  - Towel Roll Squeeze  - 1 x daily - 7 x weekly - 1-3 sets - 10 reps  - Standing Bicep Curls Supinated with Dumbbells  - 1 x daily - 7 x weekly - 1-3 sets - 10 reps     Access Code: [email or web address]  URL: [email or web address]  Date: 09/05/2024  Prepared by: Tony Ragsdale    Exercises  - Seated Scapular Retraction  - 1 x daily - 7 x weekly - 1-3 sets - 10 reps - 5 second hold  - Shoulder External Rotation and Scapular Retraction  - 1 x daily - 7 x weekly - 1-3 sets - 10 reps  - Bilateral Shoulder Flexion Wall Slide with Towel  - 1 x daily - 7 x weekly - 1-3 sets - 10 reps  - Standing Bicep Curls Supinated with Dumbbells  - 1 x daily - 7 x weekly - 1-3 sets - 10 reps  - Seated Cervical Retraction  - 1 x daily - 2-3 x weekly - 2-3 sets - 10 reps  - Standing Isometric Cervical Flexion with Manual Resistance  - 1 x daily - 2-3 x weekly - 2-3 sets - 10 reps  - Standing Isometric Cervical Extension with Manual Resistance  - 1 x daily - 2-3 x weekly - 2-3 sets - 10 reps  - Standing Isometric Cervical Sidebending with Manual Resistance  - 1 x daily - 2-3 x weekly - 2-3 sets - 10 reps  - Seated Cervical Rotation AROM  - 1 x daily - 7 x weekly - 1 sets - 10 reps  - Shoulder Internal Rotation with Resistance  - 1 x daily - 2-3 x weekly - 2-3 sets - 10 reps  - Standing Shoulder Row with Anchored Resistance  - 1 x daily - 2-3 x weekly - 2-3 sets - 10 reps  - Supine Single Arm Shoulder Protraction  - 1 x daily - 2-3 x weekly - 2-3 sets - 5-10 reps  - Standing Tricep Extensions with Resistance  - 1 x daily - 2-3 x weekly - 2-3 sets - 10 reps  Plan: [x] Continue current frequency toward long and short term goals.    [x]

## 2024-09-11 DIAGNOSIS — Z98.1 S/P LAMINECTOMY WITH SPINAL FUSION: ICD-10-CM

## 2024-09-11 DIAGNOSIS — Z98.1 S/P CERVICAL SPINAL FUSION: ICD-10-CM

## 2024-09-11 RX ORDER — HYDROCODONE BITARTRATE AND ACETAMINOPHEN 5; 325 MG/1; MG/1
1 TABLET ORAL EVERY 6 HOURS PRN
Qty: 24 TABLET | Refills: 0 | Status: SHIPPED | OUTPATIENT
Start: 2024-09-11 | End: 2024-09-18

## 2024-09-16 ENCOUNTER — TELEPHONE (OUTPATIENT)
Dept: PAIN MANAGEMENT | Age: 54
End: 2024-09-16

## 2024-09-16 NOTE — TELEPHONE ENCOUNTER
Patient called in regards to increase of headaches since procedure with Dr. Christina, back in June. Dr. Christina has been weening patient off pain medication after the procedure. Last seen Dr. Dixon in office 7/1/2024. Please advise.

## 2024-09-18 DIAGNOSIS — Z98.1 S/P CERVICAL SPINAL FUSION: ICD-10-CM

## 2024-09-18 DIAGNOSIS — Z98.1 S/P LAMINECTOMY WITH SPINAL FUSION: ICD-10-CM

## 2024-09-19 RX ORDER — HYDROCODONE BITARTRATE AND ACETAMINOPHEN 5; 325 MG/1; MG/1
1 TABLET ORAL EVERY 8 HOURS PRN
Qty: 21 TABLET | Refills: 0 | Status: SHIPPED | OUTPATIENT
Start: 2024-09-19 | End: 2024-09-26

## 2024-09-20 ENCOUNTER — HOSPITAL ENCOUNTER (OUTPATIENT)
Dept: PHYSICAL THERAPY | Facility: CLINIC | Age: 54
Setting detail: THERAPIES SERIES
Discharge: HOME | End: 2024-09-20
Payer: COMMERCIAL

## 2024-09-20 ENCOUNTER — TELEPHONE (OUTPATIENT)
Dept: NEUROSURGERY | Age: 54
End: 2024-09-20

## 2024-09-20 PROCEDURE — 97110 THERAPEUTIC EXERCISES: CPT

## 2024-09-23 DIAGNOSIS — E11.65 TYPE 2 DIABETES MELLITUS WITH HYPERGLYCEMIA, WITH LONG-TERM CURRENT USE OF INSULIN (HCC): ICD-10-CM

## 2024-09-23 DIAGNOSIS — I10 ESSENTIAL HYPERTENSION: ICD-10-CM

## 2024-09-23 DIAGNOSIS — E03.9 HYPOTHYROIDISM, UNSPECIFIED TYPE: ICD-10-CM

## 2024-09-23 DIAGNOSIS — J30.2 SEASONAL ALLERGIC RHINITIS, UNSPECIFIED TRIGGER: ICD-10-CM

## 2024-09-23 DIAGNOSIS — K31.84 GASTROPARESIS: ICD-10-CM

## 2024-09-23 DIAGNOSIS — Z79.4 TYPE 2 DIABETES MELLITUS WITH HYPERGLYCEMIA, WITH LONG-TERM CURRENT USE OF INSULIN (HCC): ICD-10-CM

## 2024-09-23 DIAGNOSIS — K21.00 GASTROESOPHAGEAL REFLUX DISEASE WITH ESOPHAGITIS WITHOUT HEMORRHAGE: ICD-10-CM

## 2024-09-23 DIAGNOSIS — N95.1 HOT FLASHES DUE TO MENOPAUSE: ICD-10-CM

## 2024-09-23 RX ORDER — LEVOTHYROXINE SODIUM 50 UG/1
50 TABLET ORAL DAILY
Qty: 90 TABLET | Refills: 1 | Status: SHIPPED | OUTPATIENT
Start: 2024-09-23

## 2024-09-23 RX ORDER — RANOLAZINE 500 MG/1
500 TABLET, EXTENDED RELEASE ORAL 2 TIMES DAILY
Qty: 180 TABLET | Refills: 1 | Status: SHIPPED | OUTPATIENT
Start: 2024-09-23

## 2024-09-23 RX ORDER — FEZOLINETANT 45 MG/1
1 TABLET, FILM COATED ORAL DAILY
Qty: 90 TABLET | Refills: 1 | Status: SHIPPED | OUTPATIENT
Start: 2024-09-23

## 2024-09-23 RX ORDER — FLUTICASONE PROPIONATE 50 MCG
1 SPRAY, SUSPENSION (ML) NASAL DAILY PRN
Qty: 16 G | Refills: 2 | Status: SHIPPED | OUTPATIENT
Start: 2024-09-23

## 2024-09-23 RX ORDER — METOPROLOL SUCCINATE 100 MG/1
TABLET, EXTENDED RELEASE ORAL
Qty: 180 TABLET | Refills: 1 | Status: SHIPPED | OUTPATIENT
Start: 2024-09-23

## 2024-09-23 RX ORDER — CLOPIDOGREL BISULFATE 75 MG/1
75 TABLET ORAL DAILY
Qty: 30 TABLET | Refills: 5 | Status: SHIPPED | OUTPATIENT
Start: 2024-09-23

## 2024-09-23 RX ORDER — ACYCLOVIR 400 MG/1
TABLET ORAL
Qty: 2 EACH | Refills: 2 | Status: SHIPPED | OUTPATIENT
Start: 2024-09-23

## 2024-09-23 RX ORDER — CHOLECALCIFEROL (VITAMIN D3) 1250 MCG
1 CAPSULE ORAL WEEKLY
Qty: 4 CAPSULE | Refills: 3 | Status: SHIPPED | OUTPATIENT
Start: 2024-09-23

## 2024-09-23 RX ORDER — INSULIN GLARGINE 300 U/ML
30 INJECTION, SOLUTION SUBCUTANEOUS
Qty: 5 ADJUSTABLE DOSE PRE-FILLED PEN SYRINGE | Refills: 0 | Status: SHIPPED | OUTPATIENT
Start: 2024-09-23

## 2024-09-23 RX ORDER — ROSUVASTATIN CALCIUM 20 MG/1
20 TABLET, COATED ORAL DAILY
Qty: 90 TABLET | Refills: 1 | Status: SHIPPED | OUTPATIENT
Start: 2024-09-23 | End: 2025-03-24

## 2024-09-23 RX ORDER — PANTOPRAZOLE SODIUM 40 MG/1
40 TABLET, DELAYED RELEASE ORAL DAILY
Qty: 90 TABLET | Refills: 2 | Status: SHIPPED | OUTPATIENT
Start: 2024-09-23 | End: 2025-06-20

## 2024-09-23 RX ORDER — DOCUSATE SODIUM 100 MG/1
100 CAPSULE, LIQUID FILLED ORAL 2 TIMES DAILY
Qty: 60 CAPSULE | Refills: 0 | Status: SHIPPED | OUTPATIENT
Start: 2024-09-23

## 2024-09-24 ENCOUNTER — HOSPITAL ENCOUNTER (OUTPATIENT)
Dept: PHYSICAL THERAPY | Facility: CLINIC | Age: 54
Setting detail: THERAPIES SERIES
Discharge: HOME | End: 2024-09-24
Payer: COMMERCIAL

## 2024-09-24 PROCEDURE — 97110 THERAPEUTIC EXERCISES: CPT

## 2024-09-25 ENCOUNTER — OFFICE VISIT (OUTPATIENT)
Dept: FAMILY MEDICINE CLINIC | Age: 54
End: 2024-09-25
Payer: COMMERCIAL

## 2024-09-25 VITALS
DIASTOLIC BLOOD PRESSURE: 90 MMHG | SYSTOLIC BLOOD PRESSURE: 130 MMHG | WEIGHT: 205 LBS | OXYGEN SATURATION: 97 % | HEART RATE: 93 BPM | TEMPERATURE: 97.4 F | BODY MASS INDEX: 36.31 KG/M2

## 2024-09-25 DIAGNOSIS — N63.24 MASS OF LOWER INNER QUADRANT OF LEFT BREAST: ICD-10-CM

## 2024-09-25 DIAGNOSIS — N64.52 NIPPLE DISCHARGE: Primary | ICD-10-CM

## 2024-09-25 DIAGNOSIS — G43.909 MIGRAINE WITHOUT STATUS MIGRAINOSUS, NOT INTRACTABLE, UNSPECIFIED MIGRAINE TYPE: ICD-10-CM

## 2024-09-25 PROCEDURE — 3017F COLORECTAL CA SCREEN DOC REV: CPT | Performed by: NURSE PRACTITIONER

## 2024-09-25 PROCEDURE — 99214 OFFICE O/P EST MOD 30 MIN: CPT | Performed by: NURSE PRACTITIONER

## 2024-09-25 PROCEDURE — 4004F PT TOBACCO SCREEN RCVD TLK: CPT | Performed by: NURSE PRACTITIONER

## 2024-09-25 PROCEDURE — G8427 DOCREV CUR MEDS BY ELIG CLIN: HCPCS | Performed by: NURSE PRACTITIONER

## 2024-09-25 PROCEDURE — 3075F SYST BP GE 130 - 139MM HG: CPT | Performed by: NURSE PRACTITIONER

## 2024-09-25 PROCEDURE — 3080F DIAST BP >= 90 MM HG: CPT | Performed by: NURSE PRACTITIONER

## 2024-09-25 PROCEDURE — G8417 CALC BMI ABV UP PARAM F/U: HCPCS | Performed by: NURSE PRACTITIONER

## 2024-09-25 RX ORDER — ATOGEPANT 60 MG/1
1 TABLET ORAL DAILY
Qty: 30 TABLET | Refills: 5 | Status: SHIPPED | OUTPATIENT
Start: 2024-09-25

## 2024-09-25 RX ORDER — ATOGEPANT 60 MG/1
1 TABLET ORAL DAILY
Qty: 16 TABLET | Refills: 0 | COMMUNITY
Start: 2024-09-25

## 2024-09-25 ASSESSMENT — PATIENT HEALTH QUESTIONNAIRE - PHQ9
SUM OF ALL RESPONSES TO PHQ QUESTIONS 1-9: 0
6. FEELING BAD ABOUT YOURSELF - OR THAT YOU ARE A FAILURE OR HAVE LET YOURSELF OR YOUR FAMILY DOWN: NOT AT ALL
3. TROUBLE FALLING OR STAYING ASLEEP: NOT AT ALL
5. POOR APPETITE OR OVEREATING: NOT AT ALL
10. IF YOU CHECKED OFF ANY PROBLEMS, HOW DIFFICULT HAVE THESE PROBLEMS MADE IT FOR YOU TO DO YOUR WORK, TAKE CARE OF THINGS AT HOME, OR GET ALONG WITH OTHER PEOPLE: NOT DIFFICULT AT ALL
1. LITTLE INTEREST OR PLEASURE IN DOING THINGS: NOT AT ALL
7. TROUBLE CONCENTRATING ON THINGS, SUCH AS READING THE NEWSPAPER OR WATCHING TELEVISION: NOT AT ALL
2. FEELING DOWN, DEPRESSED OR HOPELESS: NOT AT ALL
SUM OF ALL RESPONSES TO PHQ QUESTIONS 1-9: 0
SUM OF ALL RESPONSES TO PHQ9 QUESTIONS 1 & 2: 0
9. THOUGHTS THAT YOU WOULD BE BETTER OFF DEAD, OR OF HURTING YOURSELF: NOT AT ALL
4. FEELING TIRED OR HAVING LITTLE ENERGY: NOT AT ALL
8. MOVING OR SPEAKING SO SLOWLY THAT OTHER PEOPLE COULD HAVE NOTICED. OR THE OPPOSITE, BEING SO FIGETY OR RESTLESS THAT YOU HAVE BEEN MOVING AROUND A LOT MORE THAN USUAL: NOT AT ALL
SUM OF ALL RESPONSES TO PHQ QUESTIONS 1-9: 0
SUM OF ALL RESPONSES TO PHQ QUESTIONS 1-9: 0

## 2024-09-26 ENCOUNTER — HOSPITAL ENCOUNTER (OUTPATIENT)
Dept: PHYSICAL THERAPY | Facility: CLINIC | Age: 54
Setting detail: THERAPIES SERIES
Discharge: HOME | End: 2024-09-26
Payer: COMMERCIAL

## 2024-10-01 ENCOUNTER — HOSPITAL ENCOUNTER (OUTPATIENT)
Dept: PHYSICAL THERAPY | Facility: CLINIC | Age: 54
Setting detail: THERAPIES SERIES
Discharge: HOME | End: 2024-10-01
Payer: COMMERCIAL

## 2024-10-01 PROCEDURE — 97110 THERAPEUTIC EXERCISES: CPT

## 2024-10-01 NOTE — DISCHARGE SUMMARY
as pain free cervical flexion and extension for ease of navigating the community [x]   []   []       5. Pt will increase her distance on the 6 minute walk test from 807' to 975' for ease of community navigation [x]   []    []   1130 ft                     Patient goals: Complete normal activities without pain    Pt. Education:  [x] Yes  [] No  [] Reviewed Prior HEP/Ed  Method of Education: [x] Verbal- indep progressions  [x] Demo  [x] Written    Access Code: [email or web address]  URL: [email or web address]  Date: 07/30/2024  Prepared by: Tony Ragsdale  Exercises  - Seated Scapular Retraction  - 1 x daily - 7 x weekly - 1-3 sets - 10 reps - 5 second hold  - Shoulder External Rotation and Scapular Retraction  - 1 x daily - 7 x weekly - 1-3 sets - 10 reps  - Bilateral Shoulder Flexion Wall Slide with Towel  - 1 x daily - 7 x weekly - 1-3 sets - 10 reps  - Towel Roll Squeeze  - 1 x daily - 7 x weekly - 1-3 sets - 10 reps  - Standing Bicep Curls Supinated with Dumbbells  - 1 x daily - 7 x weekly - 1-3 sets - 10 reps     Date: 09/05/2024  Prepared by: Tony Artwell  Exercises  - Seated Cervical Retraction  - 1 x daily - 2-3 x weekly - 2-3 sets - 10 reps  - Standing Isometric Cervical Flexion with Manual Resistance  - 1 x daily - 2-3 x weekly - 2-3 sets - 10 reps  - Standing Isometric Cervical Extension with Manual Resistance  - 1 x daily - 2-3 x weekly - 2-3 sets - 10 reps  - Standing Isometric Cervical Sidebending with Manual Resistance  - 1 x daily - 2-3 x weekly - 2-3 sets - 10 reps  - Seated Cervical Rotation AROM  - 1 x daily - 7 x weekly - 1 sets - 10 reps  - Shoulder Internal Rotation with Resistance  - 1 x daily - 2-3 x weekly - 2-3 sets - 10 reps  - Standing Shoulder Row with Anchored Resistance  - 1 x daily - 2-3 x weekly - 2-3 sets - 10 reps  - Supine Single Arm Shoulder Protraction  - 1 x daily - 2-3 x weekly - 2-3 sets - 5-10 reps  - Standing Tricep Extensions with Resistance  - 1 x daily - 2-3 x weekly - 2-3 sets

## 2024-10-03 ENCOUNTER — APPOINTMENT (OUTPATIENT)
Dept: PHYSICAL THERAPY | Facility: CLINIC | Age: 54
End: 2024-10-03
Payer: COMMERCIAL

## 2024-10-04 ENCOUNTER — TELEPHONE (OUTPATIENT)
Dept: FAMILY MEDICINE CLINIC | Age: 54
End: 2024-10-04

## 2024-10-04 NOTE — TELEPHONE ENCOUNTER
Patient calling in to inform her insurance denied qulipta, stated this medication helps a lot and is wondering if the office can contact the insurance company with a PA

## 2024-10-08 ENCOUNTER — OFFICE VISIT (OUTPATIENT)
Dept: PAIN MANAGEMENT | Age: 54
End: 2024-10-08
Payer: COMMERCIAL

## 2024-10-08 VITALS — WEIGHT: 205 LBS | BODY MASS INDEX: 36.32 KG/M2 | HEIGHT: 63 IN

## 2024-10-08 DIAGNOSIS — M47.812 CERVICAL SPONDYLOSIS: Primary | ICD-10-CM

## 2024-10-08 DIAGNOSIS — Z79.02 LONG TERM (CURRENT) USE OF ANTITHROMBOTICS/ANTIPLATELETS: ICD-10-CM

## 2024-10-08 DIAGNOSIS — Z98.1 HX OF FUSION OF CERVICAL SPINE: ICD-10-CM

## 2024-10-08 PROCEDURE — G8417 CALC BMI ABV UP PARAM F/U: HCPCS | Performed by: ANESTHESIOLOGY

## 2024-10-08 PROCEDURE — 3017F COLORECTAL CA SCREEN DOC REV: CPT | Performed by: ANESTHESIOLOGY

## 2024-10-08 PROCEDURE — G8427 DOCREV CUR MEDS BY ELIG CLIN: HCPCS | Performed by: ANESTHESIOLOGY

## 2024-10-08 PROCEDURE — G8484 FLU IMMUNIZE NO ADMIN: HCPCS | Performed by: ANESTHESIOLOGY

## 2024-10-08 PROCEDURE — 4004F PT TOBACCO SCREEN RCVD TLK: CPT | Performed by: ANESTHESIOLOGY

## 2024-10-08 PROCEDURE — 99214 OFFICE O/P EST MOD 30 MIN: CPT | Performed by: ANESTHESIOLOGY

## 2024-10-08 ASSESSMENT — ENCOUNTER SYMPTOMS: EYE PAIN: 1

## 2024-10-08 NOTE — PROGRESS NOTES
The patient is a 53 y.o.Non- / non  female.    Chief Complaint   Patient presents with    Neck Pain     Increased headaches        HPI    This is a 53-year-old female with history of chronic neck pain  She had recent posterior cervical spine fusion  In past also had ACDF C4-C7    She was seen in us last year for right-sided headaches and was diagnosed with cervical spondylosis underwent cervical medial branch nerve radiofrequency ablation March 2024  Patient states that provided her more than 50% improvement in neck pain and headaches lasting for more than 6 months with improved range of motion    Returns today with worsening neck pain and associated headaches affecting both side right more than left  No dermatomal radiation of pain  Recently did physical therapy  Pain aggravated with neck movement  No other interim changes in health history  She is interested in repeating cervical medial branch nerve radiofrequency ablation as it was helpful in past    Pain is constant throbbing aching stiffness aggravates with neck movement interfering with quality of life and average intensity 6/10  Right Cervical Medical branch nerve Radiofrequency ablation at C2/ C/3 C/4  nerves under fluoroscopy guidance     Patient is here today for: neck pain  Character: aching  Radiating: down to shoulder, right  Weakness or numbness: weak arms, some numbness is fingers primarily right hand  Aggravating Factors: lifting  Alleviating Factors: wedge pillow, ice, heat, pain meds  Constant or intermitting: intermitting  Bladder/bowel loss: no    Past Medical History:   Diagnosis Date    Abnormal uterine bleeding (AUB)     fibroid    Anxiety     Arthritis     CAD (coronary artery disease)     stent  LAD    Chronic neck pain     COVID-19 07/2022    fever, slight cough, fatigue,lost taste,  admitted for couple days bc of elevated troponins    Depression     pcp manages    Diabetes mellitus (HCC) 2017    managed by PCP    Fatty liver

## 2024-10-14 ENCOUNTER — HOSPITAL ENCOUNTER (OUTPATIENT)
Dept: MAMMOGRAPHY | Age: 54
Discharge: HOME | End: 2024-10-16
Payer: COMMERCIAL

## 2024-10-14 ENCOUNTER — HOSPITAL ENCOUNTER (OUTPATIENT)
Dept: ULTRASOUND IMAGING | Age: 54
Discharge: HOME | End: 2024-10-16
Payer: COMMERCIAL

## 2024-10-14 VITALS — BODY MASS INDEX: 34.73 KG/M2 | WEIGHT: 196 LBS | HEIGHT: 63 IN

## 2024-10-14 DIAGNOSIS — N63.24 MASS OF LOWER INNER QUADRANT OF LEFT BREAST: ICD-10-CM

## 2024-10-14 DIAGNOSIS — N64.52 NIPPLE DISCHARGE: ICD-10-CM

## 2024-10-14 PROCEDURE — 76642 ULTRASOUND BREAST LIMITED: CPT

## 2024-10-14 PROCEDURE — G0279 TOMOSYNTHESIS, MAMMO: HCPCS

## 2024-10-15 ENCOUNTER — TELEPHONE (OUTPATIENT)
Age: 54
End: 2024-10-15

## 2024-10-15 NOTE — TELEPHONE ENCOUNTER
Dr. Barahona office sending our office a cardiac clearance form. I gave their office our fax number.

## 2024-10-18 ENCOUNTER — TELEPHONE (OUTPATIENT)
Dept: FAMILY MEDICINE CLINIC | Age: 54
End: 2024-10-18

## 2024-10-18 NOTE — TELEPHONE ENCOUNTER
Spoke with pt she said she seen Pain Management Dr and she is scheduled for  the injection on other side of her neck. The Qulipta helped a lot per pt. She does get headaches often, she is out of the samples of Qulipta  ( there is a few in office)  Also pt asking if she needs Bx. Of Calcifications seen on mammogram

## 2024-10-18 NOTE — TELEPHONE ENCOUNTER
PLease give samples of what we have. She does not need a bx for mammogram. We will continue to screen yearly

## 2024-10-30 ENCOUNTER — HOSPITAL ENCOUNTER (OUTPATIENT)
Dept: PAIN MANAGEMENT | Facility: CLINIC | Age: 54
Discharge: HOME | End: 2024-10-30
Payer: COMMERCIAL

## 2024-10-30 VITALS
HEART RATE: 83 BPM | SYSTOLIC BLOOD PRESSURE: 141 MMHG | TEMPERATURE: 98 F | BODY MASS INDEX: 35.08 KG/M2 | HEIGHT: 63 IN | OXYGEN SATURATION: 97 % | DIASTOLIC BLOOD PRESSURE: 87 MMHG | WEIGHT: 198 LBS | RESPIRATION RATE: 11 BRPM

## 2024-10-30 DIAGNOSIS — R52 PAIN MANAGEMENT: ICD-10-CM

## 2024-10-30 DIAGNOSIS — M47.812 CERVICAL SPONDYLOSIS: Primary | ICD-10-CM

## 2024-10-30 PROCEDURE — 64634 DESTROY C/TH FACET JNT ADDL: CPT

## 2024-10-30 PROCEDURE — 64633 DESTROY CERV/THOR FACET JNT: CPT

## 2024-10-30 PROCEDURE — 6360000002 HC RX W HCPCS: Performed by: ANESTHESIOLOGY

## 2024-10-30 PROCEDURE — 2500000003 HC RX 250 WO HCPCS: Performed by: ANESTHESIOLOGY

## 2024-10-30 RX ORDER — LIDOCAINE HYDROCHLORIDE 40 MG/ML
INJECTION, SOLUTION RETROBULBAR
Status: COMPLETED | OUTPATIENT
Start: 2024-10-30 | End: 2024-10-30

## 2024-10-30 RX ORDER — MIDAZOLAM HYDROCHLORIDE 2 MG/2ML
INJECTION, SOLUTION INTRAMUSCULAR; INTRAVENOUS
Status: COMPLETED | OUTPATIENT
Start: 2024-10-30 | End: 2024-10-30

## 2024-10-30 RX ORDER — FENTANYL CITRATE 50 UG/ML
INJECTION, SOLUTION INTRAMUSCULAR; INTRAVENOUS
Status: COMPLETED | OUTPATIENT
Start: 2024-10-30 | End: 2024-10-30

## 2024-10-30 RX ORDER — LIDOCAINE HYDROCHLORIDE 10 MG/ML
INJECTION, SOLUTION EPIDURAL; INFILTRATION; INTRACAUDAL; PERINEURAL
Status: COMPLETED | OUTPATIENT
Start: 2024-10-30 | End: 2024-10-30

## 2024-10-30 RX ADMIN — FENTANYL CITRATE 50 MCG: 50 INJECTION, SOLUTION INTRAMUSCULAR; INTRAVENOUS at 14:59

## 2024-10-30 RX ADMIN — LIDOCAINE HYDROCHLORIDE 3 ML: 40 SOLUTION RETROBULBAR; TOPICAL at 15:04

## 2024-10-30 RX ADMIN — LIDOCAINE HYDROCHLORIDE 5 ML: 10 INJECTION, SOLUTION EPIDURAL; INFILTRATION; INTRACAUDAL at 14:59

## 2024-10-30 RX ADMIN — MIDAZOLAM HYDROCHLORIDE 2 MG: 1 INJECTION, SOLUTION INTRAMUSCULAR; INTRAVENOUS at 14:59

## 2024-10-30 ASSESSMENT — PAIN - FUNCTIONAL ASSESSMENT: PAIN_FUNCTIONAL_ASSESSMENT: 0-10

## 2024-10-30 ASSESSMENT — PAIN DESCRIPTION - DESCRIPTORS: DESCRIPTORS: DULL;ACHING

## 2024-10-30 NOTE — OP NOTE
Preoperative Diagnosis: Cervical spondylosis and chronic cervicalgia  Postoperative Diagnosis: Cervical spondylosis and chronic cervicalgia    Procedure Performed:  Right Cervical Medical branch nerve Radiofrequency ablation at C3/C4/C5 nerves under fluoroscopy guidance    BLOOD LOSS: NONE    Procedure:      The Patient was seen in the preop area, chart was reviewed, informed consent was obtained. Patient was taken to procedure room and was placed in prone position. Vital signs were monitored through out the  Procedure. A time out was completed. The site was prepped and draped in sterile manner.     The target point was identified with fluoro guidance using ipsilateral views. Skin and deep tissues were anesthetized with 1 % lidocaine. A  2o-gauge RF needle was advanced under fluoroscopy guidance to the targets until a bony contact was made. Position was conformed and adjusted in AP and lateral views.     The Nerve testing was performed at each level using sensory stimulation at 50 HZ and motor stimulation at 2 HZ. No arm contraction was noticed, some multifidus contraction was noticed.  Impedance was wnl at each level.  0. 5 ml of 4% lidocaine was injected to anesthetize the nerves at each level prior to lesioning.  Finally Radiofrequency lesions were made at 85 degrees C for 90 sec.    The needle was removed and a Band-Aid was placed over the needle  insertion site.  The patient's vital signs remained stable and the patient tolerated the procedure well.          SEDATION NOTE:    ASA CLASSIFICATION  3  MP   CLASSIFICATION  3    Moderate intravenous conscious sedation was supervised by Dr. Dixon  The patient was independently monitored by a Registered Nurse assigned to the Procedure Room  Monitoring included automated blood pressure, continuous EKG, Capnography and continuous pulse oximetry.   The detailed Conscious Record is permanently stored in the Hospital Information System.     The following is the conscious

## 2024-10-30 NOTE — INTERVAL H&P NOTE
Update History & Physical    The patient's History and Physical of October 8, 2024 was reviewed with the patient and I examined the patient. There was no change. The surgical site was confirmed by the patient and me.     Plan: The risks, benefits, expected outcome, and alternative to the recommended procedure have been discussed with the patient. Patient understands and wants to proceed with the procedure.     Asa 3  Mp 3    Electronically signed by Luis Angel Dixon MD on 10/30/2024 at 2:41 PM

## 2024-10-30 NOTE — DISCHARGE INSTRUCTIONS

## 2024-11-04 ENCOUNTER — HOSPITAL ENCOUNTER (OUTPATIENT)
Age: 54
Setting detail: OBSERVATION
Discharge: HOME | End: 2024-11-05
Admitting: HOSPITALIST
Payer: COMMERCIAL

## 2024-11-04 ENCOUNTER — APPOINTMENT (OUTPATIENT)
Dept: GENERAL RADIOLOGY | Age: 54
End: 2024-11-04
Payer: COMMERCIAL

## 2024-11-04 DIAGNOSIS — I20.9 ANGINA PECTORIS (HCC): ICD-10-CM

## 2024-11-04 DIAGNOSIS — R07.9 CHEST PAIN, UNSPECIFIED TYPE: Primary | ICD-10-CM

## 2024-11-04 PROBLEM — I20.0 UNSTABLE ANGINA (HCC): Status: ACTIVE | Noted: 2024-11-04

## 2024-11-04 LAB
ANION GAP SERPL CALCULATED.3IONS-SCNC: 13 MMOL/L (ref 9–17)
B-HCG SERPL EIA 3RD IS-ACNC: 2.8 MIU/ML
BASOPHILS # BLD: 0.04 K/UL (ref 0–0.2)
BASOPHILS NFR BLD: 0 % (ref 0–2)
BUN SERPL-MCNC: 11 MG/DL (ref 6–20)
BUN/CREAT SERPL: 18 (ref 9–20)
CALCIUM SERPL-MCNC: 8.8 MG/DL (ref 8.6–10.4)
CHLORIDE SERPL-SCNC: 103 MMOL/L (ref 98–107)
CO2 SERPL-SCNC: 23 MMOL/L (ref 20–31)
CREAT SERPL-MCNC: 0.6 MG/DL (ref 0.5–0.9)
D DIMER PPP FEU-MCNC: 0.31 UG/ML FEU (ref 0–0.59)
EKG ATRIAL RATE: 86 BPM
EKG P AXIS: 64 DEGREES
EKG P-R INTERVAL: 152 MS
EKG Q-T INTERVAL: 388 MS
EKG QRS DURATION: 98 MS
EKG QTC CALCULATION (BAZETT): 464 MS
EKG R AXIS: 70 DEGREES
EKG T AXIS: 78 DEGREES
EKG VENTRICULAR RATE: 86 BPM
EOSINOPHIL # BLD: 0.31 K/UL (ref 0–0.44)
EOSINOPHILS RELATIVE PERCENT: 3 % (ref 1–4)
ERYTHROCYTE [DISTWIDTH] IN BLOOD BY AUTOMATED COUNT: 13.7 % (ref 11.8–14.4)
FLUAV RNA RESP QL NAA+PROBE: NOT DETECTED
FLUBV RNA RESP QL NAA+PROBE: NOT DETECTED
GFR, ESTIMATED: >90 ML/MIN/1.73M2
GLUCOSE BLD-MCNC: 139 MG/DL (ref 65–105)
GLUCOSE BLD-MCNC: 174 MG/DL (ref 65–105)
GLUCOSE SERPL-MCNC: 211 MG/DL (ref 70–99)
HCT VFR BLD AUTO: 43.2 % (ref 36.3–47.1)
HGB BLD-MCNC: 14.6 G/DL (ref 11.9–15.1)
IMM GRANULOCYTES # BLD AUTO: 0.03 K/UL (ref 0–0.3)
IMM GRANULOCYTES NFR BLD: 0 %
LYMPHOCYTES NFR BLD: 4.22 K/UL (ref 1.1–3.7)
LYMPHOCYTES RELATIVE PERCENT: 41 % (ref 24–43)
MAGNESIUM SERPL-MCNC: 1.7 MG/DL (ref 1.6–2.6)
MCH RBC QN AUTO: 31.4 PG (ref 25.2–33.5)
MCHC RBC AUTO-ENTMCNC: 33.8 G/DL (ref 28.4–34.8)
MCV RBC AUTO: 92.9 FL (ref 82.6–102.9)
MONOCYTES NFR BLD: 0.48 K/UL (ref 0.1–1.2)
MONOCYTES NFR BLD: 5 % (ref 3–12)
NEUTROPHILS NFR BLD: 51 % (ref 36–65)
NEUTS SEG NFR BLD: 5.19 K/UL (ref 1.5–8.1)
NRBC BLD-RTO: 0 PER 100 WBC
PLATELET # BLD AUTO: 324 K/UL (ref 138–453)
PMV BLD AUTO: 9.1 FL (ref 8.1–13.5)
POTASSIUM SERPL-SCNC: 3.9 MMOL/L (ref 3.7–5.3)
RBC # BLD AUTO: 4.65 M/UL (ref 3.95–5.11)
SARS-COV-2 RNA RESP QL NAA+PROBE: NOT DETECTED
SODIUM SERPL-SCNC: 139 MMOL/L (ref 135–144)
SOURCE: NORMAL
SPECIMEN DESCRIPTION: NORMAL
TROPONIN I SERPL HS-MCNC: 17 NG/L (ref 0–14)
TROPONIN I SERPL HS-MCNC: 19 NG/L (ref 0–14)
WBC OTHER # BLD: 10.3 K/UL (ref 3.5–11.3)

## 2024-11-04 PROCEDURE — 96376 TX/PRO/DX INJ SAME DRUG ADON: CPT

## 2024-11-04 PROCEDURE — 87636 SARSCOV2 & INF A&B AMP PRB: CPT

## 2024-11-04 PROCEDURE — 6370000000 HC RX 637 (ALT 250 FOR IP): Performed by: NURSE PRACTITIONER

## 2024-11-04 PROCEDURE — 71045 X-RAY EXAM CHEST 1 VIEW: CPT

## 2024-11-04 PROCEDURE — 6360000002 HC RX W HCPCS: Performed by: HOSPITALIST

## 2024-11-04 PROCEDURE — 2580000003 HC RX 258: Performed by: HOSPITALIST

## 2024-11-04 PROCEDURE — 6360000002 HC RX W HCPCS

## 2024-11-04 PROCEDURE — 84484 ASSAY OF TROPONIN QUANT: CPT

## 2024-11-04 PROCEDURE — 96374 THER/PROPH/DIAG INJ IV PUSH: CPT

## 2024-11-04 PROCEDURE — 36415 COLL VENOUS BLD VENIPUNCTURE: CPT

## 2024-11-04 PROCEDURE — 93005 ELECTROCARDIOGRAM TRACING: CPT

## 2024-11-04 PROCEDURE — 80048 BASIC METABOLIC PNL TOTAL CA: CPT

## 2024-11-04 PROCEDURE — 84702 CHORIONIC GONADOTROPIN TEST: CPT

## 2024-11-04 PROCEDURE — 6370000000 HC RX 637 (ALT 250 FOR IP): Performed by: HOSPITALIST

## 2024-11-04 PROCEDURE — G0378 HOSPITAL OBSERVATION PER HR: HCPCS

## 2024-11-04 PROCEDURE — 82947 ASSAY GLUCOSE BLOOD QUANT: CPT

## 2024-11-04 PROCEDURE — 85379 FIBRIN DEGRADATION QUANT: CPT

## 2024-11-04 PROCEDURE — 85025 COMPLETE CBC W/AUTO DIFF WBC: CPT

## 2024-11-04 PROCEDURE — 83735 ASSAY OF MAGNESIUM: CPT

## 2024-11-04 PROCEDURE — 99285 EMERGENCY DEPT VISIT HI MDM: CPT

## 2024-11-04 PROCEDURE — 6370000000 HC RX 637 (ALT 250 FOR IP)

## 2024-11-04 PROCEDURE — 96372 THER/PROPH/DIAG INJ SC/IM: CPT

## 2024-11-04 PROCEDURE — 96375 TX/PRO/DX INJ NEW DRUG ADDON: CPT

## 2024-11-04 PROCEDURE — 99222 1ST HOSP IP/OBS MODERATE 55: CPT | Performed by: HOSPITALIST

## 2024-11-04 RX ORDER — INSULIN LISPRO 100 [IU]/ML
0-8 INJECTION, SOLUTION INTRAVENOUS; SUBCUTANEOUS
Status: DISCONTINUED | OUTPATIENT
Start: 2024-11-04 | End: 2024-11-05 | Stop reason: HOSPADM

## 2024-11-04 RX ORDER — MORPHINE SULFATE 2 MG/ML
2 INJECTION, SOLUTION INTRAMUSCULAR; INTRAVENOUS
Status: DISCONTINUED | OUTPATIENT
Start: 2024-11-04 | End: 2024-11-05 | Stop reason: HOSPADM

## 2024-11-04 RX ORDER — SODIUM CHLORIDE 0.9 % (FLUSH) 0.9 %
5-40 SYRINGE (ML) INJECTION PRN
Status: ACTIVE | OUTPATIENT
Start: 2024-11-05 | End: 2024-11-05

## 2024-11-04 RX ORDER — ONDANSETRON 2 MG/ML
4 INJECTION INTRAMUSCULAR; INTRAVENOUS EVERY 6 HOURS PRN
Status: DISCONTINUED | OUTPATIENT
Start: 2024-11-04 | End: 2024-11-05 | Stop reason: HOSPADM

## 2024-11-04 RX ORDER — METOPROLOL TARTRATE 1 MG/ML
5 INJECTION, SOLUTION INTRAVENOUS EVERY 5 MIN PRN
Status: ACTIVE | OUTPATIENT
Start: 2024-11-05 | End: 2024-11-05

## 2024-11-04 RX ORDER — M-VIT,TX,IRON,MINS/CALC/FOLIC 27MG-0.4MG
1 TABLET ORAL DAILY
Status: DISCONTINUED | OUTPATIENT
Start: 2024-11-04 | End: 2024-11-05 | Stop reason: HOSPADM

## 2024-11-04 RX ORDER — POLYETHYLENE GLYCOL 3350 17 G/17G
17 POWDER, FOR SOLUTION ORAL DAILY PRN
Status: DISCONTINUED | OUTPATIENT
Start: 2024-11-04 | End: 2024-11-05 | Stop reason: HOSPADM

## 2024-11-04 RX ORDER — DOCUSATE SODIUM 100 MG/1
100 CAPSULE, LIQUID FILLED ORAL 2 TIMES DAILY
Status: DISCONTINUED | OUTPATIENT
Start: 2024-11-04 | End: 2024-11-05 | Stop reason: HOSPADM

## 2024-11-04 RX ORDER — FLUTICASONE PROPIONATE 50 MCG
1 SPRAY, SUSPENSION (ML) NASAL DAILY PRN
Status: DISCONTINUED | OUTPATIENT
Start: 2024-11-04 | End: 2024-11-05 | Stop reason: HOSPADM

## 2024-11-04 RX ORDER — LANOLIN ALCOHOL/MO/W.PET/CERES
400 CREAM (GRAM) TOPICAL
Status: COMPLETED | OUTPATIENT
Start: 2024-11-04 | End: 2024-11-04

## 2024-11-04 RX ORDER — ASCORBIC ACID 500 MG
500 TABLET ORAL DAILY
Status: DISCONTINUED | OUTPATIENT
Start: 2024-11-04 | End: 2024-11-05 | Stop reason: HOSPADM

## 2024-11-04 RX ORDER — LEVOTHYROXINE SODIUM 50 UG/1
50 TABLET ORAL DAILY
Status: DISCONTINUED | OUTPATIENT
Start: 2024-11-04 | End: 2024-11-05 | Stop reason: HOSPADM

## 2024-11-04 RX ORDER — SODIUM CHLORIDE 0.9 % (FLUSH) 0.9 %
5-40 SYRINGE (ML) INJECTION PRN
Status: DISCONTINUED | OUTPATIENT
Start: 2024-11-04 | End: 2024-11-05 | Stop reason: HOSPADM

## 2024-11-04 RX ORDER — ONDANSETRON 4 MG/1
4 TABLET, ORALLY DISINTEGRATING ORAL EVERY 8 HOURS PRN
Status: DISCONTINUED | OUTPATIENT
Start: 2024-11-04 | End: 2024-11-05 | Stop reason: HOSPADM

## 2024-11-04 RX ORDER — ACETAMINOPHEN 650 MG/1
650 SUPPOSITORY RECTAL EVERY 6 HOURS PRN
Status: DISCONTINUED | OUTPATIENT
Start: 2024-11-04 | End: 2024-11-05 | Stop reason: HOSPADM

## 2024-11-04 RX ORDER — DEXTROSE MONOHYDRATE 100 MG/ML
INJECTION, SOLUTION INTRAVENOUS CONTINUOUS PRN
Status: DISCONTINUED | OUTPATIENT
Start: 2024-11-04 | End: 2024-11-04

## 2024-11-04 RX ORDER — SODIUM CHLORIDE 9 MG/ML
500 INJECTION, SOLUTION INTRAVENOUS CONTINUOUS PRN
Status: ACTIVE | OUTPATIENT
Start: 2024-11-05 | End: 2024-11-05

## 2024-11-04 RX ORDER — PANTOPRAZOLE SODIUM 40 MG/1
40 TABLET, DELAYED RELEASE ORAL DAILY
Status: DISCONTINUED | OUTPATIENT
Start: 2024-11-04 | End: 2024-11-05 | Stop reason: HOSPADM

## 2024-11-04 RX ORDER — ACETAMINOPHEN 325 MG/1
650 TABLET ORAL EVERY 6 HOURS PRN
Status: DISCONTINUED | OUTPATIENT
Start: 2024-11-04 | End: 2024-11-05 | Stop reason: HOSPADM

## 2024-11-04 RX ORDER — AMINOPHYLLINE 25 MG/ML
50 INJECTION, SOLUTION INTRAVENOUS PRN
Status: ACTIVE | OUTPATIENT
Start: 2024-11-05 | End: 2024-11-05

## 2024-11-04 RX ORDER — KETOROLAC TROMETHAMINE 15 MG/ML
15 INJECTION, SOLUTION INTRAMUSCULAR; INTRAVENOUS ONCE
Status: COMPLETED | OUTPATIENT
Start: 2024-11-04 | End: 2024-11-04

## 2024-11-04 RX ORDER — NICOTINE 21 MG/24HR
1 PATCH, TRANSDERMAL 24 HOURS TRANSDERMAL DAILY
Status: DISCONTINUED | OUTPATIENT
Start: 2024-11-05 | End: 2024-11-04

## 2024-11-04 RX ORDER — POTASSIUM CHLORIDE 1500 MG/1
40 TABLET, EXTENDED RELEASE ORAL PRN
Status: DISCONTINUED | OUTPATIENT
Start: 2024-11-04 | End: 2024-11-05 | Stop reason: HOSPADM

## 2024-11-04 RX ORDER — CHOLECALCIFEROL (VITAMIN D3) 1250 MCG
1 CAPSULE ORAL WEEKLY
Status: DISCONTINUED | OUTPATIENT
Start: 2024-11-04 | End: 2024-11-04

## 2024-11-04 RX ORDER — IBUPROFEN 800 MG/1
800 TABLET, FILM COATED ORAL 2 TIMES DAILY PRN
Status: DISCONTINUED | OUTPATIENT
Start: 2024-11-04 | End: 2024-11-05 | Stop reason: HOSPADM

## 2024-11-04 RX ORDER — DEXTROSE MONOHYDRATE 100 MG/ML
INJECTION, SOLUTION INTRAVENOUS CONTINUOUS PRN
Status: DISCONTINUED | OUTPATIENT
Start: 2024-11-04 | End: 2024-11-05 | Stop reason: HOSPADM

## 2024-11-04 RX ORDER — MORPHINE SULFATE 2 MG/ML
2 INJECTION, SOLUTION INTRAMUSCULAR; INTRAVENOUS
Status: COMPLETED | OUTPATIENT
Start: 2024-11-04 | End: 2024-11-04

## 2024-11-04 RX ORDER — ACETAMINOPHEN 500 MG
1000 TABLET ORAL EVERY 6 HOURS PRN
Status: DISCONTINUED | OUTPATIENT
Start: 2024-11-04 | End: 2024-11-04

## 2024-11-04 RX ORDER — MAGNESIUM SULFATE IN WATER 40 MG/ML
2000 INJECTION, SOLUTION INTRAVENOUS PRN
Status: DISCONTINUED | OUTPATIENT
Start: 2024-11-04 | End: 2024-11-05 | Stop reason: HOSPADM

## 2024-11-04 RX ORDER — METOPROLOL SUCCINATE 50 MG/1
100 TABLET, EXTENDED RELEASE ORAL DAILY
Status: DISCONTINUED | OUTPATIENT
Start: 2024-11-04 | End: 2024-11-05 | Stop reason: HOSPADM

## 2024-11-04 RX ORDER — RANOLAZINE 500 MG/1
500 TABLET, EXTENDED RELEASE ORAL 2 TIMES DAILY
Status: DISCONTINUED | OUTPATIENT
Start: 2024-11-04 | End: 2024-11-05

## 2024-11-04 RX ORDER — ALBUTEROL SULFATE 90 UG/1
2 INHALANT RESPIRATORY (INHALATION) 4 TIMES DAILY PRN
Status: DISCONTINUED | OUTPATIENT
Start: 2024-11-04 | End: 2024-11-05 | Stop reason: HOSPADM

## 2024-11-04 RX ORDER — NICOTINE 21 MG/24HR
1 PATCH, TRANSDERMAL 24 HOURS TRANSDERMAL NIGHTLY
Status: DISCONTINUED | OUTPATIENT
Start: 2024-11-04 | End: 2024-11-05 | Stop reason: HOSPADM

## 2024-11-04 RX ORDER — CLOPIDOGREL BISULFATE 75 MG/1
75 TABLET ORAL DAILY
Status: DISCONTINUED | OUTPATIENT
Start: 2024-11-04 | End: 2024-11-05 | Stop reason: HOSPADM

## 2024-11-04 RX ORDER — ROSUVASTATIN CALCIUM 10 MG/1
20 TABLET, COATED ORAL DAILY
Status: DISCONTINUED | OUTPATIENT
Start: 2024-11-04 | End: 2024-11-05 | Stop reason: HOSPADM

## 2024-11-04 RX ORDER — ASPIRIN 81 MG/1
81 TABLET ORAL DAILY
Status: DISCONTINUED | OUTPATIENT
Start: 2024-11-04 | End: 2024-11-05 | Stop reason: HOSPADM

## 2024-11-04 RX ORDER — SODIUM CHLORIDE 0.9 % (FLUSH) 0.9 %
5-40 SYRINGE (ML) INJECTION EVERY 12 HOURS SCHEDULED
Status: DISCONTINUED | OUTPATIENT
Start: 2024-11-04 | End: 2024-11-05 | Stop reason: HOSPADM

## 2024-11-04 RX ORDER — NITROGLYCERIN 0.4 MG/1
0.4 TABLET SUBLINGUAL EVERY 5 MIN PRN
Status: ACTIVE | OUTPATIENT
Start: 2024-11-05 | End: 2024-11-05

## 2024-11-04 RX ORDER — POTASSIUM CHLORIDE 7.45 MG/ML
10 INJECTION INTRAVENOUS PRN
Status: DISCONTINUED | OUTPATIENT
Start: 2024-11-04 | End: 2024-11-05 | Stop reason: HOSPADM

## 2024-11-04 RX ORDER — METHOCARBAMOL 750 MG/1
750 TABLET, FILM COATED ORAL 3 TIMES DAILY PRN
Status: DISCONTINUED | OUTPATIENT
Start: 2024-11-04 | End: 2024-11-05 | Stop reason: HOSPADM

## 2024-11-04 RX ORDER — ENOXAPARIN SODIUM 100 MG/ML
1 INJECTION SUBCUTANEOUS 2 TIMES DAILY
Status: DISCONTINUED | OUTPATIENT
Start: 2024-11-04 | End: 2024-11-05 | Stop reason: HOSPADM

## 2024-11-04 RX ORDER — AMITRIPTYLINE HYDROCHLORIDE 50 MG/1
50 TABLET ORAL NIGHTLY
Status: DISCONTINUED | OUTPATIENT
Start: 2024-11-04 | End: 2024-11-05 | Stop reason: HOSPADM

## 2024-11-04 RX ORDER — ALBUTEROL SULFATE 0.83 MG/ML
2.5 SOLUTION RESPIRATORY (INHALATION) DAILY PRN
Status: DISCONTINUED | OUTPATIENT
Start: 2024-11-04 | End: 2024-11-05 | Stop reason: HOSPADM

## 2024-11-04 RX ORDER — ONDANSETRON 2 MG/ML
4 INJECTION INTRAMUSCULAR; INTRAVENOUS ONCE
Status: COMPLETED | OUTPATIENT
Start: 2024-11-04 | End: 2024-11-04

## 2024-11-04 RX ORDER — INSULIN GLARGINE 100 [IU]/ML
24 INJECTION, SOLUTION SUBCUTANEOUS
Status: DISCONTINUED | OUTPATIENT
Start: 2024-11-05 | End: 2024-11-05 | Stop reason: HOSPADM

## 2024-11-04 RX ORDER — ONDANSETRON 4 MG/1
4 TABLET, FILM COATED ORAL EVERY 6 HOURS PRN
Status: DISCONTINUED | OUTPATIENT
Start: 2024-11-04 | End: 2024-11-04

## 2024-11-04 RX ORDER — ATROPINE SULFATE 0.1 MG/ML
0.5 INJECTION INTRAVENOUS EVERY 5 MIN PRN
Status: ACTIVE | OUTPATIENT
Start: 2024-11-05 | End: 2024-11-05

## 2024-11-04 RX ORDER — ALBUTEROL SULFATE 90 UG/1
2 INHALANT RESPIRATORY (INHALATION) PRN
Status: ACTIVE | OUTPATIENT
Start: 2024-11-05 | End: 2024-11-05

## 2024-11-04 RX ORDER — SODIUM CHLORIDE 9 MG/ML
INJECTION, SOLUTION INTRAVENOUS PRN
Status: DISCONTINUED | OUTPATIENT
Start: 2024-11-04 | End: 2024-11-05 | Stop reason: HOSPADM

## 2024-11-04 RX ORDER — MORPHINE SULFATE 4 MG/ML
4 INJECTION, SOLUTION INTRAMUSCULAR; INTRAVENOUS
Status: DISCONTINUED | OUTPATIENT
Start: 2024-11-04 | End: 2024-11-05 | Stop reason: HOSPADM

## 2024-11-04 RX ORDER — REGADENOSON 0.08 MG/ML
0.4 INJECTION, SOLUTION INTRAVENOUS
Status: COMPLETED | OUTPATIENT
Start: 2024-11-05 | End: 2024-11-05

## 2024-11-04 RX ADMIN — DOCUSATE SODIUM 100 MG: 100 CAPSULE, LIQUID FILLED ORAL at 19:47

## 2024-11-04 RX ADMIN — ENOXAPARIN SODIUM 90 MG: 100 INJECTION SUBCUTANEOUS at 16:19

## 2024-11-04 RX ADMIN — MORPHINE SULFATE 4 MG: 4 INJECTION, SOLUTION INTRAMUSCULAR; INTRAVENOUS at 22:33

## 2024-11-04 RX ADMIN — MORPHINE SULFATE 4 MG: 4 INJECTION, SOLUTION INTRAMUSCULAR; INTRAVENOUS at 17:13

## 2024-11-04 RX ADMIN — AMITRIPTYLINE HYDROCHLORIDE 50 MG: 50 TABLET, FILM COATED ORAL at 19:47

## 2024-11-04 RX ADMIN — ONDANSETRON 4 MG: 2 INJECTION, SOLUTION INTRAMUSCULAR; INTRAVENOUS at 17:13

## 2024-11-04 RX ADMIN — Medication 400 MG: at 10:21

## 2024-11-04 RX ADMIN — ONDANSETRON 4 MG: 2 INJECTION, SOLUTION INTRAMUSCULAR; INTRAVENOUS at 23:26

## 2024-11-04 RX ADMIN — RANOLAZINE 500 MG: 500 TABLET, FILM COATED, EXTENDED RELEASE ORAL at 19:47

## 2024-11-04 RX ADMIN — MORPHINE SULFATE 2 MG: 2 INJECTION, SOLUTION INTRAMUSCULAR; INTRAVENOUS at 11:34

## 2024-11-04 RX ADMIN — MORPHINE SULFATE 4 MG: 4 INJECTION, SOLUTION INTRAMUSCULAR; INTRAVENOUS at 19:59

## 2024-11-04 RX ADMIN — MORPHINE SULFATE 4 MG: 4 INJECTION, SOLUTION INTRAMUSCULAR; INTRAVENOUS at 14:21

## 2024-11-04 RX ADMIN — KETOROLAC TROMETHAMINE 15 MG: 15 INJECTION, SOLUTION INTRAMUSCULAR; INTRAVENOUS at 10:21

## 2024-11-04 RX ADMIN — SODIUM CHLORIDE, PRESERVATIVE FREE 10 ML: 5 INJECTION INTRAVENOUS at 19:47

## 2024-11-04 RX ADMIN — ONDANSETRON 4 MG: 2 INJECTION, SOLUTION INTRAMUSCULAR; INTRAVENOUS at 10:21

## 2024-11-04 RX ADMIN — METHOCARBAMOL TABLETS 750 MG: 750 TABLET, COATED ORAL at 22:50

## 2024-11-04 ASSESSMENT — PAIN DESCRIPTION - DESCRIPTORS
DESCRIPTORS: ACHING;PRESSURE;STABBING
DESCRIPTORS: PRESSURE;SHARP
DESCRIPTORS: STABBING;PRESSURE

## 2024-11-04 ASSESSMENT — PAIN DESCRIPTION - LOCATION
LOCATION: CHEST

## 2024-11-04 ASSESSMENT — ENCOUNTER SYMPTOMS
ABDOMINAL PAIN: 0
NAUSEA: 0
VOMITING: 0
SHORTNESS OF BREATH: 1

## 2024-11-04 ASSESSMENT — PAIN - FUNCTIONAL ASSESSMENT
PAIN_FUNCTIONAL_ASSESSMENT: 0-10
PAIN_FUNCTIONAL_ASSESSMENT: 0-10

## 2024-11-04 ASSESSMENT — PAIN SCALES - GENERAL
PAINLEVEL_OUTOF10: 8
PAINLEVEL_OUTOF10: 5
PAINLEVEL_OUTOF10: 8
PAINLEVEL_OUTOF10: 7
PAINLEVEL_OUTOF10: 5
PAINLEVEL_OUTOF10: 8

## 2024-11-04 ASSESSMENT — PAIN DESCRIPTION - FREQUENCY: FREQUENCY: CONTINUOUS

## 2024-11-04 ASSESSMENT — PAIN DESCRIPTION - PAIN TYPE: TYPE: ACUTE PAIN

## 2024-11-04 ASSESSMENT — PAIN DESCRIPTION - ORIENTATION
ORIENTATION: LEFT

## 2024-11-04 ASSESSMENT — PAIN DESCRIPTION - DIRECTION: RADIATING_TOWARDS: LEFT SIDE OF BACK

## 2024-11-04 ASSESSMENT — HEART SCORE: ECG: NORMAL

## 2024-11-04 NOTE — PROGRESS NOTES
Charisma Donnelly was ordered Vit D3 50,000.   As per the [facility] Formulary Committee policy, herbals and certain dietary supplements will be discontinued. The herbal or dietary supplement may be continued after discharge from the hospital.     If you feel the patient needs to continue their home herbal therapy during the inpatient stay, the patient may bring an unopened bottle for verification and administration pursuant to our home medication use policy.    Please contact the pharmacy with any questions or concerns. Thank you.  Keith Maier [facility]   11/4/2024  3:24 PM

## 2024-11-04 NOTE — PROGRESS NOTES
Spiritual Health History and Assessment/Progress Note  [facility]    (P) Loneliness/Social Isolation,  ,  ,      Name: Charisma Donnelly MRN: [id number]    Age: 53 y.o.     Sex: female   Language: English   Yazidism: Buddhism   Unstable angina (HCC)     Date: 11/4/2024            Total Time Calculated: (P) 16 min              Spiritual Assessment began in [facility]        Referral/Consult From: (P) Nurse   Encounter Overview/Reason: (P) Loneliness/Social Isolation  Service Provided For: (P) Patient    Lili, Belief, Meaning:   Patient has beliefs or practices that help with coping during difficult times  Family/Friends No family/friends present      Importance and Influence:  Patient has spiritual/personal beliefs that influence decisions regarding their health  Family/Friends No family/friends present    Community:  Patient feels well-supported. Support system includes: Spouse/Partner and Children  Family/Friends No family/friends present    Assessment and Plan of Care:     Patient Interventions include: Facilitated expression of thoughts and feelings and Facilitated life review and/ or legacy  Family/Friends Interventions include: No family/friends present    Patient Plan of Care: Spiritual Care available upon further referral  Family/Friends Plan of Care: Spiritual Care available upon further referral    Electronically signed by Chaplain Yunier on 11/4/2024 at 4:53 PM

## 2024-11-04 NOTE — ED NOTES
Dr. Duran notified of pt wanting something for nausea and pain.   
Message sent to Radha requesting pain medication for patient. Awaiting response.  
Pt ambulatory to bathroom without assistance.   
Pt presenting to the ED with complaints of chest pain and fever. Pt states that this has been going on since yesterday, pt reports highest temp at home was 99.8.   
Pt provided warm blanket  
Pt provided with lunch tray in accordance to regular diet w/o caffeine. Pt requesting pain medication, no available orders, will message admitting service.  
RN to call back for report.  
List Status is \"Complete\". The following sources were used to assist with Medication Reconciliation:    [] Med Rec Pharmacist already completed   [] Patient had a list of medications which was transcribed into the EHR.  [] Patient provided bottles of their medications  [] Home medications reviewed and confirmed with   [] Contacted patient's pharmacy to confirm home medications  [] Contacted patient's physician office to confirm home medications  [] Medical Records from another facility and/or Care Everywhere were reviewed  [] MAR from facility requested to be faxed over  [] Unable to complete due to patient condition  [x] Unable to validate home medications      [x] There are one or more home medications that need clarification before Medication Reconciliation can be completed. The Med List Status has been marked as In Progress.     To assist with Home Medication Reconciliation the following actions have been taken:    [] Pharmacy medication reconciliation service requested. (Note: This can be done by sending a Perfect Serve message to The Med Rec Pharmacist or by phoning [phone].)  [] Family requested to bring medications into the hospital  [] Family requested to call hospital with medication list  [] Message left with physician office  [] Request for medical records made to   [] Other     Electronically signed by Lilly Escalante RN on 11/4/2024 at 1:16 PM

## 2024-11-04 NOTE — H&P
BP (!) 180/104   Pulse 84   Temp 99 °F (37.2 °C) (Oral)   Resp 20   Ht 1.6 m (5' 3\")   Wt 90.3 kg (199 lb)   LMP 2022 Comment: current bleeding/ urine HCG is negative today 22  SpO2 97%   BMI 35.25 kg/m²   Temp (24hrs), Av °F (37.2 °C), Min:99 °F (37.2 °C), Max:99 °F (37.2 °C)    No results for input(s): \"POCGLU\" in the last 72 hours.  No intake or output data in the 24 hours ending 24 1328    General Appearance:  alert, well appearing, and in no acute distress  Mental status: oriented to person, place, and time  Head:  normocephalic, atraumatic  Eye: no icterus, redness, pupils equal and reactive, extraocular eye movements intact, conjunctiva clear  Ear: normal external ear, no discharge, hearing intact  Nose:  no drainage noted  Mouth: mucous membranes moist  Neck: supple, no carotid bruits, thyroid not palpable  Lungs: Bilateral equal air entry, clear to ausculation, no wheezing, rales or rhonchi, normal effort  Cardiovascular: normal rate, regular rhythm, no murmur, gallop, rub  Abdomen: Soft, nontender, nondistended, normal bowel sounds, no hepatomegaly or splenomegaly  Neurologic: There are no new focal motor or sensory deficits, normal muscle tone and bulk, no abnormal sensation, normal speech, cranial nerves II through XII grossly intact  Skin: No gross lesions, rashes, bruising or bleeding on exposed skin area  Extremities:  peripheral pulses palpable, no pedal edema or calf pain with palpation  Psych: normal affect     Investigations:      Laboratory Testing:  Recent Results (from the past 24 hour(s))   EKG 12 Lead    Collection Time: 24  9:11 AM   Result Value Ref Range    Ventricular Rate 86 BPM    Atrial Rate 86 BPM    P-R Interval 152 ms    QRS Duration 98 ms    Q-T Interval 388 ms    QTc Calculation (Bazett) 464 ms    P Axis 64 degrees    R Axis 70 degrees    T Axis 78 degrees   Basic Metabolic Panel    Collection Time: 24  9:41 AM   Result Value Ref Range

## 2024-11-04 NOTE — PROGRESS NOTES
[x] Medication Reconciliation was completed and the patient's home medication list was verified. The Med List Status is \"Complete\". The following sources were used to assist with Medication Reconciliation:    [] Med Rec Pharmacist already completed   [] Patient had a list of medications which was transcribed into the EHR.  [] Patient provided bottles of their medications  [x] Home medications reviewed and confirmed with patient  [] Contacted patient's pharmacy to confirm home medications  [] Contacted patient's physician office to confirm home medications  [] Medical Records from another facility and/or Care Everywhere were reviewed  [] MAR from facility requested to be faxed over  [] Unable to complete due to patient condition  [] Unable to validate home medications      [] There are one or more home medications that need clarification before Medication Reconciliation can be completed. The Med List Status has been marked as In Progress.     To assist with Home Medication Reconciliation the following actions have been taken:    [] Pharmacy medication reconciliation service requested. (Note: This can be done by sending a Perfect Serve message to The Med Rec Pharmacist or by phoning [phone].)  [] Family requested to bring medications into the hospital  [] Family requested to call hospital with medication list  [] Message left with physician office  [] Request for medical records made to n/a  [] Other n/a

## 2024-11-04 NOTE — ED PROVIDER NOTES
[facility] CARE  Emergency Department Encounter  Emergency Medicine Attending     Pt Name:Charisma Donnelly  MRN: [id number]  Birthdate 1970  Date of evaluation: 24  PCP:  Lucy Torres APRN - CNP  Note Started: 9:36 AM EST      CHIEF COMPLAINT       Chief Complaint   Patient presents with    Chest Pain    Fever    Shortness of Breath              HISTORY OF PRESENT ILLNESS  (Location/Symptom, Timing/Onset, Context/Setting, Quality, Duration, Modifying Factors, Severity.)      53-year-old female presents for evaluation of chest pain.  Chest pain located on the left chest and is constant.  It woke her up out of her sleep around 2 or 3 AM and has been constant.  She does have history of CAD with stenting in her LAD.  She has been eval by cardiology over the last year with EKGs.  She does smoke cigarettes daily.  She also states that she feels short of breath and fatigue with her symptoms.  She had positive sick contacts over the last week with sick grandchildren.            PAST MEDICAL / SURGICAL / SOCIAL / FAMILY HISTORY      has a past medical history of Abnormal uterine bleeding (AUB), Anxiety, Arthritis, CAD (coronary artery disease), Chronic neck pain, COVID-19, Depression, Diabetes mellitus (HCC), Fatty liver, Gastroparesis, Histiocytosis (HCC), [place] (hard of hearing), HTN (hypertension), Hx of blood clots, Hyperlipidemia, Hypothyroid, Kidney stone, Lower back pain, Mixed incontinence, Myocardial infarct (HCC), PCOS (polycystic ovarian syndrome), Pulmonary Langerhans cell granulomatosis (HCC), Snores, Spinal stenosis, Under care of service provider, Under care of service provider, Under care of service provider, Under care of team, Under care of team, Under care of team, Vasomotor symptoms due to menopause, Wears eyeglasses, Wears partial dentures, and Wellness examination.     has a past surgical history that includes back surgery ();  section (); Mastoid surgery; Cardiac

## 2024-11-04 NOTE — PROGRESS NOTES
Pt admitted to room 1003 from Ed. Admission documentation complete, vitals taken and telemetry placed. Pt oriented to room and unit routine, including hourly rounding. Call light in reach and safety maintained. Will continue to provide support and education.

## 2024-11-05 ENCOUNTER — APPOINTMENT (OUTPATIENT)
Dept: NUCLEAR MEDICINE | Age: 54
End: 2024-11-05
Payer: COMMERCIAL

## 2024-11-05 ENCOUNTER — TELEPHONE (OUTPATIENT)
Dept: FAMILY MEDICINE CLINIC | Age: 54
End: 2024-11-05

## 2024-11-05 ENCOUNTER — APPOINTMENT (OUTPATIENT)
Age: 54
End: 2024-11-05
Payer: COMMERCIAL

## 2024-11-05 ENCOUNTER — APPOINTMENT (OUTPATIENT)
Age: 54
End: 2024-11-05
Attending: HOSPITALIST
Payer: COMMERCIAL

## 2024-11-05 VITALS
TEMPERATURE: 98.6 F | HEIGHT: 63 IN | BODY MASS INDEX: 35.26 KG/M2 | DIASTOLIC BLOOD PRESSURE: 99 MMHG | WEIGHT: 199 LBS | RESPIRATION RATE: 18 BRPM | HEART RATE: 79 BPM | SYSTOLIC BLOOD PRESSURE: 147 MMHG | OXYGEN SATURATION: 95 %

## 2024-11-05 LAB
ANION GAP SERPL CALCULATED.3IONS-SCNC: 9 MMOL/L (ref 9–17)
BUN SERPL-MCNC: 15 MG/DL (ref 6–20)
BUN/CREAT SERPL: 19 (ref 9–20)
CALCIUM SERPL-MCNC: 8.6 MG/DL (ref 8.6–10.4)
CHLORIDE SERPL-SCNC: 102 MMOL/L (ref 98–107)
CO2 SERPL-SCNC: 27 MMOL/L (ref 20–31)
CREAT SERPL-MCNC: 0.8 MG/DL (ref 0.5–0.9)
ECHO AO ROOT DIAM: 2.5 CM
ECHO AO ROOT INDEX: 1.3 CM/M2
ECHO AV PEAK GRADIENT: 5 MMHG
ECHO AV PEAK VELOCITY: 1.2 M/S
ECHO BSA: 2 M2
ECHO BSA: 2 M2
ECHO EST RA PRESSURE: 5 MMHG
ECHO LA AREA 4C: 19.4 CM2
ECHO LA DIAMETER INDEX: 1.76 CM/M2
ECHO LA DIAMETER: 3.4 CM
ECHO LA MAJOR AXIS: 6 CM
ECHO LA TO AORTIC ROOT RATIO: 1.36
ECHO LA VOL MOD A4C: 49 ML (ref 22–52)
ECHO LA VOLUME INDEX MOD A4C: 25 ML/M2 (ref 16–34)
ECHO LV E' LATERAL VELOCITY: 6 CM/S
ECHO LV E' SEPTAL VELOCITY: 4.8 CM/S
ECHO LV EDV A2C: 118 ML
ECHO LV EDV A4C: 134 ML
ECHO LV EDV INDEX A4C: 69 ML/M2
ECHO LV EDV NDEX A2C: 61 ML/M2
ECHO LV EJECTION FRACTION A2C: 39 %
ECHO LV EJECTION FRACTION A4C: 45 %
ECHO LV EJECTION FRACTION BIPLANE: 42 % (ref 55–100)
ECHO LV ESV A2C: 73 ML
ECHO LV ESV A4C: 73 ML
ECHO LV ESV INDEX A2C: 38 ML/M2
ECHO LV ESV INDEX A4C: 38 ML/M2
ECHO LV FRACTIONAL SHORTENING: 21 % (ref 28–44)
ECHO LV INTERNAL DIMENSION DIASTOLE INDEX: 2.9 CM/M2
ECHO LV INTERNAL DIMENSION DIASTOLIC: 5.6 CM (ref 3.9–5.3)
ECHO LV INTERNAL DIMENSION SYSTOLIC INDEX: 2.28 CM/M2
ECHO LV INTERNAL DIMENSION SYSTOLIC: 4.4 CM
ECHO LV IVSD: 1 CM (ref 0.6–0.9)
ECHO LV MASS 2D: 234.3 G (ref 67–162)
ECHO LV MASS INDEX 2D: 121.4 G/M2 (ref 43–95)
ECHO LV POSTERIOR WALL DIASTOLIC: 1.1 CM (ref 0.6–0.9)
ECHO LV RELATIVE WALL THICKNESS RATIO: 0.39
ECHO MV A VELOCITY: 1.11 M/S
ECHO MV E DECELERATION TIME (DT): 130 MS
ECHO MV E VELOCITY: 0.91 M/S
ECHO MV E/A RATIO: 0.82
ECHO MV E/E' LATERAL: 15.17
ECHO MV E/E' RATIO (AVERAGED): 17.06
ECHO MV E/E' SEPTAL: 18.96
ECHO RIGHT VENTRICULAR SYSTOLIC PRESSURE (RVSP): 30 MMHG
ECHO TV REGURGITANT MAX VELOCITY: 2.49 M/S
ECHO TV REGURGITANT PEAK GRADIENT: 25 MMHG
ERYTHROCYTE [DISTWIDTH] IN BLOOD BY AUTOMATED COUNT: 13.9 % (ref 11.8–14.4)
GFR, ESTIMATED: 89 ML/MIN/1.73M2
GLUCOSE BLD-MCNC: 135 MG/DL (ref 65–105)
GLUCOSE BLD-MCNC: 186 MG/DL (ref 65–105)
GLUCOSE SERPL-MCNC: 145 MG/DL (ref 70–99)
HCT VFR BLD AUTO: 43.3 % (ref 36.3–47.1)
HGB BLD-MCNC: 14.2 G/DL (ref 11.9–15.1)
MCH RBC QN AUTO: 30.9 PG (ref 25.2–33.5)
MCHC RBC AUTO-ENTMCNC: 32.8 G/DL (ref 28.4–34.8)
MCV RBC AUTO: 94.3 FL (ref 82.6–102.9)
NRBC BLD-RTO: 0 PER 100 WBC
NUC STRESS EJECTION FRACTION: 24 %
PLATELET # BLD AUTO: 321 K/UL (ref 138–453)
PMV BLD AUTO: 9.1 FL (ref 8.1–13.5)
POTASSIUM SERPL-SCNC: 4.4 MMOL/L (ref 3.7–5.3)
RBC # BLD AUTO: 4.59 M/UL (ref 3.95–5.11)
SODIUM SERPL-SCNC: 138 MMOL/L (ref 135–144)
STRESS BASELINE DIAS BP: 88 MMHG
STRESS BASELINE HR: 78 BPM
STRESS BASELINE SYS BP: 143 MMHG
STRESS ESTIMATED WORKLOAD: 1 METS
STRESS PEAK DIAS BP: 88 MMHG
STRESS PEAK SYS BP: 143 MMHG
STRESS PERCENT HR ACHIEVED: 60 %
STRESS POST PEAK HR: 101 BPM
STRESS RATE PRESSURE PRODUCT: NORMAL BPM*MMHG
STRESS ST DEPRESSION: 0 MM
STRESS TARGET HR: 169 BPM
WBC OTHER # BLD: 9.5 K/UL (ref 3.5–11.3)

## 2024-11-05 PROCEDURE — 85027 COMPLETE CBC AUTOMATED: CPT

## 2024-11-05 PROCEDURE — 6370000000 HC RX 637 (ALT 250 FOR IP): Performed by: HOSPITALIST

## 2024-11-05 PROCEDURE — 93017 CV STRESS TEST TRACING ONLY: CPT

## 2024-11-05 PROCEDURE — 99238 HOSP IP/OBS DSCHRG MGMT 30/<: CPT | Performed by: HOSPITALIST

## 2024-11-05 PROCEDURE — 96376 TX/PRO/DX INJ SAME DRUG ADON: CPT

## 2024-11-05 PROCEDURE — 78452 HT MUSCLE IMAGE SPECT MULT: CPT

## 2024-11-05 PROCEDURE — 6360000002 HC RX W HCPCS: Performed by: HOSPITALIST

## 2024-11-05 PROCEDURE — 82947 ASSAY GLUCOSE BLOOD QUANT: CPT

## 2024-11-05 PROCEDURE — 3430000000 HC RX DIAGNOSTIC RADIOPHARMACEUTICAL: Performed by: HOSPITALIST

## 2024-11-05 PROCEDURE — 80048 BASIC METABOLIC PNL TOTAL CA: CPT

## 2024-11-05 PROCEDURE — G0378 HOSPITAL OBSERVATION PER HR: HCPCS

## 2024-11-05 PROCEDURE — A9500 TC99M SESTAMIBI: HCPCS | Performed by: HOSPITALIST

## 2024-11-05 PROCEDURE — 96372 THER/PROPH/DIAG INJ SC/IM: CPT

## 2024-11-05 PROCEDURE — 93306 TTE W/DOPPLER COMPLETE: CPT

## 2024-11-05 PROCEDURE — 2580000003 HC RX 258: Performed by: HOSPITALIST

## 2024-11-05 PROCEDURE — 36415 COLL VENOUS BLD VENIPUNCTURE: CPT

## 2024-11-05 RX ORDER — RANOLAZINE 1000 MG/1
1000 TABLET, EXTENDED RELEASE ORAL 2 TIMES DAILY
Qty: 60 TABLET | Refills: 3 | Status: SHIPPED | OUTPATIENT
Start: 2024-11-05

## 2024-11-05 RX ORDER — TETRAKIS(2-METHOXYISOBUTYLISOCYANIDE)COPPER(I) TETRAFLUOROBORATE 1 MG/ML
32.2 INJECTION, POWDER, LYOPHILIZED, FOR SOLUTION INTRAVENOUS
Status: COMPLETED | OUTPATIENT
Start: 2024-11-05 | End: 2024-11-05

## 2024-11-05 RX ORDER — TETRAKIS(2-METHOXYISOBUTYLISOCYANIDE)COPPER(I) TETRAFLUOROBORATE 1 MG/ML
9.8 INJECTION, POWDER, LYOPHILIZED, FOR SOLUTION INTRAVENOUS
Status: COMPLETED | OUTPATIENT
Start: 2024-11-05 | End: 2024-11-05

## 2024-11-05 RX ORDER — RANOLAZINE 500 MG/1
1000 TABLET, EXTENDED RELEASE ORAL 2 TIMES DAILY
Status: DISCONTINUED | OUTPATIENT
Start: 2024-11-05 | End: 2024-11-05 | Stop reason: HOSPADM

## 2024-11-05 RX ADMIN — ENOXAPARIN SODIUM 90 MG: 100 INJECTION SUBCUTANEOUS at 09:37

## 2024-11-05 RX ADMIN — INSULIN LISPRO 3 UNITS: 100 INJECTION, SOLUTION INTRAVENOUS; SUBCUTANEOUS at 13:05

## 2024-11-05 RX ADMIN — MORPHINE SULFATE 4 MG: 4 INJECTION, SOLUTION INTRAMUSCULAR; INTRAVENOUS at 06:54

## 2024-11-05 RX ADMIN — ASPIRIN 81 MG: 81 TABLET, COATED ORAL at 09:37

## 2024-11-05 RX ADMIN — RANOLAZINE 500 MG: 500 TABLET, FILM COATED, EXTENDED RELEASE ORAL at 09:37

## 2024-11-05 RX ADMIN — REGADENOSON 0.4 MG: 0.08 INJECTION, SOLUTION INTRAVENOUS at 08:24

## 2024-11-05 RX ADMIN — LEVOTHYROXINE SODIUM 50 MCG: 0.05 TABLET ORAL at 09:37

## 2024-11-05 RX ADMIN — ROSUVASTATIN CALCIUM 20 MG: 10 TABLET, FILM COATED ORAL at 09:37

## 2024-11-05 RX ADMIN — MORPHINE SULFATE 4 MG: 4 INJECTION, SOLUTION INTRAMUSCULAR; INTRAVENOUS at 02:13

## 2024-11-05 RX ADMIN — ONDANSETRON 4 MG: 2 INJECTION, SOLUTION INTRAMUSCULAR; INTRAVENOUS at 09:37

## 2024-11-05 RX ADMIN — MORPHINE SULFATE 4 MG: 4 INJECTION, SOLUTION INTRAMUSCULAR; INTRAVENOUS at 13:05

## 2024-11-05 RX ADMIN — METOPROLOL SUCCINATE 100 MG: 50 TABLET, EXTENDED RELEASE ORAL at 09:37

## 2024-11-05 RX ADMIN — MORPHINE SULFATE 4 MG: 4 INJECTION, SOLUTION INTRAMUSCULAR; INTRAVENOUS at 09:36

## 2024-11-05 RX ADMIN — Medication 1 TABLET: at 09:37

## 2024-11-05 RX ADMIN — PANTOPRAZOLE SODIUM 40 MG: 40 TABLET, DELAYED RELEASE ORAL at 09:37

## 2024-11-05 RX ADMIN — CLOPIDOGREL BISULFATE 75 MG: 75 TABLET ORAL at 09:37

## 2024-11-05 RX ADMIN — SODIUM CHLORIDE, PRESERVATIVE FREE 10 ML: 5 INJECTION INTRAVENOUS at 08:24

## 2024-11-05 RX ADMIN — SODIUM CHLORIDE, PRESERVATIVE FREE 10 ML: 5 INJECTION INTRAVENOUS at 09:38

## 2024-11-05 RX ADMIN — DOCUSATE SODIUM 100 MG: 100 CAPSULE, LIQUID FILLED ORAL at 09:37

## 2024-11-05 RX ADMIN — OXYCODONE HYDROCHLORIDE AND ACETAMINOPHEN 500 MG: 500 TABLET ORAL at 09:37

## 2024-11-05 RX ADMIN — Medication 32.2 MILLICURIE: at 08:26

## 2024-11-05 RX ADMIN — Medication 9.8 MILLICURIE: at 07:04

## 2024-11-05 ASSESSMENT — PAIN DESCRIPTION - ORIENTATION
ORIENTATION: MID
ORIENTATION: LEFT
ORIENTATION: LEFT
ORIENTATION: MID

## 2024-11-05 ASSESSMENT — PAIN SCALES - GENERAL
PAINLEVEL_OUTOF10: 8
PAINLEVEL_OUTOF10: 7
PAINLEVEL_OUTOF10: 5
PAINLEVEL_OUTOF10: 4
PAINLEVEL_OUTOF10: 5
PAINLEVEL_OUTOF10: 8
PAINLEVEL_OUTOF10: 7

## 2024-11-05 ASSESSMENT — PAIN DESCRIPTION - ONSET: ONSET: ON-GOING

## 2024-11-05 ASSESSMENT — PAIN DESCRIPTION - DESCRIPTORS
DESCRIPTORS: ACHING;PRESSURE;STABBING
DESCRIPTORS: PRESSURE;STABBING
DESCRIPTORS: ACHING;STABBING;PRESSURE
DESCRIPTORS: STABBING;PRESSURE

## 2024-11-05 ASSESSMENT — PAIN DESCRIPTION - LOCATION
LOCATION: CHEST

## 2024-11-05 ASSESSMENT — PAIN DESCRIPTION - FREQUENCY: FREQUENCY: CONTINUOUS

## 2024-11-05 ASSESSMENT — PAIN DESCRIPTION - PAIN TYPE: TYPE: ACUTE PAIN

## 2024-11-05 NOTE — CARE COORDINATION
Attempted to see patient for initial CM Assessment.  Patient out of room, currently in Nuclear Medicine.  Will attempt later as time permits.  
[facility]  Case Management Department

## 2024-11-05 NOTE — DISCHARGE SUMMARY
albuterol (2.5 MG/3ML) 0.083% nebulizer solution  Commonly known as: PROVENTIL  Take 3 mLs by nebulization daily as needed for Wheezing     * albuterol sulfate  (90 Base) MCG/ACT inhaler  Commonly known as: Ventolin HFA  Inhale 2 puffs into the lungs 4 times daily as needed for Wheezing     amitriptyline 50 MG tablet  Commonly known as: ELAVIL  Take 1 tablet by mouth nightly     ascorbic acid 500 MG tablet  Commonly known as: VITAMIN C     aspirin 81 MG EC tablet  Take 1 tablet by mouth daily     clopidogrel 75 MG tablet  Commonly known as: PLAVIX  Take 1 tablet by mouth daily     Dexcom G7 Sensor Misc  USE AS DIRECTED every 10 days     docusate sodium 100 MG capsule  Commonly known as: COLACE  Take 1 capsule by mouth 2 times daily     fluticasone 50 MCG/ACT nasal spray  Commonly known as: FLONASE  1 spray by Each Nostril route daily as needed for Rhinitis     * Insulin Pen Needle 31G X 5 MM Misc  Use pen needle for giving daily insulin injection     * Droplet Pen Needles 32G X 4 MM Misc  Generic drug: Insulin Pen Needle     levothyroxine 50 MCG tablet  Commonly known as: SYNTHROID  Take 1 tablet by mouth daily     methocarbamol 750 MG tablet  Commonly known as: Robaxin-750  Take 1 tablet by mouth 3 times daily as needed (spasm)     metoprolol succinate 100 MG extended release tablet  Commonly known as: TOPROL XL  take 1 tablet by mouth twice a day     ondansetron 4 MG tablet  Commonly known as: ZOFRAN  Take 1 tablet by mouth every 6 hours as needed for Nausea or Vomiting     pantoprazole 40 MG tablet  Commonly known as: PROTONIX  Take 1 tablet by mouth daily     rosuvastatin 20 MG tablet  Commonly known as: CRESTOR  Take 1 tablet by mouth daily     therapeutic multivitamin-minerals tablet     Toujeo Max SoloStar 300 UNIT/ML concentrated injection pen  Generic drug: Insulin Glargine (2 Unit Dial)  Inject 30 Units into the skin daily (with breakfast)     Veozah 45 MG Tabs  Generic drug: fezolinetant  Take 1

## 2024-11-05 NOTE — CONSULTS
Cardiology Consultation         Date:   2024  Patient name: Charisma Donnelly  Date of admission:  2024  9:08 AM  MRN:   [id number]  YOB: 1970    Reason for Admission: cardiac workup    Chief Complaint: chest pain    History of present illness:     Charisma Donnelly is a 53 y.o. female with past history of CAD with stent to LAD in , hx. Multiple cardiac caths, microvascular cardiac disease, HTN, DM2, HLD, obesity, hypothyroidism, anxiety.     She presented to the ED with complaints of chest pain. Troponin with flat trend. SR on telemetry, BP stable. She has complaints of some residual chest pressure. She also states she has some SOB, on RA currently. Overall feeling better than when she initially came in.     Past Medical History:   has a past medical history of Abnormal uterine bleeding (AUB), Anxiety, Arthritis, CAD (coronary artery disease), Chronic neck pain, COVID-19, Depression, Diabetes mellitus (HCC), Fatty liver, Gastroparesis, Histiocytosis (HCC), [place] (hard of hearing), HTN (hypertension), Hx of blood clots, Hyperlipidemia, Hypothyroid, Kidney stone, Lower back pain, Mixed incontinence, Myocardial infarct (HCC), PCOS (polycystic ovarian syndrome), Pulmonary Langerhans cell granulomatosis (HCC), Snores, Spinal stenosis, Under care of service provider, Under care of service provider, Under care of service provider, Under care of team, Under care of team, Under care of team, Vasomotor symptoms due to menopause, Wears eyeglasses, Wears partial dentures, and Wellness examination.    Past Surgical History:   has a past surgical history that includes back surgery ();  section (); Mastoid surgery; Cardiac catheterization (); Lung biopsy (Right); hip surgery (Left); Colonoscopy (N/A, 2021); Upper gastrointestinal endoscopy (2021); Cervical spine surgery (); cervical fusion (2016); laminectomy (); Hysterectomy (N/A, 2022); US BIOPSY

## 2024-11-05 NOTE — DISCHARGE INSTR - COC
Continuity of Care Form    Patient Name: Charisma Donnelly   :  1970  MRN:  [id number]    Admit date:  2024  Discharge date:  ***    Code Status Order: Full Code   Advance Directives:   Advance Care Flowsheet Documentation             Admitting Physician:  Lobo Parker DO  PCP: Lucy Torres APRN - CNP    Discharging Nurse: ***  Discharging Hospital Unit/Room#: 1003/1003-02  Discharging Unit Phone Number: ***    Emergency Contact:   Extended Emergency Contact Information  Primary Emergency Contact: AFSANEH DONNELLY  Home Phone: [phone]  Mobile Phone: [phone]  Relation: Spouse   needed? No  Secondary Emergency Contact: HELENA DONNELLY  Home Phone: [phone]  Mobile Phone: [phone]  Relation: Brother/Sister   needed? No    Past Surgical History:  Past Surgical History:   Procedure Laterality Date    BACK SURGERY      L5-S1 fusion, Dr. Lyons    CARDIAC CATHETERIZATION  2016    one stent LAD    CERVICAL FUSION  2016    C5-7, [place]    CERVICAL FUSION N/A 2024    RIGHT C7-T1 HEMILAMINECTOMY AND FORAMINOTOMY, C5-T1 FUSION performed by Carri Christina DO at [facility] OR    CERVICAL SPINE SURGERY  2019    C4, Dr. Kolb 2019     SECTION      and     COLONOSCOPY N/A 2021    COLONOSCOPY WITH BIOPSY performed by Arin Jorgensen MD at [facility] OR    CYSTOSCOPY  2024    with [facility]  , [facility]    HIP SURGERY Left     pin    HYSTERECTOMY (CERVIX STATUS UNKNOWN) N/A 2022    TOTAL LAPAROSCOPIC HYSTERECTOMY, BSO, CYSTOSCOPY performed by Charisma Mo DO at [facility] OR    LAMINECTOMY      [place], lumbar    LUMBAR FUSION N/A 10/30/2023    POSTERIOR LUMBAR HEMILAMINECTOMY RIGHT L4-L5, MEDIAL FACETECTOMY AND FORAMINOTOMY AT L4, performed by Carri Christina DO at [facility] OR    LUMBAR LAMINECTOMY  10/30/2023    POSTERIOR LUMBAR HEMILAMINECTOMY, MEDIAL FACETECTOMY AND FORAMINOTOMY AT L4    LUNG BIOPSY Right     open    MASTOID

## 2024-11-05 NOTE — PROGRESS NOTES
Comprehensive Nutrition Assessment    Type and Reason for Visit:  Initial, Positive nutrition screen    Nutrition Recommendations/Plan:   Continue current diet; if patient prefers easy to chew, can offer that instead.   Continue to encourage good po intakes.  Do not think supplement warranted at this time.  RD to follow/monitor labs, intakes, weight, POC     Malnutrition Assessment:  Malnutrition Status:  No malnutrition (11/05/24 1027)      Nutrition Assessment:    Patient is a 53 year old woman who presents with unstable angina, s/p stress test this morning. Patient screened for weight loss, per chart review, patients weight is stable, ranges from 194-205#, currently 199#. Patient was NPO for procedure, but is now on soft and bite sized diet without caffeine. Milk allergy noted. Patient follows an easy to chew diet at home, will monitor for tolerance of dysphagia diet. Labs, meds, PMH reviewed.    Nutrition Related Findings:    LBM 11/3, skin intact, no edema noted Wound Type: None       Current Nutrition Intake & Therapies:    Average Meal Intake: Unable to assess  Average Supplements Intake: None Ordered  ADULT DIET; Dysphagia - Soft and Bite Sized; No Caffeine    Anthropometric Measures:  Height: 160 cm (5' 3\")  Ideal Body Weight (IBW): 115 lbs (52 kg)       Current Body Weight: 90.3 kg (199 lb),   IBW.    Current BMI (kg/m2): 35.3                             BMI Categories: Obese Class 2 (BMI 35.0 -39.9)    Estimated Daily Nutrient Needs:  Energy Requirements Based On: Kcal/kg     Energy (kcal/day): 0166-0868 (15-17)  Weight Used for Protein Requirements: Ideal  Protein (g/day): 100-130 (2-2.5)  Method Used for Fluid Requirements: 1 ml/kcal  Fluid (ml/day): 8241-7689 (15-17)    Nutrition Diagnosis:   No nutrition diagnosis at this time related to   as evidenced by      Nutrition Interventions:   Food and/or Nutrient Delivery: Continue Current Diet  Nutrition Education/Counseling: No recommendation at this

## 2024-11-05 NOTE — PLAN OF CARE
Pt has been resting comfortably in bed for most of the day.  Echo and Stress test complete- see Chart for results  Cardiology ok for discharge  Pt continues to complain of pressure and a stabbing pain 8/10 to her chest- see Mar for meds given.  All questions and concerns have been addressed.   Bed is locked and in the lowest position with the call light in reach. Safety maintained     Problem: Chronic Conditions and Co-morbidities  Goal: Patient's chronic conditions and co-morbidity symptoms are monitored and maintained or improved  11/5/2024 1315 by Nereida Montana RN  Outcome: Progressing  Flowsheets (Taken 11/5/2024 0800)  Care Plan - Patient's Chronic Conditions and Co-Morbidity Symptoms are Monitored and Maintained or Improved: Monitor and assess patient's chronic conditions and comorbid symptoms for stability, deterioration, or improvement     Problem: Discharge Planning  Goal: Discharge to home or other facility with appropriate resources  11/5/2024 1315 by Nereida Montana RN  Outcome: Progressing  Flowsheets (Taken 11/5/2024 0800)  Discharge to home or other facility with appropriate resources: Identify barriers to discharge with patient and caregiver     Problem: Pain  Goal: Verbalizes/displays adequate comfort level or baseline comfort level  11/5/2024 1315 by Nereida Montana RN  Outcome: Progressing  Patient having pain on their chest and rates it a 8. Pain interventions includecorrect body alignment/body mechanics, relaxation techniques, medication, pillow support/positioning, and regular rest periods. Patients goal for pain relief is 2. The need for pain and symptom management will be considered in the discharge planning process to ensure patients comfort.       
Pt has been resting comfortably in bed.   Since admission she has denied pain/discomfort.  NPO at midnight for stress test tomorrow.  Medications reviewed and completed with pt.   Skin remain intact.  A&O x4  Independent  All questions and concerns addressed.   Bed is locked and in the lowest position with the call light in reach.    Problem: Chronic Conditions and Co-morbidities  Goal: Patient's chronic conditions and co-morbidity symptoms are monitored and maintained or improved  Recent Flowsheet Documentation  Taken 11/4/2024 1509 by Nereida Montana RN  Care Plan - Patient's Chronic Conditions and Co-Morbidity Symptoms are Monitored and Maintained or Improved: Monitor and assess patient's chronic conditions and comorbid symptoms for stability, deterioration, or improvement     Problem: Discharge Planning  Goal: Discharge to home or other facility with appropriate resources  Outcome: Progressing  Flowsheets (Taken 11/4/2024 1509)  Discharge to home or other facility with appropriate resources: Identify barriers to discharge with patient and caregiver     Problem: Pain  Goal: Verbalizes/displays adequate comfort level or baseline comfort level  Outcome: Progressing     Problem: Cardiovascular - Adult  Goal: Maintains optimal cardiac output and hemodynamic stability  Outcome: Progressing     Problem: Cardiovascular - Adult  Goal: Absence of cardiac dysrhythmias or at baseline  Outcome: Progressing     
Progressing     Problem: Cardiovascular - Adult  Goal: Maintains optimal cardiac output and hemodynamic stability  11/5/2024 0414 by Zahira Dos Santos RN  Outcome: Progressing     Problem: Cardiovascular - Adult  Goal: Absence of cardiac dysrhythmias or at baseline  11/5/2024 0414 by Zahira Dos Santos RN  Outcome: Progressing

## 2024-11-05 NOTE — TELEPHONE ENCOUNTER
Care Transitions Initial Follow Up Call    Outreach made within 2 business days of discharge: Yes    Patient: Charisma Donnelly Patient : 1970   MRN: [id number]  Reason for Admission: unstable angina  Discharge Date: 24       Spoke with: patient    Discharge department/facility: [facility] Interactive Patient Contact:  Was patient able to fill all prescriptions: Yes  Was patient instructed to bring all medications to the follow-up visit: Yes  Is patient taking all medications as directed in the discharge summary? Yes  Does patient understand their discharge instructions: Yes  Does patient have questions or concerns that need addressed prior to 7-14 day follow up office visit: no-     Additional needs identified to be addressed with provider  No needs identified             Scheduled appointment with PCP within 7-14 days    Follow Up  Future Appointments   Date Time Provider Department Center   2024 10:30 AM Arin Jorgensen MD [facility]   2024  9:00 AM Shannan Chen APRN - CNP [facility]   2024  8:10 AM Luis Angel Dixon MD [facility]   2024  9:00 AM Carri Christina DO [facility]   2025  8:20 AM Seal, Lucy, APRN - CNP [facility] DEP   2025  1:15 PM Marci Martinez MD [facility]       Forest Bernardo MA

## 2024-11-05 NOTE — PROGRESS NOTES
Pt discharged to home, left via private vehicle. Belonging gathered and taken with pt, IV removed, discharge instruction given, pt verbalizes understanding. All questions and concerns addressed. Safety maintained.

## 2024-11-05 NOTE — PROGRESS NOTES
[facility]  Office: [phone]  Mac Parker DO, Van He DO, íVctor Chauhan DO, Shay Salamanca DO, Marcia Reyes MD, Celi Andres MD, Celsa Jorgensen MD, Char Montes MD,  John Horner MD, Marie Sims MD, Miky Alonzo MD,  Paolo Wall DO, Chery Fair MD, Reagan Cha MD, Lobo Parker DO, Naya Salgado MD,  Juan Duran DO, Christine Man MD, Aliya Olson MD, Celeste Babcock MD, Edda Fregoso MD,  Edgar Plaza MD, Luis Thomas MD, Kirstie Camargo MD, Mathew Mosher MD, Phi Yepez MD, Donal Hogue MD, Iam Wylie DO, Modesto Mott MD, Shirley Waterhouse, CNP,  Lia Winters CNP, Iam Hackett, TONYA,  Paola Beard, JOSE, Jazmín Stephen, CNP, Rajani Bourgeois, CNP, Shannan Arceo, CNP, Sharon Woodard, CNP, Renita Ly PA-C, Iliana Sierra PA-C, Krista Rowe, TONYA, Cam Dewey, TONYA,  Tammie Domingo, CNP, Kelly Cardozo, CNP,  Marily Morris, TONYA, Dodie Zamora, CNP         [facility]   IN-PATIENT SERVICE   [facility]    Progress Note    11/5/2024    1:00 PM    Name:   Charisma Donnelly  MRN:     [id number]     Acct:      [id number]   Room[facility]   [facility]1003-02   Day:  0  Admit Date:  11/4/2024  9:08 AM    PCP:   Lucy Torres APRN - CNP  Code Status:  Full Code    Subjective:     Patient seen in follow-up for chest pain, patient states \"I am still sore\"    Patient has undergone stress as well as echocardiogram.  Case discussed with cardiology.  The patient does have ischemia but no infarct noted.  Her ejection fraction is noted to be 42% however given the lack of ischemia recommendations are for medical management and outpatient follow-up.  I reviewed the stress test as well as echocardiogram with the patient.  Multiple questions answered.  Cardiology is recommending increasing Ranexa to 1000 mg twice daily.  The patient denies any questions or concerns at this point in time can be discharged home with outpatient

## 2024-11-05 NOTE — FLOWSHEET NOTE
Pt tolerated lexiscan without experiencing side effect, recovered on cart and taken to [facility] for further testing in nad

## 2024-11-11 ENCOUNTER — TELEPHONE (OUTPATIENT)
Dept: GASTROENTEROLOGY | Age: 54
End: 2024-11-11

## 2024-11-11 ENCOUNTER — HOSPITAL ENCOUNTER (EMERGENCY)
Age: 54
Discharge: HOME | End: 2024-11-11
Attending: EMERGENCY MEDICINE
Payer: COMMERCIAL

## 2024-11-11 ENCOUNTER — APPOINTMENT (OUTPATIENT)
Dept: CT IMAGING | Age: 54
End: 2024-11-11
Payer: COMMERCIAL

## 2024-11-11 VITALS
OXYGEN SATURATION: 95 % | DIASTOLIC BLOOD PRESSURE: 67 MMHG | BODY MASS INDEX: 35.26 KG/M2 | TEMPERATURE: 98.2 F | HEART RATE: 85 BPM | SYSTOLIC BLOOD PRESSURE: 107 MMHG | WEIGHT: 199 LBS | HEIGHT: 63 IN | RESPIRATION RATE: 13 BRPM

## 2024-11-11 DIAGNOSIS — R10.30 LOWER ABDOMINAL PAIN: ICD-10-CM

## 2024-11-11 DIAGNOSIS — K62.5 RECTAL BLEEDING: Primary | ICD-10-CM

## 2024-11-11 LAB
ALBUMIN SERPL-MCNC: 4 G/DL (ref 3.5–5.2)
ALBUMIN/GLOB SERPL: 1.5 {RATIO} (ref 1–2.5)
ALP SERPL-CCNC: 86 U/L (ref 35–104)
ALT SERPL-CCNC: 16 U/L (ref 10–35)
ANION GAP SERPL CALCULATED.3IONS-SCNC: 12 MMOL/L (ref 9–16)
AST SERPL-CCNC: 12 U/L (ref 10–35)
BASOPHILS # BLD: 0 K/UL (ref 0–0.2)
BASOPHILS NFR BLD: 0 %
BILIRUB DIRECT SERPL-MCNC: 0.1 MG/DL (ref 0–0.2)
BILIRUB INDIRECT SERPL-MCNC: 0.1 MG/DL
BILIRUB SERPL-MCNC: 0.3 MG/DL (ref 0–1.2)
BILIRUB UR QL STRIP: NEGATIVE
BUN SERPL-MCNC: 11 MG/DL (ref 6–20)
CALCIUM SERPL-MCNC: 9.2 MG/DL (ref 8.6–10.4)
CHLORIDE SERPL-SCNC: 105 MMOL/L (ref 98–107)
CLARITY UR: ABNORMAL
CO2 SERPL-SCNC: 24 MMOL/L (ref 20–31)
COLOR UR: YELLOW
CREAT SERPL-MCNC: 0.6 MG/DL (ref 0.5–0.9)
DATE, STOOL #1: NORMAL
EOSINOPHIL # BLD: 0.37 K/UL (ref 0–0.4)
EOSINOPHILS RELATIVE PERCENT: 3 % (ref 1–4)
EPI CELLS #/AREA URNS HPF: ABNORMAL /HPF (ref 0–5)
ERYTHROCYTE [DISTWIDTH] IN BLOOD BY AUTOMATED COUNT: 13.8 % (ref 11.8–14.4)
GFR, ESTIMATED: >90 ML/MIN/1.73M2
GLUCOSE SERPL-MCNC: 176 MG/DL (ref 74–99)
GLUCOSE UR STRIP-MCNC: ABNORMAL MG/DL
HCT VFR BLD AUTO: 44.5 % (ref 36.3–47.1)
HEMOCCULT SP1 STL QL: NEGATIVE
HGB BLD-MCNC: 15.3 G/DL (ref 11.9–15.1)
HGB UR QL STRIP.AUTO: NEGATIVE
IMM GRANULOCYTES # BLD AUTO: 0 K/UL (ref 0–0.3)
IMM GRANULOCYTES NFR BLD: 0 %
INR PPP: 1
KETONES UR STRIP-MCNC: NEGATIVE MG/DL
LACTATE BLDV-SCNC: 1.3 MMOL/L (ref 0.5–1.9)
LEUKOCYTE ESTERASE UR QL STRIP: NEGATIVE
LIPASE SERPL-CCNC: 25 U/L (ref 13–60)
LYMPHOCYTES NFR BLD: 5.85 K/UL (ref 1–4.8)
LYMPHOCYTES RELATIVE PERCENT: 48 % (ref 24–44)
MCH RBC QN AUTO: 31.7 PG (ref 25.2–33.5)
MCHC RBC AUTO-ENTMCNC: 34.4 G/DL (ref 28.4–34.8)
MCV RBC AUTO: 92.3 FL (ref 82.6–102.9)
MONOCYTES NFR BLD: 0.61 K/UL (ref 0.2–0.8)
MONOCYTES NFR BLD: 5 % (ref 1–7)
MUCOUS THREADS URNS QL MICRO: ABNORMAL
NEUTROPHILS NFR BLD: 44 % (ref 36–66)
NEUTS SEG NFR BLD: 5.37 K/UL (ref 1.8–7.7)
NITRITE UR QL STRIP: NEGATIVE
NRBC BLD-RTO: 0 PER 100 WBC
PARTIAL THROMBOPLASTIN TIME: 28 SEC (ref 23.9–33.8)
PH UR STRIP: 6 [PH] (ref 5–8)
PLATELET # BLD AUTO: 352 K/UL (ref 138–453)
PMV BLD AUTO: 9.3 FL (ref 8.1–13.5)
POTASSIUM SERPL-SCNC: 4 MMOL/L (ref 3.7–5.3)
PROT SERPL-MCNC: 6.7 G/DL (ref 6.6–8.7)
PROT UR STRIP-MCNC: NEGATIVE MG/DL
PROTHROMBIN TIME: 13.7 SEC (ref 11.5–14.2)
RBC # BLD AUTO: 4.82 M/UL (ref 3.95–5.11)
RBC #/AREA URNS HPF: ABNORMAL /HPF (ref 0–2)
SODIUM SERPL-SCNC: 141 MMOL/L (ref 136–145)
SP GR UR STRIP: 1.04 (ref 1–1.03)
TIME, STOOL #1: 1802
UROBILINOGEN UR STRIP-ACNC: NORMAL EU/DL (ref 0–1)
WBC #/AREA URNS HPF: ABNORMAL /HPF (ref 0–5)
WBC OTHER # BLD: 12.2 K/UL (ref 3.5–11.3)

## 2024-11-11 PROCEDURE — 96374 THER/PROPH/DIAG INJ IV PUSH: CPT

## 2024-11-11 PROCEDURE — 81001 URINALYSIS AUTO W/SCOPE: CPT

## 2024-11-11 PROCEDURE — 74177 CT ABD & PELVIS W/CONTRAST: CPT

## 2024-11-11 PROCEDURE — 85610 PROTHROMBIN TIME: CPT

## 2024-11-11 PROCEDURE — 83605 ASSAY OF LACTIC ACID: CPT

## 2024-11-11 PROCEDURE — 99285 EMERGENCY DEPT VISIT HI MDM: CPT

## 2024-11-11 PROCEDURE — 6370000000 HC RX 637 (ALT 250 FOR IP): Performed by: EMERGENCY MEDICINE

## 2024-11-11 PROCEDURE — 85025 COMPLETE CBC W/AUTO DIFF WBC: CPT

## 2024-11-11 PROCEDURE — 6360000004 HC RX CONTRAST MEDICATION: Performed by: EMERGENCY MEDICINE

## 2024-11-11 PROCEDURE — 2580000003 HC RX 258: Performed by: EMERGENCY MEDICINE

## 2024-11-11 PROCEDURE — 82270 OCCULT BLOOD FECES: CPT

## 2024-11-11 PROCEDURE — 80076 HEPATIC FUNCTION PANEL: CPT

## 2024-11-11 PROCEDURE — 83690 ASSAY OF LIPASE: CPT

## 2024-11-11 PROCEDURE — 80048 BASIC METABOLIC PNL TOTAL CA: CPT

## 2024-11-11 PROCEDURE — 96375 TX/PRO/DX INJ NEW DRUG ADDON: CPT

## 2024-11-11 PROCEDURE — 85730 THROMBOPLASTIN TIME PARTIAL: CPT

## 2024-11-11 PROCEDURE — 6360000002 HC RX W HCPCS: Performed by: EMERGENCY MEDICINE

## 2024-11-11 RX ORDER — SODIUM CHLORIDE 0.9 % (FLUSH) 0.9 %
10 SYRINGE (ML) INJECTION ONCE
Status: COMPLETED | OUTPATIENT
Start: 2024-11-11 | End: 2024-11-11

## 2024-11-11 RX ORDER — ONDANSETRON 2 MG/ML
4 INJECTION INTRAMUSCULAR; INTRAVENOUS ONCE
Status: COMPLETED | OUTPATIENT
Start: 2024-11-11 | End: 2024-11-11

## 2024-11-11 RX ORDER — IOPAMIDOL 755 MG/ML
75 INJECTION, SOLUTION INTRAVASCULAR
Status: COMPLETED | OUTPATIENT
Start: 2024-11-11 | End: 2024-11-11

## 2024-11-11 RX ORDER — OXYCODONE AND ACETAMINOPHEN 5; 325 MG/1; MG/1
1 TABLET ORAL ONCE
Status: COMPLETED | OUTPATIENT
Start: 2024-11-11 | End: 2024-11-11

## 2024-11-11 RX ORDER — TRAMADOL HYDROCHLORIDE 50 MG/1
50 TABLET ORAL EVERY 4 HOURS PRN
Qty: 15 TABLET | Refills: 0 | Status: SHIPPED | OUTPATIENT
Start: 2024-11-11 | End: 2024-11-14

## 2024-11-11 RX ORDER — 0.9 % SODIUM CHLORIDE 0.9 %
80 INTRAVENOUS SOLUTION INTRAVENOUS ONCE
Status: DISCONTINUED | OUTPATIENT
Start: 2024-11-11 | End: 2024-11-11 | Stop reason: HOSPADM

## 2024-11-11 RX ORDER — MORPHINE SULFATE 4 MG/ML
4 INJECTION, SOLUTION INTRAMUSCULAR; INTRAVENOUS ONCE
Status: COMPLETED | OUTPATIENT
Start: 2024-11-11 | End: 2024-11-11

## 2024-11-11 RX ADMIN — ONDANSETRON 4 MG: 2 INJECTION, SOLUTION INTRAMUSCULAR; INTRAVENOUS at 18:13

## 2024-11-11 RX ADMIN — IOPAMIDOL 75 ML: 755 INJECTION, SOLUTION INTRAVENOUS at 18:05

## 2024-11-11 RX ADMIN — Medication 80 ML: at 18:06

## 2024-11-11 RX ADMIN — OXYCODONE HYDROCHLORIDE AND ACETAMINOPHEN 1 TABLET: 5; 325 TABLET ORAL at 19:23

## 2024-11-11 RX ADMIN — SODIUM CHLORIDE, PRESERVATIVE FREE 10 ML: 5 INJECTION INTRAVENOUS at 18:06

## 2024-11-11 RX ADMIN — MORPHINE SULFATE 4 MG: 4 INJECTION, SOLUTION INTRAMUSCULAR; INTRAVENOUS at 18:13

## 2024-11-11 ASSESSMENT — PAIN - FUNCTIONAL ASSESSMENT: PAIN_FUNCTIONAL_ASSESSMENT: 0-10

## 2024-11-11 ASSESSMENT — PAIN SCALES - GENERAL
PAINLEVEL_OUTOF10: 9
PAINLEVEL_OUTOF10: 9

## 2024-11-11 ASSESSMENT — PAIN DESCRIPTION - LOCATION: LOCATION: ABDOMEN

## 2024-11-11 ASSESSMENT — PAIN DESCRIPTION - DESCRIPTORS: DESCRIPTORS: ACHING;CRUSHING

## 2024-11-11 NOTE — ED PROVIDER NOTES
program. While intending to generate a document that actually reflects the content of the visit, no guarantees can be provided that every mistake has been identified and corrected by editing.        Raul Dougherty MD  11/11/24 1805

## 2024-11-11 NOTE — TELEPHONE ENCOUNTER
Patient called reporting severe abdominal pain, nausea, and rectal bleeding. Pain is a 10/10 and can make it hard to breath or walk. Patient was advised to go to the ER for quicker evaluation. She is planning to go to [facility] ER.

## 2024-11-12 PROBLEM — K62.5 RECTAL BLEEDING: Status: ACTIVE | Noted: 2024-11-12

## 2024-11-12 NOTE — DISCHARGE INSTRUCTIONS
Use tramadol as needed for pain.  If you have severe worsening of your pain or bleeding come back to the ER.  Follow-up with your GI doctor soon as possible.

## 2024-11-19 ENCOUNTER — OFFICE VISIT (OUTPATIENT)
Age: 54
End: 2024-11-19
Payer: COMMERCIAL

## 2024-11-19 VITALS
SYSTOLIC BLOOD PRESSURE: 141 MMHG | OXYGEN SATURATION: 97 % | BODY MASS INDEX: 35.71 KG/M2 | DIASTOLIC BLOOD PRESSURE: 97 MMHG | HEART RATE: 92 BPM | WEIGHT: 201.6 LBS

## 2024-11-19 DIAGNOSIS — E11.9 TYPE 2 DIABETES MELLITUS WITHOUT COMPLICATION, WITH LONG-TERM CURRENT USE OF INSULIN (HCC): ICD-10-CM

## 2024-11-19 DIAGNOSIS — I10 ESSENTIAL HYPERTENSION: ICD-10-CM

## 2024-11-19 DIAGNOSIS — R07.9 CHEST PAIN, UNSPECIFIED TYPE: ICD-10-CM

## 2024-11-19 DIAGNOSIS — I21.4 NSTEMI (NON-ST ELEVATED MYOCARDIAL INFARCTION) (HCC): Primary | ICD-10-CM

## 2024-11-19 DIAGNOSIS — J84.82: ICD-10-CM

## 2024-11-19 DIAGNOSIS — Z79.4 TYPE 2 DIABETES MELLITUS WITHOUT COMPLICATION, WITH LONG-TERM CURRENT USE OF INSULIN (HCC): ICD-10-CM

## 2024-11-19 PROCEDURE — 4004F PT TOBACCO SCREEN RCVD TLK: CPT

## 2024-11-19 PROCEDURE — 3017F COLORECTAL CA SCREEN DOC REV: CPT

## 2024-11-19 PROCEDURE — 3051F HG A1C>EQUAL 7.0%<8.0%: CPT

## 2024-11-19 PROCEDURE — 93000 ELECTROCARDIOGRAM COMPLETE: CPT

## 2024-11-19 PROCEDURE — 3077F SYST BP >= 140 MM HG: CPT

## 2024-11-19 PROCEDURE — 3080F DIAST BP >= 90 MM HG: CPT

## 2024-11-19 PROCEDURE — G8484 FLU IMMUNIZE NO ADMIN: HCPCS

## 2024-11-19 PROCEDURE — 99213 OFFICE O/P EST LOW 20 MIN: CPT

## 2024-11-19 PROCEDURE — G8427 DOCREV CUR MEDS BY ELIG CLIN: HCPCS

## 2024-11-19 PROCEDURE — 2022F DILAT RTA XM EVC RTNOPTHY: CPT

## 2024-11-19 PROCEDURE — G8417 CALC BMI ABV UP PARAM F/U: HCPCS

## 2024-11-19 RX ORDER — ISOSORBIDE MONONITRATE 30 MG/1
30 TABLET, EXTENDED RELEASE ORAL DAILY
Qty: 30 TABLET | Refills: 3 | Status: SHIPPED | OUTPATIENT
Start: 2024-11-19

## 2024-11-19 ASSESSMENT — ENCOUNTER SYMPTOMS
CHEST TIGHTNESS: 1
SHORTNESS OF BREATH: 1

## 2024-11-19 NOTE — PROGRESS NOTES
Cardiology Office Follow up      Charisma Donnelly  1970  [id number]    Date: 11/19/24    CC: had concerns including Follow-Up from Hospital (DOS 11/5/24/Sharp pressure chest pain everyday, high blood pressure, resting a lot, right groin pain).    HPI:   Charisma Donnelly  is here for follow up s/p ER visit. She states she has had continued intermittent CP that has been unrelieved by the increase in ranexa. She denies SOB, palpitations, or syncope. Reports SBP 150s at home.    Past Medical:  Past Medical History:   Diagnosis Date    Abnormal uterine bleeding (AUB)     fibroid    Anxiety     Arthritis     CAD (coronary artery disease)     stent  LAD    Chronic neck pain     COVID-19 07/2022    fever, slight cough, fatigue,lost taste,  admitted for couple days bc of elevated troponins    Depression     pcp manages    Diabetes mellitus (HCC) 2017    managed by PCP    Fatty liver     Gastroparesis     Histiocytosis (HCC)     [place] (hard of hearing)     needs hearing aids but doesn't have them, Seems to hear fine without. Reads lips.    HTN (hypertension)     Hx of blood clots     Leg when on Contraception  1995    Hyperlipidemia     Hypothyroid     Kidney stone     Lower back pain     Mixed incontinence     Myocardial infarct (HCC) 2016    PCOS (polycystic ovarian syndrome)     Pulmonary Langerhans cell granulomatosis (HCC)     with lung nodule    Snores     no cpap  no hx sleep apnea    Spinal stenosis 2017    Under care of service provider     Pulmonology, [facility] , last seen 1/5/2023 , supposed to follow up in 6 months    Under care of service provider     Cardiology, [facility] , last seen 5/3/2024    Under care of service provider     GI, Dr. Jorgensen , last seen 4/12/2024    Under care of team     Urology, Dr. Plasencia , robertat seen 6/7/2024    Under care of team     Pain Management , last seen 5/7/2024    Under care of team     Neuro Surg , Dr. Christina , last seen 3/28/2024    Vasomotor symptoms due to menopause     Wears

## 2024-11-26 ENCOUNTER — OFFICE VISIT (OUTPATIENT)
Dept: PAIN MANAGEMENT | Age: 54
End: 2024-11-26
Payer: COMMERCIAL

## 2024-11-26 VITALS — HEIGHT: 63 IN | BODY MASS INDEX: 35.61 KG/M2 | OXYGEN SATURATION: 97 % | HEART RATE: 92 BPM | WEIGHT: 201 LBS

## 2024-11-26 DIAGNOSIS — Z79.02 LONG TERM (CURRENT) USE OF ANTITHROMBOTICS/ANTIPLATELETS: ICD-10-CM

## 2024-11-26 DIAGNOSIS — E10.9 DIABETES MELLITUS, LABILE (HCC): ICD-10-CM

## 2024-11-26 DIAGNOSIS — Z98.1 S/P CERVICAL SPINAL FUSION: ICD-10-CM

## 2024-11-26 DIAGNOSIS — F12.90 MARIJUANA USE: ICD-10-CM

## 2024-11-26 DIAGNOSIS — Z98.890 S/P LUMBAR LAMINECTOMY: Primary | ICD-10-CM

## 2024-11-26 DIAGNOSIS — M47.812 CERVICAL SPONDYLOSIS WITHOUT MYELOPATHY: ICD-10-CM

## 2024-11-26 PROCEDURE — 4004F PT TOBACCO SCREEN RCVD TLK: CPT | Performed by: ANESTHESIOLOGY

## 2024-11-26 PROCEDURE — G8484 FLU IMMUNIZE NO ADMIN: HCPCS | Performed by: ANESTHESIOLOGY

## 2024-11-26 PROCEDURE — 2022F DILAT RTA XM EVC RTNOPTHY: CPT | Performed by: ANESTHESIOLOGY

## 2024-11-26 PROCEDURE — 3017F COLORECTAL CA SCREEN DOC REV: CPT | Performed by: ANESTHESIOLOGY

## 2024-11-26 PROCEDURE — 99212 OFFICE O/P EST SF 10 MIN: CPT | Performed by: ANESTHESIOLOGY

## 2024-11-26 PROCEDURE — G8427 DOCREV CUR MEDS BY ELIG CLIN: HCPCS | Performed by: ANESTHESIOLOGY

## 2024-11-26 PROCEDURE — G8417 CALC BMI ABV UP PARAM F/U: HCPCS | Performed by: ANESTHESIOLOGY

## 2024-11-26 PROCEDURE — 3051F HG A1C>EQUAL 7.0%<8.0%: CPT | Performed by: ANESTHESIOLOGY

## 2024-11-26 ASSESSMENT — ENCOUNTER SYMPTOMS
NAUSEA: 0
BACK PAIN: 0
VOMITING: 0
SHORTNESS OF BREATH: 0
DIARRHEA: 0
CONSTIPATION: 1
COUGH: 0

## 2024-11-26 NOTE — PROGRESS NOTES
as needed for Pain 42 tablet 0     No current facility-administered medications for this visit.       Review of Systems   Constitutional:  Negative for chills and fever.   HENT:  Positive for congestion.    Respiratory:  Negative for cough and shortness of breath.    Cardiovascular:  Negative for chest pain and palpitations.   Gastrointestinal:  Positive for constipation. Negative for diarrhea, nausea and vomiting.   Genitourinary:  Negative for difficulty urinating and frequency.   Musculoskeletal:  Positive for neck pain. Negative for back pain.   Neurological:  Positive for headaches. Negative for dizziness.         Objective:  General Appearance:  Well-appearing and in no acute distress.    Vital signs: (most recent): Pulse 92, height 1.6 m (5' 3\"), weight 91.2 kg (201 lb), last menstrual period 11/26/2022, SpO2 97%, not currently breastfeeding.  Vital signs are normal.  No fever.    Output: Producing urine and producing stool.    HEENT: Normal HEENT exam.    Lungs:  Normal effort and normal respiratory rate.  She is not in respiratory distress.    Heart: Normal rate.    Extremities: Normal range of motion.  There is no deformity.    Neurological: Patient is alert and oriented to person, place and time.  Normal strength.  Patient has normal coordination.    Pupils:  Pupils are equal, round, and reactive to light.  Pupils are equal.   Skin:  Warm and dry.  No rash or cyanosis.     Assessment & Plan   This is a 53-year-old female with history of chronic neck pain  She had recent posterior cervical spine fusion  In past also had ACDF C4-C7     Following up today after cervical RFA  Right Cervical Medical branch nerve Radiofrequency ablation at C2/ C/3 C/4  nerves under fluoroscopy guidance     Reports > 50% improvement in pain and improved activity tolerance and ROM  Pain currently well-controlled  No intervention indicated at this time  Follow-up as needed basis    1. S/P lumbar laminectomy    2. S/P cervical

## 2024-11-27 NOTE — PROGRESS NOTES
[facility] Neurosurgery Telehealth Post-op Visit    Pt Name: Charisma Donnelly  MRN: [id number]  YOB: 1970  Date of evaluation: 2024  Primary Care Physician: Lucy Torres APRN - CNP  Reason for Evaluation: TELEHEALTH EVALUATION -- Audio/Visual     TELEHEALTH EVALUATION -- Audio/Visual  HPI:    Charisma Donnelly (:  1970) has requested an audio/video evaluation for the following concern(s):    Interscapular pain and S/P cervical spinal fusion (2024)    She had RF 3-6 on the right side which improved her Headaches and neck pain which started a few week after last visit.     Onset:  Provoking factors:  Palliative factors:    Prior to Visit Medications    Medication Sig Taking? Authorizing Provider   isosorbide mononitrate (IMDUR) 30 MG extended release tablet Take 1 tablet by mouth daily  Tasha Benitez APRN - CNP   psyllium (KONSYL) 28.3 % POWD powder Take 3.4 g by mouth daily  Kev Steen MD   ranolazine (RANEXA) 1000 MG extended release tablet Take 1 tablet by mouth 2 times daily  Lobo Parker DO   Atogepant (QULIPTA) 60 MG TABS Take 1 tablet by mouth daily  Lucy Torres APRN - CNP   Continuous Glucose Sensor (DEXCOM G7 SENSOR) MISC USE AS DIRECTED every 10 days  Lucy Torres APRN - CNP   Cholecalciferol (VITAMIN D3) 1.25 MG (02066 UT) CAPS Take 1 capsule by mouth once a week Weekly on Tuesday  Lucy Torres APRN - CNP   levothyroxine (SYNTHROID) 50 MCG tablet Take 1 tablet by mouth daily  Lucy Torres APRN - CNP   metoprolol succinate (TOPROL XL) 100 MG extended release tablet take 1 tablet by mouth twice a day  Lucy Torres APRN - CNP   Insulin Glargine, 2 Unit Dial, (TOUJEO MAX SOLOSTAR) 300 UNIT/ML SOPN Inject 30 Units into the skin daily (with breakfast)  Lucy Torres APRN - CNP   fezolinetant (VEOZAH) 45 MG TABS Take 1 tablet by mouth daily  Seal, Lucy, APRN - CNP   clopidogrel (PLAVIX) 75 MG tablet Take 1 tablet by mouth daily

## 2024-12-02 ENCOUNTER — TELEMEDICINE (OUTPATIENT)
Dept: NEUROSURGERY | Age: 54
End: 2024-12-02
Payer: COMMERCIAL

## 2024-12-02 DIAGNOSIS — Z98.1 S/P CERVICAL SPINAL FUSION: Primary | ICD-10-CM

## 2024-12-02 PROCEDURE — 3017F COLORECTAL CA SCREEN DOC REV: CPT | Performed by: NEUROLOGICAL SURGERY

## 2024-12-02 PROCEDURE — 99213 OFFICE O/P EST LOW 20 MIN: CPT | Performed by: NEUROLOGICAL SURGERY

## 2024-12-02 PROCEDURE — G8428 CUR MEDS NOT DOCUMENT: HCPCS | Performed by: NEUROLOGICAL SURGERY

## 2024-12-06 NOTE — CONSULTS
Ochoa Howe DO   [address]  [place]  [address]      PATIENT NAME: Danica Fernandez  : 1967  DATE: 24  MRN: [id number]      Billing Provider: Ochoa Howe DO  Level of Service:   Patient PCP Information       Provider PCP Type    Ochoa Howe DO General            Reason for Visit / Chief Complaint: [facility]       Update PCP  Update Chief Complaint         History of Present Illness / Problem Focused Workflow     Danica Fernandez presents to the clinic with [facility]     Patient is in for routine visits in follow-up.  She does have history of hypertension.  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  She denies any chest pain shortness in breath palpitations.  No PND or orthopnea.      Review of Systems     Review of Systems   Constitutional:  Negative for activity change, appetite change, chills, fatigue and fever.   HENT:  Negative for nasal congestion, ear discharge, ear pain, mouth dryness, mouth sores, postnasal drip, sinus pressure/congestion, sore throat and voice change.    Eyes:  Negative for pain, discharge, redness, itching and visual disturbance.   Respiratory:  Negative for apnea, cough, chest tightness, shortness of breath and wheezing.    Cardiovascular:  Negative for chest pain, palpitations and leg swelling.   Gastrointestinal:  Negative for abdominal distention, abdominal pain, anal bleeding, blood in stool, change in bowel habit, constipation, diarrhea, nausea, vomiting and reflux.   Endocrine: Negative for cold intolerance, heat intolerance, polydipsia, polyphagia and polyuria.   Genitourinary:  Negative for difficulty urinating, enuresis, frequency, genital sores, hematuria, hot flashes, menstrual irregularity, urgency and vaginal dryness.   Musculoskeletal:  Negative for arthralgias, back pain, gait problem, leg pain, myalgias and neck pain.   Integumentary:  Negative for rash, mole/lesion, breast mass,  Attestation signed by      Attending Physician Statement:      Additional Comments: Agree with below. Covid pneumonia. Treat per primary service. CAD. Last cath showed patent stents. ECG and Joe no acute changes. Continue current medications. [facility] Cardiology    Consult / H&P               Today's Date: 2022  Patient Name: Laurie Mercado  Date of admission: 2022 10:13 AM  Patient's age: 54 y.o., 1970  Admission Dx: Chest pain [R07.9]  NSTEMI (non-ST elevated myocardial infarction) ([facility] [address] [I21.4]    Reason for Consult:  Cardiac evaluation    Requesting Physician: Maris Wall MD    CHIEF COMPLAINT: Chest pain and SOB    History Obtained From: Chart review     HISTORY OF PRESENT ILLNESS:      28-year-old female with history of hypertension, diabetes, hypothyroidism, CAD s/p PCI to LAD 6 years ago presented with complaint of shortness of breath and some chest pain. Initially presented to [facility].  Was positive for COVID. Was transferred here for further management. ECG showed normal sinus rhythm with anterior infarct. Unchanged from previous EKG. Mildly elevated high-sensitivity troponin. Patient was tachycardic and hypertensive on arrival.  Complaining of right flank pain. Cardiology was consulted because of history of coronary artery disease and chest pain. Patient had cardiac cath done in 2021 which showed patent LAD stent. Past Medical History:   has a past medical history of Anxiety, Arthritis, CAD (coronary artery disease), Depression, Hyperlipidemia, Kidney stone, Langerhans-cell granulomatosis ([facility] [address] Myocardial infarct [facility]), Neck pain, and Spinal stenosis. Past Surgical History:   has a past surgical history that includes back surgery;  section; Mastoid surgery; Tonsillectomy; Cardiac catheterization; Lung biopsy (Right); hip surgery (Left); Colonoscopy (N/A, 2021); and Upper gastrointestinal endoscopy (2021).      Home Medications:    Prior to Admission medications    Medication Sig Start Date End Date Taking? Authorizing Provider   nirmatrelvir/ritonavir (PAXLOVID) 20 x 150 MG & 10 x 100MG Take 3 tablets (two 150 mg nirmatrelvir and one 100 mg ritonavir tablets) by mouth every 12 hours for 5 days. 7/7/22 7/12/22  Faustino Duff DO   dexamethasone (DECADRON) 6 MG tablet Take 1 tablet by mouth daily (with breakfast) for 10 days 7/7/22 7/17/22  Faustino Duff DO   HYDROcodone-acetaminophen (NORCO) 5-325 MG per tablet Take 1 tablet by mouth every 6 hours as needed for Pain for up to 3 days. Intended supply: 3 days. Take lowest dose possible to manage pain 7/7/22 7/10/22  Faustino Duff DO   busPIRone (BUSPAR) 15 MG tablet take 1 tablet by mouth twice a day 4/19/22   Historical Provider, MD   Dulaglutide (TRULICITY) 3 QS/3.0EI SOPN Inject 3 mg into the skin once a week 6/30/22   SAVANA Woody CNP   omeprazole (PRILOSEC) 40 MG delayed release capsule Take 1 capsule by mouth every morning (before breakfast) 6/30/22   SAVANA Woody CNP   insulin detemir (LEVEMIR FLEXTOUCH) 100 UNIT/ML injection pen Inject 26 Units into the skin nightly 5/25/22   SAVANA Woody CNP   Insulin Pen Needle 31G X 5 MM MISC Use pen needle for giving daily insulin injection 5/25/22   SAVANA Woody CNP   TRUE METRIX BLOOD GLUCOSE TEST strip use 1 TEST STRIP to TEST BLOOD SUGAR twice a day and if needed 4/19/22   SAVANA Woody CNP   TRUEplus Lancets 30G MISC use 1 LANCET to TEST BLOOD SUGAR twice a day and if needed 4/19/22   SAVANA Woody CNP   albuterol (PROVENTIL) (2.5 MG/3ML) 0.083% nebulizer solution Take 2.5 mg by nebulization daily as needed for Wheezing    Historical Provider, MD   blood glucose monitor kit and supplies Dispense sufficient amount for BID testing frequency plus additional to accommodate PRN testing needs.  Dispense all needed supplies to include: monitor, strips, lancing device, breast discharge and breast tenderness.   Allergic/Immunologic: Negative for environmental allergies and food allergies.   Neurological:  Negative for dizziness, vertigo, tremors, seizures, syncope, facial asymmetry, speech difficulty, weakness, light-headedness, numbness, headaches, memory loss and coordination difficulties.   Hematological:  Negative for adenopathy. Does not bruise/bleed easily.   Psychiatric/Behavioral:  Negative for agitation, behavioral problems, confusion, decreased concentration, dysphoric mood, hallucinations, self-injury, sleep disturbance and suicidal ideas. The patient is not nervous/anxious and is not hyperactive.    Breast: Negative for mass and tenderness      Medical / Social / Family History     Past Medical History:   Diagnosis Date    Adnexal mass 03/30/2022    Dr. Ochoa Vásquez- due to cancer    Hypertension     diet controlled    Primary ovarian adenocarcinoma, right 03/22/2022    Dr. 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 Ochoa Vásquez-due to cancer       Social History  Ms.  reports that she has never smoked. She has never been exposed to tobacco smoke. She has never used smokeless tobacco. She reports that she does not drink alcohol and does not use drugs.    Family History  Ms.'s family history includes Diabetes in her father and sister; Hypertension in her father and sister; Kidney disease in her maternal grandfather; No Known Problems in her brother, daughter, maternal grandmother, mother, paternal grandfather, paternal grandmother, and son.    Medications and Allergies     Medications  Outpatient Medications Marked as Taking for the 12/6/24  encounter (Office Visit) with Ochoa Howe, DO   Medication Sig Dispense Refill    loratadine (CLARITIN) 10 mg tablet Take 10 mg by mouth once daily.      mv-mn/folic ac/calcium/vit K1 (WOMEN'S 50 PLUS MULTIVITAMIN ORAL) Take 1 tablet by mouth once daily.      [DISCONTINUED] lisinopriL-hydrochlorothiazide (PRINZIDE,ZESTORETIC) 10-12.5 mg per tablet Take 1 tablet by mouth.         Allergies  Review of patient's allergies indicates:   Allergen Reactions    Lanolin Swelling    Paclitaxel Anxiety, Other (See Comments), Palpitations and Shortness Of Breath    Sulfa (sulfonamide antibiotics) Hives and Rash    Adhesive Dermatitis    Rocephin [ceftriaxone]      Reports she had severe bone pain       Physical Examination     Vitals:    12/06/24 0908   BP: 134/83   Pulse: 88   Resp: 18   Temp: 98 °F (36.7 °C)     Physical Exam  Vitals reviewed.   Constitutional:       General: She is not in acute distress.     Appearance: Normal appearance. She is obese.   HENT:      Head: Normocephalic and atraumatic.      Nose: Nose normal.      Mouth/Throat:      Mouth: Mucous membranes are moist.      Pharynx: Oropharynx is clear. No oropharyngeal exudate or posterior oropharyngeal erythema.   Eyes:      General: No scleral icterus.     Extraocular Movements: Extraocular movements intact.      Conjunctiva/sclera: Conjunctivae normal.      Pupils: Pupils are equal, round, and reactive to light.   Neck:      Vascular: No carotid bruit.   Cardiovascular:      Rate and Rhythm: Normal rate and regular rhythm.      Pulses: Normal pulses.      Heart sounds: Normal heart sounds. No murmur heard.     No gallop.   Pulmonary:      Effort: Pulmonary effort is normal.      Breath sounds: Normal breath sounds. No wheezing or rales.   Abdominal:      General: Abdomen is flat. Bowel sounds are normal.      Palpations: Abdomen is soft.      Tenderness: There is no abdominal tenderness.   Musculoskeletal:         General: Normal range of motion.       lancets, control solutions, alcohol swabs.  10/25/21   [place] SAVANA Jean CNP   tiZANidine (ZANAFLEX) 2 MG tablet take 1 tablet by mouth three times a day if needed 10/19/21   Feli Awan DO   nitroGLYCERIN (NITROSTAT) 0.4 MG SL tablet place 1 tablet under the tongue if needed every 5 minutes for chest pain for 3 doses IF NO RELIEF AFTER THIRD DOSE CALL PRESCRIBER . 10/19/21   Feli Awan DO   calcium carbonate (OS-BRANNON) 1250 (500 Ca) MG chewable tablet Take 500-1,000 mg by mouth 3 times daily 10/20/20   Historical Provider, MD   levothyroxine (SYNTHROID) 50 MCG tablet Take 1 tablet by mouth Daily 9/13/21 6/30/22  [place] SAVANA Jean CNP   metoprolol succinate (TOPROL XL) 100 MG extended release tablet take 1 tablet by mouth twice a day  Patient taking differently: 50 mg 2 times daily take 1 tablet by mouth twice a day 9/13/21   [place] SAVANA Jean CNP   sertraline (ZOLOFT) 100 MG tablet Take 1.5 tablets by mouth daily 9/13/21   [place] SAVANA Jean CNP   ranolazine (RANEXA) 500 MG extended release tablet Take 1 tablet by mouth 2 times daily 9/13/21 6/30/22  [place] SAVANA Jean CNP   atorvastatin (LIPITOR) 80 MG tablet Take 1 tablet by mouth daily 9/13/21 6/30/22  [place] SAVANA Jean CNP   clopidogrel (PLAVIX) 75 MG tablet Take 1 tablet by mouth daily 9/13/21 6/30/22  [place] SAVANA Jean CNP   mirabegron (MYRBETRIQ) 50 MG TB24 Take 50 mg by mouth daily 9/13/21   [place] SAVANA Jean CNP   topiramate (TOPAMAX) 50 MG tablet Take 1 tablet by mouth 2 times daily 9/13/21 6/30/22  [place] SAVANA Jean CNP   albuterol sulfate  (90 Base) MCG/ACT inhaler Inhale 2 puffs into the lungs every 4 hours as needed for Wheezing or Shortness of Breath 9/13/21   SAVANA Dewitt CNP   fluticasone (FLONASE) 50 MCG/ACT nasal spray 1 spray by Each Nostril route daily as needed for Rhinitis    Historical Provider, MD   Multiple Vitamins-Minerals (THERAPEUTIC MULTIVITAMIN-MINERALS) tablet Take 1 Cervical back: Normal range of motion and neck supple.      Right lower leg: No edema.      Left lower leg: No edema.   Lymphadenopathy:      Cervical: No cervical adenopathy.   Skin:     General: Skin is warm and dry.      Capillary Refill: Capillary refill takes less than 2 seconds.      Coloration: Skin is not jaundiced.      Findings: No lesion.   Neurological:      General: No focal deficit present.      Mental Status: She is alert and oriented to person, place, and time. Mental status is at baseline.      Cranial Nerves: No cranial nerve deficit.      Sensory: No sensory deficit.      Motor: No weakness.      Coordination: Coordination normal.      Gait: Gait normal.      Deep Tendon Reflexes: Reflexes normal.   Psychiatric:         Mood and Affect: Mood normal.         Behavior: Behavior normal.         Thought Content: Thought content normal.         Judgment: Judgment normal.               Lab Results   Component Value Date    WBC 5.07 12/06/2024    HGB 14.9 12/06/2024    HCT 45.5 12/06/2024    MCV 97.4 (H) 12/06/2024     12/06/2024          Sodium   Date Value Ref Range Status   12/06/2024 135 (L) 136 - 145 mmol/L Final     Potassium   Date Value Ref Range Status   12/06/2024 4.5 3.5 - 5.1 mmol/L Final     Chloride   Date Value Ref Range Status   12/06/2024 102 98 - 107 mmol/L Final     CO2   Date Value Ref Range Status   12/06/2024 27 22 - 29 mmol/L Final     Glucose   Date Value Ref Range Status   12/06/2024 218 (H) 74 - 100 mg/dL Final     BUN   Date Value Ref Range Status   12/06/2024 11 10 - 20 mg/dL Final     Creatinine   Date Value Ref Range Status   12/06/2024 0.76 0.55 - 1.02 mg/dL Final     Calcium   Date Value Ref Range Status   12/06/2024 9.4 8.4 - 10.2 mg/dL Final     Total Protein   Date Value Ref Range Status   12/06/2024 7.3 6.4 - 8.3 g/dL Final     Albumin   Date Value Ref Range Status   12/06/2024 4.0 3.5 - 5.0 g/dL Final     Bilirubin, Total   Date Value Ref Range Status    12/06/2024 0.7 <=1.5 mg/dL Final     Alk Phos   Date Value Ref Range Status   12/06/2024 88 40 - 150 U/L Final     AST   Date Value Ref Range Status   12/06/2024 36 (H) 5 - 34 U/L Final     ALT   Date Value Ref Range Status   12/06/2024 36 <=55 U/L Final     Anion Gap   Date Value Ref Range Status   12/06/2024 11 7 - 16 mmol/L Final     eGFR   Date Value Ref Range Status   02/08/2022 100 >=60 mL/min/1.73m² Final      Mammo Digital Screening Bilat w/ Goyo  Repeat in 1 year     Procedures   Lab Results   Component Value Date    CHOL 168 12/06/2024    CHOL 177 06/14/2023    CHOL 189 12/13/2022     Lab Results   Component Value Date    HDL 40 12/06/2024    HDL 51 06/14/2023    HDL 47 12/13/2022     Lab Results   Component Value Date    LDLCALC 106 12/06/2024    LDLCALC 91 06/14/2023    LDLCALC 122 12/13/2022     Lab Results   Component Value Date    TRIG 110 12/06/2024    TRIG 174 (H) 06/14/2023    TRIG 100 12/13/2022     Lab Results   Component Value Date    CHOLHDL 4.2 12/06/2024    CHOLHDL 3.5 06/14/2023    CHOLHDL 4.0 12/13/2022      Lab Results   Component Value Date    HGBA1C 4.8 12/13/2022      Lab Results   Component Value Date    TSH 3.070 11/10/2023    [id number] 6.5 11/10/2023      Assessment and Plan (including Health Maintenance)      Problem List  Smart Sets  Document Outside HM   :    Plan:         Health Maintenance Due   Topic Date Due    COVID-19 Vaccine (1) Never done    TETANUS VACCINE  Never done    Shingles Vaccine (1 of 2) Never done    Pneumococcal Vaccines (Age 50+) (1 of 2 - PCV) Never done    Colorectal Cancer Screening  Never done       Problem List Items Addressed This Visit       Hypertension - Primary    Overview   diet controlled         Current Assessment & Plan   Blood pressure elevation likely related to his upper respiratory infection.  No changes in treatment at this time.  Will check CBC his CMP on the patient follow-up in 3 months.         Relevant Medications     PRN  acetaminophen (TYLENOL) tablet 650 mg, 650 mg, Oral, Q6H PRN **OR** acetaminophen (TYLENOL) suppository 650 mg, 650 mg, Rectal, Q6H PRN  insulin lispro (HUMALOG) injection vial 0-12 Units, 0-12 Units, SubCUTAneous, 4x Daily AC & HS  glucose chewable tablet 16 g, 4 tablet, Oral, PRN  dextrose bolus 10% 125 mL, 125 mL, IntraVENous, PRN **OR** dextrose bolus 10% 250 mL, 250 mL, IntraVENous, PRN  glucagon (rDNA) injection 1 mg, 1 mg, IntraMUSCular, PRN  dextrose 5 % solution, 100 mL/hr, IntraVENous, PRN  trospium (SANCTURA) tablet 20 mg, 20 mg, Oral, BID AC  atorvastatin (LIPITOR) tablet 80 mg, 80 mg, Oral, Daily  aspirin chewable tablet 81 mg, 81 mg, Oral, Daily  clopidogrel (PLAVIX) tablet 75 mg, 75 mg, Oral, Daily  levothyroxine (SYNTHROID) tablet 50 mcg, 50 mcg, Oral, Daily  insulin glargine (LANTUS) injection vial 15 Units, 15 Units, SubCUTAneous, Nightly  HYDROcodone-acetaminophen (NORCO) 5-325 MG per tablet 1 tablet, 1 tablet, Oral, Q6H PRN    Allergies:  Seasonal and Keflex [cephalexin]    Social History:   reports that she has quit smoking. Her smoking use included cigarettes. She has a 12.50 pack-year smoking history. She has never used smokeless tobacco. She reports current alcohol use. She reports current drug use. Frequency: 7.00 times per week. Drug: Marijuana Jimenez Bakari). Family History: family history includes Alzheimer's Disease in her paternal grandfather; COPD in her mother; Coronary Art Dis in her mother; Dementia in her mother; Depression in her mother; Diabetes in her maternal grandmother and mother; Heart Attack in her father; High Blood Pressure in her mother. No h/o sudden cardiac death. No for premature CAD    PHYSICAL EXAM:      BP (!) 155/99   Pulse 88   Temp 98.4 °F (36.9 °C) (Oral)   Resp 13   LMP 06/23/2022   SpO2 97%    Labs:     CBC:   Recent Labs     07/07/22 2054 07/08/22  1045   WBC 9.5 7.6   HGB 12.4 12.1   HCT 40.5 36.8   * 459*     BMP:   Recent Labs 07/07/22 2054 07/08/22  1045    135   K 4.1 4.4   CO2 22 18*   BUN 10 11   CREATININE 0.74 0.56   LABGLOM >60 >60   GLUCOSE 208* 220*     BNP: No results for input(s): BNP in the last 72 hours. PT/INR: No results for input(s): PROTIME, INR in the last 72 hours. APTT:No results for input(s): APTT in the last 72 hours. CARDIAC ENZYMES:No results for input(s): CKTOTAL, CKMB, CKMBINDEX, TROPONINI in the last 72 hours. FASTING LIPID PANEL:  Lab Results   Component Value Date/Time    HDL 33 01/01/2022 07:02 AM    TRIG 121 01/01/2022 07:02 AM     LIVER PROFILE:No results for input(s): AST, ALT, LABALBU in the last 72 hours. IMPRESSION:    Patient Active Problem List   Diagnosis    Hypothyroidism    Type 2 diabetes mellitus without complication, with long-term current use of insulin ([address]    Essential hypertension    Hip fracture ([address]    Spinal stenosis of cervical region    Ground glass opacity present on imaging of lung    Hx of deep venous thrombosis    Situational depression    Anxiety    Pulmonary Langerhans cell granulomatosis ([address]    Mixed hyperlipidemia    Coronary artery disease involving native heart with angina pectoris ([address]    Migraine without status migrainosus, not intractable    OAB (overactive bladder)    Smoker    Irritable bowel syndrome with constipation    Irritable bowel syndrome with both constipation and diarrhea    Positive colorectal cancer screening using Cologuard test    Gastroesophageal reflux disease with esophagitis without hemorrhage    Gastroesophageal reflux disease    Chest pain    NSTEMI (non-ST elevated myocardial infarction) ([address]    COVID-19       Cath 07/2021 Coronary Findings Diagnostic Dominance: Right   Left Main: The vessel exhibits minimal luminal irregularities. Left Anterior Descending: The vessel exhibits minimal luminal irregularities. patent mid LAD stent   Left Circumflex: The vessel exhibits minimal luminal irregularities.    Right lisinopriL-hydrochlorothiazide (PRINZIDE,ZESTORETIC) 10-12.5 mg per tablet    Hyperlipidemia    Current Assessment & Plan   Lab Results   Component Value Date    CHOL 168 12/06/2024    CHOL 177 06/14/2023    CHOL 189 12/13/2022     Lab Results   Component Value Date    HDL 40 12/06/2024    HDL 51 06/14/2023    HDL 47 12/13/2022     Lab Results   Component Value Date    LDLCALC 106 12/06/2024    LDLCALC 91 06/14/2023    LDLCALC 122 12/13/2022     Lab Results   Component Value Date    TRIG 110 12/06/2024    TRIG 174 (H) 06/14/2023    TRIG 100 12/13/2022       Lab Results   Component Value Date    CHOLHDL 4.2 12/06/2024    CHOLHDL 3.5 06/14/2023    CHOLHDL 4.0 12/13/2022    Lipids still elevated.  Again reinforced low-fat diet.  Continue medications.  Follow-up in 3 months         Macrocytic anemia    Current Assessment & Plan   Persistent macrocytic anemia.  Will check a B12 folate as well as a methylmalonic acid and homocystine levels.  No change in medicines at this time.         Relevant Orders    Vitamin B12 & Folate (Completed)    Methylmalonic Acid, Serum (Completed)    Homocysteine, Serum (Completed)    Routine general medical examination at a health care facility    Current Assessment & Plan   General physical exam was blood pressure slightly elevated but is likely related OTC cough congestion medicines.  Continue his current blood pressure medicines.  CBC CMP HIV and hepatitis-C were drawn.  Follow-up yearly         Relevant Orders    Lipid Panel (Completed)    CBC Auto Differential (Completed)    Comprehensive Metabolic Panel (Completed)     Other Visit Diagnoses         Screening for colon cancer        Relevant Orders    Colonoscopy      Encounter for screening for HIV        Relevant Orders    HIV 1/2 Ag/Ab (4th Gen) (Completed)      Need for hepatitis C screening test        Relevant Orders    Hepatitis C Antibody (Completed)            Health Maintenance Topics with due status: Not Due       Topic Last  Completion Date    Hemoglobin A1c (Diabetic Prevention Screening) 12/13/2022    Lipid Panel 12/06/2024    Mammogram 12/13/2024    RSV Vaccine (Age 60+ and Pregnant patients) Not Due       Future Appointments   Date Time Provider Department Center   6/17/2025  8:00 AM [facility] ROOM 02 [facility]   12/12/2025  9:20 AM Ochoa Howe DO [facility]        No follow-ups on file.     Signature:  DO [facility]  [address] [place] [address], [place]  [id number]    Date of encounter: 12/6/24

## 2024-12-17 RX ORDER — ACYCLOVIR 400 MG/1
TABLET ORAL
Qty: 1 EACH | Refills: 3 | Status: SHIPPED | OUTPATIENT
Start: 2024-12-17

## 2024-12-17 NOTE — TELEPHONE ENCOUNTER
Charisma Donnelly is calling to request a refill on the following medication(s):    Medication Request:  Requested Prescriptions     Pending Prescriptions Disp Refills    Continuous Glucose Sensor (DEXCOM G7 SENSOR) MISC [Pharmacy Med Name: DEXCOM G7 SENSOR] 1 each 3     Sig: USE AS DIRECTED EVERY 10 DAYS       Last Visit Date (If Applicable):  9/25/2024    Next Visit Date:    2/4/2025

## 2024-12-23 DIAGNOSIS — E11.65 TYPE 2 DIABETES MELLITUS WITH HYPERGLYCEMIA, WITH LONG-TERM CURRENT USE OF INSULIN (HCC): ICD-10-CM

## 2024-12-23 DIAGNOSIS — Z79.4 TYPE 2 DIABETES MELLITUS WITH HYPERGLYCEMIA, WITH LONG-TERM CURRENT USE OF INSULIN (HCC): ICD-10-CM

## 2024-12-23 RX ORDER — INSULIN GLARGINE 300 U/ML
30 INJECTION, SOLUTION SUBCUTANEOUS
Qty: 3 EACH | Refills: 1 | Status: SHIPPED | OUTPATIENT
Start: 2024-12-23

## 2024-12-23 NOTE — TELEPHONE ENCOUNTER
Charisma Donnelly is calling to request a refill on the following medication(s):    Medication Request:  Requested Prescriptions     Pending Prescriptions Disp Refills    Insulin Glargine, 2 Unit Dial, (TOUJEO MAX SOLOSTAR) 300 UNIT/ML concentrated injection pen [Pharmacy Med Name: TOUJEO MAX SOLOSTR 300 UNIT/ML] 3 each 1     Sig: INJECT 30 UNITS INTO THE SKIN DAILY (WITH BREAKFAST)       Last Visit Date (If Applicable):  9/25/2024    Next Visit Date:    12/27/2024

## 2024-12-27 ENCOUNTER — OFFICE VISIT (OUTPATIENT)
Dept: FAMILY MEDICINE CLINIC | Age: 54
End: 2024-12-27
Payer: COMMERCIAL

## 2024-12-27 VITALS
HEIGHT: 63 IN | WEIGHT: 201.8 LBS | TEMPERATURE: 97.2 F | OXYGEN SATURATION: 99 % | RESPIRATION RATE: 13 BRPM | BODY MASS INDEX: 35.75 KG/M2 | SYSTOLIC BLOOD PRESSURE: 180 MMHG | DIASTOLIC BLOOD PRESSURE: 100 MMHG | HEART RATE: 105 BPM

## 2024-12-27 DIAGNOSIS — K21.00 GASTROESOPHAGEAL REFLUX DISEASE WITH ESOPHAGITIS WITHOUT HEMORRHAGE: ICD-10-CM

## 2024-12-27 DIAGNOSIS — L98.9 SKIN LESION: ICD-10-CM

## 2024-12-27 DIAGNOSIS — K31.84 GASTROPARESIS: ICD-10-CM

## 2024-12-27 DIAGNOSIS — I10 ESSENTIAL HYPERTENSION: ICD-10-CM

## 2024-12-27 DIAGNOSIS — R11.0 NAUSEA: ICD-10-CM

## 2024-12-27 DIAGNOSIS — B37.2 YEAST INFECTION OF THE SKIN: Primary | ICD-10-CM

## 2024-12-27 PROCEDURE — 3080F DIAST BP >= 90 MM HG: CPT | Performed by: NURSE PRACTITIONER

## 2024-12-27 PROCEDURE — G8417 CALC BMI ABV UP PARAM F/U: HCPCS | Performed by: NURSE PRACTITIONER

## 2024-12-27 PROCEDURE — G8484 FLU IMMUNIZE NO ADMIN: HCPCS | Performed by: NURSE PRACTITIONER

## 2024-12-27 PROCEDURE — 3017F COLORECTAL CA SCREEN DOC REV: CPT | Performed by: NURSE PRACTITIONER

## 2024-12-27 PROCEDURE — 4004F PT TOBACCO SCREEN RCVD TLK: CPT | Performed by: NURSE PRACTITIONER

## 2024-12-27 PROCEDURE — G8427 DOCREV CUR MEDS BY ELIG CLIN: HCPCS | Performed by: NURSE PRACTITIONER

## 2024-12-27 PROCEDURE — 3077F SYST BP >= 140 MM HG: CPT | Performed by: NURSE PRACTITIONER

## 2024-12-27 PROCEDURE — 99214 OFFICE O/P EST MOD 30 MIN: CPT | Performed by: NURSE PRACTITIONER

## 2024-12-27 RX ORDER — ONDANSETRON 4 MG/1
4 TABLET, FILM COATED ORAL EVERY 6 HOURS PRN
Qty: 90 TABLET | Refills: 1 | Status: SHIPPED | OUTPATIENT
Start: 2024-12-27

## 2024-12-27 RX ORDER — PANTOPRAZOLE SODIUM 40 MG/1
40 TABLET, DELAYED RELEASE ORAL DAILY
Qty: 90 TABLET | Refills: 2 | Status: SHIPPED | OUTPATIENT
Start: 2024-12-27 | End: 2025-09-23

## 2024-12-27 RX ORDER — NYSTATIN 100000 [USP'U]/G
POWDER TOPICAL
Qty: 60 G | Refills: 0 | Status: SHIPPED | OUTPATIENT
Start: 2024-12-27

## 2024-12-27 RX ORDER — NYSTATIN 100000 U/G
CREAM TOPICAL
Qty: 30 G | Refills: 0 | Status: SHIPPED | OUTPATIENT
Start: 2024-12-27

## 2024-12-27 SDOH — ECONOMIC STABILITY: FOOD INSECURITY: WITHIN THE PAST 12 MONTHS, THE FOOD YOU BOUGHT JUST DIDN'T LAST AND YOU DIDN'T HAVE MONEY TO GET MORE.: NEVER TRUE

## 2024-12-27 SDOH — ECONOMIC STABILITY: FOOD INSECURITY: WITHIN THE PAST 12 MONTHS, YOU WORRIED THAT YOUR FOOD WOULD RUN OUT BEFORE YOU GOT MONEY TO BUY MORE.: NEVER TRUE

## 2024-12-27 SDOH — ECONOMIC STABILITY: INCOME INSECURITY: HOW HARD IS IT FOR YOU TO PAY FOR THE VERY BASICS LIKE FOOD, HOUSING, MEDICAL CARE, AND HEATING?: PATIENT DECLINED

## 2024-12-27 NOTE — PROGRESS NOTES
Lucy Torres, [facility], [facility]  [address]  [place]  [address]  [phone]  [phone]    Charisma Donnelly is a 54 y.o. female who is here with c/o of:    Chief Complaint: Lesion(s) (Lesion on right arm X'S 1 WEEK  now is under breast and belly sore to the touch/HAS NOT TAKEN HER BP MEDS TODAY)      Patient Accompanied by: n/a    HPI - Charisma Donnelly is here today with c/o:    History of Present Illness  The patient is a 54-year-old female who presents with complaints of a rash.    She reports the sudden appearance of a spot on her skin, which she describes as painful and itchy. She has been experiencing rashes under her abdomen, which she believes resemble Langerhans' skin disease based on her online research. She has previously consulted a dermatologist, but due to insurance issues, she had to bear the cost out-of-pocket. The dermatologist did not perform a biopsy on the spots she requested. She has been applying deodorant and powder to the affected areas. Additionally, she reports itching along her spine and across her shoulders, although she is unable to visually confirm the presence of any rash in these areas. She recalls that these areas were previously red and scaly, but have since become dormant and turned brown. She also mentions that the rash appears to be oozing a clear liquid and has become extremely scaly and itchy.    She did not take her blood pressure medication this morning. She monitors her blood pressure at home, which is usually normal. She has been under a lot of stress in the last couple of years.       Health Maintenance Due   Topic Date Due    Hepatitis B vaccine (1 of 3 - 19+ 3-dose series) Never done    Diabetic foot exam  06/19/2020    Lung Cancer Screening &/or Counseling  12/23/2022    Flu vaccine (1) 08/01/2024    Diabetic Alb to Cr ratio (uACR) test  08/02/2024    Lipids  08/02/2024    COVID-19 Vaccine (3 - 2023-24 season) 09/01/2024    Diabetic

## 2024-12-29 ENCOUNTER — HOSPITAL ENCOUNTER (EMERGENCY)
Age: 54
Discharge: LEFT BEFORE DISCHARGE | End: 2024-12-29
Attending: EMERGENCY MEDICINE
Payer: COMMERCIAL

## 2024-12-29 ENCOUNTER — APPOINTMENT (OUTPATIENT)
Dept: GENERAL RADIOLOGY | Age: 54
End: 2024-12-29
Payer: COMMERCIAL

## 2024-12-29 VITALS
SYSTOLIC BLOOD PRESSURE: 151 MMHG | RESPIRATION RATE: 20 BRPM | DIASTOLIC BLOOD PRESSURE: 104 MMHG | TEMPERATURE: 98.4 F | HEIGHT: 63 IN | HEART RATE: 118 BPM | BODY MASS INDEX: 35.61 KG/M2 | WEIGHT: 201 LBS | OXYGEN SATURATION: 99 %

## 2024-12-29 DIAGNOSIS — U07.1 COVID-19: Primary | ICD-10-CM

## 2024-12-29 LAB
ANION GAP SERPL CALCULATED.3IONS-SCNC: 13 MMOL/L (ref 9–16)
BASOPHILS # BLD: <0.03 K/UL (ref 0–0.2)
BASOPHILS NFR BLD: 0 % (ref 0–2)
BUN SERPL-MCNC: 10 MG/DL (ref 6–20)
CALCIUM SERPL-MCNC: 9.2 MG/DL (ref 8.6–10.4)
CHLORIDE SERPL-SCNC: 106 MMOL/L (ref 98–107)
CO2 SERPL-SCNC: 22 MMOL/L (ref 20–31)
CREAT SERPL-MCNC: 0.7 MG/DL (ref 0.5–0.9)
EOSINOPHIL # BLD: 0.23 K/UL (ref 0–0.44)
EOSINOPHILS RELATIVE PERCENT: 3 % (ref 1–4)
ERYTHROCYTE [DISTWIDTH] IN BLOOD BY AUTOMATED COUNT: 13.5 % (ref 11.8–14.4)
GFR, ESTIMATED: >90 ML/MIN/1.73M2
GLUCOSE SERPL-MCNC: 159 MG/DL (ref 74–99)
HCT VFR BLD AUTO: 45.8 % (ref 36.3–47.1)
HGB BLD-MCNC: 15.7 G/DL (ref 11.9–15.1)
IMM GRANULOCYTES # BLD AUTO: 0.01 K/UL (ref 0–0.3)
IMM GRANULOCYTES NFR BLD: 0 %
LYMPHOCYTES NFR BLD: 3.81 K/UL (ref 1.1–3.7)
LYMPHOCYTES RELATIVE PERCENT: 45 % (ref 24–43)
MCH RBC QN AUTO: 32.1 PG (ref 25.2–33.5)
MCHC RBC AUTO-ENTMCNC: 34.3 G/DL (ref 28.4–34.8)
MCV RBC AUTO: 93.7 FL (ref 82.6–102.9)
MONOCYTES NFR BLD: 1.03 K/UL (ref 0.1–1.2)
MONOCYTES NFR BLD: 12 % (ref 3–12)
NEUTROPHILS NFR BLD: 40 % (ref 36–65)
NEUTS SEG NFR BLD: 3.45 K/UL (ref 1.5–8.1)
NRBC BLD-RTO: 0 PER 100 WBC
PLATELET # BLD AUTO: 292 K/UL (ref 138–453)
PMV BLD AUTO: 9.5 FL (ref 8.1–13.5)
POTASSIUM SERPL-SCNC: 3.8 MMOL/L (ref 3.7–5.3)
RBC # BLD AUTO: 4.89 M/UL (ref 3.95–5.11)
SODIUM SERPL-SCNC: 141 MMOL/L (ref 136–145)
TROPONIN I SERPL HS-MCNC: 16 NG/L (ref 0–14)
WBC OTHER # BLD: 8.6 K/UL (ref 3.5–11.3)

## 2024-12-29 PROCEDURE — 84484 ASSAY OF TROPONIN QUANT: CPT

## 2024-12-29 PROCEDURE — 99285 EMERGENCY DEPT VISIT HI MDM: CPT

## 2024-12-29 PROCEDURE — 85025 COMPLETE CBC W/AUTO DIFF WBC: CPT

## 2024-12-29 PROCEDURE — 93005 ELECTROCARDIOGRAM TRACING: CPT | Performed by: EMERGENCY MEDICINE

## 2024-12-29 PROCEDURE — 71046 X-RAY EXAM CHEST 2 VIEWS: CPT

## 2024-12-29 PROCEDURE — 80048 BASIC METABOLIC PNL TOTAL CA: CPT

## 2024-12-29 ASSESSMENT — PAIN DESCRIPTION - DESCRIPTORS: DESCRIPTORS_2: ACHING

## 2024-12-29 ASSESSMENT — PAIN DESCRIPTION - PAIN TYPE: TYPE: ACUTE PAIN

## 2024-12-29 ASSESSMENT — PAIN DESCRIPTION - LOCATION
LOCATION_2: GENERALIZED
LOCATION: CHEST

## 2024-12-29 ASSESSMENT — LIFESTYLE VARIABLES
HOW OFTEN DO YOU HAVE A DRINK CONTAINING ALCOHOL: MONTHLY OR LESS
HOW MANY STANDARD DRINKS CONTAINING ALCOHOL DO YOU HAVE ON A TYPICAL DAY: 1 OR 2

## 2024-12-29 ASSESSMENT — PAIN - FUNCTIONAL ASSESSMENT: PAIN_FUNCTIONAL_ASSESSMENT: 0-10

## 2024-12-29 ASSESSMENT — PAIN SCALES - GENERAL: PAINLEVEL_OUTOF10: 6

## 2024-12-29 ASSESSMENT — PAIN DESCRIPTION - ORIENTATION: ORIENTATION: LEFT

## 2024-12-29 ASSESSMENT — PAIN DESCRIPTION - INTENSITY: RATING_2: 6

## 2024-12-29 NOTE — ED PROVIDER NOTES
[facility]  [address] [address]  Dept: [phone]  Loc: [phone]  eMERGENCYdEPARTMENT eNCOUnter      Pt Name: Charisma Donnelly  MRN: [id number]  Birthdate 1970of evaluation: 12/29/2024  Provider:SAVANA PABLO - CNP    CHIEF COMPLAINT       Chief Complaint   Patient presents with    Cough    Pharyngitis    Chest Pain    Chills    Shortness of Breath    Positive For Covid-19       HISTORY OF PRESENT ILLNESS  (Location/Symptom, Timing/Onset, Context/Setting, Quality, Duration,Modifying Factors, Severity.)   Charisma Donnelly is a 54 y.o. female who presents to the emergency department with complaints of cough, generalized bodyaches, fatigue, chest pain and shortness of breath.  She took a home COVID test which was positive.  She is concerned because she has underlying lung disease.  She is concerned that she may have pneumonia.  Patient reports occasional nausea but no vomiting or diarrhea.  No dizziness or lightheadedness.      Nursing Notes were reviewedand agreed with or any disagreements were addressed in the HPI.    REVIEW OF SYSTEMS    (2-9 systems for level 4, 10 or more for level 5)     Review of Systems   HENT:  Positive for congestion and sore throat.    Respiratory:  Positive for cough and shortness of breath.        Except as noted above the remainder of the review of systems was reviewed and negative.       PAST MEDICAL HISTORY         Diagnosis Date    Abnormal uterine bleeding (AUB)     fibroid    Anxiety     Arthritis     CAD (coronary artery disease)     stent  LAD    Chronic neck pain     COVID-19 07/2022    fever, slight cough, fatigue,lost taste,  admitted for couple days bc of elevated troponins    Depression     pcp manages    Diabetes mellitus (HCC) 2017    managed by PCP    Fatty liver     Gastroparesis     Histiocytosis (HCC)     [place] (hard of hearing)     needs hearing aids but doesn't have them, Seems to hear

## 2024-12-29 NOTE — ED PROVIDER NOTES
EMERGENCY DEPARTMENT ENCOUNTER   ATTENDING ATTESTATION     Pt Name: Charisma Donnelly  MRN: [id number]  Birthdate 1970  Date of evaluation: 12/29/24   Charisma Donnelly is a 54 y.o. female with CC: Cough, Pharyngitis, Chest Pain, Chills, Shortness of Breath, and Positive For Covid-19    MDM:   I performed a substantive part of the MDM during the patient's E/M visit. I personally evaluated and examined the patient. I personally made or approved the documented management plan and acknowledge its risk of complications.    Independent Interpretation: My (EKG/X-Ray/US/CT) interpretation     Discussion: Management/test interpretation discussed with        CRITICAL CARE:       EKG: All EKG's are interpreted by the Emergency Department Physician who either signs or Co-signs this chart in the absence of a cardiologist.      RADIOLOGY:All plain film, CT, MRI, and formal ultrasound images (except ED bedside ultrasound) are read by the radiologist, see reports below, unless otherwise noted in MDM or here.  XR CHEST (2 VW)   Final Result   No acute airspace disease identified.           LABS: All lab results were reviewed by myself, and all abnormals are listed below.  Labs Reviewed   CBC WITH AUTO DIFFERENTIAL - Abnormal; Notable for the following components:       Result Value    Hemoglobin 15.7 (*)     Lymphocytes % 45 (*)     Lymphocytes Absolute 3.81 (*)     All other components within normal limits   BASIC METABOLIC PANEL - Abnormal; Notable for the following components:    Glucose 159 (*)     All other components within normal limits   TROPONIN - Abnormal; Notable for the following components:    Troponin, High Sensitivity 16 (*)     All other components within normal limits     CONSULTS:  None  FINAL IMPRESSION      1. COVID-19            PASTMEDICAL HISTORY     Past Medical History:   Diagnosis Date    Abnormal uterine bleeding (AUB)     fibroid    Anxiety     Arthritis     CAD (coronary artery disease)     stent  LAD

## 2024-12-31 LAB
EKG ATRIAL RATE: 106 BPM
EKG P AXIS: 40 DEGREES
EKG P-R INTERVAL: 136 MS
EKG Q-T INTERVAL: 372 MS
EKG QRS DURATION: 98 MS
EKG QTC CALCULATION (BAZETT): 494 MS
EKG R AXIS: 42 DEGREES
EKG T AXIS: 57 DEGREES
EKG VENTRICULAR RATE: 106 BPM

## 2025-01-13 ENCOUNTER — TELEPHONE (OUTPATIENT)
Age: 55
End: 2025-01-13

## 2025-01-13 NOTE — TELEPHONE ENCOUNTER
Called patient and rescheduled 2/6/25 appt with Tasha NP appointment at 3 pm due to Dr. Martinez being out of office.    Patient voiced understanding.

## 2025-01-19 DIAGNOSIS — J30.2 SEASONAL ALLERGIC RHINITIS, UNSPECIFIED TRIGGER: ICD-10-CM

## 2025-01-20 RX ORDER — FLUTICASONE PROPIONATE 50 MCG
1 SPRAY, SUSPENSION (ML) NASAL DAILY PRN
Qty: 1 EACH | Refills: 2 | Status: SHIPPED | OUTPATIENT
Start: 2025-01-20

## 2025-01-20 NOTE — TELEPHONE ENCOUNTER
Charisma Donnelly is calling to request a refill on the following medication(s):    Medication Request:  Requested Prescriptions     Pending Prescriptions Disp Refills    fluticasone (FLONASE) 50 MCG/ACT nasal spray [Pharmacy Med Name: FLUTICASONE PROP 50 MCG SPRAY] 1 each 2     Si SPRAY BY EACH NOSTRIL ROUTE DAILY AS NEEDED FOR RHINITIS       Last Visit Date (If Applicable):  2024    Next Visit Date:    2025

## 2025-02-01 ENCOUNTER — HOSPITAL ENCOUNTER (EMERGENCY)
Age: 55
Discharge: HOME | End: 2025-02-01
Attending: STUDENT IN AN ORGANIZED HEALTH CARE EDUCATION/TRAINING PROGRAM
Payer: COMMERCIAL

## 2025-02-01 VITALS
BODY MASS INDEX: 36.55 KG/M2 | WEIGHT: 206.35 LBS | DIASTOLIC BLOOD PRESSURE: 90 MMHG | HEART RATE: 99 BPM | TEMPERATURE: 99.1 F | SYSTOLIC BLOOD PRESSURE: 163 MMHG | OXYGEN SATURATION: 99 % | RESPIRATION RATE: 17 BRPM

## 2025-02-01 DIAGNOSIS — M54.16 LUMBAR RADICULOPATHY: Primary | ICD-10-CM

## 2025-02-01 LAB
BILIRUB UR QL STRIP: NEGATIVE
CLARITY UR: CLEAR
COLOR UR: YELLOW
COMMENT: ABNORMAL
GLUCOSE UR STRIP-MCNC: ABNORMAL MG/DL
HGB UR QL STRIP.AUTO: NEGATIVE
KETONES UR STRIP-MCNC: NEGATIVE MG/DL
LEUKOCYTE ESTERASE UR QL STRIP: NEGATIVE
NITRITE UR QL STRIP: NEGATIVE
PH UR STRIP: 5.5 [PH] (ref 5–8)
PROT UR STRIP-MCNC: NEGATIVE MG/DL
SP GR UR STRIP: 1.02 (ref 1–1.03)
UROBILINOGEN UR STRIP-ACNC: NORMAL EU/DL (ref 0–1)

## 2025-02-01 PROCEDURE — 51798 US URINE CAPACITY MEASURE: CPT

## 2025-02-01 PROCEDURE — 81003 URINALYSIS AUTO W/O SCOPE: CPT

## 2025-02-01 PROCEDURE — 6370000000 HC RX 637 (ALT 250 FOR IP)

## 2025-02-01 PROCEDURE — 96372 THER/PROPH/DIAG INJ SC/IM: CPT

## 2025-02-01 PROCEDURE — 99284 EMERGENCY DEPT VISIT MOD MDM: CPT

## 2025-02-01 PROCEDURE — 6360000002 HC RX W HCPCS

## 2025-02-01 RX ORDER — LIDOCAINE 4 G/G
1 PATCH TOPICAL DAILY
Status: DISCONTINUED | OUTPATIENT
Start: 2025-02-01 | End: 2025-02-01 | Stop reason: HOSPADM

## 2025-02-01 RX ORDER — LIDOCAINE 4 G/G
1 PATCH TOPICAL DAILY
Qty: 30 PATCH | Refills: 0 | Status: SHIPPED | OUTPATIENT
Start: 2025-02-01 | End: 2025-03-03

## 2025-02-01 RX ORDER — PREDNISONE 50 MG/1
50 TABLET ORAL DAILY
Qty: 5 TABLET | Refills: 0 | Status: SHIPPED | OUTPATIENT
Start: 2025-02-01 | End: 2025-02-06

## 2025-02-01 RX ORDER — MORPHINE SULFATE 4 MG/ML
4 INJECTION INTRAVENOUS ONCE
Status: COMPLETED | OUTPATIENT
Start: 2025-02-01 | End: 2025-02-01

## 2025-02-01 RX ORDER — OXYCODONE HYDROCHLORIDE 5 MG/1
5 TABLET ORAL EVERY 6 HOURS PRN
Qty: 12 TABLET | Refills: 0 | Status: SHIPPED | OUTPATIENT
Start: 2025-02-01 | End: 2025-02-04

## 2025-02-01 RX ORDER — CYCLOBENZAPRINE HCL 10 MG
10 TABLET ORAL 3 TIMES DAILY PRN
Qty: 21 TABLET | Refills: 0 | Status: SHIPPED | OUTPATIENT
Start: 2025-02-01 | End: 2025-02-11

## 2025-02-01 RX ORDER — CYCLOBENZAPRINE HCL 10 MG
10 TABLET ORAL ONCE
Status: COMPLETED | OUTPATIENT
Start: 2025-02-01 | End: 2025-02-01

## 2025-02-01 RX ORDER — KETOROLAC TROMETHAMINE 30 MG/ML
30 INJECTION, SOLUTION INTRAMUSCULAR; INTRAVENOUS ONCE
Status: COMPLETED | OUTPATIENT
Start: 2025-02-01 | End: 2025-02-01

## 2025-02-01 RX ADMIN — MORPHINE SULFATE 4 MG: 4 INJECTION INTRAVENOUS at 10:56

## 2025-02-01 RX ADMIN — MORPHINE SULFATE 4 MG: 4 INJECTION INTRAVENOUS at 10:02

## 2025-02-01 RX ADMIN — CYCLOBENZAPRINE 10 MG: 10 TABLET, FILM COATED ORAL at 10:02

## 2025-02-01 RX ADMIN — KETOROLAC TROMETHAMINE 30 MG: 30 INJECTION, SOLUTION INTRAMUSCULAR; INTRAVENOUS at 10:02

## 2025-02-01 ASSESSMENT — ENCOUNTER SYMPTOMS
ABDOMINAL PAIN: 0
SHORTNESS OF BREATH: 0
BACK PAIN: 1
VOMITING: 0
COUGH: 0
NAUSEA: 0
DIARRHEA: 0

## 2025-02-01 ASSESSMENT — PAIN SCALES - GENERAL
PAINLEVEL_OUTOF10: 8
PAINLEVEL_OUTOF10: 10
PAINLEVEL_OUTOF10: 10

## 2025-02-01 ASSESSMENT — PAIN DESCRIPTION - LOCATION
LOCATION: BACK
LOCATION: BACK

## 2025-02-01 ASSESSMENT — PAIN DESCRIPTION - ORIENTATION
ORIENTATION: RIGHT;LOWER
ORIENTATION: RIGHT;LOWER

## 2025-02-01 ASSESSMENT — PAIN - FUNCTIONAL ASSESSMENT
PAIN_FUNCTIONAL_ASSESSMENT: 0-10
PAIN_FUNCTIONAL_ASSESSMENT: 0-10

## 2025-02-01 ASSESSMENT — PAIN DESCRIPTION - PAIN TYPE
TYPE: CHRONIC PAIN
TYPE: CHRONIC PAIN

## 2025-02-01 NOTE — ED PROVIDER NOTES
[facility] EMERGENCY DEPARTMENT  Emergency Department Encounter  Emergency Medicine Resident     Pt Name:Charisma Donnelly  MRN: [id number]  Birthdate 1970  Date of evaluation: 2/1/25  PCP:  Lucy Torres APRN - CNP  Note Started: 6:34 PM EST      CHIEF COMPLAINT       Chief Complaint   Patient presents with    Back Pain     Lower right sided back pain radiating into hip and buttocks. Has chronic back pain.        HISTORY OF PRESENT ILLNESS  (Location/Symptom, Timing/Onset, Context/Setting, Quality, Duration, Modifying Factors, Severity.)      Charisma Donnelly is a 54 y.o. female who presents with acute lower back pain with radiation to the posterior hip and buttocks region which has been progressively worsening for the past week.  Patient has a history of lumbar back surgery done in 2023.  Patient states that her pain has progressively worsened to the point that she has have been having difficulty with ambulation and getting out daily activities.  Patient does have a history of chronic urinary retention but has noticed that over the past week it has acutely worsened.  Patient does have a history of constipation and has been taking stool softeners.  Patient has tried Tylenol and ibuprofen at home with minimal relief.  Patient states that she has an outpatient appointment coming up with family medicine soon and thus came to the ED for further evaluation due to excruciating pain.  Denies any recent trauma, chest pain, shortness of breath    PAST MEDICAL / SURGICAL / SOCIAL / FAMILY HISTORY      has a past medical history of Abnormal uterine bleeding (AUB), Anxiety, Arthritis, CAD (coronary artery disease), Chronic neck pain, COVID-19, Depression, Diabetes mellitus (HCC), Fatty liver, Gastroparesis, Histiocytosis (HCC), [place] (hard of hearing), HTN (hypertension), Hx of blood clots, Hyperlipidemia, Hypothyroid, Kidney stone, Lower back pain, Mixed incontinence, Myocardial infarct (HCC), PCOS (polycystic ovarian  syndrome), Pulmonary Langerhans cell granulomatosis (HCC), Snores, Spinal stenosis, Under care of service provider, Under care of service provider, Under care of service provider, Under care of team, Under care of team, Under care of team, Vasomotor symptoms due to menopause, Wears eyeglasses, Wears partial dentures, and Wellness examination.       has a past surgical history that includes back surgery ();  section (); Mastoid surgery; Cardiac catheterization (); Lung biopsy (Right); hip surgery (Left); Colonoscopy (N/A, 2021); Upper gastrointestinal endoscopy (2021); Cervical spine surgery (); cervical fusion (2016); laminectomy (); Hysterectomy (N/A, 2022); US BIOPSY LYMPH NODE (09/15/2023); lumbar laminectomy (10/30/2023); lumbar fusion (N/A, 10/30/2023); Upper gastrointestinal endoscopy (N/A, 2023); Upper gastrointestinal endoscopy (N/A, 2024); Tonsillectomy and adenoidectomy; Cystocopy (2024); other surgical history (Right, 2024); cervical fusion (N/A, 2024); and Ovary removal.      Social History     Socioeconomic History    Marital status:      Spouse name: Not on file    Number of children: Not on file    Years of education: Not on file    Highest education level: Not on file   Occupational History    Not on file   Tobacco Use    Smoking status: Every Day     Current packs/day: 0.50     Average packs/day: 2.9 packs/day for 36.6 years (107.1 ttl pk-yrs)     Types: Cigarettes     Start date: 1988    Smokeless tobacco: Never    Tobacco comments:     Trying to quit smoking   Vaping Use    Vaping status: Never Used   Substance and Sexual Activity    Alcohol use: Yes     Comment: rare    Drug use: Yes     Frequency: 7.0 times per week     Types: Marijuana (Weed)     Comment: edibles daily    Sexual activity: Yes     Partners: Male   Other Topics Concern    Not on file   Social History Narrative    Not on file     Social

## 2025-02-01 NOTE — ED PROVIDER NOTES
[facility]     Emergency Department     Faculty Attestation    I performed a history and physical examination of the patient and discussed management with the resident. I have reviewed and agree with the resident’s findings including all diagnostic interpretations, and treatment plans as written at the time of my review. Any areas of disagreement are noted on the chart. I was personally present for the key portions of any procedures. I have documented in the chart those procedures where I was not present during the key portions. For Physician Assistant/ Nurse Practitioner cases/documentation I have personally evaluated this patient and have completed at least one if not all key elements of the E/M (history, physical exam, and MDM). Additional findings are as noted.    PtName: Charisma Donnelly  MRN: [id number]  Birthdate 1970  Date of evaluation: 2/1/25  Note Started: 9:26 AM EST    Primary Care Physician: Lucy Torres APRN - CNP    Brief HPI:  54-year-old female presents emergency department with lower back pain.  History of multiple spinal surgeries.  Symptoms are acute on chronic.  Right lower lumbar area rating to the right lower extremity.  She admits to baseline urinary incontinence however slightly worse than usual.  Denies fecal incontinence, saddle anesthesia    Pertinent Physical Exam Findings:  Vitals:    02/01/25 0920   BP: (!) 163/90   Pulse: 99   Resp: 17   Temp: 99.1 °F (37.3 °C)   SpO2: 99%   See resident note.  Appears uncomfortable, no acute distress.  Normal strength and sensation in bilateral lower extremities.     Medical Decision Making: Patient is a 54 y.o. female presenting to the emergency department with back pain. The chart was reviewed for pertinent history relating to the chief complaint.  The patient appears well at this time, in no acute distress, vitals are stable.  The patient has acute on chronic back pain that is consistent with

## 2025-02-01 NOTE — DISCHARGE INSTRUCTIONS
You were seen in the ED for back pain.    You are likely experiencing symptoms of nerve compression known as lumbar radiculopathy.  This condition results in compression of your L5-S1 nerve root which results in symptoms of lower back pain with radiation to your posterior right leg area.      Please take Flexeril 10 mg 3 times a day to help with pain.  Apply lidocaine patch to the lower back area at nighttime.  Please take oxycodone 5 mg as 36 hours for management of back pain.  Please take prednisone 50 mg once a day for the next 5 days    Please follow-up with neurosurgery in the outpatient setting for long-term management of back pain.  Information for setting outpatient appointment has been provided

## 2025-02-02 ENCOUNTER — HOSPITAL ENCOUNTER (OUTPATIENT)
Age: 55
Setting detail: OBSERVATION
Discharge: HOME | End: 2025-02-03
Attending: EMERGENCY MEDICINE | Admitting: EMERGENCY MEDICINE
Payer: COMMERCIAL

## 2025-02-02 ENCOUNTER — APPOINTMENT (OUTPATIENT)
Dept: MRI IMAGING | Age: 55
End: 2025-02-02
Payer: COMMERCIAL

## 2025-02-02 DIAGNOSIS — M54.16 LUMBAR RADICULOPATHY: ICD-10-CM

## 2025-02-02 DIAGNOSIS — M48.061 LUMBAR STENOSIS WITHOUT NEUROGENIC CLAUDICATION: Primary | ICD-10-CM

## 2025-02-02 DIAGNOSIS — M48.061 SPINAL STENOSIS OF LUMBAR REGION, UNSPECIFIED WHETHER NEUROGENIC CLAUDICATION PRESENT: ICD-10-CM

## 2025-02-02 PROCEDURE — 6360000002 HC RX W HCPCS: Performed by: EMERGENCY MEDICINE

## 2025-02-02 PROCEDURE — 96374 THER/PROPH/DIAG INJ IV PUSH: CPT

## 2025-02-02 PROCEDURE — 99285 EMERGENCY DEPT VISIT HI MDM: CPT

## 2025-02-02 PROCEDURE — 96375 TX/PRO/DX INJ NEW DRUG ADDON: CPT

## 2025-02-02 PROCEDURE — 6360000002 HC RX W HCPCS

## 2025-02-02 PROCEDURE — 96376 TX/PRO/DX INJ SAME DRUG ADON: CPT

## 2025-02-02 PROCEDURE — 2500000003 HC RX 250 WO HCPCS

## 2025-02-02 PROCEDURE — 72158 MRI LUMBAR SPINE W/O & W/DYE: CPT

## 2025-02-02 PROCEDURE — A9579 GAD-BASE MR CONTRAST NOS,1ML: HCPCS

## 2025-02-02 PROCEDURE — 6360000004 HC RX CONTRAST MEDICATION

## 2025-02-02 RX ORDER — FENTANYL CITRATE 50 UG/ML
50 INJECTION, SOLUTION INTRAMUSCULAR; INTRAVENOUS ONCE
Status: COMPLETED | OUTPATIENT
Start: 2025-02-02 | End: 2025-02-02

## 2025-02-02 RX ORDER — LORAZEPAM 2 MG/ML
1 INJECTION INTRAMUSCULAR ONCE
Status: COMPLETED | OUTPATIENT
Start: 2025-02-02 | End: 2025-02-02

## 2025-02-02 RX ORDER — SODIUM CHLORIDE 0.9 % (FLUSH) 0.9 %
10 SYRINGE (ML) INJECTION PRN
Status: DISCONTINUED | OUTPATIENT
Start: 2025-02-02 | End: 2025-02-03 | Stop reason: HOSPADM

## 2025-02-02 RX ADMIN — FENTANYL CITRATE 50 MCG: 50 INJECTION, SOLUTION INTRAMUSCULAR; INTRAVENOUS at 23:40

## 2025-02-02 RX ADMIN — GADOTERIDOL 18 ML: 279.3 INJECTION, SOLUTION INTRAVENOUS at 20:14

## 2025-02-02 RX ADMIN — FENTANYL CITRATE 50 MCG: 50 INJECTION, SOLUTION INTRAMUSCULAR; INTRAVENOUS at 19:24

## 2025-02-02 RX ADMIN — SODIUM CHLORIDE, PRESERVATIVE FREE 10 ML: 5 INJECTION INTRAVENOUS at 20:14

## 2025-02-02 RX ADMIN — LORAZEPAM 1 MG: 2 INJECTION INTRAMUSCULAR; INTRAVENOUS at 19:38

## 2025-02-02 ASSESSMENT — LIFESTYLE VARIABLES: HOW OFTEN DO YOU HAVE A DRINK CONTAINING ALCOHOL: NEVER

## 2025-02-03 VITALS
SYSTOLIC BLOOD PRESSURE: 133 MMHG | WEIGHT: 205 LBS | HEART RATE: 80 BPM | OXYGEN SATURATION: 98 % | DIASTOLIC BLOOD PRESSURE: 85 MMHG | RESPIRATION RATE: 16 BRPM | HEIGHT: 63 IN | TEMPERATURE: 98.1 F | BODY MASS INDEX: 36.32 KG/M2

## 2025-02-03 PROBLEM — M48.061 LUMBAR STENOSIS WITHOUT NEUROGENIC CLAUDICATION: Status: ACTIVE | Noted: 2025-02-03

## 2025-02-03 LAB
GLUCOSE BLD-MCNC: 143 MG/DL (ref 65–105)
GLUCOSE BLD-MCNC: 178 MG/DL (ref 65–105)
GLUCOSE BLD-MCNC: 224 MG/DL (ref 65–105)
GLUCOSE BLD-MCNC: 302 MG/DL (ref 65–105)

## 2025-02-03 PROCEDURE — 97161 PT EVAL LOW COMPLEX 20 MIN: CPT

## 2025-02-03 PROCEDURE — G0378 HOSPITAL OBSERVATION PER HR: HCPCS

## 2025-02-03 PROCEDURE — 6360000002 HC RX W HCPCS: Performed by: EMERGENCY MEDICINE

## 2025-02-03 PROCEDURE — 96372 THER/PROPH/DIAG INJ SC/IM: CPT

## 2025-02-03 PROCEDURE — 2500000003 HC RX 250 WO HCPCS: Performed by: EMERGENCY MEDICINE

## 2025-02-03 PROCEDURE — 6370000000 HC RX 637 (ALT 250 FOR IP): Performed by: EMERGENCY MEDICINE

## 2025-02-03 PROCEDURE — 6370000000 HC RX 637 (ALT 250 FOR IP)

## 2025-02-03 PROCEDURE — 96375 TX/PRO/DX INJ NEW DRUG ADDON: CPT

## 2025-02-03 PROCEDURE — 97116 GAIT TRAINING THERAPY: CPT

## 2025-02-03 PROCEDURE — 82947 ASSAY GLUCOSE BLOOD QUANT: CPT

## 2025-02-03 PROCEDURE — 96376 TX/PRO/DX INJ SAME DRUG ADON: CPT

## 2025-02-03 RX ORDER — PREDNISONE 50 MG/1
50 TABLET ORAL DAILY
Status: DISCONTINUED | OUTPATIENT
Start: 2025-02-03 | End: 2025-02-03 | Stop reason: HOSPADM

## 2025-02-03 RX ORDER — ACETAMINOPHEN 650 MG/1
650 SUPPOSITORY RECTAL EVERY 6 HOURS PRN
Status: DISCONTINUED | OUTPATIENT
Start: 2025-02-03 | End: 2025-02-03 | Stop reason: HOSPADM

## 2025-02-03 RX ORDER — POTASSIUM CHLORIDE 1500 MG/1
40 TABLET, EXTENDED RELEASE ORAL PRN
Status: DISCONTINUED | OUTPATIENT
Start: 2025-02-03 | End: 2025-02-03 | Stop reason: HOSPADM

## 2025-02-03 RX ORDER — SODIUM CHLORIDE 9 MG/ML
INJECTION, SOLUTION INTRAVENOUS PRN
Status: DISCONTINUED | OUTPATIENT
Start: 2025-02-03 | End: 2025-02-03 | Stop reason: HOSPADM

## 2025-02-03 RX ORDER — SODIUM CHLORIDE 0.9 % (FLUSH) 0.9 %
5-40 SYRINGE (ML) INJECTION EVERY 12 HOURS SCHEDULED
Status: DISCONTINUED | OUTPATIENT
Start: 2025-02-03 | End: 2025-02-03 | Stop reason: HOSPADM

## 2025-02-03 RX ORDER — OXYCODONE HYDROCHLORIDE 5 MG/1
5 TABLET ORAL EVERY 4 HOURS PRN
Status: DISCONTINUED | OUTPATIENT
Start: 2025-02-03 | End: 2025-02-03 | Stop reason: HOSPADM

## 2025-02-03 RX ORDER — ONDANSETRON 2 MG/ML
4 INJECTION INTRAMUSCULAR; INTRAVENOUS EVERY 6 HOURS PRN
Status: DISCONTINUED | OUTPATIENT
Start: 2025-02-03 | End: 2025-02-03 | Stop reason: HOSPADM

## 2025-02-03 RX ORDER — POLYETHYLENE GLYCOL 3350 17 G/17G
17 POWDER, FOR SOLUTION ORAL DAILY PRN
Status: DISCONTINUED | OUTPATIENT
Start: 2025-02-03 | End: 2025-02-03 | Stop reason: HOSPADM

## 2025-02-03 RX ORDER — NAPROXEN 500 MG/1
500 TABLET ORAL 2 TIMES DAILY WITH MEALS
Qty: 14 TABLET | Refills: 0 | Status: SHIPPED | OUTPATIENT
Start: 2025-02-03 | End: 2025-02-10

## 2025-02-03 RX ORDER — ACETAMINOPHEN 325 MG/1
650 TABLET ORAL EVERY 6 HOURS PRN
Status: DISCONTINUED | OUTPATIENT
Start: 2025-02-03 | End: 2025-02-03 | Stop reason: HOSPADM

## 2025-02-03 RX ORDER — RANOLAZINE 500 MG/1
1000 TABLET, EXTENDED RELEASE ORAL 2 TIMES DAILY
Status: DISCONTINUED | OUTPATIENT
Start: 2025-02-03 | End: 2025-02-03 | Stop reason: HOSPADM

## 2025-02-03 RX ORDER — ONDANSETRON 4 MG/1
4 TABLET, ORALLY DISINTEGRATING ORAL EVERY 8 HOURS PRN
Status: DISCONTINUED | OUTPATIENT
Start: 2025-02-03 | End: 2025-02-03 | Stop reason: HOSPADM

## 2025-02-03 RX ORDER — CLOPIDOGREL BISULFATE 75 MG/1
75 TABLET ORAL DAILY
Status: DISCONTINUED | OUTPATIENT
Start: 2025-02-03 | End: 2025-02-03 | Stop reason: HOSPADM

## 2025-02-03 RX ORDER — ROSUVASTATIN CALCIUM 20 MG/1
20 TABLET, COATED ORAL DAILY
Status: DISCONTINUED | OUTPATIENT
Start: 2025-02-03 | End: 2025-02-03 | Stop reason: HOSPADM

## 2025-02-03 RX ORDER — NICOTINE 21 MG/24HR
1 PATCH, TRANSDERMAL 24 HOURS TRANSDERMAL ONCE
Status: DISCONTINUED | OUTPATIENT
Start: 2025-02-03 | End: 2025-02-03 | Stop reason: HOSPADM

## 2025-02-03 RX ORDER — ASPIRIN 81 MG/1
81 TABLET ORAL DAILY
Status: DISCONTINUED | OUTPATIENT
Start: 2025-02-03 | End: 2025-02-03 | Stop reason: HOSPADM

## 2025-02-03 RX ORDER — LIDOCAINE 4 G/G
1 PATCH TOPICAL DAILY
Status: DISCONTINUED | OUTPATIENT
Start: 2025-02-03 | End: 2025-02-03 | Stop reason: HOSPADM

## 2025-02-03 RX ORDER — METOPROLOL SUCCINATE 25 MG/1
100 TABLET, EXTENDED RELEASE ORAL 2 TIMES DAILY
Status: DISCONTINUED | OUTPATIENT
Start: 2025-02-03 | End: 2025-02-03 | Stop reason: HOSPADM

## 2025-02-03 RX ORDER — POTASSIUM CHLORIDE 7.45 MG/ML
10 INJECTION INTRAVENOUS PRN
Status: DISCONTINUED | OUTPATIENT
Start: 2025-02-03 | End: 2025-02-03 | Stop reason: HOSPADM

## 2025-02-03 RX ORDER — ENOXAPARIN SODIUM 100 MG/ML
40 INJECTION SUBCUTANEOUS DAILY
Status: DISCONTINUED | OUTPATIENT
Start: 2025-02-03 | End: 2025-02-03 | Stop reason: HOSPADM

## 2025-02-03 RX ORDER — FENTANYL CITRATE 50 UG/ML
50 INJECTION, SOLUTION INTRAMUSCULAR; INTRAVENOUS EVERY 6 HOURS PRN
Status: DISCONTINUED | OUTPATIENT
Start: 2025-02-03 | End: 2025-02-03 | Stop reason: HOSPADM

## 2025-02-03 RX ORDER — OXYCODONE HYDROCHLORIDE 5 MG/1
5 TABLET ORAL EVERY 4 HOURS PRN
Qty: 18 TABLET | Refills: 0 | Status: SHIPPED | OUTPATIENT
Start: 2025-02-03 | End: 2025-02-06

## 2025-02-03 RX ORDER — SODIUM CHLORIDE 0.9 % (FLUSH) 0.9 %
5-40 SYRINGE (ML) INJECTION PRN
Status: DISCONTINUED | OUTPATIENT
Start: 2025-02-03 | End: 2025-02-03 | Stop reason: HOSPADM

## 2025-02-03 RX ORDER — MAGNESIUM SULFATE IN WATER 40 MG/ML
2000 INJECTION, SOLUTION INTRAVENOUS PRN
Status: DISCONTINUED | OUTPATIENT
Start: 2025-02-03 | End: 2025-02-03 | Stop reason: HOSPADM

## 2025-02-03 RX ORDER — CYCLOBENZAPRINE HCL 10 MG
10 TABLET ORAL 3 TIMES DAILY PRN
Status: DISCONTINUED | OUTPATIENT
Start: 2025-02-03 | End: 2025-02-03 | Stop reason: HOSPADM

## 2025-02-03 RX ORDER — PANTOPRAZOLE SODIUM 40 MG/1
40 TABLET, DELAYED RELEASE ORAL DAILY
Status: DISCONTINUED | OUTPATIENT
Start: 2025-02-03 | End: 2025-02-03 | Stop reason: HOSPADM

## 2025-02-03 RX ORDER — LEVOTHYROXINE SODIUM 50 UG/1
50 TABLET ORAL DAILY
Status: DISCONTINUED | OUTPATIENT
Start: 2025-02-03 | End: 2025-02-03 | Stop reason: HOSPADM

## 2025-02-03 RX ORDER — ISOSORBIDE MONONITRATE 30 MG/1
30 TABLET, EXTENDED RELEASE ORAL DAILY
Status: DISCONTINUED | OUTPATIENT
Start: 2025-02-03 | End: 2025-02-03 | Stop reason: HOSPADM

## 2025-02-03 RX ORDER — INSULIN LISPRO 100 [IU]/ML
0-8 INJECTION, SOLUTION INTRAVENOUS; SUBCUTANEOUS
Status: DISCONTINUED | OUTPATIENT
Start: 2025-02-03 | End: 2025-02-03 | Stop reason: HOSPADM

## 2025-02-03 RX ADMIN — PREDNISONE 50 MG: 50 TABLET ORAL at 10:03

## 2025-02-03 RX ADMIN — CYCLOBENZAPRINE 10 MG: 10 TABLET, FILM COATED ORAL at 11:04

## 2025-02-03 RX ADMIN — FENTANYL CITRATE 50 MCG: 50 INJECTION, SOLUTION INTRAMUSCULAR; INTRAVENOUS at 07:56

## 2025-02-03 RX ADMIN — METOPROLOL SUCCINATE 100 MG: 25 TABLET, EXTENDED RELEASE ORAL at 10:02

## 2025-02-03 RX ADMIN — INSULIN LISPRO 6 UNITS: 100 INJECTION, SOLUTION INTRAVENOUS; SUBCUTANEOUS at 16:15

## 2025-02-03 RX ADMIN — ONDANSETRON 4 MG: 2 INJECTION INTRAMUSCULAR; INTRAVENOUS at 06:34

## 2025-02-03 RX ADMIN — OXYCODONE 5 MG: 5 TABLET ORAL at 06:35

## 2025-02-03 RX ADMIN — ENOXAPARIN SODIUM 40 MG: 100 INJECTION SUBCUTANEOUS at 10:02

## 2025-02-03 RX ADMIN — LEVOTHYROXINE SODIUM 50 MCG: 0.05 TABLET ORAL at 06:34

## 2025-02-03 RX ADMIN — FENTANYL CITRATE 50 MCG: 50 INJECTION, SOLUTION INTRAMUSCULAR; INTRAVENOUS at 01:53

## 2025-02-03 RX ADMIN — FENTANYL CITRATE 50 MCG: 50 INJECTION, SOLUTION INTRAMUSCULAR; INTRAVENOUS at 13:47

## 2025-02-03 RX ADMIN — OXYCODONE 5 MG: 5 TABLET ORAL at 15:03

## 2025-02-03 RX ADMIN — ROSUVASTATIN CALCIUM 20 MG: 20 TABLET, FILM COATED ORAL at 10:03

## 2025-02-03 RX ADMIN — RANOLAZINE 1000 MG: 500 TABLET, FILM COATED, EXTENDED RELEASE ORAL at 10:02

## 2025-02-03 RX ADMIN — CLOPIDOGREL BISULFATE 75 MG: 75 TABLET ORAL at 10:03

## 2025-02-03 RX ADMIN — OXYCODONE 5 MG: 5 TABLET ORAL at 11:04

## 2025-02-03 RX ADMIN — ONDANSETRON 4 MG: 2 INJECTION INTRAMUSCULAR; INTRAVENOUS at 15:11

## 2025-02-03 RX ADMIN — ACETAMINOPHEN 650 MG: 325 TABLET ORAL at 15:11

## 2025-02-03 RX ADMIN — PANTOPRAZOLE SODIUM 40 MG: 40 TABLET, DELAYED RELEASE ORAL at 10:02

## 2025-02-03 RX ADMIN — SODIUM CHLORIDE, PRESERVATIVE FREE 10 ML: 5 INJECTION INTRAVENOUS at 07:58

## 2025-02-03 RX ADMIN — ISOSORBIDE MONONITRATE 30 MG: 30 TABLET, EXTENDED RELEASE ORAL at 10:03

## 2025-02-03 RX ADMIN — ACETAMINOPHEN 650 MG: 325 TABLET ORAL at 06:34

## 2025-02-03 RX ADMIN — ASPIRIN 81 MG: 81 TABLET, COATED ORAL at 10:02

## 2025-02-03 ASSESSMENT — ENCOUNTER SYMPTOMS
NAUSEA: 0
VOMITING: 0
ABDOMINAL PAIN: 0
SHORTNESS OF BREATH: 0
DIARRHEA: 0
BACK PAIN: 1

## 2025-02-03 ASSESSMENT — PAIN DESCRIPTION - LOCATION
LOCATION: HEAD
LOCATION: BACK

## 2025-02-03 ASSESSMENT — PAIN SCALES - GENERAL
PAINLEVEL_OUTOF10: 9
PAINLEVEL_OUTOF10: 9
PAINLEVEL_OUTOF10: 3
PAINLEVEL_OUTOF10: 10
PAINLEVEL_OUTOF10: 7
PAINLEVEL_OUTOF10: 10
PAINLEVEL_OUTOF10: 9

## 2025-02-03 ASSESSMENT — PAIN DESCRIPTION - ORIENTATION
ORIENTATION: LOWER
ORIENTATION: LOWER
ORIENTATION: RIGHT

## 2025-02-03 NOTE — PROGRESS NOTES
Discharge teaching and instructions completed with patient using teachback method. AVS reviewed. Prescriptions called into pharmacy for patient. Patient voiced understanding regarding prescriptions, follow up appointments, and care of self at home. Discharged home with family. All questions answered.

## 2025-02-03 NOTE — DISCHARGE INSTRUCTIONS
You were evaluated for your worsening back pain radiating down to your left leg.  Your MRI does show stenosis or narrowing of the vertebral foramen.  At this time, neurosurgery has evaluated you and deemed you stable for discharge home.  Please continue to exercise as you have been.  You will be discharged with pain medications, please take them as prescribed.  Follow-up with physical therapy outpatient for further rehabilitation.  Follow-up with Dr. Christina in 2-4 weeks for reevaluation.  Return to the emergency department if you develop fever, chest pain, shortness of breath, worsening back pain, new or worsening weakness or numbness.

## 2025-02-03 NOTE — ED NOTES
ED to inpatient nurses report      Chief Complaint:  Chief Complaint   Patient presents with    Back Pain     Present to ED from: Home    MOA:     LOC: alert and orientated to name, place, date  Mobility: Independent  Oxygen Baseline: NA    Current needs required: NA   Pending ED orders: NA  Present condition: Stable    Why did the patient come to the ED? Patient presents to ED c/o excruciating back pain radiating down her right leg with some associated numbness/tingling to the area as well. Pain has started gradually and significantly worsened since lifting a bag of dog food on Thursday. Patient reports some perineal numbness new today. Patient reports the pain is worse with movement. Patient has hx low back surgery in 2023.   What is the plan? Admit for neurosurgery consult  Any procedures or intervention occur? IV, imaging, medication  Any safety concerns?? Fall risk     Mental Status:       Psych Assessment:   Psychosocial  Psychosocial (WDL): Within Defined Limits  Vital signs   Vitals:    02/02/25 2115 02/02/25 2322 02/02/25 2328 02/03/25 0000   BP: 130/80  (!) 144/96 134/85   Pulse: 88   85   Resp:       Temp:       SpO2: 93% 96%  93%        Vitals:  Patient Vitals for the past 24 hrs:   BP Temp Pulse Resp SpO2   02/03/25 0000 134/85 -- 85 -- 93 %   02/02/25 2328 (!) 144/96 -- -- -- --   02/02/25 2322 -- -- -- -- 96 %   02/02/25 2115 130/80 -- 88 -- 93 %   02/02/25 2101 -- -- -- -- 92 %   02/02/25 2100 137/86 -- 90 -- --   02/02/25 2045 (!) 142/92 -- 90 -- 92 %   02/02/25 2042 123/77 -- 99 -- 92 %   02/02/25 1836 (!) 160/99 98.8 °F (37.1 °C) (!) 120 18 97 %      Visit Vitals  /85   Pulse 85   Temp 98.8 °F (37.1 °C)   Resp 18   SpO2 93%        LDAs:   Peripheral IV 02/02/25 Distal;Right;Anterior Forearm (Active)       Ambulatory Status:  Presents to emergency department  because of falls (Syncope, seizure, or loss of consciousness): No, Age > 70: No, Altered Mental Status, Intoxication with alcohol or

## 2025-02-03 NOTE — H&P
hospital admission to the observation unit for pain management and physical therapy.  Patient agreeable with admission for therapy and pain management. [SD]     ED Course User Index  [SD] Jessica Jon MD        DIAGNOSTIC RESULTS     RADIOLOGY:   I directly visualized the following  images and reviewed the radiologist interpretations:    MRI LUMBAR SPINE W WO CONTRAST    Result Date: 2/2/2025  EXAMINATION: MRI OF THE LUMBAR SPINE WITHOUT AND WITH CONTRAST  2/2/2025 7:05 pm TECHNIQUE: Multiplanar multisequence MRI of the lumbar spine was performed without and with the administration of intravenous contrast. COMPARISON: None. HISTORY: ORDERING SYSTEM PROVIDED HISTORY: concern for cauda equina TECHNOLOGIST PROVIDED HISTORY: concern for cauda equina Decision Support Exception - unselect if not a suspected or confirmed emergency medical condition->Emergency Medical Condition (MA) Reason for Exam: concern for cauda equina Additional signs and symptoms: Back Pain FINDINGS: BONES/ALIGNMENT: There is normal alignment of the spine. The vertebral body heights are maintained. The bone marrow signal appears unremarkable.  ACDF changes are seen at L5-S1 level.. SPINAL CORD:  The conus terminates normally. SOFT TISSUES: No abnormal enhancement is seen of the lumbar spine.  No paraspinal mass identified. L1-L2: There is no significant disc protrusion, spinal canal stenosis or neural foraminal narrowing. L2-L3: Diffuse posterior disc bulge and osteophytic ridging seen.  Mild facet arthrosis is seen.  There is mild canal stenosis.  There is mild left-sided foraminal narrowing. L3-L4: Diffuse posterior disc bulge, caudal disc extrusion, thickening of ligamentum flavum result in mild canal stenosis.  There is narrowing of the lateral recesses of the spinal canal.  Mild bilateral facet arthrosis seen with joint effusions.  There is mild bilateral foraminal narrowing. L4-L5: There is facet arthrosis with hypertrophic changes.  Diffuse

## 2025-02-03 NOTE — DISCHARGE SUMMARY
CDU Discharge Summary        Patient:  Charisma Donnelly  YOB: 1970    MRN: [id number]   Acct: [id number]    Primary Care Physician: Lucy Torres APRN - CNP    Admit date:  2/2/2025  6:41 PM  Discharge date: 02/03/25      Discharge Diagnoses:     Patient had acute on chronic lower back pain with radiation down LLE and saddle anesthesia onset due to foraminal narrowing of L4-L5 and multilevel DDD.  Treated with symptomatic management.  Patient's symptoms are improved with medications with the plan to outpatient follow-up after discharge.    Follow-up:  Follow up with Dr Christina, neurosurgery. Please return to the emergency room with any new or worsening symptoms.    Stressed to patient the importance of following up with neurosurgery for further workup/management of symptoms.  Pt verbalizes understanding and agrees with plan.    Discharge Medications:           Medication List        START taking these medications      naproxen 500 MG tablet  Commonly known as: NAPROSYN  Take 1 tablet by mouth 2 times daily (with meals) for 7 days     tiZANidine 4 MG tablet  Commonly known as: ZANAFLEX  Take 1 tablet by mouth 3 times daily as needed (pain, muscle spasms)            CHANGE how you take these medications      oxyCODONE 5 MG immediate release tablet  Commonly known as: Roxicodone  Take 1 tablet by mouth every 4 hours as needed for Pain for up to 3 days. Max Daily Amount: 30 mg  What changed:   when to take this  additional instructions            CONTINUE taking these medications      acetaminophen 500 MG tablet  Commonly known as: TYLENOL  Take 2 tablets by mouth every 6 hours as needed for Pain     * albuterol (2.5 MG/3ML) 0.083% nebulizer solution  Commonly known as: PROVENTIL  Take 3 mLs by nebulization daily as needed for Wheezing     * albuterol sulfate  (90 Base) MCG/ACT inhaler  Commonly known as: Ventolin HFA  Inhale 2 puffs into the lungs 4 times daily as needed for Wheezing    36.3

## 2025-02-03 NOTE — ED PROVIDER NOTES
[facility]     Emergency Department     Faculty Attestation    I performed a history and physical examination of the patient and discussed management with the resident. I reviewed the resident’s note and agree with the documented findings including all diagnostic interpretations and plan of care. Any areas of disagreement are noted on the chart. I was personally present for the key portions of any procedures. I have documented in the chart those procedures where I was not present during the key portions. I have reviewed the emergency nurses triage note. I agree with the chief complaint, past medical history, past surgical history, allergies, medications, social and family history as documented unless otherwise noted below. Documentation of the HPI, Physical Exam and Medical Decision Making performed by ramon is based on my personal performance of the HPI, PE and MDM. For Physician Assistant/ Nurse Practitioner cases/documentation I have personally evaluated this patient and have completed at least one if not all key elements of the E/M (history, physical exam, and MDM). Additional findings are as noted.    Primary Care Physician: Lcuy Torres APRN - CNP    Note Started: 8:57 PM EST     VITAL SIGNS:   temperature is 98.8 °F (37.1 °C). Her blood pressure is 142/92 (abnormal) and her pulse is 90. Her respiration is 18 and oxygen saturation is 92%.      Medical Decision Making  Back pain.  Low back pain.  Associated saddle anesthesia.  Prior history of hemilaminectomy and prior fusions.  No urinary incontinence.  No IV drug use.  No recent injections.  On exam significantly uncomfortable.  Strength is 5 out of 5 in the lower extremities but significantly provokes pain.  Sensation intact compared left to right.  Given saddle anesthesia will need to obtain MRI to evaluate for potential cauda equina.  Likely will require neurosurgical consultation for 
        [facility]   Emergency Department  Emergency Medicine Attending Sign-out   Note started: 11:07 PM EST    Care of Charisma Donnelly was assumed from previous attending Dr. Tubbs at 11 PM and is being seen for Back Pain  .  The patient's initial evaluation and plan have been discussed with the prior provider who initially evaluated the patient.     Attestation  I was available and discussed any additional care issues that arose and coordinated the management plans with the resident(s) caring for the patient during my duty period. Any areas of disagreement with resident's documentation of care or procedures are noted on the chart. I was personally present for the key portions of any/all procedures, during my duty period. I have documented in the chart those procedures where I was not present during the key portions.     BRIEF PATIENT SUMMARY/MDM COURSE PER INITIAL PROVIDER:   RECENT VITALS:     Temp: 98.8 °F (37.1 °C),  Pulse: 88, Respirations: 18, BP: 130/80, SpO2: 93 %    This patient is a 54 y.o. Female with back pain was seen yesterday, but today worsening.  With saddle anesthesia.  MRI performed and showed severe foraminal narrowing.    DIAGNOSTICS/MEDICATIONS:     MEDICATIONS GIVEN:  ED Medication Orders (From admission, onward)      Start Ordered     Status Ordering Provider    02/02/25 2006 02/02/25 2013  gadoteridol (PROHANCE) injection 18 mL  IMG ONCE PRN         Last MAR action: Given - by SAGAR BEAVER on 02/02/25 at 2014 JUAN BRYANT    02/02/25 2006 02/02/25 2013  sodium chloride flush 0.9 % injection 10 mL  PRN         Last MAR action: Given - by SAGAR BEAVER on 02/02/25 at 2014 JACQUELINE BRYANT    02/02/25 1945 02/02/25 1935  LORazepam (ATIVAN) injection 1 mg  ONCE         Last MAR action: Given - by NITA HA on 02/02/25 at 1938 JUAN BRYANT    02/02/25 1930 02/02/25 1917  fentaNYL (SUBLIMAZE) injection 50 mcg  ONCE         Last MAR action: Given - by NITA HA 
        [facility] EMERGENCY DEPARTMENT  Emergency Department Encounter  Emergency Medicine Resident     Pt Name:Charisma Donnelly  MRN: [id number]  Birthdate 1970  Date of evaluation: 2/2/25  PCP:  Lucy Torres APRN - CNP  Note Started: 10:53 PM EST      CHIEF COMPLAINT       Chief Complaint   Patient presents with    Back Pain       HISTORY OF PRESENT ILLNESS  (Location/Symptom, Timing/Onset, Context/Setting, Quality, Duration, Modifying Factors, Severity.)      Charisma Donnelly is a 54 y.o. female who presents with lower back pain that started a few days ago.  She reports that on Thursday she carried a bag of dog food that weighs approximately 40 pounds.  Reports that she started having back pain the next day.  She has a previous posterior lumbar hemilaminectomy that was performed in 2023.  Reports that her back pain has been getting progressively worse.  Her back pain is both midline and left-sided.  Then it radiates down her left leg.  Describes the pain as severe.  Denies any numbness or tingling in her extremities.  Denies any urinary or bowel retention/incontinence however reports some numbness in her groin.  Was seen in the ER yesterday and reports that medications that she was prescribed of the left helped with her pain.  She denies any direct trauma to her back.  Denies any fever, chills, sweats, history of IV drug use.    PAST MEDICAL / SURGICAL / SOCIAL / FAMILY HISTORY      has a past medical history of Abnormal uterine bleeding (AUB), Anxiety, Arthritis, CAD (coronary artery disease), Chronic neck pain, COVID-19, Depression, Diabetes mellitus (HCC), Fatty liver, Gastroparesis, Histiocytosis (HCC), [place] (hard of hearing), HTN (hypertension), Hx of blood clots, Hyperlipidemia, Hypothyroid, Kidney stone, Lower back pain, Mixed incontinence, Myocardial infarct (HCC), PCOS (polycystic ovarian syndrome), Pulmonary Langerhans cell granulomatosis (HCC), Snores, Spinal stenosis, Under care of service 
canal stenosis.  There is mild left-sided foraminal narrowing. L3-L4: Diffuse posterior disc bulge, caudal disc extrusion, thickening of ligamentum flavum result in mild canal stenosis.  There is narrowing of the lateral recesses of the spinal canal.  Mild bilateral facet arthrosis seen with joint effusions.  There is mild bilateral foraminal narrowing. L4-L5: There is facet arthrosis with hypertrophic changes.  Diffuse posterior disc bulge is seen with osteophytic ridging.  There is severe right-sided and moderate left-sided foraminal narrowing.  There is mild canal stenosis. L5-S1: Intervertebral disc spacer is noted.  No significant canal stenosis or foraminal narrowing is seen.  Laminectomy changes are seen on the left side.     1. Multilevel degenerative disc disease and facet arthrosis as described above. 2. Mild canal stenosis at L2-L3, L3-L4 and L4-L5. 3. Severe right-sided and moderate left-sided foraminal narrowing at L4-L5. 4. ACDF changes at L5-S1 level. 5. No evidence of severe spinal canal stenosis to suspect cauda equina syndrome.       RECENT VITALS:     Temp: 98.8 °F (37.1 °C),  Pulse: 85, Respirations: 18, BP: 134/85, SpO2: 93 %      This patient is a 54 y.o. Female with hx of lumbar fusion done in 2023 by Dr. Christina. Having pain radiating down left leg. No trauma. Carried a heavy bag of dog food prior to symptoms starting. Having saddle paraesthesia that started today. Was seen yesterday in ED and given meds but meds aren't helping. MRI shows severe lumbar stenosis. Neurosurgery consulted.      ED Course as of 02/03/25 0024   Sun Feb 02, 2025   2333 Notified by RN patient requesting more pain medication.  Will give fentanyl. [SD]   Mon Feb 03, 2025   0008 Reassessed patient, patient crying stating that she cannot take this pain anymore.  Patient stated that the pain medication she was discharged home on yesterday are not helping her at all.  Spoke with neurosurgery who states that there is no acute

## 2025-02-03 NOTE — ED NOTES
Report taken from Linda RN  Pt is A&Ox4, even unlabored RR NAD  Pt resting comfortably on cot with call light within reach

## 2025-02-03 NOTE — CARE COORDINATION
Case Management Assessment  Initial Observation Evaluation    Transition plan: Case Management Assessment  Initial Observation Evaluation    Date/Time of Evaluation: 2/3/2025 1048 AM  Assessment Completed by: Kindra Klein    If patient is discharged prior to next notation, then this note serves as note for discharge by case management.    Patient Name: Charisma Donnelly                   YOB: 1970  Diagnosis: Lumbar radiculopathy [M54.16]  Lumbar stenosis without neurogenic claudication [M48.061]  Spinal stenosis of lumbar region, unspecified whether neurogenic claudication present [M48.061]                   CM Services requested for transitional needs.     Patient Admission Status: Observation   Readmission Risk (Low < 19, Mod (19-27), High > 27): Readmission Risk Score: 9.3    Current PCP: Lucy Torres APRN - CNP  PCP verified by CM? Yes (MELLISSA Calle)       History Provided by: Patient  Patient Orientation: Alert and Oriented, Person, Place, Situation, Self    Patient Cognition: Alert      ADLS  Prior functional level: Independent in ADLs/IADLs  Current functional level: Assistance with the following:, Bathing, Dressing, Toileting, Cooking, Housework, Shopping, Mobility, Other (see comment) (needs assist d/t back pain and decreased mobility)  Family can provide assistance at DC: Yes  Would you like Case Management to discuss the discharge plan with any other family members/significant others, and if so, who? No    Financial    Payor: [employer] / Plan: [employer] [place] MEDICAID / Product Type: *No Product type* /       Transportation/Food Security/Housing Addressed:  No issues identified.     Equipment needs: none; has cane, rollator, wheeled walker, reacher, sock helper BSC, shower seat and bathroom grab bars at home    Case Management Services Information Letter Provided [x]    Transition plan: plan is to return home independent and family will transport home. Waiting to be seen by Neuro

## 2025-02-03 NOTE — ED NOTES
Patient presents to ED c/o excruciating back pain radiating down her right leg with some associated numbness/tingling to the area as well. Pain has started gradually and significantly worsened since lifting a bag of dog food on Thursday. Patient reports some perineal numbness new today. Patient reports the pain is worse with movement. Patient has hx low back surgery in 2023.

## 2025-02-03 NOTE — PROGRESS NOTES
[facility]  CDU / OBSERVATION ENCOUNTER  ATTENDING NOTE       I performed a history and physical examination of the patient and discussed management with the resident or midlevel provider. I reviewed the resident or midlevel provider's note and agree with the documented findings and plan of care. Any areas of disagreement are noted on the chart. I was personally present for the key portions of any procedures. I have documented in the chart those procedures where I was not present during the key portions. I have reviewed the nurses notes. I agree with the chief complaint, past medical history, past surgical history, allergies, medications, social and family history as documented unless otherwise noted below.    The Family history, social history, and ROS are effectively unchanged since admission unless noted elsewhere in the chart.    This patient was placed in the observation unit for reevaluation for possible admission to the hospital    Patient admitted to the ETU for chronic low back pain and neurosurgery consult.  Patient did have surgery on her back in 2023 with  and.  Over the past 1 week she has been having increasing back pain.  She says that she is also having new numbness and tingling in her groin area.  Patient did have an MRI of her lumbar spine in the emergency department yesterday which showed multilevel degenerative disc disease and facet arthrosis, mild canal stenosis at L2-L3, L3-L4, and L4-L5, as well as severe right-sided and moderate left-sided foraminal narrowing at L4-L5.    On my exam this morning, patient states that she continues to have pain as well as the numbness and tingling to her groin and legs.  She has no new complaints.  Patient was able to walk with PT today who felt that she did not need any further therapy at this time.  Will await neurosurgery attending recommendations.    Paola Braswell MD  Attending Emergency  Physician

## 2025-02-03 NOTE — CONSULTS
Department of Neurosurgery                                            Nurse Practitioner Consult Note      Reason for Consult:  Low back pain, MRI with foraminal narrowing  Requesting Physician:  Dr Zhou  Neurosurgeon:   [] Dr. Yusuf  [] Dr. Christina  [x] Dr. Montes  [] Dr. Colmenares      History Obtained From:  patient, electronic medical record    CHIEF COMPLAINT:         Chief Complaint   Patient presents with    Back Pain       HISTORY OF PRESENT ILLNESS:       The patient is a 54 y.o. female with chronic low back pain, cervical spondylosis, cervical nerve radiofrequency ablation, C7-T1 hemilaminectomy and foraminotomy, C5-T1 fusion. She has been doing overall well since her surgery in 2023 with Dr Christina but over the last week or so she has been feeling generalized back pain and more sore than normal. Then 4 days ago she was lifting a heavy bag of dog food and felt immediate pain in her back. She has had difficulty walking and sleeping since then. She has not been taking anything at home for her symptoms. She tried doing some PT exercises she was taught during previous injuries without improvement. She has a history of urinary incontinence but reports noticing numbness and tingling in her groin that was new from baseline, she has urinated and had bowel movements without issue. She also notes numbness and tingling down her right leg as well as radiation of pain from her back to her buttock and groin.    PAST MEDICAL HISTORY :       Past Medical History:        Diagnosis Date    Abnormal uterine bleeding (AUB)     fibroid    Anxiety     Arthritis     CAD (coronary artery disease)     stent  LAD    Chronic neck pain     COVID-19 07/2022    fever, slight cough, fatigue,lost taste,  admitted for couple days bc of elevated troponins    Depression     pcp manages    Diabetes mellitus (HCC) 2017    managed by PCP    Fatty liver     Gastroparesis     Histiocytosis (HCC)     [place] (hard of hearing)     needs hearing

## 2025-02-03 NOTE — PROGRESS NOTES
Physical Therapy  Facility/Department: [facility] OBSERVATION UNIT  Physical Therapy Initial Assessment    Name: Charisma Donnelly  : 1970  MRN: [id number]  Date of Service: 2/3/2025    Discharge Recommendations:  Patient would benefit from continued therapy after discharge   PT Equipment Recommendations  Equipment Needed: No (has rw and rollator)      Patient Diagnosis(es): The primary encounter diagnosis was Lumbar radiculopathy. A diagnosis of Spinal stenosis of lumbar region, unspecified whether neurogenic claudication present was also pertinent to this visit.  Past Medical History:  has a past medical history of Abnormal uterine bleeding (AUB), Anxiety, Arthritis, CAD (coronary artery disease), Chronic neck pain, COVID-19, Depression, Diabetes mellitus (HCC), Fatty liver, Gastroparesis, Histiocytosis (HCC), [place] (hard of hearing), HTN (hypertension), Hx of blood clots, Hyperlipidemia, Hypothyroid, Kidney stone, Lower back pain, Mixed incontinence, Myocardial infarct (HCC), PCOS (polycystic ovarian syndrome), Pulmonary Langerhans cell granulomatosis (HCC), Snores, Spinal stenosis, Under care of service provider, Under care of service provider, Under care of service provider, Under care of team, Under care of team, Under care of team, Vasomotor symptoms due to menopause, Wears eyeglasses, Wears partial dentures, and Wellness examination.  Past Surgical History:  has a past surgical history that includes back surgery ();  section (); Mastoid surgery; Cardiac catheterization (); Lung biopsy (Right); hip surgery (Left); Colonoscopy (N/A, 2021); Upper gastrointestinal endoscopy (2021); Cervical spine surgery (); cervical fusion (2016); laminectomy (); Hysterectomy (N/A, 2022); US BIOPSY LYMPH NODE (09/15/2023); lumbar laminectomy (10/30/2023); lumbar fusion (N/A, 10/30/2023); Upper gastrointestinal endoscopy (N/A, 2023); Upper gastrointestinal endoscopy (N/A,

## 2025-02-04 ENCOUNTER — TELEPHONE (OUTPATIENT)
Dept: FAMILY MEDICINE CLINIC | Age: 55
End: 2025-02-04

## 2025-02-04 ENCOUNTER — OFFICE VISIT (OUTPATIENT)
Dept: FAMILY MEDICINE CLINIC | Age: 55
End: 2025-02-04
Payer: COMMERCIAL

## 2025-02-04 VITALS
TEMPERATURE: 97.7 F | OXYGEN SATURATION: 98 % | BODY MASS INDEX: 35.61 KG/M2 | DIASTOLIC BLOOD PRESSURE: 98 MMHG | WEIGHT: 201 LBS | HEART RATE: 94 BPM | SYSTOLIC BLOOD PRESSURE: 150 MMHG

## 2025-02-04 DIAGNOSIS — Z79.4 TYPE 2 DIABETES MELLITUS WITH HYPERGLYCEMIA, WITH LONG-TERM CURRENT USE OF INSULIN (HCC): ICD-10-CM

## 2025-02-04 DIAGNOSIS — G43.909 MIGRAINE WITHOUT STATUS MIGRAINOSUS, NOT INTRACTABLE, UNSPECIFIED MIGRAINE TYPE: ICD-10-CM

## 2025-02-04 DIAGNOSIS — Z23 IMMUNIZATION DUE: Primary | ICD-10-CM

## 2025-02-04 DIAGNOSIS — E11.65 TYPE 2 DIABETES MELLITUS WITH HYPERGLYCEMIA, WITH LONG-TERM CURRENT USE OF INSULIN (HCC): ICD-10-CM

## 2025-02-04 DIAGNOSIS — Z09 HOSPITAL DISCHARGE FOLLOW-UP: ICD-10-CM

## 2025-02-04 DIAGNOSIS — M48.061 SPINAL STENOSIS OF LUMBAR REGION, UNSPECIFIED WHETHER NEUROGENIC CLAUDICATION PRESENT: ICD-10-CM

## 2025-02-04 PROCEDURE — 90471 IMMUNIZATION ADMIN: CPT | Performed by: NURSE PRACTITIONER

## 2025-02-04 PROCEDURE — 1111F DSCHRG MED/CURRENT MED MERGE: CPT | Performed by: NURSE PRACTITIONER

## 2025-02-04 PROCEDURE — 99215 OFFICE O/P EST HI 40 MIN: CPT | Performed by: NURSE PRACTITIONER

## 2025-02-04 PROCEDURE — 90661 CCIIV3 VAC ABX FR 0.5 ML IM: CPT | Performed by: NURSE PRACTITIONER

## 2025-02-04 RX ORDER — ATOGEPANT 60 MG/1
60 TABLET ORAL DAILY
Qty: 14 TABLET | Refills: 0 | COMMUNITY
Start: 2025-02-04

## 2025-02-04 SDOH — ECONOMIC STABILITY: FOOD INSECURITY: WITHIN THE PAST 12 MONTHS, THE FOOD YOU BOUGHT JUST DIDN'T LAST AND YOU DIDN'T HAVE MONEY TO GET MORE.: NEVER TRUE

## 2025-02-04 SDOH — ECONOMIC STABILITY: FOOD INSECURITY: WITHIN THE PAST 12 MONTHS, YOU WORRIED THAT YOUR FOOD WOULD RUN OUT BEFORE YOU GOT MONEY TO BUY MORE.: NEVER TRUE

## 2025-02-04 ASSESSMENT — ENCOUNTER SYMPTOMS: BACK PAIN: 1

## 2025-02-04 ASSESSMENT — PATIENT HEALTH QUESTIONNAIRE - PHQ9: DEPRESSION UNABLE TO ASSESS: URGENT/EMERGENT SITUATION

## 2025-02-04 NOTE — PROGRESS NOTES
Post-Discharge Transitional Care Follow Up      Charisma Donnelly   YOB: 1970    Date of Office Visit:  2/4/2025  Date of Hospital Admission: 2/2/25  Date of Hospital Discharge: 2/3/25  Readmission Risk Score (high >=14%. Medium >=10%):Readmission Risk Score: 9.3      Care management risk score Rising risk (score 2-5) and Complex Care (Scores >=6): No Risk Score On File     Non face to face  following discharge, date last encounter closed (first attempt may have been earlier): *No documented post hospital discharge outreach found in the last 14 days     Call initiated 2 business days of discharge: *No response recorded in the last 14 days     Immunization due  -     Influenza, FLUCELVAX Trivalent, (age 6 mo+) IM, Preservative Free, 0.5mL  Hospital discharge follow-up  -     AL DISCHARGE MEDS RECONCILED W/ CURRENT OUTPATIENT MED LIST  Type 2 diabetes mellitus with hyperglycemia, with long-term current use of insulin (HCC)  -     Hemoglobin A1C; Future  -     Albumin/Creatinine Ratio, Urine; Future  Spinal stenosis of lumbar region, unspecified whether neurogenic claudication present  Migraine without status migrainosus, not intractable, unspecified migraine type  -     Atogepant (QULIPTA) 60 MG TABS; Take 60 mg by mouth daily, Disp-14 tablet, [facility] [id number] Exp 3/26Sample  Assessment & Plan  1. Post-hospitalization follow-up.  Her current medication regimen appears to be appropriate, but there may be a need for minor adjustments. She has been advised to communicate with Dr. Dixon via [employer] to provide an update on her condition. She has also been instructed to schedule an appointment with Dr. Christina's office. She will continue her current medication regimen, including prednisone 3 mg once a day, despite the increase in blood sugar levels. She has been advised to take oxycodone every 4 hours instead of every 8 hours as that is what the prescription was written for. She will also continue with her physical

## 2025-02-04 NOTE — TELEPHONE ENCOUNTER
Care Transitions Initial Follow Up Call    Outreach made within 2 business days of discharge: Yes    Patient: Charisma Donnelly Patient : 1970   MRN: [id number]  Reason for Admission: Lumbar stenosis  Discharge Date: 2/3/25       Spoke with: Patient    Discharge department/facility: [facility] Patient Contact:  Was patient able to fill all prescriptions: Yes  Was patient instructed to bring all medications to the follow-up visit: Yes  Is patient taking all medications as directed in the discharge summary? Yes  Does patient understand their discharge instructions: Yes  Does patient have questions or concerns that need addressed prior to 7-14 day follow up office visit: no    Additional needs identified to be addressed with provider  No needs identified             Scheduled appointment with PCP within 7-14 days    Follow Up  Future Appointments   Date Time Provider Department Center   2025  3:00 PM Tasha Benitez APRN - CNP [facility]   2025 10:00 AM Carri Christina DO [facility]   2025  8:00 AM Claire Garza MD [facility]   3/18/2025  8:00 AM Luis Angel Dixon MD [facility]   6/3/2025  8:30 AM Carri Christina DO [facility]       Samir Bonilla MA

## 2025-02-05 ENCOUNTER — TELEPHONE (OUTPATIENT)
Dept: FAMILY MEDICINE CLINIC | Age: 55
End: 2025-02-05

## 2025-02-05 NOTE — TELEPHONE ENCOUNTER
Patient calling stating that she can not get into her PM doctor until 02/28. She states she will be out of pain meds before that. She sees surgeon 02/11 Please advise

## 2025-02-06 ENCOUNTER — OFFICE VISIT (OUTPATIENT)
Age: 55
End: 2025-02-06

## 2025-02-06 VITALS
OXYGEN SATURATION: 98 % | DIASTOLIC BLOOD PRESSURE: 86 MMHG | SYSTOLIC BLOOD PRESSURE: 147 MMHG | WEIGHT: 202.4 LBS | HEART RATE: 89 BPM | BODY MASS INDEX: 35.85 KG/M2

## 2025-02-06 DIAGNOSIS — I21.4 NSTEMI (NON-ST ELEVATED MYOCARDIAL INFARCTION) (HCC): Primary | ICD-10-CM

## 2025-02-06 DIAGNOSIS — I25.119 CORONARY ARTERY DISEASE INVOLVING NATIVE HEART WITH ANGINA PECTORIS, UNSPECIFIED VESSEL OR LESION TYPE (HCC): ICD-10-CM

## 2025-02-06 DIAGNOSIS — E11.9 TYPE 2 DIABETES MELLITUS WITHOUT COMPLICATION, WITH LONG-TERM CURRENT USE OF INSULIN (HCC): ICD-10-CM

## 2025-02-06 DIAGNOSIS — E78.2 MIXED HYPERLIPIDEMIA: ICD-10-CM

## 2025-02-06 DIAGNOSIS — I10 ESSENTIAL HYPERTENSION: ICD-10-CM

## 2025-02-06 DIAGNOSIS — K21.00 GASTROESOPHAGEAL REFLUX DISEASE WITH ESOPHAGITIS WITHOUT HEMORRHAGE: ICD-10-CM

## 2025-02-06 DIAGNOSIS — Z79.4 TYPE 2 DIABETES MELLITUS WITHOUT COMPLICATION, WITH LONG-TERM CURRENT USE OF INSULIN (HCC): ICD-10-CM

## 2025-02-06 ASSESSMENT — ENCOUNTER SYMPTOMS
RESPIRATORY NEGATIVE: 1
BACK PAIN: 1
SHORTNESS OF BREATH: 0
CHEST TIGHTNESS: 0

## 2025-02-06 NOTE — TELEPHONE ENCOUNTER
Pt advised. She wants Lucy to call her PM Dr to get her in sooner. Advised pt that 2/28/ is good usually  to get in with a Specialty office is about 2 months.

## 2025-02-06 NOTE — PROGRESS NOTES
Component Value Date    CHOL 182 01/01/2022    TRIG 121 01/01/2022    HDL 44 08/02/2023    VLDL NOT REPORTED 01/01/2022    CHOLHDLRATIO 5.6 (H) 08/02/2023       Lab Results   Component Value Date     12/29/2024    K 3.8 12/29/2024     12/29/2024    CO2 22 12/29/2024    BUN 10 12/29/2024    CREATININE 0.7 12/29/2024    GLUCOSE 159 (H) 12/29/2024    CALCIUM 9.2 12/29/2024    BILITOT 0.3 11/11/2024    ALKPHOS 86 11/11/2024    AST 12 11/11/2024    ALT 16 11/11/2024    LABGLOM >90 12/29/2024    GFRAA >60 09/06/2022    GLOB NOT REPORTED 09/19/2021       EKG 2/6/2025:   Sinus Rhythm   -Old anterior infarct.    ECHO:     Mldly reduced left ventricular systolic function with a visually estimated EF of 40 - 45%. EF by 2D Simpsons Biplane is 42%. Left ventricle size is normal. Mild global hypokinesis present. Abnormal diastolic function.    Right ventricle size is normal. Normal systolic function.    Mild mitral regurgitation.    Trace tricuspid regurgitation. RVSP 30 mmHg.     STRESS:     Lexiscan stress test was performed.  Nuclear imaging is reported separately.    Stress Combined Conclusion: The study is most consistent with myocardial infarction and is negative for myocardial ischemia. Findings suggest a high risk of cardiac events.    Stress Function: Left ventricular function post-stress is abnormal. Post-stress ejection fraction is 24%. The stress end diastolic cavity size is moderately enlarged. The stress end systolic cavity size is moderately enlarged.    Perfusion Comments: Prone images were obtained. LV perfusion is abnormal. There is no evidence of inducible ischemia.    Perfusion Defect: There is a moderate severity left ventricular stress perfusion defect present in the anteroseptal, inferior and apex segment(s) that is fixed. The defect appears to be probable infarction.    Resting ECG demonstrates normal sinus rhythm.    The stress ECG was negative for ischemia.     Past Medical and Surgical

## 2025-02-11 ENCOUNTER — OFFICE VISIT (OUTPATIENT)
Dept: NEUROSURGERY | Age: 55
End: 2025-02-11
Payer: COMMERCIAL

## 2025-02-11 VITALS
BODY MASS INDEX: 35.44 KG/M2 | HEART RATE: 89 BPM | SYSTOLIC BLOOD PRESSURE: 159 MMHG | WEIGHT: 200 LBS | DIASTOLIC BLOOD PRESSURE: 87 MMHG | HEIGHT: 63 IN

## 2025-02-11 DIAGNOSIS — M46.1 SACROILIITIS: Primary | ICD-10-CM

## 2025-02-11 PROCEDURE — 3079F DIAST BP 80-89 MM HG: CPT | Performed by: NEUROLOGICAL SURGERY

## 2025-02-11 PROCEDURE — G8417 CALC BMI ABV UP PARAM F/U: HCPCS | Performed by: NEUROLOGICAL SURGERY

## 2025-02-11 PROCEDURE — 3077F SYST BP >= 140 MM HG: CPT | Performed by: NEUROLOGICAL SURGERY

## 2025-02-11 PROCEDURE — 3017F COLORECTAL CA SCREEN DOC REV: CPT | Performed by: NEUROLOGICAL SURGERY

## 2025-02-11 PROCEDURE — G8427 DOCREV CUR MEDS BY ELIG CLIN: HCPCS | Performed by: NEUROLOGICAL SURGERY

## 2025-02-11 PROCEDURE — 99214 OFFICE O/P EST MOD 30 MIN: CPT | Performed by: NEUROLOGICAL SURGERY

## 2025-02-11 PROCEDURE — 4004F PT TOBACCO SCREEN RCVD TLK: CPT | Performed by: NEUROLOGICAL SURGERY

## 2025-02-11 NOTE — PROGRESS NOTES
SAVANA Ayala CNP   Insulin Pen Needle 31G X 5 MM MISC Use pen needle for giving daily insulin injection 5/25/22  Yes Lucy Torres APRN - CNP   Multiple Vitamins-Minerals (THERAPEUTIC MULTIVITAMIN-MINERALS) tablet Take 1 tablet by mouth daily   Yes Ruth Alcocer MD   ascorbic acid (VITAMIN C) 500 MG tablet Take 1 tablet by mouth daily   Yes ProviderRuth MD   nystatin (MYCOSTATIN) 178591 UNIT/GM cream APPLY TO AFFECTED AREA TWICE A DAY 2/21/25   Lucy Torres APRN - CNP   amitriptyline (ELAVIL) 50 MG tablet TAKE 1 TABLET BY MOUTH EVERY DAY AT NIGHT 2/18/25   Carri Christina DO   docusate sodium (COLACE) 100 MG capsule TAKE 1 CAPSULE BY MOUTH TWICE A DAY 2/18/25   Lucy Torres APRN - CNP   naproxen (NAPROSYN) 500 MG tablet Take 1 tablet by mouth 2 times daily (with meals) for 7 days 2/3/25 2/10/25  Alondra Harvey DO   lidocaine 4 % external patch Place 1 patch onto the skin daily  Patient not taking: Reported on 2/11/2025 2/1/25 3/3/25  Zachary Martinez MD   acetaminophen (TYLENOL) 500 MG tablet Take 2 tablets by mouth every 6 hours as needed for Pain 5/17/24 2/4/25  Jessica Jon MD       Current Medications:   No current facility-administered medications for this visit.    Review of system: negative, otherwise described in HPI  PHYSICAL EXAM:       BP (!) 159/87 (Site: Right Upper Arm, Position: Sitting, Cuff Size: Medium Adult)   Pulse 89   Ht 1.6 m (5' 3\")   Wt 90.7 kg (200 lb)   LMP 11/26/2022 Comment: current bleeding/ urine HCG is negative today 12-12-22  BMI 35.43 kg/m²   Physical Exam     Gen: NAD  HEENT: moist mucus membranes  Cardio: RRR  Pulm: chest rise symmetrically  GI: abd soft  Ext: no edema  Skin: warm    Neuro:    AOX3  CN 2-12 grossly intact  Speech articulate  Motor 5/5  Sensation symmetrical   Positive Callum's  Positive pelvic compression and distraction  Positive Rolf finger test      Radiology Review:    MRI lumbar spine w wo contrast  02/02/2025  Official

## 2025-02-12 ENCOUNTER — HOSPITAL ENCOUNTER (OUTPATIENT)
Dept: INTERVENTIONAL RADIOLOGY/VASCULAR | Age: 55
Discharge: HOME | End: 2025-02-14
Payer: COMMERCIAL

## 2025-02-12 DIAGNOSIS — M46.1 SACROILIITIS (HCC): ICD-10-CM

## 2025-02-12 PROCEDURE — 6360000002 HC RX W HCPCS: Performed by: NEUROLOGICAL SURGERY

## 2025-02-12 PROCEDURE — 6360000004 HC RX CONTRAST MEDICATION: Performed by: RADIOLOGY

## 2025-02-12 PROCEDURE — 2709999900

## 2025-02-12 PROCEDURE — 27096 INJECT SACROILIAC JOINT: CPT

## 2025-02-12 RX ORDER — DEXAMETHASONE SODIUM PHOSPHATE 10 MG/ML
10 INJECTION, SOLUTION INTRAMUSCULAR; INTRAVENOUS ONCE
Status: COMPLETED | OUTPATIENT
Start: 2025-02-12 | End: 2025-02-12

## 2025-02-12 RX ORDER — IOPAMIDOL 612 MG/ML
INJECTION, SOLUTION INTRAVASCULAR PRN
Status: COMPLETED | OUTPATIENT
Start: 2025-02-12 | End: 2025-02-12

## 2025-02-12 RX ORDER — BUPIVACAINE HYDROCHLORIDE 7.5 MG/ML
1 INJECTION, SOLUTION EPIDURAL; RETROBULBAR ONCE
Status: COMPLETED | OUTPATIENT
Start: 2025-02-12 | End: 2025-02-12

## 2025-02-12 RX ADMIN — IOPAMIDOL 0.2 ML: 612 INJECTION, SOLUTION INTRAVENOUS at 10:53

## 2025-02-12 RX ADMIN — BUPIVACAINE HYDROCHLORIDE 7.5 MG: 7.5 INJECTION, SOLUTION EPIDURAL; RETROBULBAR at 10:54

## 2025-02-12 RX ADMIN — DEXAMETHASONE SODIUM PHOSPHATE 10 MG: 10 INJECTION INTRAMUSCULAR; INTRAVENOUS at 10:54

## 2025-02-12 NOTE — PROGRESS NOTES
Patient tolerated right SI joint injection without distress. Dressing to site. Discharge instructions given, no questions at this time. Patient discharged home with spouse.

## 2025-02-13 ENCOUNTER — APPOINTMENT (OUTPATIENT)
Dept: PHYSICAL THERAPY | Age: 55
End: 2025-02-13
Attending: EMERGENCY MEDICINE
Payer: COMMERCIAL

## 2025-02-17 NOTE — TELEPHONE ENCOUNTER
Charisma Donnelly is calling to request a refill on the following medication(s):    Medication Request:  Requested Prescriptions     Pending Prescriptions Disp Refills    docusate sodium (COLACE) 100 MG capsule [Pharmacy Med Name: DOCUSATE SODIUM 100 MG SOFTGEL] 60 capsule 0     Sig: TAKE 1 CAPSULE BY MOUTH TWICE A DAY       Last Visit Date (If Applicable):  2/4/2025    Next Visit Date:    3/4/2025

## 2025-02-18 ENCOUNTER — HOSPITAL ENCOUNTER (OUTPATIENT)
Dept: PHYSICAL THERAPY | Age: 55
Setting detail: THERAPIES SERIES
Discharge: HOME | End: 2025-02-18
Attending: EMERGENCY MEDICINE
Payer: COMMERCIAL

## 2025-02-18 PROCEDURE — 97161 PT EVAL LOW COMPLEX 20 MIN: CPT

## 2025-02-18 RX ORDER — AMITRIPTYLINE HYDROCHLORIDE 50 MG/1
50 TABLET ORAL NIGHTLY
Qty: 90 TABLET | Refills: 1 | Status: SHIPPED | OUTPATIENT
Start: 2025-02-18

## 2025-02-18 RX ORDER — DOCUSATE SODIUM 100 MG/1
100 CAPSULE, LIQUID FILLED ORAL 2 TIMES DAILY
Qty: 60 CAPSULE | Refills: 1 | Status: SHIPPED | OUTPATIENT
Start: 2025-02-18

## 2025-02-18 NOTE — CONSULTS
[x] [facility]   Outpatient Rehabilitation & Therapy  [address]  P: [phone]   F: [phone]    Physical Therapy Spine Evaluation    Date:  2025  Patient: Charisma Donnelly  : 1970  MRN: [id number]  Physician: Paola Braswell MD      Insurance: [facility] [employer]  v[facility], auth after 30 visits.   Medical Diagnosis: M48.061 (ICD-10-CM) - Lumbar stenosis without neurogenic claudication   Rehab Codes: M54.5, R26.2   Onset Date: 25    Next 's appt.: 4/10/25 Dr. Christina , pain management 25   Visit Count:    Cancel/No Show: 0/0    Subjective:   CC: low back pain, buttock pain.   HPI: Arrives for PT due to acute on chronic low back pain with pain in buttocks/hips. Arrives ambulating with straight cane. Hx of L5-S1 fusion in  and Fusion of L4-L5 in . Says the pain increased on 25 when she picked up a bag of dog food. She been caring for 10 boxer puppies since day after [place]. A lot of bending down etc. Says she couldn't sit until she got injection on 25. The excruciating pain is gone but still have pain on both sides of low back. Radicular pain on right to mid posterior thigh. She would like to do aquatic therapy. Reports she walk for about 5' before she has to stop, can stand for about 10'. Says prior to 25 she had not used AD since October.  Pain management appointment 25. Prior to this flare up patiet was walking 2 miles a day.     Past Medical History:   Diagnosis Date    Abnormal uterine bleeding (AUB)     fibroid    Anxiety     Arthritis     CAD (coronary artery disease)     stent  LAD    Chronic neck pain     COVID-19 2022    fever, slight cough, fatigue,lost taste,  admitted for couple days bc of elevated troponins    Depression     pcp manages    Diabetes mellitus (HCC) 2017    managed by PCP    Fatty liver     Gastroparesis     Histiocytosis (HCC)     [place] (hard of hearing)     needs hearing aids but doesn't have them, Seems to

## 2025-02-20 ENCOUNTER — HOSPITAL ENCOUNTER (OUTPATIENT)
Dept: PHYSICAL THERAPY | Age: 55
Setting detail: THERAPIES SERIES
Discharge: HOME | End: 2025-02-20
Attending: EMERGENCY MEDICINE
Payer: COMMERCIAL

## 2025-02-20 NOTE — FLOWSHEET NOTE
[facility]   Outpatient Rehabilitation & Therapy  [address] [facility] [address]  P: [phone]   F: [phone]     Physical Therapy Cancel/No Show note    Date: 2025  Patient: Charisma Donnelly  : 1970  MRN: [id number]    Visit Count:   Cancels/No Shows to date:     For today's appointment patient:    [x]  Cancelled    [] Rescheduled appointment    [] No-show     Reason given by patient:    [x]  Patient ill    []  Conflicting appointment    [] No transportation      [] Conflict with work    [] No reason given    [] Weather related    [] COVID-19    [] Other:      Comments:        [x] Next appointment was confirmed    Electronically signed by: Taiwo Uribe, PTA

## 2025-02-21 DIAGNOSIS — B37.2 YEAST INFECTION OF THE SKIN: ICD-10-CM

## 2025-02-21 RX ORDER — NYSTATIN 100000 U/G
CREAM TOPICAL
Qty: 30 G | Refills: 0 | Status: SHIPPED | OUTPATIENT
Start: 2025-02-21

## 2025-02-21 NOTE — TELEPHONE ENCOUNTER
Charisma Donnelly is calling to request a refill on the following medication(s):    Medication Request:  Requested Prescriptions     Pending Prescriptions Disp Refills    nystatin (MYCOSTATIN) 875206 UNIT/GM cream [Pharmacy Med Name: NYSTATIN 100,000 UNIT/GM CREAM] 30 g 0     Sig: APPLY TO AFFECTED AREA TWICE A DAY       Last Visit Date (If Applicable):  2/4/2025    Next Visit Date:    3/4/2025

## 2025-02-25 ENCOUNTER — HOSPITAL ENCOUNTER (OUTPATIENT)
Dept: PHYSICAL THERAPY | Age: 55
Setting detail: THERAPIES SERIES
Discharge: HOME | End: 2025-02-25
Attending: EMERGENCY MEDICINE
Payer: COMMERCIAL

## 2025-02-25 PROCEDURE — 97113 AQUATIC THERAPY/EXERCISES: CPT

## 2025-02-25 NOTE — FLOWSHEET NOTE
[facility]   Outpatient Rehabilitation & Therapy  [address]  P: [phone]   F: [phone]    Physical Therapy Daily Treatment Note      Date:  2025  Patient Name:  Charisma Donnelly    :  1970  MRN: [id number]  Physician: Paola Braswell MD                                   Insurance: [facility]  vists, auth after 30 visits.   Medical Diagnosis: M48.061 (ICD-10-CM) - Lumbar stenosis without neurogenic claudication   Rehab Codes: M54.5, R26.2   Onset Date: 25                   Next 's appt.: 4/10/25 Dr. Christina , pain management 25   Visit Count: 2                                Cancel/No Show: 0/0    Subjective:  Patient reports pain across low back with no radicular pain at this time.  Notes injection has continued to give her relief and allowed her to tolerate sitting and ambulating up to 1/4 mile.    Pain:  [x] Yes  [] No Location: across low back,     Pain Rating: (0-10 scale) 4/10  Pain altered Tx:  [x] No  [] Yes  Action:  Comments: Initial aquatic therapy visit.  Educated on postural awareness, core stability and working in pain free ranges wit all exercises performed.     Objective:    [facility] Services Exercise Log  Aquatic, Hip & DLS Program- Phase 1    Date of Eval: 25                               Primary PT: Jimmy Esquivel, PT      Date 25       Visit # 2/12       Walk F/L/R 2 Laps @ rail       Marching 20x       Squats 10x3\"       Step-Ups F/L        Step Down F/L        Heel-toe raises 10x       SLR F/L/R 10x       Hip/Knee Flex/Ext        F/L Lunges                Kickboard Ex. Small        Iso Abd. 10x5\"       Push-pull 10x       Paddling                UE Format:        Horiz Abd/Add        IR/ER (wipers)        Alt Flex/Ext        Alt Press Down        Abd/Add                Deep Water: 1 Noodle        Hang 3'       Cycling        Jacks        X-Country                Balance        SLS

## 2025-02-27 ENCOUNTER — LAB (OUTPATIENT)
Dept: LAB | Age: 55
End: 2025-02-27

## 2025-02-27 ENCOUNTER — OFFICE VISIT (OUTPATIENT)
Age: 55
End: 2025-02-27
Payer: COMMERCIAL

## 2025-02-27 VITALS
DIASTOLIC BLOOD PRESSURE: 93 MMHG | SYSTOLIC BLOOD PRESSURE: 150 MMHG | BODY MASS INDEX: 35.44 KG/M2 | HEIGHT: 63 IN | WEIGHT: 200 LBS | TEMPERATURE: 97 F | OXYGEN SATURATION: 94 %

## 2025-02-27 DIAGNOSIS — J84.82: Primary | ICD-10-CM

## 2025-02-27 DIAGNOSIS — L30.8 OTHER SPECIFIED DERMATITIS: ICD-10-CM

## 2025-02-27 PROCEDURE — 11105 PUNCH BX SKIN EA SEP/ADDL: CPT | Performed by: DERMATOLOGY

## 2025-02-27 PROCEDURE — 11104 PUNCH BX SKIN SINGLE LESION: CPT | Performed by: DERMATOLOGY

## 2025-02-27 NOTE — PROGRESS NOTES
Exam:  Physical Exam  Focused exam of face, bilateral forearms was performed    Diagnoses/exam findings/medical history pertinent to this visit are listed below:    Assessment:   Diagnosis Orders   1. Pulmonary Langerhans cell granulomatosis (HCC)        2. Other specified dermatitis             Plan:  Ddx: r/o langerhans cell histiocytosis, favor SK and excoriation  Cutaneous involvement of LCH may change the prognosis and treatment of her disease, therefore will biopsy though suspicion for cutaneous involvement is low  Punch Biopsy x 2: The procedure and its risks were explained including but not limited to pain, bleeding, infection, permanent scar, permanent pigment alteration and need for an additional procedure. Consent to proceed with the procedure was obtained from the patient or the parent. After cleaning with alcohol the lesions were anesthetized with 1% lidocaine with epinephrine and two 4 mm punches were performed on the right forearm and left forearm. Hemostasis was achieved with suture closure of the defects with 5-0  gut  and Vaseline and a bandage were applied.          Return pending path.    Future Appointments   Date Time Provider Department Center   2/28/2025  9:40 AM Luis Angel Dixon MD [facility]   3/3/2025  8:00 AM Taiwo Uribe PTA [facility]   3/4/2025  8:40 AM Seal, Lucy, APRN - CNP [facility]   3/6/2025  8:15 AM Taiwo Uribe PTA [facility]   3/11/2025  8:15 AM Taiwo Uribe, PTA [facility]   3/13/2025  8:15 AM Taiwo Uribe, PTA [facility]   3/18/2025  8:00 AM Luis Angel Dixon MD [facility]   4/10/2025  9:00 AM Carri Christina DO [facility]   6/3/2025  8:30 AM Carri Christian DO [facility]   7/10/2025  8:30 AM Claire Garza MD [facility]   8/15/2025 11:45 AM Tasha Benitez APRN - CNP [facility]         Patient Instructions   BIOPSY WOUND CARE    A biopsy is where a small piece

## 2025-02-27 NOTE — PATIENT INSTRUCTIONS

## 2025-02-28 ENCOUNTER — OFFICE VISIT (OUTPATIENT)
Dept: PAIN MANAGEMENT | Age: 55
End: 2025-02-28
Payer: COMMERCIAL

## 2025-02-28 VITALS — WEIGHT: 200 LBS | BODY MASS INDEX: 35.44 KG/M2 | HEIGHT: 63 IN

## 2025-02-28 DIAGNOSIS — M54.16 CHRONIC LUMBAR RADICULOPATHY: Primary | ICD-10-CM

## 2025-02-28 DIAGNOSIS — M47.812 CERVICAL SPONDYLOSIS: ICD-10-CM

## 2025-02-28 DIAGNOSIS — Z79.02 LONG TERM (CURRENT) USE OF ANTITHROMBOTICS/ANTIPLATELETS: ICD-10-CM

## 2025-02-28 DIAGNOSIS — Z98.1 HISTORY OF LUMBAR FUSION: ICD-10-CM

## 2025-02-28 DIAGNOSIS — F12.90 MARIJUANA USE: ICD-10-CM

## 2025-02-28 PROCEDURE — 99214 OFFICE O/P EST MOD 30 MIN: CPT | Performed by: ANESTHESIOLOGY

## 2025-02-28 PROCEDURE — 3017F COLORECTAL CA SCREEN DOC REV: CPT | Performed by: ANESTHESIOLOGY

## 2025-02-28 PROCEDURE — G8427 DOCREV CUR MEDS BY ELIG CLIN: HCPCS | Performed by: ANESTHESIOLOGY

## 2025-02-28 PROCEDURE — G8417 CALC BMI ABV UP PARAM F/U: HCPCS | Performed by: ANESTHESIOLOGY

## 2025-02-28 PROCEDURE — 4004F PT TOBACCO SCREEN RCVD TLK: CPT | Performed by: ANESTHESIOLOGY

## 2025-02-28 ASSESSMENT — ENCOUNTER SYMPTOMS
RESPIRATORY NEGATIVE: 1
BACK PAIN: 1
GASTROINTESTINAL NEGATIVE: 1

## 2025-02-28 NOTE — PROGRESS NOTES
MCG/ACT inhaler Inhale 2 puffs into the lungs 4 times daily as needed for Wheezing 54 g 1    Insulin Pen Needle 31G X 5 MM MISC Use pen needle for giving daily insulin injection 100 each 3    Multiple Vitamins-Minerals (THERAPEUTIC MULTIVITAMIN-MINERALS) tablet Take 1 tablet by mouth daily      ascorbic acid (VITAMIN C) 500 MG tablet Take 1 tablet by mouth daily      naproxen (NAPROSYN) 500 MG tablet Take 1 tablet by mouth 2 times daily (with meals) for 7 days 14 tablet 0    acetaminophen (TYLENOL) 500 MG tablet Take 2 tablets by mouth every 6 hours as needed for Pain 42 tablet 0     No current facility-administered medications for this visit.       Review of Systems   Constitutional: Negative.  Negative for fever.   HENT: Negative.     Respiratory: Negative.     Cardiovascular: Negative.    Gastrointestinal: Negative.    Musculoskeletal:  Positive for back pain and gait problem. Negative for neck pain.   Neurological:  Positive for weakness and numbness.         Objective:  General Appearance:  Well-appearing, in no acute distress, uncomfortable and in pain.    Vital signs: (most recent): Height 1.6 m (5' 3\"), weight 90.7 kg (200 lb), last menstrual period 11/26/2022, not currently breastfeeding.  Vital signs are normal.  No fever.    Output: Producing urine and producing stool.    HEENT: Normal HEENT exam.    Lungs:  Normal effort and normal respiratory rate.  She is not in respiratory distress.    Heart: Normal rate.    Extremities: Normal range of motion.  There is no deformity.    Neurological: Patient is alert and oriented to person, place and time.  Normal strength.  Patient has normal coordination.    Pupils:  Pupils are equal, round, and reactive to light.  Pupils are equal.   Skin:  Warm and dry.  No rash or cyanosis.   Gait antalgic  Range of motion lumbar spine limited  Straight leg raise positive  Motor strength 5/5    Assessment & Plan    54-year-old female with history of chronic neck and low back

## 2025-03-03 ENCOUNTER — HOSPITAL ENCOUNTER (OUTPATIENT)
Age: 55
Setting detail: SPECIMEN
Discharge: HOME | End: 2025-03-03

## 2025-03-03 DIAGNOSIS — I10 ESSENTIAL HYPERTENSION: ICD-10-CM

## 2025-03-03 DIAGNOSIS — J84.82: ICD-10-CM

## 2025-03-03 DIAGNOSIS — N95.1 HOT FLASHES DUE TO MENOPAUSE: ICD-10-CM

## 2025-03-03 DIAGNOSIS — I25.119 CORONARY ARTERY DISEASE INVOLVING NATIVE HEART WITH ANGINA PECTORIS, UNSPECIFIED VESSEL OR LESION TYPE: ICD-10-CM

## 2025-03-03 DIAGNOSIS — J30.2 SEASONAL ALLERGIC RHINITIS, UNSPECIFIED TRIGGER: ICD-10-CM

## 2025-03-03 DIAGNOSIS — E78.2 MIXED HYPERLIPIDEMIA: ICD-10-CM

## 2025-03-03 DIAGNOSIS — Z79.4 TYPE 2 DIABETES MELLITUS WITH HYPERGLYCEMIA, WITH LONG-TERM CURRENT USE OF INSULIN (HCC): ICD-10-CM

## 2025-03-03 DIAGNOSIS — E03.9 HYPOTHYROIDISM, UNSPECIFIED TYPE: ICD-10-CM

## 2025-03-03 DIAGNOSIS — E11.65 TYPE 2 DIABETES MELLITUS WITH HYPERGLYCEMIA, WITH LONG-TERM CURRENT USE OF INSULIN (HCC): ICD-10-CM

## 2025-03-03 LAB
ALBUMIN SERPL-MCNC: 4.4 G/DL (ref 3.5–5.2)
ALBUMIN/GLOB SERPL: 1.4 {RATIO} (ref 1–2.5)
ALP SERPL-CCNC: 88 U/L (ref 35–104)
ALT SERPL-CCNC: 25 U/L (ref 10–35)
ANION GAP SERPL CALCULATED.3IONS-SCNC: 11 MMOL/L (ref 9–16)
AST SERPL-CCNC: 19 U/L (ref 10–35)
BASOPHILS # BLD: 0 K/UL (ref 0–0.2)
BASOPHILS NFR BLD: 0 % (ref 0–2)
BILIRUB SERPL-MCNC: 0.3 MG/DL (ref 0–1.2)
BUN SERPL-MCNC: 13 MG/DL (ref 6–20)
CALCIUM SERPL-MCNC: 9.5 MG/DL (ref 8.6–10.4)
CHLORIDE SERPL-SCNC: 103 MMOL/L (ref 98–107)
CHOLEST SERPL-MCNC: 272 MG/DL (ref 0–199)
CHOLESTEROL/HDL RATIO: 5.7
CO2 SERPL-SCNC: 26 MMOL/L (ref 20–31)
CREAT SERPL-MCNC: 0.7 MG/DL (ref 0.6–0.9)
CREAT UR-MCNC: 87.3 MG/DL (ref 28–217)
EOSINOPHIL # BLD: 0.48 K/UL (ref 0–0.44)
EOSINOPHILS RELATIVE PERCENT: 5 % (ref 1–4)
ERYTHROCYTE [DISTWIDTH] IN BLOOD BY AUTOMATED COUNT: 13.2 % (ref 11.8–14.4)
EST. AVERAGE GLUCOSE BLD GHB EST-MCNC: 200 MG/DL
GFR, ESTIMATED: >90 ML/MIN/1.73M2
GLUCOSE SERPL-MCNC: 205 MG/DL (ref 74–99)
HBA1C MFR BLD: 8.6 % (ref 4–6)
HCT VFR BLD AUTO: 47.5 % (ref 36.3–47.1)
HDLC SERPL-MCNC: 48 MG/DL
HGB BLD-MCNC: 15.5 G/DL (ref 11.9–15.1)
IMM GRANULOCYTES # BLD AUTO: 0 K/UL (ref 0–0.3)
IMM GRANULOCYTES NFR BLD: 0 %
LDLC SERPL CALC-MCNC: 183 MG/DL (ref 0–100)
LYMPHOCYTES NFR BLD: 4.64 K/UL (ref 1.1–3.7)
LYMPHOCYTES RELATIVE PERCENT: 49 % (ref 24–43)
MCH RBC QN AUTO: 30.3 PG (ref 25.2–33.5)
MCHC RBC AUTO-ENTMCNC: 32.6 G/DL (ref 28.4–34.8)
MCV RBC AUTO: 93 FL (ref 82.6–102.9)
MICROALBUMIN UR-MCNC: <12 MG/L (ref 0–20)
MICROALBUMIN/CREAT UR-RTO: NORMAL MCG/MG CREAT (ref 0–25)
MONOCYTES NFR BLD: 0.48 K/UL (ref 0.1–1.2)
MONOCYTES NFR BLD: 5 % (ref 3–12)
MORPHOLOGY: NORMAL
NEUTROPHILS NFR BLD: 41 % (ref 36–65)
NEUTS SEG NFR BLD: 3.9 K/UL (ref 1.5–8.1)
NRBC BLD-RTO: 0 PER 100 WBC
PLATELET # BLD AUTO: 435 K/UL (ref 138–453)
PMV BLD AUTO: 9.3 FL (ref 8.1–13.5)
POTASSIUM SERPL-SCNC: 4.7 MMOL/L (ref 3.7–5.3)
PROT SERPL-MCNC: 7.5 G/DL (ref 6.6–8.7)
RBC # BLD AUTO: 5.11 M/UL (ref 3.95–5.11)
SODIUM SERPL-SCNC: 140 MMOL/L (ref 136–145)
TRIGL SERPL-MCNC: 203 MG/DL (ref 0–149)
TSH SERPL DL<=0.05 MIU/L-ACNC: 0.95 UIU/ML (ref 0.27–4.2)
VLDLC SERPL CALC-MCNC: 41 MG/DL (ref 1–30)
WBC OTHER # BLD: 9.5 K/UL (ref 3.5–11.3)

## 2025-03-04 ENCOUNTER — OFFICE VISIT (OUTPATIENT)
Dept: FAMILY MEDICINE CLINIC | Age: 55
End: 2025-03-04
Payer: COMMERCIAL

## 2025-03-04 VITALS
SYSTOLIC BLOOD PRESSURE: 142 MMHG | OXYGEN SATURATION: 97 % | DIASTOLIC BLOOD PRESSURE: 94 MMHG | BODY MASS INDEX: 36.31 KG/M2 | TEMPERATURE: 97.8 F | WEIGHT: 205 LBS | HEART RATE: 91 BPM

## 2025-03-04 DIAGNOSIS — Z09 FOLLOW-UP EXAM AFTER TREATMENT: Primary | ICD-10-CM

## 2025-03-04 DIAGNOSIS — G43.909 MIGRAINE WITHOUT STATUS MIGRAINOSUS, NOT INTRACTABLE, UNSPECIFIED MIGRAINE TYPE: ICD-10-CM

## 2025-03-04 DIAGNOSIS — E78.2 MIXED HYPERLIPIDEMIA: Primary | ICD-10-CM

## 2025-03-04 DIAGNOSIS — M48.061 SPINAL STENOSIS OF LUMBAR REGION, UNSPECIFIED WHETHER NEUROGENIC CLAUDICATION PRESENT: ICD-10-CM

## 2025-03-04 PROCEDURE — 4004F PT TOBACCO SCREEN RCVD TLK: CPT | Performed by: NURSE PRACTITIONER

## 2025-03-04 PROCEDURE — 3080F DIAST BP >= 90 MM HG: CPT | Performed by: NURSE PRACTITIONER

## 2025-03-04 PROCEDURE — 99214 OFFICE O/P EST MOD 30 MIN: CPT | Performed by: NURSE PRACTITIONER

## 2025-03-04 PROCEDURE — 3017F COLORECTAL CA SCREEN DOC REV: CPT | Performed by: NURSE PRACTITIONER

## 2025-03-04 PROCEDURE — G8417 CALC BMI ABV UP PARAM F/U: HCPCS | Performed by: NURSE PRACTITIONER

## 2025-03-04 PROCEDURE — G8427 DOCREV CUR MEDS BY ELIG CLIN: HCPCS | Performed by: NURSE PRACTITIONER

## 2025-03-04 PROCEDURE — 3077F SYST BP >= 140 MM HG: CPT | Performed by: NURSE PRACTITIONER

## 2025-03-04 RX ORDER — ROSUVASTATIN CALCIUM 40 MG/1
40 TABLET, COATED ORAL DAILY
Qty: 90 TABLET | Refills: 1 | Status: SHIPPED | OUTPATIENT
Start: 2025-03-04

## 2025-03-04 RX ORDER — ATOGEPANT 60 MG/1
1 TABLET ORAL DAILY
Qty: 30 TABLET | Refills: 5 | Status: SHIPPED | OUTPATIENT
Start: 2025-03-04

## 2025-03-04 RX ORDER — ATOGEPANT 60 MG/1
1 TABLET ORAL
Qty: 16 TABLET | Refills: 0 | COMMUNITY
Start: 2025-03-04

## 2025-03-04 SDOH — ECONOMIC STABILITY: FOOD INSECURITY: WITHIN THE PAST 12 MONTHS, YOU WORRIED THAT YOUR FOOD WOULD RUN OUT BEFORE YOU GOT MONEY TO BUY MORE.: NEVER TRUE

## 2025-03-04 SDOH — ECONOMIC STABILITY: FOOD INSECURITY: WITHIN THE PAST 12 MONTHS, THE FOOD YOU BOUGHT JUST DIDN'T LAST AND YOU DIDN'T HAVE MONEY TO GET MORE.: NEVER TRUE

## 2025-03-04 ASSESSMENT — PATIENT HEALTH QUESTIONNAIRE - PHQ9
SUM OF ALL RESPONSES TO PHQ QUESTIONS 1-9: 0
5. POOR APPETITE OR OVEREATING: NOT AT ALL
10. IF YOU CHECKED OFF ANY PROBLEMS, HOW DIFFICULT HAVE THESE PROBLEMS MADE IT FOR YOU TO DO YOUR WORK, TAKE CARE OF THINGS AT HOME, OR GET ALONG WITH OTHER PEOPLE: NOT DIFFICULT AT ALL
9. THOUGHTS THAT YOU WOULD BE BETTER OFF DEAD, OR OF HURTING YOURSELF: NOT AT ALL
4. FEELING TIRED OR HAVING LITTLE ENERGY: NOT AT ALL
SUM OF ALL RESPONSES TO PHQ QUESTIONS 1-9: 0
8. MOVING OR SPEAKING SO SLOWLY THAT OTHER PEOPLE COULD HAVE NOTICED. OR THE OPPOSITE, BEING SO FIGETY OR RESTLESS THAT YOU HAVE BEEN MOVING AROUND A LOT MORE THAN USUAL: NOT AT ALL
7. TROUBLE CONCENTRATING ON THINGS, SUCH AS READING THE NEWSPAPER OR WATCHING TELEVISION: NOT AT ALL
3. TROUBLE FALLING OR STAYING ASLEEP: NOT AT ALL
SUM OF ALL RESPONSES TO PHQ QUESTIONS 1-9: 0
SUM OF ALL RESPONSES TO PHQ QUESTIONS 1-9: 0
1. LITTLE INTEREST OR PLEASURE IN DOING THINGS: NOT AT ALL
6. FEELING BAD ABOUT YOURSELF - OR THAT YOU ARE A FAILURE OR HAVE LET YOURSELF OR YOUR FAMILY DOWN: NOT AT ALL
2. FEELING DOWN, DEPRESSED OR HOPELESS: NOT AT ALL

## 2025-03-04 ASSESSMENT — ENCOUNTER SYMPTOMS: BACK PAIN: 1

## 2025-03-04 NOTE — PROGRESS NOTES
Lucy Torres, [facility]  [address]  [place]  [address]  [phone]  [phone]    Charisma Donnelly is a 54 y.o. female who is here with c/o of:    Chief Complaint: Other (Recheck visit per pt)      Patient Accompanied by: n/a    HPI - Charisma Donnelly is here today with c/o:    History of Present Illness  The patient is a 54-year-old female who presents for a follow-up of back pain.    She has been diagnosed with sacroiliac (SI) joint dysfunction and received an injection under the guidance of interventional radiology at [facility], as per Dr. Christina's recommendation. She recently consulted with Dr. Dixon, who proposed the initiation of epidural steroid injections, pending clearance from this office. She has been participating in aqua therapy sessions on Mondays, Tuesdays, and Thursdays but missed her most recent session due to a scheduling conflict with this appointment. She reports experiencing significant pain post-therapy. Her current pain management regimen includes Aleve and Tylenol.    She has been prescribed Qulipta once daily, which she reports as beneficial. Since starting this medication, she has experienced a total of four headaches. However, her insurance company has declined coverage for Qulipta, citing the need for her to trial Topamax or an alternative medication. She has previously been on Topamax, which was effective but had to be discontinued due to the development of kidney stones and HTN. She has a scheduled follow-up appointment with Dr. Christina in April 2025.    She has seen dermatology and had a biopsy done. The results are back, but she has not read them yet.    MEDICATIONS  Current: Aleve, Tylenol, Qulipta  Discontinued: Topamax       Health Maintenance Due   Topic Date Due    Hepatitis B vaccine (1 of 3 - 19+ 3-dose series) Never done    Diabetic foot exam  06/19/2020    Lung Cancer Screening &/or Counseling  12/23/2022    Diabetic retinal exam

## 2025-03-05 ENCOUNTER — TELEPHONE (OUTPATIENT)
Dept: FAMILY MEDICINE CLINIC | Age: 55
End: 2025-03-05

## 2025-03-05 NOTE — TELEPHONE ENCOUNTER
Patient called stating that she is not taking Trulicity. She is on Toujeo. She is asking what to increase her Toujeo to. Please advise

## 2025-03-05 NOTE — TELEPHONE ENCOUNTER
Have her increase to 34 units. And then titrate up every 3-4 days until fasting sugars are around 120 consistently

## 2025-03-06 ENCOUNTER — HOSPITAL ENCOUNTER (OUTPATIENT)
Dept: PHYSICAL THERAPY | Age: 55
Setting detail: THERAPIES SERIES
Discharge: HOME | End: 2025-03-06
Attending: EMERGENCY MEDICINE
Payer: COMMERCIAL

## 2025-03-06 PROCEDURE — 97113 AQUATIC THERAPY/EXERCISES: CPT

## 2025-03-11 ENCOUNTER — HOSPITAL ENCOUNTER (OUTPATIENT)
Dept: PHYSICAL THERAPY | Age: 55
Setting detail: THERAPIES SERIES
Discharge: HOME | End: 2025-03-11
Attending: EMERGENCY MEDICINE
Payer: COMMERCIAL

## 2025-03-11 PROCEDURE — 97113 AQUATIC THERAPY/EXERCISES: CPT

## 2025-03-11 NOTE — FLOWSHEET NOTE
X-Country        Figure 4                 Balance        SLS                Stretches        Achllies   2x20\"     Hamstring  2x20\" 2x20\"                     Breast Stroke 2 Laps Breast Stroke  2 Laps  2 Laps     Pain Rating 4  6        Specific Instructions for next treatment:   Step ups and progress deep water next visit.     Assessment: [] Progressing toward goals.      [x] No change.  No changes due to high pain after last visit and upon arrival.  Good tolerance to all exercises with cueing throughout treatment to avoid increased pain and decrease ROM.  Good carry over.  Did add achilles stretch due to tightness noted in posterior LE with SLR forward.  Notes relief after deep water hang and pain down to 6/10 and tolerable at this time.      [] Other:    [x] Patient would continue to benefit from skilled physical therapy services in order to: improve functional mobility and decrease pain for patient to complete ADLs/IADLs with least amount of compensation or restriction.     LTGs: (to be met in 12 treatments)  ? Pain: to 3/10 at most for patient to participate fully in social life.   ? ROM: of lumbar spine to WFL without pain for patient to don/doff socks with ease.  ? Strength: of B LEs to 3+-4-/5 to assist in stabilization to lumbar spine to decrease pain.  Improve abdominal strength to 3+/5 to assist in stabilization to lumbar spine for patient to be able to lift objects safely without pain.   ? Function: with score on Modified Oswestry to <50% impairment for patient stand/walk with minimal pain.   Independent with Home Exercise Programs.  Patient to ambulate >10' without cane with minimal pain to complete short errands with ease.  Patient to  report she can stand for >15' to ease completion of ADLs/IADLs.         Patient goals: lesson pain and get back to normal activity     Pt. Education:  [x] Yes  [] No  [x] Reviewed Prior HEP/Ed- Educated on postural awareness, core stability and working in pain free

## 2025-03-13 ENCOUNTER — HOSPITAL ENCOUNTER (OUTPATIENT)
Dept: PHYSICAL THERAPY | Age: 55
Setting detail: THERAPIES SERIES
Discharge: HOME | End: 2025-03-13
Attending: EMERGENCY MEDICINE
Payer: COMMERCIAL

## 2025-03-13 PROCEDURE — 97113 AQUATIC THERAPY/EXERCISES: CPT

## 2025-03-13 NOTE — FLOWSHEET NOTE
[facility]   Outpatient Rehabilitation & Therapy  [address]  P: [phone]   F: [phone]    Physical Therapy Daily Treatment Note    Date:  3/13/2025  Patient Name:  Charisma Donnelly    :  1970  MRN: [id number]  Physician: Paola Braswell MD                                   Insurance: [facility]  vists, auth after 30 visits.   Medical Diagnosis: M48.061 (ICD-10-CM) - Lumbar stenosis without neurogenic claudication   Rehab Codes: M54.5, R26.2   Onset Date: 25                   Next 's appt.: 4/10/25 Dr. Christina , epidural 3/23/25  Visit Count:                                 Cancel/No Show: 0/0    Subjective:  Patient reports increased soreness after last visit but able to rest the rest of the day without issue.  States still tired from taking muscle relaxer last night.  States pain is about 7/10 this morning.   Pain:  [x] Yes  [] No Location: across low back, R hip down lateral LE to knee, Left posterior LE to foot     Pain Rating: (0-10 scale) 7/10  Pain altered Tx:  [x] No  [] Yes  Action:  Comments: Reviewed postural awareness, core stability and working in pain free ranges wit all exercises performed.     Objective:    [facility] Services Exercise Log  Aquatic, Hip & DLS Program- Phase 1    Date of Eval: 25                               Primary PT: Jimmy Esquivel, PT      Date 2/25/25 3/6/25 3/11/25 3/13/25    Visit # 2/12 3/12 4/12 5/12    Walk F/L/R 2 Laps @ rail 2 Laps @ rail  +R 2 Laps @ Rail 2 Laps @ Rail    Marching 20x 15x 15x 15x    Squats 10x3\" 10x3\" 10x3\" 10x5\"    Step-Ups F/L        Step Down F/L        Heel-toe raises 10x 15x 15x 15x    SLR F/L/R 10x 10x 10x 15x    Hip/Knee Flex/Ext  10x 10x 10x    F/L Lunges                Kickboard Ex. Small  Small Small Small     Iso Abd. 10x5\" 10x5\" 10x5\" 15x    Push-pull 10x 10x 10x 15x    Paddling                UE Format:        Horiz Abd/Add        IR/ER (wipers)        Alt

## 2025-03-18 ENCOUNTER — OFFICE VISIT (OUTPATIENT)
Dept: PAIN MANAGEMENT | Age: 55
End: 2025-03-18
Payer: COMMERCIAL

## 2025-03-18 VITALS — WEIGHT: 205 LBS | HEIGHT: 63 IN | BODY MASS INDEX: 36.32 KG/M2

## 2025-03-18 DIAGNOSIS — M47.812 CERVICAL SPONDYLOSIS WITHOUT MYELOPATHY: Primary | ICD-10-CM

## 2025-03-18 DIAGNOSIS — Z79.02 LONG TERM (CURRENT) USE OF ANTITHROMBOTICS/ANTIPLATELETS: ICD-10-CM

## 2025-03-18 DIAGNOSIS — F12.90 MARIJUANA USE: ICD-10-CM

## 2025-03-18 DIAGNOSIS — Z98.1 HISTORY OF LUMBAR FUSION: ICD-10-CM

## 2025-03-18 DIAGNOSIS — M54.41 CHRONIC BILATERAL LOW BACK PAIN WITH RIGHT-SIDED SCIATICA: ICD-10-CM

## 2025-03-18 DIAGNOSIS — G89.29 CHRONIC BILATERAL LOW BACK PAIN WITH RIGHT-SIDED SCIATICA: ICD-10-CM

## 2025-03-18 DIAGNOSIS — Z98.1 HX OF FUSION OF CERVICAL SPINE: ICD-10-CM

## 2025-03-18 PROCEDURE — 99214 OFFICE O/P EST MOD 30 MIN: CPT | Performed by: ANESTHESIOLOGY

## 2025-03-18 PROCEDURE — 4004F PT TOBACCO SCREEN RCVD TLK: CPT | Performed by: ANESTHESIOLOGY

## 2025-03-18 PROCEDURE — 3017F COLORECTAL CA SCREEN DOC REV: CPT | Performed by: ANESTHESIOLOGY

## 2025-03-18 PROCEDURE — G8427 DOCREV CUR MEDS BY ELIG CLIN: HCPCS | Performed by: ANESTHESIOLOGY

## 2025-03-18 PROCEDURE — G8417 CALC BMI ABV UP PARAM F/U: HCPCS | Performed by: ANESTHESIOLOGY

## 2025-03-18 ASSESSMENT — ENCOUNTER SYMPTOMS
RESPIRATORY NEGATIVE: 1
GASTROINTESTINAL NEGATIVE: 1

## 2025-03-18 NOTE — PROGRESS NOTES
in the Last Year: Never true     Ran Out of Food in the Last Year: Never true   Transportation Needs: No Transportation Needs (3/4/2025)    PRAPARE - Transportation     Lack of Transportation (Medical): No     Lack of Transportation (Non-Medical): No   Physical Activity: Unknown (2/14/2022)    Exercise Vital Sign     Days of Exercise per Week: 0 days   Stress: Stress Concern Present (2/15/2020)    Received from [employer]    [employer] - Occupational Stress Questionnaire     Feeling of Stress : Very much   Social Connections: Socially Integrated (2/15/2020)    Received from [employer]    Social Connection and Isolation Panel [NHANES]     Frequency of Communication with Friends and Family: More than three times a week     Frequency of Social Gatherings with Friends and Family: Once a week     Attends [employer] Services: 1 to 4 times per year     Active Member of Clubs or Organizations: Yes     Attends Club or Organization Meetings: 1 to 4 times per year     Marital Status:    Intimate Partner Violence: Not At Risk (2/14/2022)    Humiliation, Afraid, Rape, and Kick questionnaire     Fear of Current or Ex-Partner: No     Emotionally Abused: No     Physically Abused: No     Sexually Abused: No   Housing Stability: Low Risk  (3/4/2025)    Housing Stability Vital Sign     Unable to Pay for Housing in the Last Year: No     Number of Times Moved in the Last Year: 0     Homeless in the Last Year: No       Family History   Problem Relation Age of Onset    Dementia Mother     Depression Mother     High Blood Pressure Mother     Coronary Art Dis Mother         9 stents    COPD Mother     Diabetes Mother     Other Mother         degenerative disc disease    Heart Attack Father     Heart Disease Maternal Aunt     Diabetes Maternal Grandmother     Alzheimer's Disease Paternal Grandfather        Allergies   Allergen

## 2025-03-18 NOTE — H&P (VIEW-ONLY)
The patient is a 54 y.o.Non- / non  female.    Chief Complaint   Patient presents with    Neck Pain    Follow Up After Procedure            Chief Complaint: neck pain   This is a 54-year-old female with history of chronic neck and low back pain  Neck pain is axial associated with occipital headache  History of previous cervical spine fusion surgery  Patient had cervical medial branch nerve radiofrequency ablation with 80% plus improvement in neck pain, neck pain score went from 8 down to 1-2/10  Neurosurgery have requested C2-3 radiofrequency ablation, for possible consideration of extension of cervical spine fusion for occipital headache associated with cervical spondylosis    Patient also history of chronic low back and bilateral lower extremity pain right more than left  History of previous lumbar spine fusion  Have done multiple courses of physical therapy  Describes the pain as aching throbbing burning shooting sensation aggravates with movement affects quality of life    Recent diagnostic workup did not show any surgical pathology  She is interested in neuromodulation trial with spinal cord stimulation  Procedure discussed at length with patient  Information material provided from [employer]    Will obtain psych evaluation for prior authorization      Pain score Today:  8      Hemoglobin A1C   Date Value Ref Range Status   03/03/2025 8.6 (H) 4.0 - 6.0 % Final          Past Medical History:   Diagnosis Date    Abnormal uterine bleeding (AUB)     fibroid    Anxiety     Arthritis     CAD (coronary artery disease)     stent  LAD    Chronic neck pain     COVID-19 07/2022    fever, slight cough, fatigue,lost taste,  admitted for couple days bc of elevated troponins    Depression     pcp manages    Diabetes mellitus (HCC) 2017    managed by PCP    Fatty liver     Gastroparesis     Histiocytosis (HCC)     [place] (hard of hearing)     needs hearing aids but doesn't have them, Seems to hear fine

## 2025-03-20 ENCOUNTER — HOSPITAL ENCOUNTER (OUTPATIENT)
Dept: PHYSICAL THERAPY | Age: 55
Setting detail: THERAPIES SERIES
Discharge: HOME | End: 2025-03-20
Attending: EMERGENCY MEDICINE
Payer: COMMERCIAL

## 2025-03-20 PROCEDURE — 97113 AQUATIC THERAPY/EXERCISES: CPT

## 2025-03-20 NOTE — FLOWSHEET NOTE
[facility]   Outpatient Rehabilitation & Therapy  [address]  P: [phone]   F: [phone]    Physical Therapy Daily Treatment Note    Date:  3/20/2025  Patient Name:  Charisma Donnelly    :  1970  MRN: [id number]  Physician: Paola Braswell MD                                   Insurance: [facility]  vists, auth after 30 visits.   Medical Diagnosis: M48.061 (ICD-10-CM) - Lumbar stenosis without neurogenic claudication   Rehab Codes: M54.5, R26.2   Onset Date: 25                   Next 's appt.: 4/10/25 Dr. Christina , jesús 3/23/25  Visit Count:                                 Cancel/No Show: 0/0    Subjective:  Patient reports having a headache since Monday.  Dog ran into her head when she was bent over and neck and head have been hurting since then.  Discussed with doctor to possibly needing a implanted low back and possibly cervical nerve stimulator.  Walked about a mile with the dog and back felt alright.   Pain:  [x] Yes  [] No Location: across low back, R hip down lateral LE to knee, Left posterior LE to foot     Pain Rating: (0-10 scale) 6.5/10 Low back, 8/10 neck/headache  Pain altered Tx:  [x] No  [] Yes  Action:  Comments: Reviewed postural awareness, core stability and working in pain free ranges wit all exercises performed.     Objective:    [facility] Services Exercise Log  Aquatic, Hip & DLS Program- Phase 1    Date of Eval: 25                               Primary PT: Jimmy Esquivel, PT      Date 2/25/25 3/6/25 3/11/25 3/13/25 3/20/25   Visit # 2/12 3/12 4/12 5/12 6/12   Walk F/L/R 2 Laps @ rail 2 Laps @ rail  +R 2 Laps @ Rail 2 Laps @ Rail 2 Laps @ Rail   Marching 20x 15x 15x 15x 15x   Squats 10x3\" 10x3\" 10x3\" 10x5\" 10x5\"   Step-Ups F/L        Step Down F/L        Heel-toe raises 10x 15x 15x 15x 15x   SLR F/L/R 10x 10x 10x 15x 15x   Hip/Knee Flex/Ext  10x 10x 10x 15x   F/L Lunges                Kickboard Ex. Small  Small

## 2025-03-25 ENCOUNTER — HOSPITAL ENCOUNTER (OUTPATIENT)
Dept: PHYSICAL THERAPY | Age: 55
Setting detail: THERAPIES SERIES
Discharge: HOME | End: 2025-03-25
Attending: EMERGENCY MEDICINE
Payer: COMMERCIAL

## 2025-03-25 PROCEDURE — 97113 AQUATIC THERAPY/EXERCISES: CPT

## 2025-03-25 NOTE — FLOWSHEET NOTE
with score on Modified Oswestry to <50% impairment for patient stand/walk with minimal pain.   Independent with Home Exercise Programs.  Patient to ambulate >10' without cane with minimal pain to complete short errands with ease.  Patient to  report she can stand for >15' to ease completion of ADLs/IADLs.       Patient goals: lesson pain and get back to normal activity     Pt. Education:  [x] Yes  [] No  [x] Reviewed Prior HEP/Ed- Educated on postural awareness, core stability and working in pain free ranges with all exercises.    Method of Education: [x] Verbal  [x] Demo  [] Written  Comprehension of Education:  [x] Verbalizes understanding.  [x] Demonstrates understanding.  [] Needs review.  [] Demonstrates/verbalizes HEP/Ed previously given.     -3/6/25 Educated on seated figure 4 stretch to help with pulling hip into IR.  Patient verbalized understanding and demonstrated in deep water today.    Plan: [x] Continue per plan of care.   [] Other:      Treatment Charges: Mins Units Time in/Out   []  Modalities      []  Ther Exercise      []  Manual Therapy      []  Ther Activities      [x]  Aquatics 35 2    []  Neuromuscular      [] Vasocompression      [] Gait Training      [] Dry needling        [] 1 or 2 muscles        [] 3 or more muscles      []  Other      Total Billable time 35 2      Time In: 0740             Time Out: 0820    Electronically signed by:  Taiwo Uribe PTA

## 2025-03-26 DIAGNOSIS — J84.82: ICD-10-CM

## 2025-03-26 RX ORDER — ALBUTEROL SULFATE 90 UG/1
2 INHALANT RESPIRATORY (INHALATION) 4 TIMES DAILY PRN
Qty: 54 G | Refills: 1 | Status: SHIPPED | OUTPATIENT
Start: 2025-03-26

## 2025-03-26 NOTE — TELEPHONE ENCOUNTER
Charisma Donnelly is calling to request a refill on the following medication(s):    Medication Request:  Requested Prescriptions     Pending Prescriptions Disp Refills    albuterol sulfate HFA (VENTOLIN HFA) 108 (90 Base) MCG/ACT inhaler 54 g 1     Sig: Inhale 2 puffs into the lungs 4 times daily as needed for Wheezing       Last Visit Date (If Applicable):  3/4/2025    Next Visit Date:    6/4/2025        Last refilled 4/8/2024  Rx Pending

## 2025-03-27 ENCOUNTER — HOSPITAL ENCOUNTER (OUTPATIENT)
Dept: PHYSICAL THERAPY | Age: 55
Setting detail: THERAPIES SERIES
Discharge: HOME | End: 2025-03-27
Attending: EMERGENCY MEDICINE
Payer: COMMERCIAL

## 2025-03-27 PROCEDURE — 97113 AQUATIC THERAPY/EXERCISES: CPT

## 2025-03-27 NOTE — FLOWSHEET NOTE
? Function: with score on Modified Oswestry to <50% impairment for patient stand/walk with minimal pain.   Independent with Home Exercise Programs.  Patient to ambulate >10' without cane with minimal pain to complete short errands with ease.  Patient to  report she can stand for >15' to ease completion of ADLs/IADLs.       Patient goals: lesson pain and get back to normal activity     Pt. Education:  [x] Yes  [] No  [x] Reviewed Prior HEP/Ed- Decrease speed and ROM to avoid aggravating low back.  Also emphasis on postural awareness and exercises technique.   Method of Education: [x] Verbal  [x] Demo  [] Written  Comprehension of Education:  [x] Verbalizes understanding.  [] Demonstrates understanding.  [x] Needs review.  [] Demonstrates/verbalizes HEP/Ed previously given.     -3/6/25 Educated on seated figure 4 stretch to help with pulling hip into IR.  Patient verbalized understanding and demonstrated in deep water today.    Plan: [x] Continue per plan of care.   [] Other:      Treatment Charges: Mins Units Time in/Out   []  Modalities      []  Ther Exercise      []  Manual Therapy      []  Ther Activities      [x]  Aquatics 30 2    []  Neuromuscular      [] Vasocompression      [] Gait Training      [] Dry needling        [] 1 or 2 muscles        [] 3 or more muscles      []  Other      Total Billable time 30 2      Time In: 0803             Time Out: 0838    Electronically signed by:  Taiwo Uribe PTA

## 2025-03-28 RX ORDER — CHOLECALCIFEROL (VITAMIN D3) 1250 MCG
1 CAPSULE ORAL WEEKLY
Qty: 4 CAPSULE | Refills: 3 | Status: SHIPPED | OUTPATIENT
Start: 2025-03-28

## 2025-03-28 NOTE — TELEPHONE ENCOUNTER
Charisma Donnelly is calling to request a refill on the following medication(s):    Medication Request:  Requested Prescriptions     Pending Prescriptions Disp Refills    Cholecalciferol (VITAMIN D3) 1.25 MG (95432 UT) CAPS [Pharmacy Med Name: VITAMIN D3 1,250 MCG CAPSULE] 4 capsule 3     Sig: TAKE 1 CAPSULE BY MOUTH ONCE A WEEK WEEKLY ON TUESDAY       Last Visit Date (If Applicable):  3/4/2025    Next Visit Date:    6/4/2025

## 2025-04-01 ENCOUNTER — HOSPITAL ENCOUNTER (OUTPATIENT)
Dept: PHYSICAL THERAPY | Age: 55
Setting detail: THERAPIES SERIES
Discharge: HOME | End: 2025-04-01
Attending: EMERGENCY MEDICINE
Payer: COMMERCIAL

## 2025-04-01 PROCEDURE — 97113 AQUATIC THERAPY/EXERCISES: CPT

## 2025-04-01 NOTE — FLOWSHEET NOTE
[facility]   Outpatient Rehabilitation & Therapy  [address]  P: [phone]   F: [phone]    Physical Therapy Daily Treatment Note    Date:  2025  Patient Name:  Charisma Donnelly    :  1970  MRN: [id number]  Physician: Paola Braswell MD                                   Insurance: [facility]  vists, auth after 30 visits.   Medical Diagnosis: M48.061 (ICD-10-CM) - Lumbar stenosis without neurogenic claudication   Rehab Codes: M54.5, R26.2   Onset Date: 25                   Next 's appt.: 4/10/25 Dr. Christina , epidural 25  Visit Count:                                 Cancel/No Show: 0/0    Subjective:  Patient reports radiating pain down right anterior medial thigh to mid thigh at this time.  States getting epidural tomorrow morning.    Pain:  [x] Yes  [] No Location: across low back, R hip down lateral LE to knee, Left posterior LE to foot     Pain Rating: (0-10 scale) 6/10 Low back Radiating down to mid anterior/medial thigh  Pain altered Tx:  [x] No  [] Yes  Action:  Comments: Reviewed postural awareness, core stability and working in pain free ranges wit all exercises performed.     Objective:    [facility] Services Exercise Log  Aquatic, Hip & DLS Program- Phase 1    Date of Eval: 25                               Primary PT: Jimmy Esquivel, PT      Date 3/13/25 3/20/25 3/25/25 3/27/25 4/1/25   Visit #    Walk F/L/R 2 Laps @ Rail 2 Laps @ Rail 2 Laps @ Rail 2 Laps @ Rail 2 Laps @ Rail   Marching 15x 15x 15x 15x 2 Laps   Squats 10x5\" 10x5\" 10x5\" 10x5\" 10x5\"   Step-Ups F/L   Low 10x Low 10x Low 15x +L   Step Down F/L        Heel-toe raises 15x 15x 15x 15x 15x   SLR F/L/R 15x 15x 15x 15x 15x   Hip/Knee Flex/Ext 10x 15x 15x 15x 15x   F/L Lunges                Kickboard Ex. Small  Small Small Small  Small   Iso Abd. 15x 10x5\" 10x5\" 10x5\" 10x5\"   Push-pull 15x 15x 15x 15x 15x   Paddling                UE

## 2025-04-02 ENCOUNTER — HOSPITAL ENCOUNTER (OUTPATIENT)
Dept: PAIN MANAGEMENT | Facility: CLINIC | Age: 55
Discharge: HOME | End: 2025-04-02
Payer: COMMERCIAL

## 2025-04-02 VITALS
HEART RATE: 90 BPM | OXYGEN SATURATION: 96 % | WEIGHT: 205 LBS | SYSTOLIC BLOOD PRESSURE: 114 MMHG | RESPIRATION RATE: 12 BRPM | HEIGHT: 63 IN | DIASTOLIC BLOOD PRESSURE: 70 MMHG | TEMPERATURE: 97.8 F | BODY MASS INDEX: 36.32 KG/M2

## 2025-04-02 DIAGNOSIS — Z98.1 HISTORY OF LUMBAR FUSION: ICD-10-CM

## 2025-04-02 DIAGNOSIS — R52 PAIN MANAGEMENT: ICD-10-CM

## 2025-04-02 DIAGNOSIS — M54.16 CHRONIC LUMBAR RADICULOPATHY: Primary | ICD-10-CM

## 2025-04-02 LAB — GLUCOSE BLD-MCNC: 95 MG/DL (ref 65–105)

## 2025-04-02 PROCEDURE — 6360000002 HC RX W HCPCS: Performed by: ANESTHESIOLOGY

## 2025-04-02 PROCEDURE — 6360000004 HC RX CONTRAST MEDICATION: Performed by: ANESTHESIOLOGY

## 2025-04-02 PROCEDURE — 62323 NJX INTERLAMINAR LMBR/SAC: CPT | Performed by: ANESTHESIOLOGY

## 2025-04-02 PROCEDURE — 82947 ASSAY GLUCOSE BLOOD QUANT: CPT

## 2025-04-02 PROCEDURE — 62323 NJX INTERLAMINAR LMBR/SAC: CPT

## 2025-04-02 RX ORDER — DEXAMETHASONE SODIUM PHOSPHATE 10 MG/ML
INJECTION, SOLUTION INTRAMUSCULAR; INTRAVENOUS
Status: COMPLETED | OUTPATIENT
Start: 2025-04-02 | End: 2025-04-02

## 2025-04-02 RX ORDER — LIDOCAINE HYDROCHLORIDE 10 MG/ML
INJECTION, SOLUTION EPIDURAL; INFILTRATION; INTRACAUDAL; PERINEURAL
Status: COMPLETED | OUTPATIENT
Start: 2025-04-02 | End: 2025-04-02

## 2025-04-02 RX ORDER — FENTANYL CITRATE 50 UG/ML
INJECTION, SOLUTION INTRAMUSCULAR; INTRAVENOUS
Status: COMPLETED | OUTPATIENT
Start: 2025-04-02 | End: 2025-04-02

## 2025-04-02 RX ORDER — SODIUM CHLORIDE 0.9 % (FLUSH) 0.9 %
SYRINGE (ML) INJECTION
Status: COMPLETED | OUTPATIENT
Start: 2025-04-02 | End: 2025-04-02

## 2025-04-02 RX ORDER — MIDAZOLAM HYDROCHLORIDE 2 MG/2ML
INJECTION, SOLUTION INTRAMUSCULAR; INTRAVENOUS
Status: COMPLETED | OUTPATIENT
Start: 2025-04-02 | End: 2025-04-02

## 2025-04-02 RX ADMIN — FENTANYL CITRATE 50 MCG: 50 INJECTION, SOLUTION INTRAMUSCULAR; INTRAVENOUS at 08:48

## 2025-04-02 RX ADMIN — IOHEXOL 3 ML: 180 INJECTION INTRAVENOUS at 08:49

## 2025-04-02 RX ADMIN — MIDAZOLAM HYDROCHLORIDE 1 MG: 1 INJECTION, SOLUTION INTRAMUSCULAR; INTRAVENOUS at 08:48

## 2025-04-02 RX ADMIN — DEXAMETHASONE SODIUM PHOSPHATE 10 MG: 10 INJECTION INTRAMUSCULAR; INTRAVENOUS at 08:50

## 2025-04-02 RX ADMIN — Medication 5 ML: at 08:49

## 2025-04-02 RX ADMIN — LIDOCAINE HYDROCHLORIDE 5 ML: 10 INJECTION, SOLUTION EPIDURAL; INFILTRATION; INTRACAUDAL; PERINEURAL at 08:49

## 2025-04-02 ASSESSMENT — PAIN DESCRIPTION - LOCATION: LOCATION: BACK

## 2025-04-02 ASSESSMENT — PAIN - FUNCTIONAL ASSESSMENT
PAIN_FUNCTIONAL_ASSESSMENT: PREVENTS OR INTERFERES WITH ALL ACTIVE AND SOME PASSIVE ACTIVITIES
PAIN_FUNCTIONAL_ASSESSMENT: 0-10

## 2025-04-02 ASSESSMENT — PAIN SCALES - GENERAL: PAINLEVEL_OUTOF10: 9

## 2025-04-02 NOTE — OP NOTE
Patient Name: Charisma Donnelly   YOB: 1970  Room/Bed: Room/bed info not found  Medical Record Number: [id number]  Date: 4/2/2025       Sedation/ Anesthesia Plan:   intravenous sedation   as needed.    Medications Planned:   midazolam (Versed) / Fentanyl  Intravenously  as needed.    Preoperative Diagnosis:    1. Chronic lumbar radiculopathy    2. History of lumbar fusion        Postoperative Diagnosis:    1. Chronic lumbar radiculopathy    2. History of lumbar fusion        Procedure Performed:  CAUDAL epidural steroid injection under fluoroscopy guidance    Procedure:      The Patient was seen in the preop area, chart was reviewed, informed consent was obtained. Patient was taken to procedure room and was placed in prone position. Vital signs were monitored through out the procedure. A time out was completed.  The skin over the back was prepped and draped in sterile manner.     The target point was marked at the sacrococcygeal ligament. Skin and deep tissues were anesthetized with 1 % lidocaine.A 17-gauge Tuohy epidural needlele was advanced  under fluoroscopy guidance in lateral view. Once Epidural space was entered, aspiration was -ve.  A 19 G catheter was passed through the needle into the epidural space and was advanced several cm.     Then after negative aspiration contrast dye was injected with live fluoroscopy in AP views that showed  spread of the contrast in the epidural space  and no vascular runoff or intrathecal spread. Finally 6 ml of treatment solution containing 5 ml of  1 ml of PF NS and 1 ml of dexamethasone 10 mg / ml was injected.    The needle was removed and a Band-Aid was placed over the needle  insertion site.  The patient's vital signs remained stable and the patient tolerated the procedure well.      Electronically signed by Luis Angel Dixon MD on 4/2/2025 at 8:53 AM    SEDATION NOTE:    ASA CLASSIFICATION  3  MP   CLASSIFICATION  3    Moderate intravenous conscious sedation was

## 2025-04-02 NOTE — DISCHARGE INSTRUCTIONS

## 2025-04-02 NOTE — INTERVAL H&P NOTE
Update History & Physical    The patient's History and Physical of March 18, 2025 was reviewed with the patient and I examined the patient. There was no change. The surgical site was confirmed by the patient and me.     Plan: The risks, benefits, expected outcome, and alternative to the recommended procedure have been discussed with the patient. Patient understands and wants to proceed with the procedure.     ASA 3  MP 3    Electronically signed by Luis Angel Dixon MD on 4/2/2025 at 8:26 AM

## 2025-04-03 ENCOUNTER — HOSPITAL ENCOUNTER (OUTPATIENT)
Dept: PHYSICAL THERAPY | Age: 55
Setting detail: THERAPIES SERIES
Discharge: HOME | End: 2025-04-03
Attending: EMERGENCY MEDICINE
Payer: COMMERCIAL

## 2025-04-03 NOTE — FLOWSHEET NOTE
[facility]   Outpatient Rehabilitation & Therapy  [address] [facility] [address]  P: [phone]   F: [phone]     Physical Therapy Cancel/No Show note    Date: 4/3/2025  Patient: Charisma Donnelly  : 1970  MRN: [id number]    Visit Count:   Cancels/No Shows to date:     For today's appointment patient:    [x]  Cancelled    [] Rescheduled appointment    [] No-show     Reason given by patient:    []  Patient ill    []  Conflicting appointment    [] No transportation      [] Conflict with work    [x] No reason given    [] Weather related    [] COVID-19    [] Other:      Comments:        [x] Next appointment was confirmed    Electronically signed by: Taiwo Uribe, PTA

## 2025-04-04 ENCOUNTER — HOSPITAL ENCOUNTER (EMERGENCY)
Age: 55
Discharge: HOME | End: 2025-04-04
Attending: EMERGENCY MEDICINE
Payer: COMMERCIAL

## 2025-04-04 ENCOUNTER — TELEPHONE (OUTPATIENT)
Dept: PAIN MANAGEMENT | Age: 55
End: 2025-04-04

## 2025-04-04 ENCOUNTER — APPOINTMENT (OUTPATIENT)
Dept: CT IMAGING | Age: 55
End: 2025-04-04
Payer: COMMERCIAL

## 2025-04-04 VITALS
SYSTOLIC BLOOD PRESSURE: 132 MMHG | OXYGEN SATURATION: 99 % | HEART RATE: 92 BPM | RESPIRATION RATE: 18 BRPM | DIASTOLIC BLOOD PRESSURE: 116 MMHG | TEMPERATURE: 99 F

## 2025-04-04 DIAGNOSIS — R51.9 ACUTE NONINTRACTABLE HEADACHE, UNSPECIFIED HEADACHE TYPE: Primary | ICD-10-CM

## 2025-04-04 DIAGNOSIS — G97.1 POST-DURAL PUNCTURE HEADACHE: ICD-10-CM

## 2025-04-04 LAB
ANION GAP SERPL CALCULATED.3IONS-SCNC: 12 MMOL/L (ref 9–16)
BASOPHILS # BLD: 0.14 K/UL (ref 0–0.2)
BASOPHILS NFR BLD: 1 % (ref 0–2)
BUN SERPL-MCNC: 16 MG/DL (ref 6–20)
CALCIUM SERPL-MCNC: 9.2 MG/DL (ref 8.6–10.4)
CHLORIDE SERPL-SCNC: 102 MMOL/L (ref 98–107)
CO2 SERPL-SCNC: 24 MMOL/L (ref 20–31)
CREAT SERPL-MCNC: 0.7 MG/DL (ref 0.6–0.9)
EOSINOPHIL # BLD: 0.14 K/UL (ref 0–0.4)
EOSINOPHILS RELATIVE PERCENT: 1 % (ref 1–4)
ERYTHROCYTE [DISTWIDTH] IN BLOOD BY AUTOMATED COUNT: 13.4 % (ref 11.8–14.4)
GFR, ESTIMATED: >90 ML/MIN/1.73M2
GLUCOSE SERPL-MCNC: 183 MG/DL (ref 74–99)
HCT VFR BLD AUTO: 46.7 % (ref 36.3–47.1)
HGB BLD-MCNC: 15.6 G/DL (ref 11.9–15.1)
IMM GRANULOCYTES # BLD AUTO: 0 K/UL (ref 0–0.3)
IMM GRANULOCYTES NFR BLD: 0 %
LYMPHOCYTES NFR BLD: 7.42 K/UL (ref 1–4.8)
LYMPHOCYTES RELATIVE PERCENT: 53 % (ref 24–44)
MCH RBC QN AUTO: 30.4 PG (ref 25.2–33.5)
MCHC RBC AUTO-ENTMCNC: 33.4 G/DL (ref 28.4–34.8)
MCV RBC AUTO: 91 FL (ref 82.6–102.9)
MONOCYTES NFR BLD: 0.84 K/UL (ref 0.1–0.8)
MONOCYTES NFR BLD: 6 % (ref 1–7)
MORPHOLOGY: NORMAL
NEUTROPHILS NFR BLD: 39 % (ref 36–66)
NEUTS SEG NFR BLD: 5.46 K/UL (ref 1.8–7.7)
NRBC BLD-RTO: 0 PER 100 WBC
PLATELET # BLD AUTO: 365 K/UL (ref 138–453)
PMV BLD AUTO: 9.4 FL (ref 8.1–13.5)
POTASSIUM SERPL-SCNC: 3.9 MMOL/L (ref 3.7–5.3)
RBC # BLD AUTO: 5.13 M/UL (ref 3.95–5.11)
SODIUM SERPL-SCNC: 138 MMOL/L (ref 136–145)
WBC OTHER # BLD: 14 K/UL (ref 3.5–11.3)

## 2025-04-04 PROCEDURE — 6360000002 HC RX W HCPCS

## 2025-04-04 PROCEDURE — 96375 TX/PRO/DX INJ NEW DRUG ADDON: CPT

## 2025-04-04 PROCEDURE — 96374 THER/PROPH/DIAG INJ IV PUSH: CPT

## 2025-04-04 PROCEDURE — 2580000003 HC RX 258: Performed by: EMERGENCY MEDICINE

## 2025-04-04 PROCEDURE — 80048 BASIC METABOLIC PNL TOTAL CA: CPT

## 2025-04-04 PROCEDURE — 85025 COMPLETE CBC W/AUTO DIFF WBC: CPT

## 2025-04-04 PROCEDURE — 70498 CT ANGIOGRAPHY NECK: CPT

## 2025-04-04 PROCEDURE — 6360000004 HC RX CONTRAST MEDICATION

## 2025-04-04 PROCEDURE — 99285 EMERGENCY DEPT VISIT HI MDM: CPT

## 2025-04-04 PROCEDURE — 70450 CT HEAD/BRAIN W/O DYE: CPT

## 2025-04-04 RX ORDER — IOPAMIDOL 755 MG/ML
75 INJECTION, SOLUTION INTRAVASCULAR
Status: COMPLETED | OUTPATIENT
Start: 2025-04-04 | End: 2025-04-04

## 2025-04-04 RX ORDER — PROCHLORPERAZINE EDISYLATE 5 MG/ML
10 INJECTION INTRAMUSCULAR; INTRAVENOUS ONCE
Status: COMPLETED | OUTPATIENT
Start: 2025-04-04 | End: 2025-04-04

## 2025-04-04 RX ORDER — ONDANSETRON 4 MG/1
4 TABLET, ORALLY DISINTEGRATING ORAL 3 TIMES DAILY PRN
Qty: 21 TABLET | Refills: 0 | Status: SHIPPED | OUTPATIENT
Start: 2025-04-04

## 2025-04-04 RX ORDER — DIPHENHYDRAMINE HYDROCHLORIDE 50 MG/ML
25 INJECTION, SOLUTION INTRAMUSCULAR; INTRAVENOUS ONCE
Status: COMPLETED | OUTPATIENT
Start: 2025-04-04 | End: 2025-04-04

## 2025-04-04 RX ORDER — 0.9 % SODIUM CHLORIDE 0.9 %
1000 INTRAVENOUS SOLUTION INTRAVENOUS ONCE
Status: COMPLETED | OUTPATIENT
Start: 2025-04-04 | End: 2025-04-04

## 2025-04-04 RX ADMIN — PROCHLORPERAZINE EDISYLATE 10 MG: 5 INJECTION INTRAMUSCULAR; INTRAVENOUS at 15:07

## 2025-04-04 RX ADMIN — SODIUM CHLORIDE 1000 ML: 9 INJECTION, SOLUTION INTRAVENOUS at 16:38

## 2025-04-04 RX ADMIN — DIPHENHYDRAMINE HYDROCHLORIDE 25 MG: 50 INJECTION INTRAMUSCULAR; INTRAVENOUS at 15:07

## 2025-04-04 RX ADMIN — IOPAMIDOL 75 ML: 755 INJECTION, SOLUTION INTRAVENOUS at 16:40

## 2025-04-04 ASSESSMENT — PAIN DESCRIPTION - DESCRIPTORS: DESCRIPTORS: ACHING

## 2025-04-04 ASSESSMENT — PAIN - FUNCTIONAL ASSESSMENT: PAIN_FUNCTIONAL_ASSESSMENT: 0-10

## 2025-04-04 ASSESSMENT — PAIN DESCRIPTION - LOCATION: LOCATION: HEAD

## 2025-04-04 ASSESSMENT — PAIN DESCRIPTION - ORIENTATION: ORIENTATION: MID

## 2025-04-04 ASSESSMENT — PAIN SCALES - GENERAL: PAINLEVEL_OUTOF10: 10

## 2025-04-04 NOTE — ED PROVIDER NOTES
[facility]  Emergency Department  Faculty Attestation     I performed a history and physical examination of the patient and discussed management with the resident. I reviewed the resident’s note and agree with the documented findings and plan of care. Any areas of disagreement are noted on the chart. I was personally present for the key portions of any procedures. I have documented in the chart those procedures where I was not present during the key portions. I have reviewed the emergency nurses triage note. I agree with the chief complaint, past medical history, past surgical history, allergies, medications, social and family history as documented unless otherwise noted below.    For Physician Assistant/ Nurse Practitioner cases/documentation I have personally evaluated this patient and have completed at least one if not all key elements of the E/M (history, physical exam, and MDM). Additional findings are as noted.    Preliminary note started at 2:55 PM EDT    Primary Care Physician:  Lucy Torres APRN - CNP    Screenings:  [unfilled]    CHIEF COMPLAINT       Chief Complaint   Patient presents with    Headache    Nausea    Dizziness       RECENT VITALS:   BP (!) 132/116   Pulse 92   Temp 99 °F (37.2 °C) (Oral)   Resp 18   LMP 11/26/2022 Comment: current bleeding/ urine HCG is negative today 12-12-22  SpO2 99%     LABS:  Labs Reviewed - No data to display    Radiology  No orders to display       CRITICAL CARE: There was a high probability of clinically significant/life threatening deterioration in this patient's condition which required my urgent intervention.  Total critical care time was none minutes.  This excludes any time for separately reportable procedures.     EKG:      Attending Physician Additional  Notes    Patient is severe headache, photophobia, nausea, described as the worst headache ever, gradual onset onset, ever since her low back injection by her

## 2025-04-04 NOTE — DISCHARGE INSTRUCTIONS
For pain use acetaminophen (Tylenol) or ibuprofen (Motrin / Advil), unless prescribed medications that have acetaminophen or ibuprofen (or similar medications) in it.  You can take over the counter acetaminophen tablets (1 - 2 tablets of the 500-mg strength every 6 hours) or ibuprofen tablets (2 tablets every 4 hours).   Avoid taking narcotics for headaches (can cause a worse headache in several hours after taking the medication).  Make sure that you drink plenty of water or Gatorade (or similar solution) to keep yourself hydrated.    PLEASE RETURN TO THE EMERGENCY DEPARTMENT IMMEDIATELY for worsening symptoms, change in vision / hearing / taste, ringing in your ears, loss of sensation or difficulty moving your arms or legs, or if you develop any concerning symptoms such as: high fever not relieved by acetaminophen (Tylenol) and/or ibuprofen (Motrin / Advil), chills, shortness of breath, chest pain, feeling of your heart fluttering or racing, persistent nausea and/or vomiting, vomiting up blood, blood in your stool, numbness, loss of consciousness, weakness or tingling in the arms or legs or change in color of the extremities, changes in mental status, persistent headache, blurry vision, loss of bladder / bowel control, unable to follow up with your physician, or other any other care or concern

## 2025-04-04 NOTE — TELEPHONE ENCOUNTER
Pt called with complaints of extreme headache, fatigue, light headed, dizziness, nausea, weakness, and blurred vision.  Writer suggested going to a [facility] ED for further evaluation.

## 2025-04-04 NOTE — ED PROVIDER NOTES
FACULTY SIGN-OUT  ADDENDUM       Patient: Charisma Donnelly   MRN: [id number]  PCP:  Lucy Torres APRN - CNP  Note Started: 4/4/25, 3:28 PM EDT  Attestation  I was available and discussed any additional care issues that arose and coordinated the management plans with the resident(s) caring for the patient during my duty period. Any areas of disagreement with resident's documentation of care or procedures are noted on the chart. I was personally present for the key portions of any/all procedures during my duty period. I have documented in the chart those procedures where I was not present during the key portions.   The patient's initial evaluation and plan have been discussed with the prior provider who initially evaluated the patient.      Pertinent Comments:  The patient is a 54 y.o. female taken in signout with migraine-like headache however history of aneurysm as well as recent spinal injections apparently raising concern for possible spinal headache.   We are awaiting CT as well as CTA of the head and neck.   IV fluids and symptom symptomatic control with some basic laboratories.  Reevaluation after    ED COURSE      The patient was given the following medications:  Orders Placed This Encounter   Medications    prochlorperazine (COMPAZINE) injection 10 mg    diphenhydrAMINE (BENADRYL) injection 25 mg    sodium chloride 0.9 % bolus 1,000 mL       RECENT VITALS:   BP: (!) 132/116  Pulse: 92  Respirations: 18  Temp: 99 °F (37.2 °C) SpO2: 99 %    (Please note that portions of this note were completed with a voice recognition program.  Efforts were made to edit the dictations but occasionally words are mis-transcribed.)    Kai Fitzgerald MD [facility]  Attending Emergency Medicine Physician       Kai Fitzgerald MD  04/04/25 [id number]

## 2025-04-04 NOTE — ED PROVIDER NOTES
[facility] EMERGENCY DEPARTMENT  Emergency Department  Emergency Medicine Resident Turn-Over     Note Started: 5:32 PM EDT    Care of Charisma Donnelly was assumed from Dr. Martinez and is being seen for Headache, Nausea, and Dizziness  .  The patient's initial evaluation and plan have been discussed with the prior provider who initially evaluated the patient.     EMERGENCY DEPARTMENT COURSE / MEDICAL DECISION MAKING:       MEDICATIONS GIVEN:  Orders Placed This Encounter   Medications    prochlorperazine (COMPAZINE) injection 10 mg    diphenhydrAMINE (BENADRYL) injection 25 mg    sodium chloride 0.9 % bolus 1,000 mL    iopamidol (ISOVUE-370) 76 % injection 75 mL    ondansetron (ZOFRAN-ODT) 4 MG disintegrating tablet     Sig: Take 1 tablet by mouth 3 times daily as needed for Nausea or Vomiting     Dispense:  21 tablet     Refill:  0       LABS / RADIOLOGY:     Labs Reviewed   CBC WITH AUTO DIFFERENTIAL - Abnormal; Notable for the following components:       Result Value    WBC 14.0 (*)     RBC 5.13 (*)     Hemoglobin 15.6 (*)     Lymphocytes % 53 (*)     Lymphocytes Absolute 7.42 (*)     Monocytes Absolute 0.84 (*)     All other components within normal limits   BASIC METABOLIC PANEL - Abnormal; Notable for the following components:    Glucose 183 (*)     All other components within normal limits       CT HEAD WO CONTRAST  Result Date: 4/4/2025  EXAMINATION: CTA OF THE HEAD AND NECK WITH CONTRAST; CT OF THE HEAD WITHOUT CONTRAST 4/4/2025 4:40 pm: TECHNIQUE: CTA of the head and neck was performed with the administration of intravenous contrast. Multiplanar reformatted images are provided for review.  MIP images are provided for review. Stenosis of the internal carotid arteries measured using NASCET criteria. Automated exposure control, iterative reconstruction, and/or weight based adjustment of the mA/kV was utilized to reduce the radiation dose to as low as reasonably achievable.; CT of the head was performed  RECOMMENDATIONS:    CT head, CTA head and neck  Dispo, likely home.     FINAL IMPRESSION:     1. Acute nonintractable headache, unspecified headache type        DISPOSITION:         DISPOSITION:  [x]  Discharge   []  Transfer -    []  Admission -     []  Against Medical Advice   []  Eloped   FOLLOW-UP: Lucy Torres, SAVANA - CNP  [address] [facility] [address]  [place] [address]  [phone]    Schedule an appointment as soon as possible for a visit       [facility] Emergency Department  [address] [place] [address]  [phone]    As needed, If symptoms worsen     DISCHARGE MEDICATIONS: New Prescriptions    ONDANSETRON (ZOFRAN-ODT) 4 MG DISINTEGRATING TABLET    Take 1 tablet by mouth 3 times daily as needed for Nausea or Vomiting           Ayah Hoang DO  Emergency Medicine Resident  [facility]

## 2025-04-04 NOTE — ED NOTES
Pt resting in bed with personal items and call light within reach. No acute distress noted. Resp even and non labored. Pt states slight relief in the headache at this time post medication. Will continue with plan of care.

## 2025-04-05 NOTE — ED PROVIDER NOTES
[facility] EMERGENCY DEPARTMENT  Emergency Department Encounter  Emergency Medicine Resident     Pt Name:Charisma Donnelly  MRN: [id number]  Birthdate 1970  Date of evaluation: 25  PCP:  Lucy Torres APRN - CNP  Note Started: 2:03 PM EDT      CHIEF COMPLAINT       Chief Complaint   Patient presents with    Headache    Nausea    Dizziness       HISTORY OF PRESENT ILLNESS  (Location/Symptom, Timing/Onset, Context/Setting, Quality, Duration, Modifying Factors, Severity.)      Charisma Donnelly is a 54 y.o. female who presents with headache, nausea and blurry vision.  Patient recently underwent epidural spinal epidural injection on 2025 after which her symptoms began to occur.  Denies any trauma.  Patient does have a family history of aneurysms.  Patient does state that she has a history of migraines and this headache is the worst she has ever experienced.  Patient been taking Tylenol and ibuprofen with minimal symptomatic relief.    PAST MEDICAL / SURGICAL / SOCIAL / FAMILY HISTORY      has a past medical history of Abnormal uterine bleeding (AUB), Anxiety, Arthritis, CAD (coronary artery disease), Chronic neck pain, COVID-19, Depression, Diabetes mellitus (HCC), Fatty liver, Gastroparesis, Histiocytosis (HCC), [place] (hard of hearing), HTN (hypertension), Hx of blood clots, Hyperlipidemia, Hypothyroid, Kidney stone, Lower back pain, Mixed incontinence, Myocardial infarct (HCC), PCOS (polycystic ovarian syndrome), Pulmonary Langerhans cell granulomatosis (HCC), Snores, Spinal stenosis, Under care of service provider, Under care of service provider, Under care of service provider, Under care of team, Under care of team, Under care of team, Vasomotor symptoms due to menopause, Wears eyeglasses, Wears partial dentures, and Wellness examination.       has a past surgical history that includes back surgery ();  section (); Mastoid surgery; Cardiac catheterization (2016); Lung biopsy (Right);  headache of her life.  There is a history of aneurysm in her family.  Will obtain CT head, CTA head and neck.  Will administer Compazine and Benadryl and reassess.  If symptoms do not resolve then possibly consult anesthesiology for blood patch.    EMERGENCY DEPARTMENT COURSE:      ED Course as of 04/05/25 1406   Fri Apr 04, 2025   1529 CBC with Auto Differential(!):    WBC 14.0(!)   RBC 5.13(!)   Hemoglobin Quant 15.6(!)   Hematocrit 46.7   MCV 91.0   MCH 30.4   MCHC 33.4   RDW 13.4   Platelet Count 365   MPV 9.4   NRBC Automated 0.0   Neutrophils % PENDING   Lymphocyte % PENDING   Monocytes % PENDING   Eosinophils % PENDING   Basophils % PENDING   Immature Granulocytes % PENDING   Neutrophils Absolute PENDING   Lymphocytes Absolute PENDING   Monocytes Absolute PENDING   Eosinophils Absolute PENDING   Basophils Absolute PENDING   Immature Granulocytes Absolute PENDING [AS]   1732 Care assumed [AS-2]   1733 Basic Metabolic Panel(!):    Sodium 138   Potassium 3.9   Chloride 102   CARBON DIOXIDE 24   Anion Gap 12   Glucose 183(!)   BUN,BUNPL 16   Creatinine 0.7   Est, Glom Filt Rate >90   Calcium 9.2 [AS-2]   1742 CT HEAD:     BRAIN/VENTRICLES:  No acute intracranial hemorrhage or extraaxial fluid  collection. Grey-white differentiation is maintained.  No evidence of mass,  mass effect or midline shift.  No evidence of hydrocephalus.     ORBITS: The visualized portion of the orbits demonstrate no acute abnormality.     SINUSES:  The visualized paranasal sinuses and mastoid air cells demonstrate  no acute abnormality.     Partial right mastoidectomy is noted.     SOFT TISSUES/SKULL: No acute abnormality of the visualized skull or soft  tissues.        CTA NECK:     AORTIC ARCH/ARCH VESSELS: No dissection or arterial injury.  No significant  stenosis of the brachiocephalic or subclavian arteries.     CAROTID ARTERIES: No dissection, arterial injury, or hemodynamically  significant stenosis by NASCET criteria.

## 2025-04-08 ENCOUNTER — HOSPITAL ENCOUNTER (OUTPATIENT)
Dept: PHYSICAL THERAPY | Age: 55
Setting detail: THERAPIES SERIES
Discharge: HOME | End: 2025-04-08
Attending: EMERGENCY MEDICINE
Payer: COMMERCIAL

## 2025-04-08 PROCEDURE — 97113 AQUATIC THERAPY/EXERCISES: CPT

## 2025-04-08 PROCEDURE — 97110 THERAPEUTIC EXERCISES: CPT

## 2025-04-08 NOTE — FLOWSHEET NOTE
[facility]   Outpatient Rehabilitation & Therapy  [address]  P: [phone]   F: [phone]    Physical Therapy Daily Treatment Note    Date:  2025  Patient Name:  Charisma Donnelly    :  1970  MRN: [id number]  Physician: Paola Braswell MD                                   Insurance: [facility]  vists, auth after 30 visits.   Medical Diagnosis: M48.061 (ICD-10-CM) - Lumbar stenosis without neurogenic claudication   Rehab Codes: M54.5, R26.2   Onset Date: 25                   Next 's appt.: 4/10/25 Dr. Christina ,  Visit Count: 10/12                                Cancel/No Show:     Subjective:  Patient reports getting injections last week and got a headache from it.  States pain down Left LE to L buttocks and down RLE to posterior knee.  Did some cleaning yesterday and over the weekend with pacing technique taking breaks every 30 minutes.  Did have to go to the ED on Friday due to the headache.  Was able to take a 1 mile walk with mold-mod soreness afterwards.      Pain:  [x] Yes  [] No Location: across low back, R hip down lateral LE to knee, Left posterior to buttocks     Pain Rating: (0-10 scale) 6/10 Low back Radiating down to mid anterior/medial thigh,   Pain altered Tx:  [x] No  [] Yes  Action:  Comments: Reviewed postural awareness, core stability and working in pain free ranges wit all exercises performed.     Objective:    [facility]   Rehabilitation Services Exercise Log  Aquatic, Hip & DLS Program- Phase 1    Date of Eval: 25                               Primary PT: Jimmy Esquivel, PT      Date 3/13/25 3/20/25 3/25/25 3/27/25 4/1/25 4/8/25   Visit # 5/12 6/12 7/12 8/12 9/12 10/12   Walk F/L/R 2 Laps @ Rail 2 Laps @ Rail 2 Laps @ Rail 2 Laps @ Rail 2 Laps @ Rail 2 Laps @ Rail   Marching 15x 15x 15x 15x 2 Laps 2 Laps   Squats 10x5\" 10x5\" 10x5\" 10x5\" 10x5\" 15x5\"   Step-Ups F/L   Low 10x Low 10x Low 15x +L Low 15x   Step Down F/L         Heel-toe

## 2025-04-08 NOTE — PROGRESS NOTES
Activity  33949 [] Vasopneumatic cold with compression  31384    [] Gait Training   47054 [] Ultrasound   12143   [] Neuromuscular Re-education  13527 [] Electrical Stimulation Unattended  88946   [] Manual Therapy  43779 [] Electrical Stimulation Attended  80490   [x] Instruction in HEP  [] Lumbar/Cervical Traction  41599   [x] Aquatic Therapy   06094 [] Cold/hotpack    [] Massage   08328      [] Dry Needling, 1 or 2 muscles  74180   [] Biofeedback, first 15 minutes   90912  [] Biofeedback, additional 15 minutes   90913 [] Dry Needling, 3 or more muscles  20561      Patient Status:     [x] Continue per initial plan of care.    [x] Additional visits necessary.    [] Other:     Requested Frequency/Duration: 2 times per week for total of 20 treatments.        Electronically signed by: Jimmy Esquivel PT    If you have any questions or concerns, please don't hesitate to call.  Thank you for your referral.    Physician Signature:________________________________Date:__________________  By signing above or cosigning this note, I have reviewed this plan of care and certify a need for medically necessary rehabilitation services.     *PLEASE SIGN ABOVE AND FAX BACK ALL PAGES*

## 2025-04-10 ENCOUNTER — OFFICE VISIT (OUTPATIENT)
Dept: NEUROSURGERY | Age: 55
End: 2025-04-10
Payer: COMMERCIAL

## 2025-04-10 VITALS
BODY MASS INDEX: 35.44 KG/M2 | HEART RATE: 95 BPM | WEIGHT: 200 LBS | OXYGEN SATURATION: 98 % | HEIGHT: 63 IN | DIASTOLIC BLOOD PRESSURE: 80 MMHG | SYSTOLIC BLOOD PRESSURE: 121 MMHG

## 2025-04-10 DIAGNOSIS — Z98.1 HISTORY OF LUMBAR FUSION: ICD-10-CM

## 2025-04-10 DIAGNOSIS — M47.816 LUMBAR SPONDYLOSIS: ICD-10-CM

## 2025-04-10 DIAGNOSIS — M46.1 SACROILIITIS: Primary | ICD-10-CM

## 2025-04-10 DIAGNOSIS — Z98.1 S/P CERVICAL SPINAL FUSION: ICD-10-CM

## 2025-04-10 PROCEDURE — 3074F SYST BP LT 130 MM HG: CPT | Performed by: NEUROLOGICAL SURGERY

## 2025-04-10 PROCEDURE — 3017F COLORECTAL CA SCREEN DOC REV: CPT | Performed by: NEUROLOGICAL SURGERY

## 2025-04-10 PROCEDURE — G8417 CALC BMI ABV UP PARAM F/U: HCPCS | Performed by: NEUROLOGICAL SURGERY

## 2025-04-10 PROCEDURE — 3079F DIAST BP 80-89 MM HG: CPT | Performed by: NEUROLOGICAL SURGERY

## 2025-04-10 PROCEDURE — 4004F PT TOBACCO SCREEN RCVD TLK: CPT | Performed by: NEUROLOGICAL SURGERY

## 2025-04-10 PROCEDURE — G8427 DOCREV CUR MEDS BY ELIG CLIN: HCPCS | Performed by: NEUROLOGICAL SURGERY

## 2025-04-10 PROCEDURE — 99214 OFFICE O/P EST MOD 30 MIN: CPT | Performed by: NEUROLOGICAL SURGERY

## 2025-04-10 NOTE — PROGRESS NOTES
myocardial infarction) (HCC)    Hx of CS x2    Hx of laparoscopic tubal ligation (2000)    Fundal uterine fibroid     Hx of decompressive lumbar laminectomy    Histiocytosis (HCC)    Bone mass    Chronic lumbar radiculopathy    Marijuana use    Cervical spondylosis without myelopathy    History of lumbar fusion    Diabetes mellitus, labile (HCC)    Long term (current) use of antithrombotics/antiplatelets    S/P lumbar laminectomy    Cervical spondylosis    Cervical radiculitis    Cervical spondylosis with myelopathy    Gastroparesis    Unstable angina (HCC)    Rectal bleeding    Lumbar stenosis without neurogenic claudication    Sacroiliitis    S/P cervical spinal fusion    Lumbar spondylosis         A/P:  This is a 54 y.o. female with Sacroiliitis  Comments:  Right  Resolved after SI injection  S/P cervical spinal fusion  Complete after 10 months  History of lumbar fusion  Lumbar spondylosis     F.u with me as needed  Recommend conservative treatment of cervicogenic headache, and low back pain for now            By signing my name below, I, Mercy Cisneros, attest that this documentation has been prepared under the direction and in the presence of Carri Christina DO. Electronically signed: Eldon Moody,     4/10/2025  9:54 AM      This note was created using voice recognition software. There may be inaccuracies of transcription  that are inadvertently overlooked prior to the signature.  There is any questions about the transcription please contact me.

## 2025-04-11 NOTE — DISCHARGE SUMMARY
[x] [facility]  Rehabilitation Services  [address]  [place], [place] [address]  Phone [phone]  Fax [phone]    Physical Therapy Discharge Note    Date: 2025      Patient: Charisma Donnelly  : 1970  MRN: [id number]       Physician: Paola Braswell MD                                   Insurance: [facility] [employer], auth after 30 visits.   Medical Diagnosis: M48.061 (ICD-10-CM) - Lumbar stenosis without neurogenic claudication   Rehab Codes: M54.5, R26.2   Onset Date: 25                   Next 's appt.: 4/10/25 Dr. Christina   Visit Count: 10/20                               Cancel/No Show:   Date of initial visit: 25                 Reporting period: 25-25  Last visit: 25             [] Patient recovered from conditions. Treatment goals were met.  [] Patient received maximum benefit. No further therapy indicated at this time.  [] Patient demonstrated improvement from condition with  ** Of  ** Short term goals met.  []Patient demonstrated improvement from condition with **   Of **  Long term goals met.  [] Patient to continue exercise/home instructions independently.  [x] Therapy interrupted due to: patient saw surgeon this week who advised her to DC therapy.     [] Patient has 2 or more no shows/cancels, is discontinued per our policy.    [] Patient has completed prescribed number of treatment sessions.    [] Other:        It Is My Understanding That The:  [] Patient returned to work.  [x] Patient demonstrated improved level of function.  [] Patient returned to previous functional level.  [] Patient's current functional status is unknown due to no-shows  [] Other:             Treatment Included:     [x] Therapeutic Exercise   50413  [] Iontophoresis: 4 mg/mL Dexamethasone Sodium Phosphate  mAmin  07979   [] Therapeutic Activity  18921 [] Vasopneumatic cold with compression  07553    [] Gait Training   40534 []

## 2025-04-15 ENCOUNTER — APPOINTMENT (OUTPATIENT)
Dept: PHYSICAL THERAPY | Age: 55
End: 2025-04-15
Attending: EMERGENCY MEDICINE
Payer: COMMERCIAL

## 2025-04-17 ENCOUNTER — APPOINTMENT (OUTPATIENT)
Dept: PHYSICAL THERAPY | Age: 55
End: 2025-04-17
Attending: EMERGENCY MEDICINE
Payer: COMMERCIAL

## 2025-04-22 ENCOUNTER — OFFICE VISIT (OUTPATIENT)
Dept: PAIN MANAGEMENT | Age: 55
End: 2025-04-22
Payer: COMMERCIAL

## 2025-04-22 VITALS — HEIGHT: 63 IN | WEIGHT: 200 LBS | BODY MASS INDEX: 35.44 KG/M2

## 2025-04-22 DIAGNOSIS — M54.16 CHRONIC LUMBAR RADICULOPATHY: ICD-10-CM

## 2025-04-22 DIAGNOSIS — M46.1 BILATERAL SACROILIITIS: Primary | ICD-10-CM

## 2025-04-22 DIAGNOSIS — Z98.1 S/P CERVICAL SPINAL FUSION: ICD-10-CM

## 2025-04-22 DIAGNOSIS — Z79.02 LONG TERM (CURRENT) USE OF ANTITHROMBOTICS/ANTIPLATELETS: ICD-10-CM

## 2025-04-22 DIAGNOSIS — M47.812 CERVICAL SPONDYLOSIS WITHOUT MYELOPATHY: ICD-10-CM

## 2025-04-22 DIAGNOSIS — Z98.890 S/P LUMBAR LAMINECTOMY: ICD-10-CM

## 2025-04-22 PROCEDURE — 4004F PT TOBACCO SCREEN RCVD TLK: CPT | Performed by: ANESTHESIOLOGY

## 2025-04-22 PROCEDURE — G8427 DOCREV CUR MEDS BY ELIG CLIN: HCPCS | Performed by: ANESTHESIOLOGY

## 2025-04-22 PROCEDURE — 99213 OFFICE O/P EST LOW 20 MIN: CPT | Performed by: ANESTHESIOLOGY

## 2025-04-22 PROCEDURE — G8417 CALC BMI ABV UP PARAM F/U: HCPCS | Performed by: ANESTHESIOLOGY

## 2025-04-22 PROCEDURE — 3017F COLORECTAL CA SCREEN DOC REV: CPT | Performed by: ANESTHESIOLOGY

## 2025-04-22 ASSESSMENT — ENCOUNTER SYMPTOMS
BACK PAIN: 1
RESPIRATORY NEGATIVE: 1
GASTROINTESTINAL NEGATIVE: 1

## 2025-04-22 NOTE — H&P (VIEW-ONLY)
The patient is a 54 y.o.Non- / non  female.    Chief Complaint   Patient presents with    Neck Pain     Procedure f/u    Back Pain      54-year-old female with history of chronic multisite body pain including neck and low back  Had previous cervical and lumbar spine fusion surgery  Main issue today is flareup of bilateral sacroiliac joint pain  She had SI joint injection in February that provided her more than 6 weeks of relief  Has been following up with neurosurgery and is advised to continue with SI joint injection    Pain is located over the bilateral SI joint right side more than left describes it as sharp aggravates with standing and climbing stairs  No dermatomal radiation in leg  No associated numbness or paresthesia  Patient has tried multiple courses of physical therapy and NSAIDs in past  Outcome   Any improvement of activity?  Yes, but have to take excessive breaks    Any side effects (appetite,leg cramping,facial fleshing): no appetite, spinal headache for 8 days after procedure    Increase of pain:  Yes  Pain score Today:  9  % of pain relief: none   Pain diary (medial branch block): No    Hemoglobin A1C   Date Value Ref Range Status   03/03/2025 8.6 (H) 4.0 - 6.0 % Final            Past Medical History:   Diagnosis Date    Abnormal uterine bleeding (AUB)     fibroid    Anxiety     Arthritis     CAD (coronary artery disease)     stent  LAD    Chronic neck pain     COVID-19 07/2022    fever, slight cough, fatigue,lost taste,  admitted for couple days bc of elevated troponins    Depression     pcp manages    Diabetes mellitus (HCC) 2017    managed by PCP    Fatty liver     Gastroparesis     Histiocytosis (HCC)     [place] (hard of hearing)     needs hearing aids but doesn't have them, Seems to hear fine without. Reads lips.    HTN (hypertension)     Hx of blood clots     Leg when on Contraception  1995    Hyperlipidemia     Hypothyroid     Kidney stone     Lower back pain     Mixed

## 2025-04-22 NOTE — PROGRESS NOTES
Transportation (Medical): No     Lack of Transportation (Non-Medical): No   Physical Activity: Unknown (2/14/2022)    Exercise Vital Sign     Days of Exercise per Week: 0 days   Stress: Stress Concern Present (2/15/2020)    Received from [employer], [facility] of Occupational Health - Occupational Stress Questionnaire     Feeling of Stress : Very much   Social Connections: Socially Integrated (2/15/2020)    Received from [employer], [employer]    Social Connection and Isolation Panel [NHANES]     Frequency of Communication with Friends and Family: More than three times a week     Frequency of Social Gatherings with Friends and Family: Once a week     Attends Gnosticism Services: 1 to 4 times per year     Active Member of Clubs or Organizations: Yes     Attends Club or Organization Meetings: 1 to 4 times per year     Marital Status:    Intimate Partner Violence: Not At Risk (2/14/2022)    Humiliation, Afraid, Rape, and Kick questionnaire     Fear of Current or Ex-Partner: No     Emotionally Abused: No     Physically Abused: No     Sexually Abused: No   Housing Stability: Low Risk  (3/4/2025)    Housing Stability Vital Sign     Unable to Pay for Housing in the Last Year: No     Number of Times Moved in the Last Year: 0     Homeless in the Last Year: No       Family History   Problem Relation Age of Onset    Dementia Mother     Depression Mother     High Blood Pressure Mother     Coronary Art Dis Mother         9 stents    COPD Mother     Diabetes Mother     Other Mother         degenerative disc disease    Heart Attack Father     Heart Disease Maternal Aunt     Diabetes Maternal Grandmother     Alzheimer's Disease Paternal Grandfather        Allergies   Allergen Reactions    Lactose Intolerance (Gi)     Cephalexin Rash    Dust Mite Extract Itching     sneezing    Erythromycin Rash    Grass Pollen(K-O-R-T-Swt Elias) Other (See Comments)

## 2025-04-24 ENCOUNTER — APPOINTMENT (OUTPATIENT)
Dept: PHYSICAL THERAPY | Age: 55
End: 2025-04-24
Attending: EMERGENCY MEDICINE
Payer: COMMERCIAL

## 2025-05-07 ENCOUNTER — HOSPITAL ENCOUNTER (OUTPATIENT)
Dept: PAIN MANAGEMENT | Facility: CLINIC | Age: 55
Discharge: HOME | End: 2025-05-07
Payer: COMMERCIAL

## 2025-05-07 VITALS
OXYGEN SATURATION: 96 % | HEART RATE: 104 BPM | DIASTOLIC BLOOD PRESSURE: 81 MMHG | SYSTOLIC BLOOD PRESSURE: 127 MMHG | RESPIRATION RATE: 13 BRPM | HEIGHT: 63 IN | WEIGHT: 200 LBS | TEMPERATURE: 98 F | BODY MASS INDEX: 35.44 KG/M2

## 2025-05-07 DIAGNOSIS — R52 PAIN MANAGEMENT: ICD-10-CM

## 2025-05-07 DIAGNOSIS — M46.1 BILATERAL SACROILIITIS: ICD-10-CM

## 2025-05-07 LAB — GLUCOSE BLD-MCNC: 204 MG/DL (ref 65–105)

## 2025-05-07 PROCEDURE — 99152 MOD SED SAME PHYS/QHP 5/>YRS: CPT | Performed by: ANESTHESIOLOGY

## 2025-05-07 PROCEDURE — 27096 INJECT SACROILIAC JOINT: CPT | Performed by: ANESTHESIOLOGY

## 2025-05-07 PROCEDURE — 6360000004 HC RX CONTRAST MEDICATION: Performed by: ANESTHESIOLOGY

## 2025-05-07 PROCEDURE — 6360000002 HC RX W HCPCS: Performed by: ANESTHESIOLOGY

## 2025-05-07 PROCEDURE — 82947 ASSAY GLUCOSE BLOOD QUANT: CPT

## 2025-05-07 PROCEDURE — G0260 INJ FOR SACROILIAC JT ANESTH: HCPCS

## 2025-05-07 RX ORDER — DEXAMETHASONE SODIUM PHOSPHATE 10 MG/ML
INJECTION, SOLUTION INTRAMUSCULAR; INTRAVENOUS
Status: COMPLETED | OUTPATIENT
Start: 2025-05-07 | End: 2025-05-07

## 2025-05-07 RX ORDER — BUPIVACAINE HYDROCHLORIDE 5 MG/ML
INJECTION, SOLUTION EPIDURAL; INTRACAUDAL; PERINEURAL
Status: COMPLETED | OUTPATIENT
Start: 2025-05-07 | End: 2025-05-07

## 2025-05-07 RX ORDER — FENTANYL CITRATE 50 UG/ML
INJECTION, SOLUTION INTRAMUSCULAR; INTRAVENOUS
Status: COMPLETED | OUTPATIENT
Start: 2025-05-07 | End: 2025-05-07

## 2025-05-07 RX ORDER — LIDOCAINE HYDROCHLORIDE 10 MG/ML
INJECTION, SOLUTION EPIDURAL; INFILTRATION; INTRACAUDAL; PERINEURAL
Status: COMPLETED | OUTPATIENT
Start: 2025-05-07 | End: 2025-05-07

## 2025-05-07 RX ORDER — MIDAZOLAM HYDROCHLORIDE 2 MG/2ML
INJECTION, SOLUTION INTRAMUSCULAR; INTRAVENOUS
Status: COMPLETED | OUTPATIENT
Start: 2025-05-07 | End: 2025-05-07

## 2025-05-07 RX ADMIN — BUPIVACAINE HYDROCHLORIDE 5 ML: 5 INJECTION, SOLUTION EPIDURAL; INTRACAUDAL; PERINEURAL at 08:54

## 2025-05-07 RX ADMIN — FENTANYL CITRATE 50 MCG: 50 INJECTION, SOLUTION INTRAMUSCULAR; INTRAVENOUS at 08:52

## 2025-05-07 RX ADMIN — IOHEXOL 3 ML: 180 INJECTION INTRAVENOUS at 08:53

## 2025-05-07 RX ADMIN — MIDAZOLAM HYDROCHLORIDE 2 MG: 1 INJECTION, SOLUTION INTRAMUSCULAR; INTRAVENOUS at 08:52

## 2025-05-07 RX ADMIN — LIDOCAINE HYDROCHLORIDE 4 ML: 10 INJECTION, SOLUTION EPIDURAL; INFILTRATION; INTRACAUDAL; PERINEURAL at 08:53

## 2025-05-07 RX ADMIN — DEXAMETHASONE SODIUM PHOSPHATE 10 MG: 10 INJECTION INTRAMUSCULAR; INTRAVENOUS at 08:54

## 2025-05-07 ASSESSMENT — PAIN DESCRIPTION - LOCATION: LOCATION: BACK;HIP

## 2025-05-07 ASSESSMENT — PAIN DESCRIPTION - FREQUENCY: FREQUENCY: CONTINUOUS

## 2025-05-07 ASSESSMENT — PAIN DESCRIPTION - PAIN TYPE: TYPE: CHRONIC PAIN

## 2025-05-07 ASSESSMENT — PAIN DESCRIPTION - DIRECTION: RADIATING_TOWARDS: RIGHT LEG

## 2025-05-07 ASSESSMENT — PAIN SCALES - GENERAL: PAINLEVEL_OUTOF10: 9

## 2025-05-07 ASSESSMENT — PAIN DESCRIPTION - ORIENTATION: ORIENTATION: RIGHT

## 2025-05-07 ASSESSMENT — PAIN DESCRIPTION - DESCRIPTORS: DESCRIPTORS: SHARP;STABBING

## 2025-05-07 ASSESSMENT — PAIN DESCRIPTION - ONSET: ONSET: PROGRESSIVE

## 2025-05-07 NOTE — INTERVAL H&P NOTE
Update History & Physical    The patient's History and Physical of April 22, 2025 was reviewed with the patient and I examined the patient. There was no change. The surgical site was confirmed by the patient and me.     Plan: The risks, benefits, expected outcome, and alternative to the recommended procedure have been discussed with the patient. Patient understands and wants to proceed with the procedure.     ASA 3  MP 3    Electronically signed by Luis Angel Dixon MD on 5/7/2025 at 8:35 AM

## 2025-05-07 NOTE — DISCHARGE INSTRUCTIONS

## 2025-05-07 NOTE — OP NOTE
Pre Op Diagnoses: BilateralSacroiliac joint pain  Post Op Diagnoses:Bilateral Sacroiliac joint pain     Procedure: BilateralSI joint steroid injection with flouro guidance     Blood Loss: None  Procedure:      The Patient was seen in the preop area, chart was reviewed, informed consent was obtained. Patient was taken to procedure room and was placed in prone position. Vital signs were monitored through out the Procedure. A time out was completed.  The skin over the back was prepped and draped in sterile manner.      The target point was marked at the left SI joint. Skin and deep tissues were anesthetized with 1 % lidocaine. A 22 G spinal needlele was advanced under fluoroscopy guidance in AP view. Positon was confirmed by injecting small amount of contrast dye  Finally 3 ml of treatment solution containing 5 ml of 0.5 % Bupivacaine and 1 ml of Dexamethasone 10 mg was injected  The needle was removed and a Band-Aid was placed over the needle insertion site.    The same procedure was then repeated on the other side with same technique, the remaining 1.5 ml of treatment solution was injected on that side.    The patient's vital signs remained stable and the patient tolerated the procedure well.        SEDATION NOTE:    ASA CLASSIFICATION  3  MP   CLASSIFICATION  3    Moderate intravenous conscious sedation was supervised by Dr. Dixon  The patient was independently monitored by a Registered Nurse assigned to the Procedure Room  Monitoring included automated blood pressure, continuous EKG, Capnography and continuous pulse oximetry.   The detailed Conscious Record is permanently stored in the Hospital Information System.     The following is the conscious sedation record;  Start Time:  0848  End times:  0858  Duration:  10 minutes  MEDS GIVEN 2 MG VERSED AND 50 MCG FENTANYL

## 2025-06-04 ENCOUNTER — OFFICE VISIT (OUTPATIENT)
Dept: FAMILY MEDICINE CLINIC | Age: 55
End: 2025-06-04
Payer: COMMERCIAL

## 2025-06-04 VITALS
DIASTOLIC BLOOD PRESSURE: 113 MMHG | WEIGHT: 201 LBS | HEART RATE: 90 BPM | TEMPERATURE: 97.6 F | SYSTOLIC BLOOD PRESSURE: 176 MMHG | OXYGEN SATURATION: 98 % | BODY MASS INDEX: 35.61 KG/M2

## 2025-06-04 DIAGNOSIS — B37.2 YEAST INFECTION OF THE SKIN: ICD-10-CM

## 2025-06-04 DIAGNOSIS — I10 ESSENTIAL HYPERTENSION: ICD-10-CM

## 2025-06-04 DIAGNOSIS — J84.82: ICD-10-CM

## 2025-06-04 DIAGNOSIS — E11.65 TYPE 2 DIABETES MELLITUS WITH HYPERGLYCEMIA, WITH LONG-TERM CURRENT USE OF INSULIN (HCC): Primary | ICD-10-CM

## 2025-06-04 DIAGNOSIS — G43.909 MIGRAINE WITHOUT STATUS MIGRAINOSUS, NOT INTRACTABLE, UNSPECIFIED MIGRAINE TYPE: ICD-10-CM

## 2025-06-04 DIAGNOSIS — G89.29 OTHER CHRONIC PAIN: ICD-10-CM

## 2025-06-04 DIAGNOSIS — K59.01 SLOW TRANSIT CONSTIPATION: ICD-10-CM

## 2025-06-04 DIAGNOSIS — E78.2 MIXED HYPERLIPIDEMIA: ICD-10-CM

## 2025-06-04 DIAGNOSIS — R11.0 NAUSEA: ICD-10-CM

## 2025-06-04 DIAGNOSIS — E03.9 HYPOTHYROIDISM, UNSPECIFIED TYPE: ICD-10-CM

## 2025-06-04 DIAGNOSIS — K31.84 GASTROPARESIS: ICD-10-CM

## 2025-06-04 DIAGNOSIS — E55.9 VITAMIN D DEFICIENCY: ICD-10-CM

## 2025-06-04 DIAGNOSIS — J30.2 SEASONAL ALLERGIC RHINITIS, UNSPECIFIED TRIGGER: ICD-10-CM

## 2025-06-04 DIAGNOSIS — Z79.4 TYPE 2 DIABETES MELLITUS WITH HYPERGLYCEMIA, WITH LONG-TERM CURRENT USE OF INSULIN (HCC): Primary | ICD-10-CM

## 2025-06-04 DIAGNOSIS — N95.1 HOT FLASHES DUE TO MENOPAUSE: ICD-10-CM

## 2025-06-04 DIAGNOSIS — I25.119 CORONARY ARTERY DISEASE INVOLVING NATIVE HEART WITH ANGINA PECTORIS, UNSPECIFIED VESSEL OR LESION TYPE: ICD-10-CM

## 2025-06-04 DIAGNOSIS — K21.00 GASTROESOPHAGEAL REFLUX DISEASE WITH ESOPHAGITIS WITHOUT HEMORRHAGE: ICD-10-CM

## 2025-06-04 PROBLEM — E10.9 DIABETES MELLITUS, LABILE (HCC): Status: RESOLVED | Noted: 2024-01-16 | Resolved: 2025-06-04

## 2025-06-04 PROBLEM — D76.3 HISTIOCYTOSIS (HCC): Status: RESOLVED | Noted: 2023-08-24 | Resolved: 2025-06-04

## 2025-06-04 PROBLEM — I21.4 NSTEMI (NON-ST ELEVATED MYOCARDIAL INFARCTION) (HCC): Status: RESOLVED | Noted: 2022-07-08 | Resolved: 2025-06-04

## 2025-06-04 PROBLEM — M46.1 BILATERAL SACROILIITIS: Status: RESOLVED | Noted: 2025-02-11 | Resolved: 2025-06-04

## 2025-06-04 PROBLEM — S72.009A HIP FRACTURE (HCC): Status: RESOLVED | Noted: 2020-01-18 | Resolved: 2025-06-04

## 2025-06-04 PROBLEM — M54.2 CERVICAL PAIN (NECK): Status: RESOLVED | Noted: 2019-05-31 | Resolved: 2025-06-04

## 2025-06-04 PROBLEM — M47.812 CERVICAL SPONDYLOSIS: Status: RESOLVED | Noted: 2024-03-28 | Resolved: 2025-06-04

## 2025-06-04 PROBLEM — K62.5 RECTAL BLEEDING: Status: RESOLVED | Noted: 2024-11-12 | Resolved: 2025-06-04

## 2025-06-04 PROBLEM — M89.8X9 BONE MASS: Status: RESOLVED | Noted: 2023-08-24 | Resolved: 2025-06-04

## 2025-06-04 PROBLEM — M54.12 CERVICAL RADICULITIS: Status: RESOLVED | Noted: 2024-03-28 | Resolved: 2025-06-04

## 2025-06-04 LAB — HBA1C MFR BLD: 8.3 %

## 2025-06-04 PROCEDURE — 3077F SYST BP >= 140 MM HG: CPT | Performed by: NURSE PRACTITIONER

## 2025-06-04 PROCEDURE — 99214 OFFICE O/P EST MOD 30 MIN: CPT | Performed by: NURSE PRACTITIONER

## 2025-06-04 PROCEDURE — 3052F HG A1C>EQUAL 8.0%<EQUAL 9.0%: CPT | Performed by: NURSE PRACTITIONER

## 2025-06-04 PROCEDURE — G8417 CALC BMI ABV UP PARAM F/U: HCPCS | Performed by: NURSE PRACTITIONER

## 2025-06-04 PROCEDURE — 2022F DILAT RTA XM EVC RTNOPTHY: CPT | Performed by: NURSE PRACTITIONER

## 2025-06-04 PROCEDURE — 3017F COLORECTAL CA SCREEN DOC REV: CPT | Performed by: NURSE PRACTITIONER

## 2025-06-04 PROCEDURE — 83036 HEMOGLOBIN GLYCOSYLATED A1C: CPT | Performed by: NURSE PRACTITIONER

## 2025-06-04 PROCEDURE — 4004F PT TOBACCO SCREEN RCVD TLK: CPT | Performed by: NURSE PRACTITIONER

## 2025-06-04 PROCEDURE — 3080F DIAST BP >= 90 MM HG: CPT | Performed by: NURSE PRACTITIONER

## 2025-06-04 PROCEDURE — G8427 DOCREV CUR MEDS BY ELIG CLIN: HCPCS | Performed by: NURSE PRACTITIONER

## 2025-06-04 RX ORDER — ALBUTEROL SULFATE 0.83 MG/ML
2.5 SOLUTION RESPIRATORY (INHALATION) DAILY PRN
Qty: 60 EACH | Refills: 0 | Status: SHIPPED | OUTPATIENT
Start: 2025-06-04

## 2025-06-04 RX ORDER — ROSUVASTATIN CALCIUM 40 MG/1
40 TABLET, COATED ORAL DAILY
Qty: 90 TABLET | Refills: 1 | Status: SHIPPED | OUTPATIENT
Start: 2025-06-04

## 2025-06-04 RX ORDER — PANTOPRAZOLE SODIUM 40 MG/1
40 TABLET, DELAYED RELEASE ORAL DAILY
Qty: 90 TABLET | Refills: 2 | Status: SHIPPED | OUTPATIENT
Start: 2025-06-04 | End: 2026-03-01

## 2025-06-04 RX ORDER — INSULIN GLARGINE 300 U/ML
40 INJECTION, SOLUTION SUBCUTANEOUS
Qty: 3 EACH | Refills: 1 | Status: SHIPPED
Start: 2025-06-04

## 2025-06-04 RX ORDER — RANOLAZINE 1000 MG/1
1000 TABLET, EXTENDED RELEASE ORAL 2 TIMES DAILY
Qty: 60 TABLET | Refills: 3 | Status: CANCELLED | OUTPATIENT
Start: 2025-06-04

## 2025-06-04 RX ORDER — ONDANSETRON 4 MG/1
4 TABLET, FILM COATED ORAL EVERY 6 HOURS PRN
Qty: 90 TABLET | Refills: 1 | Status: SHIPPED | OUTPATIENT
Start: 2025-06-04

## 2025-06-04 RX ORDER — FLUTICASONE PROPIONATE 50 MCG
1 SPRAY, SUSPENSION (ML) NASAL DAILY PRN
Qty: 1 EACH | Refills: 2 | Status: SHIPPED | OUTPATIENT
Start: 2025-06-04

## 2025-06-04 RX ORDER — NYSTATIN 100000 [USP'U]/G
POWDER TOPICAL
Qty: 60 G | Refills: 0 | Status: SHIPPED | OUTPATIENT
Start: 2025-06-04

## 2025-06-04 RX ORDER — NYSTATIN 100000 U/G
CREAM TOPICAL
Qty: 30 G | Refills: 0 | Status: SHIPPED | OUTPATIENT
Start: 2025-06-04

## 2025-06-04 RX ORDER — ALBUTEROL SULFATE 90 UG/1
2 INHALANT RESPIRATORY (INHALATION) 4 TIMES DAILY PRN
Qty: 54 G | Refills: 1 | Status: SHIPPED | OUTPATIENT
Start: 2025-06-04

## 2025-06-04 RX ORDER — NAPROXEN 500 MG/1
500 TABLET ORAL 2 TIMES DAILY WITH MEALS
Qty: 14 TABLET | Refills: 0 | Status: CANCELLED | OUTPATIENT
Start: 2025-06-04 | End: 2025-06-11

## 2025-06-04 RX ORDER — AMITRIPTYLINE HYDROCHLORIDE 50 MG/1
50 TABLET ORAL NIGHTLY
Qty: 90 TABLET | Refills: 1 | Status: CANCELLED | OUTPATIENT
Start: 2025-06-04

## 2025-06-04 RX ORDER — CHOLECALCIFEROL (VITAMIN D3) 1250 MCG
1 CAPSULE ORAL WEEKLY
Qty: 4 CAPSULE | Refills: 3 | Status: SHIPPED | OUTPATIENT
Start: 2025-06-04

## 2025-06-04 RX ORDER — FEZOLINETANT 45 MG/1
1 TABLET, FILM COATED ORAL DAILY
Qty: 90 TABLET | Refills: 1 | Status: SHIPPED | OUTPATIENT
Start: 2025-06-04

## 2025-06-04 RX ORDER — ISOSORBIDE MONONITRATE 30 MG/1
30 TABLET, EXTENDED RELEASE ORAL DAILY
Qty: 30 TABLET | Refills: 3 | Status: CANCELLED | OUTPATIENT
Start: 2025-06-04

## 2025-06-04 RX ORDER — ACETAMINOPHEN 500 MG
1000 TABLET ORAL EVERY 6 HOURS PRN
Qty: 42 TABLET | Refills: 0 | Status: SHIPPED | OUTPATIENT
Start: 2025-06-04 | End: 2025-06-09

## 2025-06-04 RX ORDER — LEVOTHYROXINE SODIUM 50 UG/1
50 TABLET ORAL DAILY
Qty: 90 TABLET | Refills: 1 | Status: SHIPPED | OUTPATIENT
Start: 2025-06-04

## 2025-06-04 RX ORDER — DOCUSATE SODIUM 100 MG/1
100 CAPSULE, LIQUID FILLED ORAL 2 TIMES DAILY
Qty: 60 CAPSULE | Refills: 1 | Status: SHIPPED | OUTPATIENT
Start: 2025-06-04

## 2025-06-04 RX ORDER — CLOPIDOGREL BISULFATE 75 MG/1
75 TABLET ORAL DAILY
Qty: 30 TABLET | Refills: 5 | Status: SHIPPED | OUTPATIENT
Start: 2025-06-04

## 2025-06-04 RX ORDER — METOPROLOL SUCCINATE 100 MG/1
TABLET, EXTENDED RELEASE ORAL
Qty: 180 TABLET | Refills: 1 | Status: SHIPPED | OUTPATIENT
Start: 2025-06-04

## 2025-06-04 RX ORDER — GLIPIZIDE 2.5 MG/1
2.5 TABLET, EXTENDED RELEASE ORAL DAILY
Qty: 90 TABLET | Refills: 1 | Status: SHIPPED | OUTPATIENT
Start: 2025-06-04

## 2025-06-04 RX ORDER — FAMOTIDINE 40 MG/1
40 TABLET, FILM COATED ORAL EVERY EVENING
Qty: 90 TABLET | Refills: 1 | Status: SHIPPED | OUTPATIENT
Start: 2025-06-04

## 2025-06-04 RX ORDER — ASPIRIN 81 MG/1
81 TABLET ORAL DAILY
Qty: 90 TABLET | Refills: 1 | Status: SHIPPED | OUTPATIENT
Start: 2025-06-04

## 2025-06-04 SDOH — ECONOMIC STABILITY: FOOD INSECURITY: WITHIN THE PAST 12 MONTHS, THE FOOD YOU BOUGHT JUST DIDN'T LAST AND YOU DIDN'T HAVE MONEY TO GET MORE.: NEVER TRUE

## 2025-06-04 SDOH — ECONOMIC STABILITY: FOOD INSECURITY: WITHIN THE PAST 12 MONTHS, YOU WORRIED THAT YOUR FOOD WOULD RUN OUT BEFORE YOU GOT MONEY TO BUY MORE.: NEVER TRUE

## 2025-06-04 ASSESSMENT — PATIENT HEALTH QUESTIONNAIRE - PHQ9
SUM OF ALL RESPONSES TO PHQ QUESTIONS 1-9: 0
SUM OF ALL RESPONSES TO PHQ QUESTIONS 1-9: 0
4. FEELING TIRED OR HAVING LITTLE ENERGY: NOT AT ALL
5. POOR APPETITE OR OVEREATING: NOT AT ALL
6. FEELING BAD ABOUT YOURSELF - OR THAT YOU ARE A FAILURE OR HAVE LET YOURSELF OR YOUR FAMILY DOWN: NOT AT ALL
9. THOUGHTS THAT YOU WOULD BE BETTER OFF DEAD, OR OF HURTING YOURSELF: NOT AT ALL
SUM OF ALL RESPONSES TO PHQ QUESTIONS 1-9: 0
3. TROUBLE FALLING OR STAYING ASLEEP: NOT AT ALL
1. LITTLE INTEREST OR PLEASURE IN DOING THINGS: NOT AT ALL
7. TROUBLE CONCENTRATING ON THINGS, SUCH AS READING THE NEWSPAPER OR WATCHING TELEVISION: NOT AT ALL
8. MOVING OR SPEAKING SO SLOWLY THAT OTHER PEOPLE COULD HAVE NOTICED. OR THE OPPOSITE, BEING SO FIGETY OR RESTLESS THAT YOU HAVE BEEN MOVING AROUND A LOT MORE THAN USUAL: NOT AT ALL
SUM OF ALL RESPONSES TO PHQ QUESTIONS 1-9: 0
10. IF YOU CHECKED OFF ANY PROBLEMS, HOW DIFFICULT HAVE THESE PROBLEMS MADE IT FOR YOU TO DO YOUR WORK, TAKE CARE OF THINGS AT HOME, OR GET ALONG WITH OTHER PEOPLE: NOT DIFFICULT AT ALL
2. FEELING DOWN, DEPRESSED OR HOPELESS: NOT AT ALL

## 2025-06-04 ASSESSMENT — ENCOUNTER SYMPTOMS
NAUSEA: 1
ABDOMINAL PAIN: 1
DIARRHEA: 1
BACK PAIN: 1

## 2025-06-04 NOTE — PROGRESS NOTES
[facility], [facility]  [address]  [place]  [address]  [phone]  [phone]    Charisma Donnelly is a 54 y.o. female who is here with c/o of:    Chief Complaint: Diabetes      Patient Accompanied by: n/a    HPI - Charisma Donnelly is here today with c/o:    History of Present Illness  The patient is a 54-year-old female who presents for follow-up and reevaluation of chronic conditions including diabetes mellitus, angina, GERD, gastroparesis, constipation, allergies, pain management, and hypertension.    She has discontinued her endocrinology consultations and prefers her diabetes to be managed here. Her blood glucose levels fluctuate, with readings around 130 on good days and potentially spiking to 300 on bad days, depending on her injection schedule. She is currently on a regimen of Toujeo 40 units. She is not using Humalog. She has experienced adverse reactions to Jardiance and Trulicity, the latter of which she believes contributed to her gastroparesis.    She has been experiencing severe acid reflux, necessitating the use of chewable Tums. Protonix is not helping her. Over the past 3 weeks, she has been struggling with gastroparesis, limiting her food intake to 4 or 5 bites every 3 to 4 hours. She is attempting to avoid Reglan due to potential side effects and is seeking a stronger dose of Zofran.    She has been managing her constipation with Colace, although she has not required it in the past 3 weeks due to diarrhea.    She reports severe allergy symptoms, including runny eyes and nose, and a productive cough.    She has been alternating between naproxen and Tylenol for pain management. She has a scheduled appointment with Dr. Dixon next week. She has been on various opioids since 1997 and is currently taking edibles every 8 to 10 hours. She has been using Veozah effectively to manage hot flashes.    She has not been monitoring her blood pressure at home recently but

## 2025-06-10 ENCOUNTER — OFFICE VISIT (OUTPATIENT)
Dept: PAIN MANAGEMENT | Age: 55
End: 2025-06-10
Payer: COMMERCIAL

## 2025-06-10 VITALS — BODY MASS INDEX: 35.61 KG/M2 | HEIGHT: 63 IN | WEIGHT: 201 LBS

## 2025-06-10 DIAGNOSIS — M54.16 CHRONIC LUMBAR RADICULOPATHY: ICD-10-CM

## 2025-06-10 DIAGNOSIS — M47.812 CERVICAL SPONDYLOSIS WITHOUT MYELOPATHY: Primary | ICD-10-CM

## 2025-06-10 DIAGNOSIS — Z98.1 HISTORY OF LUMBAR FUSION: ICD-10-CM

## 2025-06-10 DIAGNOSIS — Z98.1 S/P CERVICAL SPINAL FUSION: ICD-10-CM

## 2025-06-10 DIAGNOSIS — Z79.02 LONG TERM (CURRENT) USE OF ANTITHROMBOTICS/ANTIPLATELETS: ICD-10-CM

## 2025-06-10 PROCEDURE — 3017F COLORECTAL CA SCREEN DOC REV: CPT | Performed by: ANESTHESIOLOGY

## 2025-06-10 PROCEDURE — 4004F PT TOBACCO SCREEN RCVD TLK: CPT | Performed by: ANESTHESIOLOGY

## 2025-06-10 PROCEDURE — G8427 DOCREV CUR MEDS BY ELIG CLIN: HCPCS | Performed by: ANESTHESIOLOGY

## 2025-06-10 PROCEDURE — 99214 OFFICE O/P EST MOD 30 MIN: CPT | Performed by: ANESTHESIOLOGY

## 2025-06-10 PROCEDURE — G8417 CALC BMI ABV UP PARAM F/U: HCPCS | Performed by: ANESTHESIOLOGY

## 2025-06-10 ASSESSMENT — ENCOUNTER SYMPTOMS
RESPIRATORY NEGATIVE: 1
GASTROINTESTINAL NEGATIVE: 1
BACK PAIN: 1

## 2025-06-10 NOTE — PROGRESS NOTES
Take 1 tablet by mouth daily 90 tablet 2    rosuvastatin (CRESTOR) 40 MG tablet Take 1 tablet by mouth daily 90 tablet 1    Insulin Glargine, 2 Unit Dial, (TOUJEO SON SOLOSTAR) 300 UNIT/ML concentrated injection pen Inject 40 Units into the skin daily (with breakfast) 3 each 1    famotidine (PEPCID) 40 MG tablet Take 1 tablet by mouth every evening 90 tablet 1    glipiZIDE (GLUCOTROL XL) 2.5 MG extended release tablet Take 1 tablet by mouth daily 90 tablet 1    amitriptyline (ELAVIL) 50 MG tablet TAKE 1 TABLET BY MOUTH EVERY DAY AT NIGHT 90 tablet 1    Continuous Glucose Sensor (DEXCOM G7 SENSOR) MISC USE AS DIRECTED EVERY 10 DAYS 1 each 3    isosorbide mononitrate (IMDUR) 30 MG extended release tablet Take 1 tablet by mouth daily 30 tablet 3    psyllium (KONSYL) 28.3 % POWD powder Take 3.4 g by mouth daily 368 g 0    ranolazine (RANEXA) 1000 MG extended release tablet Take 1 tablet by mouth 2 times daily 60 tablet 3    DROPLET PEN NEEDLES 32G X 4 MM MISC use 1 PEN NEEDLE to inject MEDICATION subcutaneously four times a day      Multiple Vitamins-Minerals (THERAPEUTIC MULTIVITAMIN-MINERALS) tablet Take 1 tablet by mouth daily      ascorbic acid (VITAMIN C) 500 MG tablet Take 1 tablet by mouth daily      acetaminophen (TYLENOL) 500 MG tablet Take 2 tablets by mouth every 6 hours as needed for Pain 42 tablet 0     No current facility-administered medications for this visit.       Review of Systems   Constitutional: Negative.  Negative for fever.   HENT: Negative.     Respiratory: Negative.     Cardiovascular: Negative.    Gastrointestinal: Negative.    Musculoskeletal:  Positive for back pain and gait problem. Negative for joint swelling.   Neurological:  Positive for headaches. Negative for weakness and numbness.         Objective:  General Appearance:  Well-appearing, in no acute distress, uncomfortable and in pain.    Vital signs: (most recent): Height 1.6 m (5' 3\"), weight 91.2 kg (201 lb), last menstrual period

## 2025-06-10 NOTE — H&P (VIEW-ONLY)
leighton The patient is a 54 y.o.Non- / non  female.    Chief Complaint   Patient presents with    Back Pain    Follow Up After Procedure     SIJ    Neck Pain     Headaches           Outcome   Any improvement of activity?  Yes   Any side effects (appetite,leg cramping,facial fleshing): No    Increase of pain:  No  Pain score Today:  0  % of pain relief: 100%  Pain diary (medial branch block):     54-year-old female following up today after recent SI joint injection report near 100% improvement in lumbosacral area groin and hip pain    Main issue today is the neck pain  Chronic going on for many years located over the upper posterior cervical spine area associated with occipital headache aggravates with neck movement  No dermatomal radiation of pain  No associated numbness or paresthesia  No changes in bladder or bowel control  Rates pain intensity moderate to severe  Pain score 7/10 in the neck  History of extensive lower cervical spine fusion history  Following up with neurosurgery at [place]  Has been advised for bilateral C2-C3 facet intervention  She had radiofrequency ablation in past that provided her more than 50% relief for more than 6 months  She is interested in repeat cervical medial branch nerve radiofrequency ablation at C2-C3 facets with fluoroscopy guidance    Hemoglobin A1C   Date Value Ref Range Status   06/04/2025 8.3 % Final            Past Medical History:   Diagnosis Date    Abnormal uterine bleeding (AUB)     fibroid    Anxiety     Arthritis     CAD (coronary artery disease)     stent  LAD    Chronic neck pain     COVID-19 07/2022    fever, slight cough, fatigue,lost taste,  admitted for couple days bc of elevated troponins    Depression     pcp manages    Diabetes mellitus (HCC) 2017    managed by PCP    Fatty liver     Gastroparesis     Histiocytosis (HCC)     [place] (hard of hearing)     needs hearing aids but doesn't have them, Seems to hear fine without. Reads lips.    HTN

## 2025-06-24 ENCOUNTER — TELEPHONE (OUTPATIENT)
Dept: PAIN MANAGEMENT | Age: 55
End: 2025-06-24

## 2025-06-24 NOTE — TELEPHONE ENCOUNTER
Writer called patient in regards to upcoming procedure on 7/2. Patient has been denied the LEFT side for the RFA. Dr. Dixon can move forward with the RIGHT side RFA unless patient would like to have Left MBB shot series done. Patient did not answer, writer left VM.

## 2025-06-24 NOTE — TELEPHONE ENCOUNTER
Patient returned office phone call, patient was informed of denial for left side, patient stated she wanted to go ahead and move forward with the right side and schedule left side MBB after right has been completed. Patient also states the headaches have been changing and has increased with pain on the left side causing sharp pain on the left posterior side of the skull. Writer advised patient that if pain increases to seek out the ER for further evaulation, in the mean time use ice and heat as tolerated, alternate ibuprofen and tylenol.

## 2025-07-01 ENCOUNTER — HOSPITAL ENCOUNTER (OUTPATIENT)
Age: 55
Setting detail: SPECIMEN
Discharge: HOME | End: 2025-07-01

## 2025-07-01 DIAGNOSIS — Z79.02 LONG TERM (CURRENT) USE OF ANTITHROMBOTICS/ANTIPLATELETS: ICD-10-CM

## 2025-07-01 LAB
CLOSURE TME COLL+ADP BLD: 98 SEC (ref 67–112)
COLLAGEN EPINEPHRINE TIME: 137 SEC (ref 85–172)
PLATELET FUNCTION INTERP: NORMAL

## 2025-07-02 ENCOUNTER — HOSPITAL ENCOUNTER (OUTPATIENT)
Dept: PAIN MANAGEMENT | Facility: CLINIC | Age: 55
Discharge: HOME | End: 2025-07-02
Payer: COMMERCIAL

## 2025-07-02 VITALS
RESPIRATION RATE: 12 BRPM | BODY MASS INDEX: 35.61 KG/M2 | OXYGEN SATURATION: 95 % | HEIGHT: 63 IN | WEIGHT: 201 LBS | HEART RATE: 75 BPM | DIASTOLIC BLOOD PRESSURE: 91 MMHG | TEMPERATURE: 97.9 F | SYSTOLIC BLOOD PRESSURE: 138 MMHG

## 2025-07-02 DIAGNOSIS — R52 PAIN MANAGEMENT: ICD-10-CM

## 2025-07-02 DIAGNOSIS — M47.812 CERVICAL SPONDYLOSIS WITHOUT MYELOPATHY: Primary | ICD-10-CM

## 2025-07-02 LAB — GLUCOSE BLD-MCNC: 203 MG/DL (ref 65–105)

## 2025-07-02 PROCEDURE — 82947 ASSAY GLUCOSE BLOOD QUANT: CPT

## 2025-07-02 PROCEDURE — 6360000002 HC RX W HCPCS: Performed by: ANESTHESIOLOGY

## 2025-07-02 PROCEDURE — 64633 DESTROY CERV/THOR FACET JNT: CPT

## 2025-07-02 RX ORDER — FENTANYL CITRATE 50 UG/ML
INJECTION, SOLUTION INTRAMUSCULAR; INTRAVENOUS
Status: COMPLETED | OUTPATIENT
Start: 2025-07-02 | End: 2025-07-02

## 2025-07-02 RX ORDER — MIDAZOLAM HYDROCHLORIDE 2 MG/2ML
INJECTION, SOLUTION INTRAMUSCULAR; INTRAVENOUS
Status: COMPLETED | OUTPATIENT
Start: 2025-07-02 | End: 2025-07-02

## 2025-07-02 RX ORDER — LIDOCAINE HYDROCHLORIDE 10 MG/ML
INJECTION, SOLUTION EPIDURAL; INFILTRATION; INTRACAUDAL; PERINEURAL
Status: COMPLETED | OUTPATIENT
Start: 2025-07-02 | End: 2025-07-02

## 2025-07-02 RX ORDER — LIDOCAINE HYDROCHLORIDE 40 MG/ML
INJECTION, SOLUTION RETROBULBAR
Status: COMPLETED | OUTPATIENT
Start: 2025-07-02 | End: 2025-07-02

## 2025-07-02 RX ADMIN — LIDOCAINE HYDROCHLORIDE 3 ML: 40 INJECTION, SOLUTION RETROBULBAR; TOPICAL at 08:39

## 2025-07-02 RX ADMIN — LIDOCAINE HYDROCHLORIDE 5 ML: 10 INJECTION, SOLUTION EPIDURAL; INFILTRATION; INTRACAUDAL; PERINEURAL at 08:34

## 2025-07-02 RX ADMIN — FENTANYL CITRATE 50 MCG: 50 INJECTION, SOLUTION INTRAMUSCULAR; INTRAVENOUS at 08:34

## 2025-07-02 RX ADMIN — MIDAZOLAM HYDROCHLORIDE 2 MG: 1 INJECTION, SOLUTION INTRAMUSCULAR; INTRAVENOUS at 08:34

## 2025-07-02 ASSESSMENT — PAIN DESCRIPTION - ORIENTATION: ORIENTATION: LOWER

## 2025-07-02 ASSESSMENT — PAIN DESCRIPTION - ONSET: ONSET: AWAKENED FROM SLEEP

## 2025-07-02 ASSESSMENT — PAIN DESCRIPTION - FREQUENCY: FREQUENCY: CONTINUOUS

## 2025-07-02 ASSESSMENT — PAIN DESCRIPTION - LOCATION: LOCATION: NECK

## 2025-07-02 ASSESSMENT — PAIN - FUNCTIONAL ASSESSMENT
PAIN_FUNCTIONAL_ASSESSMENT: NONE - DENIES PAIN
PAIN_FUNCTIONAL_ASSESSMENT: PREVENTS OR INTERFERES WITH MANY ACTIVE NOT PASSIVE ACTIVITIES

## 2025-07-02 ASSESSMENT — PAIN SCALES - GENERAL: PAINLEVEL_OUTOF10: 6

## 2025-07-02 ASSESSMENT — PAIN DESCRIPTION - PAIN TYPE: TYPE: CHRONIC PAIN

## 2025-07-02 ASSESSMENT — PAIN DESCRIPTION - DESCRIPTORS: DESCRIPTORS: SHARP;PRESSURE;POUNDING

## 2025-07-02 NOTE — DISCHARGE INSTRUCTIONS

## 2025-07-02 NOTE — INTERVAL H&P NOTE
Update History & Physical    The patient's History and Physical of Cat 10, 2025 was reviewed with the patient and I examined the patient. There was no change. The surgical site was confirmed by the patient and me.     Plan: The risks, benefits, expected outcome, and alternative to the recommended procedure have been discussed with the patient. Patient understands and wants to proceed with the procedure.     ASA 3  MP 3    Electronically signed by Luis Angel Dixon MD on 7/2/2025 at 8:13 AM

## 2025-07-02 NOTE — OP NOTE
Preoperative Diagnosis: Cervical spondylosis and chronic cervicalgia  Postoperative Diagnosis: Cervical spondylosis and chronic cervicalgia    Procedure Performed:  Right Cervical Medical branch nerve Radiofrequency ablation at C2/3 nerves under fluoroscopy guidance    BLOOD LOSS: NONE    Procedure:      The Patient was seen in the preop area, chart was reviewed, informed consent was obtained. Patient was taken to procedure room and was placed in prone position. Vital signs were monitored through out the  Procedure. A time out was completed. The site was prepped and draped in sterile manner.     The target point was identified with fluoro guidance using ipsilateral views. Skin and deep tissues were anesthetized with 1 % lidocaine. A  2o-gauge RF needle was advanced under fluoroscopy guidance to the targets until a bony contact was made. Position was conformed and adjusted in AP and lateral views.     The Nerve testing was performed at each level using sensory stimulation at 50 HZ and motor stimulation at 2 HZ. No arm contraction was noticed, some multifidus contraction was noticed.  Impedance was wnl at each level.  0. 5 ml of 4% lidocaine was injected to anesthetize the nerves at each level prior to lesioning.  Finally Radiofrequency lesions were made at 85 degrees C for 90 sec.    The needle was removed and a Band-Aid was placed over the needle  insertion site.  The patient's vital signs remained stable and the patient tolerated the procedure well.          SEDATION NOTE:    ASA CLASSIFICATION  3  MP   CLASSIFICATION  3    Moderate intravenous conscious sedation was supervised by Dr. Dixon  The patient was independently monitored by a Registered Nurse assigned to the Procedure Room  Monitoring included automated blood pressure, continuous EKG, Capnography and continuous pulse oximetry.   The detailed Conscious Record is permanently stored in the Hospital Information System.     The following is the conscious

## 2025-07-07 DIAGNOSIS — Z79.4 TYPE 2 DIABETES MELLITUS WITHOUT COMPLICATION, WITH LONG-TERM CURRENT USE OF INSULIN (HCC): ICD-10-CM

## 2025-07-07 DIAGNOSIS — Z79.4 TYPE 2 DIABETES MELLITUS WITHOUT COMPLICATION, WITH LONG-TERM CURRENT USE OF INSULIN (HCC): Primary | ICD-10-CM

## 2025-07-07 DIAGNOSIS — E11.9 TYPE 2 DIABETES MELLITUS WITHOUT COMPLICATION, WITH LONG-TERM CURRENT USE OF INSULIN (HCC): ICD-10-CM

## 2025-07-07 DIAGNOSIS — E11.9 TYPE 2 DIABETES MELLITUS WITHOUT COMPLICATION, WITH LONG-TERM CURRENT USE OF INSULIN (HCC): Primary | ICD-10-CM

## 2025-07-07 RX ORDER — ACYCLOVIR 400 MG/1
1 TABLET ORAL
Qty: 9 EACH | Refills: 2 | Status: SHIPPED | OUTPATIENT
Start: 2025-07-07 | End: 2025-10-05

## 2025-07-07 RX ORDER — ACYCLOVIR 400 MG/1
TABLET ORAL
Qty: 1 EACH | Refills: 3 | Status: SHIPPED | OUTPATIENT
Start: 2025-07-07 | End: 2025-07-07 | Stop reason: SDUPTHER

## 2025-07-07 NOTE — TELEPHONE ENCOUNTER
Patient would like to know if her Dexcom rx can be changed to a 30 or 90 day supply. Right now she is going to the pharmacy every 10 days for a refill. Please advise and I can call pharmacy with a verbal

## 2025-07-07 NOTE — TELEPHONE ENCOUNTER
Charisma Donnelly is calling to request a refill on the following medication(s):    Medication Request:  Requested Prescriptions     Pending Prescriptions Disp Refills    Continuous Glucose Sensor (DEXCOM G7 SENSOR) MISC [Pharmacy Med Name: DEXCOM G7 SENSOR] 1 each 3     Sig: USE AS DIRECTED EVERY 10 DAYS       Last Visit Date (If Applicable):  6/4/2025    Next Visit Date:    9/4/2025

## 2025-07-20 DIAGNOSIS — R11.0 NAUSEA: ICD-10-CM

## 2025-07-21 RX ORDER — ONDANSETRON 4 MG/1
4 TABLET, FILM COATED ORAL EVERY 6 HOURS PRN
Qty: 90 TABLET | Refills: 1 | Status: SHIPPED | OUTPATIENT
Start: 2025-07-21

## 2025-07-21 NOTE — TELEPHONE ENCOUNTER
Charisma Donnelly is calling to request a refill on the following medication(s):    Medication Request:  Requested Prescriptions     Pending Prescriptions Disp Refills    ondansetron (ZOFRAN) 4 MG tablet [Pharmacy Med Name: ONDANSETRON HCL 4 MG TABLET] 90 tablet 1     Sig: TAKE 1 TABLET BY MOUTH EVERY 6 HOURS AS NEEDED FOR NAUSEA OR VOMITING.       Last Visit Date (If Applicable):  6/4/2025    Next Visit Date:    9/4/2025

## 2025-07-29 ENCOUNTER — OFFICE VISIT (OUTPATIENT)
Dept: PAIN MANAGEMENT | Age: 55
End: 2025-07-29
Payer: COMMERCIAL

## 2025-07-29 VITALS — HEIGHT: 63 IN | WEIGHT: 201 LBS | BODY MASS INDEX: 35.61 KG/M2

## 2025-07-29 DIAGNOSIS — Z98.1 HISTORY OF LUMBAR FUSION: ICD-10-CM

## 2025-07-29 DIAGNOSIS — M46.1 BILATERAL SACROILIITIS: Primary | ICD-10-CM

## 2025-07-29 DIAGNOSIS — Z98.890 HX OF DECOMPRESSIVE LUMBAR LAMINECTOMY: ICD-10-CM

## 2025-07-29 DIAGNOSIS — M47.812 CERVICAL SPONDYLOSIS WITHOUT MYELOPATHY: ICD-10-CM

## 2025-07-29 DIAGNOSIS — Z79.02 LONG TERM (CURRENT) USE OF ANTITHROMBOTICS/ANTIPLATELETS: ICD-10-CM

## 2025-07-29 DIAGNOSIS — Z98.1 S/P CERVICAL SPINAL FUSION: ICD-10-CM

## 2025-07-29 DIAGNOSIS — M54.16 CHRONIC LUMBAR RADICULOPATHY: ICD-10-CM

## 2025-07-29 PROCEDURE — 4004F PT TOBACCO SCREEN RCVD TLK: CPT | Performed by: ANESTHESIOLOGY

## 2025-07-29 PROCEDURE — 3017F COLORECTAL CA SCREEN DOC REV: CPT | Performed by: ANESTHESIOLOGY

## 2025-07-29 PROCEDURE — G8427 DOCREV CUR MEDS BY ELIG CLIN: HCPCS | Performed by: ANESTHESIOLOGY

## 2025-07-29 PROCEDURE — G8417 CALC BMI ABV UP PARAM F/U: HCPCS | Performed by: ANESTHESIOLOGY

## 2025-07-29 PROCEDURE — 99214 OFFICE O/P EST MOD 30 MIN: CPT | Performed by: ANESTHESIOLOGY

## 2025-07-29 ASSESSMENT — ENCOUNTER SYMPTOMS
GASTROINTESTINAL NEGATIVE: 1
RESPIRATORY NEGATIVE: 1

## 2025-07-29 NOTE — PROGRESS NOTES
The patient is a 54 y.o.Non- / non  female.    Chief Complaint   Patient presents with    Neck Pain     RT sided thumb pain with swelling     Follow Up After Procedure     Right Cervical Medical branch nerve Radiofrequency ablation at C2/3 nerves under fluoroscopy guidance        55-year-old pleasant female with history of chronic pain involving multiple body side  Recently had right-sided cervical medial branch nerve radiofrequency ablation for chronic axial neck pain and associated headache  Report 100% improvement in headache  Ongoing left-sided axial cervical spinal pain  Interested in left-sided diagnostic facet block    Pain is chronic going on for many years have failed different modalities including therapy and NSAID  Pain affects quality of life  Neck disability showed moderate disability associated with back pain  Past history significant for cervical spine fusion  Recent imaging showed significant facet arthropathy changes above the previous surgical level  Neurosurgery did not advise for any surgery and recommended for pain intervention including facet medial branch nerve block and radiofrequency ablation    Back pain  Chronic going on for many years  Located in the lower lumbosacral area across midline on both sides  History of previous lumbar spine laminectomy decompression and fusion  Residual axial lumbosacral area pain after surgery refractory to therapy and NSAID  Was diagnosed with SI joint mediated pain, had SI joint injection bilaterally with fluoroscopy guidance 3 months back that provided more than 50% improvement in back pain  Pain is returning suggestive of recurrence of SI joint mediated pain  She is interested in repeating bilateral SI joint injection      Outcome   Any improvement of activity?  Yes   Any side effects (appetite,leg cramping,facial fleshing): no   Increase of pain:  No  Pain score Today:  0  % of pain relief: 100% (right side)  Pain diary (medial branch

## 2025-08-13 ENCOUNTER — HOSPITAL ENCOUNTER (OUTPATIENT)
Dept: PAIN MANAGEMENT | Facility: CLINIC | Age: 55
Discharge: HOME | End: 2025-08-13
Payer: COMMERCIAL

## 2025-08-13 VITALS
SYSTOLIC BLOOD PRESSURE: 128 MMHG | WEIGHT: 200 LBS | OXYGEN SATURATION: 93 % | BODY MASS INDEX: 35.44 KG/M2 | TEMPERATURE: 97.6 F | DIASTOLIC BLOOD PRESSURE: 90 MMHG | HEIGHT: 63 IN | HEART RATE: 77 BPM | RESPIRATION RATE: 12 BRPM

## 2025-08-13 DIAGNOSIS — R52 PAIN MANAGEMENT: ICD-10-CM

## 2025-08-13 DIAGNOSIS — M46.1 BILATERAL SACROILIITIS: ICD-10-CM

## 2025-08-13 PROCEDURE — 6360000002 HC RX W HCPCS: Performed by: ANESTHESIOLOGY

## 2025-08-13 PROCEDURE — G0260 INJ FOR SACROILIAC JT ANESTH: HCPCS

## 2025-08-13 PROCEDURE — 27096 INJECT SACROILIAC JOINT: CPT | Performed by: ANESTHESIOLOGY

## 2025-08-13 PROCEDURE — 6360000004 HC RX CONTRAST MEDICATION: Performed by: ANESTHESIOLOGY

## 2025-08-13 PROCEDURE — 99152 MOD SED SAME PHYS/QHP 5/>YRS: CPT | Performed by: ANESTHESIOLOGY

## 2025-08-13 RX ORDER — MIDAZOLAM HYDROCHLORIDE 2 MG/2ML
INJECTION, SOLUTION INTRAMUSCULAR; INTRAVENOUS
Status: COMPLETED | OUTPATIENT
Start: 2025-08-13 | End: 2025-08-13

## 2025-08-13 RX ORDER — FENTANYL CITRATE 50 UG/ML
INJECTION, SOLUTION INTRAMUSCULAR; INTRAVENOUS
Status: COMPLETED | OUTPATIENT
Start: 2025-08-13 | End: 2025-08-13

## 2025-08-13 RX ORDER — LIDOCAINE HYDROCHLORIDE 10 MG/ML
INJECTION, SOLUTION EPIDURAL; INFILTRATION; INTRACAUDAL; PERINEURAL
Status: COMPLETED | OUTPATIENT
Start: 2025-08-13 | End: 2025-08-13

## 2025-08-13 RX ORDER — BUPIVACAINE HYDROCHLORIDE 5 MG/ML
INJECTION, SOLUTION EPIDURAL; INTRACAUDAL; PERINEURAL
Status: COMPLETED | OUTPATIENT
Start: 2025-08-13 | End: 2025-08-13

## 2025-08-13 RX ORDER — DEXAMETHASONE SODIUM PHOSPHATE 10 MG/ML
INJECTION, SOLUTION INTRAMUSCULAR; INTRAVENOUS
Status: COMPLETED | OUTPATIENT
Start: 2025-08-13 | End: 2025-08-13

## 2025-08-13 RX ADMIN — BUPIVACAINE HYDROCHLORIDE 5 ML: 5 INJECTION, SOLUTION EPIDURAL; INTRACAUDAL; PERINEURAL at 08:31

## 2025-08-13 RX ADMIN — LIDOCAINE HYDROCHLORIDE 5 ML: 10 INJECTION, SOLUTION EPIDURAL; INFILTRATION; INTRACAUDAL; PERINEURAL at 08:29

## 2025-08-13 RX ADMIN — FENTANYL CITRATE 50 MCG: 50 INJECTION, SOLUTION INTRAMUSCULAR; INTRAVENOUS at 08:26

## 2025-08-13 RX ADMIN — DEXAMETHASONE SODIUM PHOSPHATE 10 MG: 10 INJECTION INTRAMUSCULAR; INTRAVENOUS at 08:32

## 2025-08-13 RX ADMIN — MIDAZOLAM HYDROCHLORIDE 1 MG: 1 INJECTION, SOLUTION INTRAMUSCULAR; INTRAVENOUS at 08:25

## 2025-08-13 RX ADMIN — IOHEXOL 3 ML: 180 INJECTION INTRAVENOUS at 08:29

## 2025-08-13 ASSESSMENT — PAIN DESCRIPTION - DESCRIPTORS: DESCRIPTORS: SHARP;THROBBING

## 2025-08-13 ASSESSMENT — PAIN - FUNCTIONAL ASSESSMENT
PAIN_FUNCTIONAL_ASSESSMENT: 0-10
PAIN_FUNCTIONAL_ASSESSMENT: PREVENTS OR INTERFERES WITH ALL ACTIVE AND SOME PASSIVE ACTIVITIES
PAIN_FUNCTIONAL_ASSESSMENT: 0-10

## 2025-08-15 ENCOUNTER — OFFICE VISIT (OUTPATIENT)
Age: 55
End: 2025-08-15

## 2025-08-15 VITALS
SYSTOLIC BLOOD PRESSURE: 158 MMHG | OXYGEN SATURATION: 99 % | DIASTOLIC BLOOD PRESSURE: 94 MMHG | WEIGHT: 200 LBS | BODY MASS INDEX: 35.44 KG/M2 | HEART RATE: 81 BPM | HEIGHT: 63 IN

## 2025-08-15 DIAGNOSIS — R55 SYNCOPE, UNSPECIFIED SYNCOPE TYPE: ICD-10-CM

## 2025-08-15 DIAGNOSIS — R00.2 PALPITATIONS: ICD-10-CM

## 2025-08-15 DIAGNOSIS — I10 ESSENTIAL HYPERTENSION: ICD-10-CM

## 2025-08-15 DIAGNOSIS — I21.4 NSTEMI (NON-ST ELEVATED MYOCARDIAL INFARCTION) (HCC): Primary | ICD-10-CM

## 2025-08-15 DIAGNOSIS — R06.02 SHORTNESS OF BREATH: ICD-10-CM

## 2025-08-15 DIAGNOSIS — I25.119 CORONARY ARTERY DISEASE INVOLVING NATIVE HEART WITH ANGINA PECTORIS, UNSPECIFIED VESSEL OR LESION TYPE: ICD-10-CM

## 2025-08-15 ASSESSMENT — ENCOUNTER SYMPTOMS
BACK PAIN: 1
SHORTNESS OF BREATH: 1
CHEST TIGHTNESS: 1

## 2025-08-22 ENCOUNTER — TELEPHONE (OUTPATIENT)
Age: 55
End: 2025-08-22

## 2025-08-22 ENCOUNTER — HOSPITAL ENCOUNTER (OUTPATIENT)
Age: 55
Discharge: HOME | End: 2025-08-24
Payer: COMMERCIAL

## 2025-08-22 DIAGNOSIS — R55 SYNCOPE, UNSPECIFIED SYNCOPE TYPE: ICD-10-CM

## 2025-08-22 DIAGNOSIS — R06.02 SHORTNESS OF BREATH: ICD-10-CM

## 2025-08-22 DIAGNOSIS — R00.2 PALPITATIONS: ICD-10-CM

## 2025-08-22 LAB
ECHO AO ROOT DIAM: 2.6 CM
ECHO AV AREA PEAK VELOCITY: 2.5 CM2
ECHO AV AREA VTI: 2.2 CM2
ECHO AV MEAN GRADIENT: 3 MMHG
ECHO AV MEAN VELOCITY: 0.8 M/S
ECHO AV PEAK GRADIENT: 6 MMHG
ECHO AV PEAK VELOCITY: 1.2 M/S
ECHO AV VELOCITY RATIO: 0.75
ECHO AV VTI: 27.6 CM
ECHO EST RA PRESSURE: 3 MMHG
ECHO LA AREA 2C: 21.2 CM2
ECHO LA AREA 4C: 24.9 CM2
ECHO LA DIAMETER: 2.6 CM
ECHO LA MAJOR AXIS: 6 CM
ECHO LA MINOR AXIS: 5.5 CM
ECHO LA TO AORTIC ROOT RATIO: 1
ECHO LA VOL BP: 81 ML (ref 22–52)
ECHO LA VOL MOD A2C: 68 ML (ref 22–52)
ECHO LA VOL MOD A4C: 89 ML (ref 22–52)
ECHO LV E' LATERAL VELOCITY: 5.55 CM/S
ECHO LV E' SEPTAL VELOCITY: 4.13 CM/S
ECHO LV EDV A2C: 149 ML
ECHO LV EDV A4C: 133 ML
ECHO LV EF PHYSICIAN: 40 %
ECHO LV EJECTION FRACTION A2C: 40 %
ECHO LV EJECTION FRACTION A4C: 40 %
ECHO LV EJECTION FRACTION BIPLANE: 40 % (ref 55–100)
ECHO LV ESV A2C: 90 ML
ECHO LV ESV A4C: 80 ML
ECHO LV FRACTIONAL SHORTENING: 11 % (ref 28–44)
ECHO LV INTERNAL DIMENSION DIASTOLIC: 5.5 CM (ref 3.9–5.3)
ECHO LV INTERNAL DIMENSION SYSTOLIC: 4.9 CM
ECHO LV IVSD: 0.9 CM (ref 0.6–0.9)
ECHO LV MASS 2D: 185.8 G (ref 67–162)
ECHO LV POSTERIOR WALL DIASTOLIC: 0.9 CM (ref 0.6–0.9)
ECHO LV RELATIVE WALL THICKNESS RATIO: 0.33
ECHO LVOT AREA: 3.5 CM2
ECHO LVOT AV VTI INDEX: 0.63
ECHO LVOT DIAM: 2.1 CM
ECHO LVOT MEAN GRADIENT: 1 MMHG
ECHO LVOT PEAK GRADIENT: 3 MMHG
ECHO LVOT PEAK VELOCITY: 0.9 M/S
ECHO LVOT SV: 60.2 ML
ECHO LVOT VTI: 17.4 CM
ECHO MV A VELOCITY: 1.11 M/S
ECHO MV AREA VTI: 2.9 CM2
ECHO MV E DECELERATION TIME (DT): 174 MS
ECHO MV E VELOCITY: 0.79 M/S
ECHO MV E/A RATIO: 0.71
ECHO MV E/E' LATERAL: 14.23
ECHO MV E/E' RATIO (AVERAGED): 16.68
ECHO MV E/E' SEPTAL: 19.13
ECHO MV LVOT VTI INDEX: 1.21
ECHO MV MAX VELOCITY: 1.1 M/S
ECHO MV MEAN GRADIENT: 3 MMHG
ECHO MV MEAN VELOCITY: 0.8 M/S
ECHO MV PEAK GRADIENT: 4 MMHG
ECHO MV VTI: 21 CM
ECHO PV MAX VELOCITY: 0.8 M/S
ECHO PV PEAK GRADIENT: 2 MMHG
ECHO RIGHT VENTRICULAR SYSTOLIC PRESSURE (RVSP): 18 MMHG
ECHO RV FREE WALL PEAK S': 9.1 CM/S
ECHO RV INTERNAL DIMENSION: 3.4 CM
ECHO RV TAPSE: 1.7 CM (ref 1.7–?)
ECHO TV REGURGITANT MAX VELOCITY: 1.95 M/S
ECHO TV REGURGITANT PEAK GRADIENT: 15 MMHG

## 2025-08-22 PROCEDURE — 93242 EXT ECG>48HR<7D RECORDING: CPT

## 2025-08-22 PROCEDURE — 93306 TTE W/DOPPLER COMPLETE: CPT

## 2025-08-27 ENCOUNTER — TELEPHONE (OUTPATIENT)
Dept: FAMILY MEDICINE CLINIC | Age: 55
End: 2025-08-27

## 2025-08-27 DIAGNOSIS — F43.0 PANIC ATTACK AS REACTION TO STRESS: Primary | ICD-10-CM

## 2025-08-27 DIAGNOSIS — F41.0 PANIC ATTACK AS REACTION TO STRESS: Primary | ICD-10-CM

## 2025-08-27 RX ORDER — BUSPIRONE HYDROCHLORIDE 5 MG/1
5 TABLET ORAL 2 TIMES DAILY
Qty: 60 TABLET | Refills: 0 | Status: SHIPPED | OUTPATIENT
Start: 2025-08-27 | End: 2025-09-26

## 2025-08-31 DIAGNOSIS — M47.816 LUMBAR SPONDYLOSIS: Primary | ICD-10-CM

## 2025-08-31 DIAGNOSIS — M47.812 CERVICAL SPONDYLOSIS: ICD-10-CM

## 2025-09-02 ENCOUNTER — OFFICE VISIT (OUTPATIENT)
Dept: PAIN MANAGEMENT | Age: 55
End: 2025-09-02
Payer: COMMERCIAL

## 2025-09-02 VITALS — WEIGHT: 199 LBS | BODY MASS INDEX: 35.26 KG/M2 | HEIGHT: 63 IN

## 2025-09-02 DIAGNOSIS — E11.65 POORLY CONTROLLED DIABETES MELLITUS (HCC): ICD-10-CM

## 2025-09-02 DIAGNOSIS — M54.16 CHRONIC LUMBAR RADICULOPATHY: ICD-10-CM

## 2025-09-02 DIAGNOSIS — M47.812 CERVICAL SPONDYLOSIS WITHOUT MYELOPATHY: Primary | ICD-10-CM

## 2025-09-02 DIAGNOSIS — Z79.02 LONG TERM (CURRENT) USE OF ANTITHROMBOTICS/ANTIPLATELETS: ICD-10-CM

## 2025-09-02 DIAGNOSIS — Z98.1 HISTORY OF LUMBAR FUSION: ICD-10-CM

## 2025-09-02 DIAGNOSIS — Z98.1 S/P CERVICAL SPINAL FUSION: ICD-10-CM

## 2025-09-02 PROCEDURE — 2022F DILAT RTA XM EVC RTNOPTHY: CPT | Performed by: ANESTHESIOLOGY

## 2025-09-02 PROCEDURE — G8427 DOCREV CUR MEDS BY ELIG CLIN: HCPCS | Performed by: ANESTHESIOLOGY

## 2025-09-02 PROCEDURE — 4004F PT TOBACCO SCREEN RCVD TLK: CPT | Performed by: ANESTHESIOLOGY

## 2025-09-02 PROCEDURE — G8417 CALC BMI ABV UP PARAM F/U: HCPCS | Performed by: ANESTHESIOLOGY

## 2025-09-02 PROCEDURE — 3052F HG A1C>EQUAL 8.0%<EQUAL 9.0%: CPT | Performed by: ANESTHESIOLOGY

## 2025-09-02 PROCEDURE — 3017F COLORECTAL CA SCREEN DOC REV: CPT | Performed by: ANESTHESIOLOGY

## 2025-09-02 PROCEDURE — 99214 OFFICE O/P EST MOD 30 MIN: CPT | Performed by: ANESTHESIOLOGY

## 2025-09-02 RX ORDER — AMITRIPTYLINE HYDROCHLORIDE 50 MG/1
50 TABLET ORAL NIGHTLY
Qty: 90 TABLET | Refills: 0 | Status: SHIPPED | OUTPATIENT
Start: 2025-09-02 | End: 2025-12-01

## 2025-09-02 ASSESSMENT — ENCOUNTER SYMPTOMS
RESPIRATORY NEGATIVE: 1
BACK PAIN: 1

## 2025-09-04 ENCOUNTER — OFFICE VISIT (OUTPATIENT)
Dept: FAMILY MEDICINE CLINIC | Age: 55
End: 2025-09-04
Payer: COMMERCIAL

## 2025-09-04 ENCOUNTER — TELEPHONE (OUTPATIENT)
Dept: FAMILY MEDICINE CLINIC | Age: 55
End: 2025-09-04

## 2025-09-04 VITALS
OXYGEN SATURATION: 97 % | SYSTOLIC BLOOD PRESSURE: 140 MMHG | DIASTOLIC BLOOD PRESSURE: 100 MMHG | BODY MASS INDEX: 36.14 KG/M2 | WEIGHT: 204 LBS | HEART RATE: 103 BPM | TEMPERATURE: 96.9 F

## 2025-09-04 DIAGNOSIS — I10 ESSENTIAL HYPERTENSION: ICD-10-CM

## 2025-09-04 DIAGNOSIS — Z79.4 TYPE 2 DIABETES MELLITUS WITHOUT COMPLICATION, WITH LONG-TERM CURRENT USE OF INSULIN (HCC): Primary | ICD-10-CM

## 2025-09-04 DIAGNOSIS — Z87.891 PERSONAL HISTORY OF TOBACCO USE: ICD-10-CM

## 2025-09-04 DIAGNOSIS — E03.9 HYPOTHYROIDISM, UNSPECIFIED TYPE: ICD-10-CM

## 2025-09-04 DIAGNOSIS — F41.9 ANXIETY: ICD-10-CM

## 2025-09-04 DIAGNOSIS — E78.2 MIXED HYPERLIPIDEMIA: ICD-10-CM

## 2025-09-04 DIAGNOSIS — E11.9 TYPE 2 DIABETES MELLITUS WITHOUT COMPLICATION, WITH LONG-TERM CURRENT USE OF INSULIN (HCC): Primary | ICD-10-CM

## 2025-09-04 PROBLEM — E11.65 POORLY CONTROLLED DIABETES MELLITUS (HCC): Status: RESOLVED | Noted: 2025-09-02 | Resolved: 2025-09-04

## 2025-09-04 PROBLEM — M47.12 CERVICAL SPONDYLOSIS WITH MYELOPATHY: Status: RESOLVED | Noted: 2024-06-24 | Resolved: 2025-09-04

## 2025-09-04 LAB — HBA1C MFR BLD: 7.8 %

## 2025-09-04 PROCEDURE — G0296 VISIT TO DETERM LDCT ELIG: HCPCS | Performed by: NURSE PRACTITIONER

## 2025-09-04 PROCEDURE — 2022F DILAT RTA XM EVC RTNOPTHY: CPT | Performed by: NURSE PRACTITIONER

## 2025-09-04 PROCEDURE — 4004F PT TOBACCO SCREEN RCVD TLK: CPT | Performed by: NURSE PRACTITIONER

## 2025-09-04 PROCEDURE — 3051F HG A1C>EQUAL 7.0%<8.0%: CPT | Performed by: NURSE PRACTITIONER

## 2025-09-04 PROCEDURE — 3017F COLORECTAL CA SCREEN DOC REV: CPT | Performed by: NURSE PRACTITIONER

## 2025-09-04 PROCEDURE — G8417 CALC BMI ABV UP PARAM F/U: HCPCS | Performed by: NURSE PRACTITIONER

## 2025-09-04 PROCEDURE — 83036 HEMOGLOBIN GLYCOSYLATED A1C: CPT | Performed by: NURSE PRACTITIONER

## 2025-09-04 PROCEDURE — 99214 OFFICE O/P EST MOD 30 MIN: CPT | Performed by: NURSE PRACTITIONER

## 2025-09-04 PROCEDURE — 3080F DIAST BP >= 90 MM HG: CPT | Performed by: NURSE PRACTITIONER

## 2025-09-04 PROCEDURE — 3074F SYST BP LT 130 MM HG: CPT | Performed by: NURSE PRACTITIONER

## 2025-09-04 PROCEDURE — G8427 DOCREV CUR MEDS BY ELIG CLIN: HCPCS | Performed by: NURSE PRACTITIONER

## 2025-09-04 SDOH — ECONOMIC STABILITY: FOOD INSECURITY: WITHIN THE PAST 12 MONTHS, YOU WORRIED THAT YOUR FOOD WOULD RUN OUT BEFORE YOU GOT MONEY TO BUY MORE.: NEVER TRUE

## 2025-09-04 SDOH — ECONOMIC STABILITY: FOOD INSECURITY: WITHIN THE PAST 12 MONTHS, THE FOOD YOU BOUGHT JUST DIDN'T LAST AND YOU DIDN'T HAVE MONEY TO GET MORE.: NEVER TRUE

## 2025-09-04 ASSESSMENT — PATIENT HEALTH QUESTIONNAIRE - PHQ9
SUM OF ALL RESPONSES TO PHQ QUESTIONS 1-9: 18
8. MOVING OR SPEAKING SO SLOWLY THAT OTHER PEOPLE COULD HAVE NOTICED. OR THE OPPOSITE, BEING SO FIGETY OR RESTLESS THAT YOU HAVE BEEN MOVING AROUND A LOT MORE THAN USUAL: MORE THAN HALF THE DAYS
2. FEELING DOWN, DEPRESSED OR HOPELESS: NEARLY EVERY DAY
6. FEELING BAD ABOUT YOURSELF - OR THAT YOU ARE A FAILURE OR HAVE LET YOURSELF OR YOUR FAMILY DOWN: NOT AT ALL
4. FEELING TIRED OR HAVING LITTLE ENERGY: NEARLY EVERY DAY
7. TROUBLE CONCENTRATING ON THINGS, SUCH AS READING THE NEWSPAPER OR WATCHING TELEVISION: MORE THAN HALF THE DAYS
DEPRESSION UNABLE TO ASSESS: URGENT/EMERGENT SITUATION
1. LITTLE INTEREST OR PLEASURE IN DOING THINGS: MORE THAN HALF THE DAYS
9. THOUGHTS THAT YOU WOULD BE BETTER OFF DEAD, OR OF HURTING YOURSELF: NOT AT ALL
5. POOR APPETITE OR OVEREATING: NEARLY EVERY DAY
3. TROUBLE FALLING OR STAYING ASLEEP: NEARLY EVERY DAY
SUM OF ALL RESPONSES TO PHQ QUESTIONS 1-9: 18
10. IF YOU CHECKED OFF ANY PROBLEMS, HOW DIFFICULT HAVE THESE PROBLEMS MADE IT FOR YOU TO DO YOUR WORK, TAKE CARE OF THINGS AT HOME, OR GET ALONG WITH OTHER PEOPLE: SOMEWHAT DIFFICULT

## (undated) DEVICE — TUBING, SUCTION, 9/32" X 20', STRAIGHT: Brand: MEDLINE INDUSTRIES, INC.

## (undated) DEVICE — SHEET,DRAPE,70X100,STERILE: Brand: MEDLINE

## (undated) DEVICE — APPLICATOR MEDICATED 26 CC SOLUTION HI LT ORNG CHLORAPREP

## (undated) DEVICE — SUTURE VICRYL SZ 2-0 L18IN ABSRB UD CT-1 L36MM 1/2 CIR J839D

## (undated) DEVICE — C-ARM: Brand: UNBRANDED

## (undated) DEVICE — SUTURE SZ 0 27IN 5/8 CIR UR-6  TAPER PT VIOLET ABSRB VICRYL J603H

## (undated) DEVICE — SYRINGE MED 50ML LUERLOCK TIP

## (undated) DEVICE — DECANTER BAG 9": Brand: MEDLINE INDUSTRIES, INC.

## (undated) DEVICE — NEEDLE, QUINCKE, 18GX3.5": Brand: MEDLINE

## (undated) DEVICE — ADHESIVE SKIN CLOSURE TOP 36 CC HI VISC DERMBND MINI

## (undated) DEVICE — HYPODERMIC SAFETY NEEDLE: Brand: MAGELLAN

## (undated) DEVICE — Device

## (undated) DEVICE — SUTURE VICRYL + SZ 0 L18IN ABSRB UD L36MM CT-1 1/2 CIR VCP840D

## (undated) DEVICE — STANDARD HYPODERMIC NEEDLE,POLYPROPYLENE HUB: Brand: MONOJECT

## (undated) DEVICE — FORCEPS BX L240CM JAW DIA2.4MM ORNG L CAP W/ NDL DISP RAD

## (undated) DEVICE — 3.0MM PRECISION NEURO (MATCH HEAD)

## (undated) DEVICE — AGENT HEMOSTATIC SURGIFLOW MATRIX KIT W/THROMBIN

## (undated) DEVICE — ADAPTER TBNG LUER STUB 15 GA INTMED

## (undated) DEVICE — DRAPE,UNDRBUT,WHT GRAD PCH,CAPPORT,20/CS: Brand: MEDLINE

## (undated) DEVICE — STRAP,POSITIONING,KNEE/BODY,FOAM,4X60": Brand: MEDLINE

## (undated) DEVICE — Device: Brand: DEFENDO VALVE AND CONNECTOR KIT

## (undated) DEVICE — ELECTRODE PT RET AD L9FT HI MOIST COND ADH HYDRGEL CORDED

## (undated) DEVICE — SYSTEM ES CUP DIA4CM PNEUMO OCCL BLLN DISP FOR CLIN POS

## (undated) DEVICE — DRESSING BORDERED ADH GZ UNIV GEN USE 8INX4IN AND 6INX2IN

## (undated) DEVICE — SEALER/DIVIDER LAP SHFT L37CM JAW APER 11.4MM 315DEG ROT

## (undated) DEVICE — 1LYRTR 16FR10ML100%SIL UMS SNP: Brand: MEDLINE INDUSTRIES, INC.

## (undated) DEVICE — YANKAUER,FLEXIBLE HANDLE,REGLR CAPACITY: Brand: MEDLINE INDUSTRIES, INC.

## (undated) DEVICE — SYRINGE, LUER LOCK, 10ML: Brand: MEDLINE

## (undated) DEVICE — STAZ ENDO KIT: Brand: MEDLINE INDUSTRIES, INC.

## (undated) DEVICE — BASIN EMSIS 700ML GRAPHITE PLAS KID SHP GRAD

## (undated) DEVICE — LIQUIBAND RAPID ADHESIVE 36/CS 0.8ML: Brand: MEDLINE

## (undated) DEVICE — TRI-LUMEN FILTERED TUBE SET WITH ACTIVATED CHARCOAL FILTER: Brand: AIRSEAL

## (undated) DEVICE — STVZ LUMBAR SPINE PACK: Brand: MEDLINE INDUSTRIES, INC.

## (undated) DEVICE — BITEBLOCK ENDOSCP 60FR MAXI WHT POLYETH STURDY W/ VELC WVN

## (undated) DEVICE — SUTURE VCRL SZ 0 L18IN ABSRB UD L36MM CT-1 1/2 CIR J840D

## (undated) DEVICE — PACK LAP BASIC

## (undated) DEVICE — SOLUTION SCRB 4OZ 2% CHG FOR SURG SCRBBING HND WSH

## (undated) DEVICE — TUBING, SUCTION, 1/4" X 12', STRAIGHT: Brand: MEDLINE

## (undated) DEVICE — MEDICINE CUP, GRADUATED, STER: Brand: MEDLINE

## (undated) DEVICE — TOWEL,OR,DSP,ST,NATURAL,DLX,4/PK,20PK/CS: Brand: MEDLINE

## (undated) DEVICE — DRESSING TRNSPAR W5XL4.5IN FLM SHT SEMIPERMEABLE WIND

## (undated) DEVICE — SPONGE,NEURO,1"X1",XR,STRL,LF,10/PK: Brand: MEDLINE

## (undated) DEVICE — SCISSOR SURG METZ CRV TIP

## (undated) DEVICE — CYSTO/BLADDER IRRIGATION SET, REGULATING CLAMP

## (undated) DEVICE — DRAPE,REIN 53X77,STERILE: Brand: MEDLINE

## (undated) DEVICE — TROCAR: Brand: KII FIOS FIRST ENTRY

## (undated) DEVICE — DRAPE,THYROID,SOFT,STERILE: Brand: MEDLINE

## (undated) DEVICE — SOLUTION ANTIFOG VIS SYS CLEARIFY LAPSCP

## (undated) DEVICE — Device: Brand: SNAP ON SPHERZ

## (undated) DEVICE — GLOVE SURG 8 11.7IN BEAD CUF LIGHT BRN SENSICARE LTX FREE

## (undated) DEVICE — COVER,MAYO STAND,XL,STERILE: Brand: MEDLINE

## (undated) DEVICE — SUTURE VCRL SZ 0 L27IN ABSRB UD L36MM CT-1 1/2 CIR J260H

## (undated) DEVICE — SYRINGE,CONTROL,LL,FINGER,GRIP: Brand: MEDLINE INDUSTRIES, INC.

## (undated) DEVICE — COLLAR CERV COT DIAL HT ADJ XL PT ACCS WIND VISTA

## (undated) DEVICE — JELLY,LUBE,STERILE,FLIP TOP,TUBE,2-OZ: Brand: MEDLINE

## (undated) DEVICE — CUP MED 1OZ CLR POLYPR FEED GRAD W/O LID

## (undated) DEVICE — SUTURE MONOCRYL SZ 3-0 L27IN ABSRB UD L26MM SH 1/2 CIR Y416H

## (undated) DEVICE — SUTURE STRATAFIX SPRL SZ 3-0 L5IN ABSRB UD FS L26MM 3/8 CIR SXMP2B411

## (undated) DEVICE — INTENDED FOR TISSUE SEPARATION, AND OTHER PROCEDURES THAT REQUIRE A SHARP SURGICAL BLADE TO PUNCTURE OR CUT.: Brand: BARD-PARKER ® CARBON RIB-BACK BLADES

## (undated) DEVICE — PROTECTOR ULN NRV PUR FOAM HK LOOP STRP ANATOMICALLY

## (undated) DEVICE — DEVICE TRCR 12X9X3IN WHT CLSR DISP OMNICLOSE

## (undated) DEVICE — SUTURE NONABSORBABLE MONOFILAMENT 3-0 PS-1 18 IN BLK ETHILON 1663H

## (undated) DEVICE — GLOVE SURG SZ 7 L12IN THK7.5MIL DK GRN LTX FREE MSG6570] MEDLINE INDUSTRIES INC]

## (undated) DEVICE — MARKER,SKIN,WI/RULER AND LABELS: Brand: MEDLINE

## (undated) DEVICE — BLADE ES ELASTOMERIC COAT INSUL DURABLE BEND UPTO 90DEG

## (undated) DEVICE — C-ARMOR C-ARM EQUIPMENT COVERS CLEAR STERILE UNIVERSAL FIT 12 PER CASE: Brand: C-ARMOR

## (undated) DEVICE — SYRINGE MED 10ML LUERLOCK TIP W/O SFTY DISP

## (undated) DEVICE — TIP IU L10CM DIA6.7MM GRN SIL FLX DISP RUMI II

## (undated) DEVICE — APPLICATOR MEDICATED 10.5 CC SOLUTION HI LT ORNG CHLORAPREP

## (undated) DEVICE — PREMIUM DRY TRAY LF: Brand: MEDLINE INDUSTRIES, INC.

## (undated) DEVICE — GOWN,AURORA,NONREINFORCED,LARGE: Brand: MEDLINE

## (undated) DEVICE — THE MILL DISPOSABLE - MEDIUM

## (undated) DEVICE — GAUZE,SPONGE,4"X4",16PLY,STRL,LF,10/TRAY: Brand: MEDLINE

## (undated) DEVICE — SUTURE VICRYL SZ 3-0 L18IN ABSRB UD L26MM SH 1/2 CIR J864D

## (undated) DEVICE — SPONGE LAP W18XL18IN WHT COT 4 PLY FLD STRUNG RADPQ DISP ST 2 PER PACK

## (undated) DEVICE — COVER OR TBL W40XL90IN ABSRB STD AND GRIPPY BK SAHARA

## (undated) DEVICE — LEGGINGS, PAIR, 31X48, STERILE: Brand: MEDLINE

## (undated) DEVICE — NEEDLE SPINAL 22GA L3.5IN SPINOCAN

## (undated) DEVICE — FORCEPS BX L240CM JAW DIA2.4MM WRK CHN 2.8MM ORNG L CAP W/

## (undated) DEVICE — SPONGE,NEURO,0.5"X3",XR,STRL,LF,10/PK: Brand: MEDLINE

## (undated) DEVICE — SHEET, T, LAPAROTOMY, STERILE: Brand: MEDLINE

## (undated) DEVICE — GLOVE SURG SZ 65 THK91MIL LTX FREE SYN POLYISOPRENE

## (undated) DEVICE — TROCAR: Brand: KII SLEEVE

## (undated) DEVICE — UNDERPANTS MAT L XL SEAMLESS CLR CODE WAISTBAND KNIT

## (undated) DEVICE — GARMENT,MEDLINE,DVT,INT,CALF,MED, GEN2: Brand: MEDLINE

## (undated) DEVICE — COVER,MAYO STAND,STERILE: Brand: MEDLINE

## (undated) DEVICE — PROVE COVER: Brand: UNBRANDED

## (undated) DEVICE — TOTAL TRAY, 16FR 10ML SIL FOLEY, URN: Brand: MEDLINE

## (undated) DEVICE — DEVICE RELD SZ 0 L8IN GRN ABSRB FOR ENDO SILS STIT DEVS

## (undated) DEVICE — BLADE ES L4IN INSUL EDGE

## (undated) DEVICE — SUTURE MCRYL SZ 4-0 L18IN ABSRB UD L16MM PC-3 3/8 CIR PRIM Y845G

## (undated) DEVICE — PAD PT POS 36 IN SURGYPAD DISP

## (undated) DEVICE — COVER XR CASS W24XL25IN CLR POLY FLM DRAPEABLE W REL LNR

## (undated) DEVICE — SUTURE VCRL SZ 2-0 L18IN ABSRB UD CT-1 L36MM 1/2 CIR J839D

## (undated) DEVICE — SUTURE STRATAFIX SYMMETRIC PDS + SZ 0 L18IN ABSRB L36MM SXPP1A401

## (undated) DEVICE — AIRSEAL 5 MM ACCESS PORT AND LOW PROFILE OBTURATOR WITH BLADELESS OPTICAL TIP, 120 MM LENGTH: Brand: AIRSEAL

## (undated) DEVICE — SUTURE VCRL SZ 3-0 L18IN ABSRB UD L26MM SH 1/2 CIR J864D

## (undated) DEVICE — TOOL MR8-15MH22 MR8 15CM MATCH 2.2MM: Brand: MIDAS REX MR8

## (undated) DEVICE — CO2 CANNULA,SUPERSOFT, ADLT,7'O2,7'CO2: Brand: MEDLINE

## (undated) DEVICE — PAD,SANITARY,11 IN,MAXI,W/WINGS,N-STRL: Brand: MEDLINE

## (undated) DEVICE — GAUZE,SPONGE,FLUFF,6"X6.75",STRL,5/TRAY: Brand: MEDLINE

## (undated) DEVICE — TOWEL SURG SM W12XL18IN CLR PLAS TEAR RESIST REINF ADH FRST

## (undated) DEVICE — GLOVE ORANGE PI 7   MSG9070